# Patient Record
Sex: MALE | Race: WHITE | Employment: OTHER | URBAN - METROPOLITAN AREA
[De-identification: names, ages, dates, MRNs, and addresses within clinical notes are randomized per-mention and may not be internally consistent; named-entity substitution may affect disease eponyms.]

---

## 2017-01-26 ENCOUNTER — HOSPITAL ENCOUNTER (OUTPATIENT)
Dept: MRI IMAGING | Age: 77
Discharge: HOME | End: 2017-01-26
Attending: PSYCHIATRY & NEUROLOGY
Payer: MEDICARE

## 2017-01-26 DIAGNOSIS — F03.90 UNSPECIFIED DEMENTIA WITHOUT BEHAVIORAL DISTURBANCE: ICD-10-CM

## 2017-01-26 LAB — CREAT BLD-MCNC: 1.5 MG/DL (ref 0.6–1)

## 2017-01-26 PROCEDURE — 82565 ASSAY OF CREATININE: CPT

## 2017-01-26 PROCEDURE — 74011250636 HC RX REV CODE- 250/636: Performed by: PSYCHIATRY & NEUROLOGY

## 2017-01-26 PROCEDURE — 70553 MRI BRAIN STEM W/O & W/DYE: CPT

## 2017-01-26 PROCEDURE — A9577 INJ MULTIHANCE: HCPCS | Performed by: PSYCHIATRY & NEUROLOGY

## 2017-01-26 RX ORDER — SODIUM CHLORIDE 0.9 % (FLUSH) 0.9 %
10 SYRINGE (ML) INJECTION
Status: COMPLETED | OUTPATIENT
Start: 2017-01-26 | End: 2017-01-26

## 2017-01-26 RX ADMIN — Medication 10 ML: at 11:29

## 2017-01-26 RX ADMIN — GADOBENATE DIMEGLUMINE 18 ML: 529 INJECTION, SOLUTION INTRAVENOUS at 11:29

## 2017-03-31 PROBLEM — C64.9 RENAL CANCER (HCC): Status: ACTIVE | Noted: 2017-03-31

## 2017-03-31 PROCEDURE — 88112 CYTOPATH CELL ENHANCE TECH: CPT | Performed by: UROLOGY

## 2017-04-03 ENCOUNTER — HOSPITAL ENCOUNTER (OUTPATIENT)
Dept: LAB | Age: 77
Discharge: HOME | End: 2017-04-03

## 2017-09-29 PROCEDURE — 88112 CYTOPATH CELL ENHANCE TECH: CPT | Performed by: UROLOGY

## 2017-10-02 ENCOUNTER — HOSPITAL ENCOUNTER (OUTPATIENT)
Dept: LAB | Age: 77
Discharge: HOME | End: 2017-10-02

## 2017-10-09 ENCOUNTER — HOSPITAL ENCOUNTER (OUTPATIENT)
Dept: LAB | Age: 77
Discharge: HOME | End: 2017-10-09

## 2018-07-27 PROBLEM — C65.2: Status: ACTIVE | Noted: 2018-07-27

## 2018-07-27 PROBLEM — C66.2: Status: ACTIVE | Noted: 2018-07-27

## 2019-09-27 ENCOUNTER — HOSPITAL ENCOUNTER (OUTPATIENT)
Dept: GENERAL RADIOLOGY | Age: 79
Discharge: HOME | End: 2019-09-27
Payer: MEDICARE

## 2019-09-27 DIAGNOSIS — M54.6 THORACIC SPINE PAIN: ICD-10-CM

## 2019-09-27 PROCEDURE — 72072 X-RAY EXAM THORAC SPINE 3VWS: CPT

## 2019-10-10 ENCOUNTER — HOSPITAL ENCOUNTER (OUTPATIENT)
Dept: SURGERY | Age: 79
Discharge: HOME | End: 2019-10-10
Payer: MEDICARE

## 2019-10-10 VITALS
WEIGHT: 183.6 LBS | BODY MASS INDEX: 27.19 KG/M2 | HEIGHT: 69 IN | DIASTOLIC BLOOD PRESSURE: 67 MMHG | HEART RATE: 57 BPM | SYSTOLIC BLOOD PRESSURE: 149 MMHG | TEMPERATURE: 97.5 F | RESPIRATION RATE: 16 BRPM | OXYGEN SATURATION: 98 %

## 2019-10-10 LAB
GLUCOSE BLD STRIP.AUTO-MCNC: 86 MG/DL (ref 65–100)
HGB BLD-MCNC: 11.2 G/DL (ref 13.6–17.2)

## 2019-10-10 PROCEDURE — 85018 HEMOGLOBIN: CPT

## 2019-10-10 PROCEDURE — 82962 GLUCOSE BLOOD TEST: CPT

## 2019-10-10 RX ORDER — ASPIRIN 81 MG/1
81 TABLET ORAL
COMMUNITY
End: 2020-03-09

## 2019-10-10 NOTE — PERIOP NOTES
Patient confirms name and . Order to obtain consent  found in EHR and  matches case posting. Type 1b surgery, PAT assessment complete. Labs per surgeon: none  Labs per anesthesia protocol: Hgb  EKG: not required  Glucose: 86    Medication list nor Rx bottles visualized today. Patient provided with list of medications after discussing from memory. Instructions given to compare list to home medications and call PAT any corrections or additions. Understanding verbalized. Hibiclens and instructions given per hospital policy. Patient provided with and instructed on educational handouts including Guide to Surgery, Pain Management, Hand Hygiene, Blood Transfusion Education, and [facility] Brochure. Patient answered medical/surgical history questions at their best of ability. All prior to admission medications documented in [facility]. Patient instructed to continue previous medications as prescribed prior to surgery and to take the following medications the day of surgery according to anesthesia guidelines with a small sip of water: lebetalol. Patient instructed to hold all vitamins 7 days prior to surgery and NSAIDS 5 days prior to surgery, patient verbalized understanding. Medications to be held: Vitamins and supplements, holding Plavix per surgeon (permission to hold per Dr. Reji Jett in EHR- reports last dose as 10/8/2019). Patient has history of heart stents and stroke. Called Dr. Elfego Whitaker office and spoke to [facility] who states it's ok for patient to take 81 mg aspirin nightly while off Plavix. Patient and wife verbalize understanding of these instructions. Patient verbalizes understanding the following medications should not be taken morning of surgery: alogliptin, and that failure to follow these instructions may result in surgery being cancelled/ rescheduled.       Patient instructed on the following:  Arrive at [facility], time of arrival to be called the day before by 1700  NPO after midnight including gum, mints, and ice chips. Responsible adult must drive patient to the hospital, stay during surgery, and patient will require supervision 24 hours after anesthesia. Use antibacterial soap in shower the night before surgery and on the morning of surgery. Leave all valuables (money and jewelry) at home but bring insurance card and ID on       DOS. Do not wear make-up, nail polish, lotions, cologne, perfumes, powders, or oil on skin. Patient teach back successful and patient demonstrates knowledge of instruction.

## 2019-10-10 NOTE — PERIOP NOTES
Recent Results (from the past 12 hour(s))   GLUCOSE, POC    Collection Time: 10/10/19 12:37 PM   Result Value Ref Range    Glucose (POC) 86 65 - 100 mg/dL   HEMOGLOBIN    Collection Time: 10/10/19 12:45 PM   Result Value Ref Range    HGB 11.2 (L) 13.6 - 17.2 g/dL     Labs checked. WDL.

## 2019-10-14 ENCOUNTER — ANESTHESIA EVENT (OUTPATIENT)
Dept: SURGERY | Age: 79
End: 2019-10-14
Payer: MEDICARE

## 2019-10-14 NOTE — ANESTHESIA PREPROCEDURE EVALUATION
Anesthetic History   No history of anesthetic complications            Review of Systems / Medical History  Patient summary reviewed and pertinent labs reviewed    Pulmonary    COPD: mild    Sleep apnea: No treatment  Smoker         Neuro/Psych       CVA (Left eye impairment)       Cardiovascular    Hypertension: well controlled          CAD, CABG/stent, cardiac stents and CABG ('01 - on bASA)    Exercise tolerance: <4 METS     GI/Hepatic/Renal     GERD: well controlled      PUD     Endo/Other    Diabetes: well controlled, type 2         Other Findings            Physical Exam    Airway  Mallampati: II  TM Distance: 4 - 6 cm  Neck ROM: normal range of motion   Mouth opening: Normal     Cardiovascular  Regular rate and rhythm,  S1 and S2 normal,  no murmur, click, rub, or gallop  Rhythm: regular  Rate: normal         Dental    Dentition: Edentulous and Poor dentition     Pulmonary  Breath sounds clear to auscultation               Abdominal         Other Findings            Anesthetic Plan    ASA: 3  Anesthesia type: general          Induction: Intravenous  Anesthetic plan and risks discussed with: Patient and Spouse

## 2019-10-15 ENCOUNTER — ANESTHESIA (OUTPATIENT)
Dept: SURGERY | Age: 79
End: 2019-10-15
Payer: MEDICARE

## 2019-10-15 ENCOUNTER — HOSPITAL ENCOUNTER (OUTPATIENT)
Age: 79
Setting detail: OUTPATIENT SURGERY
Discharge: HOME | End: 2019-10-15
Attending: UROLOGY | Admitting: UROLOGY
Payer: MEDICARE

## 2019-10-15 VITALS
OXYGEN SATURATION: 95 % | SYSTOLIC BLOOD PRESSURE: 172 MMHG | WEIGHT: 180.4 LBS | DIASTOLIC BLOOD PRESSURE: 70 MMHG | HEIGHT: 69 IN | BODY MASS INDEX: 26.72 KG/M2 | RESPIRATION RATE: 12 BRPM | TEMPERATURE: 98.6 F | HEART RATE: 63 BPM

## 2019-10-15 LAB
GLUCOSE BLD STRIP.AUTO-MCNC: 107 MG/DL (ref 65–100)
POTASSIUM BLD-SCNC: 5.1 MMOL/L (ref 3.5–5.1)

## 2019-10-15 PROCEDURE — 74011250636 HC RX REV CODE- 250/636: Performed by: ANESTHESIOLOGY

## 2019-10-15 PROCEDURE — 77030010509 HC AIRWY LMA MSK TELE -A: Performed by: ANESTHESIOLOGY

## 2019-10-15 PROCEDURE — 76210000006 HC OR PH I REC 0.5 TO 1 HR: Performed by: UROLOGY

## 2019-10-15 PROCEDURE — 77030040361 HC SLV COMPR DVT MDII -B: Performed by: UROLOGY

## 2019-10-15 PROCEDURE — 74011250636 HC RX REV CODE- 250/636

## 2019-10-15 PROCEDURE — 77030019940 HC BLNKT HYPOTHRM STRY -B: Performed by: ANESTHESIOLOGY

## 2019-10-15 PROCEDURE — 76210000020 HC REC RM PH II FIRST 0.5 HR: Performed by: UROLOGY

## 2019-10-15 PROCEDURE — 76060000032 HC ANESTHESIA 0.5 TO 1 HR: Performed by: UROLOGY

## 2019-10-15 PROCEDURE — 74011250636 HC RX REV CODE- 250/636: Performed by: UROLOGY

## 2019-10-15 PROCEDURE — 84132 ASSAY OF SERUM POTASSIUM: CPT

## 2019-10-15 PROCEDURE — 88305 TISSUE EXAM BY PATHOLOGIST: CPT

## 2019-10-15 PROCEDURE — 74011000250 HC RX REV CODE- 250

## 2019-10-15 PROCEDURE — 77030019927 HC TBNG IRR CYSTO BAXT -A: Performed by: UROLOGY

## 2019-10-15 PROCEDURE — 76010000138 HC OR TIME 0.5 TO 1 HR: Performed by: UROLOGY

## 2019-10-15 PROCEDURE — 77030018832 HC SOL IRR H20 ICUM -A: Performed by: UROLOGY

## 2019-10-15 PROCEDURE — 82962 GLUCOSE BLOOD TEST: CPT

## 2019-10-15 RX ORDER — PROPOFOL 10 MG/ML
INJECTION, EMULSION INTRAVENOUS AS NEEDED
Status: DISCONTINUED | OUTPATIENT
Start: 2019-10-15 | End: 2019-10-15 | Stop reason: HOSPADM

## 2019-10-15 RX ORDER — ONDANSETRON 2 MG/ML
INJECTION INTRAMUSCULAR; INTRAVENOUS AS NEEDED
Status: DISCONTINUED | OUTPATIENT
Start: 2019-10-15 | End: 2019-10-15 | Stop reason: HOSPADM

## 2019-10-15 RX ORDER — ACETAMINOPHEN 500 MG
1000 TABLET ORAL ONCE
Status: DISCONTINUED | OUTPATIENT
Start: 2019-10-15 | End: 2019-10-15 | Stop reason: HOSPADM

## 2019-10-15 RX ORDER — OXYCODONE HYDROCHLORIDE 5 MG/1
5 TABLET ORAL
Status: DISCONTINUED | OUTPATIENT
Start: 2019-10-15 | End: 2019-10-15 | Stop reason: HOSPADM

## 2019-10-15 RX ORDER — SODIUM CHLORIDE 0.9 % (FLUSH) 0.9 %
5-40 SYRINGE (ML) INJECTION EVERY 8 HOURS
Status: DISCONTINUED | OUTPATIENT
Start: 2019-10-15 | End: 2019-10-15 | Stop reason: HOSPADM

## 2019-10-15 RX ORDER — OXYCODONE HYDROCHLORIDE 5 MG/1
10 TABLET ORAL
Status: DISCONTINUED | OUTPATIENT
Start: 2019-10-15 | End: 2019-10-15 | Stop reason: HOSPADM

## 2019-10-15 RX ORDER — EPHEDRINE SULFATE 50 MG/ML
INJECTION, SOLUTION INTRAVENOUS AS NEEDED
Status: DISCONTINUED | OUTPATIENT
Start: 2019-10-15 | End: 2019-10-15 | Stop reason: HOSPADM

## 2019-10-15 RX ORDER — CEFAZOLIN SODIUM/WATER 2 G/20 ML
2 SYRINGE (ML) INTRAVENOUS
Status: COMPLETED | OUTPATIENT
Start: 2019-10-15 | End: 2019-10-15

## 2019-10-15 RX ORDER — LIDOCAINE HYDROCHLORIDE 20 MG/ML
INJECTION, SOLUTION EPIDURAL; INFILTRATION; INTRACAUDAL; PERINEURAL AS NEEDED
Status: DISCONTINUED | OUTPATIENT
Start: 2019-10-15 | End: 2019-10-15 | Stop reason: HOSPADM

## 2019-10-15 RX ORDER — DIPHENHYDRAMINE HYDROCHLORIDE 50 MG/ML
12.5 INJECTION, SOLUTION INTRAMUSCULAR; INTRAVENOUS
Status: DISCONTINUED | OUTPATIENT
Start: 2019-10-15 | End: 2019-10-15 | Stop reason: HOSPADM

## 2019-10-15 RX ORDER — SODIUM CHLORIDE 0.9 % (FLUSH) 0.9 %
5-40 SYRINGE (ML) INJECTION AS NEEDED
Status: DISCONTINUED | OUTPATIENT
Start: 2019-10-15 | End: 2019-10-15 | Stop reason: HOSPADM

## 2019-10-15 RX ORDER — NALOXONE HYDROCHLORIDE 0.4 MG/ML
0.2 INJECTION, SOLUTION INTRAMUSCULAR; INTRAVENOUS; SUBCUTANEOUS AS NEEDED
Status: DISCONTINUED | OUTPATIENT
Start: 2019-10-15 | End: 2019-10-15 | Stop reason: HOSPADM

## 2019-10-15 RX ORDER — FENTANYL CITRATE 50 UG/ML
INJECTION, SOLUTION INTRAMUSCULAR; INTRAVENOUS AS NEEDED
Status: DISCONTINUED | OUTPATIENT
Start: 2019-10-15 | End: 2019-10-15 | Stop reason: HOSPADM

## 2019-10-15 RX ORDER — FENTANYL CITRATE 50 UG/ML
100 INJECTION, SOLUTION INTRAMUSCULAR; INTRAVENOUS ONCE
Status: DISCONTINUED | OUTPATIENT
Start: 2019-10-15 | End: 2019-10-15 | Stop reason: HOSPADM

## 2019-10-15 RX ORDER — DEXAMETHASONE SODIUM PHOSPHATE 4 MG/ML
INJECTION, SOLUTION INTRA-ARTICULAR; INTRALESIONAL; INTRAMUSCULAR; INTRAVENOUS; SOFT TISSUE AS NEEDED
Status: DISCONTINUED | OUTPATIENT
Start: 2019-10-15 | End: 2019-10-15 | Stop reason: HOSPADM

## 2019-10-15 RX ORDER — HYDROMORPHONE HYDROCHLORIDE 2 MG/ML
0.5 INJECTION, SOLUTION INTRAMUSCULAR; INTRAVENOUS; SUBCUTANEOUS
Status: DISCONTINUED | OUTPATIENT
Start: 2019-10-15 | End: 2019-10-15 | Stop reason: HOSPADM

## 2019-10-15 RX ORDER — SODIUM CHLORIDE, SODIUM LACTATE, POTASSIUM CHLORIDE, CALCIUM CHLORIDE 600; 310; 30; 20 MG/100ML; MG/100ML; MG/100ML; MG/100ML
100 INJECTION, SOLUTION INTRAVENOUS CONTINUOUS
Status: DISCONTINUED | OUTPATIENT
Start: 2019-10-15 | End: 2019-10-15 | Stop reason: HOSPADM

## 2019-10-15 RX ORDER — LIDOCAINE HYDROCHLORIDE 10 MG/ML
0.1 INJECTION INFILTRATION; PERINEURAL AS NEEDED
Status: DISCONTINUED | OUTPATIENT
Start: 2019-10-15 | End: 2019-10-15 | Stop reason: HOSPADM

## 2019-10-15 RX ORDER — FLUMAZENIL 0.1 MG/ML
0.2 INJECTION INTRAVENOUS
Status: DISCONTINUED | OUTPATIENT
Start: 2019-10-15 | End: 2019-10-15 | Stop reason: HOSPADM

## 2019-10-15 RX ADMIN — SODIUM CHLORIDE, SODIUM LACTATE, POTASSIUM CHLORIDE, AND CALCIUM CHLORIDE 100 ML/HR: 600; 310; 30; 20 INJECTION, SOLUTION INTRAVENOUS at 09:48

## 2019-10-15 RX ADMIN — LIDOCAINE HYDROCHLORIDE 60 MG: 20 INJECTION, SOLUTION EPIDURAL; INFILTRATION; INTRACAUDAL; PERINEURAL at 12:45

## 2019-10-15 RX ADMIN — DEXAMETHASONE SODIUM PHOSPHATE 4 MG: 4 INJECTION, SOLUTION INTRA-ARTICULAR; INTRALESIONAL; INTRAMUSCULAR; INTRAVENOUS; SOFT TISSUE at 12:55

## 2019-10-15 RX ADMIN — FENTANYL CITRATE 50 MCG: 50 INJECTION, SOLUTION INTRAMUSCULAR; INTRAVENOUS at 12:45

## 2019-10-15 RX ADMIN — PROPOFOL 150 MG: 10 INJECTION, EMULSION INTRAVENOUS at 12:45

## 2019-10-15 RX ADMIN — Medication 2 G: at 12:51

## 2019-10-15 RX ADMIN — EPHEDRINE SULFATE 5 MG: 50 INJECTION, SOLUTION INTRAVENOUS at 12:51

## 2019-10-15 RX ADMIN — ONDANSETRON 4 MG: 2 INJECTION INTRAMUSCULAR; INTRAVENOUS at 12:55

## 2019-10-15 NOTE — ANESTHESIA POSTPROCEDURE EVALUATION
Procedure(s):  CYSTOSCOPY W/ BLADDER BIOPSY. general    Anesthesia Post Evaluation      Multimodal analgesia: multimodal analgesia used between 6 hours prior to anesthesia start to PACU discharge  Patient location during evaluation: PACU  Patient participation: complete - patient participated  Level of consciousness: awake and alert  Pain management: adequate  Airway patency: patent  Anesthetic complications: no  Cardiovascular status: acceptable and hemodynamically stable  Respiratory status: acceptable  Hydration status: acceptable        Vitals Value Taken Time   /65 10/15/2019  1:41 PM   Temp 37 °C (98.6 °F) 10/15/2019  1:14 PM   Pulse 62 10/15/2019  1:43 PM   Resp 13 10/15/2019  1:43 PM   SpO2 97 % 10/15/2019  1:43 PM   Vitals shown include unvalidated device data.

## 2019-10-15 NOTE — BRIEF OP NOTE
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

## 2019-10-15 NOTE — OP NOTES
[facility]  OPERATIVE REPORT    Name:  Benson Vines  MR#:  [id number]  :  1940  ACCOUNT #:  [de-identified]  DATE OF SERVICE:  10/15/2019    PREOPERATIVE DIAGNOSIS:  Bladder tumor. POSTOPERATIVE DIAGNOSIS:  Bladder tumor. PROCEDURE PERFORMED:  Cystoscopy and bladder biopsy. SURGEON:  Anayeli Oh MD    ASSISTANT:  None. ANESTHESIA:  General.    COMPLICATIONS:  None. SPECIMENS REMOVED:  Bladder tumor. IMPLANTS:  None. ESTIMATED BLOOD LOSS:  Minimal.    FINDINGS:  A 1-cm papillary tumor posterior to the right ureteral orifice. DESCRIPTION OF PROCEDURE:  The patient was given a general anesthetic, placed in the dorsal lithotomy position. He was prepped and draped in sterile fashion. I inserted a 22-[place]at[place] cystoscope into the urethra using video camera and 30 degrees lens. The anterior urethra was normal.  Prostate is status post prostatectomy. Bladder was entered and both ureteral orifices were visualized. There is a small 1-cm papillary tumor just cephalad to the right ureteral orifice. A cold cup biopsy forceps were used to remove the tumor and it was sent in formalin to pathology. The base of this was cauterized with a Bugbee electrode. Hemostasis appeared good. Scope was removed after draining the bladder. PLAN:  He will be discharged home. I will contact the patient about his pathology and arrange followup.         Tita Santos MD    [id number]  D:  10/15/2019 13:25  T:  10/15/2019 19:02  JOB #:  [id number]

## 2019-10-15 NOTE — DISCHARGE INSTRUCTIONS
Restart Plavix in 3 days. Cystoscopy: What to Expect at [address]  A cystoscopy is a procedure that lets a doctor look inside of the bladder and the urethra. The urethra is the tube that carries urine from the bladder to outside the body. The doctor uses a thin, lighted tube called a cystoscope to look for kidney or bladder stones, tumors, bleeding, or infection. After the cystoscopy, your urethra may be sore at first, and it may burn when you urinate for the first few days after the procedure. You may feel the need to urinate more often, and your urine may be pink. These symptoms should get better in 1 or 2 days. You will probably be able to go back to work or most of your usual activities in 1 or 2 days. How can you care for yourself at home? Activity  · Rest when you feel tired. Getting enough sleep will help you recover. · Try to walk each day. Start by walking a little more than you did the day before. Bit by bit, increase the amount you walk. Walking boosts blood flow and helps prevent pneumonia and constipation. · Avoid strenuous activities, such as bicycle riding, jogging, weight lifting, or aerobic exercise, until your doctor says it is okay. · Ask your doctor when you can drive again. · Most people are able to return to work within 1 or 2 days after the procedure. · You may shower and take baths as usual.  · Ask your doctor when it is okay for you to have sex. Diet  · You can eat your normal diet. If your stomach is upset, try bland, low-fat foods like plain rice, broiled chicken, toast, and yogurt. · Drink plenty of fluids (unless your doctor tells you not to). Medicines  · Take pain medicines exactly as directed. ¨ If the doctor gave you a prescription medicine for pain, take it as prescribed. ¨ If you are not taking a prescription pain medicine, ask your doctor if you can take an over-the-counter medicine.   · If you think your pain medicine is making you sick to your stomach:  ¨ Take your medicine after meals (unless your doctor has told you not to). ¨ Ask your doctor for a different pain medicine. · If your doctor prescribed antibiotics, take them as directed. Do not stop taking them just because you feel better. You need to take the full course of antibiotics. Follow-up care is a key part of your treatment and safety. Be sure to make and go to all appointments, and call your doctor if you are having problems. It's also a good idea to know your test results and keep a list of the medicines you take. When should you call for help? Call 911 anytime you think you may need emergency care. For example, call if:  · You passed out (lost consciousness). · You have severe trouble breathing. · You have sudden chest pain and shortness of breath, or you cough up blood. · You have severe belly pain. Call your doctor now or seek immediate medical care if:  · You are sick to your stomach or cannot keep fluids down. · Your urine is still red or you see blood clots after you have urinated several times. · You have trouble passing urine or stool, especially if you have pain or swelling in your lower belly. · You have signs of a blood clot, such as:  ¨ Pain in your calf, back of the knee, thigh, or groin. ¨ Redness and swelling in your leg or groin. · You develop a fever or severe chills. · You have pain in your back just below your rib cage. This is called flank pain. Watch closely for changes in your health, and be sure to contact your doctor if:  · A burning feeling is normal for a day or two after the test, but call if it does not get better. · You have a frequent urge to urinate but can pass only small amounts of urine. · It is normal for the urine to have a pinkish color for a few days after the test, but call if it does not get better or if your urine is cloudy, smells bad, or has pus in it.     After general anesthesia or intravenous sedation, for 24 hours or while taking prescription Narcotics:  · Limit your activities  · A responsible adult needs to be with you for the next 24 hours  · Do not drive and operate hazardous machinery  · Do not make important personal or business decisions  · Do  not drink alcoholic beverages  · If you have not urinated within 8 hours after discharge, please contact your surgeon on call. · If you have sleep apnea and have a CPAP machine, please use it for all naps and sleeping. · Please use caution when taking narcotics and any of your home medications that may cause drowsiness. *  Please give a list of your current medications to your Primary Care Provider. *  Please update this list whenever your medications are discontinued, doses are      changed, or new medications (including over-the-counter products) are added. *  Please carry medication information at all times in case of emergency situations. These are general instructions for a healthy lifestyle:  No smoking/ No tobacco products/ Avoid exposure to second hand smoke  Surgeon General's Warning:  Quitting smoking now greatly reduces serious risk to your health. Obesity, smoking, and sedentary lifestyle greatly increases your risk for illness  A healthy diet, regular physical exercise & weight monitoring are important for maintaining a healthy lifestyle    You may be retaining fluid if you have a history of heart failure or if you experience any of the following symptoms:  Weight gain of 3 pounds or more overnight or 5 pounds in a week, increased swelling in our hands or feet or shortness of breath while lying flat in bed. Please call your doctor as soon as you notice any of these symptoms; do not wait until your next office visit.

## 2019-10-15 NOTE — H&P
[employer]   : 1940   HPI   [de-identified] y.o., male Has history of CRF (creatinine 1.8 in ). Has CAD, s/p CABG in     . History of renal stent by Dr. Rebeca Melo, possibly coronary   stents. Is on Plavix, goes to [facility]. Has CRF, creatinine 1.8. Has smoked since he was a teenager, currently 1 ppd. Has history   of stroke. Renal ultrasound done to workup in the left renal lesion. Shows   a 4.5-cm hypoechoic mass concerning for a solid mass such as   renal cell carcinoma. Located in left upper pole. Also a   nodular bladder mucosa possibly concerning for bladder tumor. MRI of the kidneys showed a left renal mass. Could not tell   whether it was a renal cell carcinoma or transitional cell   carcinoma. Had TURBT on May 23, 2013. . Pathology from his bladder tumor   shows high grade stage T1   urothelial cancer. Muscularis propria present and does not show   cancer. MAG 3 renal scan performed on 13---Very small left kidney   accounts for 16% of total renal function. Essentially normal   right renal function. No evidence of obstruction on either side. Serum creatinine of 1.84 on May 16, 2013. --S/P left hand-assisted laparoscopic nephrectomy 2013. 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 4.2 cm. In  creatinine was 1.73, hgb 10.5. Has had BCG x 6 in   . CT in  shows no evidence of cancer recurrence. Had BCG x 3 , finished in March and 2014. Polly Ovalle bCG x 3 in   . Need to continue maintenance BCG until 2017. Had atypical cytology, negative FISH in 2014. Negative cysto. Had CT 5-6-15:Impression:  1. Stable left nephrectomy. Right kidney stable with again a few   scattered tiny  cysts evident. Nothing acute seen in the abdomen and pelvis and   no evidence for  metastatic disease progression. Finished BCG maintenance. .   In , had atypical cytology, positive FISH.    CXR and CT were negative. In 5-16 had negative cysto, atypical cytology, positive FISH. Atypical cytology in 3-17. He has been having pain in his thoracic spine. Here for cysto.         Past Medical History:   Diagnosis Date    Allergic rhinitis, cause unspecified 2/27/2014    Anemia, unspecified 2/27/2014    Atherosclerosis of renal artery ([facility] [address]     2001    CAD (coronary artery disease) 2001    4 vessel cabg, cardiac stents prior to this    Cancer [facility]) 2013    bladder/ L kidney    Chronic airway obstruction, not elsewhere classified 2/27/2014    Chronic kidney disease     hx of CRF, creatinine 1.8    COPD     on no meds or oxygen; smoker of 60+ yrs; diminished lung sounds but clear 6/25/13    Coronary atherosclerosis of native coronary vessel 2/27/2014    Depressive disorder, not elsewhere classified 2/27/2014    Diabetes ([facility] [address] dx 2000    type 2, oral med, does not check his glucose, unsure last HA1c, rare episode hypo    Diverticulosis of colon (without mention of hemorrhage) 2/27/2014    Dyslipidemia     controlled with med    Esophagitis, unspecified 2/27/2014    Essential and other specified forms of tremor 2/27/2014    Essential hypertension, benign 2/27/2014    GERD (gastroesophageal reflux disease)     controlled with omeprazole    Gross hematuria 2/27/2014    Hematuria, unspecified 2/27/2014    Hypertension     controlled with meds    Malignant neoplasm of bladder, 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    Malignant neoplasm of trigone of urinary bladder ([facility] [address] 2/27/2014    Microscopic hematuria 2/27/2014    Osteoarthrosis, unspecified whether generalized or localized, unspecified site 2/27/2014    Other peripheral vascular disease(443.89) 2/27/2014    Polyneuropathy in diabetes(357.2) 2/27/2014    Proteinuria 2/27/2014    Psychiatric disorder     gets \"aggravated\", treated with Celexa    PUD (peptic ulcer disease) 1970's    Pure hypercholesterolemia 2/27/2014    Renal artery stenosis ([facility] [address] 2/27/2014    Renal sclerosis, unspecified 2/27/2014    left    Respiratory insufficiency 1/13/2016    Stroke [facility])     CVA x 2, (last was fall of 2011), no residual weakness; L eye impaired    Tobacco use disorder 2/27/2014    Unspecified gastritis and gastroduodenitis without mention of hemorrhage 2/27/2014    Unspecified hereditary and idiopathic peripheral neuropathy 2/27/2014    Unspecified sleep apnea     does not wear cpap    Unspecified transient cerebral ischemia 2/27/2014           Past Surgical History:   Procedure Laterality Date    CARDIAC SURG PROCEDURE UNLIST  2001    4 vessel CABG, cardiac stents    HX COLONOSCOPY      HX NEPHRECTOMY Left     HX UROLOGICAL      TURBT    VASCULAR SURGERY PROCEDURE UNLIST  2001    renal stent in L kidney            Current Outpatient Medications   Medication Sig Dispense Refill    alogliptin (NESINA) 12.5 mg tablet Take 12.5 mg by mouth daily.  citalopram (CELEXA) 40 mg tablet Take 1 Tab by mouth nightly. 90 Tab 3    cranberry conc-ascorbic acid 4,200-20 mg cap Take by mouth.  ascorbic acid (VITAMIN C) 500 mg tablet Take by mouth.  clopidogrel (PLAVIX) 75 mg tablet Take 75 mg by mouth daily. Last dose 7/2/13      selenium 200 mcg Tab Take 1 Tab by mouth every morning. Last dose 7/2/13      labetalol (NORMODYNE) 200 mg tablet Take 200 mg by mouth two (2) times a day.  omeprazole (PRILOSEC) 20 mg capsule Take 20 mg by mouth daily.  losartan (COZAAR) 25 mg tablet Take 50 mg by mouth nightly.  cyanocobalamin (VITAMIN B12) 100 mcg tablet Take 100 mcg by mouth daily. Last dose 7/2/13      saw palmetto fruit 450 mg Cap Take 1 Cap by mouth daily. Hold for surgery- last dose 7/2/13      simvastatin (ZOCOR) 40 mg tablet Take 40 mg by mouth nightly.                   Current Facility-Administered Medications   Medication Dose Route Frequency Provider Last Rate Last Dose    BCG Intravesical (RUDOLPH) injection 50 mg 50 mg IntraVESical Q7D Francena Snuffer, CMA  50 mg at 01/18/16 1100    BCG Intravesical (RUDOLPH) injection 50 mg 50 mg IntraVESical Q7D Kayode, Viky M  50 mg at 01/11/16 1100    BCG Intravesical (RUDOLPH) injection 50 mg 50 mg IntraVESical Q7D Francena Snuffer, CMA  50 mg at 01/04/16 1100    BCG Intravesical (RUDOLPH) injection 50 mg 50 mg IntraVESical Q7D Afua Macdonald, CMA      BCG Intravesical (RUDOLPH) injection 50 mg 50 mg IntraVESical Q7D MacdonaldAfua garcia, CMA      BCG Intravesical (RUDOLPH) injection 50 mg 50 mg IntraVESical Q7D Francena Snuffer, CMA  50 mg at 06/12/15 1107    BCG Intravesical (RUDOLPH) injection 50 mg 50 mg IntraVESical Q7D Alison Guevara      BCG Intravesical (RUDOLPH) injection 50 mg 50 mg IntraVESical Q7D Opal Dolores A, LPN      BCG Intravesical (RUDOLPH) injection 50 mg 50 mg IntraVESical Q7D Francena Snuffer, CMA  50 mg at 12/15/14 1000    BCG Intravesical (RUDOLPH) injection 50 mg 50 mg IntraVESical Q7D Opal Dolores A, LPN  50 mg at 87/03/70 1049    BCG Intravesical (RUDOLPH) injection 50 mg 50 mg IntraVESical Q7D Francena Snuffer, CMA  50 mg at 06/20/14 1050    BCG Intravesical (RUDOLPH) injection 50 mg 50 mg IntraVESical Q7D Kayode, Viky M  50 mg at 06/13/14 1032           Allergies   Allergen Reactions    Ciprofloxacin Other (comments)     weakness     Social History           Socioeconomic History    Marital status:      Spouse name: Not on file    Number of children: Not on file    Years of education: Not on file    Highest education level: Not on file   Occupational History    Not on file   Social Needs    Financial resource strain: Not on file    Food insecurity:     Worry: Not on file     Inability: Not on file    Transportation needs:     Medical: Not on file     Non-medical: Not on file   Tobacco Use    Smoking status: Current Every Day Smoker     Packs/day: 1.00     Years: 63.00     Pack years: 63.00    Smokeless tobacco: Never Used   Substance and Sexual Activity    Alcohol use: No    Drug use: No     Types: Prescription, OTC    Sexual activity: Not on file   Lifestyle    Physical activity:     Days per week: Not on file     Minutes per session: Not on file    Stress: Not on file   Relationships    Social connections:     Talks on phone: Not on file     Gets together: Not on file     Attends [employer] service: Not on file     Active member of club or organization: Not on file     Attends meetings of clubs or organizations: Not on file     Relationship status: Not on file    Intimate partner violence:     Fear of current or ex partner: Not on file     Emotionally abused: Not on file     Physically abused: Not on file     Forced sexual activity: Not on file   Other Topics Concern    Not on file   Social History Narrative    Not on file           Family History   Problem Relation Age of Onset    Diabetes Mother     Heart Disease Mother     Heart Attack Mother     Myocardial Infarction    Stroke Father     Alcohol abuse Father     Diabetes Son      Visit Vitals   /67   Pulse 64   Temp 95.7 °F (35.4 °C)   Physical Exam   General   Mental Status - Patient is alert and oriented X3. Build & Nutrition - Well nourished. Chest and Lung Exam   Chest and lung exam reveals - normal excursion with symmetric chest walls, quiet, even and easy respiratory effort with no use of accessory muscles and on auscultation, normal breath sounds, no adventitious sounds and normal vocal resonance. Cardiovascular   Cardiovascular examination reveals - normal heart sounds, regular rate and rhythm with no murmurs. Abdomen   Palpation/Percussion: Palpation and Percussion of the abdomen reveal - Non Tender, No Rebound tenderness, No Rigidity (guarding), No hepatosplenomegaly, No Palpable abdominal masses and Soft. Hernia - Bilateral - No Hernia(s) present.    UA - Dipstick          Results for orders placed or performed in visit on 09/06/19   AMB POC URINALYSIS DIP STICK AUTO W/ MICRO (PGU) Status: None   Result Value Ref Range Status    Glucose (UA POC) Negative Negative mg/dL Final    Bilirubin (UA POC) Negative Negative Final    Ketones (UA POC) Negative Negative Final    Specific gravity (UA POC) 1.025 1.001 - 1.035 Final    Blood (UA POC) Negative Negative Final    pH (UA POC) 5.0 4.6 - 8.0 Final    Protein (UA POC) 30  Negative Final    Urobilinogen (POC) 0.2 mg/dL  Final    Nitrites (UA POC) Negative Negative Final    Leukocyte esterase (UA POC) Negative Negative Final     UA - Micro   WBC - 0   RBC - 0   Bacteria - 0   Epith - 0   Cystoscopy Procedure Note   Procedure Details   The risks, benefits, complications, treatment options, and expected outcomes were discussed with the patient. The patient concurred with the proposed plan, giving informed consent. Cystoscopy was performed today under local anesthesia, using sterile technique. The patient was placed in the lithotomy position, prepped with Betadine, and draped in the usual sterile fashion. A (Flexible)cystoscope was used to inspect both the urethra and bladder . Findings:   Anterior urethra: normal without strictures. Prostatic urethra: Trilobar prostatic hyperplasia, without bladder neck contracture. Bladder: 1+ trabeculation, with lesions. 1 cm papillary lesion lateral to right UO. Ureteral orifice(s) was/were seen right and bilateral;   Complications: None; patient tolerated the procedure well. Condition: stable   Assessment and Plan     ICD-10-CM ICD-9-CM    1. Malignant neoplasm of trigone of urinary bladder (HCC) C67.0 188.0 AMB POC URINALYSIS DIP STICK AUTO W/ MICRO (PGU)      CYSTOURETHROSCOPY   2. 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 History of kidney cancer Z85.528 V10.52    4. Chronic midline thoracic back pain M54.6 724.1 AMB POC X-RAY THORACIC SPINE 4 VW    G89.29 338.29      Has small bladder cancer recurrence.  Recommend cysto and bladder biopsy. Stop Plavix before .

## 2020-03-09 PROBLEM — N18.4 CKD (CHRONIC KIDNEY DISEASE) STAGE 4, GFR 15-29 ML/MIN (HCC): Status: ACTIVE | Noted: 2020-03-09

## 2022-03-19 PROBLEM — C64.9 RENAL CANCER (HCC): Status: ACTIVE | Noted: 2017-03-31

## 2022-03-20 PROBLEM — C66.2: Status: ACTIVE | Noted: 2018-07-27

## 2022-03-20 PROBLEM — C65.2: Status: ACTIVE | Noted: 2018-07-27

## 2022-03-20 PROBLEM — N18.4 CKD (CHRONIC KIDNEY DISEASE) STAGE 4, GFR 15-29 ML/MIN (HCC): Status: ACTIVE | Noted: 2020-03-09

## 2022-03-28 PROBLEM — C66.2: Status: RESOLVED | Noted: 2018-07-27 | Resolved: 2022-03-28

## 2022-03-28 PROBLEM — C64.9 RENAL CANCER (HCC): Status: RESOLVED | Noted: 2017-03-31 | Resolved: 2022-03-28

## 2022-03-28 PROBLEM — C65.2: Status: RESOLVED | Noted: 2018-07-27 | Resolved: 2022-03-28

## 2022-06-20 ENCOUNTER — APPOINTMENT (OUTPATIENT)
Dept: CT IMAGING | Age: 82
DRG: 085 | End: 2022-06-20
Payer: MEDICARE

## 2022-06-20 ENCOUNTER — APPOINTMENT (OUTPATIENT)
Dept: GENERAL RADIOLOGY | Age: 82
DRG: 085 | End: 2022-06-20
Payer: MEDICARE

## 2022-06-20 ENCOUNTER — HOSPITAL ENCOUNTER (INPATIENT)
Age: 82
LOS: 9 days | Discharge: NURSING, REHAB OR LONG-TERM CARE | DRG: 085 | End: 2022-06-30
Attending: STUDENT IN AN ORGANIZED HEALTH CARE EDUCATION/TRAINING PROGRAM
Payer: MEDICARE

## 2022-06-20 DIAGNOSIS — K92.2 ACUTE UPPER GASTROINTESTINAL BLEEDING: ICD-10-CM

## 2022-06-20 DIAGNOSIS — I60.9 SAH (SUBARACHNOID HEMORRHAGE) (HCC): Primary | ICD-10-CM

## 2022-06-20 DIAGNOSIS — E87.0 HYPERNATREMIA: ICD-10-CM

## 2022-06-20 DIAGNOSIS — I16.1 HYPERTENSIVE EMERGENCY: ICD-10-CM

## 2022-06-20 LAB
ALBUMIN SERPL-MCNC: 3.3 G/DL (ref 3.2–4.6)
ALBUMIN/GLOB SERPL: 1 {RATIO} (ref 1.2–3.5)
ALP SERPL-CCNC: 59 U/L (ref 50–136)
ALT SERPL-CCNC: 12 U/L (ref 12–65)
ANION GAP SERPL CALC-SCNC: 3 MMOL/L (ref 7–16)
AST SERPL-CCNC: 14 U/L (ref 15–37)
BILIRUB SERPL-MCNC: 0.3 MG/DL (ref 0.2–1.1)
BUN SERPL-MCNC: 25 MG/DL (ref 8–23)
CALCIUM SERPL-MCNC: 9.1 MG/DL (ref 8.3–10.4)
CHLORIDE SERPL-SCNC: 112 MMOL/L (ref 98–107)
CO2 SERPL-SCNC: 31 MMOL/L (ref 21–32)
CREAT SERPL-MCNC: 1.6 MG/DL (ref 0.8–1.5)
ERYTHROCYTE [DISTWIDTH] IN BLOOD BY AUTOMATED COUNT: 15.7 % (ref 11.9–14.6)
GLOBULIN SER CALC-MCNC: 3.2 G/DL (ref 2.3–3.5)
GLUCOSE SERPL-MCNC: 172 MG/DL (ref 65–100)
HCT VFR BLD AUTO: 35.4 % (ref 41.1–50.3)
HGB BLD-MCNC: 11.3 G/DL (ref 13.6–17.2)
INR BLD: 1.1 (ref 0.9–1.2)
MCH RBC QN AUTO: 26.3 PG (ref 26.1–32.9)
MCHC RBC AUTO-ENTMCNC: 31.9 G/DL (ref 31.4–35)
MCV RBC AUTO: 82.3 FL (ref 79.6–97.8)
NRBC # BLD: 0 K/UL (ref 0–0.2)
PLATELET # BLD AUTO: 249 K/UL (ref 150–450)
PMV BLD AUTO: 12 FL (ref 9.4–12.3)
POTASSIUM SERPL-SCNC: 4.1 MMOL/L (ref 3.5–5.1)
PROT SERPL-MCNC: 6.5 G/DL (ref 6.3–8.2)
PT BLD: 12.9 SECS (ref 9.6–11.6)
RBC # BLD AUTO: 4.3 M/UL (ref 4.23–5.6)
SODIUM SERPL-SCNC: 146 MMOL/L (ref 136–145)
WBC # BLD AUTO: 8.3 K/UL (ref 4.3–11.1)

## 2022-06-20 PROCEDURE — 85610 PROTHROMBIN TIME: CPT

## 2022-06-20 PROCEDURE — 93005 ELECTROCARDIOGRAM TRACING: CPT | Performed by: STUDENT IN AN ORGANIZED HEALTH CARE EDUCATION/TRAINING PROGRAM

## 2022-06-20 PROCEDURE — 2500000003 HC RX 250 WO HCPCS: Performed by: STUDENT IN AN ORGANIZED HEALTH CARE EDUCATION/TRAINING PROGRAM

## 2022-06-20 PROCEDURE — 71045 X-RAY EXAM CHEST 1 VIEW: CPT

## 2022-06-20 PROCEDURE — 70496 CT ANGIOGRAPHY HEAD: CPT

## 2022-06-20 PROCEDURE — 96376 TX/PRO/DX INJ SAME DRUG ADON: CPT

## 2022-06-20 PROCEDURE — 80047 BASIC METABLC PNL IONIZED CA: CPT

## 2022-06-20 PROCEDURE — 2580000003 HC RX 258: Performed by: STUDENT IN AN ORGANIZED HEALTH CARE EDUCATION/TRAINING PROGRAM

## 2022-06-20 PROCEDURE — 96374 THER/PROPH/DIAG INJ IV PUSH: CPT

## 2022-06-20 PROCEDURE — 99285 EMERGENCY DEPT VISIT HI MDM: CPT

## 2022-06-20 PROCEDURE — 6360000004 HC RX CONTRAST MEDICATION: Performed by: STUDENT IN AN ORGANIZED HEALTH CARE EDUCATION/TRAINING PROGRAM

## 2022-06-20 PROCEDURE — 70450 CT HEAD/BRAIN W/O DYE: CPT

## 2022-06-20 PROCEDURE — 80053 COMPREHEN METABOLIC PANEL: CPT

## 2022-06-20 PROCEDURE — 4A03X5D MEASUREMENT OF ARTERIAL FLOW, INTRACRANIAL, EXTERNAL APPROACH: ICD-10-PCS | Performed by: FAMILY MEDICINE

## 2022-06-20 PROCEDURE — 85027 COMPLETE CBC AUTOMATED: CPT

## 2022-06-20 RX ORDER — SODIUM CHLORIDE 0.9 % (FLUSH) 0.9 %
10 SYRINGE (ML) INJECTION
Status: COMPLETED | OUTPATIENT
Start: 2022-06-20 | End: 2022-06-20

## 2022-06-20 RX ORDER — 0.9 % SODIUM CHLORIDE 0.9 %
100 INTRAVENOUS SOLUTION INTRAVENOUS ONCE
Status: COMPLETED | OUTPATIENT
Start: 2022-06-20 | End: 2022-06-20

## 2022-06-20 RX ORDER — NICARDIPINE HYDROCHLORIDE 0.1 MG/ML
2.5-15 INJECTION INTRAVENOUS CONTINUOUS
Status: DISCONTINUED | OUTPATIENT
Start: 2022-06-20 | End: 2022-06-21 | Stop reason: RX

## 2022-06-20 RX ADMIN — SODIUM CHLORIDE, PRESERVATIVE FREE 10 ML: 5 INJECTION INTRAVENOUS at 22:57

## 2022-06-20 RX ADMIN — NICARDIPINE HYDROCHLORIDE 5 MG/HR: 0.1 INJECTION INTRAVENOUS at 22:25

## 2022-06-20 RX ADMIN — IOPAMIDOL 100 ML: 755 INJECTION, SOLUTION INTRAVENOUS at 22:57

## 2022-06-20 RX ADMIN — NICARDIPINE HYDROCHLORIDE 15 MG/HR: 0.1 INJECTION INTRAVENOUS at 23:27

## 2022-06-20 RX ADMIN — SODIUM CHLORIDE 100 ML: 9 INJECTION, SOLUTION INTRAVENOUS at 22:57

## 2022-06-20 RX ADMIN — NICARDIPINE HYDROCHLORIDE 10 MG/HR: 0.1 INJECTION INTRAVENOUS at 22:38

## 2022-06-20 RX ADMIN — NICARDIPINE HYDROCHLORIDE 12.5 MG/HR: 0.1 INJECTION INTRAVENOUS at 22:46

## 2022-06-20 RX ADMIN — NICARDIPINE HYDROCHLORIDE 15 MG/HR: 0.1 INJECTION INTRAVENOUS at 23:07

## 2022-06-20 ASSESSMENT — PAIN SCALES - GENERAL: PAINLEVEL_OUTOF10: 6

## 2022-06-20 ASSESSMENT — ENCOUNTER SYMPTOMS
PHOTOPHOBIA: 0
VOMITING: 0
BACK PAIN: 0
ABDOMINAL PAIN: 0
SINUS PAIN: 0
DIARRHEA: 0
SHORTNESS OF BREATH: 0
NAUSEA: 0

## 2022-06-20 ASSESSMENT — PAIN - FUNCTIONAL ASSESSMENT: PAIN_FUNCTIONAL_ASSESSMENT: 0-10

## 2022-06-21 ENCOUNTER — APPOINTMENT (OUTPATIENT)
Dept: CT IMAGING | Age: 82
DRG: 085 | End: 2022-06-21
Payer: MEDICARE

## 2022-06-21 PROBLEM — E11.9 TYPE 2 DIABETES MELLITUS (HCC): Status: ACTIVE | Noted: 2022-06-21

## 2022-06-21 PROBLEM — I77.79 DISSECTION OF LEFT SUBCLAVIAN ARTERY (HCC): Status: ACTIVE | Noted: 2022-06-21

## 2022-06-21 PROBLEM — I74.8 SUBCLAVIAN ARTERY THROMBOSIS (HCC): Status: ACTIVE | Noted: 2022-06-21

## 2022-06-21 PROBLEM — F03.90 DEMENTIA (HCC): Status: ACTIVE | Noted: 2022-06-21

## 2022-06-21 PROBLEM — I60.9 SUBARACHNOID HEMORRHAGE (HCC): Status: ACTIVE | Noted: 2022-06-21

## 2022-06-21 PROBLEM — N18.4 CKD (CHRONIC KIDNEY DISEASE) STAGE 4, GFR 15-29 ML/MIN (HCC): Status: ACTIVE | Noted: 2020-03-09

## 2022-06-21 LAB
ANION GAP SERPL CALC-SCNC: 4 MMOL/L (ref 7–16)
BUN SERPL-MCNC: 36 MG/DL (ref 8–23)
CALCIUM SERPL-MCNC: 8.8 MG/DL (ref 8.3–10.4)
CHLORIDE SERPL-SCNC: 113 MMOL/L (ref 98–107)
CO2 SERPL-SCNC: 29 MMOL/L (ref 21–32)
CREAT SERPL-MCNC: 1.8 MG/DL (ref 0.8–1.5)
EKG ATRIAL RATE: 64 BPM
EKG DIAGNOSIS: NORMAL
EKG P AXIS: -88 DEGREES
EKG P-R INTERVAL: 114 MS
EKG Q-T INTERVAL: 468 MS
EKG QRS DURATION: 142 MS
EKG QTC CALCULATION (BAZETT): 476 MS
EKG R AXIS: 72 DEGREES
EKG T AXIS: 30 DEGREES
EKG VENTRICULAR RATE: 62 BPM
EST. AVERAGE GLUCOSE BLD GHB EST-MCNC: 126 MG/DL
GASTROCULT GAST QL: POSITIVE
GLUCOSE BLD STRIP.AUTO-MCNC: 179 MG/DL (ref 65–100)
GLUCOSE BLD STRIP.AUTO-MCNC: 184 MG/DL (ref 65–100)
GLUCOSE BLD STRIP.AUTO-MCNC: 199 MG/DL (ref 65–100)
GLUCOSE SERPL-MCNC: 290 MG/DL (ref 65–100)
HBA1C MFR BLD: 6 % (ref 4.2–6.3)
HCT VFR BLD AUTO: 29.4 % (ref 41.1–50.3)
HCT VFR BLD AUTO: 31.2 % (ref 41.1–50.3)
HGB BLD-MCNC: 9 G/DL (ref 13.6–17.2)
HGB BLD-MCNC: 9.9 G/DL (ref 13.6–17.2)
PH GAST: 3 [PH] (ref 1.5–3.5)
POTASSIUM SERPL-SCNC: 4.1 MMOL/L (ref 3.5–5.1)
SERVICE CMNT-IMP: ABNORMAL
SODIUM SERPL-SCNC: 146 MMOL/L (ref 136–145)

## 2022-06-21 PROCEDURE — 2500000003 HC RX 250 WO HCPCS: Performed by: INTERNAL MEDICINE

## 2022-06-21 PROCEDURE — A4216 STERILE WATER/SALINE, 10 ML: HCPCS | Performed by: INTERNAL MEDICINE

## 2022-06-21 PROCEDURE — 2700000000 HC OXYGEN THERAPY PER DAY

## 2022-06-21 PROCEDURE — 82962 GLUCOSE BLOOD TEST: CPT

## 2022-06-21 PROCEDURE — APPNB30 APP NON BILLABLE TIME 0-30 MINS: Performed by: NURSE PRACTITIONER

## 2022-06-21 PROCEDURE — C9113 INJ PANTOPRAZOLE SODIUM, VIA: HCPCS | Performed by: INTERNAL MEDICINE

## 2022-06-21 PROCEDURE — 2100000000 HC CCU R&B

## 2022-06-21 PROCEDURE — 80048 BASIC METABOLIC PNL TOTAL CA: CPT

## 2022-06-21 PROCEDURE — 2580000003 HC RX 258: Performed by: INTERNAL MEDICINE

## 2022-06-21 PROCEDURE — 6360000002 HC RX W HCPCS: Performed by: FAMILY MEDICINE

## 2022-06-21 PROCEDURE — 83036 HEMOGLOBIN GLYCOSYLATED A1C: CPT

## 2022-06-21 PROCEDURE — 36415 COLL VENOUS BLD VENIPUNCTURE: CPT

## 2022-06-21 PROCEDURE — 70450 CT HEAD/BRAIN W/O DYE: CPT

## 2022-06-21 PROCEDURE — 6360000002 HC RX W HCPCS: Performed by: INTERNAL MEDICINE

## 2022-06-21 PROCEDURE — 2500000003 HC RX 250 WO HCPCS: Performed by: STUDENT IN AN ORGANIZED HEALTH CARE EDUCATION/TRAINING PROGRAM

## 2022-06-21 PROCEDURE — 82271 OCCULT BLOOD OTHER SOURCES: CPT

## 2022-06-21 PROCEDURE — 2580000003 HC RX 258: Performed by: STUDENT IN AN ORGANIZED HEALTH CARE EDUCATION/TRAINING PROGRAM

## 2022-06-21 PROCEDURE — 6370000000 HC RX 637 (ALT 250 FOR IP): Performed by: STUDENT IN AN ORGANIZED HEALTH CARE EDUCATION/TRAINING PROGRAM

## 2022-06-21 PROCEDURE — 2580000003 HC RX 258: Performed by: FAMILY MEDICINE

## 2022-06-21 PROCEDURE — 6370000000 HC RX 637 (ALT 250 FOR IP): Performed by: FAMILY MEDICINE

## 2022-06-21 PROCEDURE — 6370000000 HC RX 637 (ALT 250 FOR IP): Performed by: INTERNAL MEDICINE

## 2022-06-21 PROCEDURE — 99223 1ST HOSP IP/OBS HIGH 75: CPT | Performed by: PSYCHIATRY & NEUROLOGY

## 2022-06-21 PROCEDURE — 99231 SBSQ HOSP IP/OBS SF/LOW 25: CPT | Performed by: SURGERY

## 2022-06-21 PROCEDURE — 85018 HEMOGLOBIN: CPT

## 2022-06-21 RX ORDER — PANTOPRAZOLE SODIUM 40 MG/1
40 TABLET, DELAYED RELEASE ORAL
Status: DISCONTINUED | OUTPATIENT
Start: 2022-06-21 | End: 2022-06-21

## 2022-06-21 RX ORDER — NICOTINE 21 MG/24HR
1 PATCH, TRANSDERMAL 24 HOURS TRANSDERMAL DAILY
Status: DISCONTINUED | OUTPATIENT
Start: 2022-06-21 | End: 2022-06-30 | Stop reason: HOSPADM

## 2022-06-21 RX ORDER — INSULIN LISPRO 100 [IU]/ML
0-4 INJECTION, SOLUTION INTRAVENOUS; SUBCUTANEOUS NIGHTLY
Status: DISCONTINUED | OUTPATIENT
Start: 2022-06-21 | End: 2022-06-30 | Stop reason: HOSPADM

## 2022-06-21 RX ORDER — SODIUM CHLORIDE 0.9 % (FLUSH) 0.9 %
5-40 SYRINGE (ML) INJECTION EVERY 12 HOURS SCHEDULED
Status: DISCONTINUED | OUTPATIENT
Start: 2022-06-21 | End: 2022-06-30 | Stop reason: HOSPADM

## 2022-06-21 RX ORDER — SODIUM CHLORIDE 9 MG/ML
INJECTION, SOLUTION INTRAVENOUS PRN
Status: DISCONTINUED | OUTPATIENT
Start: 2022-06-21 | End: 2022-06-30 | Stop reason: HOSPADM

## 2022-06-21 RX ORDER — INSULIN LISPRO 100 [IU]/ML
0-8 INJECTION, SOLUTION INTRAVENOUS; SUBCUTANEOUS
Status: DISCONTINUED | OUTPATIENT
Start: 2022-06-21 | End: 2022-06-30 | Stop reason: HOSPADM

## 2022-06-21 RX ORDER — LOSARTAN POTASSIUM 25 MG/1
25 TABLET ORAL DAILY
Status: DISCONTINUED | OUTPATIENT
Start: 2022-06-21 | End: 2022-06-24

## 2022-06-21 RX ORDER — ONDANSETRON 4 MG/1
4 TABLET, ORALLY DISINTEGRATING ORAL EVERY 8 HOURS PRN
Status: DISCONTINUED | OUTPATIENT
Start: 2022-06-21 | End: 2022-06-30 | Stop reason: HOSPADM

## 2022-06-21 RX ORDER — POLYETHYLENE GLYCOL 3350 17 G/17G
17 POWDER, FOR SOLUTION ORAL DAILY PRN
Status: DISCONTINUED | OUTPATIENT
Start: 2022-06-21 | End: 2022-06-30 | Stop reason: HOSPADM

## 2022-06-21 RX ORDER — SODIUM CHLORIDE 0.9 % (FLUSH) 0.9 %
5-40 SYRINGE (ML) INJECTION PRN
Status: DISCONTINUED | OUTPATIENT
Start: 2022-06-21 | End: 2022-06-30 | Stop reason: HOSPADM

## 2022-06-21 RX ORDER — LABETALOL 200 MG/1
200 TABLET, FILM COATED ORAL EVERY 12 HOURS SCHEDULED
Status: DISCONTINUED | OUTPATIENT
Start: 2022-06-21 | End: 2022-06-22

## 2022-06-21 RX ORDER — ACETAMINOPHEN 325 MG/1
650 TABLET ORAL
Status: COMPLETED | OUTPATIENT
Start: 2022-06-21 | End: 2022-06-21

## 2022-06-21 RX ORDER — GLUCAGON 1 MG/ML
1 KIT INJECTION PRN
Status: DISCONTINUED | OUTPATIENT
Start: 2022-06-21 | End: 2022-06-30 | Stop reason: HOSPADM

## 2022-06-21 RX ORDER — ACETAMINOPHEN 650 MG/1
650 SUPPOSITORY RECTAL EVERY 6 HOURS PRN
Status: DISCONTINUED | OUTPATIENT
Start: 2022-06-21 | End: 2022-06-30 | Stop reason: HOSPADM

## 2022-06-21 RX ORDER — ATORVASTATIN CALCIUM 10 MG/1
20 TABLET, FILM COATED ORAL DAILY
Status: DISCONTINUED | OUTPATIENT
Start: 2022-06-21 | End: 2022-06-30 | Stop reason: HOSPADM

## 2022-06-21 RX ORDER — ONDANSETRON 2 MG/ML
4 INJECTION INTRAMUSCULAR; INTRAVENOUS EVERY 6 HOURS PRN
Status: DISCONTINUED | OUTPATIENT
Start: 2022-06-21 | End: 2022-06-30 | Stop reason: HOSPADM

## 2022-06-21 RX ORDER — LABETALOL HYDROCHLORIDE 5 MG/ML
10 INJECTION, SOLUTION INTRAVENOUS EVERY 4 HOURS PRN
Status: DISCONTINUED | OUTPATIENT
Start: 2022-06-21 | End: 2022-06-23

## 2022-06-21 RX ORDER — CITALOPRAM 20 MG/1
40 TABLET ORAL DAILY
Status: DISCONTINUED | OUTPATIENT
Start: 2022-06-21 | End: 2022-06-30 | Stop reason: HOSPADM

## 2022-06-21 RX ORDER — DEXTROSE MONOHYDRATE 50 MG/ML
100 INJECTION, SOLUTION INTRAVENOUS PRN
Status: DISCONTINUED | OUTPATIENT
Start: 2022-06-21 | End: 2022-06-30 | Stop reason: HOSPADM

## 2022-06-21 RX ORDER — ACETAMINOPHEN 325 MG/1
650 TABLET ORAL EVERY 6 HOURS PRN
Status: DISCONTINUED | OUTPATIENT
Start: 2022-06-21 | End: 2022-06-30 | Stop reason: HOSPADM

## 2022-06-21 RX ADMIN — NICARDIPINE HYDROCHLORIDE 15 MG/HR: 0.1 INJECTION INTRAVENOUS at 00:00

## 2022-06-21 RX ADMIN — NICARDIPINE HYDROCHLORIDE 12.5 MG/HR: 0.1 INJECTION INTRAVENOUS at 00:49

## 2022-06-21 RX ADMIN — ACETAMINOPHEN 650 MG: 325 TABLET ORAL at 10:23

## 2022-06-21 RX ADMIN — NICARDIPINE HYDROCHLORIDE 15 MG/HR: 0.1 INJECTION INTRAVENOUS at 01:02

## 2022-06-21 RX ADMIN — SODIUM CHLORIDE, PRESERVATIVE FREE 10 ML: 5 INJECTION INTRAVENOUS at 09:08

## 2022-06-21 RX ADMIN — LABETALOL HYDROCHLORIDE 10 MG: 5 INJECTION INTRAVENOUS at 17:38

## 2022-06-21 RX ADMIN — SODIUM CHLORIDE 15 MG/HR: 9 INJECTION, SOLUTION INTRAVENOUS at 06:25

## 2022-06-21 RX ADMIN — ACETAMINOPHEN 650 MG: 325 TABLET ORAL at 02:23

## 2022-06-21 RX ADMIN — LABETALOL HYDROCHLORIDE 200 MG: 200 TABLET, FILM COATED ORAL at 20:56

## 2022-06-21 RX ADMIN — SODIUM CHLORIDE 10 MG/HR: 9 INJECTION, SOLUTION INTRAVENOUS at 12:59

## 2022-06-21 RX ADMIN — ATORVASTATIN CALCIUM 20 MG: 10 TABLET, FILM COATED ORAL at 09:04

## 2022-06-21 RX ADMIN — SODIUM CHLORIDE, PRESERVATIVE FREE 10 ML: 5 INJECTION INTRAVENOUS at 20:59

## 2022-06-21 RX ADMIN — ACETAMINOPHEN 650 MG: 325 TABLET ORAL at 16:23

## 2022-06-21 RX ADMIN — NICARDIPINE HYDROCHLORIDE 12.5 MG/HR: 0.1 INJECTION INTRAVENOUS at 01:17

## 2022-06-21 RX ADMIN — LABETALOL HYDROCHLORIDE 200 MG: 200 TABLET, FILM COATED ORAL at 11:44

## 2022-06-21 RX ADMIN — SODIUM CHLORIDE 15 MG/HR: 9 INJECTION, SOLUTION INTRAVENOUS at 03:00

## 2022-06-21 RX ADMIN — CITALOPRAM HYDROBROMIDE 40 MG: 20 TABLET ORAL at 09:04

## 2022-06-21 RX ADMIN — SODIUM CHLORIDE 15 MG/HR: 9 INJECTION, SOLUTION INTRAVENOUS at 04:39

## 2022-06-21 RX ADMIN — SODIUM CHLORIDE 12.5 MG/HR: 9 INJECTION, SOLUTION INTRAVENOUS at 08:48

## 2022-06-21 RX ADMIN — ONDANSETRON 4 MG: 2 INJECTION INTRAMUSCULAR; INTRAVENOUS at 12:53

## 2022-06-21 RX ADMIN — Medication 5 UNITS: at 15:37

## 2022-06-21 RX ADMIN — PANTOPRAZOLE SODIUM 40 MG: 40 TABLET, DELAYED RELEASE ORAL at 07:33

## 2022-06-21 RX ADMIN — SODIUM CHLORIDE 40 MG: 9 INJECTION INTRAMUSCULAR; INTRAVENOUS; SUBCUTANEOUS at 17:30

## 2022-06-21 RX ADMIN — LOSARTAN POTASSIUM 25 MG: 25 TABLET, FILM COATED ORAL at 11:44

## 2022-06-21 RX ADMIN — NICARDIPINE HYDROCHLORIDE 15 MG/HR: 0.1 INJECTION INTRAVENOUS at 01:43

## 2022-06-21 ASSESSMENT — PAIN DESCRIPTION - LOCATION
LOCATION: NECK

## 2022-06-21 ASSESSMENT — PAIN SCALES - WONG BAKER
WONGBAKER_NUMERICALRESPONSE: 0

## 2022-06-21 ASSESSMENT — PAIN DESCRIPTION - DESCRIPTORS
DESCRIPTORS: ACHING

## 2022-06-21 ASSESSMENT — PAIN SCALES - GENERAL
PAINLEVEL_OUTOF10: 0
PAINLEVEL_OUTOF10: 3
PAINLEVEL_OUTOF10: 3
PAINLEVEL_OUTOF10: 0
PAINLEVEL_OUTOF10: 0
PAINLEVEL_OUTOF10: 3
PAINLEVEL_OUTOF10: 3
PAINLEVEL_OUTOF10: 0
PAINLEVEL_OUTOF10: 3
PAINLEVEL_OUTOF10: 2

## 2022-06-21 ASSESSMENT — PAIN DESCRIPTION - ORIENTATION
ORIENTATION: POSTERIOR
ORIENTATION: MID;POSTERIOR
ORIENTATION: POSTERIOR
ORIENTATION: MID;POSTERIOR

## 2022-06-21 ASSESSMENT — PAIN - FUNCTIONAL ASSESSMENT
PAIN_FUNCTIONAL_ASSESSMENT: PREVENTS OR INTERFERES SOME ACTIVE ACTIVITIES AND ADLS
PAIN_FUNCTIONAL_ASSESSMENT: PREVENTS OR INTERFERES SOME ACTIVE ACTIVITIES AND ADLS
PAIN_FUNCTIONAL_ASSESSMENT: ACTIVITIES ARE NOT PREVENTED
PAIN_FUNCTIONAL_ASSESSMENT: ACTIVITIES ARE NOT PREVENTED

## 2022-06-21 NOTE — PROGRESS NOTES
Initial visit was made, emotional support and a spiritual presence were provided for the patient and his daughter.         Santos Whitlock, [address] [facility], [facility]

## 2022-06-21 NOTE — ED TRIAGE NOTES
Pt to ED with c/o headache and neck pain and hypertension. Per daughter pt did not take plavix or labetalol yesterday or this am. Per daughter wife called and stated that pt was c/o headache to the back of head and neck and broke out in a sweat. Per daughter pt hates hospitals but was willing to come in. Pt admits to headache getting better. Per daughter pt was given labetalol prior to arrival. Pt alert and in NAD at this time.

## 2022-06-21 NOTE — ED NOTES
EPOC results:     Na 147   K 4.3   Cl 109  CO2 28  bgl 151  Lac 1.15  Bun 24  Creatinine 1.6     Janessa Lowe, RN  06/20/22 [address] JOSE A Pressley  06/20/22 0768

## 2022-06-21 NOTE — CONSULTS
[address]   [address] [facility] FAX: [phone]    Coral Cortez  1940    Chief Complaint   Patient presents with    Headache    Hypertension           HPI   Mr. Coral Cortez is a 79y.o. year old male who presented with a headache last night and was found to have a subarachnoid hemorrhage. Underwent a CTA of his neck was found to have a concern for dissection of his left subclavian. Patient has a history of tobacco abuse.     Current Facility-Administered Medications   Medication Dose Route Frequency Provider Last Rate Last Admin    citalopram (CELEXA) tablet 40 mg  40 mg Oral Daily Coretta Gitelman, MD        pantoprazole (PROTONIX) tablet 40 mg  40 mg Oral QAM AC Coretta Gitelman, MD        atorvastatin (LIPITOR) tablet 20 mg  20 mg Oral Daily Coretta Gitelman, MD        sodium chloride flush 0.9 % injection 5-40 mL  5-40 mL IntraVENous 2 times per day Coretta Gitelman, MD        sodium chloride flush 0.9 % injection 5-40 mL  5-40 mL IntraVENous PRN Coretta Gitelman, MD        0.9 % sodium chloride infusion   IntraVENous PRN Coretta Gitelman, MD        ondansetron (ZOFRAN-ODT) disintegrating tablet 4 mg  4 mg Oral Q8H PRN Coretta Gitelman, MD        Or    [facility]ron [facility]) injection 4 mg  4 mg IntraVENous Q6H PRN Coretta Gitelman, MD        polyethylene glycol [facility]) packet 17 g  17 g Oral Daily PRN Coretta Gitelman, MD        acetaminophen (TYLENOL) tablet 650 mg  650 mg Oral Q6H PRN Coretta Gitelman, MD        Or    acetaminophen (TYLENOL) suppository 650 mg  650 mg Rectal Q6H PRN Coretta Gitelman, MD        nicotine (NICODERM CQ) 14 MG/24HR 1 patch  1 patch TransDERmal Daily Coretta Gitelman, MD        niCARdipine (CARDENE) 25 mg in sodium chloride 0.9 % 250 mL infusion (Ttry1Asg)  2.5-15 mg/hr IntraVENous Continuous Yuri Taylor,  mL/hr at 06/21/22 0625 15 mg/hr at 06/21/22 0816     Allergies   Allergen Reactions    Ciprofloxacin Other (See Comments)     weakness     Past Medical History:   Diagnosis Date    Allergic rhinitis, cause unspecified 2/27/2014    Anemia, unspecified 2/27/2014    Atherosclerosis of renal artery ([address]     2001    CAD (coronary artery disease) 2001    4 vessel cabg, cardiac stents prior to this    Cancer [facility]) 2013    bladder/ L kidney    Chronic airway obstruction, not elsewhere classified 2/27/2014    Chronic kidney disease     hx of CRF,  creatinine 1.8    Coronary atherosclerosis of native coronary vessel 2/27/2014    Depressive disorder, not elsewhere classified 2/27/2014    Diabetes ([address] dx 2000    type 2, oral med, does not check his glucose, A1C 6.4   9/9/2019, states very rare episode hypo    Diverticulosis of colon (without mention of hemorrhage) 2/27/2014    Dyslipidemia     controlled with med    Esophagitis, unspecified 2/27/2014    Essential and other specified forms of tremor 2/27/2014    Essential hypertension, benign 2/27/2014    GERD (gastroesophageal reflux disease)     controlled with omeprazole    Gross hematuria 2/27/2014    Hematuria, unspecified 2/27/2014    Hypertension     controlled with meds    Malignant neoplasm of bladder, 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    Malignant neoplasm of trigone of urinary bladder ([address] 2/27/2014    Microscopic hematuria 2/27/2014    Osteoarthrosis, unspecified whether generalized or localized, unspecified site 2/27/2014    Other peripheral vascular disease(443.89) 2/27/2014    Polyneuropathy in diabetes(357.2) 2/27/2014    Proteinuria 2/27/2014    Psychiatric disorder     gets \"aggravated\", treated with Celexa    PUD (peptic ulcer disease) 1970's    Pure hypercholesterolemia 2/27/2014    Renal artery stenosis ([address] 2/27/2014    Renal sclerosis, unspecified 2/27/2014    left    Respiratory insufficiency 1/13/2016    Stroke ([address]     CVA x 2, (last was fall of 2011),  no residual weakness; L eye impaired    Tobacco use disorder 2/27/2014    Unspecified gastritis and gastroduodenitis without mention of hemorrhage 2/27/2014    Unspecified hereditary and idiopathic peripheral neuropathy 2/27/2014    Unspecified sleep apnea     does not wear cpap    Unspecified transient cerebral ischemia 2/27/2014     Family History   Problem Relation Age of Onset    Diabetes Mother     Heart Disease Mother     Heart Attack Mother         Myocardial Infarction    Stroke Father     Alcohol Abuse Father     Diabetes Son      Past Surgical History:   Procedure Laterality Date    CARDIAC CATHETERIZATION      stents prior to CABG    COLONOSCOPY      KIDNEY REMOVAL Left     LIPOMA RESECTION  10/07/2019    [place] CARDIAC SURG PROCEDURE UNLIST  2001    4 vessel CABG, cardiac stents    UROLOGICAL SURGERY      TURBT    VASCULAR SURGERY  2001    renal stent in L kidney     Social History     Tobacco Use    Smoking status: Current Every Day Smoker     Packs/day: 1.00    Smokeless tobacco: Never Used   Substance Use Topics    Alcohol use: No        Review of Systems  Constitutional: Negative for fever and chills. HENT: Negative for congestion and sore throat. Skin: Negative for rash and itching. Eyes: Negative for blurred vision and double vision. Respiratory: Negative for cough and shortness of breath. Cardiovascular: Negative for chest pain, palpitations, claudication and leg swelling. Gastrointestinal: Negative for nausea, vomiting and abdominal pain. Genitourinary: Negative for dysuria, hematuria and flank pain. Musculoskeletal: Negative for joint pain and falls. Neurological: Negative for dizziness, sensory change, focal weakness, loss of consciousness and headaches. PHYSICAL EXAM       Constitutional: he appears well-developed. No distress. HENT: Hearing intact. Head: Atraumatic. Eyes: Pupils are equal, round, and reactive to light. Neck: Normal range of motion. Cardiovascular: Regular rhythm. Pulmonary/Chest: Effort normal and breath sounds normal. No respiratory distress. Abdominal: Soft. Bowel sounds are normal. he exhibits no distension. There is no tenderness. There is no guarding. No hernia. Musculoskeletal: Normal range of motion. Neurological: He is alert. CN II- XII grossly intact  Skin: Warm and dry. Vascular: The left brachial pulse palpable left radial pulse motor and sensory intact      Imaging Results  CTA showed no evidence of any atherosclerotic disease in his carotids there is a filling defect in his left subclavian chronic in nature distal subclavian fills    ASSESSMENT AND PLAN   Mr. Jake Decker is a 79y.o. 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 Patient is well-perfused with palpable radial pulses and motor and sensory intact. Ideally I would just put patient on antiplatelet therapy, however in the setting of her more hemorrhagic stroke, would not recommend that at this time. No further vascular intervention is needed. Vascular will sign off please call with questions or concerns. Elements of this note have been dictated using speech recognition software. As a result, errors of speech recognition may have occurred. 50 minutes of time was spent on this consult with greater than 50% of time spent face-to-face with the patient discussing the disease process, education and treatment options.

## 2022-06-21 NOTE — ACP (ADVANCE CARE PLANNING)
Advance Care Planning     General Advance Care Planning (ACP) Conversation    Date of Conversation:6/21/2022  Healthcare Decision Maker:    Primary Decision Maker: Giovanna Baker - Spouse - [phone]  Click here to complete Healthcare Decision Makers including selection of the Healthcare Decision Maker Relationship (ie \"Primary\"). Today we documented Decision Maker(s) consistent with Legal Next of Kin hierarchy. Content/Action Overview:  No current LW/HCPOA on file. Spouse legal NOK unless document presented stating otherwise. DNR per MD orders.      Length of Voluntary ACP Conversation in minutes:  <16 minutes (Non-Billable)    Esthela Ross RN

## 2022-06-21 NOTE — CONSULTS
[facility] Consult Note       Primary GI Physician: Gisela Aguilera MD  Consulting GI Physician: Billy Hogan MD  Referring Provider:  Abimbola Guerrero MD    Consult Date:  6/21/2022  Admit Date:  6/20/2022    Chief Complaint:  Hematemesis with Hgb drop    Subjective:     History of Present Illness:  Patient is a 79 y.o. male with PMHx including but not limited to HTN, CKD, dementia, CAD s/p CABG '01 and cardiac stents, hx of CVA, GERD, HTN, and GERD, who is seen in consultation at the request of Dr. Serena Larose for further evaluation of hematemesis. Patient presented to the ED yesterday with severe posterior headache and SBP > 200. Head CT scan revealed SAH adjacent to brainstem. Dr. Cam Roman with neurosurgery recommended CTA which showed an area of abnormality at subclavian, clot or small dissection. Patient was placed on cardene drip for BP control, currently turned off. [facility] was consulted and Dr. Cassidy Acosta stated that this was not a new finding and it does not need emergent intervention at this time. Per nursing, patient had a single episode of projectile vomiting of what appeared to be CG emesis. Patient denies N/V. No abdominal pain. + BMs, no melena or rectal bleeding. Last dose of Plavix was Friday, June 17th. He took a dose of headache powder over the weekend, but denies regular use of NSAIDs. He has a hx of chronic GERD which is well controlled with Prilosec 20mg QD. Presenting Hgb noted stable in the 11s range since March 2022, but dropped down to 9.9 today. Last EGD/colonoscopy 6/2012 for GERD, Heme +, anemia, wt loss, hx of colon polyp revealed mild gastritis, but colonoscopy belly sheet was difficult to read and colonoscopy report was incomplete.     PMH:  Past Medical History:   Diagnosis Date    Allergic rhinitis, cause unspecified 2/27/2014    Anemia, unspecified 2/27/2014    Atherosclerosis of renal artery ([address]     2001    CAD (coronary artery disease) 2001    4 vessel cabg, cardiac stents prior to this    Cancer [facility]) 2013    bladder/ L kidney    Chronic airway obstruction, not elsewhere classified 2/27/2014    Chronic kidney disease     hx of CRF,  creatinine 1.8    Coronary atherosclerosis of native coronary vessel 2/27/2014    Depressive disorder, not elsewhere classified 2/27/2014    Diabetes ([address] dx 2000    type 2, oral med, does not check his glucose, A1C 6.4   9/9/2019, states very rare episode hypo    Diverticulosis of colon (without mention of hemorrhage) 2/27/2014    Dyslipidemia     controlled with med    Esophagitis, unspecified 2/27/2014    Essential and other specified forms of tremor 2/27/2014    Essential hypertension, benign 2/27/2014    GERD (gastroesophageal reflux disease)     controlled with omeprazole    Gross hematuria 2/27/2014    Hematuria, unspecified 2/27/2014    Hypertension     controlled with meds    Malignant neoplasm of bladder, 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    Malignant neoplasm of trigone of urinary bladder ([address] 2/27/2014    Microscopic hematuria 2/27/2014    Osteoarthrosis, unspecified whether generalized or localized, unspecified site 2/27/2014    Other peripheral vascular disease(443.89) 2/27/2014    Polyneuropathy in diabetes(357.2) 2/27/2014    Proteinuria 2/27/2014    Psychiatric disorder     gets \"aggravated\", treated with Celexa    PUD (peptic ulcer disease) 1970's    Pure hypercholesterolemia 2/27/2014    Renal artery stenosis ([address] 2/27/2014    Renal sclerosis, unspecified 2/27/2014    left    Respiratory insufficiency 1/13/2016    Stroke ([address]     CVA x 2, (last was fall of 2011),  no residual weakness; L eye impaired    Tobacco use disorder 2/27/2014    Unspecified gastritis and gastroduodenitis without mention of hemorrhage 2/27/2014    Unspecified hereditary and idiopathic peripheral neuropathy 2/27/2014    Unspecified sleep apnea     does not wear cpap    Unspecified transient cerebral ischemia 2/27/2014       PSH:  Past Surgical History:   Procedure Laterality Date    CARDIAC CATHETERIZATION      stents prior to CABG    COLONOSCOPY      KIDNEY REMOVAL Left     LIPOMA RESECTION  10/07/2019    [place] CARDIAC SURG PROCEDURE UNLIST  2001    4 vessel CABG, cardiac stents    UROLOGICAL SURGERY      TURBT    VASCULAR SURGERY  2001    renal stent in L kidney       Allergies: Allergies   Allergen Reactions    Ciprofloxacin Other (See Comments)     weakness       Home Medications:  Prior to Admission medications    Medication Sig Start Date End Date Taking?  Authorizing Provider   ascorbic acid (VITAMIN C) 500 MG tablet Take by mouth    Ar Automatic Reconciliation   vitamin D3 (CHOLECALCIFEROL) 125 MCG (5000 UT) TABS tablet Take by mouth daily    Ar Automatic Reconciliation   citalopram (CELEXA) 40 MG tablet Take 40 mg by mouth 9/21/21   Ar Automatic Reconciliation   clopidogrel (PLAVIX) 75 MG tablet Take 75 mg by mouth daily    Ar Automatic Reconciliation   labetalol (NORMODYNE) 200 MG tablet Take 200 mg by mouth 2 times daily 9/21/21   Ar Automatic Reconciliation   losartan (COZAAR) 25 MG tablet Take 100 mg by mouth    Ar Automatic Reconciliation   omeprazole (PRILOSEC) 20 MG delayed release capsule Take 20 mg by mouth    Ar Automatic Reconciliation   simvastatin (ZOCOR) 40 MG tablet Take 40 mg by mouth    Ar Automatic Reconciliation       Hospital Medications:  Current Facility-Administered Medications   Medication Dose Route Frequency    citalopram (CELEXA) tablet 40 mg  40 mg Oral Daily    atorvastatin (LIPITOR) tablet 20 mg  20 mg Oral Daily    sodium chloride flush 0.9 % injection 5-40 mL  5-40 mL IntraVENous 2 times per day    sodium chloride flush 0.9 % injection 5-40 mL  5-40 mL IntraVENous PRN    0.9 % sodium chloride infusion   IntraVENous PRN    ondansetron (ZOFRAN-ODT) disintegrating tablet 4 mg  4 mg Oral Q8H PRN    Or    ondansetron (ZOFRAN) injection 4 mg  4 mg IntraVENous Q6H PRN    polyethylene glycol (GLYCOLAX) packet 17 g  17 g Oral Daily PRN    acetaminophen (TYLENOL) tablet 650 mg  650 mg Oral Q6H PRN    Or    acetaminophen (TYLENOL) suppository 650 mg  650 mg Rectal Q6H PRN    nicotine (NICODERM CQ) 14 MG/24HR 1 patch  1 patch TransDERmal Daily    niCARdipine (CARDENE) 25 mg in sodium chloride 0.9 % 250 mL infusion (Lvpy2Tdp)  2.5-15 mg/hr IntraVENous Continuous    losartan (COZAAR) tablet 25 mg  25 mg Oral Daily    labetalol (NORMODYNE) tablet 200 mg  200 mg Oral 2 times per day    glucose chewable tablet 16 g  4 tablet Oral PRN    dextrose bolus 10% 125 mL  125 mL IntraVENous PRN    Or    dextrose bolus 10% 250 mL  250 mL IntraVENous PRN    glucagon injection 1 mg  1 mg IntraMUSCular PRN    dextrose 5 % solution  100 mL/hr IntraVENous PRN    insulin lispro (HUMALOG) injection vial 0-8 Units  0-8 Units SubCUTAneous TID WC    insulin lispro (HUMALOG) injection vial 0-4 Units  0-4 Units SubCUTAneous Nightly    pantoprazole (PROTONIX) 40 mg in sodium chloride (PF) 10 mL injection  40 mg IntraVENous BID       Social History:  Social History     Tobacco Use    Smoking status: Current Every Day Smoker     Packs/day: 1.00    Smokeless tobacco: Never Used   Substance Use Topics    Alcohol use: No       Pt denies any history of drug use, blood transfusions, or tattoos. Family History:  Family History   Problem Relation Age of Onset    Diabetes Mother     Heart Disease Mother     Heart Attack Mother         Myocardial Infarction    Stroke Father     Alcohol Abuse Father     Diabetes Son        Review of Systems:  A detailed 10 system ROS is obtained, with pertinent positives as listed above. All others are negative.     Diet:  Regular    Objective:     Physical Exam:  Vitals:  BP (!) 130/58   Pulse 75   Temp 97.9 °F (36.6 °C) (Oral)   Resp 21   Ht 5' 9\" (1.753 m)   Wt 175 lb 11.3 oz (79.7 kg)   SpO2 91%   BMI 25.95 kg/m² Gen:  Pt is alert, cooperative, elderly male in NAD  Skin:  Extremities and face reveal no rashes. HEENT: Sclerae anicteric. Extra-occular muscles are intact. No oral ulcers. No abnormal pigmentation of the lips. The neck is supple. Cardiovascular: RRR. No murmurs, gallops, or rubs. Respiratory:  Comfortable breathing with no accessory muscle use. Clear breath sounds anteriorly with no wheezes, rales, or rhonchi. GI:  Abdomen nondistended, soft, and nontender. Normal active bowel sounds. No enlargement of the liver or spleen. No masses palpable. Rectal:  Deferred  Musculoskeletal:  No pitting edema of the lower legs. Neurological:  Gross memory appears intact. Patient is alert and oriented x2, not oriented to time/date  Psychiatric:  Mood appears appropriate with judgement intact. Lymphatic:  No cervical or supraclavicular adenopathy. Laboratory:    Recent Labs     06/20/22  2204 06/20/22  2222 06/21/22  0448 06/21/22  1305   WBC 8.3  --   --   --    HGB 11.3*  --   --  9.9*   HCT 35.4*  --   --  31.2*     --   --   --    MCV 82.3  --   --   --    *  --  146*  --    K 4.1  --  4.1  --    *  --  113*  --    CO2 31  --  29  --    BUN 25*  --  36*  --    AST 14*  --   --   --    ALT 12  --   --   --    INR  --  1.1  --   --           Assessment:     Principal Problem:    SAH (subarachnoid hemorrhage) (HCC)    Active Problems:    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HCC)    Dementia (HCC)    Type 2 diabetes mellitus (HCC)    Tobacco use disorder    Essential hypertension, benign    CKD (chronic kidney disease) stage 4, GFR 15-29 ml/min ([address]    79 y.o. male with PMHx of HTN, CKD, dementia, CAD s/p CABG '01 and cardiac stents, hx of CVA, GERD, HTN, and GERD, who is seen in consultation at the request of Dr. Khalida Luciano for further evaluation of hematemesis in the setting of Plavix. Patient presents with severe headache and SBP > 200. Head CT scan revealed SAH.  He had a single episode of CG emesis witnessed by RN around noon today. Hgb dropped from 11s range to 9.9. Last EGD in 2012 revealed gastritis. Last dose of Plavix was Friday, June 17th. Consider PUD, gastroduodenitis, AVMs, neoplasm, etc.     Plan:     Poor candidate for endoscopy and sedation due to recent [facility]. Consider emergent EGD if patient demonstrates or has ongoing evidence of GI bleeding with further drop in Hgb. Otherwise, monitor H/H, transfuse as needed, and continue PPI IV BID. Following. Patient is seen and examined in collaboration with Dr. Mukund Delarosa. Assessment and plan as per Dr. Mega Martinez.     Jenise Hernandez PA-C  [facility]

## 2022-06-21 NOTE — PROGRESS NOTES
NEUROSURGERY  83 YO  MALE  WITH SUDDEN  ONSET OF  HA   AND NECK PAIN SINCE THIS  AM.  BP  210/74  ON PLAVIX EXCEPT LAST TWO DAYS    CT:  SAH AT LEVEL OF FORAMEN MAGNUM  NO ICH  NO HYDROCEPHALUS  NEURO NEGATIVE  PER  ED    A/P AGREE WITH  STAT  CTA . IF + FOR ANEURYSM OR AVM, WILL NEED  TRANSFER TO [facility]. IF NEGATIVE THEN ADMIT TO ICU HOSPITALIST WITH NEUROLOGY CONSULTATION    AGREE WITH  BP  CONTROL TO  <466  SYSTOLIC    OTHERWISE NOTHING SURGICAL IF  CTA  IS  NEGATIVE. Sally Jaramillo     NO CHARGE TO PATIENT FOR THIS REVIEW    Arman Dakin MD

## 2022-06-21 NOTE — INTERDISCIPLINARY ROUNDS
Multi-D Rounds/Checklist (leapfrog):  Lines: can any be removed?: None       DVT Prophylaxis: Contraindicated  Vent: N/A  Nutrition Ordered/appropriate: Ordered  Can antibiotics or other drugs be stopped?: N/A  Consults needed: None  A: Is pain control adequate? Yes  B: Sedation break and SBT? N/A  C: Is sedation choice appropriate? N/A  D: Delirium/CAM-ICU? No  E: Mobility goals/appropriateness? Yes  F: Family update and plan? Spouse is primary contact and is being updated daily by primary attending and nursing staff.     Tereza Kennedy, APRN - CNP

## 2022-06-21 NOTE — PROGRESS NOTES
Bedside and verbal shift change report received from  [employer] (offgoing nurse). Report included the following information SBAR, Kardex, Intake/Output, MAR, Recent Results, Med Rec Status, Cardiac Rhythm SR, Alarm Parameters  and Dual Neuro Assessment. Dual skin assessment completed at bedside: WDL (list pertinent skin assessment findings)    Dual verification of gtts completed (name of gtts verified): n/a    Pt alert and oriented. Dual neuro assessment performed with Noe NARANJO. NIH 0. Wife at bedside. VSS on 4LNC.

## 2022-06-21 NOTE — PROGRESS NOTES
Bedside and verbal shift change report received from  [place] (offgoing nurse). Report included the following information SBAR, Kardex, ED Summary, Procedure Summary, Intake/Output, MAR, Recent Results, Cardiac Rhythm SR, Alarm Parameters  and Dual Neuro Assessment.      Dual skin assessment completed at bedside: WNL (list pertinent skin assessment findings)    Dual verification of gtts completed (name of gtts verified): Cardene @ 15mg/hr

## 2022-06-21 NOTE — ED PROVIDER NOTES
[facility] Emergency Department Provider Note                   PCP:                Yoli Velez DO               Age: 79 y.o. Sex: male       ICD-10-CM    1. SAH (subarachnoid hemorrhage) (HCC)  I60.9    2. Hypertensive emergency  I16.1        DISPOSITION         New Prescriptions    No medications on file       Orders Placed This Encounter   Procedures    Critical Care    XR CHEST PORTABLE    CT HEAD WO CONTRAST    CTA HEAD NECK W WO CONTRAST    CBC    Comprehensive Metabolic Panel    POCT CREATININE - BLOOD    POCT INR    POCT INR    EKG 12 Lead        Prabhjot Kalyaniguerline, DO 12:44 AM      MDM  Number of Diagnoses or Management Options  Hypertensive emergency  SAH (subarachnoid hemorrhage) (HCC)  Diagnosis management comments: 79-year-old male presents to the emergency department with sudden onset of headache, now complaining of severe neck pain. Denies any recent trauma. History of prior ischemic CVAs in the past.  Patient was seen shortly after arrival by myself with normal NIH, family is at bedside. Patient is significantly hypertensive blood pressure 233 systolic. Taken straight to CT scan. I reviewed this and then called radiology for their review as well, findings consistent with subarachnoid hemorrhage adjacent to the brainstem. Patient started on Cardene with a goal systolic BP less than 855. INR is normal.  Will obtain CTA. Normal white count, stable H&H. Neurosurgery immediately contacted who reviewed films, advised that patient would subarachnoid hemorrhage, if no evidence of large aneurysm the patient could be admitted to the ICU with strict blood pressure control. CTA did not reveal any evidence of aneurysm. It did show area of subclavian abnormality, question clot versus dissection, vascular surgery made aware advised this was nothing new and nothing that needed emergent intervention. Hospitalist consulted for ICU admission. Hospitalist agrees.   Patient and family voiced understanding and agreement. EKG shows sinus rhythm, rate 62, , , QTc 476, normal axis, PVCs noted, no significant ST elevation or depression. Amount and/or Complexity of Data Reviewed  Clinical lab tests: ordered and reviewed  Tests in the radiology section of CPT®: reviewed and ordered  Discussion of test results with the performing providers: yes  Decide to obtain previous medical records or to obtain history from someone other than the patient: yes  Obtain history from someone other than the patient: yes  Review and summarize past medical records: yes  Discuss the patient with other providers: yes  Independent visualization of images, tracings, or specimens: yes    Risk of Complications, Morbidity, and/or Mortality  Presenting problems: high  Diagnostic procedures: high  Management options: high Saul Toledo is a 79 y.o. male who presents to the Emergency Department with chief complaint of    Chief Complaint   Patient presents with    Headache    Hypertension      79-year-old male presents to the emergency department with family bedside. Patient reports a severe headache at approximately 815pm.  History of prior CVA in the past.  Patient also has not compliant with his blood pressure medication as well as his Plavix for the last 2 days secondary to his underlying schedule change. Patient denies any new neurologic findings. Denies any new vision changes. Family denies any change in speech patterns or other complaints. No recent trauma. Wife does report patient fell approximately 7 days ago. Again does not have his Plavix in 2 days but did receive his labetalol tonight after patient did complain of a headache. Patient also was given Goody's powder by his wife after patient complained of a headache. Review of Systems   Constitutional: Negative for chills and fever. HENT: Negative for congestion and sinus pain. Eyes: Negative for photophobia.    Respiratory: Polyneuropathy in diabetes(357.2) 2/27/2014    Proteinuria 2/27/2014    Psychiatric disorder     gets \"aggravated\", treated with Celexa    PUD (peptic ulcer disease) 1970's    Pure hypercholesterolemia 2/27/2014    Renal artery stenosis (HCC) 2/27/2014    Renal sclerosis, unspecified 2/27/2014    left    Respiratory insufficiency 1/13/2016    Stroke ([address]     CVA x 2, (last was fall of 2011),  no residual weakness; L eye impaired    Tobacco use disorder 2/27/2014    Unspecified gastritis and gastroduodenitis without mention of hemorrhage 2/27/2014    Unspecified hereditary and idiopathic peripheral neuropathy 2/27/2014    Unspecified sleep apnea     does not wear cpap    Unspecified transient cerebral ischemia 2/27/2014        Past Surgical History:   Procedure Laterality Date    CARDIAC CATHETERIZATION      stents prior to CABG    COLONOSCOPY      KIDNEY REMOVAL Left     LIPOMA RESECTION  10/07/2019    [place] CARDIAC SURG PROCEDURE UNLIST  2001    4 vessel CABG, cardiac stents    UROLOGICAL SURGERY      TURBT    VASCULAR SURGERY  2001    renal stent in L kidney        Family History   Problem Relation Age of Onset    Diabetes Mother     Heart Disease Mother     Heart Attack Mother         Myocardial Infarction    Stroke Father     Alcohol Abuse Father     Diabetes Son            Social Connections:     Frequency of Communication with Friends and Family: Not on file    Frequency of Social Gatherings with Friends and Family: Not on file    Attends Restoration Services: Not on file    Active Member of Clubs or Organizations: Not on file    Attends Club or Organization Meetings: Not on file    Marital Status: Not on file        Allergies   Allergen Reactions    Ciprofloxacin Other (See Comments)     weakness        Vitals signs and nursing note reviewed.    Patient Vitals for the past 4 hrs:   Temp Pulse Resp BP SpO2   06/21/22 0027 -- 69 17 (!) 124/53 96 %   06/21/22 0017 -- 70 16 (!) 141/51 91 %   06/21/22 0012 -- 71 18 (!) 142/55 94 %   06/21/22 0007 -- 70 16 127/70 92 %   06/21/22 0000 -- 69 16 (!) 144/53 91 %   06/20/22 2357 -- 72 18 (!) 133/52 94 %   06/20/22 2355 -- 71 18 (!) 139/54 93 %   06/20/22 2352 -- 70 15 (!) 145/59 91 %   06/20/22 2325 -- 77 16 (!) 148/55 92 %   06/20/22 2323 -- 82 21 (!) 144/59 95 %   06/20/22 2310 -- 75 15 (!) 131/95 93 %   06/20/22 2247 -- 69 17 (!) 184/62 94 %   06/20/22 2242 -- 68 18 (!) 160/58 95 %   06/20/22 2237 -- 68 17 (!) 181/67 94 %   06/20/22 2232 -- 66 17 (!) 208/78 96 %   06/20/22 2215 -- 68 -- (!) 223/83 98 %   06/20/22 2212 -- 68 15 (!) 212/92 98 %   06/20/22 2200 -- 57 14 (!) 211/74 97 %   06/20/22 2157 -- 70 16 (!) 257/78 --   06/20/22 2156 -- -- 17 -- --   06/20/22 2151 97.8 °F (36.6 °C) 62 -- (!) 223/87 100 %          Physical Exam  Vitals and nursing note reviewed. Constitutional:       General: He is in acute distress. Appearance: Normal appearance. Comments: Complaining of headache and neck pain   HENT:      Head: Normocephalic. Nose: Nose normal.      Mouth/Throat:      Mouth: Mucous membranes are moist.   Eyes:      Extraocular Movements: Extraocular movements intact. Cardiovascular:      Rate and Rhythm: Normal rate and regular rhythm. Pulses: Normal pulses. Heart sounds: Normal heart sounds. Pulmonary:      Effort: Pulmonary effort is normal. No respiratory distress. Breath sounds: Normal breath sounds. Abdominal:      General: Abdomen is flat. Palpations: Abdomen is soft. Tenderness: There is no abdominal tenderness. Musculoskeletal:         General: No swelling. Normal range of motion. Cervical back: Normal range of motion. No rigidity. Skin:     General: Skin is warm. Capillary Refill: Capillary refill takes less than 2 seconds. Findings: No rash. Neurological:      General: No focal deficit present. Mental Status: He is alert and oriented to person, place, and time. Cranial Nerves: No cranial nerve deficit. Sensory: No sensory deficit. Motor: No weakness.    Psychiatric:         Mood and Affect: Mood normal.          Critical Care  Performed by: Samanta Young DO  Authorized by: Samanta Young DO     Critical care provider statement:     Critical care time (minutes):  34    Critical care time was exclusive of:  Separately billable procedures and treating other patients    Critical care was necessary to treat or prevent imminent or life-threatening deterioration of the following conditions:  CNS failure or compromise (Hypertensive emergency with subarachnoid hemorrhage)    Critical care was time spent personally by me on the following activities:  Development of treatment plan with patient or surrogate, discussions with consultants, evaluation of patient's response to treatment, examination of patient, obtaining history from patient or surrogate, ordering and performing treatments and interventions, ordering and review of laboratory studies, ordering and review of radiographic studies, re-evaluation of patient's condition and review of old charts          Labs Reviewed   CBC - Abnormal; Notable for the following components:       Result Value    Hemoglobin 11.3 (*)     Hematocrit 35.4 (*)     RDW 15.7 (*)     All other components within normal limits   COMPREHENSIVE METABOLIC PANEL - Abnormal; Notable for the following components:    Sodium 146 (*)     Chloride 112 (*)     Anion Gap 3 (*)     Glucose 172 (*)     BUN 25 (*)     CREATININE 1.60 (*)     GFR  53 (*)     GFR Non- 44 (*)     AST 14 (*)     Albumin/Globulin Ratio 1.0 (*)     All other components within normal limits   POCT PT/INR - Abnormal; Notable for the following components:    POC Protime 12.9 (*)     All other components within normal limits   POCT PT/INR        CTA HEAD NECK W WO CONTRAST         XR CHEST PORTABLE   Final Result   No evidence of an acute intrathoracic process. CT HEAD WO CONTRAST   Final Result   Findings described above suggest subarachnoid hemorrhage adjacent to the the   brainstem, at the level of the foramen magnum. This does not appear to represent   intraparenchymal hemorrhage. This case was discussed with the ordering physician at the time of   interpretation. NIH Stroke Scale  Interval: Reassessment  Level of Consciousness (1a): Alert  LOC Questions (1b): Answers one correctly  LOC Commands (1c): Performs both tasks correctly  Best Gaze (2): Normal  Visual (3): No visual loss  Facial Palsy (4): Normal symmetrical movement  Motor Arm, Left (5a): No drift  Motor Arm, Right (5b): No drift  Motor Leg, Left (6a): No drift  Motor Leg, Right (6b): No drift  Limb Ataxia (7): Absent  Sensory (8): Normal  Best Language (9): No aphasia  Dysarthria (10): Normal  Extinction and Inattention (11): No abnormality  Total: 1                 Voice dictation software was used during the making of this note. This software is not perfect and grammatical and other typographical errors may be present. This note has not been completely proofread for errors.         Mike General, DO  06/21/22 0532

## 2022-06-21 NOTE — CARE COORDINATION
Pt seen in CCU s/p admission SAH, HTN urgency. Alert and RN working with pt. Spoke with daughter at bedside. Confirms demographics and screen below. Pt lives with spouse, mostly independent of ADLs,does use cane sometimes. No current HH or rehab. Discussed poss needs HH vs STR at d/c. Aware CM to follow for d/c needs/POC when stable. 06/21/22 1605   Service Assessment   History Provided By Child/Family  (daughter, Lona Mosquera)   Primary Caregiver Self   Support Systems Spouse/Significant Other;Children;Family Members   Patient's Healthcare Decision Maker is: Legal Next of Kin   PCP Verified by CM Yes  Myah Handing)   Prior Functional Level Independent in ADLs/IADLs   Can patient return to prior living arrangement Unknown at present   Jessica Rachel; Other (Comment)  (MCR/[facility] MCR)   Social/Functional History   Lives With Spouse   Type of [address]  (glucometer,cane)   Receives Help From Family   ADL Assistance Independent   Active  No   Patient's  Info drives some, but most family   Discharge Planning   Current Services Prior To Admission None   Services At/After Discharge   Confirm Follow Up Transport Family   Condition of Participation: Discharge Planning   Freedom of Choice list was provided with basic dialogue that supports the patient's individualized plan of care/goals, treatment preferences, and shares the quality data associated with the providers?   Yes

## 2022-06-21 NOTE — CONSULTS
Consult    Patient: Jaimie Mcclure MRN: [id number]     YOB: 1940  Age: 79 y.o. Sex: male      Subjective:      Jaimie Mcclure is a 79 y.o. male who is being seen for subarachnoid hemorrhage. The patient presented to the emergency department on 6/20 with severe headache. His systolic blood pressure was greater than 200 at that time. CT scan of the head showed a subarachnoid hemorrhage in the lower brainstem. A CTA did not reveal an aneurysm. It is thought that the patient likely had a fall and hit the back of his head. He is now in the ICU and being monitored closely. He did have an episode of vomiting earlier in the day. Onset of headache was acute. Duration was many hours. Severity was considered severe.     Past Medical History:   Diagnosis Date    Allergic rhinitis, cause unspecified 2/27/2014    Anemia, unspecified 2/27/2014    Atherosclerosis of renal artery ([facility] [address]     2001    CAD (coronary artery disease) 2001    4 vessel cabg, cardiac stents prior to this    Cancer [facility]) 2013    bladder/ L kidney    Chronic airway obstruction, not elsewhere classified 2/27/2014    Chronic kidney disease     hx of CRF,  creatinine 1.8    Coronary atherosclerosis of native coronary vessel 2/27/2014    Depressive disorder, not elsewhere classified 2/27/2014    Diabetes ([facility] [address] dx 2000    type 2, oral med, does not check his glucose, A1C 6.4   9/9/2019, states very rare episode hypo    Diverticulosis of colon (without mention of hemorrhage) 2/27/2014    Dyslipidemia     controlled with med    Esophagitis, unspecified 2/27/2014    Essential and other specified forms of tremor 2/27/2014    Essential hypertension, benign 2/27/2014    GERD (gastroesophageal reflux disease)     controlled with omeprazole    Gross hematuria 2/27/2014    Hematuria, unspecified 2/27/2014    Hypertension     controlled with meds    Malignant neoplasm of bladder, 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    Malignant neoplasm of trigone of urinary bladder ([facility] [address] 2/27/2014    Microscopic hematuria 2/27/2014    Osteoarthrosis, unspecified whether generalized or localized, unspecified site 2/27/2014    Other peripheral vascular disease(443.89) 2/27/2014    Polyneuropathy in diabetes(357.2) 2/27/2014    Proteinuria 2/27/2014    Psychiatric disorder     gets \"aggravated\", treated with Celexa    PUD (peptic ulcer disease) 1970's    Pure hypercholesterolemia 2/27/2014    Renal artery stenosis ([facility] [address] 2/27/2014    Renal sclerosis, unspecified 2/27/2014    left    Respiratory insufficiency 1/13/2016    Stroke ([facility] [address]     CVA x 2, (last was fall of 2011),  no residual weakness; L eye impaired    Tobacco use disorder 2/27/2014    Unspecified gastritis and gastroduodenitis without mention of hemorrhage 2/27/2014    Unspecified hereditary and idiopathic peripheral neuropathy 2/27/2014    Unspecified sleep apnea     does not wear cpap    Unspecified transient cerebral ischemia 2/27/2014     Past Surgical History:   Procedure Laterality Date    CARDIAC CATHETERIZATION      stents prior to CABG    COLONOSCOPY      KIDNEY REMOVAL Left     LIPOMA RESECTION  10/07/2019    [place] CARDIAC SURG PROCEDURE UNLIST  2001    4 vessel CABG, cardiac stents    UROLOGICAL SURGERY      TURBT    VASCULAR SURGERY  2001    renal stent in L kidney      Family History   Problem Relation Age of Onset    Diabetes Mother     Heart Disease Mother     Heart Attack Mother         Myocardial Infarction    Stroke Father     Alcohol Abuse Father     Diabetes Son      Social History     Tobacco Use    Smoking status: Current Every Day Smoker     Packs/day: 1.00    Smokeless tobacco: Never Used   Substance Use Topics    Alcohol use: No      Current Facility-Administered Medications   Medication Dose Route Frequency Provider Last Rate Last Admin    citalopram (CELEXA) tablet 40 mg  40 mg Oral Daily Coretta Gitelman, MD 40 mg at 06/21/22 0904    atorvastatin (LIPITOR) tablet 20 mg  20 mg Oral Daily Eleazar Sampson MD   20 mg at 06/21/22 8390    sodium chloride flush 0.9 % injection 5-40 mL  5-40 mL IntraVENous 2 times per day Eleazar Sampson MD   10 mL at 06/21/22 0908    sodium chloride flush 0.9 % injection 5-40 mL  5-40 mL IntraVENous PRN Eleazar Sampson MD        0.9 % sodium chloride infusion   IntraVENous PRN Eleazar Sampson MD        ondansetron (ZOFRAN-ODT) disintegrating tablet 4 mg  4 mg Oral Q8H PRN Eleazar Sampson MD        Or    ondansetron [facility]) injection 4 mg  4 mg IntraVENous Q6H PRDYLAN Sampson MD   4 mg at 06/21/22 1253    polyethylene glycol (GLYCOLAX) packet 17 g  17 g Oral Daily PRN Eleazar Sampson MD        acetaminophen (TYLENOL) tablet 650 mg  650 mg Oral Q6H PRN Eleazar Sampson MD   650 mg at 06/21/22 1023    Or    acetaminophen (TYLENOL) suppository 650 mg  650 mg Rectal Q6H PRN Eleazar Sampson MD        nicotine (NICODERM CQ) 14 MG/24HR 1 patch  1 patch TransDERmal Daily Eleazar Sampson MD   1 patch at 06/21/22 5107    niCARdipine (CARDENE) 25 mg in sodium chloride 0.9 % 250 mL infusion ([id number])  2.5-15 mg/hr IntraVENous Continuous Deette Peek, DO   Stopped at 06/21/22 1402    losartan (COZAAR) tablet 25 mg  25 mg Oral Daily Spencer Walker, DO   25 mg at 06/21/22 1144    labetalol (NORMODYNE) tablet 200 mg  200 mg Oral 2 times per day Nereida Geremias, DO   200 mg at 06/21/22 1144    glucose chewable tablet 16 g  4 tablet Oral PRN Nereida Geremias, DO        dextrose bolus 10% 125 mL  125 mL IntraVENous PRN Nreeida Geremias, DO        Or    dextrose bolus 10% 250 mL  250 mL IntraVENous PRN Nereida Geremias, DO        glucagon injection 1 mg  1 mg IntraMUSCular PRN Nereida Geremias, DO        dextrose 5 % solution  100 mL/hr IntraVENous PRN [place], DO        insulin lispro (HUMALOG) injection vial 0-8 Units  0-8 Units SubCUTAneous TID WC Spencer Walker, DO        insulin lispro (HUMALOG) injection vial 0-4 Units  0-4 Units SubCUTAneous Nightly Spencer Walker, DO        pantoprazole (PROTONIX) 40 mg in sodium chloride (PF) 10 mL injection  40 mg IntraVENous BID Spnecer Walker, DO        tuberculin injection 5 Units  5 Units IntraDERmal Once [employer], DO        labetalol (NORMODYNE;TRANDATE) injection 10 mg  10 mg IntraVENous Q4H PRN [employer], DO            Allergies   Allergen Reactions    Ciprofloxacin Other (See Comments)     weakness       Review of Systems:  CONSTITUTIONAL: No weight loss, fever, chills, weakness or fatigue. HEENT: Eyes: No visual loss, blurred vision, double vision or yellow sclerae. Ears, Nose, Throat: No hearing loss, sneezing, congestion, runny nose or sore throat. SKIN: No rash or itching. CARDIOVASCULAR: No chest pain, chest pressure or chest discomfort. No palpitations or edema. RESPIRATORY: No shortness of breath, cough or sputum. GASTROINTESTINAL: Positive for vomiting. . No abdominal pain or blood. GENITOURINARY:  No burning with urination  NEUROLOGICAL: No headache, dizziness, syncope, paralysis, ataxia, numbness or tingling in the extremities. No change in bowel or bladder control. MUSCULOSKELETAL: No muscle, back pain, joint pain or stiffness. HEMATOLOGIC: No anemia, bleeding or bruising. LYMPHATICS: No enlarged nodes. No history of splenectomy. PSYCHIATRIC: No history of depression or anxiety. ENDOCRINOLOGIC: No reports of sweating, cold or heat intolerance. No polyuria or polydipsia. ALLERGIES: No history of asthma, hives, eczema or rhinitis.         Objective:     Vitals:    06/21/22 1400 06/21/22 1415 06/21/22 1445 06/21/22 1500   BP: 125/60 134/61 (!) 130/58 (!) 144/65   Pulse: 68 69 73 73   Resp: 22  16 12   Temp:    98.2 °F (36.8 °C)   TempSrc:    Oral   SpO2: (!) 88% 93% 96% 93%   Weight:       Height:            Physical Exam:  General - Well developed, well nourished, in no apparent distress. Pleasant and conversant  HEENT - Normocephalic, atraumatic. Conjunctiva, tympanic membranes, and oropharynx are clear. Neck - Supple without masses, no bruits   Cardiovascular - Regular rate and rhythm. Normal S1, S2 without murmurs, rubs, or gallops. Lungs - Clear to auscultation. Abdomen - Soft, nontender with normal bowel sounds. Extremities - Peripheral pulses intact. No edema and no rashes. Neurological examination -    Comprehension is intact to one-step commands but he struggles to follow more complex commands. Language and speech are normal. On cranial nerve examination pupils are equal round and reactive to light. Fundoscopic examination is normal. Visual acuity is adequate. Visual fields are full to finger confrontation. Extraocular motility is normal. Face is symmetric and sensation is intact to light touch. Hearing is intact to finger rustle bilaterally. Motor examination - There is normal muscle tone and bulk. Power is full throughout. Muscle stretch reflexes are normoactive and there are no pathological reflexes present. Sensation is intact to light touch, pinprick, vibration and proprioception in all extremities. Cerebellar examination is normal.  Gait and stance not assessed due to fall risk        Lab Results   Component Value Date    CHOL 98 03/22/2022    HDL 38 03/22/2022    VLDL 15 03/22/2022        No results found for: HBA1C, [id number]     CT Results (most recent): Personally Reviewed   Results for orders placed during the hospital encounter of 06/20/22    CT HEAD WO CONTRAST    Narrative  EXAM: CT HEAD WO CONTRAST    HISTORY:  Reason for exam:->sudden onset HA, bp 260/110. TECHNIQUE: Axial images of the brain were performed without the administration  of intravenous contrast. Images were obtained axial plane and coronal  reformatted images were submitted. Dose reduction technique used:  Automated exposure control/Adjustment of the mA  and/or kV according to patient size/Use of iterative reconstruction technique. COMPARISON: MRI dated 1/26/2017    FINDINGS:  There is hyperdense hemorrhage noted adjacent to the brainstem, at the level of  the foramen magnum. There is no appreciable intracranial parenchymal hemorrhage. There is no midline shift, or mass effect. There are mild chronic appearing microangiopathic changes within the  periventricular white matter. No evidence of acute confluent territorial  infarction. Ventricles and basal cisterns are patent and symmetric. There is mild  generalized volume loss. Visualized paranasal sinuses and mastoid air cells are without significant  fluid. Scalp, soft tissues, and calvarium are within normal limits. Impression  Findings described above suggest subarachnoid hemorrhage adjacent to the the  brainstem, at the level of the foramen magnum. This does not appear to represent  intraparenchymal hemorrhage. This case was discussed with the ordering physician at the time of  interpretation. Most recent CTA Personally Reviewed  Results for orders placed during the hospital encounter of 06/20/22    CTA HEAD NECK W WO CONTRAST    Narrative  History: Complains of headache and neck pain and hypertension    Comments: CT ANGIOGRAM OF THE NECK AND CT ANGIOGRAM OF THE [place] OF WARD was  obtained following the administration of IV contrast. IV contrast was  administered to evaluate the arterial vasculature. Reformatted images in the  coronal and sagittal planes as well as 3-D imaging was obtained and reviewed on  a dedicated PACS and [address]. Radiation reduction dose techniques  were used for the study. Our CT scanner use one or all of the following-  Automated exposure control, adjustment of the mA and/or KV according the patient  size, iterative reconstruction. All measurements are based upon NASCET criteria  if appropriate.     This study was analyzed by the Within the next 24-48 hours this goal can likely be changed to less than 160 mmHg  STAT CT head for clinical worsening  Head of the bed at 30 degrees with neck in the neutral position  24-hour repeat CT scan of the head  Maintain normoglycemia and normothermia  No need for antiseizure medications  Avoid antithrombotics

## 2022-06-21 NOTE — PLAN OF CARE
Problem: Discharge Planning  Goal: Discharge to home or other facility with appropriate resources  Outcome: Progressing  Flowsheets (Taken 6/21/2022 0228)  Discharge to home or other facility with appropriate resources:   Identify barriers to discharge with patient and caregiver   Arrange for needed discharge resources and transportation as appropriate   Identify discharge learning needs (meds, wound care, etc)   Refer to discharge planning if patient needs post-hospital services based on physician order or complex needs related to functional status, cognitive ability or social support system     Problem: Pain  Goal: Verbalizes/displays adequate comfort level or baseline comfort level  Outcome: Progressing  Flowsheets (Taken 6/21/2022 0228)  Verbalizes/displays adequate comfort level or baseline comfort level:   Encourage patient to monitor pain and request assistance   Assess pain using appropriate pain scale   Administer analgesics based on type and severity of pain and evaluate response   Implement non-pharmacological measures as appropriate and evaluate response   Notify Licensed Independent Practitioner if interventions unsuccessful or patient reports new pain     Problem: Safety - Adult  Goal: Free from fall injury  Outcome: Progressing     Problem: ABCDS Injury Assessment  Goal: Absence of physical injury  Outcome: Progressing

## 2022-06-21 NOTE — H&P
Hospitalist History and Physical   Admit Date:  2022  9:49 PM   Name:  Jamaica Aldridge   Age:  79 y.o. Sex:  male  :  1940   MRN:  [id number]     Presenting Complaint: SAH  Reason(s) for Admission: Subarachnoid hemorrhage ([address] [I60.9]  SAH (subarachnoid hemorrhage) ([address] [I60.9]  Hypertensive emergency [I16.1]     History of Present Illness:   Jamaica Aldridge is a 79 y.o. male with medical history of HTN, CKD, dementia who presented to ED with severe posterior headache. Symptoms started around  last night. Upon ER evaluation, NIH was 0. 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 BP was found to be very elevated with SBP>200. CT head shows SAH adjacent to brainstem. Neurosurgery was consulted by ER. Dr. Allie Brito reviewed chart and recommended CTA. Patient as placed on cardene drip for BP control. CTA was obtained which does show an area of abnormality at subclavian, clot or small dissection. [facility] was consulted, and Dr. Anna Marie Tam reviewed chart and reported to ER that this was not a new finding and it does not need emergent intervention at this time. Hospitalist to admit for [facility] to ICU. Review of Systems:  10 systems reviewed and negative except as noted in HPI.   Assessment & Plan:   [facility]  - Neurosurgery consulted, no indication for surgical intervention  - Holding Plavix  - Admit to ICU  - Q1H neurochecks  - Cardene drip to keep SBP<140  - Repeat CT in 24 hours, sooner if develops neuro symptoms    Subclavian clot or dissection  - Vascular Surgery consulted, no indication for emergent intervention    HTN  - Recently has not taken home BP meds  - Currently on Cardene drip to keep SBP<140  - Restart home meds tomorrow    CKD, stage 4  - Stable  - Of note    Dementia  - Pleasant currently, but daughter and grandson at bedside report that he can get very agitated if someone is not with him (specifically his wife)  - Have requested special allowance for wife to remain at bedside in ICU tonight, House Supervisor agrees    Tobacco Use   - Smokes ~1ppd (\"depending on the weather\")  - Nicotine patch ordered  - Smoking cessation counseled     Past medical history reviewed.     Past Medical History:   Diagnosis Date    Allergic rhinitis, cause unspecified 2/27/2014    Anemia, unspecified 2/27/2014    Atherosclerosis of renal artery ([facility] [address]     2001    CAD (coronary artery disease) 2001    4 vessel cabg, cardiac stents prior to this    Cancer [facility]) 2013    bladder/ L kidney    Chronic airway obstruction, not elsewhere classified 2/27/2014    Chronic kidney disease     hx of CRF,  creatinine 1.8    Coronary atherosclerosis of native coronary vessel 2/27/2014    Depressive disorder, not elsewhere classified 2/27/2014    Diabetes ([address] dx 2000    type 2, oral med, does not check his glucose, A1C 6.4   9/9/2019, states very rare episode hypo    Diverticulosis of colon (without mention of hemorrhage) 2/27/2014    Dyslipidemia     controlled with med    Esophagitis, unspecified 2/27/2014    Essential and other specified forms of tremor 2/27/2014    Essential hypertension, benign 2/27/2014    GERD (gastroesophageal reflux disease)     controlled with omeprazole    Gross hematuria 2/27/2014    Hematuria, unspecified 2/27/2014    Hypertension     controlled with meds    Malignant neoplasm of bladder, 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    Malignant neoplasm of trigone of urinary bladder ([address] 2/27/2014    Microscopic hematuria 2/27/2014    Osteoarthrosis, unspecified whether generalized or localized, unspecified site 2/27/2014    Other peripheral vascular disease(443.89) 2/27/2014    Polyneuropathy in diabetes(357.2) 2/27/2014    Proteinuria 2/27/2014    Psychiatric disorder     gets \"aggravated\", treated with Celexa    PUD (peptic ulcer disease) 1970's    Pure hypercholesterolemia 2/27/2014    Renal artery stenosis ([address] 2/27/2014    Renal sclerosis, unspecified 2/27/2014    left    Respiratory insufficiency 1/13/2016    Stroke [facility])     CVA x 2, (last was fall of 2011),  no residual weakness; L eye impaired    Tobacco use disorder 2/27/2014    Unspecified gastritis and gastroduodenitis without mention of hemorrhage 2/27/2014    Unspecified hereditary and idiopathic peripheral neuropathy 2/27/2014    Unspecified sleep apnea     does not wear cpap    Unspecified transient cerebral ischemia 2/27/2014     Past surgical history reviewed. Past Surgical History:   Procedure Laterality Date    CARDIAC CATHETERIZATION      stents prior to CABG    COLONOSCOPY      KIDNEY REMOVAL Left     LIPOMA RESECTION  10/07/2019    [place] CARDIAC SURG PROCEDURE UNLIST  2001    4 vessel CABG, cardiac stents    UROLOGICAL SURGERY      TURBT    VASCULAR SURGERY  2001    renal stent in L kidney      Allergies   Allergen Reactions    Ciprofloxacin Other (See Comments)     weakness      Social History     Tobacco Use    Smoking status: Current Every Day Smoker     Packs/day: 1.00    Smokeless tobacco: Never Used   Substance Use Topics    Alcohol use: No      Family History   Problem Relation Age of Onset    Diabetes Mother     Heart Disease Mother     Heart Attack Mother         Myocardial Infarction    Stroke Father     Alcohol Abuse Father     Diabetes Son       Family history reviewed and noncontributory to patient's acute condition; no relevant family history unless otherwise noted above.   Immunization History   Administered Date(s) Administered    Pneumococcal Conjugate 13-valent ([id number]) 02/05/2016    Pneumococcal Polysaccharide ([id number]) 12/01/2006, 03/09/2020    Tdap (Boostrix, Adacel) 02/05/2016     PTA Medications:  Current Outpatient Medications   Medication Instructions    ascorbic acid (VITAMIN C) 500 MG tablet Oral    citalopram (CELEXA) 40 mg, Oral    clopidogrel (PLAVIX) 75 mg, Oral, DAILY    labetalol (NORMODYNE) 200 mg, Oral, 2 TIMES No overt distress  Head:  Normocephalic, atraumatic  Eyes:  Sclerae appear normal.  Pupils equally round. HENT:  Nares appear normal, no drainage. Moist mucous membranes  Neck:  No restricted ROM. Trachea midline  CV:   RRR. S1/S2 auscultated  Lungs:   CTAB. No wheezing, rhonchi, or rales. Appears even, unlabored  Abdomen: Bowel sounds present. Soft, nontender, nondistended. Extremities: Warm and dry. No cyanosis or clubbing. No edema. Skin:     No rashes. Normal turgor. Normal coloration  Neuro:  Cranial nerves II-XII grossly intact. Sensation intact  Psych:  Currently pleasant. Normal mood and affect.   Alert and oriented to person, place    Data Ordered and Personally Reviewed:    Last 24hr Labs:  Recent Results (from the past 24 hour(s))   EKG 12 Lead    Collection Time: 06/20/22  9:56 PM   Result Value Ref Range    Ventricular Rate 62 BPM    Atrial Rate 64 BPM    P-R Interval 114 ms    QRS Duration 142 ms    Q-T Interval 468 ms    QTc Calculation (Bazett) 476 ms    P Axis -88 degrees    R Axis 72 degrees    T Axis 30 degrees    Diagnosis Sinus or ectopic atrial rhythm    CBC    Collection Time: 06/20/22 10:04 PM   Result Value Ref Range    WBC 8.3 4.3 - 11.1 K/uL    RBC 4.30 4.23 - 5.6 M/uL    Hemoglobin 11.3 (L) 13.6 - 17.2 g/dL    Hematocrit 35.4 (L) 41.1 - 50.3 %    MCV 82.3 79.6 - 97.8 FL    MCH 26.3 26.1 - 32.9 PG    MCHC 31.9 31.4 - 35.0 g/dL    RDW 15.7 (H) 11.9 - 14.6 %    Platelets 301 221 - 152 K/uL    MPV 12.0 9.4 - 12.3 FL    nRBC 0.00 0.0 - 0.2 K/uL   Comprehensive Metabolic Panel    Collection Time: 06/20/22 10:04 PM   Result Value Ref Range    Sodium 146 (H) 136 - 145 mmol/L    Potassium 4.1 3.5 - 5.1 mmol/L    Chloride 112 (H) 98 - 107 mmol/L    CO2 31 21 - 32 mmol/L    Anion Gap 3 (L) 7 - 16 mmol/L    Glucose 172 (H) 65 - 100 mg/dL    BUN 25 (H) 8 - 23 MG/DL    CREATININE 1.60 (H) 0.8 - 1.5 MG/DL    GFR  53 (L) >60 ml/min/1.73m2    GFR Non-

## 2022-06-21 NOTE — PROGRESS NOTES
Hospitalist Progress Note   Admit Date:  2022  9:49 PM   Name:  Pallavi Zambrano   Age:  79 y.o. Sex:  male  :  1940   MRN:  [id number]   Room:  [facility]/    Presenting Complaint: Posterior Neck ache  Reason(s) for Admission: Subarachnoid hemorrhage ([facility] [I60.9]  SAH (subarachnoid hemorrhage) ([facility] [I60.9]  Hypertensive emergency [I16.1]     Hospital Course & Interval History:   79 y.o. male with medical history of HTN, CKD, dementia who presented to ED on  with severe posterior headache. Symptoms started around  on . Upon ER evaluation, NIH was 0. 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 BP was found to be very elevated with SBP>200. CT head shows SAH adjacent to brainstem. Neurosurgery was consulted by ER. They reviewed the chart and recommended CTA. Patient as placed on cardene drip for BP control. CTA was obtained which does show an area of abnormality at subclavian, clot or small dissection. Vascular Sugery was consulted who felt it was an chronic plaque or thrombus and recommended Plavix when able to take. He was admitted to the ICU on a Cardene gtt. Subjective/24hr Events (22): Still with posterior neck ache, but improved from admission. Denies CP/SOB. Denies vision changes. Denies N/V/D. Denies F/C.       Assessment & Plan:     Principal Problem:    Subarachnoid hemorrhage ([facility]  -  CT Head with SAH adjacent to the brainstem  -  CTA with no aneurysm  - Only neurological sign/symptom is posterior neck ache  - Goal SBP <140  - Continue Cardene gtt  - Restart home meds - Labetalol + Cozaar  - Repeat CT Head to be done tonight  - Consult Neurology for assistance    Active Problems:    Essential hypertension, benign  - Currently on Cardene gtt  - Restart home meds - Labetalol + Cozaar  - Goal SBP <140      CKD (chronic kidney disease) stage 4, GFR 15-29 ml/min ([facility])  - At baseline      Type 2 diabetes mellitus ([facility]  - Well controlled  - Continue Humalog SSI Tobacco use disorder  - Continue Nicotine patch      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[facility]  - Vascular surgery evaluated  - 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  - Cannot take Plavix due to [address]       Vascular Dementia ([facility]    Diet:  ADULT DIET; Regular  DVT PPx: SCDs  Code status: DNR    Hospital Problems:  Hospital Problems           Last Modified POA    * (Principal) Subarachnoid hemorrhage ([facility] 6/21/2022 Yes    Essential hypertension, benign 6/21/2022 Yes    CKD (chronic kidney disease) stage 4, GFR 15-29 ml/min ([facility] 6/21/2022 Yes    Type 2 diabetes mellitus ([facility] 6/21/2022 Yes    Tobacco use disorder 6/21/2022 Yes    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 Without intervention, there is a high probability of acute organ impairment or life-threatening deterioration in the patient's condition from: [address] with hypertensive emergency  Critical care interventions: Cardene gtt  Total critical care time spent: 41 minutes. Time is indicative of direct patient attendance at bedside and on the patient's floor nearby. Includes time spent at bedside performing history and exam, performing chart review, discussing findings and treatment plan with patient and/or family, discussing patient with nursing staff, consultants and colleagues, and ordering/reviewing pertinent laboratory and radiographic evaluations. Time excludes procedures. CPT:  20706: First 30-74 minutes  52352: Each block of 30 min.  beyond 74    Objective:     Patient Vitals for the past 24 hrs:   Temp Pulse Resp BP SpO2   06/21/22 1015 -- 79 23 135/61 90 %   06/21/22 1000 -- 76 15 (!) 142/65 93 %   06/21/22 0945 -- 79 19 (!) 125/58 (!) 85 %   06/21/22 0930 -- 93 25 133/63 (!) 89 %   06/21/22 0915 -- 79 22 132/62 92 %   06/21/22 0900 -- 78 22 (!) 141/62 (!) 89 %   06/21/22 0845 -- 75 22 135/63 (!) 89 %   06/21/22 0830 -- 69 23 (!) 123/58 (!) 88 %   06/21/22 0815 -- 91 (!) 38 (!) 131/58 93 % 06/21/22 0800 -- 69 20 130/60 90 %   06/21/22 0745 -- 68 20 136/61 90 %   06/21/22 0730 -- 72 (!) 32 132/60 --   06/21/22 0715 -- 74 21 133/60 90 %   06/21/22 0700 98 °F (36.7 °C) 76 22 133/60 90 %   06/21/22 0403 98 °F (36.7 °C) 79 21 135/63 92 %   06/21/22 0347 -- 75 26 129/62 90 %   06/21/22 0333 -- 74 19 134/62 92 %   06/21/22 0318 -- 68 23 (!) 140/65 92 %   06/21/22 0303 -- 75 19 (!) 141/65 93 %   06/21/22 0247 -- 72 (!) 31 137/63 93 %   06/21/22 0233 -- 75 24 139/64 93 %   06/21/22 0228 98 °F (36.7 °C) 74 19 (!) 140/66 93 %   06/21/22 0216 -- 79 20 -- 92 %   06/21/22 0211 98 °F (36.7 °C) -- -- -- --   06/21/22 0142 -- 77 19 (!) 159/51 91 %   06/21/22 0137 -- 78 17 (!) 136/52 90 %   06/21/22 0132 -- 75 16 133/66 90 %   06/21/22 0127 -- 79 15 (!) 139/52 91 %   06/21/22 0122 -- 72 21 (!) 142/56 92 %   06/21/22 0117 -- 75 16 125/80 91 %   06/21/22 0112 -- 74 17 (!) 145/56 93 %   06/21/22 0107 -- 71 15 (!) 146/51 90 %   06/21/22 0052 -- 77 17 (!) 140/61 92 %   06/21/22 0047 -- 76 13 (!) 150/56 91 %   06/21/22 0042 -- 72 15 136/74 90 %   06/21/22 0037 -- 70 16 (!) 141/50 93 %   06/21/22 0032 -- 72 13 (!) 140/51 96 %   06/21/22 0027 -- 69 17 (!) 124/53 96 %   06/21/22 0017 -- 70 16 (!) 141/51 91 %   06/21/22 0012 -- 71 18 (!) 142/55 94 %   06/21/22 0007 -- 70 16 127/70 92 %   06/21/22 0000 -- 69 16 (!) 144/53 91 %   06/20/22 2357 -- 72 18 (!) 133/52 94 %   06/20/22 2355 -- 71 18 (!) 139/54 93 %   06/20/22 2352 -- 70 15 (!) 145/59 91 %   06/20/22 2325 -- 77 16 (!) 148/55 92 %   06/20/22 2323 -- 82 21 (!) 144/59 95 %   06/20/22 2310 -- 75 15 (!) 131/95 93 %   06/20/22 2247 -- 69 17 (!) 184/62 94 %   06/20/22 2242 -- 68 18 (!) 160/58 95 %   06/20/22 2237 -- 68 17 (!) 181/67 94 %   06/20/22 2232 -- 66 17 (!) 208/78 96 %   06/20/22 2215 -- 68 -- (!) 223/83 98 %   06/20/22 2212 -- 68 15 (!) 212/92 98 %   06/20/22 2200 -- 57 14 (!) 211/74 97 %   06/20/22 2157 -- 70 16 (!) 257/78 --   06/20/22 2156 -- -- 17 -- -- 06/20/22 2151 97.8 °F (36.6 °C) 62 -- (!) 223/87 100 %       Oxygen Therapy  SpO2: 90 %  Pulse via Oximetry: 80 beats per minute  Pulse Oximeter Device Mode: Continuous  O2 Device: Nasal cannula  Skin Assessment: Clean, dry, & intact  O2 Flow Rate (L/min): 4 L/min    Estimated body mass index is 25.95 kg/m² as calculated from the following:    Height as of this encounter: 5' 9\" (1.753 m). Weight as of this encounter: 175 lb 11.3 oz (79.7 kg). Intake/Output Summary (Last 24 hours) at 6/21/2022 1103  Last data filed at 6/21/2022 0700  Gross per 24 hour   Intake 954.17 ml   Output 200 ml   Net 754.17 ml         Physical Exam:   Blood pressure 135/61, pulse 79, temperature 98 °F (36.7 °C), temperature source Oral, resp. rate 23, height 5' 9\" (1.753 m), weight 175 lb 11.3 oz (79.7 kg), SpO2 90 %. General:    Well nourished. No overt distress  Head:  Normocephalic, atraumatic  Eyes:  Sclerae appear normal.  Pupils equally round. ENT:  Nares appear normal, no drainage. Moist oral mucosa  Neck:  No restricted ROM. Trachea midline   CV:   RRR. No m/r/g. No jugular venous distension. Lungs:   CTAB. No wheezing, rhonchi, or rales. Respirations even, unlabored  Abdomen: Bowel sounds present. Soft, nontender, nondistended. Extremities: No cyanosis or clubbing. No edema  Skin:     No rashes and normal coloration. Warm and dry. Neuro:  CN II-XII grossly intact. Sensation intact. Pleasantly confused  Psych:  Normal mood and affect.       I have reviewed ordered lab tests and independently visualized imaging below:    Recent Labs:  Recent Results (from the past 48 hour(s))   EKG 12 Lead    Collection Time: 06/20/22  9:56 PM   Result Value Ref Range    Ventricular Rate 62 BPM    Atrial Rate 64 BPM    P-R Interval 114 ms    QRS Duration 142 ms    Q-T Interval 468 ms    QTc Calculation (Bazett) 476 ms    P Axis -88 degrees    R Axis 72 degrees    T Axis 30 degrees    Diagnosis Sinus or ectopic atrial rhythm CBC    Collection Time: 06/20/22 10:04 PM   Result Value Ref Range    WBC 8.3 4.3 - 11.1 K/uL    RBC 4.30 4.23 - 5.6 M/uL    Hemoglobin 11.3 (L) 13.6 - 17.2 g/dL    Hematocrit 35.4 (L) 41.1 - 50.3 %    MCV 82.3 79.6 - 97.8 FL    MCH 26.3 26.1 - 32.9 PG    MCHC 31.9 31.4 - 35.0 g/dL    RDW 15.7 (H) 11.9 - 14.6 %    Platelets 050 029 - 883 K/uL    MPV 12.0 9.4 - 12.3 FL    nRBC 0.00 0.0 - 0.2 K/uL   Comprehensive Metabolic Panel    Collection Time: 06/20/22 10:04 PM   Result Value Ref Range    Sodium 146 (H) 136 - 145 mmol/L    Potassium 4.1 3.5 - 5.1 mmol/L    Chloride 112 (H) 98 - 107 mmol/L    CO2 31 21 - 32 mmol/L    Anion Gap 3 (L) 7 - 16 mmol/L    Glucose 172 (H) 65 - 100 mg/dL    BUN 25 (H) 8 - 23 MG/DL    CREATININE 1.60 (H) 0.8 - 1.5 MG/DL    GFR  53 (L) >60 ml/min/1.73m2    GFR Non- 44 (L) >60 ml/min/1.73m2    Calcium 9.1 8.3 - 10.4 MG/DL    Total Bilirubin 0.3 0.2 - 1.1 MG/DL    ALT 12 12 - 65 U/L    AST 14 (L) 15 - 37 U/L    Alk Phosphatase 59 50 - 136 U/L    Total Protein 6.5 6.3 - 8.2 g/dL    Albumin 3.3 3.2 - 4.6 g/dL    Globulin 3.2 2.3 - 3.5 g/dL    Albumin/Globulin Ratio 1.0 (L) 1.2 - 3.5     POCT INR    Collection Time: 06/20/22 10:22 PM   Result Value Ref Range    POC Protime 12.9 (H) 9.6 - 11.6 SECS    POC INR 1.1 0.9 - 1.2     Basic Metabolic Panel w/ Reflex to MG    Collection Time: 06/21/22  4:48 AM   Result Value Ref Range    Sodium 146 (H) 136 - 145 mmol/L    Potassium 4.1 3.5 - 5.1 mmol/L    Chloride 113 (H) 98 - 107 mmol/L    CO2 29 21 - 32 mmol/L    Anion Gap 4 (L) 7 - 16 mmol/L    Glucose 290 (H) 65 - 100 mg/dL    BUN 36 (H) 8 - 23 MG/DL    CREATININE 1.80 (H) 0.8 - 1.5 MG/DL    GFR  47 (L) >60 ml/min/1.73m2    GFR Non- 38 (L) >60 ml/min/1.73m2    Calcium 8.8 8.3 - 10.4 MG/DL       Other Studies:  CTA HEAD NECK W WO CONTRAST    Result Date: 6/21/2022  History: Complains of headache and neck pain and hypertension Comments: CT ANGIOGRAM OF THE NECK AND CT ANGIOGRAM OF THE [place] was obtained following the administration of IV contrast. IV contrast was administered to evaluate the arterial vasculature. Reformatted images in the coronal and sagittal planes as well as 3-D imaging was obtained and reviewed on a dedicated PACS and [address]. Radiation reduction dose techniques were used for the study. Our CT scanner use one or all of the following- Automated exposure control, adjustment of the mA and/or KV according the patient size, iterative reconstruction. All measurements are based upon NASCET criteria if appropriate. This study was analyzed by the [address] [place], [place] [address]y [address] ai.Algorithm Findings: CT ANGIOGRAM OF THE NECK: The arch demonstrates atherosclerotic changes. Within the proximal left subclavian artery there is a soft tissue filling defect. This may represent noncalcified plaque disease or thromboemboli. This may place the patient at additional risk for distal emboli in the left upper extremity and/or of the left vertebral artery The right left carotid bulbs demonstrate eccentric calcified and noncalcified plaque disease. Proximally 50% narrowing is noted on the right and less than 50% on the left. The proximal vertebral arteries are patent with at least moderate stenosis noted at the origin on the left. The lung apices are clear with the exception of a mild scarring in the medial aspect of the right upper lobe. The thyroid gland is unremarkable. CT angiogram of [place]: The petrous, cavernous, and supraclinoid internal carotid arteries are patent with diffuse atherosclerotic changes. Moderate narrowing is noted of the clinoid portion of the right internal carotid artery. Anterior circulation and middle cerebral arteries are patent. The distal vertebral arteries posterior inferior cerebellar arteries basilar artery superior cerebellar arteries and posterior cerebral arteries are patent.  The dural venous sinuses are patent. 1. Small focus of filling defect noted within the proximal left subclavian artery which may represent noncalcified plaque disease or thromboembolic disease. This may place the patient at additional risk for distal emboli either within the left upper extremity or the left vertebral artery. CT HEAD WO CONTRAST    Result Date: 6/20/2022  EXAM: CT HEAD WO CONTRAST HISTORY:  Reason for exam:->sudden onset HA, bp 260/110. TECHNIQUE: Axial images of the brain were performed without the administration of intravenous contrast. Images were obtained axial plane and coronal reformatted images were submitted. Dose reduction technique used: Automated exposure control/Adjustment of the mA and/or kV according to patient size/Use of iterative reconstruction technique. COMPARISON: MRI dated 1/26/2017 FINDINGS: There is hyperdense hemorrhage noted adjacent to the brainstem, at the level of the foramen magnum. There is no appreciable intracranial parenchymal hemorrhage. There is no midline shift, or mass effect. There are mild chronic appearing microangiopathic changes within the periventricular white matter. No evidence of acute confluent territorial infarction. Ventricles and basal cisterns are patent and symmetric. There is mild generalized volume loss. Visualized paranasal sinuses and mastoid air cells are without significant fluid. Scalp, soft tissues, and calvarium are within normal limits. Findings described above suggest subarachnoid hemorrhage adjacent to the the brainstem, at the level of the foramen magnum. This does not appear to represent intraparenchymal hemorrhage. This case was discussed with the ordering physician at the time of interpretation. XR CHEST PORTABLE    Result Date: 6/20/2022  EXAM: XR CHEST PORTABLE HISTORY: evaluate. TECHNIQUE: Frontal chest. COMPARISON: 5/9/2016 FINDINGS: The cardiac silhouette, mediastinum, and pulmonary vasculature are within normal limits.  There is no consolidation, pleural effusion, or pneumothorax. No significant osseous abnormalities are observed. No evidence of an acute intrathoracic process. Current Meds:  Current Facility-Administered Medications   Medication Dose Route Frequency    citalopram (CELEXA) tablet 40 mg  40 mg Oral Daily    pantoprazole (PROTONIX) tablet 40 mg  40 mg Oral QAM AC    atorvastatin (LIPITOR) tablet 20 mg  20 mg Oral Daily    sodium chloride flush 0.9 % injection 5-40 mL  5-40 mL IntraVENous 2 times per day    sodium chloride flush 0.9 % injection 5-40 mL  5-40 mL IntraVENous PRN    0.9 % sodium chloride infusion   IntraVENous PRN    ondansetron (ZOFRAN-ODT) disintegrating tablet 4 mg  4 mg Oral Q8H PRN    Or    ondansetron (ZOFRAN) injection 4 mg  4 mg IntraVENous Q6H PRN    polyethylene glycol (GLYCOLAX) packet 17 g  17 g Oral Daily PRN    acetaminophen (TYLENOL) tablet 650 mg  650 mg Oral Q6H PRN    Or    acetaminophen (TYLENOL) suppository 650 mg  650 mg Rectal Q6H PRN    nicotine (NICODERM CQ) 14 MG/24HR 1 patch  1 patch TransDERmal Daily    niCARdipine (CARDENE) 25 mg in sodium chloride 0.9 % 250 mL infusion ([id number])  2.5-15 mg/hr IntraVENous Continuous    losartan (COZAAR) tablet 25 mg  25 mg Oral Daily    labetalol (NORMODYNE) tablet 200 mg  200 mg Oral 2 times per day    glucose chewable tablet 16 g  4 tablet Oral PRN    dextrose bolus 10% 125 mL  125 mL IntraVENous PRN    Or    dextrose bolus 10% 250 mL  250 mL IntraVENous PRN    glucagon injection 1 mg  1 mg IntraMUSCular PRN    dextrose 5 % solution  100 mL/hr IntraVENous PRN    insulin lispro (HUMALOG) injection vial 0-8 Units  0-8 Units SubCUTAneous TID WC    insulin lispro (HUMALOG) injection vial 0-4 Units  0-4 Units SubCUTAneous Nightly       Discharge Plan:     Location:TBD  Days: 2-3  PPD Ordered: 6/21    Signed:  Quyen Venegas DO    Part of this note may have been written by using a voice dictation software.   The note has

## 2022-06-21 NOTE — PROGRESS NOTES
PT had an episode of nausea and vomiting with dark brown coffee ground emesis. Dr. Kimberly No notified and at bedside. Stat H&H and occult blood gastric ordered. PRN zofran 4mg given, and PT denies any further nausea.

## 2022-06-21 NOTE — ED NOTES
TRANSFER - OUT REPORT:    Verbal report given to Yoanaprakashlauren Yi on Lynda Webster  being transferred to [facility] in [place] [id number] for routine progression of patient care       Report consisted of patient's Situation, Background, Assessment and   Recommendations(SBAR). Information from the following report(s) Nurse Handoff Report was reviewed with the receiving nurse. Lines:   Peripheral IV 06/20/22 Right Antecubital (Active)   Site Assessment Clean, dry & intact 06/20/22 2213   Line Status Blood return noted 06/20/22 2213   Line Care Connections checked and tightened 06/20/22 2213   Phlebitis Assessment No symptoms 06/20/22 2213   Dressing Status Clean, dry & intact 06/20/22 2213       Peripheral IV 06/20/22 Left Wrist (Active)   Site Assessment Clean, dry & intact 06/20/22 2313   Line Status Blood return noted 06/20/22 2021 Shanthi Viveros Connections checked and tightened 06/20/22 2313   Phlebitis Assessment No symptoms 06/20/22 2313   Infiltration Assessment 0 06/20/22 2313   Dressing Status Clean, dry & intact; New dressing applied 06/20/22 2313        Opportunity for questions and clarification was provided.       Patient transported with:  Monitor, JOSE A Varma RN  06/21/22 1810

## 2022-06-22 ENCOUNTER — APPOINTMENT (OUTPATIENT)
Dept: CT IMAGING | Age: 82
DRG: 085 | End: 2022-06-22
Payer: MEDICARE

## 2022-06-22 PROBLEM — D62 ACUTE BLOOD LOSS ANEMIA (ABLA): Status: ACTIVE | Noted: 2022-06-22

## 2022-06-22 PROBLEM — K92.2 ACUTE UPPER GASTROINTESTINAL BLEEDING: Status: ACTIVE | Noted: 2022-06-22

## 2022-06-22 LAB
ANION GAP SERPL CALC-SCNC: 5 MMOL/L (ref 7–16)
BASOPHILS # BLD: 0 K/UL (ref 0–0.2)
BASOPHILS NFR BLD: 0 % (ref 0–2)
BUN SERPL-MCNC: 63 MG/DL (ref 8–23)
CALCIUM SERPL-MCNC: 8.3 MG/DL (ref 8.3–10.4)
CHLORIDE SERPL-SCNC: 117 MMOL/L (ref 98–107)
CO2 SERPL-SCNC: 26 MMOL/L (ref 21–32)
CREAT SERPL-MCNC: 2 MG/DL (ref 0.8–1.5)
DIFFERENTIAL METHOD BLD: ABNORMAL
EOSINOPHIL # BLD: 0 K/UL (ref 0–0.8)
EOSINOPHIL NFR BLD: 0 % (ref 0.5–7.8)
ERYTHROCYTE [DISTWIDTH] IN BLOOD BY AUTOMATED COUNT: 15.7 % (ref 11.9–14.6)
GLUCOSE BLD STRIP.AUTO-MCNC: 151 MG/DL (ref 65–100)
GLUCOSE BLD STRIP.AUTO-MCNC: 157 MG/DL (ref 65–100)
GLUCOSE BLD STRIP.AUTO-MCNC: 163 MG/DL (ref 65–100)
GLUCOSE BLD STRIP.AUTO-MCNC: 171 MG/DL (ref 65–100)
GLUCOSE SERPL-MCNC: 159 MG/DL (ref 65–100)
HCT VFR BLD AUTO: 26.3 % (ref 41.1–50.3)
HCT VFR BLD AUTO: 26.9 % (ref 41.1–50.3)
HCT VFR BLD AUTO: 28.4 % (ref 41.1–50.3)
HGB BLD-MCNC: 8.1 G/DL (ref 13.6–17.2)
HGB BLD-MCNC: 8.5 G/DL (ref 13.6–17.2)
HGB BLD-MCNC: 8.7 G/DL (ref 13.6–17.2)
IMM GRANULOCYTES # BLD AUTO: 0.1 K/UL (ref 0–0.5)
IMM GRANULOCYTES NFR BLD AUTO: 0 % (ref 0–5)
LYMPHOCYTES # BLD: 1.6 K/UL (ref 0.5–4.6)
LYMPHOCYTES NFR BLD: 12 % (ref 13–44)
MCH RBC QN AUTO: 26.1 PG (ref 26.1–32.9)
MCHC RBC AUTO-ENTMCNC: 31.6 G/DL (ref 31.4–35)
MCV RBC AUTO: 82.5 FL (ref 79.6–97.8)
MM INDURATION, POC: 0 MM (ref 0–5)
MONOCYTES # BLD: 1.2 K/UL (ref 0.1–1.3)
MONOCYTES NFR BLD: 9 % (ref 4–12)
NEUTS SEG # BLD: 10.3 K/UL (ref 1.7–8.2)
NEUTS SEG NFR BLD: 79 % (ref 43–78)
NRBC # BLD: 0 K/UL (ref 0–0.2)
PLATELET # BLD AUTO: 195 K/UL (ref 150–450)
PMV BLD AUTO: 11.8 FL (ref 9.4–12.3)
POTASSIUM SERPL-SCNC: 3.9 MMOL/L (ref 3.5–5.1)
PPD, POC: NEGATIVE
RBC # BLD AUTO: 3.26 M/UL (ref 4.23–5.6)
SERVICE CMNT-IMP: ABNORMAL
SODIUM SERPL-SCNC: 148 MMOL/L (ref 138–145)
WBC # BLD AUTO: 13.2 K/UL (ref 4.3–11.1)

## 2022-06-22 PROCEDURE — 6370000000 HC RX 637 (ALT 250 FOR IP): Performed by: FAMILY MEDICINE

## 2022-06-22 PROCEDURE — 2500000003 HC RX 250 WO HCPCS: Performed by: STUDENT IN AN ORGANIZED HEALTH CARE EDUCATION/TRAINING PROGRAM

## 2022-06-22 PROCEDURE — 80048 BASIC METABOLIC PNL TOTAL CA: CPT

## 2022-06-22 PROCEDURE — 2580000003 HC RX 258: Performed by: INTERNAL MEDICINE

## 2022-06-22 PROCEDURE — A4216 STERILE WATER/SALINE, 10 ML: HCPCS | Performed by: INTERNAL MEDICINE

## 2022-06-22 PROCEDURE — APPSS45 APP SPLIT SHARED TIME 31-45 MINUTES: Performed by: NURSE PRACTITIONER

## 2022-06-22 PROCEDURE — C9113 INJ PANTOPRAZOLE SODIUM, VIA: HCPCS | Performed by: INTERNAL MEDICINE

## 2022-06-22 PROCEDURE — 2580000003 HC RX 258: Performed by: STUDENT IN AN ORGANIZED HEALTH CARE EDUCATION/TRAINING PROGRAM

## 2022-06-22 PROCEDURE — 2580000003 HC RX 258: Performed by: FAMILY MEDICINE

## 2022-06-22 PROCEDURE — 36415 COLL VENOUS BLD VENIPUNCTURE: CPT

## 2022-06-22 PROCEDURE — 2700000000 HC OXYGEN THERAPY PER DAY

## 2022-06-22 PROCEDURE — 85014 HEMATOCRIT: CPT

## 2022-06-22 PROCEDURE — 6370000000 HC RX 637 (ALT 250 FOR IP): Performed by: INTERNAL MEDICINE

## 2022-06-22 PROCEDURE — 94660 CPAP INITIATION&MGMT: CPT

## 2022-06-22 PROCEDURE — 99233 SBSQ HOSP IP/OBS HIGH 50: CPT | Performed by: PSYCHIATRY & NEUROLOGY

## 2022-06-22 PROCEDURE — 6360000002 HC RX W HCPCS: Performed by: INTERNAL MEDICINE

## 2022-06-22 PROCEDURE — 70450 CT HEAD/BRAIN W/O DYE: CPT

## 2022-06-22 PROCEDURE — 85025 COMPLETE CBC W/AUTO DIFF WBC: CPT

## 2022-06-22 PROCEDURE — 2500000003 HC RX 250 WO HCPCS: Performed by: INTERNAL MEDICINE

## 2022-06-22 PROCEDURE — 2100000000 HC CCU R&B

## 2022-06-22 PROCEDURE — 82962 GLUCOSE BLOOD TEST: CPT

## 2022-06-22 RX ORDER — LABETALOL 200 MG/1
300 TABLET, FILM COATED ORAL EVERY 12 HOURS SCHEDULED
Status: DISCONTINUED | OUTPATIENT
Start: 2022-06-22 | End: 2022-06-24

## 2022-06-22 RX ORDER — OXYCODONE HYDROCHLORIDE 5 MG/1
5 TABLET ORAL EVERY 6 HOURS PRN
Status: DISCONTINUED | OUTPATIENT
Start: 2022-06-22 | End: 2022-06-30 | Stop reason: HOSPADM

## 2022-06-22 RX ADMIN — ATORVASTATIN CALCIUM 20 MG: 10 TABLET, FILM COATED ORAL at 07:43

## 2022-06-22 RX ADMIN — ACETAMINOPHEN 650 MG: 325 TABLET ORAL at 00:28

## 2022-06-22 RX ADMIN — LOSARTAN POTASSIUM 25 MG: 25 TABLET, FILM COATED ORAL at 07:44

## 2022-06-22 RX ADMIN — SODIUM CHLORIDE 5 MG/HR: 9 INJECTION, SOLUTION INTRAVENOUS at 08:16

## 2022-06-22 RX ADMIN — LABETALOL HYDROCHLORIDE 10 MG: 5 INJECTION INTRAVENOUS at 05:59

## 2022-06-22 RX ADMIN — SODIUM CHLORIDE 5 MG/HR: 9 INJECTION, SOLUTION INTRAVENOUS at 23:36

## 2022-06-22 RX ADMIN — SODIUM CHLORIDE 40 MG: 9 INJECTION INTRAMUSCULAR; INTRAVENOUS; SUBCUTANEOUS at 07:44

## 2022-06-22 RX ADMIN — LABETALOL HYDROCHLORIDE 200 MG: 200 TABLET, FILM COATED ORAL at 07:43

## 2022-06-22 RX ADMIN — CITALOPRAM HYDROBROMIDE 40 MG: 20 TABLET ORAL at 07:44

## 2022-06-22 RX ADMIN — LABETALOL HYDROCHLORIDE 10 MG: 5 INJECTION INTRAVENOUS at 01:58

## 2022-06-22 RX ADMIN — SODIUM CHLORIDE 10 MG/HR: 9 INJECTION, SOLUTION INTRAVENOUS at 21:01

## 2022-06-22 RX ADMIN — SODIUM CHLORIDE 40 MG: 9 INJECTION INTRAMUSCULAR; INTRAVENOUS; SUBCUTANEOUS at 16:47

## 2022-06-22 RX ADMIN — ACETAMINOPHEN 650 MG: 325 TABLET ORAL at 11:51

## 2022-06-22 RX ADMIN — OXYCODONE 5 MG: 5 TABLET ORAL at 16:58

## 2022-06-22 RX ADMIN — SODIUM CHLORIDE 5 MG/HR: 9 INJECTION, SOLUTION INTRAVENOUS at 16:44

## 2022-06-22 RX ADMIN — SODIUM CHLORIDE, PRESERVATIVE FREE 10 ML: 5 INJECTION INTRAVENOUS at 21:00

## 2022-06-22 RX ADMIN — LABETALOL HYDROCHLORIDE 300 MG: 200 TABLET, FILM COATED ORAL at 20:56

## 2022-06-22 RX ADMIN — SODIUM CHLORIDE, PRESERVATIVE FREE 10 ML: 5 INJECTION INTRAVENOUS at 07:44

## 2022-06-22 RX ADMIN — LABETALOL HYDROCHLORIDE 10 MG: 5 INJECTION INTRAVENOUS at 14:21

## 2022-06-22 ASSESSMENT — PAIN SCALES - GENERAL
PAINLEVEL_OUTOF10: 3
PAINLEVEL_OUTOF10: 5
PAINLEVEL_OUTOF10: 2
PAINLEVEL_OUTOF10: 2
PAINLEVEL_OUTOF10: 0
PAINLEVEL_OUTOF10: 5
PAINLEVEL_OUTOF10: 0
PAINLEVEL_OUTOF10: 0

## 2022-06-22 ASSESSMENT — PAIN DESCRIPTION - DESCRIPTORS
DESCRIPTORS: ACHING;POUNDING
DESCRIPTORS: ACHING
DESCRIPTORS: ACHING
DESCRIPTORS: ACHING;POUNDING

## 2022-06-22 ASSESSMENT — PAIN - FUNCTIONAL ASSESSMENT: PAIN_FUNCTIONAL_ASSESSMENT: PREVENTS OR INTERFERES SOME ACTIVE ACTIVITIES AND ADLS

## 2022-06-22 ASSESSMENT — PAIN SCALES - WONG BAKER
WONGBAKER_NUMERICALRESPONSE: 0
WONGBAKER_NUMERICALRESPONSE: 0

## 2022-06-22 ASSESSMENT — PAIN DESCRIPTION - ORIENTATION
ORIENTATION: POSTERIOR
ORIENTATION: POSTERIOR
ORIENTATION: MID
ORIENTATION: POSTERIOR

## 2022-06-22 ASSESSMENT — PAIN DESCRIPTION - FREQUENCY: FREQUENCY: INTERMITTENT

## 2022-06-22 ASSESSMENT — PAIN DESCRIPTION - LOCATION
LOCATION: HEAD
LOCATION: HEAD
LOCATION: HEAD;NECK
LOCATION: NECK
LOCATION: HEAD;NECK

## 2022-06-22 ASSESSMENT — PAIN DESCRIPTION - PAIN TYPE: TYPE: ACUTE PAIN

## 2022-06-22 ASSESSMENT — PAIN DESCRIPTION - ONSET: ONSET: ON-GOING

## 2022-06-22 NOTE — PROGRESS NOTES
Therapy Note:  Therapist participated in ICU/CCU IDT rounds and believe patient is currently functioning below baseline functional mobility/ADL performance. Patient could benefit from skilled therapy services when MD deems medically appropriate. Thank you,  Waleska York.  Renan White

## 2022-06-22 NOTE — FLOWSHEET NOTE
06/22/22 0000   NIHSS Stroke Scale   Interval Reassessment   Level of Consciousness (1a) 0   LOC Questions (1b) 1   LOC Commands (1c) 0   Best Gaze (2) 0   Visual (3) 0   Facial Palsy (4) 0   Motor Arm, Left (5a) 0   Motor Arm, Right (5b) 0   Motor Leg, Left (6a) 0   Motor Leg, Right (6b) 0   Limb Ataxia (7) 0   Sensory (8) 0   Best Language (9) 0   Dysarthria (10) 0   Extinction and Inattention (11) 0   Total 1     Pt with increased confusion and agitation upon neuro assessment with no other focal deficits. MD notified about recent change and CT results. New orders received for repeat CT in am and q2hr neuro checks.

## 2022-06-22 NOTE — PROGRESS NOTES
This is a follow-up visit to the patient, providing a spiritual presence, emotional support and prayer. The patient appears to be resting. His wife, Annia Gamez and grandson, Raimundo Flores were present.     Ash Lutz, [address] [facility], [facility]

## 2022-06-22 NOTE — INTERDISCIPLINARY ROUNDS
Multi-D Rounds/Checklist (leapfrog):  Lines: can any be removed?: None       DVT Prophylaxis: Contraindicated  Vent: N/A  Nutrition Ordered/appropriate: Ordered  Can antibiotics or other drugs be stopped?: N/A  Consults needed: None  A: Is pain control adequate? Yes  B: Sedation break and SBT? N/A  C: Is sedation choice appropriate? N/A  D: Delirium/CAM-ICU? No  E: Mobility goals/appropriateness? Yes  F: Family update and plan? Spouse is primary contact and is being updated daily by primary attending and nursing staff.     Jonah Boo, APRN - CNP

## 2022-06-22 NOTE — CARE COORDINATION
Chart reviewed as pt remains CCU. Screen completed yesterday and CM following for d/c needs/POC. Remains on Cardene gtt. PPD for potential rehab needs.  PT/OT per MD.

## 2022-06-22 NOTE — PROGRESS NOTES
Bedside, Verbal and Written shift change report given to [phone] (oncoming nurse) by Sandy Naik RN (offgoing nurse). Report included the following information Nurse Handoff Report, Adult Overview, Intake/Output, MAR, Recent Results, Med Rec Status, Cardiac Rhythm NSR, Alarm Parameters and Neuro Assessment.

## 2022-06-22 NOTE — PROGRESS NOTES
[facility] Progress Note         Admit Date:  6/20/2022    Today's Date:  6/22/2022    CC:  Hematemesis with Hgb drop    Subjective:     Patient had a second episode of coffee ground emesis later in day yesterday, smaller amount, but none since then. He has a new area of bleeding on repeat CT head 21 June 2022 and then stable on recheck Ct head 22 June 2022 after concern for neuro changes. Bp remains elevated. Hgb drifted to 8.5 <-- 9 <-- 9.9 <-- 11.3. He has not had other signs of bleeding per RN. His wife is at the bedside. He is sleeping and does not respond to questions.     Medications:   Current Facility-Administered Medications   Medication Dose Route Frequency    citalopram (CELEXA) tablet 40 mg  40 mg Oral Daily    atorvastatin (LIPITOR) tablet 20 mg  20 mg Oral Daily    sodium chloride flush 0.9 % injection 5-40 mL  5-40 mL IntraVENous 2 times per day    sodium chloride flush 0.9 % injection 5-40 mL  5-40 mL IntraVENous PRN    0.9 % sodium chloride infusion   IntraVENous PRN    ondansetron (ZOFRAN-ODT) disintegrating tablet 4 mg  4 mg Oral Q8H PRN    Or    ondansetron (ZOFRAN) injection 4 mg  4 mg IntraVENous Q6H PRN    polyethylene glycol (GLYCOLAX) packet 17 g  17 g Oral Daily PRN    acetaminophen (TYLENOL) tablet 650 mg  650 mg Oral Q6H PRN    Or    acetaminophen (TYLENOL) suppository 650 mg  650 mg Rectal Q6H PRN    nicotine (NICODERM CQ) 14 MG/24HR 1 patch  1 patch TransDERmal Daily    niCARdipine (CARDENE) 25 mg in sodium chloride 0.9 % 250 mL infusion ([id number])  2.5-15 mg/hr IntraVENous Continuous    losartan (COZAAR) tablet 25 mg  25 mg Oral Daily    labetalol (NORMODYNE) tablet 200 mg  200 mg Oral 2 times per day    glucose chewable tablet 16 g  4 tablet Oral PRN    dextrose bolus 10% 125 mL  125 mL IntraVENous PRN    Or    dextrose bolus 10% 250 mL  250 mL IntraVENous PRN    glucagon injection 1 mg  1 mg IntraMUSCular PRN    dextrose 5 % solution  100 mL/hr IntraVENous PRN    insulin lispro (HUMALOG) injection vial 0-8 Units  0-8 Units SubCUTAneous TID WC    insulin lispro (HUMALOG) injection vial 0-4 Units  0-4 Units SubCUTAneous Nightly    pantoprazole (PROTONIX) 40 mg in sodium chloride (PF) 10 mL injection  40 mg IntraVENous BID    tuberculin injection 5 Units  5 Units IntraDERmal Once    labetalol (NORMODYNE;TRANDATE) injection 10 mg  10 mg IntraVENous Q4H PRN       Review of Systems:  ROS was unable to be obtained. Diet:  Regular    Objective:   Vitals:  BP (!) 144/63   Pulse 71   Temp 98.6 °F (37 °C) (Oral)   Resp 18   Ht 5' 9\" (1.753 m)   Wt 175 lb 11.3 oz (79.7 kg)   SpO2 99%   BMI 25.95 kg/m²   Intake/Output:  No intake/output data recorded. 06/20 1901 - 06/22 0700  In: 954.2 [I.V.:954.2]  Out: 600 [Urine:600]  Exam:  General appearance: sleeping, not responsive to questions, cooperative, no distress, lying in bed  Lungs: coarse BS to auscultation bilaterally anteriorly  Heart: regular rate and rhythm  Abdomen: soft, non-tender.  Bowel sounds normal. No masses, no organomegaly  Neuro:  Sleeping, not responsive to questions    Data Review (Labs):    Recent Labs     06/20/22  2204 06/20/22  2222 06/21/22  0448 06/21/22  1305 06/21/22  1949 06/22/22  0209   WBC 8.3  --   --   --   --  13.2*   HGB 11.3*  --   --  9.9* 9.0* 8.5*   HCT 35.4*  --   --  31.2* 29.4* 26.9*     --   --   --   --  195   MCV 82.3  --   --   --   --  82.5   *  --  146*  --   --  148*   K 4.1  --  4.1  --   --  3.9   *  --  113*  --   --  117*   CO2 31  --  29  --   --  26   BUN 25*  --  36*  --   --  63*   CREATININE 1.60*  --  1.80*  --   --  2.00*   CALCIUM 9.1  --  8.8  --   --  8.3   GLUCOSE 172*  --  290*  --   --  159*   ALKPHOS 59  --   --   --   --   --    AST 14*  --   --   --   --   --    ALT 12  --   --   --   --   --    BILITOT 0.3  --   --   --   --   --    ALBUMIN 1.0*  --   --   --   --   --    PROT 6.5  --   --   --   --   --    INR --  1.1  --   --   --   --      CT HEAD WO CONTRAST 21 June 2022   HISTORY:  Nontraumatic subarachnoid hemorrhage, unspecified / Reason for  exam:->SAH, 24 hour repeat CT.     TECHNIQUE: Axial images of the brain were performed without the administration of intravenous contrast. Images were obtained axial plane and coronal reformatted images were submitted.    Dose reduction technique used: Automated exposure control/Adjustment of the mA and/or kV according to patient size/Use of iterative reconstruction technique.   COMPARISON: 6/20/2022   FINDINGS:   Mild interval decrease in the hyperdensity previously seen at the level of the brainstem which may represent subarachnoid hemorrhage.   There is now a small volume of hemorrhage layering in the posterior horns of the lateral ventricles.   No evidence of intraparenchymal hemorrhage.   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   Visualized paranasal sinuses and mastoid air cells are without significant  fluid. Scalp, soft tissues, and calvarium are within normal limits.   Impression  Findings as above. CT HEAD WO CONTRAST 22 June 2022   HISTORY:  Nontraumatic subarachnoid hemorrhage, unspecified / Reason for exam:->SAH.     TECHNIQUE: Axial images of the brain were performed without the administration of intravenous contrast. Images were obtained axial plane and coronal reformatted images were submitted.    Dose reduction technique used: Automated exposure control/Adjustment of the mA and/or kV according to patient size/Use of iterative reconstruction technique.    COMPARISON: None.   FINDINGS:   No significant interval change in the appearance of the hyperdensity adjacent to the brainstem which may represent hemorrhage.   Compared to the previous examination there is been no significant interval  change in the intraventricular hemorrhage.   There is no new intracranial hemorrhage appreciated.   There is no midline shift or mass effect observed.   Stable chronic changes.   Impression  Findings as described above. No significant interval change. Assessment:     Principal Problem:    SAH (subarachnoid hemorrhage) (HCC)  Active Problems:    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HCC)    Dementia (HCC)    Type 2 diabetes mellitus (HCC)    Tobacco use disorder    Essential hypertension, benign    CKD (chronic kidney disease) stage 4, GFR 15-29 ml/min (HCC)  Resolved Problems:    * No resolved hospital problems. [de-identified]    80 yo male pt of Dr. Manuel Mark with PMH including but not limited to HTN, CKD, dementia, CAD s/p CABG '01 and cardiac stents, hx of CVA, GERD, HTN, GERD, who was seen in consultation 21 June 2022 at the request of Dr. Lorena Cerda for further evaluation of hematemesis in the setting of Plavix, after presented with severe headache and SBP > 200, and found to have acute SAH on head CT. He had a single episode of dark brown or coffee ground emesis witnessed by RN 21 June 2022. Hgb dropped from 11s range to 9.9 then 9 then 8.5. No chronicity to symptoms and no NSAIDs, ETOH, smoking. He had a large brown BM 21 June 2022. Last EGD in 2012 revealed gastritis. Last dose of Plavix was Friday, June 17th. Consider PUD, gastroduodenitis, AVMs, neoplasm, etc.  CT head noted possible new area of hemorrhage overnight, but then stable on repeat CT head this morning. Plan:     - Supportive care, maintain electrolytes. - Poor candidate for endoscopy and sedation due to recent [facility].    - Consider emergent EGD if patient demonstrates or has ongoing evidence of GI bleeding with significant further drop in Hgb. - Monitor H/H, transfuse as needed if hgb < 8.  - Continue PPI IV BID.   - Change to clear liquids with no reds. - Following. Patient is seen and examined in collaboration with Dr. Colin Zhang. Assessment and plan as per Dr. Bienvenido Jones. Carla Hunter [facility]  [facility]

## 2022-06-22 NOTE — PROGRESS NOTES
Neurology Daily Progress Note     Assessment:     79-year-old man who presented with severe headache and was found to have a perimedullary subarachnoid hemorrhage, likely secondary to trauma. No aneurysm on CTA. Plan:     Nonaneurysmal subarachnoid hemorrhage management   Q1H neurological checks  Maintain SBP < 140 mmHg. If he remains stable for 24 hours this be changed to less than 160 mmHg  STAT CT head for clinical worsening  Head of the bed at 30 degrees with neck in the neutral position  24-hour repeat CT scan of the head  Maintain normoglycemia and normothermia  No need for antiseizure medications  Avoid antithrombotics    Subjective: Interval history:    Patient drowsy. Awakens to voice and able to follow commands. Repeat CT stable. History:    Jamaica Aldridge is a 79 y.o. male who is being seen for [facility].     Unable to obtain ROS due to AMS      Objective:     Vitals:    06/22/22 0900 06/22/22 0915 06/22/22 0930 06/22/22 0945   BP: (!) 144/67 129/60 134/63    Pulse: 71 71 69 68   Resp: 17 16 19 18   Temp:       TempSrc:       SpO2: 98% 98%     Weight:       Height:              Current Facility-Administered Medications:     citalopram (CELEXA) tablet 40 mg, 40 mg, Oral, Daily, Shannan Little MD, 40 mg at 06/22/22 0744    atorvastatin (LIPITOR) tablet 20 mg, 20 mg, Oral, Daily, Shannan Little MD, 20 mg at 06/22/22 0743    sodium chloride flush 0.9 % injection 5-40 mL, 5-40 mL, IntraVENous, 2 times per day, Shannan Little MD, 10 mL at 06/22/22 0744    sodium chloride flush 0.9 % injection 5-40 mL, 5-40 mL, IntraVENous, PRN, Shannan Little MD    0.9 % sodium chloride infusion, , IntraVENous, PRN, Shannan Little MD    ondansetron (ZOFRAN-ODT) disintegrating tablet 4 mg, 4 mg, Oral, Q8H PRN **OR** ondansetron (ZOFRAN) injection 4 mg, 4 mg, IntraVENous, Q6H PRN, Shannan Little MD, 4 mg at 06/21/22 1253    polyethylene glycol (GLYCOLAX) packet 17 g, 17 g, Oral, Daily PRN, Frank Vera MD    acetaminophen (TYLENOL) tablet 650 mg, 650 mg, Oral, Q6H PRN, 650 mg at 06/22/22 0028 **OR** acetaminophen (TYLENOL) suppository 650 mg, 650 mg, Rectal, Q6H PRN, Frank Vera MD    nicotine (NICODERM CQ) 14 MG/24HR 1 patch, 1 patch, TransDERmal, Daily, Frank Vera MD, 1 patch at 06/22/22 0751    niCARdipine (CARDENE) 25 mg in sodium chloride 0.9 % 250 mL infusion ([id number]), 2.5-15 mg/hr, IntraVENous, Continuous, Gina Atlanta, DO, Last Rate: 50 mL/hr at 06/22/22 0816, 5 mg/hr at 06/22/22 0816    losartan (COZAAR) tablet 25 mg, 25 mg, Oral, Daily, Pocahontas E Belknap, DO, 25 mg at 06/22/22 0744    labetalol (NORMODYNE) tablet 200 mg, 200 mg, Oral, 2 times per day, Corky Rolls, DO, 200 mg at 06/22/22 3269    glucose chewable tablet 16 g, 4 tablet, Oral, PRN, Pocahontas E Denise, DO    dextrose bolus 10% 125 mL, 125 mL, IntraVENous, PRN **OR** dextrose bolus 10% 250 mL, 250 mL, IntraVENous, PRN, Pocahontas E Belknap, DO    glucagon injection 1 mg, 1 mg, IntraMUSCular, PRN, Spencer E Denise, DO    dextrose 5 % solution, 100 mL/hr, IntraVENous, PRN, Pocahontas E Belknap, DO    insulin lispro (HUMALOG) injection vial 0-8 Units, 0-8 Units, SubCUTAneous, TID WC, Spencer E Belknap, DO    insulin lispro (HUMALOG) injection vial 0-4 Units, 0-4 Units, SubCUTAneous, Nightly, Pocahontas E Belknap, DO    pantoprazole (PROTONIX) 40 mg in sodium chloride (PF) 10 mL injection, 40 mg, IntraVENous, BID, Pocahontas E Denise, DO, 40 mg at 06/22/22 0744    tuberculin injection 5 Units, 5 Units, IntraDERmal, Once, Corky Rolls, DO, 5 Units at 06/21/22 1537    labetalol (NORMODYNE;TRANDATE) injection 10 mg, 10 mg, IntraVENous, Q4H PRN, Spencer Walker, DO, 10 mg at 06/22/22 0559    Recent Results (from the past 12 hour(s))   Basic Metabolic Panel w/ Reflex to MG    Collection Time: 06/22/22  2:09 AM   Result Value Ref Range    Sodium 148 (H) 138 - 145 mmol/L    Potassium 3.9 3.5 - 5.1 mmol/L    Chloride 117 (H) 98 - 107 mmol/L    CO2 26 21 - 32 mmol/L    Anion Gap 5 (L) 7 - 16 mmol/L    Glucose 159 (H) 65 - 100 mg/dL    BUN 63 (H) 8 - 23 MG/DL    CREATININE 2.00 (H) 0.8 - 1.5 MG/DL    GFR  41 (L) >60 ml/min/1.73m2    GFR Non- 34 (L) >60 ml/min/1.73m2    Calcium 8.3 8.3 - 10.4 MG/DL   CBC with Auto Differential    Collection Time: 06/22/22  2:09 AM   Result Value Ref Range    WBC 13.2 (H) 4.3 - 11.1 K/uL    RBC 3.26 (L) 4.23 - 5.6 M/uL    Hemoglobin 8.5 (L) 13.6 - 17.2 g/dL    Hematocrit 26.9 (L) 41.1 - 50.3 %    MCV 82.5 79.6 - 97.8 FL    MCH 26.1 26.1 - 32.9 PG    MCHC 31.6 31.4 - 35.0 g/dL    RDW 15.7 (H) 11.9 - 14.6 %    Platelets 206 641 - 109 K/uL    MPV 11.8 9.4 - 12.3 FL    nRBC 0.00 0.0 - 0.2 K/uL    Differential Type AUTOMATED      Seg Neutrophils 79 (H) 43 - 78 %    Lymphocytes 12 (L) 13 - 44 %    Monocytes 9 4.0 - 12.0 %    Eosinophils % 0 (L) 0.5 - 7.8 %    Basophils 0 0.0 - 2.0 %    Immature Granulocytes 0 0.0 - 5.0 %    Segs Absolute 10.3 (H) 1.7 - 8.2 K/UL    Absolute Lymph # 1.6 0.5 - 4.6 K/UL    Absolute Mono # 1.2 0.1 - 1.3 K/UL    Absolute Eos # 0.0 0.0 - 0.8 K/UL    Basophils Absolute 0.0 0.0 - 0.2 K/UL    Absolute Immature Granulocyte 0.1 0.0 - 0.5 K/UL   POCT Glucose    Collection Time: 06/22/22  7:50 AM   Result Value Ref Range    POC Glucose 171 (H) 65 - 100 mg/dL    Performed by: Sandra Bean          Physical Exam:  General - Well developed, well nourished, in no apparent distress. Pleasant and conversant  HEENT - Normocephalic, atraumatic. Conjunctiva, tympanic membranes, and oropharynx are clear. Neck - Supple without masses, no bruits   Extremities - Peripheral pulses intact. No edema and no rashes.      Neurological examination -     Comprehension is intact to one-step commands but he struggles to follow more complex commands.  Language and speech are normal. On cranial nerve examination pupils are equal round and reactive to light. Visual acuity is adequate. Visual fields are full to finger confrontation. Extraocular motility is normal. Face is symmetric and sensation is intact to light touch. Hearing is intact to finger rustle bilaterally. Motor examination - There is normal muscle tone and bulk. Power is full throughout. Muscle stretch reflexes are normoactive and there are no pathological reflexes present. Sensation is intact to light touch, pinprick, vibration and proprioception in all extremities.  Cerebellar examination is normal.  Gait and stance not assessed due to fall risk         Signed By: Quinn Combs, APRN     June 22, 2022

## 2022-06-22 NOTE — PROGRESS NOTES
Hospitalist Progress Note   Admit Date:  2022  9:49 PM   Name:  Lety Ochoa   Age:  79 y.o. Sex:  male  :  1940   MRN:  [id number]   Room:  [facility]7/    Presenting Complaint: Posterior Neck ache  Reason(s) for Admission: Subarachnoid hemorrhage ([address] [I60.9]  SAH (subarachnoid hemorrhage) ([facility] [I60.9]  Hypertensive emergency [I16.1]     Hospital Course & Interval History:   79 y.o. male with medical history of HTN, CKD, dementia who presented to ED on  with severe posterior headache. Symptoms started around  on . Upon ER evaluation, NIH was 0. 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 BP was found to be very elevated with SBP>200. CT head shows SAH adjacent to brainstem. Neurosurgery was consulted by ER. They reviewed the chart and recommended CTA. Patient as placed on cardene drip for BP control. CTA was obtained which does show an area of abnormality at subclavian, clot or small dissection. Vascular Sugery was consulted who felt it was an chronic plaque or thrombus and recommended Plavix when able to take. He was admitted to the ICU on a Cardene gtt. On  he had sudden onset vomiting of coffee ground emesis. PPI was started with GI consult. Subjective/24hr Events (22): Drowsy this morning. Easily awakens. Still with posterior neck ache. Denies CP/SOB. Denies N/V/D today.       Assessment & Plan:     Principal Problem:    Subarachnoid hemorrhage ([address]  -  CT Head with SAH adjacent to the brainstem  -  CTA with no aneurysm  -  Repeat CT with no change  -  Repeat CT with decrease in [address] but now with blood layering in posterior horn ventricles  - Only neurological sign/symptom is posterior neck ache  - Goal SBP <140  - Continue Cardene gtt  - Restart home meds - Labetalol + Cozaar  - Appreciate Neurology's input and assistance    Active Problems:    Acute Blood Loss Anemia  - Likely due to UGIB  -  Hgb 11.3  -  9.9 --> large coffee ground emesis --> Hgb 9.0  - 6/22 Hgb 8.5  - Continue PPI BID  - GI following      Acute Upper GI Bleed  - 6/21 2 large coffee ground emesis episodes  - Hgb dropping  - Emesis is hemoccult positive  - Continue PPI BID  - GI following      Essential hypertension, benign  - Currently on Cardene gtt  - Restart home meds - Labetalol + Cozaar  - Goal SBP <140      CKD (chronic kidney disease) stage 4, GFR 15-29 ml/min ([facility])  - At baseline      Type 2 diabetes mellitus ([address]  - Well controlled  - Continue Humalog SSI      Tobacco use disorder  - Continue Nicotine patch      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 6/21/2022 Yes    Type 2 diabetes mellitus ([address] 6/21/2022 Yes    Tobacco use disorder 6/21/2022 Yes    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[address] 6/21/2022 Yes    Dementia ([address] 6/21/2022 Yes        Patient is critically ill. Without intervention, there is a high probability of acute organ impairment or life-threatening deterioration in the patient's condition from: [facility] with hypertensive emergency  Critical care interventions: Cardene gtt  Total critical care time spent: 41 minutes. Time is indicative of direct patient attendance at bedside and on the patient's floor nearby. Includes time spent at bedside performing history and exam, performing chart review, discussing findings and treatment plan with patient and/or family, discussing patient with nursing staff, consultants and colleagues, and ordering/reviewing pertinent laboratory and radiographic evaluations.   Time excludes procedures. CPT:  25987: First 30-74 minutes  30339: Each block of 30 min.  beyond 74    Objective:     Patient Vitals for the past 24 hrs:   Temp Pulse Resp BP SpO2   06/22/22 0915 -- 71 16 -- 98 %   06/22/22 0900 -- 71 17 (!) 144/67 98 %   06/22/22 0845 -- 72 23 (!) 146/64 99 %   06/22/22 0830 -- 71 17 (!) 154/69 97 %   06/22/22 0823 -- 71 18 -- 99 %   06/22/22 0815 -- 71 24 (!) 160/70 99 %   06/22/22 0800 -- 74 16 (!) 176/74 98 %   06/22/22 0745 -- 73 -- (!) 166/71 99 %   06/22/22 0730 -- 72 16 (!) 162/72 97 %   06/22/22 0715 -- 77 14 (!) 144/63 99 %   06/22/22 0700 98 °F (36.7 °C) 75 19 (!) 172/73 100 %   06/22/22 0645 -- 85 17 (!) 146/65 97 %   06/22/22 0630 -- 71 17 (!) 151/66 99 %   06/22/22 0615 -- 71 18 (!) 144/65 99 %   06/22/22 0600 -- 67 17 (!) 152/65 94 %   06/22/22 0545 -- 77 -- -- --   06/22/22 0530 -- 77 16 -- 98 %   06/22/22 0515 -- 76 17 (!) 157/70 98 %   06/22/22 0500 -- 74 18 (!) 143/65 98 %   06/22/22 0445 -- 71 27 (!) 143/64 98 %   06/22/22 0430 -- 75 15 (!) 148/67 97 %   06/22/22 0415 -- 78 18 132/62 97 %   06/22/22 0400 -- 71 13 (!) 148/65 92 %   06/22/22 0345 -- 72 25 (!) 142/66 95 %   06/22/22 0330 -- 74 17 (!) 143/55 99 %   06/22/22 0315 -- 77 15 (!) 141/64 98 %   06/22/22 0300 98.6 °F (37 °C) 73 19 (!) 145/65 99 %   06/22/22 0245 -- 75 20 130/60 98 %   06/22/22 0230 -- 73 19 132/63 99 %   06/22/22 0215 -- 72 15 131/62 97 %   06/22/22 0200 -- 75 18 138/63 99 %   06/22/22 0145 -- 75 19 (!) 144/63 99 %   06/22/22 0130 -- 76 19 (!) 158/67 98 %   06/22/22 0115 -- 72 19 (!) 147/65 98 %   06/22/22 0100 -- 72 16 (!) 140/103 97 %   06/22/22 0045 -- 73 17 (!) 132/55 95 %   06/22/22 0030 -- 75 18 (!) 129/59 --   06/22/22 0015 -- 76 16 (!) 136/58 --   06/22/22 0000 -- 74 15 139/62 --   06/21/22 2345 -- 76 18 (!) 145/65 --   06/21/22 2330 -- 74 18 136/63 --   06/21/22 2315 -- 73 17 (!) 158/63 --   06/21/22 2300 98.3 °F (36.8 °C) 74 17 135/65 93 %   06/21/22 2245 -- 71 23 136/63 --   06/21/22 2230 -- 70 (!) 31 139/65 --   06/21/22 2215 -- 69 16 (!) 123/52 --   06/21/22 2145 -- 67 17 125/67 92 %   06/21/22 2130 -- 68 16 130/87 --   06/21/22 2115 -- 77 17 128/87 91 %   06/21/22 2100 -- 83 15 121/64 99 %   06/21/22 2056 -- 75 -- 131/63 --   06/21/22 2045 -- 77 18 131/63 100 %   06/21/22 2030 -- 74 22 (!) 147/64 99 %   06/21/22 2015 -- 73 17 138/63 98 %   06/21/22 2000 -- 73 18 131/61 96 %   06/21/22 1945 -- 69 18 129/61 93 %   06/21/22 1930 -- 82 27 131/60 94 %   06/21/22 1915 98.6 °F (37 °C) 69 15 (!) 113/55 96 %   06/21/22 1900 -- 67 18 (!) 121/56 96 %   06/21/22 1845 -- 69 20 133/62 95 %   06/21/22 1830 -- 70 20 133/63 95 %   06/21/22 1815 -- 70 21 129/63 95 %   06/21/22 1800 -- 68 19 132/63 93 %   06/21/22 1745 -- 66 25 137/61 92 %   06/21/22 1730 -- 73 19 (!) 147/67 92 %   06/21/22 1715 -- 71 23 (!) 141/65 90 %   06/21/22 1638 -- 79 21 -- 99 %   06/21/22 1615 -- 79 17 (!) 143/62 99 %   06/21/22 1600 -- 75 15 (!) 119/56 100 %   06/21/22 1545 -- 75 17 131/62 95 %   06/21/22 1530 -- 73 18 127/60 98 %   06/21/22 1515 -- 64 29 (!) 146/64 (!) 83 %   06/21/22 1500 98.2 °F (36.8 °C) 73 12 (!) 144/65 93 %   06/21/22 1445 -- 73 16 (!) 130/58 96 %   06/21/22 1415 -- 69 -- 134/61 93 %   06/21/22 1400 -- 68 22 125/60 (!) 88 %   06/21/22 1345 -- 71 25 (!) 115/57 92 %   06/21/22 1330 -- 78 (!) 31 125/64 94 %   06/21/22 1315 -- 75 21 (!) 119/56 91 %   06/21/22 1300 -- 79 12 (!) 130/58 90 %   06/21/22 1245 -- 75 21 134/62 90 %   06/21/22 1230 -- 81 22 -- 94 %   06/21/22 1200 -- 76 21 132/60 91 %   06/21/22 1145 -- 76 22 (!) 123/59 94 %   06/21/22 1130 -- 92 (!) 31 (!) 123/52 95 %   06/21/22 1115 -- (!) 107 -- (!) 142/63 90 %   06/21/22 1100 97.9 °F (36.6 °C) 80 12 (!) 142/64 93 %   06/21/22 1045 -- 80 20 132/60 92 %   06/21/22 1030 -- 80 22 (!) 126/58 91 %   06/21/22 1015 -- 79 23 135/61 90 %   06/21/22 1000 -- 76 15 (!) 142/65 93 %   06/21/22 0945 -- 79 19 (!) 125/58 (!) 85 %       Oxygen Therapy  SpO2: 98 %  Pulse Oximetry Type: Continuous  Pulse via Oximetry: 70 beats per minute  Pulse Oximeter Device Mode: Continuous  Pulse Oximeter Device Location: Right,Finger  O2 Device: Nasal cannula  Oximetry Probe Site Changed: Yes  Skin Assessment: Clean, dry, & intact  Skin Protection for O2 Device: N/A  O2 Flow Rate (L/min): 4 L/min    Estimated body mass index is 25.95 kg/m² as calculated from the following:    Height as of this encounter: 5' 9\" (1.753 m). Weight as of this encounter: 175 lb 11.3 oz (79.7 kg). Intake/Output Summary (Last 24 hours) at 6/22/2022 0932  Last data filed at 6/21/2022 2106  Gross per 24 hour   Intake --   Output 400 ml   Net -400 ml         Physical Exam:   Blood pressure (!) 144/67, pulse 71, temperature 98 °F (36.7 °C), temperature source Axillary, resp. rate 16, height 5' 9\" (1.753 m), weight 175 lb 11.3 oz (79.7 kg), SpO2 98 %. General:    Well nourished. No overt distress  Head:  Normocephalic, atraumatic  Eyes:  Sclerae appear normal.  Pupils equally round. ENT:  Nares appear normal, no drainage. Moist oral mucosa  Neck:  No restricted ROM. Trachea midline   CV:   RRR. No m/r/g. No jugular venous distension. Lungs:   CTAB. No wheezing, rhonchi, or rales. Respirations even, unlabored  Abdomen: Bowel sounds present. Soft, nontender, nondistended. Extremities: No cyanosis or clubbing. No edema  Skin:     No rashes and normal coloration. Warm and dry. Neuro:  CN II-XII grossly intact. Sensation intact. Pleasantly confused  Psych:  Normal mood and affect.       I have reviewed ordered lab tests and independently visualized imaging below:    Recent Labs:  Recent Results (from the past 48 hour(s))   EKG 12 Lead    Collection Time: 06/20/22  9:56 PM   Result Value Ref Range    Ventricular Rate 62 BPM    Atrial Rate 64 BPM    P-R Interval 114 ms    QRS Duration 142 ms    Q-T Interval 468 ms    QTc Calculation (Bazett) 476 ms    P Axis -88 degrees    R Axis 72 degrees    T Axis 30 degrees Diagnosis Sinus or ectopic atrial rhythm    CBC    Collection Time: 06/20/22 10:04 PM   Result Value Ref Range    WBC 8.3 4.3 - 11.1 K/uL    RBC 4.30 4.23 - 5.6 M/uL    Hemoglobin 11.3 (L) 13.6 - 17.2 g/dL    Hematocrit 35.4 (L) 41.1 - 50.3 %    MCV 82.3 79.6 - 97.8 FL    MCH 26.3 26.1 - 32.9 PG    MCHC 31.9 31.4 - 35.0 g/dL    RDW 15.7 (H) 11.9 - 14.6 %    Platelets 964 001 - 526 K/uL    MPV 12.0 9.4 - 12.3 FL    nRBC 0.00 0.0 - 0.2 K/uL   Comprehensive Metabolic Panel    Collection Time: 06/20/22 10:04 PM   Result Value Ref Range    Sodium 146 (H) 136 - 145 mmol/L    Potassium 4.1 3.5 - 5.1 mmol/L    Chloride 112 (H) 98 - 107 mmol/L    CO2 31 21 - 32 mmol/L    Anion Gap 3 (L) 7 - 16 mmol/L    Glucose 172 (H) 65 - 100 mg/dL    BUN 25 (H) 8 - 23 MG/DL    CREATININE 1.60 (H) 0.8 - 1.5 MG/DL    GFR  53 (L) >60 ml/min/1.73m2    GFR Non- 44 (L) >60 ml/min/1.73m2    Calcium 9.1 8.3 - 10.4 MG/DL    Total Bilirubin 0.3 0.2 - 1.1 MG/DL    ALT 12 12 - 65 U/L    AST 14 (L) 15 - 37 U/L    Alk Phosphatase 59 50 - 136 U/L    Total Protein 6.5 6.3 - 8.2 g/dL    Albumin 3.3 3.2 - 4.6 g/dL    Globulin 3.2 2.3 - 3.5 g/dL    Albumin/Globulin Ratio 1.0 (L) 1.2 - 3.5     POCT INR    Collection Time: 06/20/22 10:22 PM   Result Value Ref Range    POC Protime 12.9 (H) 9.6 - 11.6 SECS    POC INR 1.1 0.9 - 1.2     Basic Metabolic Panel w/ Reflex to MG    Collection Time: 06/21/22  4:48 AM   Result Value Ref Range    Sodium 146 (H) 136 - 145 mmol/L    Potassium 4.1 3.5 - 5.1 mmol/L    Chloride 113 (H) 98 - 107 mmol/L    CO2 29 21 - 32 mmol/L    Anion Gap 4 (L) 7 - 16 mmol/L    Glucose 290 (H) 65 - 100 mg/dL    BUN 36 (H) 8 - 23 MG/DL    CREATININE 1.80 (H) 0.8 - 1.5 MG/DL    GFR  47 (L) >60 ml/min/1.73m2    GFR Non- 38 (L) >60 ml/min/1.73m2    Calcium 8.8 8.3 - 10.4 MG/DL   Hemoglobin A1C    Collection Time: 06/21/22  4:48 AM   Result Value Ref Range    Hemoglobin A1C 6.0 4.20 - 6.30 %    eAG 126 mg/dL   POCT Glucose    Collection Time: 06/21/22 11:50 AM   Result Value Ref Range    POC Glucose 199 (H) 65 - 100 mg/dL    Performed by: Aaron    Occult blood gastric / duodenum    Collection Time: 06/21/22 12:46 PM   Result Value Ref Range    Occult Blood, Gastric Positive (A) NEG      pH, Gastric 3.0 1.5 - 3.5     Hemoglobin and Hematocrit    Collection Time: 06/21/22  1:05 PM   Result Value Ref Range    Hemoglobin 9.9 (L) 13.6 - 17.2 g/dL    Hematocrit 31.2 (L) 41.1 - 50.3 %   POCT Glucose    Collection Time: 06/21/22  5:27 PM   Result Value Ref Range    POC Glucose 179 (H) 65 - 100 mg/dL    Performed by: Aaron    Hemoglobin and Hematocrit    Collection Time: 06/21/22  7:49 PM   Result Value Ref Range    Hemoglobin 9.0 (L) 13.6 - 17.2 g/dL    Hematocrit 29.4 (L) 41.1 - 50.3 %   POCT Glucose    Collection Time: 06/21/22  8:58 PM   Result Value Ref Range    POC Glucose 184 (H) 65 - 100 mg/dL    Performed by:  OdalisCarbon)BSN    Basic Metabolic Panel w/ Reflex to MG    Collection Time: 06/22/22  2:09 AM   Result Value Ref Range    Sodium 148 (H) 138 - 145 mmol/L    Potassium 3.9 3.5 - 5.1 mmol/L    Chloride 117 (H) 98 - 107 mmol/L    CO2 26 21 - 32 mmol/L    Anion Gap 5 (L) 7 - 16 mmol/L    Glucose 159 (H) 65 - 100 mg/dL    BUN 63 (H) 8 - 23 MG/DL    CREATININE 2.00 (H) 0.8 - 1.5 MG/DL    GFR  41 (L) >60 ml/min/1.73m2    GFR Non- 34 (L) >60 ml/min/1.73m2    Calcium 8.3 8.3 - 10.4 MG/DL   CBC with Auto Differential    Collection Time: 06/22/22  2:09 AM   Result Value Ref Range    WBC 13.2 (H) 4.3 - 11.1 K/uL    RBC 3.26 (L) 4.23 - 5.6 M/uL    Hemoglobin 8.5 (L) 13.6 - 17.2 g/dL    Hematocrit 26.9 (L) 41.1 - 50.3 %    MCV 82.5 79.6 - 97.8 FL    MCH 26.1 26.1 - 32.9 PG    MCHC 31.6 31.4 - 35.0 g/dL    RDW 15.7 (H) 11.9 - 14.6 %    Platelets 515 094 - 835 K/uL    MPV 11.8 9.4 - 12.3 FL    nRBC 0.00 0.0 - 0.2 K/uL    Differential Type AUTOMATED      Seg Neutrophils 79 (H) 43 - 78 %    Lymphocytes 12 (L) 13 - 44 %    Monocytes 9 4.0 - 12.0 %    Eosinophils % 0 (L) 0.5 - 7.8 %    Basophils 0 0.0 - 2.0 %    Immature Granulocytes 0 0.0 - 5.0 %    Segs Absolute 10.3 (H) 1.7 - 8.2 K/UL    Absolute Lymph # 1.6 0.5 - 4.6 K/UL    Absolute Mono # 1.2 0.1 - 1.3 K/UL    Absolute Eos # 0.0 0.0 - 0.8 K/UL    Basophils Absolute 0.0 0.0 - 0.2 K/UL    Absolute Immature Granulocyte 0.1 0.0 - 0.5 K/UL   POCT Glucose    Collection Time: 06/22/22  7:50 AM   Result Value Ref Range    POC Glucose 171 (H) 65 - 100 mg/dL    Performed by: Mar Alejandra        Other Studies:  CTA HEAD NECK W WO CONTRAST    Result Date: 6/21/2022  History: Complains of headache and neck pain and hypertension Comments: CT ANGIOGRAM OF THE NECK AND CT ANGIOGRAM OF THE [place] was obtained following the administration of IV contrast. IV contrast was administered to evaluate the arterial vasculature. Reformatted images in the coronal and sagittal planes as well as 3-D imaging was obtained and reviewed on a dedicated PACS and [address] Radiation reduction dose techniques were used for the study. Our CT scanner use one or all of the following- Automated exposure control, adjustment of the mA and/or KV according the patient size, iterative reconstruction. All measurements are based upon NASCET criteria if appropriate. This study was analyzed by the [address] [facility] [address]Algorithm Findings: CT ANGIOGRAM OF THE NECK: The arch demonstrates atherosclerotic changes. Within the proximal left subclavian artery there is a soft tissue filling defect. This may represent noncalcified plaque disease or thromboemboli. This may place the patient at additional risk for distal emboli in the left upper extremity and/or of the left vertebral artery The right left carotid bulbs demonstrate eccentric calcified and noncalcified plaque disease.  Proximally 50% narrowing is noted on the right and less than 50% on the left. The proximal vertebral arteries are patent with at least moderate stenosis noted at the origin on the left. The lung apices are clear with the exception of a mild scarring in the medial aspect of the right upper lobe. The thyroid gland is unremarkable. CT angiogram of [place]: The petrous, cavernous, and supraclinoid internal carotid arteries are patent with diffuse atherosclerotic changes. Moderate narrowing is noted of the clinoid portion of the right internal carotid artery. Anterior circulation and middle cerebral arteries are patent. The distal vertebral arteries posterior inferior cerebellar arteries basilar artery superior cerebellar arteries and posterior cerebral arteries are patent. The dural venous sinuses are patent. 1. Small focus of filling defect noted within the proximal left subclavian artery which may represent noncalcified plaque disease or thromboembolic disease. This may place the patient at additional risk for distal emboli either within the left upper extremity or the left vertebral artery. CT HEAD WO CONTRAST    Result Date: 6/20/2022  EXAM: CT HEAD WO CONTRAST HISTORY:  Reason for exam:->sudden onset HA, bp 260/110. TECHNIQUE: Axial images of the brain were performed without the administration of intravenous contrast. Images were obtained axial plane and coronal reformatted images were submitted. Dose reduction technique used: Automated exposure control/Adjustment of the mA and/or kV according to patient size/Use of iterative reconstruction technique. COMPARISON: MRI dated 1/26/2017 FINDINGS: There is hyperdense hemorrhage noted adjacent to the brainstem, at the level of the foramen magnum. There is no appreciable intracranial parenchymal hemorrhage. There is no midline shift, or mass effect. There are mild chronic appearing microangiopathic changes within the periventricular white matter. No evidence of acute confluent territorial infarction.  Ventricles and basal cisterns are patent and symmetric. There is mild generalized volume loss. Visualized paranasal sinuses and mastoid air cells are without significant fluid. Scalp, soft tissues, and calvarium are within normal limits. Findings described above suggest subarachnoid hemorrhage adjacent to the the brainstem, at the level of the foramen magnum. This does not appear to represent intraparenchymal hemorrhage. This case was discussed with the ordering physician at the time of interpretation. XR CHEST PORTABLE    Result Date: 6/20/2022  EXAM: XR CHEST PORTABLE HISTORY: evaluate. TECHNIQUE: Frontal chest. COMPARISON: 5/9/2016 FINDINGS: The cardiac silhouette, mediastinum, and pulmonary vasculature are within normal limits. There is no consolidation, pleural effusion, or pneumothorax. No significant osseous abnormalities are observed. No evidence of an acute intrathoracic process.        Current Meds:  Current Facility-Administered Medications   Medication Dose Route Frequency    citalopram (CELEXA) tablet 40 mg  40 mg Oral Daily    atorvastatin (LIPITOR) tablet 20 mg  20 mg Oral Daily    sodium chloride flush 0.9 % injection 5-40 mL  5-40 mL IntraVENous 2 times per day    sodium chloride flush 0.9 % injection 5-40 mL  5-40 mL IntraVENous PRN    0.9 % sodium chloride infusion   IntraVENous PRN    ondansetron (ZOFRAN-ODT) disintegrating tablet 4 mg  4 mg Oral Q8H PRN    Or    ondansetron (ZOFRAN) injection 4 mg  4 mg IntraVENous Q6H PRN    polyethylene glycol (GLYCOLAX) packet 17 g  17 g Oral Daily PRN    acetaminophen (TYLENOL) tablet 650 mg  650 mg Oral Q6H PRN    Or    acetaminophen (TYLENOL) suppository 650 mg  650 mg Rectal Q6H PRN    nicotine (NICODERM CQ) 14 MG/24HR 1 patch  1 patch TransDERmal Daily    niCARdipine (CARDENE) 25 mg in sodium chloride 0.9 % 250 mL infusion ([id number])  2.5-15 mg/hr IntraVENous Continuous    losartan (COZAAR) tablet 25 mg  25 mg Oral Daily    labetalol (NORMODYNE) tablet 200 mg  200 mg Oral 2 times per day    glucose chewable tablet 16 g  4 tablet Oral PRN    dextrose bolus 10% 125 mL  125 mL IntraVENous PRN    Or    dextrose bolus 10% 250 mL  250 mL IntraVENous PRN    glucagon injection 1 mg  1 mg IntraMUSCular PRN    dextrose 5 % solution  100 mL/hr IntraVENous PRN    insulin lispro (HUMALOG) injection vial 0-8 Units  0-8 Units SubCUTAneous TID WC    insulin lispro (HUMALOG) injection vial 0-4 Units  0-4 Units SubCUTAneous Nightly    pantoprazole (PROTONIX) 40 mg in sodium chloride (PF) 10 mL injection  40 mg IntraVENous BID    tuberculin injection 5 Units  5 Units IntraDERmal Once    labetalol (NORMODYNE;TRANDATE) injection 10 mg  10 mg IntraVENous Q4H PRN       Discharge Plan:     Location:TBD  Days: 2-3  PPD Ordered: 6/21    Signed:  Nereida Archuleta DO    Part of this note may have been written by using a voice dictation software. The note has been proof read but may still contain some grammatical/other typographical errors.

## 2022-06-23 ENCOUNTER — APPOINTMENT (OUTPATIENT)
Dept: GENERAL RADIOLOGY | Age: 82
DRG: 085 | End: 2022-06-23
Payer: MEDICARE

## 2022-06-23 LAB
ANION GAP SERPL CALC-SCNC: 7 MMOL/L (ref 7–16)
BASOPHILS # BLD: 0 K/UL (ref 0–0.2)
BASOPHILS NFR BLD: 0 % (ref 0–2)
BUN SERPL-MCNC: 55 MG/DL (ref 8–23)
CALCIUM SERPL-MCNC: 8.5 MG/DL (ref 8.3–10.4)
CHLORIDE SERPL-SCNC: 120 MMOL/L (ref 98–107)
CO2 SERPL-SCNC: 23 MMOL/L (ref 21–32)
CREAT SERPL-MCNC: 1.9 MG/DL (ref 0.8–1.5)
DIFFERENTIAL METHOD BLD: ABNORMAL
EOSINOPHIL # BLD: 0.1 K/UL (ref 0–0.8)
EOSINOPHIL NFR BLD: 1 % (ref 0.5–7.8)
ERYTHROCYTE [DISTWIDTH] IN BLOOD BY AUTOMATED COUNT: 15.9 % (ref 11.9–14.6)
GLUCOSE BLD STRIP.AUTO-MCNC: 128 MG/DL (ref 65–100)
GLUCOSE BLD STRIP.AUTO-MCNC: 159 MG/DL (ref 65–100)
GLUCOSE BLD STRIP.AUTO-MCNC: 166 MG/DL (ref 65–100)
GLUCOSE BLD STRIP.AUTO-MCNC: 204 MG/DL (ref 65–100)
GLUCOSE SERPL-MCNC: 166 MG/DL (ref 65–100)
HCT VFR BLD AUTO: 24.8 % (ref 41.1–50.3)
HCT VFR BLD AUTO: 25.4 % (ref 41.1–50.3)
HCT VFR BLD AUTO: 25.4 % (ref 41.1–50.3)
HGB BLD-MCNC: 7.7 G/DL (ref 13.6–17.2)
HGB BLD-MCNC: 7.9 G/DL (ref 13.6–17.2)
HGB BLD-MCNC: 8 G/DL (ref 13.6–17.2)
IMM GRANULOCYTES # BLD AUTO: 0 K/UL (ref 0–0.5)
IMM GRANULOCYTES NFR BLD AUTO: 0 % (ref 0–5)
LYMPHOCYTES # BLD: 1.3 K/UL (ref 0.5–4.6)
LYMPHOCYTES NFR BLD: 15 % (ref 13–44)
MCH RBC QN AUTO: 26.5 PG (ref 26.1–32.9)
MCHC RBC AUTO-ENTMCNC: 31.5 G/DL (ref 31.4–35)
MCV RBC AUTO: 84.1 FL (ref 79.6–97.8)
MM INDURATION, POC: 0 MM (ref 0–5)
MONOCYTES # BLD: 0.7 K/UL (ref 0.1–1.3)
MONOCYTES NFR BLD: 7 % (ref 4–12)
NEUTS SEG # BLD: 7.1 K/UL (ref 1.7–8.2)
NEUTS SEG NFR BLD: 77 % (ref 43–78)
NRBC # BLD: 0 K/UL (ref 0–0.2)
PLATELET # BLD AUTO: 203 K/UL (ref 150–450)
PMV BLD AUTO: 12.9 FL (ref 9.4–12.3)
POTASSIUM SERPL-SCNC: 4 MMOL/L (ref 3.5–5.1)
PPD, POC: NEGATIVE
RBC # BLD AUTO: 3.02 M/UL (ref 4.23–5.6)
SERVICE CMNT-IMP: ABNORMAL
SODIUM SERPL-SCNC: 150 MMOL/L (ref 136–145)
WBC # BLD AUTO: 9.2 K/UL (ref 4.3–11.1)

## 2022-06-23 PROCEDURE — 6370000000 HC RX 637 (ALT 250 FOR IP): Performed by: INTERNAL MEDICINE

## 2022-06-23 PROCEDURE — 71045 X-RAY EXAM CHEST 1 VIEW: CPT

## 2022-06-23 PROCEDURE — C9113 INJ PANTOPRAZOLE SODIUM, VIA: HCPCS | Performed by: INTERNAL MEDICINE

## 2022-06-23 PROCEDURE — 85025 COMPLETE CBC W/AUTO DIFF WBC: CPT

## 2022-06-23 PROCEDURE — A4216 STERILE WATER/SALINE, 10 ML: HCPCS | Performed by: INTERNAL MEDICINE

## 2022-06-23 PROCEDURE — 2100000000 HC CCU R&B

## 2022-06-23 PROCEDURE — 85014 HEMATOCRIT: CPT

## 2022-06-23 PROCEDURE — 6370000000 HC RX 637 (ALT 250 FOR IP): Performed by: FAMILY MEDICINE

## 2022-06-23 PROCEDURE — 2580000003 HC RX 258: Performed by: FAMILY MEDICINE

## 2022-06-23 PROCEDURE — APPSS45 APP SPLIT SHARED TIME 31-45 MINUTES: Performed by: NURSE PRACTITIONER

## 2022-06-23 PROCEDURE — 82962 GLUCOSE BLOOD TEST: CPT

## 2022-06-23 PROCEDURE — 2580000003 HC RX 258: Performed by: STUDENT IN AN ORGANIZED HEALTH CARE EDUCATION/TRAINING PROGRAM

## 2022-06-23 PROCEDURE — 2500000003 HC RX 250 WO HCPCS: Performed by: PSYCHIATRY & NEUROLOGY

## 2022-06-23 PROCEDURE — 2500000003 HC RX 250 WO HCPCS: Performed by: STUDENT IN AN ORGANIZED HEALTH CARE EDUCATION/TRAINING PROGRAM

## 2022-06-23 PROCEDURE — 6360000002 HC RX W HCPCS: Performed by: INTERNAL MEDICINE

## 2022-06-23 PROCEDURE — 80048 BASIC METABOLIC PNL TOTAL CA: CPT

## 2022-06-23 PROCEDURE — 99232 SBSQ HOSP IP/OBS MODERATE 35: CPT | Performed by: PSYCHIATRY & NEUROLOGY

## 2022-06-23 PROCEDURE — 2580000003 HC RX 258: Performed by: INTERNAL MEDICINE

## 2022-06-23 PROCEDURE — 36415 COLL VENOUS BLD VENIPUNCTURE: CPT

## 2022-06-23 RX ORDER — LABETALOL HYDROCHLORIDE 5 MG/ML
10 INJECTION, SOLUTION INTRAVENOUS EVERY 4 HOURS PRN
Status: DISCONTINUED | OUTPATIENT
Start: 2022-06-23 | End: 2022-06-24

## 2022-06-23 RX ADMIN — LABETALOL HYDROCHLORIDE 300 MG: 200 TABLET, FILM COATED ORAL at 08:21

## 2022-06-23 RX ADMIN — SODIUM CHLORIDE 40 MG: 9 INJECTION INTRAMUSCULAR; INTRAVENOUS; SUBCUTANEOUS at 08:24

## 2022-06-23 RX ADMIN — SODIUM CHLORIDE, PRESERVATIVE FREE 10 ML: 5 INJECTION INTRAVENOUS at 08:24

## 2022-06-23 RX ADMIN — OXYCODONE 5 MG: 5 TABLET ORAL at 21:34

## 2022-06-23 RX ADMIN — SODIUM CHLORIDE 40 MG: 9 INJECTION INTRAMUSCULAR; INTRAVENOUS; SUBCUTANEOUS at 17:37

## 2022-06-23 RX ADMIN — SODIUM CHLORIDE 7.5 MG/HR: 9 INJECTION, SOLUTION INTRAVENOUS at 03:13

## 2022-06-23 RX ADMIN — LABETALOL HYDROCHLORIDE 10 MG: 5 INJECTION INTRAVENOUS at 23:11

## 2022-06-23 RX ADMIN — CITALOPRAM HYDROBROMIDE 40 MG: 20 TABLET ORAL at 08:21

## 2022-06-23 RX ADMIN — LOSARTAN POTASSIUM 25 MG: 25 TABLET, FILM COATED ORAL at 08:22

## 2022-06-23 RX ADMIN — LABETALOL HYDROCHLORIDE 10 MG: 5 INJECTION INTRAVENOUS at 18:34

## 2022-06-23 RX ADMIN — LABETALOL HYDROCHLORIDE 300 MG: 200 TABLET, FILM COATED ORAL at 20:20

## 2022-06-23 RX ADMIN — ATORVASTATIN CALCIUM 20 MG: 10 TABLET, FILM COATED ORAL at 08:21

## 2022-06-23 RX ADMIN — INSULIN LISPRO 2 UNITS: 100 INJECTION, SOLUTION INTRAVENOUS; SUBCUTANEOUS at 11:24

## 2022-06-23 RX ADMIN — SODIUM CHLORIDE, PRESERVATIVE FREE 10 ML: 5 INJECTION INTRAVENOUS at 20:21

## 2022-06-23 ASSESSMENT — PAIN SCALES - GENERAL
PAINLEVEL_OUTOF10: 0
PAINLEVEL_OUTOF10: 4

## 2022-06-23 ASSESSMENT — PAIN DESCRIPTION - LOCATION: LOCATION: BACK

## 2022-06-23 NOTE — PLAN OF CARE
Problem: Discharge Planning  Goal: Discharge to home or other facility with appropriate resources  Outcome: Progressing  Flowsheets (Taken 6/22/2022 1930 by Jennifer Seth RN)  Discharge to home or other facility with appropriate resources: Identify barriers to discharge with patient and caregiver     Problem: Pain  Goal: Verbalizes/displays adequate comfort level or baseline comfort level  Outcome: Progressing  Flowsheets  Taken 6/22/2022 2300 by Jennifer Seth RN  Verbalizes/displays adequate comfort level or baseline comfort level: Encourage patient to monitor pain and request assistance  Taken 6/22/2022 1930 by Jennifer Seth RN  Verbalizes/displays adequate comfort level or baseline comfort level: Encourage patient to monitor pain and request assistance     Problem: Safety - Adult  Goal: Free from fall injury  Outcome: Progressing  Flowsheets (Taken 6/22/2022 1930 by Jennifer Seth RN)  Free From Fall Injury: Instruct family/caregiver on patient safety     Problem: ABCDS Injury Assessment  Goal: Absence of physical injury  Outcome: Progressing  Flowsheets (Taken 6/22/2022 1930 by Jennifer Seth RN)  Absence of Physical Injury: Implement safety measures based on patient assessment     Problem: Chronic Conditions and Co-morbidities  Goal: Patient's chronic conditions and co-morbidity symptoms are monitored and maintained or improved  Outcome: Progressing  Flowsheets (Taken 6/22/2022 1930 by Jennifer Seth RN)  Care Plan - Patient's Chronic Conditions and Co-Morbidity Symptoms are Monitored and Maintained or Improved: Monitor and assess patient's chronic conditions and comorbid symptoms for stability, deterioration, or improvement     Problem: Skin/Tissue Integrity  Goal: Absence of new skin breakdown  Description: 1. Monitor for areas of redness and/or skin breakdown  2. Assess vascular access sites hourly  3. Every 4-6 hours minimum:  Change oxygen saturation probe site  4.   Every 4-6 hours:  If on nasal continuous positive airway pressure, respiratory therapy assess nares and determine need for appliance change or resting period.   Outcome: Progressing

## 2022-06-23 NOTE — PROGRESS NOTES
Neurology Daily Progress Note     Assessment:     56-year-old man who presented with severe headache and was found to have a perimedullary subarachnoid hemorrhage, likely secondary to trauma. He also has a small amount of intraventricular hemorrhage. No aneurysm on CTA. Plan:     Nonaneurysmal subarachnoid hemorrhage management   q4h neuro checks   Maintain SBP < 160 mmHg. STAT CT head for clinical worsening  Head of the bed at 30 degrees with neck in the neutral position  24-hour repeat CT scan of the head  Maintain normoglycemia and normothermia  No need for antiseizure medications  Avoid antithrombotics    Subjective: Interval history:    Patient drowsy, but awakens easily and follows commands. No issues noted. History:    Thien Menjivar is a 79 y.o. male who is being seen for [facility].     Unable to obtain ROS due to AMS      Objective:     Vitals:    06/23/22 0933 06/23/22 0934 06/23/22 1004 06/23/22 1019   BP: 117/68  (!) 136/58    Pulse: 61 59 61 62   Resp: 15 13  26   Temp:       TempSrc:       SpO2: 94% 93%     Weight:       Height:              Current Facility-Administered Medications:     labetalol (NORMODYNE;TRANDATE) injection 10 mg, 10 mg, IntraVENous, Q4H PRN, José Miguel Harris DO    labetalol (NORMODYNE) tablet 300 mg, 300 mg, Oral, 2 times per day, Spencer Walker DO, 300 mg at 06/23/22 6835    oxyCODONE (ROXICODONE) immediate release tablet 5 mg, 5 mg, Oral, Q6H PRN, Spencer Walker DO, 5 mg at 06/22/22 1658    citalopram (CELEXA) tablet 40 mg, 40 mg, Oral, Daily, Alana Martin MD, 40 mg at 06/23/22 2101    atorvastatin (LIPITOR) tablet 20 mg, 20 mg, Oral, Daily, Alana Martin MD, 20 mg at 06/23/22 6869    sodium chloride flush 0.9 % injection 5-40 mL, 5-40 mL, IntraVENous, 2 times per day, Alana Martin MD, 10 mL at 06/23/22 0824    sodium chloride flush 0.9 % injection 5-40 mL, 5-40 mL, IntraVENous, PRN, Alana Martin MD    0.9 % sodium chloride infusion, , IntraVENous, PRN, Chastity Marquez MD    ondansetron (ZOFRAN-ODT) disintegrating tablet 4 mg, 4 mg, Oral, Q8H PRN **OR** ondansetron (ZOFRAN) injection 4 mg, 4 mg, IntraVENous, Q6H PRN, Chastity Marquez MD, 4 mg at 06/21/22 1253    polyethylene glycol (GLYCOLAX) packet 17 g, 17 g, Oral, Daily PRN, Chastity Marquez MD    acetaminophen (TYLENOL) tablet 650 mg, 650 mg, Oral, Q6H PRN, 650 mg at 06/22/22 1151 **OR** acetaminophen (TYLENOL) suppository 650 mg, 650 mg, Rectal, Q6H PRN, Chastity Marquez MD    nicotine (NICODERM CQ) 14 MG/24HR 1 patch, 1 patch, TransDERmal, Daily, Chastity Marquez MD, 1 patch at 06/23/22 0827    losartan (COZAAR) tablet 25 mg, 25 mg, Oral, Daily, Spencer Walker, , 25 mg at 06/23/22 8977    glucose chewable tablet 16 g, 4 tablet, Oral, PRN, Spencer Walker, DO    dextrose bolus 10% 125 mL, 125 mL, IntraVENous, PRN **OR** dextrose bolus 10% 250 mL, 250 mL, IntraVENous, PRN, Spencer Walker, DO    glucagon injection 1 mg, 1 mg, IntraMUSCular, PRN, Spencer Walker, DO    dextrose 5 % solution, 100 mL/hr, IntraVENous, PRN, Spencer Walker, DO    insulin lispro (HUMALOG) injection vial 0-8 Units, 0-8 Units, SubCUTAneous, TID WC, Spencer Walker, DO    insulin lispro (HUMALOG) injection vial 0-4 Units, 0-4 Units, SubCUTAneous, Nightly, Spencer Walker, DO    pantoprazole (PROTONIX) 40 mg in sodium chloride (PF) 10 mL injection, 40 mg, IntraVENous, BID, Spencer Walker, , 40 mg at 06/23/22 8812    Recent Results (from the past 12 hour(s))   Basic Metabolic Panel w/ Reflex to MG    Collection Time: 06/23/22  4:24 AM   Result Value Ref Range    Sodium 150 (H) 136 - 145 mmol/L    Potassium 4.0 3.5 - 5.1 mmol/L    Chloride 120 (H) 98 - 107 mmol/L    CO2 23 21 - 32 mmol/L    Anion Gap 7 7 - 16 mmol/L    Glucose 166 (H) 65 - 100 mg/dL    BUN 55 (H) 8 - 23 MG/DL    CREATININE 1.90 (H) 0.8 - 1.5 MG/DL    GFR  44 (L) >60 ml/min/1.73m2 GFR Non-African American 36 (L) >60 ml/min/1.73m2    Calcium 8.5 8.3 - 10.4 MG/DL   CBC with Auto Differential    Collection Time: 06/23/22  4:24 AM   Result Value Ref Range    WBC 9.2 4.3 - 11.1 K/uL    RBC 3.02 (L) 4.23 - 5.6 M/uL    Hemoglobin 8.0 (L) 13.6 - 17.2 g/dL    Hematocrit 25.4 (L) 41.1 - 50.3 %    MCV 84.1 79.6 - 97.8 FL    MCH 26.5 26.1 - 32.9 PG    MCHC 31.5 31.4 - 35.0 g/dL    RDW 15.9 (H) 11.9 - 14.6 %    Platelets 271 225 - 105 K/uL    MPV 12.9 (H) 9.4 - 12.3 FL    nRBC 0.00 0.0 - 0.2 K/uL    Differential Type AUTOMATED      Seg Neutrophils 77 43 - 78 %    Lymphocytes 15 13 - 44 %    Monocytes 7 4.0 - 12.0 %    Eosinophils % 1 0.5 - 7.8 %    Basophils 0 0.0 - 2.0 %    Immature Granulocytes 0 0.0 - 5.0 %    Segs Absolute 7.1 1.7 - 8.2 K/UL    Absolute Lymph # 1.3 0.5 - 4.6 K/UL    Absolute Mono # 0.7 0.1 - 1.3 K/UL    Absolute Eos # 0.1 0.0 - 0.8 K/UL    Basophils Absolute 0.0 0.0 - 0.2 K/UL    Absolute Immature Granulocyte 0.0 0.0 - 0.5 K/UL   POCT Glucose    Collection Time: 06/23/22  8:32 AM   Result Value Ref Range    POC Glucose 166 (H) 65 - 100 mg/dL    Performed by: Joann          Physical Exam:  General - Well developed, well nourished, in no apparent distress. Pleasant and conversant  HEENT - Normocephalic, atraumatic. Conjunctiva, tympanic membranes, and oropharynx are clear. Neck - Supple without masses, no bruits   Extremities - Peripheral pulses intact. No edema and no rashes.      Neurological examination -     Comprehension is intact to one-step commands but he struggles to follow more complex commands. Language and speech are normal. On cranial nerve examination pupils are equal round and reactive to light. Visual acuity is adequate. Visual fields are full to finger confrontation. Extraocular motility is normal. Face is symmetric and sensation is intact to light touch. Hearing is intact to finger rustle bilaterally. Motor examination - There is normal muscle tone and bulk. Power is full throughout. Muscle stretch reflexes are normoactive and there are no pathological reflexes present. Sensation is intact to light touch, pinprick, vibration and proprioception in all extremities.  Cerebellar examination is normal.  Gait and stance not assessed due to fall risk         Signed By: Hugo Drake, MALU     June 23, 2022

## 2022-06-23 NOTE — PROGRESS NOTES
Hospitalist Progress Note   Admit Date:  2022  9:49 PM   Name:  Jim Hercules   Age:  79 y.o. Sex:  male  :  1940   MRN:  [id number]   Room:  [facility]/    Presenting Complaint: Posterior Neck ache  Reason(s) for Admission: Subarachnoid hemorrhage ([facility] [I60.9]  SAH (subarachnoid hemorrhage) ([facility] [I60.9]  Hypertensive emergency [I16.1]     Hospital Course & Interval History:   79 y.o. male with medical history of HTN, CKD, dementia who presented to ED on  with severe posterior headache. Symptoms started around  on . Upon ER evaluation, NIH was 0. 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 BP was found to be very elevated with SBP>200. CT head shows SAH adjacent to brainstem. Neurosurgery was consulted by ER. They reviewed the chart and recommended CTA. Patient as placed on cardene drip for BP control. CTA was obtained which does show an area of abnormality at subclavian, clot or small dissection. Vascular Sugery was consulted who felt it was an chronic plaque or thrombus and recommended Plavix when able to take. He was admitted to the ICU on a Cardene gtt. On  he had sudden onset vomiting of coffee ground emesis. PPI was started with GI consult. Subjective/24hr Events (22): Alert this morning. Feels better. Neck ache improved. Denies CP/SOB. Denies N/V/D today.       Assessment & Plan:     Principal Problem:    Subarachnoid hemorrhage ([facility]  -  CT Head with SAH adjacent to the brainstem  -  CTA with no aneurysm  -  Repeat CT with no change  -  Repeat CT with decrease in [facility] but now with blood layering in posterior horn ventricles  - Only neurological sign/symptom is posterior neck ache  - Goal SBP <140  - Continue Cardene gtt  - Continue home Cozaar  - Continue Labetalol 300mg (increased from home dose)  - Appreciate Neurology's input and assistance    Active Problems:    Acute Blood Loss Anemia  - Likely due to UGIB  -  Hgb 11.3  -  9.9 --> large coffee ground emesis --> Hgb 9.0  - 6/22 Hgb 8.5  - 6/23 Hgb 8.1  - Continue PPI BID  - GI following      Acute Upper GI Bleed  - 6/21 2 large coffee ground emesis episodes  - Hgb dropping slower  - Emesis is hemoccult positive  - Continue PPI BID  - GI following      Essential hypertension, benign  - Off Cardene gtt since 6/22  - Continue home Cozaar  - Continue Labetalol 300mg (increased from home dose)  - Goal SBP <140      CKD (chronic kidney disease) stage 4, GFR 15-29 ml/min ([facility])  - At baseline      Type 2 diabetes mellitus ([address]  - Well controlled  - Continue Humalog SSI      Tobacco use disorder  - Continue Nicotine patch      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[address]  - Vascular surgery evaluated  - 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  - Cannot take Plavix due to [facility]       Vascular Dementia ([facility] [facility]    Diet:  ADULT DIET; Clear Liquid; No red dye  DVT PPx: SCDs  Code status: DNR    Hospital Problems:  Hospital Problems           Last Modified POA    * (Principal) SAH (subarachnoid hemorrhage) ([address] 6/21/2022 Yes    Acute blood loss anemia (ABLA) 6/22/2022 No    Acute upper gastrointestinal bleeding 6/22/2022 Clinically Undetermined    Essential hypertension, benign 6/21/2022 Yes    CKD (chronic kidney disease) stage 4, GFR 15-29 ml/min ([address] 6/21/2022 Yes    Type 2 diabetes mellitus ([address] 6/21/2022 Yes    Tobacco use disorder 6/21/2022 Yes    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[address] 6/21/2022 Yes    Dementia ([address] 6/21/2022 Yes        Patient is critically ill. Without intervention, there is a high probability of acute organ impairment or life-threatening deterioration in the patient's condition from: [facility] with hypertensive emergency  Critical care interventions: Cardene gtt  Total critical care time spent: 41 minutes. Time is indicative of direct patient attendance at bedside and on the patient's floor nearby.   Includes time spent at bedside performing history and exam, performing chart review, discussing findings and treatment plan with patient and/or family, discussing patient with nursing staff, consultants and colleagues, and ordering/reviewing pertinent laboratory and radiographic evaluations. Time excludes procedures. CPT:  27949: First 30-74 minutes  13702: Each block of 30 min.  beyond 74    Objective:     Patient Vitals for the past 24 hrs:   Temp Pulse Resp BP SpO2   06/23/22 1019 -- 62 26 -- --   06/23/22 1004 -- 61 -- (!) 136/58 --   06/23/22 0934 -- 59 13 -- 93 %   06/23/22 0933 -- 61 15 117/68 94 %   06/23/22 0918 -- 58 16 (!) 125/58 --   06/23/22 0914 -- 60 14 125/60 --   06/23/22 0900 -- 61 19 135/63 94 %   06/23/22 0847 -- 63 -- 135/63 --   06/23/22 0833 -- 69 -- (!) 146/65 --   06/23/22 0818 -- 77 18 (!) 142/56 --   06/23/22 0800 -- 69 14 (!) 131/50 --   06/23/22 0747 -- 68 13 (!) 131/50 --   06/23/22 0733 -- 67 14 (!) 140/64 --   06/23/22 0718 -- 66 13 134/63 --   06/23/22 0713 -- 66 16 138/64 --   06/23/22 0708 -- 70 18 123/61 --   06/23/22 0700 98.4 °F (36.9 °C) 70 16 123/61 --   06/23/22 0615 -- 64 14 -- 94 %   06/23/22 0600 -- 64 27 127/64 94 %   06/23/22 0545 -- 67 -- 130/63 93 %   06/23/22 0540 -- 67 (!) 35 (!) 124/58 93 %   06/23/22 0530 -- 67 12 -- 94 %   06/23/22 0515 -- 70 15 (!) 140/63 94 %   06/23/22 0500 -- 70 16 (!) 145/68 96 %   06/23/22 0445 -- 66 14 133/60 96 %   06/23/22 0430 -- 67 23 131/60 90 %   06/23/22 0415 -- 74 16 (!) 120/58 94 %   06/23/22 0400 -- 72 27 (!) 141/62 98 %   06/23/22 0315 -- 66 15 126/60 98 %   06/23/22 0303 -- 67 14 (!) 164/65 93 %   06/23/22 0200 -- 66 15 (!) 133/59 93 %   06/23/22 0145 -- 67 15 135/62 93 %   06/23/22 0130 -- 67 18 (!) 141/63 93 %   06/23/22 0115 -- 65 19 (!) 128/57 92 %   06/23/22 0100 -- 65 14 (!) 126/57 99 %   06/23/22 0045 -- 69 19 (!) 114/54 91 %   06/23/22 0030 -- 64 14 (!) 140/58 (!) 89 %   06/23/22 0015 -- 62 16 127/62 90 %   06/23/22 0000 -- 64 19 126/60 95 %   06/22/22 2345 -- 65 12 130/62 96 %   06/22/22 2330 -- 61 14 (!) 128/58 92 %   06/22/22 2315 -- 63 12 123/61 93 %   06/22/22 2300 98.2 °F (36.8 °C) 64 20 (!) 117/56 (!) 86 %   06/22/22 2245 -- 62 10 -- 91 %   06/22/22 2230 -- 68 (!) 44 119/60 92 %   06/22/22 2225 -- 66 -- (!) 119/59 93 %   06/22/22 2200 -- 63 20 125/60 96 %   06/22/22 2145 -- 63 30 121/62 94 %   06/22/22 2130 -- 64 12 139/65 92 %   06/22/22 2120 -- 65 22 (!) 151/68 95 %   06/22/22 2100 -- 73 13 (!) 141/105 94 %   06/22/22 2056 -- 72 -- (!) 149/67 --   06/22/22 2045 -- 69 11 (!) 149/67 90 %   06/22/22 2030 -- 70 18 (!) 143/66 94 %   06/22/22 2015 -- 70 13 (!) 141/62 96 %   06/22/22 2000 -- 68 19 (!) 151/69 96 %   06/22/22 1945 -- 70 14 (!) 149/68 96 %   06/22/22 1930 98.6 °F (37 °C) 67 10 (!) 154/68 98 %   06/22/22 1915 -- 65 14 (!) 146/67 95 %   06/22/22 1900 -- 67 11 (!) 145/67 96 %   06/22/22 1830 -- 67 13 (!) 149/64 96 %   06/22/22 1815 -- 66 18 (!) 156/66 96 %   06/22/22 1800 -- 67 14 (!) 152/70 95 %   06/22/22 1745 -- 69 17 (!) 168/67 96 %   06/22/22 1730 -- 69 17 (!) 160/70 95 %   06/22/22 1715 -- 68 19 (!) 161/68 97 %   06/22/22 1700 -- 70 15 (!) 148/66 99 %   06/22/22 1645 -- 71 18 (!) 142/64 100 %   06/22/22 1630 -- 65 18 (!) 143/64 100 %   06/22/22 1615 -- 74 16 (!) 143/62 97 %   06/22/22 1603 -- 67 22 (!) 148/67 99 %   06/22/22 1602 -- 67 20 -- 99 %   06/22/22 1600 -- 71 15 -- 98 %   06/22/22 1533 -- -- -- (!) 153/68 97 %   06/22/22 1532 -- 68 22 -- 98 %   06/22/22 1530 98 °F (36.7 °C) 68 15 (!) 153/68 98 %   06/22/22 1515 -- 70 15 (!) 147/67 97 %   06/22/22 1500 -- 70 28 135/63 99 %   06/22/22 1445 -- 70 27 (!) 153/69 99 %   06/22/22 1430 -- 65 -- (!) 152/68 99 %   06/22/22 1415 -- 68 18 (!) 154/67 97 %   06/22/22 1400 -- 66 21 (!) 113/56 97 %   06/22/22 1345 -- 68 -- (!) 113/56 99 %   06/22/22 1330 -- 60 16 135/60 100 %   06/22/22 1315 -- 61 20 125/60 98 %   06/22/22 1300 -- 64 25 (!) 126/57 98 %   06/22/22 1245 -- 62 24 (!) 120/58 96 %   06/22/22 1230 -- 65 21 (!) 150/60 97 %   06/22/22 1215 -- 64 28 (!) 144/67 96 %   06/22/22 1200 -- 67 30 (!) 152/66 97 %   06/22/22 1145 -- 73 20 (!) 123/59 94 %   06/22/22 1130 98.6 °F (37 °C) 69 16 (!) 143/63 96 %   06/22/22 1115 -- 74 17 (!) 145/69 93 %       Oxygen Therapy  SpO2: 93 %  Pulse Oximetry Type: Continuous  Pulse via Oximetry: 66 beats per minute  Pulse Oximeter Device Mode: Continuous  Pulse Oximeter Device Location: Left,Finger  O2 Device: Nasal cannula  Oximetry Probe Site Changed: No  Skin Assessment: Clean, dry, & intact  Skin Protection for O2 Device: N/A  Intervention(s): Reposition Device (placed in mouth due to pt being a mouth breather)  O2 Flow Rate (L/min): 4 L/min    Estimated body mass index is 25.95 kg/m² as calculated from the following:    Height as of this encounter: 5' 9\" (1.753 m). Weight as of this encounter: 175 lb 11.3 oz (79.7 kg). Intake/Output Summary (Last 24 hours) at 6/23/2022 1102  Last data filed at 6/23/2022 0700  Gross per 24 hour   Intake 1480.83 ml   Output 750 ml   Net 730.83 ml         Physical Exam:   Blood pressure (!) 136/58, pulse 62, temperature 98.4 °F (36.9 °C), temperature source Oral, resp. rate 26, height 5' 9\" (1.753 m), weight 175 lb 11.3 oz (79.7 kg), SpO2 93 %. General:    Well nourished. No overt distress  Head:  Normocephalic, atraumatic  Eyes:  Sclerae appear normal.  Pupils equally round. ENT:  Nares appear normal, no drainage. Moist oral mucosa  Neck:  No restricted ROM. Trachea midline   CV:   RRR. No m/r/g. No jugular venous distension. Lungs:   CTAB. No wheezing, rhonchi, or rales. Respirations even, unlabored  Abdomen: Bowel sounds present. Soft, nontender, nondistended. Extremities: No cyanosis or clubbing. No edema  Skin:     No rashes and normal coloration. Warm and dry. Neuro:  CN II-XII grossly intact. Sensation intact. Pleasantly confused  Psych:  Normal mood and affect.       I have reviewed ordered lab tests and independently visualized imaging below:    Recent Labs:  Recent Results (from the past 48 hour(s))   POCT Glucose    Collection Time: 06/21/22 11:50 AM   Result Value Ref Range    POC Glucose 199 (H) 65 - 100 mg/dL    Performed by: Aaron    Occult blood gastric / duodenum    Collection Time: 06/21/22 12:46 PM   Result Value Ref Range    Occult Blood, Gastric Positive (A) NEG      pH, Gastric 3.0 1.5 - 3.5     Hemoglobin and Hematocrit    Collection Time: 06/21/22  1:05 PM   Result Value Ref Range    Hemoglobin 9.9 (L) 13.6 - 17.2 g/dL    Hematocrit 31.2 (L) 41.1 - 50.3 %   POCT Glucose    Collection Time: 06/21/22  5:27 PM   Result Value Ref Range    POC Glucose 179 (H) 65 - 100 mg/dL    Performed by: Aaron    Hemoglobin and Hematocrit    Collection Time: 06/21/22  7:49 PM   Result Value Ref Range    Hemoglobin 9.0 (L) 13.6 - 17.2 g/dL    Hematocrit 29.4 (L) 41.1 - 50.3 %   POCT Glucose    Collection Time: 06/21/22  8:58 PM   Result Value Ref Range    POC Glucose 184 (H) 65 - 100 mg/dL    Performed by:  Od[place])BSN    Basic Metabolic Panel w/ Reflex to MG    Collection Time: 06/22/22  2:09 AM   Result Value Ref Range    Sodium 148 (H) 138 - 145 mmol/L    Potassium 3.9 3.5 - 5.1 mmol/L    Chloride 117 (H) 98 - 107 mmol/L    CO2 26 21 - 32 mmol/L    Anion Gap 5 (L) 7 - 16 mmol/L    Glucose 159 (H) 65 - 100 mg/dL    BUN 63 (H) 8 - 23 MG/DL    CREATININE 2.00 (H) 0.8 - 1.5 MG/DL    GFR  41 (L) >60 ml/min/1.73m2    GFR Non- 34 (L) >60 ml/min/1.73m2    Calcium 8.3 8.3 - 10.4 MG/DL   CBC with Auto Differential    Collection Time: 06/22/22  2:09 AM   Result Value Ref Range    WBC 13.2 (H) 4.3 - 11.1 K/uL    RBC 3.26 (L) 4.23 - 5.6 M/uL    Hemoglobin 8.5 (L) 13.6 - 17.2 g/dL    Hematocrit 26.9 (L) 41.1 - 50.3 %    MCV 82.5 79.6 - 97.8 FL    MCH 26.1 26.1 - 32.9 PG    MCHC 31.6 31.4 - 35.0 g/dL    RDW 15.7 (H) 11.9 - 14.6 %    Platelets 865 191 - 959 K/uL    MPV 11.8 9.4 - 12.3 FL    nRBC 0.00 0.0 - 0.2 K/uL    Differential Type AUTOMATED ml/min/1.73m2    GFR Non-African American 36 (L) >60 ml/min/1.73m2    Calcium 8.5 8.3 - 10.4 MG/DL   CBC with Auto Differential    Collection Time: 06/23/22  4:24 AM   Result Value Ref Range    WBC 9.2 4.3 - 11.1 K/uL    RBC 3.02 (L) 4.23 - 5.6 M/uL    Hemoglobin 8.0 (L) 13.6 - 17.2 g/dL    Hematocrit 25.4 (L) 41.1 - 50.3 %    MCV 84.1 79.6 - 97.8 FL    MCH 26.5 26.1 - 32.9 PG    MCHC 31.5 31.4 - 35.0 g/dL    RDW 15.9 (H) 11.9 - 14.6 %    Platelets 750 997 - 589 K/uL    MPV 12.9 (H) 9.4 - 12.3 FL    nRBC 0.00 0.0 - 0.2 K/uL    Differential Type AUTOMATED      Seg Neutrophils 77 43 - 78 %    Lymphocytes 15 13 - 44 %    Monocytes 7 4.0 - 12.0 %    Eosinophils % 1 0.5 - 7.8 %    Basophils 0 0.0 - 2.0 %    Immature Granulocytes 0 0.0 - 5.0 %    Segs Absolute 7.1 1.7 - 8.2 K/UL    Absolute Lymph # 1.3 0.5 - 4.6 K/UL    Absolute Mono # 0.7 0.1 - 1.3 K/UL    Absolute Eos # 0.1 0.0 - 0.8 K/UL    Basophils Absolute 0.0 0.0 - 0.2 K/UL    Absolute Immature Granulocyte 0.0 0.0 - 0.5 K/UL   POCT Glucose    Collection Time: 06/23/22  8:32 AM   Result Value Ref Range    POC Glucose 166 (H) 65 - 100 mg/dL    Performed by: Joann        Other Studies:  CTA HEAD NECK W WO CONTRAST    Result Date: 6/21/2022  History: Complains of headache and neck pain and hypertension Comments: CT ANGIOGRAM OF THE NECK AND CT ANGIOGRAM OF THE [place] was obtained following the administration of IV contrast. IV contrast was administered to evaluate the arterial vasculature. Reformatted images in the coronal and sagittal planes as well as 3-D imaging was obtained and reviewed on a dedicated PACS and [address]. Radiation reduction dose techniques were used for the study. Our CT scanner use one or all of the following- Automated exposure control, adjustment of the mA and/or KV according the patient size, iterative reconstruction. All measurements are based upon NASCET criteria if appropriate.  This study was analyzed by the [email or web address] Findings: CT ANGIOGRAM OF THE NECK: The arch demonstrates atherosclerotic changes. Within the proximal left subclavian artery there is a soft tissue filling defect. This may represent noncalcified plaque disease or thromboemboli. This may place the patient at additional risk for distal emboli in the left upper extremity and/or of the left vertebral artery The right left carotid bulbs demonstrate eccentric calcified and noncalcified plaque disease. Proximally 50% narrowing is noted on the right and less than 50% on the left. The proximal vertebral arteries are patent with at least moderate stenosis noted at the origin on the left. The lung apices are clear with the exception of a mild scarring in the medial aspect of the right upper lobe. The thyroid gland is unremarkable. CT angiogram of [place]: The petrous, cavernous, and supraclinoid internal carotid arteries are patent with diffuse atherosclerotic changes. Moderate narrowing is noted of the clinoid portion of the right internal carotid artery. Anterior circulation and middle cerebral arteries are patent. The distal vertebral arteries posterior inferior cerebellar arteries basilar artery superior cerebellar arteries and posterior cerebral arteries are patent. The dural venous sinuses are patent. 1. Small focus of filling defect noted within the proximal left subclavian artery which may represent noncalcified plaque disease or thromboembolic disease. This may place the patient at additional risk for distal emboli either within the left upper extremity or the left vertebral artery. CT HEAD WO CONTRAST    Result Date: 6/20/2022  EXAM: CT HEAD WO CONTRAST HISTORY:  Reason for exam:->sudden onset HA, bp 260/110. TECHNIQUE: Axial images of the brain were performed without the administration of intravenous contrast. Images were obtained axial plane and coronal reformatted images were submitted. Dose reduction technique used:  Automated exposure control/Adjustment of the mA and/or kV according to patient size/Use of iterative reconstruction technique. COMPARISON: MRI dated 1/26/2017 FINDINGS: There is hyperdense hemorrhage noted adjacent to the brainstem, at the level of the foramen magnum. There is no appreciable intracranial parenchymal hemorrhage. There is no midline shift, or mass effect. There are mild chronic appearing microangiopathic changes within the periventricular white matter. No evidence of acute confluent territorial infarction. Ventricles and basal cisterns are patent and symmetric. There is mild generalized volume loss. Visualized paranasal sinuses and mastoid air cells are without significant fluid. Scalp, soft tissues, and calvarium are within normal limits. Findings described above suggest subarachnoid hemorrhage adjacent to the the brainstem, at the level of the foramen magnum. This does not appear to represent intraparenchymal hemorrhage. This case was discussed with the ordering physician at the time of interpretation. XR CHEST PORTABLE    Result Date: 6/20/2022  EXAM: XR CHEST PORTABLE HISTORY: evaluate. TECHNIQUE: Frontal chest. COMPARISON: 5/9/2016 FINDINGS: The cardiac silhouette, mediastinum, and pulmonary vasculature are within normal limits. There is no consolidation, pleural effusion, or pneumothorax. No significant osseous abnormalities are observed. No evidence of an acute intrathoracic process.        Current Meds:  Current Facility-Administered Medications   Medication Dose Route Frequency    labetalol (NORMODYNE;TRANDATE) injection 10 mg  10 mg IntraVENous Q4H PRN    labetalol (NORMODYNE) tablet 300 mg  300 mg Oral 2 times per day    oxyCODONE (ROXICODONE) immediate release tablet 5 mg  5 mg Oral Q6H PRN    citalopram (CELEXA) tablet 40 mg  40 mg Oral Daily    atorvastatin (LIPITOR) tablet 20 mg  20 mg Oral Daily    sodium chloride flush 0.9 % injection 5-40 mL  5-40 mL IntraVENous 2 times per day    sodium chloride flush 0.9 % injection 5-40 mL  5-40 mL IntraVENous PRN    0.9 % sodium chloride infusion   IntraVENous PRN    ondansetron (ZOFRAN-ODT) disintegrating tablet 4 mg  4 mg Oral Q8H PRN    Or    ondansetron (ZOFRAN) injection 4 mg  4 mg IntraVENous Q6H PRN    polyethylene glycol (GLYCOLAX) packet 17 g  17 g Oral Daily PRN    acetaminophen (TYLENOL) tablet 650 mg  650 mg Oral Q6H PRN    Or    acetaminophen (TYLENOL) suppository 650 mg  650 mg Rectal Q6H PRN    nicotine (NICODERM CQ) 14 MG/24HR 1 patch  1 patch TransDERmal Daily    losartan (COZAAR) tablet 25 mg  25 mg Oral Daily    glucose chewable tablet 16 g  4 tablet Oral PRN    dextrose bolus 10% 125 mL  125 mL IntraVENous PRN    Or    dextrose bolus 10% 250 mL  250 mL IntraVENous PRN    glucagon injection 1 mg  1 mg IntraMUSCular PRN    dextrose 5 % solution  100 mL/hr IntraVENous PRN    insulin lispro (HUMALOG) injection vial 0-8 Units  0-8 Units SubCUTAneous TID WC    insulin lispro (HUMALOG) injection vial 0-4 Units  0-4 Units SubCUTAneous Nightly    pantoprazole (PROTONIX) 40 mg in sodium chloride (PF) 10 mL injection  40 mg IntraVENous BID       Discharge Plan:     Location:D  Days: 2-3  PPD Ordered: 6/21    Signed:  Quyen Venegas DO    Part of this note may have been written by using a voice dictation software. The note has been proof read but may still contain some grammatical/other typographical errors.

## 2022-06-23 NOTE — PROGRESS NOTES
Therapy Note:  Therapist participated in ICU/CCU IDT rounds and believe patient is currently functioning below baseline functional mobility/ADL performance. Patient could benefit from skilled therapy services when MD deems medically appropriate. Thank you,  Anderson Calvert

## 2022-06-23 NOTE — PROGRESS NOTES
A follow up visit was made to the patient. Emotional support, spiritual presence and   prayer were provided for the patient. His son was present.       Erica Caldera, [address] [facility], [facility]

## 2022-06-23 NOTE — PROGRESS NOTES
Care transferred to [facility] at this time. Dual NIH completed at bedside, score of 0 revealed.

## 2022-06-23 NOTE — PROGRESS NOTES
Bedside and verbal shift change report received from  Ana [address] (offgoing nurse). Report included the following information SBAR, Kardex, ED Summary, Intake/Output, MAR, Recent Results and Cardiac Rhythm NSR with occasional PVCs. Dual skin assessment completed at bedside: Scattered ecchymosis (list pertinent skin assessment findings)    Dual verification of gtts completed (name of gtts verified): Nicardipine currently infusing at 5 mg/hr, SBP goal < 140.      Dual NIH completed at bedside--NIH 0.

## 2022-06-23 NOTE — PROGRESS NOTES
Pt O2 saturation began dropping around 2300--pt was on 4L NC but breathing through his mouth. This RN placed NC in his mouth and increased O2 from 4-5L, but his saturations were still on the lower side. Notified hospitalist, order received for CPAP at night and a CXR in the morning. Will notify RT.

## 2022-06-24 PROBLEM — J96.01 ACUTE RESPIRATORY FAILURE WITH HYPOXIA (HCC): Status: ACTIVE | Noted: 2022-06-22

## 2022-06-24 PROBLEM — R00.1 BRADYCARDIA, SINUS: Status: ACTIVE | Noted: 2022-06-24

## 2022-06-24 LAB
ANION GAP SERPL CALC-SCNC: 5 MMOL/L (ref 7–16)
BUN SERPL-MCNC: 46 MG/DL (ref 8–23)
CALCIUM SERPL-MCNC: 8.7 MG/DL (ref 8.3–10.4)
CHLORIDE SERPL-SCNC: 117 MMOL/L (ref 98–107)
CO2 SERPL-SCNC: 25 MMOL/L (ref 21–32)
CREAT SERPL-MCNC: 2 MG/DL (ref 0.8–1.5)
EKG ATRIAL RATE: 65 BPM
EKG DIAGNOSIS: NORMAL
EKG P AXIS: 92 DEGREES
EKG P-R INTERVAL: 190 MS
EKG Q-T INTERVAL: 436 MS
EKG QRS DURATION: 130 MS
EKG QTC CALCULATION (BAZETT): 453 MS
EKG R AXIS: 71 DEGREES
EKG T AXIS: -1 DEGREES
EKG VENTRICULAR RATE: 65 BPM
GLUCOSE BLD STRIP.AUTO-MCNC: 145 MG/DL (ref 65–100)
GLUCOSE BLD STRIP.AUTO-MCNC: 155 MG/DL (ref 65–100)
GLUCOSE BLD STRIP.AUTO-MCNC: 163 MG/DL (ref 65–100)
GLUCOSE BLD STRIP.AUTO-MCNC: 164 MG/DL (ref 65–100)
GLUCOSE SERPL-MCNC: 203 MG/DL (ref 65–100)
MAGNESIUM SERPL-MCNC: 2.3 MG/DL (ref 1.8–2.4)
POTASSIUM SERPL-SCNC: 3.8 MMOL/L (ref 3.5–5.1)
SERVICE CMNT-IMP: ABNORMAL
SODIUM SERPL-SCNC: 147 MMOL/L (ref 138–145)

## 2022-06-24 PROCEDURE — 83735 ASSAY OF MAGNESIUM: CPT

## 2022-06-24 PROCEDURE — 6360000002 HC RX W HCPCS: Performed by: HOSPITALIST

## 2022-06-24 PROCEDURE — 6360000002 HC RX W HCPCS: Performed by: INTERNAL MEDICINE

## 2022-06-24 PROCEDURE — A4216 STERILE WATER/SALINE, 10 ML: HCPCS | Performed by: INTERNAL MEDICINE

## 2022-06-24 PROCEDURE — 2580000003 HC RX 258: Performed by: FAMILY MEDICINE

## 2022-06-24 PROCEDURE — 97530 THERAPEUTIC ACTIVITIES: CPT

## 2022-06-24 PROCEDURE — 97112 NEUROMUSCULAR REEDUCATION: CPT

## 2022-06-24 PROCEDURE — 6370000000 HC RX 637 (ALT 250 FOR IP): Performed by: FAMILY MEDICINE

## 2022-06-24 PROCEDURE — C9113 INJ PANTOPRAZOLE SODIUM, VIA: HCPCS | Performed by: INTERNAL MEDICINE

## 2022-06-24 PROCEDURE — 51798 US URINE CAPACITY MEASURE: CPT

## 2022-06-24 PROCEDURE — 92610 EVALUATE SWALLOWING FUNCTION: CPT

## 2022-06-24 PROCEDURE — 2580000003 HC RX 258: Performed by: INTERNAL MEDICINE

## 2022-06-24 PROCEDURE — 93005 ELECTROCARDIOGRAM TRACING: CPT | Performed by: FAMILY MEDICINE

## 2022-06-24 PROCEDURE — 97165 OT EVAL LOW COMPLEX 30 MIN: CPT

## 2022-06-24 PROCEDURE — 36415 COLL VENOUS BLD VENIPUNCTURE: CPT

## 2022-06-24 PROCEDURE — 80048 BASIC METABOLIC PNL TOTAL CA: CPT

## 2022-06-24 PROCEDURE — 97535 SELF CARE MNGMENT TRAINING: CPT

## 2022-06-24 PROCEDURE — 82962 GLUCOSE BLOOD TEST: CPT

## 2022-06-24 PROCEDURE — 99232 SBSQ HOSP IP/OBS MODERATE 35: CPT | Performed by: PSYCHIATRY & NEUROLOGY

## 2022-06-24 PROCEDURE — 97161 PT EVAL LOW COMPLEX 20 MIN: CPT

## 2022-06-24 PROCEDURE — 2500000003 HC RX 250 WO HCPCS: Performed by: HOSPITALIST

## 2022-06-24 PROCEDURE — 1100000000 HC RM PRIVATE

## 2022-06-24 PROCEDURE — 6370000000 HC RX 637 (ALT 250 FOR IP): Performed by: HOSPITALIST

## 2022-06-24 PROCEDURE — 1100000003 HC PRIVATE W/ TELEMETRY

## 2022-06-24 PROCEDURE — 6370000000 HC RX 637 (ALT 250 FOR IP): Performed by: INTERNAL MEDICINE

## 2022-06-24 RX ORDER — LABETALOL 200 MG/1
200 TABLET, FILM COATED ORAL EVERY 12 HOURS SCHEDULED
Status: DISCONTINUED | OUTPATIENT
Start: 2022-06-24 | End: 2022-06-29

## 2022-06-24 RX ORDER — AMLODIPINE BESYLATE 10 MG/1
10 TABLET ORAL DAILY
Status: DISCONTINUED | OUTPATIENT
Start: 2022-06-24 | End: 2022-06-30 | Stop reason: HOSPADM

## 2022-06-24 RX ORDER — POLYETHYLENE GLYCOL 3350 17 G/17G
17 POWDER, FOR SOLUTION ORAL DAILY
Status: DISCONTINUED | OUTPATIENT
Start: 2022-06-24 | End: 2022-06-30 | Stop reason: HOSPADM

## 2022-06-24 RX ORDER — HYDRALAZINE HYDROCHLORIDE 20 MG/ML
10 INJECTION INTRAMUSCULAR; INTRAVENOUS EVERY 6 HOURS PRN
Status: DISCONTINUED | OUTPATIENT
Start: 2022-06-24 | End: 2022-06-30 | Stop reason: HOSPADM

## 2022-06-24 RX ORDER — LABETALOL HYDROCHLORIDE 5 MG/ML
20 INJECTION, SOLUTION INTRAVENOUS EVERY 4 HOURS PRN
Status: DISCONTINUED | OUTPATIENT
Start: 2022-06-24 | End: 2022-06-30 | Stop reason: HOSPADM

## 2022-06-24 RX ORDER — LOSARTAN POTASSIUM 50 MG/1
50 TABLET ORAL DAILY
Status: DISCONTINUED | OUTPATIENT
Start: 2022-06-24 | End: 2022-06-26

## 2022-06-24 RX ORDER — LOSARTAN POTASSIUM 50 MG/1
50 TABLET ORAL DAILY
Status: DISCONTINUED | OUTPATIENT
Start: 2022-06-24 | End: 2022-06-24

## 2022-06-24 RX ORDER — HYDRALAZINE HYDROCHLORIDE 50 MG/1
50 TABLET, FILM COATED ORAL EVERY 8 HOURS SCHEDULED
Status: DISCONTINUED | OUTPATIENT
Start: 2022-06-24 | End: 2022-06-30 | Stop reason: HOSPADM

## 2022-06-24 RX ORDER — HYDRALAZINE HYDROCHLORIDE 50 MG/1
25 TABLET, FILM COATED ORAL EVERY 8 HOURS SCHEDULED
Status: DISCONTINUED | OUTPATIENT
Start: 2022-06-24 | End: 2022-06-24

## 2022-06-24 RX ADMIN — HYDRALAZINE HYDROCHLORIDE 25 MG: 50 TABLET, FILM COATED ORAL at 14:23

## 2022-06-24 RX ADMIN — ATORVASTATIN CALCIUM 20 MG: 10 TABLET, FILM COATED ORAL at 07:37

## 2022-06-24 RX ADMIN — POLYETHYLENE GLYCOL 3350 17 G: 17 POWDER, FOR SOLUTION ORAL at 09:30

## 2022-06-24 RX ADMIN — SODIUM CHLORIDE, PRESERVATIVE FREE 5 ML: 5 INJECTION INTRAVENOUS at 21:01

## 2022-06-24 RX ADMIN — SODIUM CHLORIDE 40 MG: 9 INJECTION INTRAMUSCULAR; INTRAVENOUS; SUBCUTANEOUS at 07:38

## 2022-06-24 RX ADMIN — AMLODIPINE BESYLATE 10 MG: 10 TABLET ORAL at 07:37

## 2022-06-24 RX ADMIN — CITALOPRAM HYDROBROMIDE 40 MG: 20 TABLET ORAL at 07:37

## 2022-06-24 RX ADMIN — HYDRALAZINE HYDROCHLORIDE 25 MG: 50 TABLET, FILM COATED ORAL at 07:54

## 2022-06-24 RX ADMIN — SODIUM CHLORIDE, PRESERVATIVE FREE 20 ML: 5 INJECTION INTRAVENOUS at 08:00

## 2022-06-24 RX ADMIN — HYDRALAZINE HYDROCHLORIDE 10 MG: 20 INJECTION INTRAMUSCULAR; INTRAVENOUS at 17:46

## 2022-06-24 RX ADMIN — HYDRALAZINE HYDROCHLORIDE 10 MG: 20 INJECTION INTRAMUSCULAR; INTRAVENOUS at 02:32

## 2022-06-24 RX ADMIN — LOSARTAN POTASSIUM 50 MG: 50 TABLET, FILM COATED ORAL at 05:08

## 2022-06-24 RX ADMIN — SODIUM CHLORIDE 40 MG: 9 INJECTION INTRAMUSCULAR; INTRAVENOUS; SUBCUTANEOUS at 17:36

## 2022-06-24 RX ADMIN — HYDRALAZINE HYDROCHLORIDE 50 MG: 50 TABLET, FILM COATED ORAL at 21:00

## 2022-06-24 RX ADMIN — OXYCODONE 5 MG: 5 TABLET ORAL at 09:29

## 2022-06-24 RX ADMIN — LABETALOL HYDROCHLORIDE 20 MG: 5 INJECTION INTRAVENOUS at 20:58

## 2022-06-24 ASSESSMENT — PAIN SCALES - GENERAL
PAINLEVEL_OUTOF10: 0
PAINLEVEL_OUTOF10: 6
PAINLEVEL_OUTOF10: 0

## 2022-06-24 ASSESSMENT — PAIN DESCRIPTION - LOCATION: LOCATION: HEAD

## 2022-06-24 ASSESSMENT — PAIN SCALES - WONG BAKER
WONGBAKER_NUMERICALRESPONSE: 0
WONGBAKER_NUMERICALRESPONSE: 0

## 2022-06-24 ASSESSMENT — PAIN DESCRIPTION - DESCRIPTORS: DESCRIPTORS: ACHING

## 2022-06-24 ASSESSMENT — PAIN DESCRIPTION - ORIENTATION: ORIENTATION: POSTERIOR

## 2022-06-24 NOTE — PROGRESS NOTES
PHYSICAL THERAPY Initial Assessment and AM  (Link to Caseload Tracking: PT Visit Days : 1  Acknowledge Orders  Time In/Out  PT Charge Capture  Rehab Caseload Tracker    Sae Shane is a 79 y.o. male   PRIMARY DIAGNOSIS: SAH (subarachnoid hemorrhage) ([facility] [address]  Subarachnoid hemorrhage ([facility] [address] [I60.9]  SAH (subarachnoid hemorrhage) ([facility] [address] [I60.9]  Hypertensive emergency [I16.1]       Reason for Referral: Generalized Muscle Weakness (M62.81)  Difficulty in walking, Not elsewhere classified (R26.2)  Repeated Falls (R29.6)  History of falling (Z91.81)  Inpatient: Payor: MEDICARE / Plan: MEDICARE PART A AND B / Product Type: *No Product type* /     ASSESSMENT:     REHAB RECOMMENDATIONS:   Recommendation to date pending progress:  Setting:   Short-term Rehab    Equipment:     To Be Determined     ASSESSMENT:  Mr. Yeimy Sung presents to PT with decreased AROM and decreased strength in B LEs. Pt was sitting in recliner upon arrival and agreeable to therapy. He was very [facility] but wife at bedside to assist with history. Pt was able to bart several times today with Tona x 2 and poor standing balance. He ambulated short distances today with RW and Tona x 2 using chair follow. Pt has very Parkinson's like gait with short, shuffled steps and was quick to fatigue. Mr. Yeimy Sung could benefit from skilled PT as he is currently functioning below his baseline.         [address] [facility] [address] [address] AM-PAC 6 Clicks Basic Mobility Inpatient Short Form  AM-PAC Mobility Inpatient   How much difficulty turning over in bed?: A Little  How much difficulty sitting down on / standing up from a chair with arms?: A Little  How much difficulty moving from lying on back to sitting on side of bed?: A Little  How much help from another person moving to and from a bed to a chair?: A Lot  How much help from another person needed to walk in hospital room?: A Lot  How much help from another person for climbing 3-5 steps with a railing?: A Lot  AM-PAC Inpatient Mobility Raw Score : 15  AM-PAC Inpatient T-Scale Score : 39.45  Mobility Inpatient CMS 0-100% Score: 57.7  Mobility Inpatient CMS G-Code Modifier : CK    SUBJECTIVE:   Mr. Go  states, \"Alright\"     Social/Functional Lives With: Spouse  Type of Home: House  Home Equipment: Cane (Alicia Elliott)  Receives Help From: Family  ADL Assistance: Independent  Active : No  Patient's  Info: drives some, but most family  Lives with spouse and uses SPC PRN for ambulation with recent falls reported  OBJECTIVE:     PAIN: Ortez Serene / O2: PRECAUTION / Lorelle Wu / Josephine [place]:   Pre Treatment:   Pain Assessment: 0-10  Pain Level: 0      Post Treatment: 0 Vitals        Oxygen      Continuous Pulse Oximetry and Telemetry     RESTRICTIONS/PRECAUTIONS:  Restrictions/Precautions: Fall Risk                 GROSS EVALUATION: Intact Impaired (Comments):   AROM []  generally decreased   PROM []    Strength []  3+ for L hip flexion 4- for R hip flexion   Balance []     Posture [] Forward Head  Rounded Shoulders   Sensation [x]     Coordination [] decreased   Tone []     Edema []    Activity Tolerance []  fatigued quickly with standing/ambulation    []      COGNITION/  PERCEPTION: Intact Impaired (Comments):   Orientation []  confused; history of dementia   Vision []     Hearing []  [place]   Cognition  []  follows simple commands but decreased safety awareness     MOBILITY: I Mod I S SBA CGA Min Mod Max Total  NT x2 Comments:   Bed Mobility    Rolling [] [] [] [] [] [] [] [] [] [] []    Supine to Sit [] [] [] [] [] [] [] [] [] [] []    Scooting [] [] [] [] [] [] [] [] [] [] []    Sit to Supine [] [] [] [] [] [] [] [] [] [] []    Transfers    Sit to Stand [] [] [] [] [] [x] [] [] [] [] [x]    Bed to Chair [] [] [] [] [] [] [] [] [] [] []    Stand to Sit [] [] [] [] [] [x] [] [] [] [] [x]     [] [] [] [] [] [] [] [] [] [] []    I=Independent, Mod I=Modified Independent, S=Supervision, SBA=Standby Assistance, CGA=Contact Guard Assistance, Min=Minimal Assistance, Mod=Moderate Assistance, Max=Maximal Assistance, Total=Total Assistance, NT=Not Tested    GAIT: I Mod I S SBA CGA Min Mod Max Total  NT x2 Comments:   Level of Assistance [] [] [] [] [] [x] [] [] [] [] [x]    Distance 10 feet    DME Rolling Walker    Gait Quality Decreased step clearance, Decreased step length, Shuffling  and Steppage    Weightbearing Status Restrictions/Precautions  Restrictions/Precautions: Fall Risk    Stairs      I=Independent, Mod I=Modified Independent, S=Supervision, SBA=Standby Assistance, CGA=Contact Guard Assistance,   Min=Minimal Assistance, Mod=Moderate Assistance, Max=Maximal Assistance, Total=Total Assistance, NT=Not Tested    PLAN:   ACUTE PHYSICAL THERAPY GOALS:   (Developed with and agreed upon by patient and/or caregiver. )  LTG:  (1.)Mr. Donavan Cardenas will move from supine to sit and sit to supine , scoot up and down and roll side to side in bed with SUPERVISION within 7 treatment day(s). (2.)Mr. Donavan Cardenas will transfer from bed to chair and chair to bed with SUPERVISION using the least restrictive device within 7 treatment day(s). (3.)Mr. Donavan Cardenas will ambulate with STAND BY ASSIST for 250 feet with the least restrictive device within 7 treatment day(s). (4.)Mr. Donavan Cardenas will participate in therapeutic activity/exercises x 25 minutes for increased strength within 7 treatment days. (5.)Mr. Donavan Cardenas will perform standing static and dynamic balance activities x 15 minutes with SUPERVISION to improve safety within 7 treatment day(s).     ________________________________________________________________________________________________          FREQUENCY AND DURATION: 3 times/week for duration of hospital stay or until stated goals are met, whichever comes first.    THERAPY PROGNOSIS: Good    PROBLEM LIST:   (Skilled intervention is medically necessary to address:)  Decreased ADL/Functional Activities  Decreased Activity Tolerance  Decreased AROM/PROM  Decreased Balance  Decreased Cognition  Decreased Coordination  Decreased Gait Ability  Decreased Safety Awareness  Decreased Strength  Decreased Transfer Abilities INTERVENTIONS PLANNED:   (Benefits and precautions of physical therapy have been discussed with the patient.)  Self Care Training  Therapeutic Activity  Therapeutic Exercise/HEP  Neuromuscular Re-education  Gait Training  Education       TREATMENT:   EVALUATION: LOW COMPLEXITY: (Untimed Charge)    TREATMENT:   Co-Treatment PT/OT necessary due to patient's decreased overall endurance/tolerance levels, as well as need for high level skilled assistance to complete functional transfers/mobility and functional tasks  Therapeutic Activity (23 Minutes): Therapeutic activity included Transfer Training, Ambulation on level ground and Standing balance to improve functional Activity tolerance, Balance, Coordination, Mobility and Strength. TREATMENT GRID:  N/A    AFTER TREATMENT PRECAUTIONS: Alarm Activated, Bed/Chair Locked, Call light within reach, RN notified and Visitors at bedside    INTERDISCIPLINARY COLLABORATION:  RN/ PCT, PT/ PTA and OT/ CRUZ    EDUCATION: Education Given To: Patient  Education Provided: Transfer Training    TIME IN/OUT:  Time In: 7883  Time Out: 7249  Minutes: 40 Lolita Badillo.  [place]

## 2022-06-24 NOTE — PROGRESS NOTES
Neurology Daily Progress Note     Assessment:     68-year-old man who presented with severe headache and was found to have a perimedullary subarachnoid hemorrhage, likely secondary to trauma. He also has a small amount of intraventricular hemorrhage. No aneurysm on CTA. Plan:     Nonaneurysmal subarachnoid hemorrhage management   q4h neuro checks   Maintain SBP < 160 mmHg. STAT CT head for clinical worsening  Maintain normoglycemia and normothermia  No need for antiseizure medications  Avoid antithrombotics  If excessive somnolence continues then consider an MRI of the brain to further investigate. Neurology will sign off    Subjective: Interval history:    The patient was working with physical therapy and sat down in a chair and had severe neck pain. He was given pain medicine and is now somnolent but awakens and follows all commands. History:    Jaimee Page is a 79 y.o. male who is being seen for [facility].     Unable to obtain ROS due to AMS      Objective:     Vitals:    06/24/22 0800 06/24/22 0803 06/24/22 0900 06/24/22 0930   BP: (!) 146/64 134/62 (!) 121/53 139/65   Pulse: 63 62  64   Resp: 18 17  20   Temp: 98.1 °F (36.7 °C)      TempSrc: Oral      SpO2: 95% 96%  96%   Weight:       Height:              Current Facility-Administered Medications:     hydrALAZINE (APRESOLINE) injection 10 mg, 10 mg, IntraVENous, Q6H PRN, Geraldo Benjamin MD, 10 mg at 06/24/22 0232    labetalol (NORMODYNE;TRANDATE) injection 20 mg, 20 mg, IntraVENous, Q4H PRN, Geraldo Benjamin MD    losartan (COZAAR) tablet 50 mg, 50 mg, Oral, Daily, Geraldo Benjamin MD, 50 mg at 06/24/22 0508    amLODIPine (NORVASC) tablet 10 mg, 10 mg, Oral, Daily, Thu Guerra, DO, 10 mg at 06/24/22 0737    [Held by provider] labetalol (NORMODYNE) tablet 200 mg, 200 mg, Oral, 2 times per day, Thu Guerra, DO    hydrALAZINE (APRESOLINE) tablet 25 mg, 25 mg, Oral, 3 times per day, Thu Guerra, DO, 25 mg at 06/24/22 0341    polyethylene glycol (GLYCOLAX) packet 17 g, 17 g, Oral, Daily, Thu Guerra, DO, 17 g at 06/24/22 0930    oxyCODONE (ROXICODONE) immediate release tablet 5 mg, 5 mg, Oral, Q6H PRN, Naeem Holden, DO, 5 mg at 06/24/22 0929    citalopram (CELEXA) tablet 40 mg, 40 mg, Oral, Daily, Frances Tijerina MD, 40 mg at 06/24/22 0737    atorvastatin (LIPITOR) tablet 20 mg, 20 mg, Oral, Daily, Frances Tijerina MD, 20 mg at 06/24/22 0737    sodium chloride flush 0.9 % injection 5-40 mL, 5-40 mL, IntraVENous, 2 times per day, Frances Tijerina MD, 20 mL at 06/24/22 0800    sodium chloride flush 0.9 % injection 5-40 mL, 5-40 mL, IntraVENous, PRN, Frances Tijerina MD    0.9 % sodium chloride infusion, , IntraVENous, PRN, Frances Tijerina MD    ondansetron (ZOFRAN-ODT) disintegrating tablet 4 mg, 4 mg, Oral, Q8H PRN **OR** ondansetron (ZOFRAN) injection 4 mg, 4 mg, IntraVENous, Q6H PRN, Frances Tijerina MD, 4 mg at 06/21/22 1253    polyethylene glycol (GLYCOLAX) packet 17 g, 17 g, Oral, Daily PRN, Frances Tijerina MD    acetaminophen (TYLENOL) tablet 650 mg, 650 mg, Oral, Q6H PRN, 650 mg at 06/22/22 1151 **OR** acetaminophen (TYLENOL) suppository 650 mg, 650 mg, Rectal, Q6H PRN, Frances Tijerina MD    nicotine (NICODERM CQ) 14 MG/24HR 1 patch, 1 patch, TransDERmal, Daily, Frances Tijerina MD, 1 patch at 06/24/22 0759    glucose chewable tablet 16 g, 4 tablet, Oral, PRN, Spencer Walker,     dextrose bolus 10% 125 mL, 125 mL, IntraVENous, PRN **OR** dextrose bolus 10% 250 mL, 250 mL, IntraVENous, PRN, Spencer Walker,     glucagon injection 1 mg, 1 mg, IntraMUSCular, PRN, Spencer Walker DO    dextrose 5 % solution, 100 mL/hr, IntraVENous, PRN, Spencer Walker DO    insulin lispro (HUMALOG) injection vial 0-8 Units, 0-8 Units, SubCUTAneous, TID WC, Spencer Walker DO, 2 Units at 06/23/22 1124    insulin lispro (HUMALOG) injection vial 0-4 Units, 0-4 Units, SubCUTAneous, Nightly, Spencer Kramer DO    pantoprazole (PROTONIX) 40 mg in sodium chloride (PF) 10 mL injection, 40 mg, IntraVENous, BID, Spencer Walker, DO, 40 mg at 06/24/22 8002    Recent Results (from the past 12 hour(s))   POCT Glucose    Collection Time: 06/24/22  7:56 AM   Result Value Ref Range    POC Glucose 163 (H) 65 - 100 mg/dL    Performed by: Alfreda([facility])    Basic Metabolic Panel    Collection Time: 06/24/22  9:12 AM   Result Value Ref Range    Sodium 147 (H) 138 - 145 mmol/L    Potassium 3.8 3.5 - 5.1 mmol/L    Chloride 117 (H) 98 - 107 mmol/L    CO2 25 21 - 32 mmol/L    Anion Gap 5 (L) 7 - 16 mmol/L    Glucose 203 (H) 65 - 100 mg/dL    BUN 46 (H) 8 - 23 MG/DL    CREATININE 2.00 (H) 0.8 - 1.5 MG/DL    GFR  41 (L) >60 ml/min/1.73m2    GFR Non- 34 (L) >60 ml/min/1.73m2    Calcium 8.7 8.3 - 10.4 MG/DL   EKG 12 Lead    Collection Time: 06/24/22  9:25 AM   Result Value Ref Range    Ventricular Rate 65 BPM    Atrial Rate 65 BPM    P-R Interval 190 ms    QRS Duration 130 ms    Q-T Interval 436 ms    QTc Calculation (Bazett) 453 ms    P Axis 92 degrees    R Axis 71 degrees    T Axis -1 degrees    Diagnosis Sinus rhythm with Premature atrial complexes    POCT Glucose    Collection Time: 06/24/22 11:12 AM   Result Value Ref Range    POC Glucose 164 (H) 65 - 100 mg/dL    Performed by: Mary Ellen Chin          Physical Exam:  General - Well developed, well nourished, in no apparent distress. Somnolent  HEENT - Normocephalic, atraumatic. Conjunctiva, tympanic membranes, and oropharynx are clear. Neck - Supple without masses, no bruits   Extremities - Peripheral pulses intact. No edema and no rashes.      Neurological examination -     Somnolent. Comprehension is intact to one-step commands but he struggles to follow more complex commands. Language and speech are normal. On cranial nerve examination pupils are equal round and reactive to light. Visual acuity is adequate. Visual fields are full to finger confrontation. Extraocular motility is normal. Face is symmetric and sensation is intact to light touch. Hearing is intact to finger rustle bilaterally. Motor examination - There is normal muscle tone and bulk. Power is full throughout.  Cerebellar examination is normal.  Gait and stance not assessed due to fall risk         Signed By: Edgard Aguilera DO     June 24, 2022

## 2022-06-24 NOTE — PROGRESS NOTES
TRANSFER - OUT REPORT:    Verbal report given to [address]  RN on Radha Right  being transferred to 1 for routine progression of patient care       Report consisted of patient's Situation, Background, Assessment and   Recommendations(SBAR). Information from the following report(s) Nurse Handoff Report, Intake/Output, MAR, Recent Results and Neuro Assessment was reviewed with the receiving nurse. Lines:   Peripheral IV 06/20/22 Right Antecubital (Active)   Site Assessment Clean, dry & intact 06/24/22 0745   Line Status Capped;Flushed 06/24/22 0745   Line Care Connections checked and tightened;Ports disinfected 06/24/22 0745   Phlebitis Assessment No symptoms 06/24/22 0745   Infiltration Assessment 0 06/24/22 0300   Alcohol Cap Used Yes 06/24/22 0745   Dressing Status Clean, dry & intact 06/24/22 0745   Dressing Type Transparent 06/24/22 0745       Peripheral IV 06/20/22 Left Wrist (Active)   Site Assessment Clean, dry & intact 06/24/22 0745   Line Status Capped;Flushed 06/24/22 0745   Line Care Connections checked and tightened;Ports disinfected 06/24/22 0745   Phlebitis Assessment No symptoms 06/24/22 0745   Infiltration Assessment 0 06/24/22 0745   Alcohol Cap Used Yes 06/24/22 0745   Dressing Status Clean, dry & intact 06/24/22 0745   Dressing Type Transparent 06/24/22 0745        Opportunity for questions and clarification was provided.       Patient transported with:  Registered Nurse

## 2022-06-24 NOTE — CARE COORDINATION
Chart review complete. Pt transferred to 7th floor from CCU today. Therapy evals pending. PPD placed 6/21/22 in the event STR is needed. DC needs unknown at present. CM to continue to follow to assist as needed.

## 2022-06-24 NOTE — PROGRESS NOTES
SPEECH PATHOLOGY NOTE    Speech therapy consult received. Dysphagia evaluation attempted this morning. Patient with c/o pain- RN aware- and requesting to delay evaluation. Will follow up at later time this morning. ADDENDUM: Patient en route to new room at time of speech therapy re-attempt. Will follow up later.      Robley Krabbe, MSP, CCC-SLP  Speech Language Pathologist  Acute Rehabilitation Services  Contact: Michelle

## 2022-06-24 NOTE — PROGRESS NOTES
Hospitalist Progress Note   Admit Date:  2022  9:49 PM   Name:  Ricki Del Real   Age:  79 y.o. Sex:  male  :  1940   MRN:  [id number]   Room:  [facility]/    Presenting Complaint: Posterior Neck ache  Reason(s) for Admission: Subarachnoid hemorrhage ([address] [I60.9]  SAH (subarachnoid hemorrhage) ([address] [I60.9]  Hypertensive emergency [I16.1]     Hospital Course & Interval History:   79 y.o. male with medical history of HTN, CKD, dementia who presented to ED on  with severe posterior headache. Symptoms started around  on . Upon ER evaluation, NIH was 0. 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 BP was found to be very elevated with SBP>200. CT head shows SAH adjacent to brainstem. Neurosurgery was consulted by ER. They reviewed the chart and recommended CTA. Patient as placed on cardene drip for BP control. CTA was obtained which does show an area of abnormality at subclavian, clot or small dissection. Vascular Sugery was consulted who felt it was an chronic plaque or thrombus and recommended Plavix when able to take. He was admitted to the ICU on a Cardene gtt, since  has come off of this. On  he had sudden onset vomiting of coffee ground emesis. PPI was started with GI consult. GI has seen and signed off on . No further emesis or evidence of GIB. Poor candidate for endoscopy with recent SAH. Subjective/24hr Events (22): Wife at beside. Pt sitting up in recliner. After working with PT, pt stated he has a headache and back of his neck was hurting. Denies any other changes. No more hematemesis. No BM since . Denies CP/SOB. Denies N/V/D today. Assessment & Plan:     Principal Problem:    Subarachnoid hemorrhage ([address]  -  CT Head with SAH adjacent to the brainstem.  CTA with no aneurysm  -  Repeat CT with no change  -  Repeat CT with decrease in [facility] but now with blood layering in posterior horn ventricles  - Only neurological sign/symptom is posterior neck ache  - Goal SBP <160  - Continue home Cozaar, can increase dose if needed  - Holding Labetalol with bradycardia overnight. - weaned off Cardene gtt. Add Norvasc and Hydralazine po  - Hydralazine IV prn  - Appreciate Neurology's input and assistance  - PT/OT  - Transfer to medical/Neuro floor when bed available, order has been placed. Active Problems:  Acute Blood Loss Anemia  Acute Upper GI Bleed  - Likely due to UGIB  - 6/20 Hgb 11.3 but trended down to ~9 on 6/21 with large coffee ground emesis. Emesis is hemoccult positive  - Continue PPI BID  - GI has seen and signed off on 6/22. No further emesis or evidence of GIB. Poor candidate for endoscopy with recent SAH.   - Hgb stable ~7-8.   - Recheck CBC. D/c frequent Hgb/Hct check. Check iron studies      Essential hypertension, benign  - Off Cardene gtt since 6/22  - Continue home Cozaar, can increase dose if needed  - Holding Labetalol with bradycardia overnight.   - Add Norvasc and Hydralazine po  - Hydralazine IV prn  - Goal SBP <160    Sinus bradycardia  - 6/23 telemetry reported bradycardia down to 20's-30's per RN.  - Holding Labetalol  - Check EKG and TSH  - Keep Mag>2, K>4    Acute respiratory failure with hypoxia  - RR in 30's at times with O2 sat 86% on 6/22 and had to be placed on 7LO2NC. Has been weaned down to RA. Suspect 2/2 anemia. CXR also shows atelectasis. 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  Delirium precautions     Management of Delirium      Non-pharmacological intervention  · Reorient the patient throughout the day  · Window open and lights on during the day. Lights off, television off, noises down during the night. If able, decrease nursing checks at night  · Therapies as often as possible  · Avoid restraints to the best of your ability   · Avoid sensory deprivation by using glasses and hearing aids, if applicable        Pharmacological intervention  · Replete electrolyte abnormalities and correct metabolic abnormalities  · Limit benzodiazepines, antihistamines, narcotics, anticholinergics. Preference towards Precedex for sedation over fentanyl and benzodiazepines/GABAa agonists. · For dangerous behavior/aggression to self or others can consider Zyprexa or Seroquel if benefits outweigh risk  · For persistent insomnia can use melatonin four hours prior to bedtime or Seroquel 25 mg at bedtime       Diet:  ADULT DIET; Clear Liquid; No red dye  DVT PPx: SCDs  Code status: DNR     I spent 39 minutes of time caring for this patient at bedside or nearby on the unit, more than 50 percent of which was spent on coordination of care and/or counseling regarding the disease process, treatment options, and treatment plan.       Hospital Problems:  Hospital Problems           Last Modified POA    * (Principal) SAH (subarachnoid hemorrhage) ([address] 6/21/2022 Yes    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[address] 6/21/2022 Yes    Dementia ([address] 6/21/2022 Yes    Type 2 diabetes mellitus ([address] 6/21/2022 Yes    Acute blood loss anemia (ABLA) 6/22/2022 No    Acute upper gastrointestinal bleeding 6/22/2022 Clinically Undetermined    Tobacco use disorder 6/21/2022 Yes    Essential hypertension, benign 6/21/2022 Yes    CKD (chronic kidney disease) stage 4, GFR 15-29 ml/min ([address] 6/21/2022 Yes          Objective:     Patient Vitals for the past 24 hrs:   Temp Pulse Resp BP SpO2   06/24/22 0803 -- 62 17 134/62 96 %   06/24/22 0800 -- 63 18 (!) 146/64 95 %   06/24/22 0715 -- 56 15 (!) 171/74 95 %   06/24/22 0700 -- 59 16 (!) 188/76 97 %   06/24/22 0505 -- 60 19 (!) 153/62 --   06/24/22 0455 -- 69 13 (!) 168/73 --   06/24/22 0445 -- 69 20 (!) 158/70 --   06/24/22 0412 -- 69 18 (!) 155/70 93 %   06/24/22 0405 -- 61 23 (!) 170/76 93 %   06/24/22 0354 -- 58 27 (!) 172/83 92 %   06/24/22 0345 -- 60 18 (!) 158/70 91 %   06/24/22 0330 -- 64 20 (!) 187/74 92 %   06/24/22 0310 -- 61 (!) 40 (!) 161/71 91 %   06/24/22 0303 -- 67 22 (!) 151/70 93 %   06/24/22 0301 -- 66 (!) 61 (!) 159/67 93 %   06/24/22 0254 -- -- -- (!) 193/74 --   06/24/22 0243 -- (!) 44 17 (!) 150/85 94 %   06/24/22 0241 -- -- -- (!) 172/74 --   06/24/22 0240 -- -- -- (!) 166/73 --   06/24/22 0238 -- -- -- (!) 179/77 --   06/24/22 0237 -- -- -- (!) 190/76 --   06/24/22 0236 -- -- -- (!) 173/75 --   06/24/22 0205 -- 58 16 (!) 169/70 93 %   06/24/22 0100 -- 60 24 (!) 160/72 92 %   06/24/22 0000 -- 60 (!) 52 (!) 145/67 92 %   06/23/22 2315 -- -- -- (!) 146/67 --   06/23/22 2300 98 °F (36.7 °C) 57 (!) 8 (!) 175/74 92 %   06/23/22 2230 -- 51 10 (!) 150/64 91 %   06/23/22 2135 -- 60 19 (!) 120/56 92 %   06/23/22 2130 -- 65 18 (!) 153/64 92 %   06/23/22 2115 -- 68 22 -- 95 %   06/23/22 2100 -- 61 14 (!) 168/72 (!) 89 %   06/23/22 2045 -- 66 17 -- 92 %   06/23/22 2030 -- 64 12 (!) 162/124 95 %   06/23/22 2004 -- 64 -- (!) 161/67 --   06/23/22 2000 -- 62 17 (!) 161/67 99 %   06/23/22 1920 98.7 °F (37.1 °C) 62 15 (!) 154/109 97 %   06/23/22 1900 -- 62 (!) 36 (!) 169/74 97 %   06/23/22 1832 -- 65 15 (!) 168/76 --   06/23/22 1808 -- 65 15 (!) 161/68 --   06/23/22 1733 -- 59 16 (!) 154/67 --   06/23/22 1700 -- 65 14 -- --   06/23/22 1653 -- 58 15 -- --   06/23/22 1608 -- 61 14 (!) 145/64 --   06/23/22 1553 -- 68 12 -- --   06/23/22 1538 -- 63 16 (!) 158/69 --   06/23/22 1523 -- 64 15 -- --   06/23/22 1508 -- 62 13 (!) 150/64 --   06/23/22 1500 98.1 °F (36.7 °C) 62 14 (!) 150/64 --   06/23/22 1453 -- 61 14 -- --   06/23/22 1438 -- 60 14 (!) 147/64 --   06/23/22 1408 -- 63 14 -- --   06/23/22 1404 -- -- -- 138/62 --   06/23/22 1334 -- 62 14 (!) 140/65 --   06/23/22 1310 -- 57 15 -- --   06/23/22 1304 -- 59 -- 132/63 95 %   06/23/22 1203 -- 60 14 (!) 141/67 --   06/23/22 1134 -- 60 18 (!) 140/62 --   06/23/22 1103 -- -- -- 119/77 --   06/23/22 1102 -- 61 20 -- --   06/23/22 1100 98.9 °F (37.2 °C) 58 26 119/77 --   06/23/22 1034 -- 59 26 (!) 123/58 97 %   06/23/22 1019 -- 62 26 -- --   06/23/22 1004 -- 61 -- (!) 136/58 --   06/23/22 0934 -- 59 13 -- 93 %   06/23/22 0933 -- 61 15 117/68 94 %   06/23/22 0918 -- 58 16 (!) 125/58 --   06/23/22 0914 -- 60 14 125/60 --   06/23/22 0900 -- 61 19 135/63 94 %   06/23/22 0847 -- 63 -- 135/63 --   06/23/22 0833 -- 69 -- (!) 146/65 --       Oxygen Therapy  SpO2: 96 %  Pulse Oximetry Type: Continuous  Pulse via Oximetry: 56 beats per minute  Pulse Oximeter Device Mode: Continuous  Pulse Oximeter Device Location: Left,Finger  O2 Device: None (Room air)  Oximetry Probe Site Changed: No  Skin Assessment: Clean, dry, & intact  Skin Protection for O2 Device: N/A  Intervention(s): Reposition Device (placed in mouth due to pt being a mouth breather)  O2 Flow Rate (L/min): 4 L/min    Estimated body mass index is 25.95 kg/m² as calculated from the following:    Height as of this encounter: 5' 9\" (1.753 m). Weight as of this encounter: 175 lb 11.3 oz (79.7 kg). Intake/Output Summary (Last 24 hours) at 6/24/2022 0829  Last data filed at 6/24/2022 0300  Gross per 24 hour   Intake 185.83 ml   Output 1000 ml   Net -814.17 ml         Physical Exam:   Blood pressure 134/62, pulse 62, temperature 98 °F (36.7 °C), resp. rate 17, height 5' 9\" (1.753 m), weight 175 lb 11.3 oz (79.7 kg), SpO2 96 %. General:    Well nourished. No overt distress. Hard of hearing. Head:  Normocephalic, atraumatic  Eyes:  Sclerae appear normal.  Pupils equally round. ENT:  Nares appear normal, no drainage. Moist oral mucosa  Neck:  No restricted ROM. Trachea midline   CV:   RRR. No m/r/g. No jugular venous distension. Lungs:   CTAB. No wheezing, rhonchi, or rales. Respirations even, unlabored  Abdomen: Bowel sounds present. Soft, nontender, nondistended. Extremities: No cyanosis or clubbing. No edema  Skin:     No rashes and normal coloration. Warm and dry. Neuro:  CN II-XII grossly intact. Sensation intact. Strength 5/5 b/l. Alert and oriented x3. Psych:  Normal mood and affect.       I have reviewed ordered lab tests and independently visualized imaging below:    Recent Labs:  Recent Results (from the past 48 hour(s))   POCT Glucose    Collection Time: 06/22/22 11:55 AM   Result Value Ref Range    POC Glucose 157 (H) 65 - 100 mg/dL    Performed by: Taylor Young    Hemoglobin and Hematocrit    Collection Time: 06/22/22  1:01 PM   Result Value Ref Range    Hemoglobin 8.1 (L) 13.6 - 17.2 g/dL    Hematocrit 26.3 (L) 41.1 - 50.3 %   PLEASE READ & DOCUMENT PPD TEST IN 24 HRS    Collection Time: 06/22/22  2:48 PM   Result Value Ref Range    PPD, (POC) Negative Negative    mm Induration 0 0 - 5 mm   POCT Glucose    Collection Time: 06/22/22  4:46 PM   Result Value Ref Range    POC Glucose 151 (H) 65 - 100 mg/dL    Performed by: Taylor Young    Hemoglobin and Hematocrit    Collection Time: 06/22/22  8:29 PM   Result Value Ref Range    Hemoglobin 8.7 (L) 13.6 - 17.2 g/dL    Hematocrit 28.4 (L) 41.1 - 50.3 %   POCT Glucose    Collection Time: 06/22/22  9:00 PM   Result Value Ref Range    POC Glucose 163 (H) 65 - 100 mg/dL    Performed by: Avila (Weber)    PLEASE READ & DOCUMENT PPD TEST IN 48 HRS    Collection Time: 06/23/22 12:00 AM   Result Value Ref Range    PPD, (POC) Negative Negative    mm Induration 0 0 - 5 mm   Basic Metabolic Panel w/ Reflex to MG    Collection Time: 06/23/22  4:24 AM   Result Value Ref Range    Sodium 150 (H) 136 - 145 mmol/L    Potassium 4.0 3.5 - 5.1 mmol/L    Chloride 120 (H) 98 - 107 mmol/L    CO2 23 21 - 32 mmol/L    Anion Gap 7 7 - 16 mmol/L    Glucose 166 (H) 65 - 100 mg/dL    BUN 55 (H) 8 - 23 MG/DL    CREATININE 1.90 (H) 0.8 - 1.5 MG/DL    GFR  44 (L) >60 ml/min/1.73m2    GFR Non- 36 (L) >60 ml/min/1.73m2    Calcium 8.5 8.3 - 10.4 MG/DL   CBC with Auto Differential    Collection Time: 06/23/22  4:24 AM   Result Value Ref Range    WBC 9.2 4.3 - 11.1 K/uL    RBC 3.02 (L) 4.23 - 5.6 M/uL    Hemoglobin 8.0 (L) 13.6 - 17.2 g/dL    Hematocrit 25.4 (L) 41.1 - 50.3 %    MCV 84.1 79.6 - 97.8 FL    MCH 26.5 26.1 - 32.9 PG    MCHC 31.5 31.4 - 35.0 g/dL    RDW 15.9 (H) 11.9 - 14.6 %    Platelets 678 002 - 414 K/uL    MPV 12.9 (H) 9.4 - 12.3 FL    nRBC 0.00 0.0 - 0.2 K/uL    Differential Type AUTOMATED      Seg Neutrophils 77 43 - 78 %    Lymphocytes 15 13 - 44 %    Monocytes 7 4.0 - 12.0 %    Eosinophils % 1 0.5 - 7.8 %    Basophils 0 0.0 - 2.0 %    Immature Granulocytes 0 0.0 - 5.0 %    Segs Absolute 7.1 1.7 - 8.2 K/UL    Absolute Lymph # 1.3 0.5 - 4.6 K/UL    Absolute Mono # 0.7 0.1 - 1.3 K/UL    Absolute Eos # 0.1 0.0 - 0.8 K/UL    Basophils Absolute 0.0 0.0 - 0.2 K/UL    Absolute Immature Granulocyte 0.0 0.0 - 0.5 K/UL   POCT Glucose    Collection Time: 06/23/22  8:32 AM   Result Value Ref Range    POC Glucose 166 (H) 65 - 100 mg/dL    Performed by: Joann    POCT Glucose    Collection Time: 06/23/22 11:24 AM   Result Value Ref Range    POC Glucose 204 (H) 65 - 100 mg/dL    Performed by: Joann    Hemoglobin and Hematocrit    Collection Time: 06/23/22  1:49 PM   Result Value Ref Range    Hemoglobin 7.9 (L) 13.6 - 17.2 g/dL    Hematocrit 25.4 (L) 41.1 - 50.3 %   POCT Glucose    Collection Time: 06/23/22  5:35 PM   Result Value Ref Range    POC Glucose 128 (H) 65 - 100 mg/dL    Performed by: Joann    POCT Glucose    Collection Time: 06/23/22  8:28 PM   Result Value Ref Range    POC Glucose 159 (H) 65 - 100 mg/dL    Performed by: Kaykay    Hemoglobin and Hematocrit    Collection Time: 06/23/22  8:44 PM   Result Value Ref Range    Hemoglobin 7.7 (L) 13.6 - 17.2 g/dL    Hematocrit 24.8 (L) 41.1 - 50.3 %   POCT Glucose    Collection Time: 06/24/22  7:56 AM   Result Value Ref Range    POC Glucose 163 (H) 65 - 100 mg/dL    Performed by: Lexis)        Other Studies:  CTA HEAD NECK W WO CONTRAST    Result Date: 6/21/2022  History: Complains of headache and neck pain and hypertension Comments: CT ANGIOGRAM OF THE NECK AND CT ANGIOGRAM OF THE [place] was obtained following the administration of IV contrast. IV contrast was administered to evaluate the arterial vasculature. Reformatted images in the coronal and sagittal planes as well as 3-D imaging was obtained and reviewed on a dedicated PACS and [address]. Radiation reduction dose techniques were used for the study. Our CT scanner use one or all of the following- Automated exposure control, adjustment of the mA and/or KV according the patient size, iterative reconstruction. All measurements are based upon NASCET criteria if appropriate. This study was analyzed by the [address] [address]Algorithm Findings: CT ANGIOGRAM OF THE NECK: The arch demonstrates atherosclerotic changes. Within the proximal left subclavian artery there is a soft tissue filling defect. This may represent noncalcified plaque disease or thromboemboli. This may place the patient at additional risk for distal emboli in the left upper extremity and/or of the left vertebral artery The right left carotid bulbs demonstrate eccentric calcified and noncalcified plaque disease. Proximally 50% narrowing is noted on the right and less than 50% on the left. The proximal vertebral arteries are patent with at least moderate stenosis noted at the origin on the left. The lung apices are clear with the exception of a mild scarring in the medial aspect of the right upper lobe. The thyroid gland is unremarkable. CT angiogram of [place]: The petrous, cavernous, and supraclinoid internal carotid arteries are patent with diffuse atherosclerotic changes.  Moderate narrowing is noted of the clinoid portion of the right internal carotid artery. Anterior circulation and middle cerebral arteries are patent. The distal vertebral arteries posterior inferior cerebellar arteries basilar artery superior cerebellar arteries and posterior cerebral arteries are patent. The dural venous sinuses are patent. 1. Small focus of filling defect noted within the proximal left subclavian artery which may represent noncalcified plaque disease or thromboembolic disease. This may place the patient at additional risk for distal emboli either within the left upper extremity or the left vertebral artery. CT HEAD WO CONTRAST    Result Date: 6/20/2022  EXAM: CT HEAD WO CONTRAST HISTORY:  Reason for exam:->sudden onset HA, bp 260/110. TECHNIQUE: Axial images of the brain were performed without the administration of intravenous contrast. Images were obtained axial plane and coronal reformatted images were submitted. Dose reduction technique used: Automated exposure control/Adjustment of the mA and/or kV according to patient size/Use of iterative reconstruction technique. COMPARISON: MRI dated 1/26/2017 FINDINGS: There is hyperdense hemorrhage noted adjacent to the brainstem, at the level of the foramen magnum. There is no appreciable intracranial parenchymal hemorrhage. There is no midline shift, or mass effect. There are mild chronic appearing microangiopathic changes within the periventricular white matter. No evidence of acute confluent territorial infarction. Ventricles and basal cisterns are patent and symmetric. There is mild generalized volume loss. Visualized paranasal sinuses and mastoid air cells are without significant fluid. Scalp, soft tissues, and calvarium are within normal limits. Findings described above suggest subarachnoid hemorrhage adjacent to the the brainstem, at the level of the foramen magnum. This does not appear to represent intraparenchymal hemorrhage.  This case was discussed with the ordering physician at the time of interpretation. XR CHEST PORTABLE    Result Date: 6/20/2022  EXAM: XR CHEST PORTABLE HISTORY: evaluate. TECHNIQUE: Frontal chest. COMPARISON: 5/9/2016 FINDINGS: The cardiac silhouette, mediastinum, and pulmonary vasculature are within normal limits. There is no consolidation, pleural effusion, or pneumothorax. No significant osseous abnormalities are observed. No evidence of an acute intrathoracic process.        Current Meds:  Current Facility-Administered Medications   Medication Dose Route Frequency    hydrALAZINE (APRESOLINE) injection 10 mg  10 mg IntraVENous Q6H PRN    labetalol (NORMODYNE;TRANDATE) injection 20 mg  20 mg IntraVENous Q4H PRN    losartan (COZAAR) tablet 50 mg  50 mg Oral Daily    amLODIPine (NORVASC) tablet 10 mg  10 mg Oral Daily    [Held by provider] labetalol (NORMODYNE) tablet 200 mg  200 mg Oral 2 times per day    hydrALAZINE (APRESOLINE) tablet 25 mg  25 mg Oral 3 times per day    oxyCODONE (ROXICODONE) immediate release tablet 5 mg  5 mg Oral Q6H PRN    citalopram (CELEXA) tablet 40 mg  40 mg Oral Daily    atorvastatin (LIPITOR) tablet 20 mg  20 mg Oral Daily    sodium chloride flush 0.9 % injection 5-40 mL  5-40 mL IntraVENous 2 times per day    sodium chloride flush 0.9 % injection 5-40 mL  5-40 mL IntraVENous PRN    0.9 % sodium chloride infusion   IntraVENous PRN    ondansetron (ZOFRAN-ODT) disintegrating tablet 4 mg  4 mg Oral Q8H PRN    Or    ondansetron (ZOFRAN) injection 4 mg  4 mg IntraVENous Q6H PRN    polyethylene glycol (GLYCOLAX) packet 17 g  17 g Oral Daily PRN    acetaminophen (TYLENOL) tablet 650 mg  650 mg Oral Q6H PRN    Or    acetaminophen (TYLENOL) suppository 650 mg  650 mg Rectal Q6H PRN    nicotine (NICODERM CQ) 14 MG/24HR 1 patch  1 patch TransDERmal Daily    glucose chewable tablet 16 g  4 tablet Oral PRN    dextrose bolus 10% 125 mL  125 mL IntraVENous PRN    Or    dextrose bolus 10% 250 mL 250 mL IntraVENous PRN    glucagon injection 1 mg  1 mg IntraMUSCular PRN    dextrose 5 % solution  100 mL/hr IntraVENous PRN    insulin lispro (HUMALOG) injection vial 0-8 Units  0-8 Units SubCUTAneous TID WC    insulin lispro (HUMALOG) injection vial 0-4 Units  0-4 Units SubCUTAneous Nightly    pantoprazole (PROTONIX) 40 mg in sodium chloride (PF) 10 mL injection  40 mg IntraVENous BID       Discharge Plan:     Location: Transfer to medical floor  Days: 1-2 days. OT/PT eval pending  PPD Ordered: 6/21    Signed: Aurora Spivey DO    Part of this note may have been written by using a voice dictation software. The note has been proof read but may still contain some grammatical/other typographical errors.

## 2022-06-24 NOTE — PROGRESS NOTES
ICU OUTREACH NURSE PROGRESS REPORT      SUBJECTIVE: Called to assess patient secondary to transfer from critical care. Vitals:    06/24/22 0900 06/24/22 0930 06/24/22 1200 06/24/22 1525   BP: (!) 121/53 139/65 (!) 174/54 (!) 163/62   Pulse:  64 59    Resp:  20 18    Temp:   97.9 °F (36.6 °C)    TempSrc:   Axillary    SpO2:  96% 93%    Weight:       Height:            ASSESSMENT:  Pt in bed with eyes closed, awakens to voice. Family at bedside. Pt states he feels better and denies any pain. No needs expressed at this time. PLAN:  Will follow per ICU Outreach protocol.     Yelitza Araujo RN  [phone]

## 2022-06-24 NOTE — PROGRESS NOTES
This is a follow-up visit to the patient, providing a spiritual presence, emotional support and prayer. The patient appears to be resting.  had a conversation and visited with his wife, Amy Matos who was present at his bedside.     Jaswant Alberts, [address] [facility], [facility]

## 2022-06-24 NOTE — INTERDISCIPLINARY ROUNDS
Multi-D Rounds/Checklist (leapfrog):  Lines: can any be removed?: None       DVT Prophylaxis: Contraindicated  Vent: N/A  Nutrition Ordered/appropriate: Ordered  Can antibiotics or other drugs be stopped?: N/A  Consults needed: None  A: Is pain control adequate? Yes  B: Sedation break and SBT? N/A  C: Is sedation choice appropriate? N/A  D: Delirium/CAM-ICU? No  E: Mobility goals/appropriateness? Yes  F: Family update and plan? Spouse is primary contact and is being updated daily by primary attending and nursing staff.     Nate Mcnair, APRN - CNP

## 2022-06-24 NOTE — PROGRESS NOTES
LTG: Patient will tolerate least restrictive oral diet without overt s/sx of airway compromise. STG: Patient will consume minced/moist diet and thin liquids without overt s/sx of airway compromise. STG: Patient will participate in ongoing po trials with SLP in attempt to identify least restrictive oral diet. STG: Patient will participate in modified barium swallow study to objectively assess swallow function as medically indicated. SPEECH LANGUAGE PATHOLOGY: DYSPHAGIA  Initial Assessment    NAME: Cecil Varela  : 1940  MRN: [id number]    ADMISSION DATE: 2022  ADMITTING DIAGNOSIS: has Proteinuria; Hematuria, unspecified; Pure hypercholesterolemia; Acute, but ill-defined, cerebrovascular disease; Recurrent major depressive disorder, in full remission ([address] Gross hematuria; Chronic kidney disease, unspecified; Urinary tract infection, site not specified; Coronary atherosclerosis of native coronary vessel; Microscopic hematuria; Tobacco use disorder; Type II or unspecified type diabetes mellitus with neurological manifestations, not stated as uncontrolled(250.60); Essential tremor; Anemia, unspecified; Essential hypertension, benign; History of kidney cancer; Renal sclerosis, unspecified; CKD (chronic kidney disease) stage 4, GFR 15-29 ml/min ([address] Respiratory insufficiency; SAH (subarachnoid hemorrhage) ([address] 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[address] Dementia ([address] Type 2 diabetes mellitus ([address] Acute blood loss anemia (ABLA); Acute upper gastrointestinal bleeding; Bradycardia, sinus; and Acute respiratory failure with hypoxia (HCC) on their problem list.    ICD-10: Treatment Diagnosis: R13.12 Dysphagia, Oropharyngeal Phase    RECOMMENDATIONS   Diet:  Diet Solids Recommendation: Minced & Moist  Liquid Consistency Recommendation: Thin    Medications: Whole with puree     Recommendations: Dysphagia treatment     Compensatory Swallowing Strategies: Alternate solids and liquids;Upright as possible for all oral intake;Small bites/sips     Therapeutic Intervention:Patient/Family education     Patient continues to require skilled intervention: Yes    D/C Recommendations: Ongoing speech therapy is recommended during this hospitalization       ASSESSMENT    Dysphagia Diagnosis: Mild to moderate oral stage dysphagia    Mild-moderate oral dysphagia characterized by prolonged mastication and piecemeal swallow. Appeared to have some difficulty with mastication of soft solids. Improved oral prep time with minced/moist textures. Appropriate oral clearance with minced/moist and purees. Intermittent double swallow observed. Delayed cough x1 following multiple trials of soft solids. Thin liquids consumed by cup and straw without overt s/sx of airway compromise. Daughter reports this incident is consistent with what she has seen at home and different from incident seen yesterday which included facial redness/watery eyes. Recommend minced/moist diet and thin liquids. Medications whole or crushed in puree. Please ensure he is awake and sitting upright during intake. Will follow up for diet tolerance and po trials for possible advancement. Possible instrumental swallow assessment if dysphagia concerns persist. Daughter aware and in agreement with plan.        GENERAL    History of Present Injury/Illness: Mr. Katey Fowler  has a past medical history of Allergic rhinitis, cause unspecified, Anemia, unspecified, Atherosclerosis of renal artery ([address] CAD (coronary artery disease), Cancer ([address] Chronic airway obstruction, not elsewhere classified, Chronic kidney disease, Coronary atherosclerosis of native coronary vessel, Depressive disorder, not elsewhere classified, Diabetes ([address] Diverticulosis of colon (without mention of hemorrhage), Dyslipidemia, Esophagitis, unspecified, Essential and other specified forms of tremor, Essential hypertension, benign, GERD (gastroesophageal reflux disease), Gross hematuria, Hematuria, unspecified, Hypertension, Malignant neoplasm of bladder, part unspecified, Malignant neoplasm of trigone of urinary bladder ([address] Microscopic hematuria, Osteoarthrosis, unspecified whether generalized or localized, unspecified site, Other peripheral vascular disease(443.89), 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 Yas Urena He also  has a past surgical history that includes Kidney removal (Left); Colonoscopy; Cardiac catheterization; pr cardiac surg procedure unlist (2001); lipoma resection (10/07/2019); Urological Surgery; and vascular surgery (2001). Chart Reviewed: Yes  Subjective: Patient pleasant and cooperative with evaluation. Daughter at bedside. She denies dysphagia at home. States he consumed regular diet. Coughing episodes with po intake yesterday x2. Behavior/Cognition: Alert; Cooperative  Patient Position: Upright in bed      Prior Dysphagia History: No prior dysphagia. Daughter reports 2 instances of coughing yesterday when taking po. + facial redness and watery eyes.   Current Liquid Diet : Thin (Clear liquids)    Pain:   Patient does not c/o pain          Communication Observation: Functional  Follows Directions: Simple  Respiratory Status: Room air    OBJECTIVE    Oral Motor Exam  Patient Positioning: Upright in bed   Oral Motor   Labial: No impairment  Dentition: Natural;Limited  Oral Hygiene: Moist;Clean  Lingual: No impairment  Mandible: No impairment     Baseline Vocal Quality: Normal    Oropharyngeal Phase:     Assessment Method(s): Observation;Palpation  Patient Position: Upright in bed  Vocal vitamin D3 (CHOLECALCIFEROL) 125 MCG (5000 UT) TABS tablet Take by mouth daily      citalopram (CELEXA) 40 MG tablet Take 40 mg by mouth      clopidogrel (PLAVIX) 75 MG tablet Take 75 mg by mouth daily      labetalol (NORMODYNE) 200 MG tablet Take 200 mg by mouth 2 times daily      losartan (COZAAR) 25 MG tablet Take 100 mg by mouth      omeprazole (PRILOSEC) 20 MG delayed release capsule Take 20 mg by mouth      simvastatin (ZOCOR) 40 MG tablet Take 40 mg by mouth         PRECAUTIONS/ALLERGIES: Ciprofloxacin   Safety Devices in place: Yes  Type of devices:  All fall risk precautions in place,Left in bed (Daughter at bedside)    Therapy Time  SLP Individual Minutes  Time In: 7497  Time Out: 5454 Ruben Pressley  Minutes: [address], [facility], [facility] DYLAN COOK, [facility]-SLP  6/24/2022 12:44 PM

## 2022-06-24 NOTE — PROGRESS NOTES
OCCUPATIONAL THERAPY Initial Assessment and Daily Note       OT Visit Days: 1   Acknowledge Orders  Time  OT Charge Capture  Rehab Caseload Tracker      Lynda Webster is a 79 y.o. male   PRIMARY DIAGNOSIS: SAH ([address]arachnoid hemorrhage) ([facility]  Subarachnoid hemorrhage ([facility] [I60.9]  SAH (subarachnoid hemorrhage) ([facility] [I60.9]  Hypertensive emergency [I16.1]       Reason for Referral: Generalized Muscle Weakness (M62.81)  Inpatient: Payor: MEDICARE / Plan: MEDICARE PART A AND B / Product Type: *No Product type* /     ASSESSMENT:     REHAB RECOMMENDATIONS:   Recommendation to date pending progress:  Setting:   Short-term Rehab    Equipment:     To Be Determined     ASSESSMENT:  Mr. Bradford Palumbo presents with deficits in overall strength, activity tolerance, ADL performance, and functional mobility. Currently in ICU for [facility]. Mild confusion today (also [place] could be contributing factor). BUE (3+/5) are generally decreased but WFL; sensation and vision are intact. Coordination generally decreased d/t mild weakness. Min A x 2 for sit <> stand from chair. Proceeded to complete functional mobility for short household distances to in-room sink. Fair (-) dynamic standing balance with short shuffled gait. Short seated rest break provided after patient report dizziness. Vital WFL. Pt proceeded to complete self-grooming tasks, including washing face and brushing teeth, while standing EOS with CGA. At this time, Lynda Webster is functioning below baseline for ADL performance and functional mobility. Patient would benefit from skilled OT services to address goals and plan of care.       [address] [facility] [address] [address] AM-PAC 6 Clicks Daily Activity Inpatient Short Form:    AM-PAC Daily Activity Inpatient   How much help for putting on and taking off regular lower body clothing?: A Lot  How much help for Bathing?: A Lot  How much help for Toileting?: A Lot  How much help for putting on and taking off regular upper body clothing?: A Little  How much help for taking care of personal grooming?: A Little  How much help for eating meals?: A Little  AM-PAC Inpatient Daily Activity Raw Score: 15  AM-PAC Inpatient ADL T-Scale Score : 34.69  ADL Inpatient CMS 0-100% Score: 56.46  ADL Inpatient CMS G-Code Modifier : CK           SUBJECTIVE:     Mr. Jaylene Rivera states, \"Oh I reckon. \"     Social/Functional Lives With: Spouse  Type of Home: House  Home Equipment: Cane (glucometer,cane)  Receives Help From: Family  ADL Assistance: Independent  Active : No  Patient's  Info: drives some, but most family  Lives with wife in [facility]. Independent at baseline for ADLs and fMOD I for functional mobility with use of cane.    OBJECTIVE:     Ana Rivas / Kamar Bloom / AIRWAY: None    RESTRICTIONS/PRECAUTIONS:  Restrictions/Precautions: Fall Risk    PAIN: VITALS / O2:   Pre Treatment:       Numeric 0-10  0/10    Post Treatment:   0/10       Vitals        WFL    Oxygen     Resting on 2L 02       GROSS EVALUATION: INTACT IMPAIRED   (See Comments)   UE AROM [] []   UE PROM [] []   Strength []  generally decreased but BUE WFL     Posture / Balance []  fair (+) sitting balance  Fair (-) dynamic standing balance w/ use of RW   Sensation [x]     Coordination [] Slightly decreased d/t mild weakness   Tone [x]       Edema [x]    Activity Tolerance []   generally decreased on 2L 02   Hand Dominance R [] L []      COGNITION/  PERCEPTION: INTACT IMPAIRED   (See Comments)   Orientation []  mild confusion    Vision [x]     Hearing []  [place]   Cognition  []  mild confusion    Perception []  decreased insight into deficits     MOBILITY: I Mod I S SBA CGA Min Mod Max Total  NT x2 Comments:   Bed Mobility    Rolling [] [] [] [] [] [] [] [] [] [x] []    Supine to Sit [] [] [] [] [] [] [] [] [] [x] []    Scooting [] [] [] [] [] [] [] [] [] [x] []    Sit to Supine [] [] [] [] [] [] [] [] [] [x] []    Transfers    Sit to Stand [] [] [] [] [] [x] [] [] [] [] [x]    Bed to Chair [] [] [] [] [] [x] [] [] [] [] [x]    Stand to Sit [] [] [] [] [] [x] [] [] [] [] [x]    Tub/Shower [] [] [] [] [] [] [] [] [] [] []     Toilet [] [] [] [] [] [] [] [] [] [] []      [] [] [] [] [] [] [] [] [] [] []    I=Independent, Mod I=Modified Independent, S=Supervision/Setup, SBA=Standby Assistance, CGA=Contact Guard Assistance, Min=Minimal Assistance, Mod=Moderate Assistance, Max=Maximal Assistance, Total=Total Assistance, NT=Not Tested    ACTIVITIES OF DAILY LIVING: I Mod I S SBA CGA Min Mod Max Total NT Comments   BASIC ADLs:              Upper Body Bathing  [] [] [] [] [] [] [] [] [] [x]    Lower Body Bathing [] [] [] [] [] [] [] [] [] [x]    Toileting [] [] [] [] [] [] [] [] [] [x]    Upper Body Dressing [] [] [] [] [] [] [] [] [] [x]    Lower Body Dressing [] [] [] [] [] [] [] [x] [] [] Donning socks   Feeding [] [] [] [] [] [] [] [] [] [x]    Grooming [] [] [] [] [x] [] [] [] [] [] While standing EOS   Personal Device Care [] [] [] [] [] [] [] [] [] [x]    Functional Mobility [] [] [] [] [] [x] [] [] [] [] X 2 x RW   I=Independent, Mod I=Modified Independent, S=Supervision/Setup, SBA=Standby Assistance, CGA=Contact Guard Assistance, Min=Minimal Assistance, Mod=Moderate Assistance, Max=Maximal Assistance, Total=Total Assistance, NT=Not Tested    PLAN:     FREQUENCY/DURATION   OT Plan of Care: 3 times/week for duration of hospital stay or until stated goals are met, whichever comes first.    ACUTE OCCUPATIONAL THERAPY GOALS:   (Developed with and agreed upon by patient and/or caregiver.)  1. Patient will complete lower body bathing and dressing with min A and adaptive equipment as needed. 2. Patient will complete toileting with SBA. 3. Patient will tolerate 30 minutes of OT treatment with 1-2 rest breaks to increase activity tolerance for ADLs. 4. Patient will complete functional transfers with SBA and adaptive equipment as needed.    5. Patient will complete functional mobility for household distances with supervision and adaptive equipment as needed. 6. Patient will complete self-grooming while standing edge of sink with supervision and adaptive equipment as needed. Timeframe: 7 visits           PROBLEM LIST:   (Skilled intervention is medically necessary to address:)  Decreased ADL/Functional Activities  Decreased Activity Tolerance  Decreased AROM/PROM  Decreased Balance  Decreased Coordination  Decreased Gait Ability  Decreased Strength  Decreased Transfer Abilities   INTERVENTIONS PLANNED:  (Benefits and precautions of occupational therapy have been discussed with the patient.)  Self Care Training  Therapeutic Activity  Therapeutic Exercise/HEP  Neuromuscular Re-education  Manual Therapy  Education         TREATMENT:     EVALUATION: LOW COMPLEXITY: (Untimed Charge)    TREATMENT:   Neuromuscular Re-education (13 Minutes): Neuromuscular Re-education included Balance Training, Coordination training, Postural training, Sitting balance training and Standing balance training to improve Balance, Coordination and Postural Control. Self Care (10 minutes): Patient participated in lower body dressing and grooming ADLs in standing with minimal verbal cueing to increase independence, decrease assistance required and increase activity tolerance. Patient also participated in functional mobility, functional transfer and energy conservation training to increase independence, decrease assistance required and increase activity tolerance. TREATMENT GRID:  N/A    AFTER TREATMENT PRECAUTIONS: Chair, Needs within reach, RN notified and Visitors at bedside    INTERDISCIPLINARY COLLABORATION:  RN/ PCT, PT/ PTA and OT/ CRUZ    EDUCATION:  Education Given To: Patient; Family  Education Provided: Role of Therapy;Plan of Care  Barriers to Learning: None;Cognition  Education Outcome: Verbalized understanding;Demonstrated understanding      TOTAL TREATMENT DURATION AND TIME:  Time In: 0840  Time Out: 2163  Minutes: 22    Ghazal PONCE Stevena Flavors

## 2022-06-25 ENCOUNTER — APPOINTMENT (OUTPATIENT)
Dept: MRI IMAGING | Age: 82
DRG: 085 | End: 2022-06-25
Payer: MEDICARE

## 2022-06-25 LAB
ANION GAP SERPL CALC-SCNC: 7 MMOL/L (ref 7–16)
BASOPHILS # BLD: 0 K/UL (ref 0–0.2)
BASOPHILS NFR BLD: 0 % (ref 0–2)
BUN SERPL-MCNC: 36 MG/DL (ref 8–23)
CALCIUM SERPL-MCNC: 9 MG/DL (ref 8.3–10.4)
CHLORIDE SERPL-SCNC: 118 MMOL/L (ref 98–107)
CO2 SERPL-SCNC: 26 MMOL/L (ref 21–32)
CREAT SERPL-MCNC: 1.5 MG/DL (ref 0.8–1.5)
DIFFERENTIAL METHOD BLD: ABNORMAL
EOSINOPHIL # BLD: 0.2 K/UL (ref 0–0.8)
EOSINOPHIL NFR BLD: 2 % (ref 0.5–7.8)
ERYTHROCYTE [DISTWIDTH] IN BLOOD BY AUTOMATED COUNT: 15.9 % (ref 11.9–14.6)
FERRITIN SERPL-MCNC: 242 NG/ML (ref 8–388)
GLUCOSE BLD STRIP.AUTO-MCNC: 131 MG/DL (ref 65–100)
GLUCOSE BLD STRIP.AUTO-MCNC: 140 MG/DL (ref 65–100)
GLUCOSE BLD STRIP.AUTO-MCNC: 141 MG/DL (ref 65–100)
GLUCOSE BLD STRIP.AUTO-MCNC: 157 MG/DL (ref 65–100)
GLUCOSE SERPL-MCNC: 121 MG/DL (ref 65–100)
HCT VFR BLD AUTO: 25.8 % (ref 41.1–50.3)
HGB BLD-MCNC: 8.1 G/DL (ref 13.6–17.2)
IMM GRANULOCYTES # BLD AUTO: 0.1 K/UL (ref 0–0.5)
IMM GRANULOCYTES NFR BLD AUTO: 1 % (ref 0–5)
IRON SATN MFR SERPL: 28 %
IRON SERPL-MCNC: 47 UG/DL (ref 35–150)
LYMPHOCYTES # BLD: 1.6 K/UL (ref 0.5–4.6)
LYMPHOCYTES NFR BLD: 21 % (ref 13–44)
MAGNESIUM SERPL-MCNC: 2.3 MG/DL (ref 1.8–2.4)
MCH RBC QN AUTO: 25.8 PG (ref 26.1–32.9)
MCHC RBC AUTO-ENTMCNC: 31.4 G/DL (ref 31.4–35)
MCV RBC AUTO: 82.2 FL (ref 79.6–97.8)
MONOCYTES # BLD: 0.8 K/UL (ref 0.1–1.3)
MONOCYTES NFR BLD: 10 % (ref 4–12)
NEUTS SEG # BLD: 5 K/UL (ref 1.7–8.2)
NEUTS SEG NFR BLD: 66 % (ref 43–78)
NRBC # BLD: 0 K/UL (ref 0–0.2)
PLATELET # BLD AUTO: 214 K/UL (ref 150–450)
PMV BLD AUTO: 13.1 FL (ref 9.4–12.3)
POTASSIUM SERPL-SCNC: 3.5 MMOL/L (ref 3.5–5.1)
RBC # BLD AUTO: 3.14 M/UL (ref 4.23–5.6)
SERVICE CMNT-IMP: ABNORMAL
SODIUM SERPL-SCNC: 151 MMOL/L (ref 138–145)
TIBC SERPL-MCNC: 166 UG/DL (ref 250–450)
TSH, 3RD GENERATION: 2.41 UIU/ML (ref 0.36–3.74)
WBC # BLD AUTO: 7.6 K/UL (ref 4.3–11.1)

## 2022-06-25 PROCEDURE — 6370000000 HC RX 637 (ALT 250 FOR IP): Performed by: FAMILY MEDICINE

## 2022-06-25 PROCEDURE — A4216 STERILE WATER/SALINE, 10 ML: HCPCS | Performed by: INTERNAL MEDICINE

## 2022-06-25 PROCEDURE — 80048 BASIC METABOLIC PNL TOTAL CA: CPT

## 2022-06-25 PROCEDURE — 6360000002 HC RX W HCPCS: Performed by: HOSPITALIST

## 2022-06-25 PROCEDURE — 82728 ASSAY OF FERRITIN: CPT

## 2022-06-25 PROCEDURE — C9113 INJ PANTOPRAZOLE SODIUM, VIA: HCPCS | Performed by: INTERNAL MEDICINE

## 2022-06-25 PROCEDURE — 82962 GLUCOSE BLOOD TEST: CPT

## 2022-06-25 PROCEDURE — 6370000000 HC RX 637 (ALT 250 FOR IP): Performed by: HOSPITALIST

## 2022-06-25 PROCEDURE — 2580000003 HC RX 258: Performed by: INTERNAL MEDICINE

## 2022-06-25 PROCEDURE — 6360000002 HC RX W HCPCS: Performed by: INTERNAL MEDICINE

## 2022-06-25 PROCEDURE — 83735 ASSAY OF MAGNESIUM: CPT

## 2022-06-25 PROCEDURE — 1100000003 HC PRIVATE W/ TELEMETRY

## 2022-06-25 PROCEDURE — 2580000003 HC RX 258: Performed by: FAMILY MEDICINE

## 2022-06-25 PROCEDURE — 84443 ASSAY THYROID STIM HORMONE: CPT

## 2022-06-25 PROCEDURE — 36415 COLL VENOUS BLD VENIPUNCTURE: CPT

## 2022-06-25 PROCEDURE — 6370000000 HC RX 637 (ALT 250 FOR IP): Performed by: INTERNAL MEDICINE

## 2022-06-25 PROCEDURE — 70551 MRI BRAIN STEM W/O DYE: CPT

## 2022-06-25 PROCEDURE — 85025 COMPLETE CBC W/AUTO DIFF WBC: CPT

## 2022-06-25 PROCEDURE — 83540 ASSAY OF IRON: CPT

## 2022-06-25 RX ORDER — LORAZEPAM 2 MG/ML
0.5 INJECTION INTRAMUSCULAR
Status: COMPLETED | OUTPATIENT
Start: 2022-06-25 | End: 2022-06-25

## 2022-06-25 RX ADMIN — SODIUM CHLORIDE, PRESERVATIVE FREE 10 ML: 5 INJECTION INTRAVENOUS at 03:36

## 2022-06-25 RX ADMIN — POLYETHYLENE GLYCOL 3350 17 G: 17 POWDER, FOR SOLUTION ORAL at 08:04

## 2022-06-25 RX ADMIN — LOSARTAN POTASSIUM 50 MG: 50 TABLET, FILM COATED ORAL at 08:04

## 2022-06-25 RX ADMIN — HYDRALAZINE HYDROCHLORIDE 50 MG: 50 TABLET, FILM COATED ORAL at 05:53

## 2022-06-25 RX ADMIN — HYDRALAZINE HYDROCHLORIDE 10 MG: 20 INJECTION INTRAMUSCULAR; INTRAVENOUS at 15:49

## 2022-06-25 RX ADMIN — OXYCODONE 5 MG: 5 TABLET ORAL at 23:13

## 2022-06-25 RX ADMIN — CITALOPRAM HYDROBROMIDE 40 MG: 20 TABLET ORAL at 08:03

## 2022-06-25 RX ADMIN — SODIUM CHLORIDE 40 MG: 9 INJECTION INTRAMUSCULAR; INTRAVENOUS; SUBCUTANEOUS at 08:03

## 2022-06-25 RX ADMIN — SODIUM CHLORIDE 40 MG: 9 INJECTION INTRAMUSCULAR; INTRAVENOUS; SUBCUTANEOUS at 18:32

## 2022-06-25 RX ADMIN — LORAZEPAM 0.5 MG: 2 INJECTION INTRAMUSCULAR; INTRAVENOUS at 17:34

## 2022-06-25 RX ADMIN — SODIUM CHLORIDE, PRESERVATIVE FREE 10 ML: 5 INJECTION INTRAVENOUS at 08:04

## 2022-06-25 RX ADMIN — AMLODIPINE BESYLATE 10 MG: 10 TABLET ORAL at 08:04

## 2022-06-25 RX ADMIN — SODIUM CHLORIDE, PRESERVATIVE FREE 5 ML: 5 INJECTION INTRAVENOUS at 23:13

## 2022-06-25 RX ADMIN — ATORVASTATIN CALCIUM 20 MG: 10 TABLET, FILM COATED ORAL at 08:03

## 2022-06-25 RX ADMIN — HYDRALAZINE HYDROCHLORIDE 10 MG: 20 INJECTION INTRAMUSCULAR; INTRAVENOUS at 03:27

## 2022-06-25 RX ADMIN — OXYCODONE 5 MG: 5 TABLET ORAL at 08:14

## 2022-06-25 RX ADMIN — LABETALOL HYDROCHLORIDE 20 MG: 5 INJECTION INTRAVENOUS at 18:32

## 2022-06-25 RX ADMIN — HYDRALAZINE HYDROCHLORIDE 50 MG: 50 TABLET, FILM COATED ORAL at 20:54

## 2022-06-25 RX ADMIN — HYDRALAZINE HYDROCHLORIDE 50 MG: 50 TABLET, FILM COATED ORAL at 14:58

## 2022-06-25 RX ADMIN — LABETALOL HYDROCHLORIDE 20 MG: 5 INJECTION INTRAVENOUS at 00:49

## 2022-06-25 ASSESSMENT — PAIN - FUNCTIONAL ASSESSMENT: PAIN_FUNCTIONAL_ASSESSMENT: PREVENTS OR INTERFERES SOME ACTIVE ACTIVITIES AND ADLS

## 2022-06-25 ASSESSMENT — PAIN DESCRIPTION - LOCATION
LOCATION: BACK;GENERALIZED
LOCATION: HEAD

## 2022-06-25 ASSESSMENT — PAIN SCALES - WONG BAKER
WONGBAKER_NUMERICALRESPONSE: 0
WONGBAKER_NUMERICALRESPONSE: 0

## 2022-06-25 ASSESSMENT — PAIN SCALES - PAIN ASSESSMENT IN ADVANCED DEMENTIA (PAINAD)
NEGVOCALIZATION: 1
NEGVOCALIZATION: 0
CONSOLABILITY: 1
BREATHING: 0
FACIALEXPRESSION: 0
TOTALSCORE: 0
BREATHING: 1
TOTALSCORE: 5
FACIALEXPRESSION: 1
BODYLANGUAGE: 1
BODYLANGUAGE: 0
CONSOLABILITY: 0

## 2022-06-25 ASSESSMENT — PAIN DESCRIPTION - ORIENTATION
ORIENTATION: POSTERIOR
ORIENTATION: POSTERIOR

## 2022-06-25 ASSESSMENT — PAIN SCALES - GENERAL
PAINLEVEL_OUTOF10: 5
PAINLEVEL_OUTOF10: 0
PAINLEVEL_OUTOF10: 5

## 2022-06-25 ASSESSMENT — PAIN DESCRIPTION - PAIN TYPE: TYPE: CHRONIC PAIN

## 2022-06-25 ASSESSMENT — PAIN DESCRIPTION - ONSET: ONSET: ON-GOING

## 2022-06-25 ASSESSMENT — PAIN DESCRIPTION - DESCRIPTORS
DESCRIPTORS: SQUEEZING
DESCRIPTORS: ACHING

## 2022-06-25 ASSESSMENT — PAIN DESCRIPTION - FREQUENCY: FREQUENCY: INTERMITTENT

## 2022-06-25 NOTE — PROGRESS NOTES
Hospitalist Progress Note   Admit Date:  2022  9:49 PM   Name:  Sae Shane   Age:  79 y.o. Sex:  male  :  1940   MRN:  [id number]   Room:  8/    Presenting Complaint: Posterior Neck ache  Reason(s) for Admission: Subarachnoid hemorrhage ([address] [I60.9]  SAH (subarachnoid hemorrhage) ([facility] [I60.9]  Hypertensive emergency [I16.1]     Hospital Course & Interval History: This is a 79 y.o. male with medical history of HTN, CKD, dementia who presented to ED on  with severe posterior headache. Symptoms started around  on . Upon ER evaluation, NIH was 0. 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 BP was found to be very elevated with SBP>200. CT head shows SAH adjacent to brainstem. Neurosurgery was consulted by ER. They reviewed the chart and recommended CTA. Patient as placed on cardene drip for BP control. CTA was obtained which does show an area of abnormality at subclavian, clot or small dissection. Vascular Sugery was consulted who felt it was an chronic plaque or thrombus and recommended Plavix when able to take. He was admitted to the ICU on a Cardene gtt, since  has come off of this. On  he had sudden onset vomiting of coffee ground emesis. PPI was started with GI consult. GI has seen and signed off on . No further emesis or evidence of GIB. Poor candidate for endoscopy with recent SAH. Subjective/24hr Events (22): Patient is hard of hearing. Wife at bedside. Patient denies any chest pain shortness of breath. No fever no chills. No tingling numbness or weakness of extremities. Assessment & Plan:     Principal Problem: This is a 58-year-old male with:      Subarachnoid hemorrhage ([facility]  -  CT Head with SAH adjacent to the brainstem. CTA with no aneurysm  -  Repeat CT with no change  -  Repeat CT with decrease in [facility] but now with blood layering in posterior horn ventricles.    - Goal SBP <160  - Continue home Cozaar, can increase dose if needed  - Holding Labetalol with bradycardia overnight. - weaned off Cardene gtt. Add Norvasc and Hydralazine po  - Hydralazine IV prn  - Appreciate Neurology's input and assistance  - PT/OT  -Neurology recommends MRI of the brain as patient is somnolent. MRI of the brain ordered. Active Problems:  Acute Blood Loss Anemia  Acute Upper GI Bleed  - Likely due to UGIB  - 6/20 Hgb 11.3 but trended down to ~9 on 6/21 with large coffee ground emesis. Emesis is hemoccult positive  - Continue PPI BID  - GI has seen and signed off on 6/22. No further emesis or evidence of GIB. Poor candidate for endoscopy with recent SAH.   - Hgb stable ~7-8.   -Hemoglobin is 8.1 this morning. Essential hypertension, benign  - Off Cardene gtt since 6/22  - Continue home Cozaar, can increase dose if needed. - Holding Labetalol with bradycardia overnight.   - Add Norvasc and Hydralazine po  - Hydralazine IV prn  - Goal SBP <160    Sinus bradycardia  - 6/23 telemetry reported bradycardia down to 20's-30's per RN.  - Holding Labetalol  - TSH is 2.41.  - Keep Mag>2, K>4    Acute respiratory failure with hypoxia  - RR in 30's at times with O2 sat 86% on 6/22 and had to be placed on 7LO2NC. Has been weaned down to RA. Suspect 2/2 anemia. CXR also shows atelectasis. 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 Vascular Dementia ([facility]  - Noted. Delirium precautions     Management of Delirium      Non-pharmacological intervention  · Reorient the patient throughout the day  · Window open and lights on during the day. Lights off, television off, noises down during the night.  If able, decrease nursing 98.2 °F (36.8 °C) 66 20 (!) 171/55 94 %   06/24/22 2224 -- -- -- (!) 166/63 --   06/24/22 2051 -- 76 -- (!) 174/54 --   06/24/22 2000 98.4 °F (36.9 °C) 53 16 (!) 175/51 --   06/24/22 1941 -- -- -- -- 95 %   06/24/22 1746 -- -- -- (!) 175/53 --   06/24/22 1611 -- -- -- (!) 149/52 --   06/24/22 1600 98.6 °F (37 °C) 60 19 (!) 179/45 94 %   06/24/22 1525 -- -- -- (!) 163/62 --       Oxygen Therapy  SpO2: 91 %  Pulse Oximetry Type: Continuous  Pulse via Oximetry: 64 beats per minute  Pulse Oximeter Device Mode: Continuous  Pulse Oximeter Device Location: Left,Finger  O2 Device: None (Room air)  Oximetry Probe Site Changed: No  Skin Assessment: Clean, dry, & intact  Skin Protection for O2 Device: N/A  Intervention(s): Reposition Device (placed in mouth due to pt being a mouth breather)  O2 Flow Rate (L/min): 4 L/min    Estimated body mass index is 25.95 kg/m² as calculated from the following:    Height as of this encounter: 5' 9\" (1.753 m). Weight as of this encounter: 175 lb 11.3 oz (79.7 kg). Intake/Output Summary (Last 24 hours) at 6/25/2022 1519  Last data filed at 6/25/2022 1405  Gross per 24 hour   Intake 180 ml   Output 1050 ml   Net -870 ml         Physical Exam:   Blood pressure (!) 170/65, pulse 59, temperature 97.7 °F (36.5 °C), temperature source Oral, resp. rate 19, height 5' 9\" (1.753 m), weight 175 lb 11.3 oz (79.7 kg), SpO2 91 %. General:    Well nourished. No overt distress. Hard of hearing. Head:  Normocephalic, atraumatic  Eyes:  Sclerae appear normal.  Pupils equally round. ENT:  Nares appear normal, no drainage. Moist oral mucosa  Neck:  No restricted ROM. Trachea midline   CV:   RRR. No m/r/g. No jugular venous distension. Lungs:   CTAB. No wheezing, rhonchi, or rales. Respirations even, unlabored  Abdomen: Bowel sounds present. Soft, nontender, nondistended. Extremities: No cyanosis or clubbing. No edema  Skin:     No rashes and normal coloration. Warm and dry.     Neuro:  CN Result Value Ref Range    Magnesium 2.3 1.8 - 2.4 mg/dL   POCT Glucose    Collection Time: 06/24/22  4:48 PM   Result Value Ref Range    POC Glucose 155 (H) 65 - 100 mg/dL    Performed by: [employer] [address]    POCT Glucose    Collection Time: 06/24/22  9:24 PM   Result Value Ref Range    POC Glucose 145 (H) 65 - 100 mg/dL    Performed by: Kelsey    CBC with Auto Differential    Collection Time: 06/25/22  5:05 AM   Result Value Ref Range    WBC 7.6 4.3 - 11.1 K/uL    RBC 3.14 (L) 4.23 - 5.6 M/uL    Hemoglobin 8.1 (L) 13.6 - 17.2 g/dL    Hematocrit 25.8 (L) 41.1 - 50.3 %    MCV 82.2 79.6 - 97.8 FL    MCH 25.8 (L) 26.1 - 32.9 PG    MCHC 31.4 31.4 - 35.0 g/dL    RDW 15.9 (H) 11.9 - 14.6 %    Platelets 670 369 - 906 K/uL    MPV 13.1 (H) 9.4 - 12.3 FL    nRBC 0.00 0.0 - 0.2 K/uL    Differential Type AUTOMATED      Seg Neutrophils 66 43 - 78 %    Lymphocytes 21 13 - 44 %    Monocytes 10 4.0 - 12.0 %    Eosinophils % 2 0.5 - 7.8 %    Basophils 0 0.0 - 2.0 %    Immature Granulocytes 1 0.0 - 5.0 %    Segs Absolute 5.0 1.7 - 8.2 K/UL    Absolute Lymph # 1.6 0.5 - 4.6 K/UL    Absolute Mono # 0.8 0.1 - 1.3 K/UL    Absolute Eos # 0.2 0.0 - 0.8 K/UL    Basophils Absolute 0.0 0.0 - 0.2 K/UL    Absolute Immature Granulocyte 0.1 0.0 - 0.5 K/UL   Ferritin    Collection Time: 06/25/22  5:05 AM   Result Value Ref Range    Ferritin 242 8 - 388 NG/ML   Transferrin Saturation    Collection Time: 06/25/22  5:05 AM   Result Value Ref Range    Iron 47 35 - 150 ug/dL    TIBC 166 (L) 250 - 450 ug/dL    TRANSFERRIN SATURATION 28 >20 %   TSH    Collection Time: 06/25/22  5:05 AM   Result Value Ref Range    TSH, 3RD GENERATION 2.410 0.358 - 3.740 uIU/mL   Magnesium    Collection Time: 06/25/22  5:05 AM   Result Value Ref Range    Magnesium 2.3 1.8 - 2.4 mg/dL   Basic Metabolic Panel    Collection Time: 06/25/22  5:05 AM   Result Value Ref Range    Sodium 151 (H) 138 - 145 mmol/L    Potassium 3.5 3.5 - 5.1 mmol/L    Chloride 118 (H) 98 - 107 mmol/L    CO2 26 21 - 32 mmol/L    Anion Gap 7 7 - 16 mmol/L    Glucose 121 (H) 65 - 100 mg/dL    BUN 36 (H) 8 - 23 MG/DL    CREATININE 1.50 0.8 - 1.5 MG/DL    GFR  57 (L) >60 ml/min/1.73m2    GFR Non- 48 (L) >60 ml/min/1.73m2    Calcium 9.0 8.3 - 10.4 MG/DL   POCT Glucose    Collection Time: 06/25/22  5:54 AM   Result Value Ref Range    POC Glucose 131 (H) 65 - 100 mg/dL    Performed by: Kelsey    POCT Glucose    Collection Time: 06/25/22 11:13 AM   Result Value Ref Range    POC Glucose 141 (H) 65 - 100 mg/dL    Performed by: Angela        Other Studies:  CTA HEAD NECK W WO CONTRAST    Result Date: 6/21/2022  History: Complains of headache and neck pain and hypertension Comments: CT ANGIOGRAM OF THE NECK AND CT ANGIOGRAM OF THE [place] was obtained following the administration of IV contrast. IV contrast was administered to evaluate the arterial vasculature. Reformatted images in the coronal and sagittal planes as well as 3-D imaging was obtained and reviewed on a dedicated PACS and [address] Radiation reduction dose techniques were used for the study. Our CT scanner use one or all of the following- Automated exposure control, adjustment of the mA and/or KV according the patient size, iterative reconstruction. All measurements are based upon NASCET criteria if appropriate. This study was analyzed by the [address]Algorithm Findings: CT ANGIOGRAM OF THE NECK: The arch demonstrates atherosclerotic changes. Within the proximal left subclavian artery there is a soft tissue filling defect. This may represent noncalcified plaque disease or thromboemboli. This may place the patient at additional risk for distal emboli in the left upper extremity and/or of the left vertebral artery The right left carotid bulbs demonstrate eccentric calcified and noncalcified plaque disease. Proximally 50% narrowing is noted on the right and less than 50% on the left.  The proximal vertebral arteries are patent with at least moderate stenosis noted at the origin on the left. The lung apices are clear with the exception of a mild scarring in the medial aspect of the right upper lobe. The thyroid gland is unremarkable. CT angiogram of [place]: The petrous, cavernous, and supraclinoid internal carotid arteries are patent with diffuse atherosclerotic changes. Moderate narrowing is noted of the clinoid portion of the right internal carotid artery. Anterior circulation and middle cerebral arteries are patent. The distal vertebral arteries posterior inferior cerebellar arteries basilar artery superior cerebellar arteries and posterior cerebral arteries are patent. The dural venous sinuses are patent. 1. Small focus of filling defect noted within the proximal left subclavian artery which may represent noncalcified plaque disease or thromboembolic disease. This may place the patient at additional risk for distal emboli either within the left upper extremity or the left vertebral artery. CT HEAD WO CONTRAST    Result Date: 6/20/2022  EXAM: CT HEAD WO CONTRAST HISTORY:  Reason for exam:->sudden onset HA, bp 260/110. TECHNIQUE: Axial images of the brain were performed without the administration of intravenous contrast. Images were obtained axial plane and coronal reformatted images were submitted. Dose reduction technique used: Automated exposure control/Adjustment of the mA and/or kV according to patient size/Use of iterative reconstruction technique. COMPARISON: MRI dated 1/26/2017 FINDINGS: There is hyperdense hemorrhage noted adjacent to the brainstem, at the level of the foramen magnum. There is no appreciable intracranial parenchymal hemorrhage. There is no midline shift, or mass effect. There are mild chronic appearing microangiopathic changes within the periventricular white matter. No evidence of acute confluent territorial infarction.  Ventricles and basal cisterns are patent and symmetric. There is mild generalized volume loss. Visualized paranasal sinuses and mastoid air cells are without significant fluid. Scalp, soft tissues, and calvarium are within normal limits. Findings described above suggest subarachnoid hemorrhage adjacent to the the brainstem, at the level of the foramen magnum. This does not appear to represent intraparenchymal hemorrhage. This case was discussed with the ordering physician at the time of interpretation. XR CHEST PORTABLE    Result Date: 6/20/2022  EXAM: XR CHEST PORTABLE HISTORY: evaluate. TECHNIQUE: Frontal chest. COMPARISON: 5/9/2016 FINDINGS: The cardiac silhouette, mediastinum, and pulmonary vasculature are within normal limits. There is no consolidation, pleural effusion, or pneumothorax. No significant osseous abnormalities are observed. No evidence of an acute intrathoracic process.        Current Meds:  Current Facility-Administered Medications   Medication Dose Route Frequency    LORazepam (ATIVAN) injection 0.5 mg  0.5 mg IntraVENous Once PRN    hydrALAZINE (APRESOLINE) injection 10 mg  10 mg IntraVENous Q6H PRN    labetalol (NORMODYNE;TRANDATE) injection 20 mg  20 mg IntraVENous Q4H PRN    losartan (COZAAR) tablet 50 mg  50 mg Oral Daily    amLODIPine (NORVASC) tablet 10 mg  10 mg Oral Daily    [Held by provider] labetalol (NORMODYNE) tablet 200 mg  200 mg Oral 2 times per day    polyethylene glycol (GLYCOLAX) packet 17 g  17 g Oral Daily    hydrALAZINE (APRESOLINE) tablet 50 mg  50 mg Oral 3 times per day    oxyCODONE (ROXICODONE) immediate release tablet 5 mg  5 mg Oral Q6H PRN    citalopram (CELEXA) tablet 40 mg  40 mg Oral Daily    atorvastatin (LIPITOR) tablet 20 mg  20 mg Oral Daily    sodium chloride flush 0.9 % injection 5-40 mL  5-40 mL IntraVENous 2 times per day    sodium chloride flush 0.9 % injection 5-40 mL  5-40 mL IntraVENous PRN    0.9 % sodium chloride infusion   IntraVENous PRN    ondansetron (ZOFRAN-ODT) disintegrating tablet 4 mg  4 mg Oral Q8H PRN    Or    ondansetron (ZOFRAN) injection 4 mg  4 mg IntraVENous Q6H PRN    polyethylene glycol (GLYCOLAX) packet 17 g  17 g Oral Daily PRN    acetaminophen (TYLENOL) tablet 650 mg  650 mg Oral Q6H PRN    Or    acetaminophen (TYLENOL) suppository 650 mg  650 mg Rectal Q6H PRN    nicotine (NICODERM CQ) 14 MG/24HR 1 patch  1 patch TransDERmal Daily    glucose chewable tablet 16 g  4 tablet Oral PRN    dextrose bolus 10% 125 mL  125 mL IntraVENous PRN    Or    dextrose bolus 10% 250 mL  250 mL IntraVENous PRN    glucagon injection 1 mg  1 mg IntraMUSCular PRN    dextrose 5 % solution  100 mL/hr IntraVENous PRN    insulin lispro (HUMALOG) injection vial 0-8 Units  0-8 Units SubCUTAneous TID WC    insulin lispro (HUMALOG) injection vial 0-4 Units  0-4 Units SubCUTAneous Nightly    pantoprazole (PROTONIX) 40 mg in sodium chloride (PF) 10 mL injection  40 mg IntraVENous BID       Discharge Plan:     Days: 1-2 days. OT/PT eval pending  PPD Ordered: 6/21    Signed:  Lora Ratliff MD    Part of this note may have been written by using a voice dictation software. The note has been proof read but may still contain some grammatical/other typographical errors.

## 2022-06-25 NOTE — PROGRESS NOTES
Date of Outreach Update:  Murphy Bond was seen and assessed. @[facility]([id number])@  Vitals:    06/24/22 1746 06/24/22 1941 06/24/22 2000 06/24/22 2051   BP: (!) 175/53  (!) 175/51 (!) 174/54   Pulse:   53 76   Resp:   16    Temp:   98.4 °F (36.9 °C)    TempSrc:   Axillary    SpO2:  95%     Weight:       Height:            Previous Outreach assessment has been reviewed. Alert and oriented, room air, CTA, respirations even and unlabored. BP elevated, RN at bedside treated with PRNs as well scheduled meds. Will continue to follow up per outreach protocol.     Signed By:   Naeem Eaton RN    June 24, 2022 9:54 PM

## 2022-06-25 NOTE — PROGRESS NOTES
Date of Outreach Update:  Murphy Bond was seen and assessed. Previous Outreach assessment has been reviewed. There have been no significant clinical changes since the completion of the last dated Outreach assessment. Will continue to follow up per outreach protocol.     Signed By:   Collette Lucero RN    June 25, 2022 4:46 PM

## 2022-06-25 NOTE — PROGRESS NOTES
Date of Outreach Update:  Gagan Lovett was seen and assessed. @[facility]([id number])@  Vitals:    06/24/22 2224 06/25/22 0000 06/25/22 0315 06/25/22 0419   BP: (!) 166/63 (!) 171/55 (!) 163/52 (!) 156/81   Pulse:  66 60 68   Resp:  20     Temp:  98.2 °F (36.8 °C)     TempSrc:  Oral     SpO2:  94%     Weight:       Height:              Previous Outreach assessment has been reviewed. There have been no significant clinical changes since the completion of the last dated Outreach assessment. BP been uncontrolled, last one 156/81. Hydralazine seems to be the most effective. Pt alert with some confusion. Wife at bedside, no further concerns. Will continue to follow up per outreach protocol.     Signed By:   Zacarias Estrada RN    June 25, 2022 5:04 AM

## 2022-06-25 NOTE — PROGRESS NOTES
Neurology Progress Note      Diagnosis/CC:  SAH with intraventricular extension. Patient Active Problem List   Diagnosis    Proteinuria    Hematuria, unspecified    Pure hypercholesterolemia    Acute, but ill-defined, cerebrovascular disease    Recurrent major depressive disorder, in full remission ([facility]    Gross hematuria    Chronic kidney disease, unspecified    Urinary tract infection, site not specified    Coronary atherosclerosis of native coronary vessel    Microscopic hematuria    Tobacco use disorder    Type II or unspecified type diabetes mellitus with neurological manifestations, not stated as uncontrolled(250.60)    Essential tremor    Anemia, unspecified    Essential hypertension, benign    History of kidney cancer    Renal sclerosis, unspecified    CKD (chronic kidney disease) stage 4, GFR 15-29 ml/min (HCC)    Respiratory insufficiency    SAH (subarachnoid hemorrhage) (HCC)    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HCC)    Dementia ([facility]    Type 2 diabetes mellitus ([facility]    Acute blood loss anemia (ABLA)    Acute upper gastrointestinal bleeding    Bradycardia, sinus    Acute respiratory failure with hypoxia (HCC)       Subjective:  No new complaints.     Objective:    Medications    Scheduled Medications   losartan  50 mg Oral Daily    amLODIPine  10 mg Oral Daily    [Held by provider] labetalol  200 mg Oral 2 times per day    polyethylene glycol  17 g Oral Daily    hydrALAZINE  50 mg Oral 3 times per day    citalopram  40 mg Oral Daily    atorvastatin  20 mg Oral Daily    sodium chloride flush  5-40 mL IntraVENous 2 times per day    nicotine  1 patch TransDERmal Daily    insulin lispro  0-8 Units SubCUTAneous TID WC    insulin lispro  0-4 Units SubCUTAneous Nightly    pantoprazole (PROTONIX) 40 mg injection  40 mg IntraVENous BID       PRN Medications  hydrALAZINE, labetalol, oxyCODONE, sodium chloride flush, sodium chloride, ondansetron **OR** ondansetron, polyethylene glycol, acetaminophen **OR** acetaminophen, glucose, dextrose bolus **OR** dextrose bolus, glucagon (rDNA), dextrose    IV Medications  [unfilled]      Vitals:   Current Range (last 24 hours)  Temperature: 98.2 °F (36.8 °C)   Heart Rate: 82   Resp Rate: 20   Blood Pressure: (!) 162/54 (SCHEDULED MEDS) [unfilled]  Oxygen Sats: 94 %     Appearance: NAD  Chest: respirations non-labored   Ext: no edema, full ROM  Neck: supple without meningeal signs    Neurological examination:  Higher functions:  Alert and oriented to person only. Follows commands fairly well. Cranial nerves: II to XII reviewed in depth and are intact. Motor:   Normal power bulk and tone in all four extremities. Sensory: Intact to light touch    DTR's: plantar response downgoing bilaterally     Cerebellar:  Mild to moderate bilateral finger to nose dystaxia. Gait: deferred;        Imaging : CT brain personally reviewed by me shows a small SAH near the brain stem without midline shift and some intraventricular  Extension. IMPRESSION:  This is an 79year old man with a history of essential tremor, diabetic polyneuropathy, HTN, CKD stage IV, bladder cancer, major depressive disorder, nephrectomy, and prior  PT for generalized weakness and loss of balance in 2016. He was admitted through the ED on 6/21/2022 for hypertensive emergency and SAH with acute  Onset of headache,and severe neck pain. He had a CT of the brain which showed a brainstem adjacent SAH. His INR was normal and  CTA showed no LVO nor aneurysm. His last CT of the brain was on 6/22/22 which showed no new changes. He is lethargic now but arousable and following some commands. Given his persistent incoordination, and the possibility of brainstem ischemia poorly visualized on a CT, I will order an MRI of the brain.

## 2022-06-25 NOTE — PROGRESS NOTES
ICU OUTREACH NURSE PROGRESS REPORT      SUBJECTIVE: Called to assess patient secondary to transfer from critical care. Vitals:    06/25/22 0400 06/25/22 0419 06/25/22 0800 06/25/22 0801   BP: (!) 154/62 (!) 156/81 (!) 162/54    Pulse: 58 68 82    Resp:   20    Temp: 98.4 °F (36.9 °C)  98.2 °F (36.8 °C)    TempSrc: Oral  Oral    SpO2: 95%   94%   Weight:       Height:            ASSESSMENT:  Pt in bed with eyes closed, awakens briefly to voice. Oriented x3, grumpy. On room air. Family at bedside. No needs expressed at this time. PLAN:  Will follow per ICU Outreach protocol.     Claudene Bolster, RN  [phone]

## 2022-06-26 LAB
ANION GAP SERPL CALC-SCNC: 6 MMOL/L (ref 7–16)
ANION GAP SERPL CALC-SCNC: 7 MMOL/L (ref 7–16)
BASOPHILS # BLD: 0 K/UL (ref 0–0.2)
BASOPHILS NFR BLD: 1 % (ref 0–2)
BUN SERPL-MCNC: 32 MG/DL (ref 8–23)
BUN SERPL-MCNC: 35 MG/DL (ref 8–23)
CALCIUM SERPL-MCNC: 8.7 MG/DL (ref 8.3–10.4)
CALCIUM SERPL-MCNC: 8.8 MG/DL (ref 8.3–10.4)
CHLORIDE SERPL-SCNC: 114 MMOL/L (ref 98–107)
CHLORIDE SERPL-SCNC: 118 MMOL/L (ref 98–107)
CO2 SERPL-SCNC: 26 MMOL/L (ref 21–32)
CO2 SERPL-SCNC: 26 MMOL/L (ref 21–32)
CREAT SERPL-MCNC: 1.6 MG/DL (ref 0.8–1.5)
CREAT SERPL-MCNC: 1.8 MG/DL (ref 0.8–1.5)
DIFFERENTIAL METHOD BLD: ABNORMAL
EOSINOPHIL # BLD: 0.1 K/UL (ref 0–0.8)
EOSINOPHIL NFR BLD: 2 % (ref 0.5–7.8)
ERYTHROCYTE [DISTWIDTH] IN BLOOD BY AUTOMATED COUNT: 15.9 % (ref 11.9–14.6)
GLUCOSE BLD STRIP.AUTO-MCNC: 143 MG/DL (ref 65–100)
GLUCOSE BLD STRIP.AUTO-MCNC: 163 MG/DL (ref 65–100)
GLUCOSE BLD STRIP.AUTO-MCNC: 215 MG/DL (ref 65–100)
GLUCOSE BLD STRIP.AUTO-MCNC: 289 MG/DL (ref 65–100)
GLUCOSE SERPL-MCNC: 126 MG/DL (ref 65–100)
GLUCOSE SERPL-MCNC: 218 MG/DL (ref 65–100)
HCT VFR BLD AUTO: 28.4 % (ref 41.1–50.3)
HGB BLD-MCNC: 9 G/DL (ref 13.6–17.2)
IMM GRANULOCYTES # BLD AUTO: 0.1 K/UL (ref 0–0.5)
IMM GRANULOCYTES NFR BLD AUTO: 1 % (ref 0–5)
LYMPHOCYTES # BLD: 1.3 K/UL (ref 0.5–4.6)
LYMPHOCYTES NFR BLD: 16 % (ref 13–44)
MAGNESIUM SERPL-MCNC: 2.4 MG/DL (ref 1.8–2.4)
MCH RBC QN AUTO: 26.1 PG (ref 26.1–32.9)
MCHC RBC AUTO-ENTMCNC: 31.7 G/DL (ref 31.4–35)
MCV RBC AUTO: 82.3 FL (ref 79.6–97.8)
MONOCYTES # BLD: 0.9 K/UL (ref 0.1–1.3)
MONOCYTES NFR BLD: 11 % (ref 4–12)
NEUTS SEG # BLD: 5.5 K/UL (ref 1.7–8.2)
NEUTS SEG NFR BLD: 69 % (ref 43–78)
NRBC # BLD: 0.02 K/UL (ref 0–0.2)
PLATELET # BLD AUTO: 226 K/UL (ref 150–450)
PMV BLD AUTO: 12.4 FL (ref 9.4–12.3)
POTASSIUM SERPL-SCNC: 3.6 MMOL/L (ref 3.5–5.1)
POTASSIUM SERPL-SCNC: 3.7 MMOL/L (ref 3.5–5.1)
RBC # BLD AUTO: 3.45 M/UL (ref 4.23–5.6)
SERVICE CMNT-IMP: ABNORMAL
SODIUM SERPL-SCNC: 147 MMOL/L (ref 138–145)
SODIUM SERPL-SCNC: 150 MMOL/L (ref 138–145)
WBC # BLD AUTO: 7.9 K/UL (ref 4.3–11.1)

## 2022-06-26 PROCEDURE — 85025 COMPLETE CBC W/AUTO DIFF WBC: CPT

## 2022-06-26 PROCEDURE — C9113 INJ PANTOPRAZOLE SODIUM, VIA: HCPCS | Performed by: INTERNAL MEDICINE

## 2022-06-26 PROCEDURE — 1100000003 HC PRIVATE W/ TELEMETRY

## 2022-06-26 PROCEDURE — 36415 COLL VENOUS BLD VENIPUNCTURE: CPT

## 2022-06-26 PROCEDURE — 6370000000 HC RX 637 (ALT 250 FOR IP): Performed by: FAMILY MEDICINE

## 2022-06-26 PROCEDURE — 6370000000 HC RX 637 (ALT 250 FOR IP): Performed by: HOSPITALIST

## 2022-06-26 PROCEDURE — 2580000003 HC RX 258: Performed by: FAMILY MEDICINE

## 2022-06-26 PROCEDURE — 83735 ASSAY OF MAGNESIUM: CPT

## 2022-06-26 PROCEDURE — 80048 BASIC METABOLIC PNL TOTAL CA: CPT

## 2022-06-26 PROCEDURE — 6360000002 HC RX W HCPCS: Performed by: INTERNAL MEDICINE

## 2022-06-26 PROCEDURE — 82962 GLUCOSE BLOOD TEST: CPT

## 2022-06-26 PROCEDURE — 6360000002 HC RX W HCPCS: Performed by: HOSPITALIST

## 2022-06-26 PROCEDURE — A4216 STERILE WATER/SALINE, 10 ML: HCPCS | Performed by: INTERNAL MEDICINE

## 2022-06-26 PROCEDURE — 2580000003 HC RX 258: Performed by: INTERNAL MEDICINE

## 2022-06-26 PROCEDURE — 6370000000 HC RX 637 (ALT 250 FOR IP): Performed by: INTERNAL MEDICINE

## 2022-06-26 PROCEDURE — 2580000003 HC RX 258: Performed by: HOSPITALIST

## 2022-06-26 RX ORDER — DEXTROSE MONOHYDRATE 50 MG/ML
INJECTION, SOLUTION INTRAVENOUS CONTINUOUS
Status: DISCONTINUED | OUTPATIENT
Start: 2022-06-26 | End: 2022-06-27

## 2022-06-26 RX ORDER — LOSARTAN POTASSIUM 50 MG/1
100 TABLET ORAL DAILY
Status: DISCONTINUED | OUTPATIENT
Start: 2022-06-26 | End: 2022-06-30 | Stop reason: HOSPADM

## 2022-06-26 RX ADMIN — HYDRALAZINE HYDROCHLORIDE 10 MG: 20 INJECTION INTRAMUSCULAR; INTRAVENOUS at 00:27

## 2022-06-26 RX ADMIN — SODIUM CHLORIDE 40 MG: 9 INJECTION INTRAMUSCULAR; INTRAVENOUS; SUBCUTANEOUS at 16:31

## 2022-06-26 RX ADMIN — HYDRALAZINE HYDROCHLORIDE 50 MG: 50 TABLET, FILM COATED ORAL at 13:10

## 2022-06-26 RX ADMIN — HYDRALAZINE HYDROCHLORIDE 50 MG: 50 TABLET, FILM COATED ORAL at 05:24

## 2022-06-26 RX ADMIN — INSULIN LISPRO 4 UNITS: 100 INJECTION, SOLUTION INTRAVENOUS; SUBCUTANEOUS at 12:05

## 2022-06-26 RX ADMIN — HYDRALAZINE HYDROCHLORIDE 50 MG: 50 TABLET, FILM COATED ORAL at 23:25

## 2022-06-26 RX ADMIN — SODIUM CHLORIDE, PRESERVATIVE FREE 10 ML: 5 INJECTION INTRAVENOUS at 09:07

## 2022-06-26 RX ADMIN — AMLODIPINE BESYLATE 10 MG: 10 TABLET ORAL at 09:06

## 2022-06-26 RX ADMIN — SODIUM CHLORIDE, PRESERVATIVE FREE 10 ML: 5 INJECTION INTRAVENOUS at 00:27

## 2022-06-26 RX ADMIN — SODIUM CHLORIDE 40 MG: 9 INJECTION INTRAMUSCULAR; INTRAVENOUS; SUBCUTANEOUS at 09:07

## 2022-06-26 RX ADMIN — OXYCODONE 5 MG: 5 TABLET ORAL at 23:25

## 2022-06-26 RX ADMIN — POLYETHYLENE GLYCOL 3350 17 G: 17 POWDER, FOR SOLUTION ORAL at 09:06

## 2022-06-26 RX ADMIN — DEXTROSE MONOHYDRATE: 5 INJECTION, SOLUTION INTRAVENOUS at 09:08

## 2022-06-26 RX ADMIN — CITALOPRAM HYDROBROMIDE 40 MG: 20 TABLET ORAL at 09:06

## 2022-06-26 RX ADMIN — INSULIN LISPRO 5 UNITS: 100 INJECTION, SOLUTION INTRAVENOUS; SUBCUTANEOUS at 16:31

## 2022-06-26 RX ADMIN — LOSARTAN POTASSIUM 100 MG: 50 TABLET, FILM COATED ORAL at 09:06

## 2022-06-26 RX ADMIN — ATORVASTATIN CALCIUM 20 MG: 10 TABLET, FILM COATED ORAL at 09:06

## 2022-06-26 RX ADMIN — SODIUM CHLORIDE, PRESERVATIVE FREE 10 ML: 5 INJECTION INTRAVENOUS at 23:26

## 2022-06-26 ASSESSMENT — PAIN DESCRIPTION - LOCATION: LOCATION: BACK;GENERALIZED

## 2022-06-26 ASSESSMENT — PAIN SCALES - GENERAL
PAINLEVEL_OUTOF10: 5
PAINLEVEL_OUTOF10: 0

## 2022-06-26 ASSESSMENT — PAIN DESCRIPTION - ORIENTATION: ORIENTATION: POSTERIOR

## 2022-06-26 ASSESSMENT — PAIN DESCRIPTION - PAIN TYPE: TYPE: CHRONIC PAIN

## 2022-06-26 ASSESSMENT — PAIN DESCRIPTION - ONSET: ONSET: ON-GOING

## 2022-06-26 ASSESSMENT — PAIN DESCRIPTION - DESCRIPTORS: DESCRIPTORS: ACHING

## 2022-06-26 ASSESSMENT — PAIN - FUNCTIONAL ASSESSMENT: PAIN_FUNCTIONAL_ASSESSMENT: PREVENTS OR INTERFERES SOME ACTIVE ACTIVITIES AND ADLS

## 2022-06-26 ASSESSMENT — PAIN DESCRIPTION - FREQUENCY: FREQUENCY: INTERMITTENT

## 2022-06-26 NOTE — CARE COORDINATION
LMSW follow up with pt and family at bedside about rehab options. Reviewed SNF list and chose referrals to [address] [place] [address] as first choice but also agreed to be referred to [facility], [facility] and [facility] if [address] [place] [address] is not available to pt. Referrals placed as requested. Await bed offers.

## 2022-06-26 NOTE — PROGRESS NOTES
Goal SBP <160  - Continue home Cozaar, can increase dose if needed  - Holding Labetalol with bradycardia overnight. - weaned off Cardene gtt. Add Norvasc and Hydralazine po  - Hydralazine IV prn  - Appreciate Neurology's input and assistance  - PT/OT  - MRI of the brain shows no evidence of acute infarction. Intraventricular hemorrhage, subarachnoid bleed adjacent to the brainstem noted. Chronic infarcts and remote insults. Avoid antiplatelet agents for now. Active Problems:  Acute Blood Loss Anemia  Acute Upper GI Bleed  - Likely due to UGIB  - 6/20 Hgb 11.3 but trended down to ~9 on 6/21 with large coffee ground emesis. Emesis is hemoccult positive  - Continue PPI BID  - GI has seen and signed off on 6/22. No further emesis or evidence of GIB. Poor candidate for endoscopy with recent SAH.   - Hgb stable ~7-8.   -Hemoglobin stable at 9 this morning. Hypernatremia:  Patient with very poor p.o. intake. Sodium of 150 this morning. Add D5 containing fluids at a rate of 50 mL/h. Essential hypertension, benign  - Off Cardene gtt since 6/22  -Increased dose of losartan to 100 mg p.o. daily. Continue amlodipine, hydralazine.  - Holding Labetalol with bradycardia   - Hydralazine IV prn  - Goal SBP <160    Sinus bradycardia  - 6/23 telemetry reported bradycardia down to 20's-30's per RN.  - Holding Labetalol  - TSH is 2.41.  - Keep Mag>2, K>4    Acute respiratory failure with hypoxia  - RR in 30's at times with O2 sat 86% on 6/22 and had to be placed on 7LO2NC. Has been weaned down to RA. Suspect 2/2 anemia. CXR also shows atelectasis. 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 162/57 --   06/26/22 0400 97.5 °F (36.4 °C) 75 18 (!) 162/57 --   06/26/22 0130 -- 71 18 (!) 157/67 --   06/26/22 0000 98.1 °F (36.7 °C) 58 17 (!) 163/55 99 %   06/25/22 2107 -- -- -- (!) 157/59 --   06/25/22 2000 98.1 °F (36.7 °C) 62 18 (!) 160/55 95 %   06/25/22 1910 -- -- -- (!) 160/55 --   06/25/22 1832 -- -- -- (!) 177/60 --   06/25/22 1800 -- 64 -- (!) 167/62 --   06/25/22 1615 -- 65 -- (!) 159/48 --   06/25/22 1530 -- 63 -- (!) 180/43 --   06/25/22 1457 -- -- -- (!) 170/65 --   06/25/22 1300 -- -- -- (!) 152/52 --   06/25/22 1211 -- -- -- -- 91 %   06/25/22 1200 97.7 °F (36.5 °C) 59 19 (!) 167/47 --       Oxygen Therapy  SpO2: 96 %  Pulse Oximetry Type: Continuous  Pulse via Oximetry: 64 beats per minute  Pulse Oximeter Device Mode: Continuous  Pulse Oximeter Device Location: Left,Finger  O2 Device: None (Room air)  Oximetry Probe Site Changed: No  Skin Assessment: Clean, dry, & intact  Skin Protection for O2 Device: N/A  Intervention(s): Reposition Device (placed in mouth due to pt being a mouth breather)  O2 Flow Rate (L/min): 4 L/min    Estimated body mass index is 25.95 kg/m² as calculated from the following:    Height as of this encounter: 5' 9\" (1.753 m). Weight as of this encounter: 175 lb 11.3 oz (79.7 kg). Intake/Output Summary (Last 24 hours) at 6/26/2022 1113  Last data filed at 6/26/2022 0800  Gross per 24 hour   Intake 380 ml   Output 1225 ml   Net -845 ml         Physical Exam:   Blood pressure (!) 147/59, pulse 72, temperature 98.4 °F (36.9 °C), temperature source Axillary, resp. rate 18, height 5' 9\" (1.753 m), weight 175 lb 11.3 oz (79.7 kg), SpO2 96 %. General:    Well nourished. No overt distress. Hard of hearing. Head:  Normocephalic, atraumatic  Eyes:  Sclerae appear normal.  Pupils equally round. ENT:  Nares appear normal, no drainage. Moist oral mucosa  Neck:  No restricted ROM. Trachea midline   CV:   RRR. No m/r/g. No jugular venous distension. Lungs:   CTAB.   No wheezing, rhonchi, or rales. Respirations even, unlabored  Abdomen: Bowel sounds present. Soft, nontender, nondistended. Extremities: No cyanosis or clubbing. No edema  Skin:     No rashes and normal coloration. Warm and dry. Neuro:  CN II-XII grossly intact. Sensation intact. Strength 5/5 b/l. Alert and oriented x3. Psych:  Normal mood and affect.       I have reviewed ordered lab tests and independently visualized imaging below:    Recent Labs:  Recent Results (from the past 48 hour(s))   Magnesium    Collection Time: 06/24/22  3:47 PM   Result Value Ref Range    Magnesium 2.3 1.8 - 2.4 mg/dL   POCT Glucose    Collection Time: 06/24/22  4:48 PM   Result Value Ref Range    POC Glucose 155 (H) 65 - 100 mg/dL    Performed by: [employer]    POCT Glucose    Collection Time: 06/24/22  9:24 PM   Result Value Ref Range    POC Glucose 145 (H) 65 - 100 mg/dL    Performed by: Kelsey    CBC with Auto Differential    Collection Time: 06/25/22  5:05 AM   Result Value Ref Range    WBC 7.6 4.3 - 11.1 K/uL    RBC 3.14 (L) 4.23 - 5.6 M/uL    Hemoglobin 8.1 (L) 13.6 - 17.2 g/dL    Hematocrit 25.8 (L) 41.1 - 50.3 %    MCV 82.2 79.6 - 97.8 FL    MCH 25.8 (L) 26.1 - 32.9 PG    MCHC 31.4 31.4 - 35.0 g/dL    RDW 15.9 (H) 11.9 - 14.6 %    Platelets 589 008 - 240 K/uL    MPV 13.1 (H) 9.4 - 12.3 FL    nRBC 0.00 0.0 - 0.2 K/uL    Differential Type AUTOMATED      Seg Neutrophils 66 43 - 78 %    Lymphocytes 21 13 - 44 %    Monocytes 10 4.0 - 12.0 %    Eosinophils % 2 0.5 - 7.8 %    Basophils 0 0.0 - 2.0 %    Immature Granulocytes 1 0.0 - 5.0 %    Segs Absolute 5.0 1.7 - 8.2 K/UL    Absolute Lymph # 1.6 0.5 - 4.6 K/UL    Absolute Mono # 0.8 0.1 - 1.3 K/UL    Absolute Eos # 0.2 0.0 - 0.8 K/UL    Basophils Absolute 0.0 0.0 - 0.2 K/UL    Absolute Immature Granulocyte 0.1 0.0 - 0.5 K/UL   Ferritin    Collection Time: 06/25/22  5:05 AM   Result Value Ref Range    Ferritin 242 8 - 388 NG/ML   Transferrin Saturation    Collection Time: 06/25/22  5:05 AM   Result Value Ref Range    Iron 47 35 - 150 ug/dL    TIBC 166 (L) 250 - 450 ug/dL    TRANSFERRIN SATURATION 28 >20 %   TSH    Collection Time: 06/25/22  5:05 AM   Result Value Ref Range    TSH, 3RD GENERATION 2.410 0.358 - 3.740 uIU/mL   Magnesium    Collection Time: 06/25/22  5:05 AM   Result Value Ref Range    Magnesium 2.3 1.8 - 2.4 mg/dL   Basic Metabolic Panel    Collection Time: 06/25/22  5:05 AM   Result Value Ref Range    Sodium 151 (H) 138 - 145 mmol/L    Potassium 3.5 3.5 - 5.1 mmol/L    Chloride 118 (H) 98 - 107 mmol/L    CO2 26 21 - 32 mmol/L    Anion Gap 7 7 - 16 mmol/L    Glucose 121 (H) 65 - 100 mg/dL    BUN 36 (H) 8 - 23 MG/DL    CREATININE 1.50 0.8 - 1.5 MG/DL    GFR  57 (L) >60 ml/min/1.73m2    GFR Non- 48 (L) >60 ml/min/1.73m2    Calcium 9.0 8.3 - 10.4 MG/DL   POCT Glucose    Collection Time: 06/25/22  5:54 AM   Result Value Ref Range    POC Glucose 131 (H) 65 - 100 mg/dL    Performed by: Kelsey    POCT Glucose    Collection Time: 06/25/22 11:13 AM   Result Value Ref Range    POC Glucose 141 (H) 65 - 100 mg/dL    Performed by: [facility]    POCT Glucose    Collection Time: 06/25/22  3:48 PM   Result Value Ref Range    POC Glucose 157 (H) 65 - 100 mg/dL    Performed by: [facility]    POCT Glucose    Collection Time: 06/25/22  8:51 PM   Result Value Ref Range    POC Glucose 140 (H) 65 - 100 mg/dL    Performed by: Kirill    CBC with Auto Differential    Collection Time: 06/26/22  5:49 AM   Result Value Ref Range    WBC 7.9 4.3 - 11.1 K/uL    RBC 3.45 (L) 4.23 - 5.6 M/uL    Hemoglobin 9.0 (L) 13.6 - 17.2 g/dL    Hematocrit 28.4 (L) 41.1 - 50.3 %    MCV 82.3 79.6 - 97.8 FL    MCH 26.1 26.1 - 32.9 PG    MCHC 31.7 31.4 - 35.0 g/dL    RDW 15.9 (H) 11.9 - 14.6 %    Platelets 520 285 - 767 K/uL    MPV 12.4 (H) 9.4 - 12.3 FL    nRBC 0.02 0.0 - 0.2 K/uL    Differential Type AUTOMATED      Seg Neutrophils 69 43 - 78 % Lymphocytes 16 13 - 44 %    Monocytes 11 4.0 - 12.0 %    Eosinophils % 2 0.5 - 7.8 %    Basophils 1 0.0 - 2.0 %    Immature Granulocytes 1 0.0 - 5.0 %    Segs Absolute 5.5 1.7 - 8.2 K/UL    Absolute Lymph # 1.3 0.5 - 4.6 K/UL    Absolute Mono # 0.9 0.1 - 1.3 K/UL    Absolute Eos # 0.1 0.0 - 0.8 K/UL    Basophils Absolute 0.0 0.0 - 0.2 K/UL    Absolute Immature Granulocyte 0.1 0.0 - 0.5 K/UL   Basic Metabolic Panel    Collection Time: 06/26/22  5:49 AM   Result Value Ref Range    Sodium 150 (H) 138 - 145 mmol/L    Potassium 3.6 3.5 - 5.1 mmol/L    Chloride 118 (H) 98 - 107 mmol/L    CO2 26 21 - 32 mmol/L    Anion Gap 6 (L) 7 - 16 mmol/L    Glucose 126 (H) 65 - 100 mg/dL    BUN 32 (H) 8 - 23 MG/DL    CREATININE 1.60 (H) 0.8 - 1.5 MG/DL    GFR  53 (L) >60 ml/min/1.73m2    GFR Non- 44 (L) >60 ml/min/1.73m2    Calcium 8.8 8.3 - 10.4 MG/DL   Magnesium    Collection Time: 06/26/22  5:49 AM   Result Value Ref Range    Magnesium 2.4 1.8 - 2.4 mg/dL   POCT Glucose    Collection Time: 06/26/22  6:54 AM   Result Value Ref Range    POC Glucose 143 (H) 65 - 100 mg/dL    Performed by: Kirill    POCT Glucose    Collection Time: 06/26/22 10:38 AM   Result Value Ref Range    POC Glucose 289 (H) 65 - 100 mg/dL    Performed by: Delroy        Other Studies:  CTA HEAD NECK W WO CONTRAST    Result Date: 6/21/2022  History: Complains of headache and neck pain and hypertension Comments: CT ANGIOGRAM OF THE NECK AND CT ANGIOGRAM OF THE [place] was obtained following the administration of IV contrast. IV contrast was administered to evaluate the arterial vasculature. Reformatted images in the coronal and sagittal planes as well as 3-D imaging was obtained and reviewed on a dedicated PACS and [address]. Radiation reduction dose techniques were used for the study.  Our CT scanner use one or all of the following- Automated exposure control, adjustment of the mA and/or KV according the patient size, iterative reconstruction. All measurements are based upon NASCET criteria if appropriate. This study was analyzed by the [facility] [address]Algorithm Findings: CT ANGIOGRAM OF THE NECK: The arch demonstrates atherosclerotic changes. Within the proximal left subclavian artery there is a soft tissue filling defect. This may represent noncalcified plaque disease or thromboemboli. This may place the patient at additional risk for distal emboli in the left upper extremity and/or of the left vertebral artery The right left carotid bulbs demonstrate eccentric calcified and noncalcified plaque disease. Proximally 50% narrowing is noted on the right and less than 50% on the left. The proximal vertebral arteries are patent with at least moderate stenosis noted at the origin on the left. The lung apices are clear with the exception of a mild scarring in the medial aspect of the right upper lobe. The thyroid gland is unremarkable. CT angiogram of [place]: The petrous, cavernous, and supraclinoid internal carotid arteries are patent with diffuse atherosclerotic changes. Moderate narrowing is noted of the clinoid portion of the right internal carotid artery. Anterior circulation and middle cerebral arteries are patent. The distal vertebral arteries posterior inferior cerebellar arteries basilar artery superior cerebellar arteries and posterior cerebral arteries are patent. The dural venous sinuses are patent. 1. Small focus of filling defect noted within the proximal left subclavian artery which may represent noncalcified plaque disease or thromboembolic disease. This may place the patient at additional risk for distal emboli either within the left upper extremity or the left vertebral artery. CT HEAD WO CONTRAST    Result Date: 6/20/2022  EXAM: CT HEAD WO CONTRAST HISTORY:  Reason for exam:->sudden onset HA, bp 260/110.   TECHNIQUE: Axial images of the brain were performed without the administration of intravenous contrast. Images were obtained axial plane and coronal reformatted images were submitted. Dose reduction technique used: Automated exposure control/Adjustment of the mA and/or kV according to patient size/Use of iterative reconstruction technique. COMPARISON: MRI dated 1/26/2017 FINDINGS: There is hyperdense hemorrhage noted adjacent to the brainstem, at the level of the foramen magnum. There is no appreciable intracranial parenchymal hemorrhage. There is no midline shift, or mass effect. There are mild chronic appearing microangiopathic changes within the periventricular white matter. No evidence of acute confluent territorial infarction. Ventricles and basal cisterns are patent and symmetric. There is mild generalized volume loss. Visualized paranasal sinuses and mastoid air cells are without significant fluid. Scalp, soft tissues, and calvarium are within normal limits. Findings described above suggest subarachnoid hemorrhage adjacent to the the brainstem, at the level of the foramen magnum. This does not appear to represent intraparenchymal hemorrhage. This case was discussed with the ordering physician at the time of interpretation. XR CHEST PORTABLE    Result Date: 6/20/2022  EXAM: XR CHEST PORTABLE HISTORY: evaluate. TECHNIQUE: Frontal chest. COMPARISON: 5/9/2016 FINDINGS: The cardiac silhouette, mediastinum, and pulmonary vasculature are within normal limits. There is no consolidation, pleural effusion, or pneumothorax. No significant osseous abnormalities are observed. No evidence of an acute intrathoracic process.        Current Meds:  Current Facility-Administered Medications   Medication Dose Route Frequency    dextrose 5 % solution   IntraVENous Continuous    losartan (COZAAR) tablet 100 mg  100 mg Oral Daily    hydrALAZINE (APRESOLINE) injection 10 mg  10 mg IntraVENous Q6H PRN    labetalol (NORMODYNE;TRANDATE) injection 20 mg  20 mg IntraVENous Q4H PRN    amLODIPine (NORVASC) tablet 10 mg  10 mg Oral Daily    [Held by provider] labetalol (NORMODYNE) tablet 200 mg  200 mg Oral 2 times per day    polyethylene glycol (GLYCOLAX) packet 17 g  17 g Oral Daily    hydrALAZINE (APRESOLINE) tablet 50 mg  50 mg Oral 3 times per day    oxyCODONE (ROXICODONE) immediate release tablet 5 mg  5 mg Oral Q6H PRN    citalopram (CELEXA) tablet 40 mg  40 mg Oral Daily    atorvastatin (LIPITOR) tablet 20 mg  20 mg Oral Daily    sodium chloride flush 0.9 % injection 5-40 mL  5-40 mL IntraVENous 2 times per day    sodium chloride flush 0.9 % injection 5-40 mL  5-40 mL IntraVENous PRN    0.9 % sodium chloride infusion   IntraVENous PRN    ondansetron (ZOFRAN-ODT) disintegrating tablet 4 mg  4 mg Oral Q8H PRN    Or    ondansetron (ZOFRAN) injection 4 mg  4 mg IntraVENous Q6H PRN    polyethylene glycol (GLYCOLAX) packet 17 g  17 g Oral Daily PRN    acetaminophen (TYLENOL) tablet 650 mg  650 mg Oral Q6H PRN    Or    acetaminophen (TYLENOL) suppository 650 mg  650 mg Rectal Q6H PRN    nicotine (NICODERM CQ) 14 MG/24HR 1 patch  1 patch TransDERmal Daily    glucose chewable tablet 16 g  4 tablet Oral PRN    dextrose bolus 10% 125 mL  125 mL IntraVENous PRN    Or    dextrose bolus 10% 250 mL  250 mL IntraVENous PRN    glucagon injection 1 mg  1 mg IntraMUSCular PRN    dextrose 5 % solution  100 mL/hr IntraVENous PRN    insulin lispro (HUMALOG) injection vial 0-8 Units  0-8 Units SubCUTAneous TID WC    insulin lispro (HUMALOG) injection vial 0-4 Units  0-4 Units SubCUTAneous Nightly    pantoprazole (PROTONIX) 40 mg in sodium chloride (PF) 10 mL injection  40 mg IntraVENous BID       Discharge Plan:     Days: 1-2 days. OT/PT eval pending  PPD Ordered: 6/21    Signed:  Lora Ratliff MD    Part of this note may have been written by using a voice dictation software. The note has been proof read but may still contain some grammatical/other typographical errors.

## 2022-06-26 NOTE — PROGRESS NOTES
Neurology Progress Note      Diagnosis/CC:  SAH with intraventricular extension. Patient Active Problem List   Diagnosis    Proteinuria    Hematuria, unspecified    Pure hypercholesterolemia    Acute, but ill-defined, cerebrovascular disease    Recurrent major depressive disorder, in full remission ([facility] [address]    Gross hematuria    Chronic kidney disease, unspecified    Urinary tract infection, site not specified    Coronary atherosclerosis of native coronary vessel    Microscopic hematuria    Tobacco use disorder    Type II or unspecified type diabetes mellitus with neurological manifestations, not stated as uncontrolled(250.60)    Essential tremor    Anemia, unspecified    Essential hypertension, benign    History of kidney cancer    Renal sclerosis, unspecified    CKD (chronic kidney disease) stage 4, GFR 15-29 ml/min (HCC)    Respiratory insufficiency    SAH (subarachnoid hemorrhage) (HCC)    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HCC)    Dementia ([facility]    Type 2 diabetes mellitus ([facility]    Acute blood loss anemia (ABLA)    Acute upper gastrointestinal bleeding    Bradycardia, sinus    Acute respiratory failure with hypoxia (HCC)       Subjective:  No new complaints.     Objective:    Medications    Scheduled Medications   losartan  100 mg Oral Daily    amLODIPine  10 mg Oral Daily    [Held by provider] labetalol  200 mg Oral 2 times per day    polyethylene glycol  17 g Oral Daily    hydrALAZINE  50 mg Oral 3 times per day    citalopram  40 mg Oral Daily    atorvastatin  20 mg Oral Daily    sodium chloride flush  5-40 mL IntraVENous 2 times per day    nicotine  1 patch TransDERmal Daily    insulin lispro  0-8 Units SubCUTAneous TID WC    insulin lispro  0-4 Units SubCUTAneous Nightly    pantoprazole (PROTONIX) 40 mg injection  40 mg IntraVENous BID       PRN Medications  hydrALAZINE, labetalol, oxyCODONE, sodium chloride flush, sodium chloride, ondansetron **OR** ondansetron, polyethylene glycol, acetaminophen **OR** acetaminophen, glucose, dextrose bolus **OR** dextrose bolus, glucagon (rDNA), dextrose    IV Medications  [unfilled]      Vitals:   Current Range (last 24 hours)  Temperature: 97.5 °F (36.4 °C)   Heart Rate: 75   Resp Rate: 18   Blood Pressure: (!) 162/57   Oxygen Sats: 99 %     Appearance: NAD  Chest: respirations non-labored   Ext: no edema, full ROM  Neck: supple without meningeal signs    Neurological examination:  Higher functions:  Alert and oriented to person place and time. Follows commands fairly well. Cranial nerves: II to XII reviewed in depth and are intact. Motor:   Normal power bulk and tone in all four extremities. Sensory: Intact to light touch    DTR's: plantar response downgoing bilaterally     Cerebellar:  Mild to moderate bilateral finger to nose dystaxia. Gait: deferred;        Imaging : CT brain personally reviewed by me shows a small SAH near the brain stem without midline shift and some intraventricular  Extension. IMPRESSION:  This is an 79year old man with a history of essential tremor, diabetic polyneuropathy, HTN, CKD stage IV, bladder cancer, major depressive disorder, nephrectomy, and prior  PT for generalized weakness and loss of balance in 2016. He was admitted through the ED on 6/21/2022 for hypertensive emergency and SAH with acute  Onset of headache,and severe neck pain. He had a CT of the brain which showed a brainstem adjacent SAH. His INR was normal and  CTA showed no LVO nor aneurysm. His last CT of the brain was on 6/22/22 which showed no new changes. He is more alert today and follows commands well. He  Is now oriented X 3. Given his persistent incoordination, and the possibility of brainstem ischemia poorly visualized on a CT, I ordered an MRI of the brain and this demonstrated no acute infarction. His mentation appears to be improved today relative to yesterday.

## 2022-06-26 NOTE — PROGRESS NOTES
Date of Outreach Update:  Eric Saldivar was seen and assessed. @[facility]([id number])@  Vitals:    06/25/22 1832 06/25/22 1910 06/25/22 2000 06/26/22 0000   BP: (!) 177/60 (!) 160/55 (!) 160/55 (!) 163/55   Pulse:   62 58   Resp:   18 17   Temp:   98.1 °F (36.7 °C) 98.1 °F (36.7 °C)   TempSrc:   Axillary Axillary   SpO2:   95% 99%   Weight:       Height:            Previous Outreach assessment has been reviewed. Patient asleep but easily arousable with voice. No c/o pain or SOB, VSS, SBP <160 at this time. MRI:    1.  No evidence of an acute infarct. 2.  Intraventricular hemorrhage, subarachnoid blood adjacent the brainstem again   noted. 3.  Chronic infarcts and remote insults. Will continue to follow up per outreach protocol.     Signed By:   Lavern Kaplan RN    June 26, 2022 12:51 AM

## 2022-06-27 LAB
ANION GAP SERPL CALC-SCNC: 5 MMOL/L (ref 7–16)
BASOPHILS # BLD: 0 K/UL (ref 0–0.2)
BASOPHILS NFR BLD: 0 % (ref 0–2)
BUN SERPL-MCNC: 29 MG/DL (ref 8–23)
CALCIUM SERPL-MCNC: 8.5 MG/DL (ref 8.3–10.4)
CHLORIDE SERPL-SCNC: 114 MMOL/L (ref 98–107)
CO2 SERPL-SCNC: 27 MMOL/L (ref 21–32)
CREAT SERPL-MCNC: 1.6 MG/DL (ref 0.8–1.5)
DIFFERENTIAL METHOD BLD: ABNORMAL
EOSINOPHIL # BLD: 0.3 K/UL (ref 0–0.8)
EOSINOPHIL NFR BLD: 3 % (ref 0.5–7.8)
ERYTHROCYTE [DISTWIDTH] IN BLOOD BY AUTOMATED COUNT: 15.9 % (ref 11.9–14.6)
GLUCOSE BLD STRIP.AUTO-MCNC: 176 MG/DL (ref 65–100)
GLUCOSE BLD STRIP.AUTO-MCNC: 181 MG/DL (ref 65–100)
GLUCOSE BLD STRIP.AUTO-MCNC: 192 MG/DL (ref 65–100)
GLUCOSE BLD STRIP.AUTO-MCNC: 197 MG/DL (ref 65–100)
GLUCOSE SERPL-MCNC: 163 MG/DL (ref 65–100)
HCT VFR BLD AUTO: 25.7 % (ref 41.1–50.3)
HGB BLD-MCNC: 8.2 G/DL (ref 13.6–17.2)
IMM GRANULOCYTES # BLD AUTO: 0.1 K/UL (ref 0–0.5)
IMM GRANULOCYTES NFR BLD AUTO: 1 % (ref 0–5)
LYMPHOCYTES # BLD: 1.9 K/UL (ref 0.5–4.6)
LYMPHOCYTES NFR BLD: 22 % (ref 13–44)
MAGNESIUM SERPL-MCNC: 2.1 MG/DL (ref 1.8–2.4)
MCH RBC QN AUTO: 25.9 PG (ref 26.1–32.9)
MCHC RBC AUTO-ENTMCNC: 31.9 G/DL (ref 31.4–35)
MCV RBC AUTO: 81.3 FL (ref 79.6–97.8)
MONOCYTES # BLD: 1 K/UL (ref 0.1–1.3)
MONOCYTES NFR BLD: 12 % (ref 4–12)
NEUTS SEG # BLD: 5.4 K/UL (ref 1.7–8.2)
NEUTS SEG NFR BLD: 62 % (ref 43–78)
NRBC # BLD: 0 K/UL (ref 0–0.2)
PLATELET # BLD AUTO: 226 K/UL (ref 150–450)
PMV BLD AUTO: 12.2 FL (ref 9.4–12.3)
POTASSIUM SERPL-SCNC: 3.3 MMOL/L (ref 3.5–5.1)
RBC # BLD AUTO: 3.16 M/UL (ref 4.23–5.6)
SARS-COV-2: NORMAL
SERVICE CMNT-IMP: ABNORMAL
SODIUM SERPL-SCNC: 146 MMOL/L (ref 138–145)
WBC # BLD AUTO: 8.7 K/UL (ref 4.3–11.1)

## 2022-06-27 PROCEDURE — 80048 BASIC METABOLIC PNL TOTAL CA: CPT

## 2022-06-27 PROCEDURE — 99233 SBSQ HOSP IP/OBS HIGH 50: CPT | Performed by: PSYCHIATRY & NEUROLOGY

## 2022-06-27 PROCEDURE — 6360000002 HC RX W HCPCS: Performed by: INTERNAL MEDICINE

## 2022-06-27 PROCEDURE — A4216 STERILE WATER/SALINE, 10 ML: HCPCS | Performed by: INTERNAL MEDICINE

## 2022-06-27 PROCEDURE — 2580000003 HC RX 258: Performed by: INTERNAL MEDICINE

## 2022-06-27 PROCEDURE — U0005 INFEC AGEN DETEC AMPLI PROBE: HCPCS

## 2022-06-27 PROCEDURE — 6370000000 HC RX 637 (ALT 250 FOR IP): Performed by: HOSPITALIST

## 2022-06-27 PROCEDURE — 85025 COMPLETE CBC W/AUTO DIFF WBC: CPT

## 2022-06-27 PROCEDURE — 83735 ASSAY OF MAGNESIUM: CPT

## 2022-06-27 PROCEDURE — 2580000003 HC RX 258: Performed by: HOSPITALIST

## 2022-06-27 PROCEDURE — 6370000000 HC RX 637 (ALT 250 FOR IP): Performed by: FAMILY MEDICINE

## 2022-06-27 PROCEDURE — C9113 INJ PANTOPRAZOLE SODIUM, VIA: HCPCS | Performed by: INTERNAL MEDICINE

## 2022-06-27 PROCEDURE — 2580000003 HC RX 258: Performed by: FAMILY MEDICINE

## 2022-06-27 PROCEDURE — 97535 SELF CARE MNGMENT TRAINING: CPT

## 2022-06-27 PROCEDURE — 82962 GLUCOSE BLOOD TEST: CPT

## 2022-06-27 PROCEDURE — 1100000003 HC PRIVATE W/ TELEMETRY

## 2022-06-27 PROCEDURE — 97530 THERAPEUTIC ACTIVITIES: CPT

## 2022-06-27 PROCEDURE — 97112 NEUROMUSCULAR REEDUCATION: CPT

## 2022-06-27 PROCEDURE — 36415 COLL VENOUS BLD VENIPUNCTURE: CPT

## 2022-06-27 RX ORDER — POTASSIUM CHLORIDE 20 MEQ/1
40 TABLET, EXTENDED RELEASE ORAL ONCE
Status: COMPLETED | OUTPATIENT
Start: 2022-06-27 | End: 2022-06-27

## 2022-06-27 RX ADMIN — SODIUM CHLORIDE 40 MG: 9 INJECTION INTRAMUSCULAR; INTRAVENOUS; SUBCUTANEOUS at 17:07

## 2022-06-27 RX ADMIN — HYDRALAZINE HYDROCHLORIDE 50 MG: 50 TABLET, FILM COATED ORAL at 23:11

## 2022-06-27 RX ADMIN — LOSARTAN POTASSIUM 100 MG: 50 TABLET, FILM COATED ORAL at 09:19

## 2022-06-27 RX ADMIN — CITALOPRAM HYDROBROMIDE 40 MG: 20 TABLET ORAL at 09:19

## 2022-06-27 RX ADMIN — DEXTROSE MONOHYDRATE: 5 INJECTION, SOLUTION INTRAVENOUS at 04:08

## 2022-06-27 RX ADMIN — SODIUM CHLORIDE 40 MG: 9 INJECTION INTRAMUSCULAR; INTRAVENOUS; SUBCUTANEOUS at 09:19

## 2022-06-27 RX ADMIN — POLYETHYLENE GLYCOL 3350 17 G: 17 POWDER, FOR SOLUTION ORAL at 09:20

## 2022-06-27 RX ADMIN — AMLODIPINE BESYLATE 10 MG: 10 TABLET ORAL at 09:19

## 2022-06-27 RX ADMIN — SODIUM CHLORIDE, PRESERVATIVE FREE 10 ML: 5 INJECTION INTRAVENOUS at 23:13

## 2022-06-27 RX ADMIN — POTASSIUM CHLORIDE 40 MEQ: 20 TABLET, EXTENDED RELEASE ORAL at 09:19

## 2022-06-27 RX ADMIN — HYDRALAZINE HYDROCHLORIDE 50 MG: 50 TABLET, FILM COATED ORAL at 14:45

## 2022-06-27 RX ADMIN — ATORVASTATIN CALCIUM 20 MG: 10 TABLET, FILM COATED ORAL at 09:19

## 2022-06-27 RX ADMIN — SODIUM CHLORIDE, PRESERVATIVE FREE 10 ML: 5 INJECTION INTRAVENOUS at 09:20

## 2022-06-27 RX ADMIN — HYDRALAZINE HYDROCHLORIDE 50 MG: 50 TABLET, FILM COATED ORAL at 06:00

## 2022-06-27 ASSESSMENT — PAIN SCALES - GENERAL
PAINLEVEL_OUTOF10: 0

## 2022-06-27 NOTE — CARE COORDINATION
[facility] has declined to accept pt- no quarantine beds available (pt is not vaccinated). [facility] has accepted the pt. [facility] (spouse's preference of facility) is reviewing the pt's information. No response from [facility]. Awaiting answer from [facility]. Regular COVID test ordered and is in process. CM following to facilitate pt's transfer to rehab at [facility].

## 2022-06-27 NOTE — PROGRESS NOTES
[facility]  [facility], [address] [facility]            Jade Kaiser is a 79 y.o. male who presents on referral from the inpatient service seen by Dr. Regi Fagan over the course of the weekend I have reviewed his notes and I also reviewed the report by Dr. Bradley Mota see comments below      Past Medical History:   Diagnosis Date    Allergic rhinitis, cause unspecified 2/27/2014    Anemia, unspecified 2/27/2014    Atherosclerosis of renal artery ([facility] [address]     2001    CAD (coronary artery disease) 2001    4 vessel cabg, cardiac stents prior to this    Cancer [facility]) 2013    bladder/ L kidney    Chronic airway obstruction, not elsewhere classified 2/27/2014    Chronic kidney disease     hx of CRF,  creatinine 1.8    Coronary atherosclerosis of native coronary vessel 2/27/2014    Depressive disorder, not elsewhere classified 2/27/2014    Diabetes ([facility] [address] dx 2000    type 2, oral med, does not check his glucose, A1C 6.4   9/9/2019, states very rare episode hypo    Diverticulosis of colon (without mention of hemorrhage) 2/27/2014    Dyslipidemia     controlled with med    Esophagitis, unspecified 2/27/2014    Essential and other specified forms of tremor 2/27/2014    Essential hypertension, benign 2/27/2014    GERD (gastroesophageal reflux disease)     controlled with omeprazole    Gross hematuria 2/27/2014    Hematuria, unspecified 2/27/2014    Hypertension     controlled with meds    Malignant neoplasm of bladder, 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    Malignant neoplasm of trigone of urinary bladder ([facility] [address] 2/27/2014    Microscopic hematuria 2/27/2014    Osteoarthrosis, unspecified whether generalized or localized, unspecified site 2/27/2014    Other peripheral vascular disease(443.89) 2/27/2014    Polyneuropathy in diabetes(357.2) 2/27/2014    Proteinuria 2/27/2014    Psychiatric disorder     gets \"aggravated\", treated with Celexa    PUD (peptic ulcer disease) 1970's    Pure hypercholesterolemia 2/27/2014    Renal artery stenosis ([facility] [address] 2/27/2014    Renal sclerosis, unspecified 2/27/2014    left    Respiratory insufficiency 1/13/2016    Stroke ([facility]     CVA x 2, (last was fall of 2011),  no residual weakness; L eye impaired    Tobacco use disorder 2/27/2014    Unspecified gastritis and gastroduodenitis without mention of hemorrhage 2/27/2014    Unspecified hereditary and idiopathic peripheral neuropathy 2/27/2014    Unspecified sleep apnea     does not wear cpap    Unspecified transient cerebral ischemia 2/27/2014       Past Surgical History:   Procedure Laterality Date    CARDIAC CATHETERIZATION      stents prior to CABG    COLONOSCOPY      KIDNEY REMOVAL Left     LIPOMA RESECTION  10/07/2019    DE CARDIAC SURG PROCEDURE UNLIST  2001    4 vessel CABG, cardiac stents    UROLOGICAL SURGERY      TURBT    VASCULAR SURGERY  2001    renal stent in L kidney        Family History   Problem Relation Age of Onset    Diabetes Mother     Heart Disease Mother     Heart Attack Mother         Myocardial Infarction    Stroke Father     Alcohol Abuse Father     Diabetes Son         Social History     Socioeconomic History    Marital status:      Spouse name: None    Number of children: None    Years of education: None    Highest education level: None   Occupational History    None   Tobacco Use    Smoking status: Current Every Day Smoker     Packs/day: 1.00    Smokeless tobacco: Never Used   Vaping Use    Vaping Use: Never used   Substance and Sexual Activity    Alcohol use: No    Drug use: No     Types: Prescription, OTC    Sexual activity: Not Currently   Other Topics Concern    None   Social History Narrative    None     Social Determinants of Health     Financial Resource Strain:     Difficulty of Paying Living Expenses: Not on file   Food Insecurity:     Worried About Running Out of Food in the Last Year: Not on file    [address] in the Last Year: Not on file   Transportation Needs:     Lack of Transportation (Medical): Not on file    Lack of Transportation (Non-Medical):  Not on file   Physical Activity:     Days of Exercise per Week: Not on file    Minutes of Exercise per Session: Not on file   Stress:     Feeling of Stress : Not on file   Social Connections:     Frequency of Communication with Friends and Family: Not on file    Frequency of Social Gatherings with Friends and Family: Not on file    Attends Protestant Services: Not on file    Active Member of [address] [place] [address] [employer] or Organizations: Not on file    Attends Club or Organization Meetings: Not on file    Marital Status: Not on file   Intimate Partner Violence:     Fear of Current or Ex-Partner: Not on file    Emotionally Abused: Not on file    Physically Abused: Not on file    Sexually Abused: Not on file   Housing Stability:     Unable to Pay for Housing in the Last Year: Not on file    Number of [place] in the Last Year: Not on file    Unstable Housing in the Last Year: Not on file         Current Facility-Administered Medications   Medication Dose Route Frequency Provider Last Rate Last Admin    losartan (COZAAR) tablet 100 mg  100 mg Oral Daily Jinny Araiza MD   100 mg at 06/27/22 0919    hydrALAZINE (APRESOLINE) injection 10 mg  10 mg IntraVENous Q6H PRN Marta Chamberlain MD   10 mg at 06/26/22 0027    labetalol (NORMODYNE;TRANDATE) injection 20 mg  20 mg IntraVENous Q4H PRN Marta Chamberlain MD   20 mg at 06/25/22 1832    amLODIPine (NORVASC) tablet 10 mg  10 mg Oral Daily Thu Guerra, DO   10 mg at 06/27/22 0919    [Held by provider] labetalol (NORMODYNE) tablet 200 mg  200 mg Oral 2 times per day Thu Guerra, DO        polyethylene glycol (GLYCOLAX) packet 17 g  17 g Oral Daily Thu Guerra, DO   17 g at 06/27/22 0920    hydrALAZINE (APRESOLINE) tablet 50 mg  50 mg Oral 3 times per day Thu Guerra, DO   50 mg at 06/27/22 0600    oxyCODONE (Zoila Right) immediate release tablet 5 mg  5 mg Oral Q6H PRN Towanda Flight, DO   5 mg at 06/26/22 2325    citalopram (CELEXA) tablet 40 mg  40 mg Oral Daily Pantera Weston MD   40 mg at 06/27/22 0919    atorvastatin (LIPITOR) tablet 20 mg  20 mg Oral Daily Pantera Weston MD   20 mg at 06/27/22 0919    sodium chloride flush 0.9 % injection 5-40 mL  5-40 mL IntraVENous 2 times per day Pantera Weston MD   10 mL at 06/27/22 0920    sodium chloride flush 0.9 % injection 5-40 mL  5-40 mL IntraVENous PRN Pantera Weston MD   10 mL at 06/26/22 0027    0.9 % sodium chloride infusion   IntraVENous PRN Pantera Weston MD        ondansetron (ZOFRAN-ODT) disintegrating tablet 4 mg  4 mg Oral Q8H PRN Pantera Weston MD        Or    ondansetron [facility]) injection 4 mg  4 mg IntraVENous Q6H PRN Pantera Weston MD   4 mg at 06/21/22 1253    polyethylene glycol (GLYCOLAX) packet 17 g  17 g Oral Daily PRN Pantera Weston MD        acetaminophen (TYLENOL) tablet 650 mg  650 mg Oral Q6H PRN Pantera Weston MD   650 mg at 06/22/22 1151    Or    acetaminophen (TYLENOL) suppository 650 mg  650 mg Rectal Q6H PRN Pantera Weston MD        nicotine (NICODERM CQ) 14 MG/24HR 1 patch  1 patch TransDERmal Daily Pantera Weston MD   1 patch at 06/27/22 1178    glucose chewable tablet 16 g  4 tablet Oral PRN Towanda Flight, DO        dextrose bolus 10% 125 mL  125 mL IntraVENous PRN Towanda Flight, DO        Or    dextrose bolus 10% 250 mL  250 mL IntraVENous PRN Towanda Flight, DO        glucagon injection 1 mg  1 mg IntraMUSCular PRN Towanda Flight, DO        dextrose 5 % solution  100 mL/hr IntraVENous PRN Towanda Flight, DO   Stopped at 06/27/22 3871    insulin lispro (HUMALOG) injection vial 0-8 Units  0-8 Units SubCUTAneous TID [facility], DO   5 Units at 06/26/22 1631    insulin lispro (HUMALOG) injection vial 0-4 Units  0-4 Units SubCUTAneous Nightly Rosa Flight, DO        aspect  of foramen magnum. This was initially demonstrated on the head CT of 6/20/2022. Further, there are blood products within the dependent portions of the occipital  horns of both lateral ventricles, right greater than left. Intraventricular  blood was initially demonstrated on the head CT of 6/21/2022. The ventricles are stable in caliber in comparison to recent prior head CTs,  including ex vacuo dilation of the occipital horn of the right lateral ventricle  consequent to adjacent right occipital lobe encephalomalacia. The ventricles are slightly larger than on the 1/26/2017 brain MRI. There is no  definite thick T2 hyperintensity marginating the contours of the lateral  ventricles. Scattered T2 hyperintensities in the periventricular and subcortical white  matter have slightly worsened in comparison to the prior exam. This is a  nonspecific finding which most likely represents a mild burden of chronic  microangiopathy. Multifocal encephalomalacia throughout the left greater than right cerebellar  hemispheres at the site of prior vascular insults, similar to the 1/26/2017  exam.    A midline nasopharyngeal mucosal cyst is not significant changed in comparison  to the 1/26/2017 exam, measuring 2.6 x 2.3 cm (axial image 5). 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 Diffusion imaging shows no evidence of acute ischemic infarction. The expected large central intracranial vascular flow voids are preserved. There are no suspicious osseous lesions. Impression  1. Unchanged subarachnoid hemorrhage anterior to the cervical medullary  junction and involving the medullary cistern and foramen magnum.  Given the  subtle increased vascularity along the central left central portions of the  cervical cord on axial image 407 of the prior CT angiogram, a catheter angiogram  is recommended to assess for an underlying vascular malformation causative of  the subarachnoid hemorrhage. 2.  Unchanged intraventricular blood products within the dependent right greater  than left lateral ventricles. 3.  The ventricles are stable in comparison to recent prior head CTs. However,  there is subtle increase in ventricular caliber in comparison to the 1/26/2017  brain MRI. While this may be consequent to progressive age-related cortical  volume loss, in the setting of subarachnoid hemorrhage and intraventricular  blood products, close attention to ventricular caliber is recommended on  follow-up exams to assess for the possibility of a mild developing communicating  hydrocephalus. 4.  No evidence of acute ischemic infarction. 5.  Extensive chronic vascular insults as detailed above. Most recent MRA   No results found for this or any previous visit. Most recent CTA  Results for orders placed during the hospital encounter of 06/20/22    CT HEAD WO CONTRAST    Narrative  EXAM: CT HEAD WO CONTRAST    HISTORY:  Nontraumatic subarachnoid hemorrhage, unspecified / Reason for  exam:->SAH. TECHNIQUE: Axial images of the brain were performed without the administration  of intravenous contrast. Images were obtained axial plane and coronal  reformatted images were submitted. Dose reduction technique used: Automated exposure control/Adjustment of the mA  and/or kV according to patient size/Use of iterative reconstruction technique. COMPARISON: None. FINDINGS:  No significant interval change in the appearance of the hyperdensity adjacent to  the brainstem which may represent hemorrhage. Compared to the previous examination there is been no significant interval  change in the intraventricular hemorrhage. There is no new intracranial hemorrhage appreciated. There is no midline shift or mass effect observed. Stable chronic changes. Impression  Findings as described above. No significant interval change.        Most recent Echo  No results found for this or any previous visit. Most recent lipid panels  Lab Results   Component Value Date    CHOL 98 03/22/2022    HDL 38 03/22/2022    VLDL 15 03/22/2022       Most recent Hgb A1C  No results found for: HBA1C, [id number]      Assessment/Plan:  Subarachnoid hemorrhage. He continues to have a headache but is improving.   Current Thomas Miller score is 3  Pursuant to Dr. Taylor Silva's addendum to the interpretation of the patient's CTA I did discussed the situation with endovascular at [facility] in terms of whether the patient would be a candidate for a angiogram.  Our discussion was that particularly bearing in mind the patient's other morbidities his kidney disease etc. all of which will have substantial impact on potential morbidity from that procedure etc. that this will be held until the overall situation with regards to his ultimate neurologic situation has become more clear          Zac Benson MD

## 2022-06-27 NOTE — PROGRESS NOTES
SPEECH LANGUAGE PATHOLOGY NOTE      Attempted to see patient, however he politely declined po trials despite encouragement. Patient's family reports poor intake, unrelated to being on modified diet. Will re attempt at later time/date.        Chevis Hodgkin, [address] CCC-SLP

## 2022-06-27 NOTE — PROGRESS NOTES
Hospitalist Progress Note   Admit Date:  2022  9:49 PM   Name:  Dontrell Boudreaux   Age:  79 y.o. Sex:  male  :  1940   MRN:  [id number]   Room:  8/    Presenting Complaint: Posterior Neck ache  Reason(s) for Admission: Subarachnoid hemorrhage ([address] [I60.9]  SAH (subarachnoid hemorrhage) ([address] [I60.9]  Hypertensive emergency [I16.1]     Hospital Course & Interval History: This is a 79 y.o. male with medical history of HTN, CKD, dementia who presented to ED on  with severe posterior headache. Symptoms started around  on . Upon ER evaluation, NIH was 0. 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 BP was found to be very elevated with SBP>200. CT head shows SAH adjacent to brainstem. Neurosurgery was consulted by ER. They reviewed the chart and recommended CTA. Patient as placed on cardene drip for BP control. CTA was obtained which does show an area of abnormality at subclavian, clot or small dissection. Vascular Sugery was consulted who felt it was an chronic plaque or thrombus and recommended Plavix when able to take. He was admitted to the ICU on a Cardene gtt, since  has come off of this. On  he had sudden onset vomiting of coffee ground emesis. PPI was started with GI consult. GI has seen and signed off on . No further emesis or evidence of GIB. Poor candidate for endoscopy with recent SAH. Subjective/24hr Events (22): Patient is afebrile. No chest pain. No tingling numbness or weakness. CTA head and neck showed a small focus of filling defect within the proximal left subclavian artery which may represent noncalcified plaque or thromboembolic disease. Findings were discussed with [facility] by neurology Dr. Kay Arrington. Assessment & Plan:     Principal Problem: This is a 44-year-old male with:      Subarachnoid hemorrhage ([address]  -  CT Head with SAH adjacent to the brainstem.  CTA with no aneurysm  -  Repeat CT with no change  -  Repeat CT with decrease in [address] but now with blood layering in posterior horn ventricles. - Goal SBP <160  - Continue home Cozaar, can increase dose if needed  - Holding Labetalol with bradycardia overnight. - weaned off Cardene gtt. Add Norvasc and Hydralazine po  - Hydralazine IV prn  - Appreciate Neurology's input and assistance  - PT/OT  - MRI of the brain shows no evidence of acute infarction. Intraventricular hemorrhage, subarachnoid bleed adjacent to the brainstem noted. Chronic infarcts and remote insults. CTA head and neck showed a small focus of filling defect within the proximal left subclavian artery which may represent noncalcified plaque or thromboembolic disease. Findings were discussed with [facility] by neurology Dr. Gale Cornell. Active Problems:  Acute Blood Loss Anemia  Acute Upper GI Bleed  - Likely due to UGIB  - 6/20 Hgb 11.3 but trended down to ~9 on 6/21 with large coffee ground emesis. Emesis is hemoccult positive  - Continue PPI BID.   - GI has seen and signed off on 6/22. No further emesis or evidence of GIB. Poor candidate for endoscopy with recent SAH.   - Hgb stable ~7-8.   -Hemoglobin stable at 8.2 this morning. Hypernatremia:  Patient with very poor p.o. intake. Sodium improved to 146 this morning from 150 yesterday. IV fluids discontinued to prevent rapid correction. Essential hypertension, benign  - Off Cardene gtt since 6/22  -Increased dose of losartan to 100 mg p.o. daily. Continue amlodipine, hydralazine.  - Holding Labetalol with bradycardia   - Hydralazine IV prn  - Goal SBP <160    Sinus bradycardia  - 6/23 telemetry reported bradycardia down to 20's-30's per RN.  - Holding Labetalol  - TSH is 2.41.  - Keep Mag>2, K>4    Acute respiratory failure with hypoxia  - RR in 30's at times with O2 sat 86% on 6/22 and had to be placed on 7LO2NC. Has been weaned down to RA. Suspect 2/2 anemia. CXR also shows atelectasis.   - frequent IS use      CKD stage 4, GFR 15-29 ml/min (HCC)  - At baseline Cr ~1.6-1.8      Type 2 diabetes mellitus ([facility]  - Well controlled--hA1C 6.0 on 6/21  - Continue Humalog SSI      Tobacco use disorder  - Continue Nicotine patch. 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 Vascular Dementia ([facility]  - Noted. Delirium precautions     Poor p.o. intake:  Nutrition consulted. Appreciate recommendations. Management of Delirium      Non-pharmacological intervention  · Reorient the patient throughout the day  · Window open and lights on during the day. Lights off, television off, noises down during the night. If able, decrease nursing checks at night  · Therapies as often as possible  · Avoid restraints to the best of your ability   · Avoid sensory deprivation by using glasses and hearing aids, if applicable        Pharmacological intervention  · Replete electrolyte abnormalities and correct metabolic abnormalities  · Limit benzodiazepines, antihistamines, narcotics, anticholinergics. Preference towards Precedex for sedation over fentanyl and benzodiazepines/GABAa agonists. · For dangerous behavior/aggression to self or others can consider Zyprexa or Seroquel if benefits outweigh risk  · For persistent insomnia can use melatonin four hours prior to bedtime or Seroquel 25 mg at bedtime     DISPO: STR when bed available. Diet:  ADULT DIET;  Dysphagia - Minced and Moist  ADULT ORAL NUTRITION SUPPLEMENT; Breakfast, Lunch, Dinner; Diabetic Oral Supplement  DVT PPx: SCDs  Code status: DNR     Hospital Problems:  Hospital Problems           Last Modified POA    * (Principal) SAH (subarachnoid hemorrhage) ([facility] 6/21/2022 Yes    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[facility] 6/21/2022 Yes    Dementia ([facility] 6/21/2022 Yes    Type 2 diabetes mellitus ([address] 6/21/2022 Yes    Acute blood loss anemia (ABLA) 6/22/2022 No    Acute upper gastrointestinal bleeding 6/22/2022 Clinically Undetermined    Bradycardia, sinus 6/24/2022 Yes    Acute respiratory failure with hypoxia ([address] 6/24/2022 Yes    Tobacco use disorder 6/21/2022 Yes    Essential hypertension, benign 6/21/2022 Yes    CKD (chronic kidney disease) stage 4, GFR 15-29 ml/min ([address] 6/21/2022 Yes          Objective:     Patient Vitals for the past 24 hrs:   Temp Pulse Resp BP SpO2   06/27/22 1200 98.4 °F (36.9 °C) 71 16 (!) 129/55 97 %   06/27/22 0800 98.4 °F (36.9 °C) 63 16 (!) 157/63 98 %   06/27/22 0400 98.5 °F (36.9 °C) 78 20 (!) 156/54 94 %   06/27/22 0041 -- 80 -- (!) 144/65 --   06/27/22 0000 98.2 °F (36.8 °C) 85 20 (!) 170/56 95 %   06/26/22 2325 -- -- (!) 118 -- --   06/26/22 2000 97.9 °F (36.6 °C) 77 20 (!) 147/72 94 %   06/26/22 1600 98 °F (36.7 °C) 75 18 (!) 142/65 97 %       Oxygen Therapy  SpO2: 97 %  Pulse Oximetry Type: Continuous  Pulse via Oximetry: 64 beats per minute  Pulse Oximeter Device Mode: Continuous  Pulse Oximeter Device Location: Left,Finger  O2 Device: None (Room air)  Oximetry Probe Site Changed: No  Skin Assessment: Clean, dry, & intact  Skin Protection for O2 Device: N/A  Intervention(s): Reposition Device (placed in mouth due to pt being a mouth breather)  O2 Flow Rate (L/min): 4 L/min    Estimated body mass index is 26.57 kg/m² as calculated from the following:    Height as of this encounter: 5' 10\" (1.778 m). Weight as of this encounter: 185 lb 3 oz (84 kg). No intake or output data in the 24 hours ending 06/27/22 1444      Physical Exam:   Blood pressure (!) 129/55, pulse 71, temperature 98.4 °F (36.9 °C), temperature source Oral, resp. rate 16, height 5' 10\" (1.778 m), weight 185 lb 3 oz (84 kg), SpO2 97 %. General:    Well nourished. No overt distress. Hard of hearing. Head:  Normocephalic, atraumatic  Eyes:  Sclerae appear normal.  Pupils equally round. ENT:  Nares appear normal, no drainage. Moist oral mucosa  Neck:  No restricted ROM. Trachea midline   CV:   RRR. No m/r/g. No jugular venous distension. Lungs:   CTAB. No wheezing, rhonchi, or rales. Respirations even, unlabored  Abdomen: Bowel sounds present. Soft, nontender, nondistended. Extremities: No cyanosis or clubbing. No edema  Skin:     No rashes and normal coloration. Warm and dry. Neuro:  CN II-XII grossly intact. Sensation intact. Strength 5/5 b/l. Alert and oriented x3. Psych:  Normal mood and affect.       I have reviewed ordered lab tests and independently visualized imaging below:    Recent Labs:  Recent Results (from the past 48 hour(s))   POCT Glucose    Collection Time: 06/25/22  3:48 PM   Result Value Ref Range    POC Glucose 157 (H) 65 - 100 mg/dL    Performed by: DamarisCT    POCT Glucose    Collection Time: 06/25/22  8:51 PM   Result Value Ref Range    POC Glucose 140 (H) 65 - 100 mg/dL    Performed by: Kirill    CBC with Auto Differential    Collection Time: 06/26/22  5:49 AM   Result Value Ref Range    WBC 7.9 4.3 - 11.1 K/uL    RBC 3.45 (L) 4.23 - 5.6 M/uL    Hemoglobin 9.0 (L) 13.6 - 17.2 g/dL    Hematocrit 28.4 (L) 41.1 - 50.3 %    MCV 82.3 79.6 - 97.8 FL    MCH 26.1 26.1 - 32.9 PG    MCHC 31.7 31.4 - 35.0 g/dL    RDW 15.9 (H) 11.9 - 14.6 %    Platelets 084 470 - 121 K/uL    MPV 12.4 (H) 9.4 - 12.3 FL    nRBC 0.02 0.0 - 0.2 K/uL    Differential Type AUTOMATED      Seg Neutrophils 69 43 - 78 %    Lymphocytes 16 13 - 44 %    Monocytes 11 4.0 - 12.0 %    Eosinophils % 2 0.5 - 7.8 %    Basophils 1 0.0 - 2.0 %    Immature Granulocytes 1 0.0 - 5.0 %    Segs Absolute 5.5 1.7 - 8.2 K/UL    Absolute Lymph # 1.3 0.5 - 4.6 K/UL    Absolute Mono # 0.9 0.1 - 1.3 K/UL    Absolute Eos # 0.1 0.0 - 0.8 K/UL    Basophils Absolute 0.0 0.0 - 0.2 K/UL    Absolute Immature Granulocyte 0.1 0.0 - 0.5 K/UL   Basic Metabolic Panel    Collection Time: 06/26/22  5:49 AM   Result Value Ref Range    Sodium 150 (H) 138 - 145 mmol/L    Potassium 3.6 3.5 - 5.1 mmol/L    Chloride 118 (H) 98 - 107 mmol/L    CO2 26 21 - 32 mmol/L    Anion Gap 6 (L) 7 - 16 mmol/L    Glucose 126 (H) 65 - 100 mg/dL    BUN 32 (H) 8 - 23 MG/DL    CREATININE 1.60 (H) 0.8 - 1.5 MG/DL    GFR  53 (L) >60 ml/min/1.73m2    GFR Non- 44 (L) >60 ml/min/1.73m2    Calcium 8.8 8.3 - 10.4 MG/DL   Magnesium    Collection Time: 06/26/22  5:49 AM   Result Value Ref Range    Magnesium 2.4 1.8 - 2.4 mg/dL   POCT Glucose    Collection Time: 06/26/22  6:54 AM   Result Value Ref Range    POC Glucose 143 (H) 65 - 100 mg/dL    Performed by: Kirill    POCT Glucose    Collection Time: 06/26/22 10:38 AM   Result Value Ref Range    POC Glucose 289 (H) 65 - 100 mg/dL    Performed by: Delroy    Basic Metabolic Panel    Collection Time: 06/26/22  3:00 PM   Result Value Ref Range    Sodium 147 (H) 138 - 145 mmol/L    Potassium 3.7 3.5 - 5.1 mmol/L    Chloride 114 (H) 98 - 107 mmol/L    CO2 26 21 - 32 mmol/L    Anion Gap 7 7 - 16 mmol/L    Glucose 218 (H) 65 - 100 mg/dL    BUN 35 (H) 8 - 23 MG/DL    CREATININE 1.80 (H) 0.8 - 1.5 MG/DL    GFR  47 (L) >60 ml/min/1.73m2    GFR Non- 38 (L) >60 ml/min/1.73m2    Calcium 8.7 8.3 - 10.4 MG/DL   POCT Glucose    Collection Time: 06/26/22  3:54 PM   Result Value Ref Range    POC Glucose 215 (H) 65 - 100 mg/dL    Performed by: Delroy    POCT Glucose    Collection Time: 06/26/22  9:05 PM   Result Value Ref Range    POC Glucose 163 (H) 65 - 100 mg/dL    Performed by: Kelsey    CBC with Auto Differential    Collection Time: 06/27/22  2:51 AM   Result Value Ref Range    WBC 8.7 4.3 - 11.1 K/uL    RBC 3.16 (L) 4.23 - 5.6 M/uL    Hemoglobin 8.2 (L) 13.6 - 17.2 g/dL    Hematocrit 25.7 (L) 41.1 - 50.3 %    MCV 81.3 79.6 - 97.8 FL    MCH 25.9 (L) 26.1 - 32.9 PG    MCHC 31.9 31.4 - 35.0 g/dL    RDW 15.9 (H) 11.9 - 14.6 %    Platelets 148 106 - 310 K/uL    MPV 12.2 9.4 - 12.3 FL    nRBC 0.00 0.0 - 0.2 K/uL    Differential Type AUTOMATED      Seg Neutrophils 62 43 - 78 %    Lymphocytes 22 13 - 44 %    Monocytes 12 4.0 - 12.0 %    Eosinophils % 3 0.5 - 7.8 %    Basophils 0 0.0 - 2.0 %    Immature Granulocytes 1 0.0 - 5.0 %    Segs Absolute 5.4 1.7 - 8.2 K/UL    Absolute Lymph # 1.9 0.5 - 4.6 K/UL    Absolute Mono # 1.0 0.1 - 1.3 K/UL    Absolute Eos # 0.3 0.0 - 0.8 K/UL    Basophils Absolute 0.0 0.0 - 0.2 K/UL    Absolute Immature Granulocyte 0.1 0.0 - 0.5 K/UL   Basic Metabolic Panel    Collection Time: 06/27/22  2:51 AM   Result Value Ref Range    Sodium 146 (H) 138 - 145 mmol/L    Potassium 3.3 (L) 3.5 - 5.1 mmol/L    Chloride 114 (H) 98 - 107 mmol/L    CO2 27 21 - 32 mmol/L    Anion Gap 5 (L) 7 - 16 mmol/L    Glucose 163 (H) 65 - 100 mg/dL    BUN 29 (H) 8 - 23 MG/DL    CREATININE 1.60 (H) 0.8 - 1.5 MG/DL    GFR  53 (L) >60 ml/min/1.73m2    GFR Non- 44 (L) >60 ml/min/1.73m2    Calcium 8.5 8.3 - 10.4 MG/DL   Magnesium    Collection Time: 06/27/22  2:51 AM   Result Value Ref Range    Magnesium 2.1 1.8 - 2.4 mg/dL   POCT Glucose    Collection Time: 06/27/22  6:26 AM   Result Value Ref Range    POC Glucose 181 (H) 65 - 100 mg/dL    Performed by: Kelsey    POCT Glucose    Collection Time: 06/27/22 11:08 AM   Result Value Ref Range    POC Glucose 176 (H) 65 - 100 mg/dL    Performed by: Maricarmen    COVID-19    Collection Time: 06/27/22 12:32 PM    Specimen: Nasopharyngeal Swab   Result Value Ref Range    SARS-CoV-2 Please find results under separate order         Other Studies:  CTA HEAD NECK W WO CONTRAST    Result Date: 6/21/2022  History: Complains of headache and neck pain and hypertension Comments: CT ANGIOGRAM OF THE NECK AND CT ANGIOGRAM OF THE [place] was obtained following the administration of IV contrast. IV contrast was administered to evaluate the arterial vasculature. Reformatted images in the coronal and sagittal planes as well as 3-D imaging was obtained and reviewed on a dedicated PACS and [address] Radiation reduction dose techniques were used for the study. Our CT scanner use one or all of the following- Automated exposure control, adjustment of the mA and/or KV according the patient size, iterative reconstruction. All measurements are based upon NASCET criteria if appropriate. This study was analyzed by the [facility] sm[facility].Algorithm Findings: CT ANGIOGRAM OF THE NECK: The arch demonstrates atherosclerotic changes. Within the proximal left subclavian artery there is a soft tissue filling defect. This may represent noncalcified plaque disease or thromboemboli. This may place the patient at additional risk for distal emboli in the left upper extremity and/or of the left vertebral artery The right left carotid bulbs demonstrate eccentric calcified and noncalcified plaque disease. Proximally 50% narrowing is noted on the right and less than 50% on the left. The proximal vertebral arteries are patent with at least moderate stenosis noted at the origin on the left. The lung apices are clear with the exception of a mild scarring in the medial aspect of the right upper lobe. The thyroid gland is unremarkable. CT angiogram of [place]: The petrous, cavernous, and supraclinoid internal carotid arteries are patent with diffuse atherosclerotic changes. Moderate narrowing is noted of the clinoid portion of the right internal carotid artery. Anterior circulation and middle cerebral arteries are patent. The distal vertebral arteries posterior inferior cerebellar arteries basilar artery superior cerebellar arteries and posterior cerebral arteries are patent. The dural venous sinuses are patent. 1. Small focus of filling defect noted within the proximal left subclavian artery which may represent noncalcified plaque disease or thromboembolic disease. This may place the patient at additional risk for distal emboli either within the left upper extremity or the left vertebral artery.      CT HEAD WO CONTRAST    Result Date: 6/20/2022  EXAM: CT HEAD WO CONTRAST HISTORY:  Reason for exam:->sudden onset HA, bp 260/110. TECHNIQUE: Axial images of the brain were performed without the administration of intravenous contrast. Images were obtained axial plane and coronal reformatted images were submitted. Dose reduction technique used: Automated exposure control/Adjustment of the mA and/or kV according to patient size/Use of iterative reconstruction technique. COMPARISON: MRI dated 1/26/2017 FINDINGS: There is hyperdense hemorrhage noted adjacent to the brainstem, at the level of the foramen magnum. There is no appreciable intracranial parenchymal hemorrhage. There is no midline shift, or mass effect. There are mild chronic appearing microangiopathic changes within the periventricular white matter. No evidence of acute confluent territorial infarction. Ventricles and basal cisterns are patent and symmetric. There is mild generalized volume loss. Visualized paranasal sinuses and mastoid air cells are without significant fluid. Scalp, soft tissues, and calvarium are within normal limits. Findings described above suggest subarachnoid hemorrhage adjacent to the the brainstem, at the level of the foramen magnum. This does not appear to represent intraparenchymal hemorrhage. This case was discussed with the ordering physician at the time of interpretation. XR CHEST PORTABLE    Result Date: 6/20/2022  EXAM: XR CHEST PORTABLE HISTORY: evaluate. TECHNIQUE: Frontal chest. COMPARISON: 5/9/2016 FINDINGS: The cardiac silhouette, mediastinum, and pulmonary vasculature are within normal limits. There is no consolidation, pleural effusion, or pneumothorax. No significant osseous abnormalities are observed. No evidence of an acute intrathoracic process.        Current Meds:  Current Facility-Administered Medications   Medication Dose Route Frequency    losartan (COZAAR) tablet 100 mg  100 mg Oral Daily    hydrALAZINE (APRESOLINE) injection 10 mg proof read but may still contain some grammatical/other typographical errors.

## 2022-06-27 NOTE — PROGRESS NOTES
PHYSICAL THERAPY Daily Note and AM  (Link to Caseload Tracking: PT Visit Days : 2  Time In/Out PT Charge Capture  Rehab Caseload Tracker  Orders      Jeb Noel is a 79 y.o. male   PRIMARY DIAGNOSIS: SAH (subarachnoid hemorrhage) ([facility]  Subarachnoid hemorrhage ([facility] [I60.9]  SAH (subarachnoid hemorrhage) ([facility] [I60.9]  Hypertensive emergency [I16.1]       Inpatient: Payor: MEDICARE / Plan: MEDICARE PART A AND B / Product Type: *No Product type* /     ASSESSMENT:     REHAB RECOMMENDATIONS:   Recommendation to date pending progress:  Setting:   Short-term Rehab    Equipment:     To Be Determined     ASSESSMENT:  Mr. Zak Blanchard presents in supine, agreeable to session. Upon entering, Pnt is agreeable to PT treatment. he reports no pain at rest. Pnt performed supine > sit with mod Ax2, sitting EOB with fair sitting balance control. Sit > stand with mod Ax2 using RW. Gait x 10, 22 ft with min Ax2, chair follow, and significant extra time, cues for step length. Gait is noted to be very slow and shuffled. Stand > sit with mod Ax2, followed by positioning for comfort. At end of session pt up in bedside chair with all needs within reach, alarm activated for safety, RN notified. Overall, good progress today as pnt improved in ambulation distance. Pnt continues to present as functioning below his baseline, with deficits in mobility including transfers, gait, balance, and activity tolerance. Pt will continue to benefit from skilled therapy services to address stated deficits to promote return to highest level of function, independence, and safety. Will continue to follow.          SUBJECTIVE:   Mr. Zak Blanchard states, \"This is wearing me out\"     Social/Functional Lives With: Spouse  Type of Home: House  Home Equipment: Cane (Adolph Roys)  Receives Help From: Family  ADL Assistance: Independent  Active : No  Patient's  Info: drives some, but most family  OBJECTIVE:     PAIN: [place] / O2: PRECAUTION / LINES / DRAINS:   Pre Treatment:   Pain Assessment: 0-10  Pain Level: 0  Patient's Stated Pain Goal: 0 - No pain      Post Treatment: 0 Vitals        Oxygen    None    RESTRICTIONS/PRECAUTIONS:  Restrictions/Precautions  Restrictions/Precautions: Fall Risk  Restrictions/Precautions: Fall Risk     MOBILITY: I Mod I S SBA CGA Min Mod Max Total  NT x2 Comments:   Bed Mobility    Rolling [] [] [] [] [] [] [x] [] [] [] [x]    Supine to Sit [] [] [] [] [] [] [x] [] [] [] [x]    Scooting [] [] [] [] [] [] [x] [] [] [] [x]    Sit to Supine [] [] [] [] [] [] [x] [] [] [] [x]    Transfers    Sit to Stand [] [] [] [] [] [x] [] [] [] [] [x]    Bed to Chair [] [] [] [] [] [x] [] [] [] [] [x]    Stand to Sit [] [] [] [] [] [x] [] [] [] [] [x]     [] [] [] [] [] [] [] [] [] [] []    I=Independent, Mod I=Modified Independent, S=Supervision, SBA=Standby Assistance, CGA=Contact Guard Assistance,   Min=Minimal Assistance, Mod=Moderate Assistance, Max=Maximal Assistance, Total=Total Assistance, NT=Not Tested    BALANCE: Good Fair+ Fair Fair- Poor NT Comments   Sitting Static [] [x] [] [] [] []    Sitting Dynamic [] [x] [] [] [] []              Standing Static [] [x] [x] [] [] []    Standing Dynamic [] [] [] [] [] []      GAIT: I Mod I S SBA CGA Min Mod Max Total  NT x2 Comments:   Level of Assistance [] [] [] [] [] [x] [] [] [] [] [x]    Distance  (10, 22) feet    DME Gait Belt, Rolling Walker and chair follow    Gait Quality Decreased rae , Decreased step clearance, Decreased step length and Decreased stance    Weightbearing Status      Stairs      I=Independent, Mod I=Modified Independent, S=Supervision, SBA=Standby Assistance, CGA=Contact Guard Assistance,   Min=Minimal Assistance, Mod=Moderate Assistance, Max=Maximal Assistance, Total=Total Assistance, NT=Not Tested    PLAN:   ACUTE PHYSICAL THERAPY GOALS:   (Developed with and agreed upon by patient and/or caregiver. )  LTG:  (1.)Mr. Miracle Frost will move from supine to sit and sit to supine , scoot up and down and roll side to side in bed with SUPERVISION within 7 treatment day(s). (2.)Mr. Bradford Palumbo will transfer from bed to chair and chair to bed with SUPERVISION using the least restrictive device within 7 treatment day(s). (3.)Mr. Bradford Palumbo will ambulate with STAND BY ASSIST for 250 feet with the least restrictive device within 7 treatment day(s). (4.)Mr. Bradford Palumbo will participate in therapeutic activity/exercises x 25 minutes for increased strength within 7 treatment days. (5.)Mr. Bradford Palumbo will perform standing static and dynamic balance activities x 15 minutes with SUPERVISION to improve safety within 7 treatment day(s). FREQUENCY AND DURATION: 3 times/week for duration of hospital stay or until stated goals are met, whichever comes first.    TREATMENT:   TREATMENT:   Therapeutic Activity (29 Minutes): Therapeutic activity included Rolling, Supine to Sit, Sit to Supine, Ambulation on level ground, Sitting balance  and Standing balance to improve functional Activity tolerance, Balance, Coordination, Mobility, Strength and ROM.     TREATMENT GRID:  N/A    AFTER TREATMENT PRECAUTIONS: Bed/Chair Locked, Call light within reach and Side rails x3    INTERDISCIPLINARY COLLABORATION:  RN/ PCT, PT/ PTA and OT/ CRUZ    EDUCATION: Education Given To: Patient    TIME IN/OUT:  Time In: 1126  Time Out: [facility]  Minutes: 112 KACEY Bajwa

## 2022-06-27 NOTE — PROGRESS NOTES
Contacted [facility] transfer line for Dr. Roxy Koehler in relation to MRI results.  Awaiting return call

## 2022-06-27 NOTE — PROGRESS NOTES
OCCUPATIONAL THERAPY Daily Note and AM     OT Visit Days: 2   Time  OT Charge Capture  Rehab Caseload Tracker  OT Orders    Lety Ochoa is a 79 y.o. male   PRIMARY DIAGNOSIS: SAH (subarachnoid hemorrhage) ([facility] [address]  Subarachnoid hemorrhage (HCC) [I60.9]  SAH (subarachnoid hemorrhage) ([facility] [address] [I60.9]  Hypertensive emergency [I16.1]       Inpatient: Payor: MEDICARE / Plan: MEDICARE PART A AND B / Product Type: *No Product type* /     ASSESSMENT:     REHAB RECOMMENDATIONS: CURRENT LEVEL OF FUNCTION:  (Most Recently Demonstrated)   Recommendation to date pending progress:  Setting:   Short-term Rehab    Equipment:     To Be Determined Bathing:   Not Tested  Dressing:   Not Tested  Feeding/Grooming:   Minimal Assist  Toileting:   Not Tested  Functional Mobility:   Minimal Assist x2 (mod A x2 for sit to stand)     ASSESSMENT:  Mr. Miracle Frost is doing fair today. Pt presents supine upon arrival. Pt required min A x2 for bed mobility today. Pt demonstrates fair sitting balance at EOB. Pt did not c/o much dizziness at EOB but sat there for a few minutes to regroup. Pt was able to perform functional mobility in room/hallway with min A x2. Pt required seated rest breaks due to fatigue. Pt returned to room and perform simple grooming task at sink (standing). Poor standing tolerance noted during session. Pt gives good effort. Some progress noted with with goals. Will continue to benefit from skilled OT during stay.        SUBJECTIVE:     Mr. Miracle Frost states, [place] I rest a few more minutes\"     Social/Functional Lives With: Spouse  Type of Home: House  Home Equipment: Cane ([place])  Receives Help From: Family  ADL Assistance: Independent  Active : No  Patient's  Info: drives some, but most family    OBJECTIVE:     Jairo Pineda / Ruy Pitts / Spring Sullivan: NA    RESTRICTIONS/PRECAUTIONS:  Restrictions/Precautions  Restrictions/Precautions: Fall Risk        PAIN: VITALS / O2:   Pre Treatment:              Post Treatment: 0 Vitals          Oxygen            MOBILITY: I Mod I S SBA CGA Min Mod Max Total  NT x2 Comments:   Bed Mobility    Rolling [] [] [] [] [] [] [] [] [] [x] []    Supine to Sit [] [] [] [] [] [x] [] [] [] [] [x]    Scooting [] [] [] [] [] [] [x] [] [] [] []    Sit to Supine [] [] [] [] [] [] [] [] [] [x] []    Transfers    Sit to Stand [] [] [] [] [] [] [x] [] [] [] [x]    Bed to Chair [] [] [] [] [] [x] [] [] [] [] [x]    Stand to Sit [] [] [] [] [] [x] [] [] [] [] [x]    Tub/Shower [] [] [] [] [] [] [] [] [] [x] []     Toilet [] [] [] [] [] [] [] [] [] [x] []      [] [] [] [] [] [] [] [] [] [] []    I=Independent, Mod I=Modified Independent, S=Supervision/Setup, SBA=Standby Assistance, CGA=Contact Guard Assistance, Min=Minimal Assistance, Mod=Moderate Assistance, Max=Maximal Assistance, Total=Total Assistance, NT=Not Tested    ACTIVITIES OF DAILY LIVING: I Mod I S SBA CGA Min Mod Max Total NT Comments   BASIC ADLs:              Upper Body   Bathing [] [] [] [] [] [] [] [] [] [x]    Lower Body Bathing [] [] [] [] [] [] [] [] [] [x]    Toileting [] [] [] [] [] [] [] [] [] [x]    Upper Body Dressing [] [] [] [] [] [] [] [] [] [x]    Lower Body Dressing [] [] [] [] [] [] [] [] [] [x]    Feeding [] [] [] [] [] [] [] [] [] [x]    Grooming [] [] [] [] [] [x] [] [] [] []    Personal Device Care [] [] [] [] [] [] [] [] [] [x]    Functional Mobility [] [] [] [] [] [x] [] [] [] [] x2   I=Independent, Mod I=Modified Independent, S=Supervision/Setup, SBA=Standby Assistance, CGA=Contact Guard Assistance, Min=Minimal Assistance, Mod=Moderate Assistance, Max=Maximal Assistance, Total=Total Assistance, NT=Not Tested    BALANCE: Good Fair+ Fair Fair- Poor NT Comments   Sitting Static [] [x] [] [] [] []    Sitting Dynamic [] [] [x] [] [] []              Standing Static [] [] [x] [] [] []    Standing Dynamic [] [] [] [x] [] []        PLAN:     FREQUENCY/DURATION   OT Plan of Care: 3 times/week for duration of hospital stay or until stated goals are met, whichever comes first.    ACUTE OCCUPATIONAL THERAPY GOALS:   (Developed with and agreed upon by patient and/or caregiver.)  1. Patient will complete lower body bathing and dressing with min A and adaptive equipment as needed. 2. Patient will complete toileting with SBA. 3. Patient will tolerate 30 minutes of OT treatment with 1-2 rest breaks to increase activity tolerance for ADLs. 4. Patient will complete functional transfers with SBA and adaptive equipment as needed. 5. Patient will complete functional mobility for household distances with supervision and adaptive equipment as needed. 6. Patient will complete self-grooming while standing edge of sink with supervision and adaptive equipment as needed.     Timeframe: 7 visits      TREATMENT:     TREATMENT:   Co-Treatment PT/OT necessary due to patient's decreased overall endurance/tolerance levels, as well as need for high level skilled assistance to complete functional transfers/mobility and functional tasks  Neuromuscular Re-education (15 Minutes): Neuromuscular Re-education included Balance Training, Coordination training, Postural training, Sitting balance training and Standing balance training to improve Balance, Coordination, Functional Mobility and Postural Control. Self Care (10 minutes): Patient participated in grooming ADLs in standing with minimal verbal, manual and tactile cueing to increase independence, decrease assistance required and increase activity tolerance. Patient also participated in functional mobility and functional transfer training to increase independence, decrease assistance required and increase activity tolerance.      TREATMENT GRID:  N/A    AFTER TREATMENT PRECAUTIONS: Bed/Chair Locked, Call light within reach, Chair, Needs within reach, RN notified and Visitors at bedside    INTERDISCIPLINARY COLLABORATION:  RN/ PCT, PT/ PTA and OT/ CRUZ    EDUCATION:       TOTAL TREATMENT DURATION AND TIME:  Time In: 1126  Time Out: 9097 [facility]  [facility] [address] [facility]

## 2022-06-27 NOTE — CONSULTS
Comprehensive Nutrition Assessment    Type and Reason for Visit: Initial,Consult  Poor Intake/Appetite 5 or More Days (Hospitalists)    Nutrition Recommendations/Plan:   Meals and Snacks:  Continue current diet. Nutrition Supplement Therapy:   Medical food supplement therapy:  Initiate Glucerna Shake three times per day (this provides 220 kcal and 10 grams protein per bottle) - strawberry or chocolate      Malnutrition Assessment:  Malnutrition Status: At risk for malnutrition (Comment) (SAH, decreased appetite/intake, advanced age)    Nutrition Assessment:  Nutrition History: Pt wife/son provide nutrition hx. They state pt appetite and intake at baseline PTA, typically consumed 2 hot meals per day + snacks in between. Family denies any unintentional changes to weight, state -190lb. Family denied prior use of oral nutrition supplements. Do You Have Any Cultural, Tenriism, or Ethnic Food Preferences?: No   Nutrition Background:  Pt with PMH significant for HTN, CKD, dementia who presented to ED with severe headache and was found to have a perimedullary subarachnoid hemorrhage, likely secondary to trauma. Julia Tran also has a small amount of intraventricular hemorrhage. No aneurysm on CTA. Nutrition Interval:  Minced and moist diet per SLP 6/24. Pt seen in recliner, wife and son present. Pt reports poor appetite, states \"I'm just not hungry at all. \" Wife attempting to feed pt, pt declining all offers of foods. Wife states pt did drink 100% of strawberry ensure 6/26 which RN provided, pt agreeable to trying supplement today. RD provided chocolate supplement, pt began to drink. 50% of supplement consumed at end of RD assessment. Nutrition Related Findings:   Pt without joselin signs of fat or muscle wasting noted, thinness to facial features noted. Current Nutrition Therapies:  ADULT DIET;  Dysphagia - Minced and Moist    Current Intake:   Average Meal Intake: 1-25% Average Supplements Intake: None Ordered      Anthropometric Measures:  Height: 5' 10\" (177.8 cm)  Current Body Wt: 185 lb 3 oz (84 kg) (6/27), Weight source: Bed Scale  BMI: 26.6  Admission Body Weight: 177 lb 14.6 oz (80.7 kg) (6/20)  Ideal Body Weight (Kg) (Calculated): 75 kg (166 lbs), 111.6 %  Usual Body Wt: 190 lb (86.2 kg) (per pt wife), Percent weight change: -2.5       Edema:Peripheral Vascular  Peripheral Vascular (WDL): Within Defined Limits   BMI Category Overweight (BMI 25.0-29. 9)  Estimated Daily Nutrient Needs:  Energy (kcal/day): 4574-5665 (20-25kcal/kg) (Kcal/kg Weight used: 84 kg Current  Protein (g/day): 67-84 (0.8-1g/kg) Weight Used: (Current) 84 kg  Fluid (ml/day):   (1 ml/kcal)    Nutrition Diagnosis:   · Inadequate oral intake related to cognitive or neurological impairment (decreased appetite) as evidenced by  [facility], pt reported barriers to intake as above)       Nutrition Interventions:   Food and/or Nutrient Delivery: Continue Current Diet,Start Oral Nutrition Supplement     Coordination of Nutrition Care: Continue to monitor while inpatient,Interdisciplinary Rounds       Goals: Active Goal: PO intake 50% or greater,by next RD assessment       Nutrition Monitoring and Evaluation:      Food/Nutrient Intake Outcomes: Diet Advancement/Tolerance,Food and Nutrient Intake,Supplement Intake  Physical Signs/Symptoms Outcomes: Biochemical Data,Meal Time Behavior,Weight    Discharge Planning:     Too soon to determine    Anil Barreto, [address] [place] [address],   Contact: [phone]

## 2022-06-28 ENCOUNTER — APPOINTMENT (OUTPATIENT)
Dept: CT IMAGING | Age: 82
DRG: 085 | End: 2022-06-28
Payer: MEDICARE

## 2022-06-28 LAB
GLUCOSE BLD STRIP.AUTO-MCNC: 172 MG/DL (ref 65–100)
GLUCOSE BLD STRIP.AUTO-MCNC: 179 MG/DL (ref 65–100)
GLUCOSE BLD STRIP.AUTO-MCNC: 183 MG/DL (ref 65–100)
GLUCOSE BLD STRIP.AUTO-MCNC: 94 MG/DL (ref 65–100)
SARS-COV-2 RNA RESP QL NAA+PROBE: NOT DETECTED
SERVICE CMNT-IMP: ABNORMAL
SERVICE CMNT-IMP: NORMAL
SOURCE: NORMAL

## 2022-06-28 PROCEDURE — 82962 GLUCOSE BLOOD TEST: CPT

## 2022-06-28 PROCEDURE — 2580000003 HC RX 258: Performed by: FAMILY MEDICINE

## 2022-06-28 PROCEDURE — 6370000000 HC RX 637 (ALT 250 FOR IP): Performed by: INTERNAL MEDICINE

## 2022-06-28 PROCEDURE — 6360000002 HC RX W HCPCS: Performed by: INTERNAL MEDICINE

## 2022-06-28 PROCEDURE — A4216 STERILE WATER/SALINE, 10 ML: HCPCS | Performed by: INTERNAL MEDICINE

## 2022-06-28 PROCEDURE — 2580000003 HC RX 258: Performed by: INTERNAL MEDICINE

## 2022-06-28 PROCEDURE — 6370000000 HC RX 637 (ALT 250 FOR IP): Performed by: FAMILY MEDICINE

## 2022-06-28 PROCEDURE — 6360000002 HC RX W HCPCS: Performed by: FAMILY MEDICINE

## 2022-06-28 PROCEDURE — 70450 CT HEAD/BRAIN W/O DYE: CPT

## 2022-06-28 PROCEDURE — 6370000000 HC RX 637 (ALT 250 FOR IP): Performed by: HOSPITALIST

## 2022-06-28 PROCEDURE — C9113 INJ PANTOPRAZOLE SODIUM, VIA: HCPCS | Performed by: INTERNAL MEDICINE

## 2022-06-28 PROCEDURE — 1100000003 HC PRIVATE W/ TELEMETRY

## 2022-06-28 PROCEDURE — 99222 1ST HOSP IP/OBS MODERATE 55: CPT | Performed by: PSYCHIATRY & NEUROLOGY

## 2022-06-28 RX ORDER — MORPHINE SULFATE 4 MG/ML
4 INJECTION INTRAVENOUS ONCE
Status: COMPLETED | OUTPATIENT
Start: 2022-06-28 | End: 2022-06-28

## 2022-06-28 RX ADMIN — ATORVASTATIN CALCIUM 20 MG: 10 TABLET, FILM COATED ORAL at 09:09

## 2022-06-28 RX ADMIN — SODIUM CHLORIDE, PRESERVATIVE FREE 5 ML: 5 INJECTION INTRAVENOUS at 05:31

## 2022-06-28 RX ADMIN — MORPHINE SULFATE 4 MG: 4 INJECTION INTRAVENOUS at 05:30

## 2022-06-28 RX ADMIN — SODIUM CHLORIDE, PRESERVATIVE FREE 10 ML: 5 INJECTION INTRAVENOUS at 21:47

## 2022-06-28 RX ADMIN — HYDRALAZINE HYDROCHLORIDE 50 MG: 50 TABLET, FILM COATED ORAL at 14:43

## 2022-06-28 RX ADMIN — ONDANSETRON 4 MG: 2 INJECTION INTRAMUSCULAR; INTRAVENOUS at 05:30

## 2022-06-28 RX ADMIN — AMLODIPINE BESYLATE 10 MG: 10 TABLET ORAL at 09:09

## 2022-06-28 RX ADMIN — HYDRALAZINE HYDROCHLORIDE 50 MG: 50 TABLET, FILM COATED ORAL at 05:18

## 2022-06-28 RX ADMIN — SODIUM CHLORIDE, PRESERVATIVE FREE 10 ML: 5 INJECTION INTRAVENOUS at 09:10

## 2022-06-28 RX ADMIN — OXYCODONE 5 MG: 5 TABLET ORAL at 05:18

## 2022-06-28 RX ADMIN — SODIUM CHLORIDE 40 MG: 9 INJECTION INTRAMUSCULAR; INTRAVENOUS; SUBCUTANEOUS at 09:10

## 2022-06-28 RX ADMIN — LOSARTAN POTASSIUM 100 MG: 50 TABLET, FILM COATED ORAL at 09:09

## 2022-06-28 RX ADMIN — SODIUM CHLORIDE 40 MG: 9 INJECTION INTRAMUSCULAR; INTRAVENOUS; SUBCUTANEOUS at 17:44

## 2022-06-28 RX ADMIN — POLYETHYLENE GLYCOL 3350 17 G: 17 POWDER, FOR SOLUTION ORAL at 09:10

## 2022-06-28 RX ADMIN — CITALOPRAM HYDROBROMIDE 40 MG: 20 TABLET ORAL at 09:09

## 2022-06-28 RX ADMIN — ACETAMINOPHEN 650 MG: 325 TABLET ORAL at 05:18

## 2022-06-28 RX ADMIN — HYDRALAZINE HYDROCHLORIDE 50 MG: 50 TABLET, FILM COATED ORAL at 21:05

## 2022-06-28 ASSESSMENT — PAIN DESCRIPTION - ORIENTATION: ORIENTATION: POSTERIOR

## 2022-06-28 ASSESSMENT — PAIN DESCRIPTION - LOCATION: LOCATION: HEAD

## 2022-06-28 ASSESSMENT — PAIN DESCRIPTION - ONSET: ONSET: SUDDEN

## 2022-06-28 ASSESSMENT — PAIN SCALES - GENERAL
PAINLEVEL_OUTOF10: 0
PAINLEVEL_OUTOF10: 8
PAINLEVEL_OUTOF10: 0

## 2022-06-28 ASSESSMENT — PAIN DESCRIPTION - PAIN TYPE: TYPE: ACUTE PAIN

## 2022-06-28 ASSESSMENT — PAIN DESCRIPTION - FREQUENCY: FREQUENCY: CONTINUOUS

## 2022-06-28 ASSESSMENT — PAIN DESCRIPTION - DESCRIPTORS: DESCRIPTORS: ACHING

## 2022-06-28 NOTE — PROGRESS NOTES
Patient was experiencing a sudden and very bad headache. First time that he had complained to me about one and the first time that he had complained of one since he was in the unit. Notified Dr. Katarzyna Carter at 1085 and she gave orders for additional Medication and a Stat CT. This nurse and an assistant took patient to CT and returned him to the floor. Upon return to the floor patient's oxygen was 89%. Placed him on 2L until medication can wear off.  Notified Dr. Katarzyna Carter of the CT results which were:  Decreased hemorrhage in the ventricles and adjacent to the   brainstem, with minimal residual.

## 2022-06-28 NOTE — PROGRESS NOTES
Hospitalist Progress Note   Admit Date:  2022  9:49 PM   Name:  Xiomy Matamoros   Age:  79 y.o. Sex:  male  :  1940   MRN:  [id number]   Room:  708/    Presenting Complaint: Posterior Neck ache  Reason(s) for Admission: Subarachnoid hemorrhage ([address] [I60.9]  SAH (subarachnoid hemorrhage) ([address] [I60.9]  Hypertensive emergency [I16.1]     Hospital Course & Interval History: This is a 79 y.o. male with medical history of HTN, CKD, dementia who presented to ED on  with severe posterior headache. Symptoms started around  on . Upon ER evaluation, NIH was 0. 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 BP was found to be very elevated with SBP>200. CT head shows SAH adjacent to brainstem. Neurosurgery was consulted by ER. They reviewed the chart and recommended CTA. Patient as placed on cardene drip for BP control. CTA was obtained which does show an area of abnormality at subclavian, clot or small dissection. Vascular Sugery was consulted who felt it was an chronic plaque or thrombus and recommended Plavix when able to take. He was admitted to the ICU on a Cardene gtt, since  has come off of this. On  he had sudden onset vomiting of coffee ground emesis. PPI was started with GI consult. GI has seen and signed off on . No further emesis or evidence of GIB. Poor candidate for endoscopy with recent SAH. Subjective/24hr Events (22): Patient somnolent this morning. Wife at bedside. Patient had headache earlier this morning for which CT head was done and showed decreased hemorrhage in the ventricles and adjacent to the brainstem. No fever no chills. Assessment & Plan:     Principal Problem: This is a 76-year-old male with:      Subarachnoid hemorrhage ([address]  -  CT Head with SAH adjacent to the brainstem. CTA with no aneurysm  -  Repeat CT with no change  -  Repeat CT with decrease in [facility] but now with blood layering in posterior horn ventricles.    - Goal SBP <160  - Continue home Cozaar, can increase dose if needed  - Holding Labetalol with bradycardia overnight. - weaned off Cardene gtt. Add Norvasc and Hydralazine po  - Hydralazine IV prn  - Appreciate Neurology's input and assistance  - PT/OT  - MRI of the brain shows no evidence of acute infarction. Intraventricular hemorrhage, subarachnoid bleed adjacent to the brainstem noted. Chronic infarcts and remote insults. CTA head and neck showed a small focus of filling defect within the proximal left subclavian artery which may represent noncalcified plaque or thromboembolic disease. Findings were discussed with [facility] by neurology Dr. Maurilio Hope. 6/28 CT head was repeated this morning as patient was having headache. Shows decreased hemorrhage in the ventricles. Active Problems:  Acute Blood Loss Anemia  Acute Upper GI Bleed  - Likely due to UGIB  - 6/20 Hgb 11.3 but trended down to ~9 on 6/21 with large coffee ground emesis. Emesis is hemoccult positive  - Continue PPI BID.   - GI has seen and signed off on 6/22. No further emesis or evidence of GIB. Poor candidate for endoscopy with recent SAH.   - Hgb stable ~7-8.   -Hemoglobin stable at 8.2. Hypernatremia:  Patient with very poor p.o. intake. Sodium stable at 146. Repeat BMP in AM.      Essential hypertension, benign  - Off Cardene gtt since 6/22  -Increased dose of losartan to 100 mg p.o. daily. Continue amlodipine, hydralazine.  - Holding Labetalol with bradycardia   - Hydralazine IV prn  - Goal SBP <160    Sinus bradycardia  - 6/23 telemetry reported bradycardia down to 20's-30's per RN.  - Holding Labetalol  - TSH is 2.41.  - Keep Mag>2, K>4    Acute respiratory failure with hypoxia  -resolved      CKD stage 4, GFR 15-29 ml/min ([facility])  - At baseline Cr ~1.6-1.8      Type 2 diabetes mellitus ([facility]  - Well controlled--hA1C 6.0 on 6/21  - Continue Humalog SSI      Tobacco use disorder  - Continue Nicotine patch.        Subclavian (chronic kidney disease) stage 4, GFR 15-29 ml/min ([facility]) 6/21/2022 Yes          Objective:     Patient Vitals for the past 24 hrs:   Temp Pulse Resp BP SpO2   06/28/22 1230 -- -- -- -- 94 %   06/28/22 1200 97.7 °F (36.5 °C) 72 18 (!) 147/54 --   06/28/22 0845 -- -- -- -- 94 %   06/28/22 0800 97.6 °F (36.4 °C) 90 20 (!) 153/41 --   06/28/22 0616 -- 86 20 (!) 153/57 93 %   06/28/22 0518 -- -- 20 -- --   06/28/22 0506 -- -- 18 (!) 161/84 --   06/28/22 0446 -- -- -- (!) 189/78 --   06/28/22 0400 98.8 °F (37.1 °C) 82 20 (!) 159/84 94 %   06/28/22 0000 98.4 °F (36.9 °C) 65 16 (!) 152/83 --   06/27/22 2308 -- -- -- -- 94 %   06/27/22 2000 98.6 °F (37 °C) 66 16 (!) 153/65 --   06/27/22 1945 -- -- -- -- 95 %   06/27/22 1600 97.9 °F (36.6 °C) 72 17 (!) 145/60 95 %       Oxygen Therapy  SpO2: 94 %  Pulse Oximetry Type: Continuous  Pulse via Oximetry: 84 beats per minute  Pulse Oximeter Device Mode: Continuous  Pulse Oximeter Device Location: Left,Finger  O2 Device: Nasal cannula  Oximetry Probe Site Changed: No  Skin Assessment: Clean, dry, & intact  Skin Protection for O2 Device: N/A  Intervention(s): Reposition Device (placed in mouth due to pt being a mouth breather)  O2 Flow Rate (L/min): 2 L/min    Estimated body mass index is 26.57 kg/m² as calculated from the following:    Height as of this encounter: 5' 10\" (1.778 m). Weight as of this encounter: 185 lb 3 oz (84 kg). Intake/Output Summary (Last 24 hours) at 6/28/2022 1527  Last data filed at 6/27/2022 2308  Gross per 24 hour   Intake --   Output 575 ml   Net -575 ml         Physical Exam:   Blood pressure (!) 147/54, pulse 72, temperature 97.7 °F (36.5 °C), temperature source Oral, resp. rate 18, height 5' 10\" (1.778 m), weight 185 lb 3 oz (84 kg), SpO2 94 %. General:    Well nourished. No overt distress. Hard of hearing. Head:  Normocephalic, atraumatic  Eyes:  Sclerae appear normal.  Pupils equally round. ENT:  Nares appear normal, no drainage.   Moist oral mucosa  Neck:  No restricted ROM. Trachea midline   CV:   RRR. No m/r/g. No jugular venous distension. Lungs:   CTAB. No wheezing, rhonchi, or rales. Respirations even, unlabored  Abdomen: Bowel sounds present. Soft, nontender, nondistended. Extremities: No cyanosis or clubbing. No edema  Skin:     No rashes and normal coloration. Warm and dry. Neuro:  CN II-XII grossly intact. Sensation intact. Strength 5/5 b/l. Alert and oriented x3. Psych:  Normal mood and affect.       I have reviewed ordered lab tests and independently visualized imaging below:    Recent Labs:  Recent Results (from the past 48 hour(s))   POCT Glucose    Collection Time: 06/26/22  3:54 PM   Result Value Ref Range    POC Glucose 215 (H) 65 - 100 mg/dL    Performed by: Delroy    POCT Glucose    Collection Time: 06/26/22  9:05 PM   Result Value Ref Range    POC Glucose 163 (H) 65 - 100 mg/dL    Performed by: Kelsey    CBC with Auto Differential    Collection Time: 06/27/22  2:51 AM   Result Value Ref Range    WBC 8.7 4.3 - 11.1 K/uL    RBC 3.16 (L) 4.23 - 5.6 M/uL    Hemoglobin 8.2 (L) 13.6 - 17.2 g/dL    Hematocrit 25.7 (L) 41.1 - 50.3 %    MCV 81.3 79.6 - 97.8 FL    MCH 25.9 (L) 26.1 - 32.9 PG    MCHC 31.9 31.4 - 35.0 g/dL    RDW 15.9 (H) 11.9 - 14.6 %    Platelets 861 734 - 225 K/uL    MPV 12.2 9.4 - 12.3 FL    nRBC 0.00 0.0 - 0.2 K/uL    Differential Type AUTOMATED      Seg Neutrophils 62 43 - 78 %    Lymphocytes 22 13 - 44 %    Monocytes 12 4.0 - 12.0 %    Eosinophils % 3 0.5 - 7.8 %    Basophils 0 0.0 - 2.0 %    Immature Granulocytes 1 0.0 - 5.0 %    Segs Absolute 5.4 1.7 - 8.2 K/UL    Absolute Lymph # 1.9 0.5 - 4.6 K/UL    Absolute Mono # 1.0 0.1 - 1.3 K/UL    Absolute Eos # 0.3 0.0 - 0.8 K/UL    Basophils Absolute 0.0 0.0 - 0.2 K/UL    Absolute Immature Granulocyte 0.1 0.0 - 0.5 K/UL   Basic Metabolic Panel    Collection Time: 06/27/22  2:51 AM   Result Value Ref Range    Sodium 146 (H) 138 - 145 mmol/L Potassium 3.3 (L) 3.5 - 5.1 mmol/L    Chloride 114 (H) 98 - 107 mmol/L    CO2 27 21 - 32 mmol/L    Anion Gap 5 (L) 7 - 16 mmol/L    Glucose 163 (H) 65 - 100 mg/dL    BUN 29 (H) 8 - 23 MG/DL    CREATININE 1.60 (H) 0.8 - 1.5 MG/DL    GFR  53 (L) >60 ml/min/1.73m2    GFR Non- 44 (L) >60 ml/min/1.73m2    Calcium 8.5 8.3 - 10.4 MG/DL   Magnesium    Collection Time: 06/27/22  2:51 AM   Result Value Ref Range    Magnesium 2.1 1.8 - 2.4 mg/dL   POCT Glucose    Collection Time: 06/27/22  6:26 AM   Result Value Ref Range    POC Glucose 181 (H) 65 - 100 mg/dL    Performed by: Kelsey    POCT Glucose    Collection Time: 06/27/22 11:08 AM   Result Value Ref Range    POC Glucose 176 (H) 65 - 100 mg/dL    Performed by: Maricarmen    COVID-19    Collection Time: 06/27/22 12:32 PM    Specimen: Nasopharyngeal Swab   Result Value Ref Range    SARS-CoV-2 Please find results under separate order     COVID-19    Collection Time: 06/27/22 12:32 PM    Specimen: Nasopharyngeal   Result Value Ref Range    Source Nasopharyngeal      SARS-CoV-2, PCR Not detected NOTD     POCT Glucose    Collection Time: 06/27/22  4:22 PM   Result Value Ref Range    POC Glucose 192 (H) 65 - 100 mg/dL    Performed by: Maricarmen    POCT Glucose    Collection Time: 06/27/22  8:49 PM   Result Value Ref Range    POC Glucose 197 (H) 65 - 100 mg/dL    Performed by: Joe    POCT Glucose    Collection Time: 06/28/22  9:18 AM   Result Value Ref Range    POC Glucose 179 (H) 65 - 100 mg/dL    Performed by: Anson    POCT Glucose    Collection Time: 06/28/22 11:33 AM   Result Value Ref Range    POC Glucose 94 65 - 100 mg/dL    Performed by: Freedom        Other Studies:  CTA HEAD NECK W WO CONTRAST    Result Date: 6/21/2022  History: Complains of headache and neck pain and hypertension Comments: CT ANGIOGRAM OF THE NECK AND CT ANGIOGRAM OF THE [place] was obtained following the administration of IV contrast. IV contrast was administered to evaluate the arterial vasculature. Reformatted images in the coronal and sagittal planes as well as 3-D imaging was obtained and reviewed on a dedicated PACS and [address]. Radiation reduction dose techniques were used for the study. Our CT scanner use one or all of the following- Automated exposure control, adjustment of the mA and/or KV according the patient size, iterative reconstruction. All measurements are based upon NASCET criteria if appropriate. This study was analyzed by the [address] [place] [address] [address]Algorithm Findings: CT ANGIOGRAM OF THE NECK: The arch demonstrates atherosclerotic changes. Within the proximal left subclavian artery there is a soft tissue filling defect. This may represent noncalcified plaque disease or thromboemboli. This may place the patient at additional risk for distal emboli in the left upper extremity and/or of the left vertebral artery The right left carotid bulbs demonstrate eccentric calcified and noncalcified plaque disease. Proximally 50% narrowing is noted on the right and less than 50% on the left. The proximal vertebral arteries are patent with at least moderate stenosis noted at the origin on the left. The lung apices are clear with the exception of a mild scarring in the medial aspect of the right upper lobe. The thyroid gland is unremarkable. CT angiogram of [place]: The petrous, cavernous, and supraclinoid internal carotid arteries are patent with diffuse atherosclerotic changes. Moderate narrowing is noted of the clinoid portion of the right internal carotid artery. Anterior circulation and middle cerebral arteries are patent. The distal vertebral arteries posterior inferior cerebellar arteries basilar artery superior cerebellar arteries and posterior cerebral arteries are patent. The dural venous sinuses are patent.      1. Small focus of filling defect noted within the proximal left subclavian artery which may represent noncalcified plaque disease or thromboembolic disease. This may place the patient at additional risk for distal emboli either within the left upper extremity or the left vertebral artery. CT HEAD WO CONTRAST    Result Date: 6/20/2022  EXAM: CT HEAD WO CONTRAST HISTORY:  Reason for exam:->sudden onset HA, bp 260/110. TECHNIQUE: Axial images of the brain were performed without the administration of intravenous contrast. Images were obtained axial plane and coronal reformatted images were submitted. Dose reduction technique used: Automated exposure control/Adjustment of the mA and/or kV according to patient size/Use of iterative reconstruction technique. COMPARISON: MRI dated 1/26/2017 FINDINGS: There is hyperdense hemorrhage noted adjacent to the brainstem, at the level of the foramen magnum. There is no appreciable intracranial parenchymal hemorrhage. There is no midline shift, or mass effect. There are mild chronic appearing microangiopathic changes within the periventricular white matter. No evidence of acute confluent territorial infarction. Ventricles and basal cisterns are patent and symmetric. There is mild generalized volume loss. Visualized paranasal sinuses and mastoid air cells are without significant fluid. Scalp, soft tissues, and calvarium are within normal limits. Findings described above suggest subarachnoid hemorrhage adjacent to the the brainstem, at the level of the foramen magnum. This does not appear to represent intraparenchymal hemorrhage. This case was discussed with the ordering physician at the time of interpretation. XR CHEST PORTABLE    Result Date: 6/20/2022  EXAM: XR CHEST PORTABLE HISTORY: evaluate. TECHNIQUE: Frontal chest. COMPARISON: 5/9/2016 FINDINGS: The cardiac silhouette, mediastinum, and pulmonary vasculature are within normal limits. There is no consolidation, pleural effusion, or pneumothorax. No significant osseous abnormalities are observed.      No evidence of an acute intrathoracic process.        Current Meds:  Current Facility-Administered Medications   Medication Dose Route Frequency    losartan (COZAAR) tablet 100 mg  100 mg Oral Daily    hydrALAZINE (APRESOLINE) injection 10 mg  10 mg IntraVENous Q6H PRN    labetalol (NORMODYNE;TRANDATE) injection 20 mg  20 mg IntraVENous Q4H PRN    amLODIPine (NORVASC) tablet 10 mg  10 mg Oral Daily    [Held by provider] labetalol (NORMODYNE) tablet 200 mg  200 mg Oral 2 times per day    polyethylene glycol (GLYCOLAX) packet 17 g  17 g Oral Daily    hydrALAZINE (APRESOLINE) tablet 50 mg  50 mg Oral 3 times per day    oxyCODONE (ROXICODONE) immediate release tablet 5 mg  5 mg Oral Q6H PRN    citalopram (CELEXA) tablet 40 mg  40 mg Oral Daily    atorvastatin (LIPITOR) tablet 20 mg  20 mg Oral Daily    sodium chloride flush 0.9 % injection 5-40 mL  5-40 mL IntraVENous 2 times per day    sodium chloride flush 0.9 % injection 5-40 mL  5-40 mL IntraVENous PRN    0.9 % sodium chloride infusion   IntraVENous PRN    ondansetron (ZOFRAN-ODT) disintegrating tablet 4 mg  4 mg Oral Q8H PRN    Or    ondansetron (ZOFRAN) injection 4 mg  4 mg IntraVENous Q6H PRN    polyethylene glycol (GLYCOLAX) packet 17 g  17 g Oral Daily PRN    acetaminophen (TYLENOL) tablet 650 mg  650 mg Oral Q6H PRN    Or    acetaminophen (TYLENOL) suppository 650 mg  650 mg Rectal Q6H PRN    nicotine (NICODERM CQ) 14 MG/24HR 1 patch  1 patch TransDERmal Daily    glucose chewable tablet 16 g  4 tablet Oral PRN    dextrose bolus 10% 125 mL  125 mL IntraVENous PRN    Or    dextrose bolus 10% 250 mL  250 mL IntraVENous PRN    glucagon injection 1 mg  1 mg IntraMUSCular PRN    dextrose 5 % solution  100 mL/hr IntraVENous PRN    insulin lispro (HUMALOG) injection vial 0-8 Units  0-8 Units SubCUTAneous TID WC    insulin lispro (HUMALOG) injection vial 0-4 Units  0-4 Units SubCUTAneous Nightly    pantoprazole (PROTONIX) 40 mg in sodium chloride (PF) 10 mL injection  40 mg IntraVENous BID       Discharge Plan:     Days: 1-2 days. STR. PPD Ordered: 6/21    Signed:  Azul Damon MD    Part of this note may have been written by using a voice dictation software. The note has been proof read but may still contain some grammatical/other typographical errors.

## 2022-06-28 NOTE — CARE COORDINATION
Pt has been accepted for STR admission to [facility] and can transfer there tomorrow if he is medically cleared for dc. CM spoke with pt/spouse to update them on bed offer and plan. They are in agreement. CM following to facilitate pt's transfer to rehab at [facility].

## 2022-06-28 NOTE — PROGRESS NOTES
OT Note:    OT attempted to see patient today for therapy. Pt too drowsy to participate at this time. OT will re-attempt to see patient at a later date/time.     Thanks,  J LUIS Moreno

## 2022-06-29 ENCOUNTER — APPOINTMENT (OUTPATIENT)
Dept: MRI IMAGING | Age: 82
DRG: 085 | End: 2022-06-29
Payer: MEDICARE

## 2022-06-29 ENCOUNTER — TELEPHONE (OUTPATIENT)
Dept: NEUROLOGY | Age: 82
End: 2022-06-29

## 2022-06-29 PROBLEM — E87.0 HYPERNATREMIA: Status: ACTIVE | Noted: 2022-06-29

## 2022-06-29 LAB
ANION GAP SERPL CALC-SCNC: 4 MMOL/L (ref 7–16)
BASOPHILS # BLD: 0 K/UL (ref 0–0.2)
BASOPHILS NFR BLD: 0 % (ref 0–2)
BUN SERPL-MCNC: 29 MG/DL (ref 8–23)
CALCIUM SERPL-MCNC: 9.2 MG/DL (ref 8.3–10.4)
CHLORIDE SERPL-SCNC: 114 MMOL/L (ref 98–107)
CO2 SERPL-SCNC: 28 MMOL/L (ref 21–32)
CREAT SERPL-MCNC: 1.6 MG/DL (ref 0.8–1.5)
DIFFERENTIAL METHOD BLD: ABNORMAL
EOSINOPHIL # BLD: 0.2 K/UL (ref 0–0.8)
EOSINOPHIL NFR BLD: 2 % (ref 0.5–7.8)
ERYTHROCYTE [DISTWIDTH] IN BLOOD BY AUTOMATED COUNT: 16.6 % (ref 11.9–14.6)
GLUCOSE BLD STRIP.AUTO-MCNC: 132 MG/DL (ref 65–100)
GLUCOSE BLD STRIP.AUTO-MCNC: 149 MG/DL (ref 65–100)
GLUCOSE BLD STRIP.AUTO-MCNC: 182 MG/DL (ref 65–100)
GLUCOSE BLD STRIP.AUTO-MCNC: 229 MG/DL (ref 65–100)
GLUCOSE SERPL-MCNC: 145 MG/DL (ref 65–100)
HCT VFR BLD AUTO: 28.3 % (ref 41.1–50.3)
HGB BLD-MCNC: 8.9 G/DL (ref 13.6–17.2)
IMM GRANULOCYTES # BLD AUTO: 0.1 K/UL (ref 0–0.5)
IMM GRANULOCYTES NFR BLD AUTO: 1 % (ref 0–5)
LYMPHOCYTES # BLD: 1.3 K/UL (ref 0.5–4.6)
LYMPHOCYTES NFR BLD: 13 % (ref 13–44)
MAGNESIUM SERPL-MCNC: 2.6 MG/DL (ref 1.8–2.4)
MCH RBC QN AUTO: 26.3 PG (ref 26.1–32.9)
MCHC RBC AUTO-ENTMCNC: 31.4 G/DL (ref 31.4–35)
MCV RBC AUTO: 83.7 FL (ref 79.6–97.8)
MONOCYTES # BLD: 1.2 K/UL (ref 0.1–1.3)
MONOCYTES NFR BLD: 12 % (ref 4–12)
NEUTS SEG # BLD: 7.2 K/UL (ref 1.7–8.2)
NEUTS SEG NFR BLD: 72 % (ref 43–78)
NRBC # BLD: 0 K/UL (ref 0–0.2)
PLATELET # BLD AUTO: 267 K/UL (ref 150–450)
PMV BLD AUTO: 12.1 FL (ref 9.4–12.3)
POTASSIUM SERPL-SCNC: 4 MMOL/L (ref 3.5–5.1)
RBC # BLD AUTO: 3.38 M/UL (ref 4.23–5.6)
SERVICE CMNT-IMP: ABNORMAL
SODIUM SERPL-SCNC: 146 MMOL/L (ref 138–145)
WBC # BLD AUTO: 9.9 K/UL (ref 4.3–11.1)

## 2022-06-29 PROCEDURE — A4216 STERILE WATER/SALINE, 10 ML: HCPCS | Performed by: INTERNAL MEDICINE

## 2022-06-29 PROCEDURE — 72156 MRI NECK SPINE W/O & W/DYE: CPT

## 2022-06-29 PROCEDURE — 6360000004 HC RX CONTRAST MEDICATION: Performed by: PSYCHIATRY & NEUROLOGY

## 2022-06-29 PROCEDURE — 6370000000 HC RX 637 (ALT 250 FOR IP): Performed by: FAMILY MEDICINE

## 2022-06-29 PROCEDURE — 6370000000 HC RX 637 (ALT 250 FOR IP): Performed by: HOSPITALIST

## 2022-06-29 PROCEDURE — C9113 INJ PANTOPRAZOLE SODIUM, VIA: HCPCS | Performed by: INTERNAL MEDICINE

## 2022-06-29 PROCEDURE — 85025 COMPLETE CBC W/AUTO DIFF WBC: CPT

## 2022-06-29 PROCEDURE — 2580000003 HC RX 258: Performed by: FAMILY MEDICINE

## 2022-06-29 PROCEDURE — 97530 THERAPEUTIC ACTIVITIES: CPT

## 2022-06-29 PROCEDURE — 6360000002 HC RX W HCPCS: Performed by: INTERNAL MEDICINE

## 2022-06-29 PROCEDURE — 6370000000 HC RX 637 (ALT 250 FOR IP): Performed by: INTERNAL MEDICINE

## 2022-06-29 PROCEDURE — 2580000003 HC RX 258: Performed by: PSYCHIATRY & NEUROLOGY

## 2022-06-29 PROCEDURE — 97112 NEUROMUSCULAR REEDUCATION: CPT

## 2022-06-29 PROCEDURE — A9579 GAD-BASE MR CONTRAST NOS,1ML: HCPCS | Performed by: PSYCHIATRY & NEUROLOGY

## 2022-06-29 PROCEDURE — 83735 ASSAY OF MAGNESIUM: CPT

## 2022-06-29 PROCEDURE — 1100000003 HC PRIVATE W/ TELEMETRY

## 2022-06-29 PROCEDURE — 99232 SBSQ HOSP IP/OBS MODERATE 35: CPT | Performed by: PSYCHIATRY & NEUROLOGY

## 2022-06-29 PROCEDURE — 82962 GLUCOSE BLOOD TEST: CPT

## 2022-06-29 PROCEDURE — 97535 SELF CARE MNGMENT TRAINING: CPT

## 2022-06-29 PROCEDURE — 36415 COLL VENOUS BLD VENIPUNCTURE: CPT

## 2022-06-29 PROCEDURE — 80048 BASIC METABOLIC PNL TOTAL CA: CPT

## 2022-06-29 PROCEDURE — 2580000003 HC RX 258: Performed by: INTERNAL MEDICINE

## 2022-06-29 RX ORDER — LORAZEPAM 0.5 MG/1
1 TABLET ORAL ONCE
Status: COMPLETED | OUTPATIENT
Start: 2022-06-29 | End: 2022-06-29

## 2022-06-29 RX ORDER — SODIUM CHLORIDE 0.9 % (FLUSH) 0.9 %
10 SYRINGE (ML) INJECTION AS NEEDED
Status: DISCONTINUED | OUTPATIENT
Start: 2022-06-29 | End: 2022-06-30 | Stop reason: HOSPADM

## 2022-06-29 RX ADMIN — CITALOPRAM HYDROBROMIDE 40 MG: 20 TABLET ORAL at 08:52

## 2022-06-29 RX ADMIN — HYDRALAZINE HYDROCHLORIDE 50 MG: 50 TABLET, FILM COATED ORAL at 21:22

## 2022-06-29 RX ADMIN — POLYETHYLENE GLYCOL 3350 17 G: 17 POWDER, FOR SOLUTION ORAL at 08:52

## 2022-06-29 RX ADMIN — SODIUM CHLORIDE, PRESERVATIVE FREE 10 ML: 5 INJECTION INTRAVENOUS at 10:32

## 2022-06-29 RX ADMIN — AMLODIPINE BESYLATE 10 MG: 10 TABLET ORAL at 08:52

## 2022-06-29 RX ADMIN — SODIUM CHLORIDE, PRESERVATIVE FREE 10 ML: 5 INJECTION INTRAVENOUS at 08:53

## 2022-06-29 RX ADMIN — INSULIN LISPRO 2 UNITS: 100 INJECTION, SOLUTION INTRAVENOUS; SUBCUTANEOUS at 17:57

## 2022-06-29 RX ADMIN — HYDRALAZINE HYDROCHLORIDE 50 MG: 50 TABLET, FILM COATED ORAL at 15:33

## 2022-06-29 RX ADMIN — SODIUM CHLORIDE 40 MG: 9 INJECTION INTRAMUSCULAR; INTRAVENOUS; SUBCUTANEOUS at 17:26

## 2022-06-29 RX ADMIN — HYDRALAZINE HYDROCHLORIDE 50 MG: 50 TABLET, FILM COATED ORAL at 05:26

## 2022-06-29 RX ADMIN — LOSARTAN POTASSIUM 100 MG: 50 TABLET, FILM COATED ORAL at 08:53

## 2022-06-29 RX ADMIN — ACETAMINOPHEN 650 MG: 325 TABLET ORAL at 11:26

## 2022-06-29 RX ADMIN — GADOTERIDOL 15 ML: 279.3 INJECTION, SOLUTION INTRAVENOUS at 10:32

## 2022-06-29 RX ADMIN — ATORVASTATIN CALCIUM 20 MG: 10 TABLET, FILM COATED ORAL at 08:52

## 2022-06-29 RX ADMIN — SODIUM CHLORIDE 40 MG: 9 INJECTION INTRAMUSCULAR; INTRAVENOUS; SUBCUTANEOUS at 08:53

## 2022-06-29 RX ADMIN — SODIUM CHLORIDE, PRESERVATIVE FREE 10 ML: 5 INJECTION INTRAVENOUS at 21:26

## 2022-06-29 RX ADMIN — LORAZEPAM 1 MG: 0.5 TABLET ORAL at 09:44

## 2022-06-29 ASSESSMENT — PAIN SCALES - GENERAL
PAINLEVEL_OUTOF10: 3
PAINLEVEL_OUTOF10: 0

## 2022-06-29 ASSESSMENT — PAIN DESCRIPTION - LOCATION: LOCATION: HEAD

## 2022-06-29 ASSESSMENT — PAIN DESCRIPTION - DESCRIPTORS: DESCRIPTORS: ACHING

## 2022-06-29 ASSESSMENT — PAIN DESCRIPTION - ORIENTATION: ORIENTATION: POSTERIOR

## 2022-06-29 NOTE — PROGRESS NOTES
Hospitalist Progress Note   Admit Date:  2022  9:49 PM   Name:  Ricki Del Real   Age:  79 y.o. Sex:  male  :  1940   MRN:  [id number]   Room:  708/01    Presenting Complaint: Headache and Hypertension     Reason(s) for Admission: Subarachnoid hemorrhage ([facility] [I60.9]  SAH (subarachnoid hemorrhage) ([facility] [I60.9]  Hypertensive emergency [I16.1]     Hospital Course & Interval History:   79 y. o. male with medical history of HTN, CKD, dementia who presented to ED on  with severe posterior headache.  Symptoms started around  on .  Upon ER evaluation, NIH was 0.  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  BP was found to be very elevated with SBP>200.  CT head shows SAH adjacent to brainstem.  Neurosurgery was consulted by ER. They reviewed the chart and recommended CTA. He was admitted to the ICU on a Cardene gtt, since  has come off of this. Repeat CT head shows stable findings. CTA was obtained which does show an area of abnormality at subclavian, clot or small dissection.  Vascular [facility] was consulted who felt it was an chronic plaque or thrombus and recommended Plavix when able to take. Findings were discussed with [facility] by neurology Dr. Karen Johnson. Per note on  from neuro, hold off on procedure at this time due to morbidity, CKD until overall neurologic situation has become more clear. On  he had sudden onset vomiting of coffee ground emesis. PPI was started with GI consult. GI has seen and signed off on . No further emesis or evidence of GIB. Poor candidate for endoscopy with recent SAH. Subjective/24hr Events (22): Patient alert and oriented x3 this a.m., follows commands. Denies any headache this morning. Does not have much of an appetite. Otherwise no complaint. Awaiting MRI C-spine this morning. No fever, chills, SOB, chest pain, abdominal pain, nausea, vomiting.       Assessment & Plan:       [address]   CT Head with SAH adjacent to the brainstem. CTA with no aneurysm  6/21 Repeat CT with no change  6/22 Repeat CT with decrease in [facility] but now with blood layering in posterior horn ventricles. 6/28 CT head was repeated as patient was having headache. Shows decreased hemorrhage in the ventricles. MRI of the brain shows no evidence of acute infarction. Intraventricular hemorrhage, subarachnoid bleed adjacent to the brainstem noted. Chronic infarcts and remote insults. Goal SBP <160  Continue home Cozaar, can increase dose if needed  D/c Labetalol with bradycardia  Cont Norvasc and Hydralazine   Hydralazine IV prn  Appreciate Neurology's input and assistance  PT/OT-- recommended STR  MRI C-spine per Neuro to eval for source of SAH    Subclavian artery thrombosis  CTA head and neck showed a small focus of filling defect within the proximal left subclavian artery which may represent noncalcified plaque or thromboembolic disease. Findings were discussed with [facility] by neurology Dr. Kay Arrington. Per note on 6/27 from neuro, hold off on procedure at this time due to morbidity, CKD until overall neurologic situation has become more clear. Vascular surgery has seen, appears chronic in nature  Cannot take Plavix due to [facility]  F/u Neurosurgery outpatient    Acute upper gastrointestinal bleeding  Acute blood loss anemia  6/20 Hgb 11.3 but trended down to ~9 on 6/21 with large coffee ground emesis. Emesis was hemoccult positive  Continue PPI BID. GI has seen and signed off on 6/22. No further emesis or evidence of GIB.  Poor candidate for endoscopy with recent SAH  Hgb stable    Bradycardia, sinus, resolved  TSH 2.41  D/c labetalol  Keep mag >2, K >4    Hypernatremia  Stable  Encourage free fluid intake    Acute respiratory failure with hypoxia, resolved  2/2 atelectasis     Vascular dementia  Delirium precautions    Type 2 diabetes mellitus  A1c 6.0 on 6/21  Continue SSI    Tobacco use disorder  Continue nicotine patch    Essential hypertension, benign  Goal SBP <160  Continue home Cozaar, can increase dose if needed  Holding Labetalol with bradycardia  Cont Norvasc and Hydralazine     CKD stage 4  CR at baseline ~1.6-1.8  Avoid nephrotoxic meds      Management of Delirium      Non-pharmacological intervention  · Reorient the patient throughout the day  · Window open and lights on during the day. Lights off, television off, noises down during the night. If able, decrease nursing checks at night  · Therapies as often as possible  · Avoid restraints to the best of your ability   · Avoid sensory deprivation by using glasses and hearing aids, if applicable        Pharmacological intervention  · Replete electrolyte abnormalities and correct metabolic abnormalities  · Limit benzodiazepines, antihistamines, narcotics, anticholinergics. Preference towards Precedex for sedation over fentanyl and benzodiazepines/GABAa agonists. · For dangerous behavior/aggression to self or others can consider Zyprexa or Seroquel if benefits outweigh risk  · For persistent insomnia can use melatonin four hours prior to bedtime or Seroquel 25 mg at bedtime         Discharge Planning:     STR pending once cleared by Neuro. MRI C spine today. I spent 35 minutes of time caring for this patient at bedside or nearby on the unit, more than 50 percent of which was spent on coordination of care and/or counseling regarding the disease process, treatment options, and treatment plan. Diet:  ADULT DIET;  Dysphagia - Minced and Moist  ADULT ORAL NUTRITION SUPPLEMENT; Breakfast, Lunch, Dinner; Diabetic Oral Supplement  DVT PPx: SCD's  Code status: DNR    Hospital Problems:  Principal Problem:    SAH (subarachnoid hemorrhage) (HCC)  Active Problems:    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HCC)    Dementia (HCC)    Type 2 diabetes mellitus (HCC)    Acute blood loss anemia ([facility]LA)    Acute upper gastrointestinal bleeding    Bradycardia, sinus    Acute respiratory failure with hypoxia ([facility]    Tobacco use disorder    Essential hypertension, benign    CKD (chronic kidney disease) stage 4, GFR 15-29 ml/min ([facility])  Resolved Problems:    * No resolved hospital problems. *      Objective:     Patient Vitals for the past 24 hrs:   Temp Pulse Resp BP SpO2   06/29/22 0816 -- -- -- -- 93 %   06/29/22 0800 98.1 °F (36.7 °C) 79 18 (!) 154/63 --   06/29/22 0526 -- -- -- (!) 169/75 --   06/29/22 0347 98.7 °F (37.1 °C) 93 18 (!) 169/75 90 %   06/29/22 0000 98.4 °F (36.9 °C) 84 18 110/63 --   06/28/22 2221 -- -- -- -- 100 %   06/28/22 2000 98 °F (36.7 °C) 77 18 (!) 152/69 --   06/28/22 1928 -- -- -- -- 94 %   06/28/22 1600 97.9 °F (36.6 °C) 83 18 (!) 118/57 96 %   06/28/22 1230 -- -- -- -- 94 %   06/28/22 1200 97.7 °F (36.5 °C) 72 18 (!) 147/54 --       Oxygen Therapy  SpO2: 93 %  Pulse Oximetry Type: Continuous  Pulse via Oximetry: 84 beats per minute  Pulse Oximeter Device Mode: Continuous  Pulse Oximeter Device Location: Left,Finger  O2 Device: Nasal cannula  Oximetry Probe Site Changed: No  Skin Assessment: Clean, dry, & intact  Skin Protection for O2 Device: N/A  Intervention(s): Reposition Device (placed in mouth due to pt being a mouth breather)  O2 Flow Rate (L/min): 2 L/min    Estimated body mass index is 26.57 kg/m² as calculated from the following:    Height as of this encounter: 5' 10\" (1.778 m). Weight as of this encounter: 185 lb 3 oz (84 kg). Intake/Output Summary (Last 24 hours) at 6/29/2022 0936  Last data filed at 6/29/2022 0626  Gross per 24 hour   Intake 30 ml   Output 700 ml   Net -670 ml         Physical Exam:     Blood pressure (!) 154/63, pulse 79, temperature 98.1 °F (36.7 °C), temperature source Oral, resp. rate 18, height 5' 10\" (1.778 m), weight 185 lb 3 oz (84 kg), SpO2 93 %. General:    Well nourished. Hard of hearing  Head:  Normocephalic, atraumatic  Eyes:  Sclerae appear normal.  Pupils equally round. ENT:  Nares appear normal, no drainage.   Moist oral mucosa  Neck:  No restricted ROM. Trachea midline   CV:   RRR. No m/r/g. No jugular venous distension. Lungs:   CTAB. No wheezing, rhonchi, or rales. Respirations even, unlabored  Abdomen: Bowel sounds present. Soft, nontender, nondistended. Extremities: No cyanosis or clubbing. No edema  Skin:     No rashes and normal coloration. Warm and dry. Neuro:  CN II-XII grossly intact. Sensation intact. A&Ox3. Moves all extremities spontaneously. Strength 5/5 b/l  Psych:  Normal mood and affect.       I have reviewed ordered lab tests and independently visualized imaging below:    Recent Labs:  Recent Results (from the past 48 hour(s))   POCT Glucose    Collection Time: 06/27/22 11:08 AM   Result Value Ref Range    POC Glucose 176 (H) 65 - 100 mg/dL    Performed by: [facility]    COVID-19    Collection Time: 06/27/22 12:32 PM    Specimen: Nasopharyngeal Swab   Result Value Ref Range    SARS-CoV-2 Please find results under separate order     COVID-19    Collection Time: 06/27/22 12:32 PM    Specimen: Nasopharyngeal   Result Value Ref Range    Source Nasopharyngeal      SARS-CoV-2, PCR Not detected NOTD     POCT Glucose    Collection Time: 06/27/22  4:22 PM   Result Value Ref Range    POC Glucose 192 (H) 65 - 100 mg/dL    Performed by: [facility]    POCT Glucose    Collection Time: 06/27/22  8:49 PM   Result Value Ref Range    POC Glucose 197 (H) 65 - 100 mg/dL    Performed by: [facility]    POCT Glucose    Collection Time: 06/28/22  9:18 AM   Result Value Ref Range    POC Glucose 179 (H) 65 - 100 mg/dL    Performed by: [place]    POCT Glucose    Collection Time: 06/28/22 11:33 AM   Result Value Ref Range    POC Glucose 94 65 - 100 mg/dL    Performed by: MaximoVenus    POCT Glucose    Collection Time: 06/28/22  3:57 PM   Result Value Ref Range    POC Glucose 172 (H) 65 - 100 mg/dL    Performed by: StephanePCTVenus    POCT Glucose    Collection Time: 06/28/22  9:11 PM   Result Value Ref Range POC Glucose 183 (H) 65 - 100 mg/dL    Performed by: Aleena    POCT Glucose    Collection Time: 06/29/22  6:26 AM   Result Value Ref Range    POC Glucose 149 (H) 65 - 100 mg/dL    Performed by: Aleena    CBC with Auto Differential    Collection Time: 06/29/22  8:05 AM   Result Value Ref Range    WBC 9.9 4.3 - 11.1 K/uL    RBC 3.38 (L) 4.23 - 5.6 M/uL    Hemoglobin 8.9 (L) 13.6 - 17.2 g/dL    Hematocrit 28.3 (L) 41.1 - 50.3 %    MCV 83.7 79.6 - 97.8 FL    MCH 26.3 26.1 - 32.9 PG    MCHC 31.4 31.4 - 35.0 g/dL    RDW 16.6 (H) 11.9 - 14.6 %    Platelets 081 185 - 462 K/uL    MPV 12.1 9.4 - 12.3 FL    nRBC 0.00 0.0 - 0.2 K/uL    Differential Type AUTOMATED      Seg Neutrophils 72 43 - 78 %    Lymphocytes 13 13 - 44 %    Monocytes 12 4.0 - 12.0 %    Eosinophils % 2 0.5 - 7.8 %    Basophils 0 0.0 - 2.0 %    Immature Granulocytes 1 0.0 - 5.0 %    Segs Absolute 7.2 1.7 - 8.2 K/UL    Absolute Lymph # 1.3 0.5 - 4.6 K/UL    Absolute Mono # 1.2 0.1 - 1.3 K/UL    Absolute Eos # 0.2 0.0 - 0.8 K/UL    Basophils Absolute 0.0 0.0 - 0.2 K/UL    Absolute Immature Granulocyte 0.1 0.0 - 0.5 K/UL   Basic Metabolic Panel    Collection Time: 06/29/22  8:05 AM   Result Value Ref Range    Sodium 146 (H) 138 - 145 mmol/L    Potassium 4.0 3.5 - 5.1 mmol/L    Chloride 114 (H) 98 - 107 mmol/L    CO2 28 21 - 32 mmol/L    Anion Gap 4 (L) 7 - 16 mmol/L    Glucose 145 (H) 65 - 100 mg/dL    BUN 29 (H) 8 - 23 MG/DL    CREATININE 1.60 (H) 0.8 - 1.5 MG/DL    GFR  53 (L) >60 ml/min/1.73m2    GFR Non- 44 (L) >60 ml/min/1.73m2    Calcium 9.2 8.3 - 10.4 MG/DL   Magnesium    Collection Time: 06/29/22  8:05 AM   Result Value Ref Range    Magnesium 2.6 (H) 1.8 - 2.4 mg/dL       Other Studies:  CT HEAD WO CONTRAST   Final Result   Decreased hemorrhage in the ventricles and adjacent to the   brainstem, with minimal residual.                  MRI BRAIN WO CONTRAST   Final Result      1.   Unchanged subarachnoid hemorrhage anterior to the cervical medullary   junction and involving the medullary cistern and foramen magnum. Given the   subtle increased vascularity along the central left central portions of the   cervical cord on axial image 407 of the prior CT angiogram, a catheter angiogram   is recommended to assess for an underlying vascular malformation causative of   the subarachnoid hemorrhage. 2.  Unchanged intraventricular blood products within the dependent right greater   than left lateral ventricles. 3.  The ventricles are stable in comparison to recent prior head CTs. However,   there is subtle increase in ventricular caliber in comparison to the 1/26/2017   brain MRI. While this may be consequent to progressive age-related cortical   volume loss, in the setting of subarachnoid hemorrhage and intraventricular   blood products, close attention to ventricular caliber is recommended on   follow-up exams to assess for the possibility of a mild developing communicating   hydrocephalus. 4.  No evidence of acute ischemic infarction. 5.  Extensive chronic vascular insults as detailed above. XR CHEST PORTABLE   Final Result      1. Findings as described above. CT HEAD WO CONTRAST   Final Result   Findings as described above. No significant interval change. CT HEAD WO CONTRAST   Final Result   Findings as above. CTA HEAD NECK W WO CONTRAST   Final Result   1. Small focus of filling defect noted within the proximal left subclavian   artery which may represent noncalcified plaque disease or thromboembolic   disease. This may place the patient at additional risk for distal emboli either   within the left upper extremity or the left vertebral artery. XR CHEST PORTABLE   Final Result   No evidence of an acute intrathoracic process.          CT HEAD WO CONTRAST   Final Result   Findings described above suggest subarachnoid hemorrhage adjacent to the the brainstem, at the level of the foramen magnum. This does not appear to represent   intraparenchymal hemorrhage. This case was discussed with the ordering physician at the time of   interpretation.          MRI CERVICAL SPINE W WO CONTRAST    (Results Pending)       Current Meds:  Current Facility-Administered Medications   Medication Dose Route Frequency    LORazepam (ATIVAN) tablet 1 mg  1 mg Oral Once    losartan (COZAAR) tablet 100 mg  100 mg Oral Daily    hydrALAZINE (APRESOLINE) injection 10 mg  10 mg IntraVENous Q6H PRN    labetalol (NORMODYNE;TRANDATE) injection 20 mg  20 mg IntraVENous Q4H PRN    amLODIPine (NORVASC) tablet 10 mg  10 mg Oral Daily    [Held by provider] labetalol (NORMODYNE) tablet 200 mg  200 mg Oral 2 times per day    polyethylene glycol (GLYCOLAX) packet 17 g  17 g Oral Daily    hydrALAZINE (APRESOLINE) tablet 50 mg  50 mg Oral 3 times per day    oxyCODONE (ROXICODONE) immediate release tablet 5 mg  5 mg Oral Q6H PRN    citalopram (CELEXA) tablet 40 mg  40 mg Oral Daily    atorvastatin (LIPITOR) tablet 20 mg  20 mg Oral Daily    sodium chloride flush 0.9 % injection 5-40 mL  5-40 mL IntraVENous 2 times per day    sodium chloride flush 0.9 % injection 5-40 mL  5-40 mL IntraVENous PRN    0.9 % sodium chloride infusion   IntraVENous PRN    ondansetron (ZOFRAN-ODT) disintegrating tablet 4 mg  4 mg Oral Q8H PRN    Or    ondansetron (ZOFRAN) injection 4 mg  4 mg IntraVENous Q6H PRN    polyethylene glycol (GLYCOLAX) packet 17 g  17 g Oral Daily PRN    acetaminophen (TYLENOL) tablet 650 mg  650 mg Oral Q6H PRN    Or    acetaminophen (TYLENOL) suppository 650 mg  650 mg Rectal Q6H PRN    nicotine (NICODERM CQ) 14 MG/24HR 1 patch  1 patch TransDERmal Daily    glucose chewable tablet 16 g  4 tablet Oral PRN    dextrose bolus 10% 125 mL  125 mL IntraVENous PRN    Or    dextrose bolus 10% 250 mL  250 mL IntraVENous PRN    glucagon injection 1 mg  1 mg IntraMUSCular PRN    dextrose 5 % solution  100 mL/hr IntraVENous PRN    insulin lispro (HUMALOG) injection vial 0-8 Units  0-8 Units SubCUTAneous TID WC    insulin lispro (HUMALOG) injection vial 0-4 Units  0-4 Units SubCUTAneous Nightly    pantoprazole (PROTONIX) 40 mg in sodium chloride (PF) 10 mL injection  40 mg IntraVENous BID       Signed: Rylee Beard DO    Part of this note may have been written by using a voice dictation software. The note has been proof read but may still contain some grammatical/other typographical errors.

## 2022-06-29 NOTE — PROGRESS NOTES
 PUD (peptic ulcer disease) 1970's    Pure hypercholesterolemia 2/27/2014    Renal artery stenosis ([facility] [address] 2/27/2014    Renal sclerosis, unspecified 2/27/2014    left    Respiratory insufficiency 1/13/2016    Stroke ([facility]     CVA x 2, (last was fall of 2011),  no residual weakness; L eye impaired    Tobacco use disorder 2/27/2014    Unspecified gastritis and gastroduodenitis without mention of hemorrhage 2/27/2014    Unspecified hereditary and idiopathic peripheral neuropathy 2/27/2014    Unspecified sleep apnea     does not wear cpap    Unspecified transient cerebral ischemia 2/27/2014       Past Surgical History:   Procedure Laterality Date    CARDIAC CATHETERIZATION      stents prior to CABG    COLONOSCOPY      KIDNEY REMOVAL Left     LIPOMA RESECTION  10/07/2019    [place] CARDIAC SURG PROCEDURE UNLIST  2001    4 vessel CABG, cardiac stents    UROLOGICAL SURGERY      TURBT    VASCULAR SURGERY  2001    renal stent in L kidney        Family History   Problem Relation Age of Onset    Diabetes Mother     Heart Disease Mother     Heart Attack Mother         Myocardial Infarction    Stroke Father     Alcohol Abuse Father     Diabetes Son         Social History     Socioeconomic History    Marital status:      Spouse name: None    Number of children: None    Years of education: None    Highest education level: None   Occupational History    None   Tobacco Use    Smoking status: Current Every Day Smoker     Packs/day: 1.00    Smokeless tobacco: Never Used   Vaping Use    Vaping Use: Never used   Substance and Sexual Activity    Alcohol use: No    Drug use: No     Types: Prescription, OTC    Sexual activity: Not Currently   Other Topics Concern    None   Social History Narrative    None     Social Determinants of Health     Financial Resource Strain:     Difficulty of Paying Living Expenses: Not on file   Food Insecurity:     Worried About Running Out of Food in the Last Year: Not on file    [address] in the Last Year: Not on file   Transportation Needs:     Lack of Transportation (Medical): Not on file    Lack of Transportation (Non-Medical):  Not on file   Physical Activity:     Days of Exercise per Week: Not on file    Minutes of Exercise per Session: Not on file   Stress:     Feeling of Stress : Not on file   Social Connections:     Frequency of Communication with Friends and Family: Not on file    Frequency of Social Gatherings with Friends and Family: Not on file    Attends Jewish Services: Not on file    Active Member of Clubs or Organizations: Not on file    Attends Club or Organization Meetings: Not on file    Marital Status: Not on file   Intimate Partner Violence:     Fear of Current or Ex-Partner: Not on file    Emotionally Abused: Not on file    Physically Abused: Not on file    Sexually Abused: Not on file   Housing Stability:     Unable to Pay for Housing in the Last Year: Not on file    Number of [place] in the Last Year: Not on file    Unstable Housing in the Last Year: Not on file         Current Facility-Administered Medications   Medication Dose Route Frequency Provider Last Rate Last Admin    sodium chloride flush 0.9 % injection 10 mL  10 mL IntraVENous PRN Cirilo Carroll MD   10 mL at 06/29/22 1032    losartan (COZAAR) tablet 100 mg  100 mg Oral Daily Jinny Araiza MD   100 mg at 06/29/22 0853    hydrALAZINE (APRESOLINE) injection 10 mg  10 mg IntraVENous Q6H PRN Cleone Denver, MD   10 mg at 06/26/22 0027    labetalol (NORMODYNE;TRANDATE) injection 20 mg  20 mg IntraVENous Q4H PRN Cleone Denver, MD   20 mg at 06/25/22 1832    amLODIPine (NORVASC) tablet 10 mg  10 mg Oral Daily Thu Guerra, DO   10 mg at 06/29/22 0852    polyethylene glycol (GLYCOLAX) packet 17 g  17 g Oral Daily Thu Guerra, DO   17 g at 06/29/22 0852    hydrALAZINE (APRESOLINE) tablet 50 mg  50 mg Oral 3 times per day Thu Guerra, DO   50 mg at 06/29/22 0526    oxyCODONE (ROXICODONE) immediate release tablet 5 mg  5 mg Oral Q6H PRN Naeem Holden DO   5 mg at 06/28/22 0518    citalopram (CELEXA) tablet 40 mg  40 mg Oral Daily Frances Tijerina MD   40 mg at 06/29/22 8395    atorvastatin (LIPITOR) tablet 20 mg  20 mg Oral Daily Frances Tijerina MD   20 mg at 06/29/22 0040    sodium chloride flush 0.9 % injection 5-40 mL  5-40 mL IntraVENous 2 times per day Frances Tijerina MD   10 mL at 06/29/22 0853    sodium chloride flush 0.9 % injection 5-40 mL  5-40 mL IntraVENous PRN Frances Tijerina MD   5 mL at 06/28/22 0531    0.9 % sodium chloride infusion   IntraVENous PRN Frances Tijerina MD        ondansetron (ZOFRAN-ODT) disintegrating tablet 4 mg  4 mg Oral Q8H PRN Frances Tijerina MD        Or    ondansetron [facility]) injection 4 mg  4 mg IntraVENous Q6H PRN Frances Tijerina MD   4 mg at 06/28/22 0530    polyethylene glycol (GLYCOLAX) packet 17 g  17 g Oral Daily PRN Frances Tijerina MD        acetaminophen (TYLENOL) tablet 650 mg  650 mg Oral Q6H PRN Frances Tijerina MD   650 mg at 06/29/22 1126    Or    acetaminophen (TYLENOL) suppository 650 mg  650 mg Rectal Q6H PRN Frances Tijerina MD        nicotine (NICODERM CQ) 14 MG/24HR 1 patch  1 patch TransDERmal Daily Frances Tijerina MD   1 patch at 06/29/22 8014    glucose chewable tablet 16 g  4 tablet Oral PRN Naeem Holden,         dextrose bolus 10% 125 mL  125 mL IntraVENous PRN Naeem Holden,         Or    dextrose bolus 10% 250 mL  250 mL IntraVENous PRN Naeem Holden,         glucagon injection 1 mg  1 mg IntraMUSCular PRN Naeem Holden, DO        dextrose 5 % solution  100 mL/hr IntraVENous PRN Naeem Holden DO   Stopped at 06/27/22 1922    insulin lispro (HUMALOG) injection vial 0-8 Units  0-8 Units SubCUTAneous TID EDUARDO Walker DO   5 Units at 06/26/22 1631    insulin lispro (HUMALOG) injection vial 0-4 Units  0-4 Units SubCUTAneous Nightly Naeem Holden DO  pantoprazole (PROTONIX) 40 mg in sodium chloride (PF) 10 mL injection  40 mg IntraVENous BID Claire Notch, DO   40 mg at 06/29/22 5307        Allergies   Allergen Reactions    Ciprofloxacin Other (See Comments)     weakness       Review of Systems   Sufficiently drowsy to make a complete review of systems and appropriate    BP (!) 139/59   Pulse 84   Temp 97.7 °F (36.5 °C) (Axillary)   Resp 15   Ht 5' 10\" (1.778 m)   Wt 185 lb 3 oz (84 kg)   SpO2 91%   BMI 26.57 kg/m²     Neurologic Exam  The patient is drowsy but rousable to visual and verbal stimulation. Nuchal rigidity which is minimal at this point but is complaining of neck pain and headache. Full range of extraocular movements face is moving symmetrically he is able to move both upper and lower extremities and wiggle his toes to commands    Most recent MRI   Results for orders placed during the hospital encounter of 06/20/22    MRI BRAIN WO CONTRAST    Narrative      1. No evidence of an acute infarct. 2.  Intraventricular hemorrhage, subarachnoid blood adjacent the brainstem again  noted. 3.  Chronic infarcts and remote insults. Exam detail limited by patient motion. Final report to follow. EXAMINATION: BRAIN MRI 6/25/2022 5:54 PM    ACCESSION NUMBER: [id number]    INDICATION: possible brainstem ischemia due to [facility] with bilateral limb ataxia in  the upper extremities. COMPARISON: Head CT 6/22/2022, 6/21/2022, brain MRI 1/26/2017, CTA head and neck  6/20/2022, head CT 6/20/2022    TECHNIQUE: Multiplanar multisequence MRI of the brain without the administration  of intravenous contrast.    FINDINGS:    Technically difficult exam due to patient motion. Midline structures including the corpus callosum, pituitary gland, optic nerves,  and cerebellum are well developed.     There is persistent subarachnoid hemorrhage anterior to the cervicomedullary  junction and within the medullary cistern and extending into the anterior aspect  of foramen magnum. This was initially demonstrated on the head CT of 6/20/2022. Further, there are blood products within the dependent portions of the occipital  horns of both lateral ventricles, right greater than left. Intraventricular  blood was initially demonstrated on the head CT of 6/21/2022. The ventricles are stable in caliber in comparison to recent prior head CTs,  including ex vacuo dilation of the occipital horn of the right lateral ventricle  consequent to adjacent right occipital lobe encephalomalacia. The ventricles are slightly larger than on the 1/26/2017 brain MRI. There is no  definite thick T2 hyperintensity marginating the contours of the lateral  ventricles. Scattered T2 hyperintensities in the periventricular and subcortical white  matter have slightly worsened in comparison to the prior exam. This is a  nonspecific finding which most likely represents a mild burden of chronic  microangiopathy. Multifocal encephalomalacia throughout the left greater than right cerebellar  hemispheres at the site of prior vascular insults, similar to the 1/26/2017  exam.    A midline nasopharyngeal mucosal cyst is not significant changed in comparison  to the 1/26/2017 exam, measuring 2.6 x 2.3 cm (axial image 5). 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 Diffusion imaging shows no evidence of acute ischemic infarction. The expected large central intracranial vascular flow voids are preserved. There are no suspicious osseous lesions. Impression  1. Unchanged subarachnoid hemorrhage anterior to the cervical medullary  junction and involving the medullary cistern and foramen magnum.  Given the  subtle increased vascularity along the central left central portions of the  cervical cord on axial image 407 of the prior CT angiogram, a catheter angiogram  is recommended to assess for an underlying vascular malformation causative of  the subarachnoid hemorrhage. 2.  Unchanged intraventricular blood products within the dependent right greater  than left lateral ventricles. 3.  The ventricles are stable in comparison to recent prior head CTs. However,  there is subtle increase in ventricular caliber in comparison to the 1/26/2017  brain MRI. While this may be consequent to progressive age-related cortical  volume loss, in the setting of subarachnoid hemorrhage and intraventricular  blood products, close attention to ventricular caliber is recommended on  follow-up exams to assess for the possibility of a mild developing communicating  hydrocephalus. 4.  No evidence of acute ischemic infarction. 5.  Extensive chronic vascular insults as detailed above. Most recent MRA   No results found for this or any previous visit. Most recent CTA  Results for orders placed during the hospital encounter of 06/20/22    CT HEAD WO CONTRAST    Narrative  EXAM: CT HEAD WO CONTRAST    HISTORY:  Nontraumatic subarachnoid hemorrhage, unspecified / Reason for  exam:->SAH. TECHNIQUE: Axial images of the brain were performed without the administration  of intravenous contrast. Images were obtained axial plane and coronal  reformatted images were submitted. Dose reduction technique used: Automated exposure control/Adjustment of the mA  and/or kV according to patient size/Use of iterative reconstruction technique. COMPARISON: None. FINDINGS:  No significant interval change in the appearance of the hyperdensity adjacent to  the brainstem which may represent hemorrhage. Compared to the previous examination there is been no significant interval  change in the intraventricular hemorrhage. There is no new intracranial hemorrhage appreciated. There is no midline shift or mass effect observed. Stable chronic changes. Impression  Findings as described above. No significant interval change.        Most recent Echo  No results found for this or any previous visit. Most recent lipid panels  Lab Results   Component Value Date    CHOL 98 03/22/2022    HDL 38 03/22/2022    VLDL 15 03/22/2022       Most recent Hgb A1C  No results found for: HBA1C, VAM0MGPO      Assessment/Plan:  Subarachnoid hemorrhage. He continues to have a headache but is improving. Current Thomas Miller score is 3  Proceeding with MRI of the cervical spine.   There have been some difficulties with artifact and the instrument which has caused some degree of delay he is stable currently            Chandrika Foss MD

## 2022-06-29 NOTE — TELEPHONE ENCOUNTER
----- Message from Eros Hamm DO sent at 6/24/2022 11:39 AM EDT -----  Regarding:  Follow-up with Aga Mercedes for subarachnoid hemorrhage

## 2022-06-29 NOTE — TELEPHONE ENCOUNTER
Patient was called and left message to call back for scheduling.   Follow-up with Lavinia Mcallister for subarachnoid hemorrhage

## 2022-06-29 NOTE — PROGRESS NOTES
OCCUPATIONAL THERAPY Daily Note and PM     OT Visit Days: 3   Time  OT Charge Capture  Rehab Caseload Tracker  OT Orders    Ashley Pérez is a 79 y.o. male   PRIMARY DIAGNOSIS: SAH (subarachnoid hemorrhage) ([facility] [address]  Subarachnoid hemorrhage ([facility] [address] [I60.9]  SAH (subarachnoid hemorrhage) ([facility] [address] [I60.9]  Hypertensive emergency [I16.1]       Inpatient: Payor: MEDICARE / Plan: MEDICARE PART A AND B / Product Type: *No Product type* /     ASSESSMENT:     REHAB RECOMMENDATIONS: CURRENT LEVEL OF FUNCTION:  (Most Recently Demonstrated)   Recommendation to date pending progress:  Setting:   Short-term Rehab    Equipment:     To Be Determined Bathing:   Not Tested  Dressing:   Not Tested  Feeding/Grooming:   Maximal Assist for shaving  Toileting:   Not Tested  Functional Mobility:   Minimal Assist/mod A x2      ASSESSMENT:  Mr. Teresa Strong is doing fair today. Pt presents supine upon arrival. Pt required min A for bed mobility today. Pt demonstrates fair sitting balance at EOB. Pt was able to perform functional mobility in room/hallway with min A x2. Pt required seated rest breaks due to fatigue. Pt returned to room and perform simple grooming task at sink (seated). Pt required more assistance with shaving due to using safety razor. Pt gives good effort. Some progress noted with with goals. Will continue to benefit from skilled OT during stay.        SUBJECTIVE:     Mr. Teresa Strong states, \"I can't shave with a razor\"     Social/Functional Lives With: Spouse  Type of Home: House  Home Equipment: Cane (Hellen Stanley)  Receives Help From: Family  ADL Assistance: Independent  Active : No  Patient's  Info: drives some, but most family    OBJECTIVE:     [place] Dc / Hodan Hall / Asa Hook: NA    RESTRICTIONS/PRECAUTIONS:  Restrictions/Precautions  Restrictions/Precautions: Fall Risk        PAIN: VITALS / O2:   Pre Treatment:              Post Treatment: 0 Vitals          Oxygen            MOBILITY: I Mod I S SBA CGA Min Mod Max Total  NT x2 Comments:   Bed Mobility    Rolling [] [] [] [] [] [] [] [] [] [x] []    Supine to Sit [] [] [] [] [] [x] [] [] [] [] [x]    Scooting [] [] [] [] [] [x] [] [] [] [] []    Sit to Supine [] [] [] [] [] [] [] [] [] [x] []    Transfers    Sit to Stand [] [] [] [] [] [x] [x] [] [] [] [x]    Bed to Chair [] [] [] [] [] [x] [] [] [] [] [x]    Stand to Sit [] [] [] [] [] [x] [] [] [] [] [x]    Tub/Shower [] [] [] [] [] [] [] [] [] [x] []     Toilet [] [] [] [] [] [] [] [] [] [x] []      [] [] [] [] [] [] [] [] [] [] []    I=Independent, Mod I=Modified Independent, S=Supervision/Setup, SBA=Standby Assistance, CGA=Contact Guard Assistance, Min=Minimal Assistance, Mod=Moderate Assistance, Max=Maximal Assistance, Total=Total Assistance, NT=Not Tested    ACTIVITIES OF DAILY LIVING: I Mod I S SBA CGA Min Mod Max Total NT Comments   BASIC ADLs:              Upper Body   Bathing [] [] [] [] [] [] [] [] [] [x]    Lower Body Bathing [] [] [] [] [] [] [] [] [] [x]    Toileting [] [] [] [] [] [] [] [] [] [x]    Upper Body Dressing [] [] [] [] [] [] [] [] [] [x]    Lower Body Dressing [] [] [] [] [] [] [] [] [] [x]    Feeding [] [] [] [] [] [] [] [] [] [x]    Grooming [] [] [] [] [] [] [] [x] [] []    Personal Device Care [] [] [] [] [] [] [] [] [] [x]    Functional Mobility [] [] [] [] [] [x] [] [] [] [] x2   I=Independent, Mod I=Modified Independent, S=Supervision/Setup, SBA=Standby Assistance, CGA=Contact Guard Assistance, Min=Minimal Assistance, Mod=Moderate Assistance, Max=Maximal Assistance, Total=Total Assistance, NT=Not Tested    BALANCE: Good Fair+ Fair Fair- Poor NT Comments   Sitting Static [] [x] [] [] [] []    Sitting Dynamic [] [] [x] [] [] []              Standing Static [] [] [x] [] [] []    Standing Dynamic [] [] [] [x] [] []        PLAN:     FREQUENCY/DURATION   OT Plan of Care: 3 times/week for duration of hospital stay or until stated goals are met, whichever comes first.    ACUTE OCCUPATIONAL THERAPY GOALS:   (Developed with and agreed upon by patient and/or caregiver.)  1. Patient will complete lower body bathing and dressing with min A and adaptive equipment as needed. 2. Patient will complete toileting with SBA. 3. Patient will tolerate 30 minutes of OT treatment with 1-2 rest breaks to increase activity tolerance for ADLs. 4. Patient will complete functional transfers with SBA and adaptive equipment as needed. 5. Patient will complete functional mobility for household distances with supervision and adaptive equipment as needed. 6. Patient will complete self-grooming while standing edge of sink with supervision and adaptive equipment as needed.     Timeframe: 7 visits      TREATMENT:     TREATMENT:   Co-Treatment PT/OT necessary due to patient's decreased overall endurance/tolerance levels, as well as need for high level skilled assistance to complete functional transfers/mobility and functional tasks  Neuromuscular Re-education (20 Minutes): Neuromuscular Re-education included Balance Training, Coordination training, Postural training, Sitting balance training and Standing balance training to improve Balance, Coordination, Functional Mobility and Postural Control. Self Care (30 minutes): Patient participated in grooming ADLs in supported sitting with maximal manual and tactile cueing to increase independence, decrease assistance required and increase activity tolerance. Patient also participated in functional mobility and functional transfer training to increase independence, decrease assistance required and increase activity tolerance.      TREATMENT GRID:  N/A    AFTER TREATMENT PRECAUTIONS: Bed/Chair Locked, Call light within reach, Chair, Needs within reach and RN notified    INTERDISCIPLINARY COLLABORATION:  RN/ PCT, PT/ PTA and OT/ CRUZ    EDUCATION:       TOTAL TREATMENT DURATION AND TIME:  Time In: 1435  Time Out: [address]  Minutes: [address] OTA

## 2022-06-29 NOTE — PLAN OF CARE
Problem: Discharge Planning  Goal: Discharge to home or other facility with appropriate resources  Outcome: Progressing     Problem: Pain  Goal: Verbalizes/displays adequate comfort level or baseline comfort level  Outcome: Progressing     Problem: Safety - Adult  Goal: Free from fall injury  Outcome: Progressing     Problem: ABCDS Injury Assessment  Goal: Absence of physical injury  Outcome: Progressing     Problem: Chronic Conditions and Co-morbidities  Goal: Patient's chronic conditions and co-morbidity symptoms are monitored and maintained or improved  Outcome: Progressing     Problem: Skin/Tissue Integrity  Goal: Absence of new skin breakdown  Description: 1. Monitor for areas of redness and/or skin breakdown  2. Assess vascular access sites hourly  3. Every 4-6 hours minimum:  Change oxygen saturation probe site  4. Every 4-6 hours:  If on nasal continuous positive airway pressure, respiratory therapy assess nares and determine need for appliance change or resting period.   Outcome: Progressing     Problem: Neurosensory - Adult  Goal: Achieves stable or improved neurological status  Outcome: Progressing     Problem: Respiratory - Adult  Goal: Achieves optimal ventilation and oxygenation  Outcome: Progressing     Problem: Cardiovascular - Adult  Goal: Maintains optimal cardiac output and hemodynamic stability  Outcome: Progressing  Goal: Absence of cardiac dysrhythmias or at baseline  Outcome: Progressing     Problem: Skin/Tissue Integrity - Adult  Goal: Skin integrity remains intact  Outcome: Progressing  Goal: Incisions, wounds, or drain sites healing without S/S of infection  Outcome: Progressing  Goal: Oral mucous membranes remain intact  Outcome: Progressing     Problem: Musculoskeletal - Adult  Goal: Return mobility to safest level of function  Outcome: Progressing     Problem: Gastrointestinal - Adult  Goal: Minimal or absence of nausea and vomiting  Outcome: Progressing  Goal: Maintains or returns to baseline bowel function  Outcome: Progressing  Goal: Maintains adequate nutritional intake  Outcome: Progressing     Problem: Genitourinary - Adult  Goal: Absence of urinary retention  Outcome: Progressing     Problem: Infection - Adult  Goal: Absence of infection at discharge  Outcome: Progressing  Goal: Absence of infection during hospitalization  Outcome: Progressing  Goal: Absence of fever/infection during anticipated neutropenic period  Outcome: Progressing     Problem: Metabolic/Fluid and Electrolytes - Adult  Goal: Electrolytes maintained within normal limits  Outcome: Progressing  Goal: Hemodynamic stability and optimal renal function maintained  Outcome: Progressing     Problem: Hematologic - Adult  Goal: Maintains hematologic stability  Outcome: Progressing     Problem: Confusion  Goal: Confusion, delirium, dementia, or psychosis is improved or at baseline  Description: INTERVENTIONS:  1. Assess for possible contributors to thought disturbance, including medications, impaired vision or hearing, underlying metabolic abnormalities, dehydration, psychiatric diagnoses, and notify attending LIP  2. [place] high risk fall precautions, as indicated  3. Provide frequent short contacts to provide reality reorientation, refocusing and direction  4. Decrease environmental stimuli, including noise as appropriate  5. Monitor and intervene to maintain adequate nutrition, hydration, elimination, sleep and activity  6. If unable to ensure safety without constant attention obtain sitter and review sitter guidelines with assigned personnel  7.  Initiate Psychosocial CNS and Spiritual Care consult, as indicated  Outcome: Progressing

## 2022-06-29 NOTE — CARE COORDINATION
Pt not medically cleared for dc to rehab today. MRI results and neurology clearance pending. Facility notified and bed will still be available tomorrow if pt is cleared for dc.

## 2022-06-29 NOTE — PROGRESS NOTES
PHYSICAL THERAPY Daily Note and PM  (Link to Caseload Tracking: PT Visit Days : 3  Time In/Out PT Charge Capture  Rehab Caseload Tracker  Orders      Ricki Del Real is a 79 y.o. male   PRIMARY DIAGNOSIS: SAH (subarachnoid hemorrhage) ([facility] [address]  Subarachnoid hemorrhage ([facility] [address] [I60.9]  SAH (subarachnoid hemorrhage) ([facility] [address] [I60.9]  Hypertensive emergency [I16.1]       Inpatient: Payor: MEDICARE / Plan: MEDICARE PART A AND B / Product Type: *No Product type* /     ASSESSMENT:     REHAB RECOMMENDATIONS:   Recommendation to date pending progress:  Setting:   Short-term Rehab    Equipment:     To Be Determined     ASSESSMENT:  Mr. Darrin Munguia presents in supine, agreeable to session. He is noted to be much weaker and fatigued today. Upon entering, Pnt is agreeable to PT treatment. he reports no pain at rest. Pnt performed supine > sit with min Ax2, sitting EOB with fair- (posterior lean) sitting balance control. Sit > stand with min Ax2 using RW. Gait x 5, 15 ft with mod Ax2, cues for step length . Gait is noted to be very slow and shuffled. Stand > sit with min Ax2, followed by positioning for comfort. At end of session pt up in bedside chair with CRUZ with all needs within reach, alarm activated for safety, RN notified. Overall, slow progress today as pnt was much more fatigued and required heavier support. Pnt continues to present as functioning below his baseline, with deficits in mobility including transfers, gait, balance, and activity tolerance. Pt will continue to benefit from skilled therapy services to address stated deficits to promote return to highest level of function, independence, and safety. Will continue to follow.            SUBJECTIVE:   Mr. Darrin Munguia states, \"I gotta get some drawers\"     Social/Functional Lives With: Spouse  Type of Home: House  Home Equipment: Cane (Rony Fontenot)  Receives Help From: Family  ADL Assistance: Independent  Active : No  Patient's  Info: drives some, but most family  OBJECTIVE:     PAIN: VITALS / O2: PRECAUTION / Edd Roch / DRAINS:   Pre Treatment:          Post Treatment: 0 Vitals        Oxygen    None    RESTRICTIONS/PRECAUTIONS:  Restrictions/Precautions  Restrictions/Precautions: Fall Risk  Restrictions/Precautions: Fall Risk     MOBILITY: I Mod I S SBA CGA Min Mod Max Total  NT x2 Comments:   Bed Mobility    Rolling [] [] [] [] [] [x] [x] [] [] [] [x]    Supine to Sit [] [] [] [] [] [x] [x] [] [] [] [x]    Scooting [] [] [] [] [] [] [] [] [] [] []    Sit to Supine [] [] [] [] [] [] [] [] [] [x] []    Transfers    Sit to Stand [] [] [] [] [] [] [x] [] [] [] [x]    Bed to Chair [] [] [] [] [] [] [x] [] [] [] [x]    Stand to Sit [] [] [] [] [] [] [x] [] [] [] [x]     [] [] [] [] [] [] [] [] [] [] []    I=Independent, Mod I=Modified Independent, S=Supervision, SBA=Standby Assistance, CGA=Contact Guard Assistance,   Min=Minimal Assistance, Mod=Moderate Assistance, Max=Maximal Assistance, Total=Total Assistance, NT=Not Tested    BALANCE: Good Fair+ Fair Fair- Poor NT Comments   Sitting Static [] [x] [] [] [] []    Sitting Dynamic [] [x] [] [] [] []              Standing Static [] [] [x] [] [] []    Standing Dynamic [] [] [] [x] [] []      GAIT: I Mod I S SBA CGA Min Mod Max Total  NT x2 Comments:   Level of Assistance [] [] [] [] [] [] [x] [] [] [] [x]    Distance   feet    DME Gait Belt, Rolling Walker and chair follow    Gait Quality Decreased rae , Decreased step clearance, Decreased step length and Decreased stance    Weightbearing Status      Stairs      I=Independent, Mod I=Modified Independent, S=Supervision, SBA=Standby Assistance, CGA=Contact Guard Assistance,   Min=Minimal Assistance, Mod=Moderate Assistance, Max=Maximal Assistance, Total=Total Assistance, NT=Not Tested    PLAN:   ACUTE PHYSICAL THERAPY GOALS:   (Developed with and agreed upon by patient and/or caregiver. )  LTG:  (1.)Mr. Ashley Silver will move from supine to sit and sit to supine , scoot up and down and roll side to side in bed with SUPERVISION within 7 treatment day(s). (2.)Mr. Donavan Cardenas will transfer from bed to chair and chair to bed with SUPERVISION using the least restrictive device within 7 treatment day(s). (3.)Mr. Donavan Cardenas will ambulate with STAND BY ASSIST for 250 feet with the least restrictive device within 7 treatment day(s). (4.)Mr. Donavan Cardenas will participate in therapeutic activity/exercises x 25 minutes for increased strength within 7 treatment days. (5.)Mr. Donavan Cardenas will perform standing static and dynamic balance activities x 15 minutes with SUPERVISION to improve safety within 7 treatment day(s). FREQUENCY AND DURATION: 3 times/week for duration of hospital stay or until stated goals are met, whichever comes first.    TREATMENT:   TREATMENT:   Co-Treatment PT/OT necessary due to patient's decreased overall endurance/tolerance levels, as well as need for high level skilled assistance to complete functional transfers/mobility and functional tasks  Therapeutic Activity (25 Minutes): Therapeutic activity included Rolling, Supine to Sit, Sit to Supine, Ambulation on level ground, Sitting balance  and Standing balance to improve functional Activity tolerance, Balance, Coordination, Mobility, Strength and ROM.     TREATMENT GRID:  N/A    AFTER TREATMENT PRECAUTIONS: Bed/Chair Locked, Call light within reach and Side rails x3    INTERDISCIPLINARY COLLABORATION:  RN/ PCT, PT/ PTA and OT/ CRUZ    EDUCATION:      TIME IN/OUT:  Time In: 1435  Time Out: 1500  Minutes: 4500 Lee Jaramillo, PT

## 2022-06-29 NOTE — PROGRESS NOTES
[facility] Neurology [facility] [address]  [facility], [address] [facility]            Jade Kaiser is a 79 y.o. male who presents on referral from the inpatient service and he continues to endorse significant headache which tends to come and go. He is drowsy.   Situation discussed with his wife thoroughly in detail      Past Medical History:   Diagnosis Date    Allergic rhinitis, cause unspecified 2/27/2014    Anemia, unspecified 2/27/2014    Atherosclerosis of renal artery ([address]     2001    CAD (coronary artery disease) 2001    4 vessel cabg, cardiac stents prior to this    Cancer [facility]) 2013    bladder/ L kidney    Chronic airway obstruction, not elsewhere classified 2/27/2014    Chronic kidney disease     hx of CRF,  creatinine 1.8    Coronary atherosclerosis of native coronary vessel 2/27/2014    Depressive disorder, not elsewhere classified 2/27/2014    Diabetes ([facility] [address] dx 2000    type 2, oral med, does not check his glucose, A1C 6.4   9/9/2019, states very rare episode hypo    Diverticulosis of colon (without mention of hemorrhage) 2/27/2014    Dyslipidemia     controlled with med    Esophagitis, unspecified 2/27/2014    Essential and other specified forms of tremor 2/27/2014    Essential hypertension, benign 2/27/2014    GERD (gastroesophageal reflux disease)     controlled with omeprazole    Gross hematuria 2/27/2014    Hematuria, unspecified 2/27/2014    Hypertension     controlled with meds    Malignant neoplasm of bladder, 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    Malignant neoplasm of trigone of urinary bladder ([facility] [address] 2/27/2014    Microscopic hematuria 2/27/2014    Osteoarthrosis, unspecified whether generalized or localized, unspecified site 2/27/2014    Other peripheral vascular disease(443.89) 2/27/2014    Polyneuropathy in diabetes(357.2) 2/27/2014    Proteinuria 2/27/2014    Psychiatric disorder     gets \"aggravated\", treated with Celexa  PUD (peptic ulcer disease) 1970's    Pure hypercholesterolemia 2/27/2014    Renal artery stenosis ([facility] [address] 2/27/2014    Renal sclerosis, unspecified 2/27/2014    left    Respiratory insufficiency 1/13/2016    Stroke ([facility]     CVA x 2, (last was fall of 2011),  no residual weakness; L eye impaired    Tobacco use disorder 2/27/2014    Unspecified gastritis and gastroduodenitis without mention of hemorrhage 2/27/2014    Unspecified hereditary and idiopathic peripheral neuropathy 2/27/2014    Unspecified sleep apnea     does not wear cpap    Unspecified transient cerebral ischemia 2/27/2014       Past Surgical History:   Procedure Laterality Date    CARDIAC CATHETERIZATION      stents prior to CABG    COLONOSCOPY      KIDNEY REMOVAL Left     LIPOMA RESECTION  10/07/2019    NC CARDIAC SURG PROCEDURE UNLIST  2001    4 vessel CABG, cardiac stents    UROLOGICAL SURGERY      TURBT    VASCULAR SURGERY  2001    renal stent in L kidney        Family History   Problem Relation Age of Onset    Diabetes Mother     Heart Disease Mother     Heart Attack Mother         Myocardial Infarction    Stroke Father     Alcohol Abuse Father     Diabetes Son         Social History     Socioeconomic History    Marital status:      Spouse name: None    Number of children: None    Years of education: None    Highest education level: None   Occupational History    None   Tobacco Use    Smoking status: Current Every Day Smoker     Packs/day: 1.00    Smokeless tobacco: Never Used   Vaping Use    Vaping Use: Never used   Substance and Sexual Activity    Alcohol use: No    Drug use: No     Types: Prescription, OTC    Sexual activity: Not Currently   Other Topics Concern    None   Social History Narrative    None     Social Determinants of Health     Financial Resource Strain:     Difficulty of Paying Living Expenses: Not on file   Food Insecurity:     Worried About Running Out of Food in the Last Year: Not on file    [address] in the Last Year: Not on file   Transportation Needs:     Lack of Transportation (Medical): Not on file    Lack of Transportation (Non-Medical):  Not on file   Physical Activity:     Days of Exercise per Week: Not on file    Minutes of Exercise per Session: Not on file   Stress:     Feeling of Stress : Not on file   Social Connections:     Frequency of Communication with Friends and Family: Not on file    Frequency of Social Gatherings with Friends and Family: Not on file    Attends Jehovah's witness Services: Not on file    Active Member of Clubs or Organizations: Not on file    Attends Club or Organization Meetings: Not on file    Marital Status: Not on file   Intimate Partner Violence:     Fear of Current or Ex-Partner: Not on file    Emotionally Abused: Not on file    Physically Abused: Not on file    Sexually Abused: Not on file   Housing Stability:     Unable to Pay for Housing in the Last Year: Not on file    Number of [place] in the Last Year: Not on file    Unstable Housing in the Last Year: Not on file         Current Facility-Administered Medications   Medication Dose Route Frequency Provider Last Rate Last Admin    sodium chloride flush 0.9 % injection 10 mL  10 mL IntraVENous PRN Chandrika Foss MD   10 mL at 06/29/22 1032    losartan (COZAAR) tablet 100 mg  100 mg Oral Daily Jinny Araiza MD   100 mg at 06/29/22 0853    hydrALAZINE (APRESOLINE) injection 10 mg  10 mg IntraVENous Q6H PRN Marta Chamberlain MD   10 mg at 06/26/22 0027    labetalol (NORMODYNE;TRANDATE) injection 20 mg  20 mg IntraVENous Q4H PRN Marta Chamberlain MD   20 mg at 06/25/22 1832    amLODIPine (NORVASC) tablet 10 mg  10 mg Oral Daily Thu Guerra, DO   10 mg at 06/29/22 0852    polyethylene glycol (GLYCOLAX) packet 17 g  17 g Oral Daily Thu Guerra, DO   17 g at 06/29/22 0852    hydrALAZINE (APRESOLINE) tablet 50 mg  50 mg Oral 3 times per day Thu Guerra, DO   50 mg at 06/29/22 0526    oxyCODONE (ROXICODONE) immediate release tablet 5 mg  5 mg Oral Q6H PRN Quyen Theo, DO   5 mg at 06/28/22 0518    citalopram (CELEXA) tablet 40 mg  40 mg Oral Daily Misti Wright MD   40 mg at 06/29/22 0042    atorvastatin (LIPITOR) tablet 20 mg  20 mg Oral Daily Misti Wright MD   20 mg at 06/29/22 4777    sodium chloride flush 0.9 % injection 5-40 mL  5-40 mL IntraVENous 2 times per day Misti Wright MD   10 mL at 06/29/22 0853    sodium chloride flush 0.9 % injection 5-40 mL  5-40 mL IntraVENous PRN Misti Wright MD   5 mL at 06/28/22 0531    0.9 % sodium chloride infusion   IntraVENous PRN Misti Wright MD        ondansetron (ZOFRAN-ODT) disintegrating tablet 4 mg  4 mg Oral Q8H PRN Misti Wright MD        Or    ondansetron [facility]) injection 4 mg  4 mg IntraVENous Q6H PRN Misti Wright MD   4 mg at 06/28/22 0530    polyethylene glycol (GLYCOLAX) packet 17 g  17 g Oral Daily PRN Misti Wright MD        acetaminophen (TYLENOL) tablet 650 mg  650 mg Oral Q6H PRN Misti Wright MD   650 mg at 06/29/22 1126    Or    acetaminophen (TYLENOL) suppository 650 mg  650 mg Rectal Q6H PRN Misti Wright MD        nicotine (NICODERM CQ) 14 MG/24HR 1 patch  1 patch TransDERmal Daily Misti Wright MD   1 patch at 06/29/22 8124    glucose chewable tablet 16 g  4 tablet Oral PRN Quyen Theo, DO        dextrose bolus 10% 125 mL  125 mL IntraVENous PRN Quyen Theo, DO        Or    dextrose bolus 10% 250 mL  250 mL IntraVENous PRN Quyen Theo, DO        glucagon injection 1 mg  1 mg IntraMUSCular PRN Quyen Theo, DO        dextrose 5 % solution  100 mL/hr IntraVENous PRN Quyen Theo, DO   Stopped at 06/27/22 9082    insulin lispro (HUMALOG) injection vial 0-8 Units  0-8 Units SubCUTAneous TID  Spencer Walker DO   5 Units at 06/26/22 1631    insulin lispro (HUMALOG) injection vial 0-4 Units  0-4 Units SubCUTAneous Nightly Quyen Venegas DO  pantoprazole (PROTONIX) 40 mg in sodium chloride (PF) 10 mL injection  40 mg IntraVENous BID Vear Matthew, DO   40 mg at 06/29/22 5070        Allergies   Allergen Reactions    Ciprofloxacin Other (See Comments)     weakness       Review of Systems   Sufficiently drowsy to make a complete review of systems and appropriate    BP (!) 139/59   Pulse 84   Temp 97.7 °F (36.5 °C) (Axillary)   Resp 15   Ht 5' 10\" (1.778 m)   Wt 185 lb 3 oz (84 kg)   SpO2 91%   BMI 26.57 kg/m²     Neurologic Exam  The patient is drowsy but rousable to visual and verbal stimulation. Nuchal rigidity which is minimal at this point but is complaining of neck pain and headache. Full range of extraocular movements face is moving symmetrically he is able to move both upper and lower extremities and wiggle his toes to commands    Most recent MRI   Results for orders placed during the hospital encounter of 06/20/22    MRI BRAIN WO CONTRAST    Narrative      1. No evidence of an acute infarct. 2.  Intraventricular hemorrhage, subarachnoid blood adjacent the brainstem again  noted. 3.  Chronic infarcts and remote insults. Exam detail limited by patient motion. Final report to follow. EXAMINATION: BRAIN MRI 6/25/2022 5:54 PM    ACCESSION NUMBER: [id number]    INDICATION: possible brainstem ischemia due to [facility] with bilateral limb ataxia in  the upper extremities. COMPARISON: Head CT 6/22/2022, 6/21/2022, brain MRI 1/26/2017, CTA head and neck  6/20/2022, head CT 6/20/2022    TECHNIQUE: Multiplanar multisequence MRI of the brain without the administration  of intravenous contrast.    FINDINGS:    Technically difficult exam due to patient motion. Midline structures including the corpus callosum, pituitary gland, optic nerves,  and cerebellum are well developed.     There is persistent subarachnoid hemorrhage anterior to the cervicomedullary  junction and within the medullary cistern and extending into the anterior aspect  of foramen magnum. This was initially demonstrated on the head CT of 6/20/2022. Further, there are blood products within the dependent portions of the occipital  horns of both lateral ventricles, right greater than left. Intraventricular  blood was initially demonstrated on the head CT of 6/21/2022. The ventricles are stable in caliber in comparison to recent prior head CTs,  including ex vacuo dilation of the occipital horn of the right lateral ventricle  consequent to adjacent right occipital lobe encephalomalacia. The ventricles are slightly larger than on the 1/26/2017 brain MRI. There is no  definite thick T2 hyperintensity marginating the contours of the lateral  ventricles. Scattered T2 hyperintensities in the periventricular and subcortical white  matter have slightly worsened in comparison to the prior exam. This is a  nonspecific finding which most likely represents a mild burden of chronic  microangiopathy. Multifocal encephalomalacia throughout the left greater than right cerebellar  hemispheres at the site of prior vascular insults, similar to the 1/26/2017  exam.    A midline nasopharyngeal mucosal cyst is not significant changed in comparison  to the 1/26/2017 exam, measuring 2.6 x 2.3 cm (axial image 5). 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 Diffusion imaging shows no evidence of acute ischemic infarction. The expected large central intracranial vascular flow voids are preserved. There are no suspicious osseous lesions. Impression  1. Unchanged subarachnoid hemorrhage anterior to the cervical medullary  junction and involving the medullary cistern and foramen magnum.  Given the  subtle increased vascularity along the central left central portions of the  cervical cord on axial image 407 of the prior CT angiogram, a catheter angiogram  is recommended to assess for an underlying vascular malformation

## 2022-06-29 NOTE — PROGRESS NOTES
SPEECH LANGUAGE PATHOLOGY NOTE      Attempted to see patient, however another therapy working with patient at the time. Will check back at later time/date.        [address] CCC-SLP

## 2022-06-30 VITALS
WEIGHT: 178.13 LBS | OXYGEN SATURATION: 99 % | HEART RATE: 88 BPM | BODY MASS INDEX: 25.5 KG/M2 | RESPIRATION RATE: 20 BRPM | HEIGHT: 70 IN | DIASTOLIC BLOOD PRESSURE: 59 MMHG | SYSTOLIC BLOOD PRESSURE: 150 MMHG | TEMPERATURE: 98.6 F

## 2022-06-30 LAB
GLUCOSE BLD STRIP.AUTO-MCNC: 156 MG/DL (ref 65–100)
GLUCOSE BLD STRIP.AUTO-MCNC: 251 MG/DL (ref 65–100)
MAGNESIUM SERPL-MCNC: 2.5 MG/DL (ref 1.8–2.4)
SERVICE CMNT-IMP: ABNORMAL
SERVICE CMNT-IMP: ABNORMAL

## 2022-06-30 PROCEDURE — 36415 COLL VENOUS BLD VENIPUNCTURE: CPT

## 2022-06-30 PROCEDURE — 6360000002 HC RX W HCPCS: Performed by: INTERNAL MEDICINE

## 2022-06-30 PROCEDURE — 2580000003 HC RX 258: Performed by: FAMILY MEDICINE

## 2022-06-30 PROCEDURE — 6370000000 HC RX 637 (ALT 250 FOR IP): Performed by: FAMILY MEDICINE

## 2022-06-30 PROCEDURE — 6360000002 HC RX W HCPCS: Performed by: HOSPITALIST

## 2022-06-30 PROCEDURE — 97112 NEUROMUSCULAR REEDUCATION: CPT

## 2022-06-30 PROCEDURE — 97535 SELF CARE MNGMENT TRAINING: CPT

## 2022-06-30 PROCEDURE — 6370000000 HC RX 637 (ALT 250 FOR IP): Performed by: INTERNAL MEDICINE

## 2022-06-30 PROCEDURE — A4216 STERILE WATER/SALINE, 10 ML: HCPCS | Performed by: INTERNAL MEDICINE

## 2022-06-30 PROCEDURE — 82962 GLUCOSE BLOOD TEST: CPT

## 2022-06-30 PROCEDURE — C9113 INJ PANTOPRAZOLE SODIUM, VIA: HCPCS | Performed by: INTERNAL MEDICINE

## 2022-06-30 PROCEDURE — 99232 SBSQ HOSP IP/OBS MODERATE 35: CPT | Performed by: PSYCHIATRY & NEUROLOGY

## 2022-06-30 PROCEDURE — 6370000000 HC RX 637 (ALT 250 FOR IP): Performed by: HOSPITALIST

## 2022-06-30 PROCEDURE — 97530 THERAPEUTIC ACTIVITIES: CPT

## 2022-06-30 PROCEDURE — 2580000003 HC RX 258: Performed by: INTERNAL MEDICINE

## 2022-06-30 PROCEDURE — 83735 ASSAY OF MAGNESIUM: CPT

## 2022-06-30 RX ORDER — AMLODIPINE BESYLATE 10 MG/1
10 TABLET ORAL DAILY
Qty: 30 TABLET | Refills: 1
Start: 2022-07-01 | End: 2022-07-28 | Stop reason: SDUPTHER

## 2022-06-30 RX ORDER — HYDRALAZINE HYDROCHLORIDE 50 MG/1
50 TABLET, FILM COATED ORAL EVERY 8 HOURS SCHEDULED
Qty: 90 TABLET | Refills: 1
Start: 2022-06-30 | End: 2022-07-28 | Stop reason: SDUPTHER

## 2022-06-30 RX ORDER — PANTOPRAZOLE SODIUM 40 MG/1
40 TABLET, DELAYED RELEASE ORAL DAILY
Qty: 30 TABLET | Refills: 1
Start: 2022-06-30 | End: 2022-07-28 | Stop reason: SDUPTHER

## 2022-06-30 RX ORDER — NICOTINE 21 MG/24HR
1 PATCH, TRANSDERMAL 24 HOURS TRANSDERMAL DAILY
Qty: 30 PATCH | Refills: 3
Start: 2022-07-01 | End: 2022-07-28

## 2022-06-30 RX ADMIN — POLYETHYLENE GLYCOL 3350 17 G: 17 POWDER, FOR SOLUTION ORAL at 08:27

## 2022-06-30 RX ADMIN — LOSARTAN POTASSIUM 100 MG: 50 TABLET, FILM COATED ORAL at 08:26

## 2022-06-30 RX ADMIN — SODIUM CHLORIDE, PRESERVATIVE FREE 10 ML: 5 INJECTION INTRAVENOUS at 08:28

## 2022-06-30 RX ADMIN — HYDRALAZINE HYDROCHLORIDE 50 MG: 50 TABLET, FILM COATED ORAL at 05:30

## 2022-06-30 RX ADMIN — HYDRALAZINE HYDROCHLORIDE 10 MG: 20 INJECTION INTRAMUSCULAR; INTRAVENOUS at 04:12

## 2022-06-30 RX ADMIN — ATORVASTATIN CALCIUM 20 MG: 10 TABLET, FILM COATED ORAL at 08:26

## 2022-06-30 RX ADMIN — AMLODIPINE BESYLATE 10 MG: 10 TABLET ORAL at 08:26

## 2022-06-30 RX ADMIN — CITALOPRAM HYDROBROMIDE 40 MG: 20 TABLET ORAL at 09:56

## 2022-06-30 RX ADMIN — INSULIN LISPRO 4 UNITS: 100 INJECTION, SOLUTION INTRAVENOUS; SUBCUTANEOUS at 11:46

## 2022-06-30 ASSESSMENT — PAIN SCALES - GENERAL: PAINLEVEL_OUTOF10: 0

## 2022-06-30 NOTE — DISCHARGE SUMMARY
Hospitalist Discharge Summary   Admit Date:  2022  9:49 PM   DC Note date: 2022  Name:  Xiomy Matamoros   Age:  79 y.o. Sex:  male  :  1940   MRN:  [id number]   Room:  [facility]  PCP:  Myke Fuentes DO    Presenting Complaint: Headache and Hypertension     Initial Admission Diagnosis: Subarachnoid hemorrhage ([address] [I60.9]  SAH (subarachnoid hemorrhage) ([address] [I60.9]  Hypertensive emergency [I16.1]     Problem List for this Hospitalization (present on admission):    Principal Problem:    SAH (subarachnoid hemorrhage) ([address]  Active Problems:    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HCC)    Dementia ([address]    Type 2 diabetes mellitus ([address]    Acute blood loss anemia (ABLA)    Acute upper gastrointestinal bleeding    Bradycardia, sinus    Acute respiratory failure with hypoxia (HCC)    Hypernatremia    Tobacco use disorder    Essential hypertension, benign    CKD (chronic kidney disease) stage 4, GFR 15-29 ml/min (HCC)  Resolved Problems:    * No resolved hospital problems. *    Did Patient have Sepsis (YES OR NO): No    Hospital Course:  79 y. o. male with medical history of HTN, CKD, dementia who presented to ED on  with severe posterior headache.  Symptoms started around  on .  Upon ER evaluation, NIH was 0.  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  BP was found to be very elevated with SBP>200.  CT head shows SAH adjacent to brainstem.  Neurosurgery was consulted by ER. They reviewed the chart and recommended CTA. He was admitted to the ICU on a Cardene gtt, since  has come off of this.  CT Head with SAH adjacent to the brainstem. CTA with no aneurysm   Repeat CT with no change   Repeat CT with decrease in [facility] but now with blood layering in posterior horn ventricles.  CT head was repeated as patient was having headache.  Shows decreased hemorrhage in the ventricles.    MRI of the brain shows no evidence of acute infarction.  Intraventricular hemorrhage, subarachnoid bleed adjacent to the brainstem noted.  Chronic infarcts and remote insults.     CTA was obtained which does show an area of abnormality at subclavian, clot or small dissection.  Vascular [facility] was consulted who felt it was an chronic plaque or thrombus and recommended Plavix when able to take. Findings were discussed with [facility] by neurology Dr. Jody Turcios. Per note on 6/27 from neuro, hold off on procedure at this time due to morbidity, CKD until overall neurologic situation has become more clear. We will have patient follow up with Neurology outpatient in 1 month to determine need for referral to [facility] at that point.     On 6/21 he had sudden onset vomiting of coffee ground emesis. PPI was started with GI consult. GI has seen and signed off on 6/22. No further emesis or evidence of GIB. Poor candidate for endoscopy with recent SAH. Recommended continue with PPI and diet as tolerated. Hgb has remained stable 8-9. No more episodes of emesis for bleeding anywhere. 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 Also shows moderate bilateral foraminal stenosis at C3-C4, mild right and severe left foraminal stenosis at C4-C5 and mild to moderate bilateral foraminal stenosis at C6-C7. Discussed findings and case again with neurology Dr. Jody Turcios who was okay for patient to be discharged from their standpoint. Headache continues to improve and patient denies any headache on day of discharge. Wife at bedside. Patient was alert and oriented x3, follows commands and had no acute complaints. He was discharged in hemodynamically stable condition. Return precautions given. He will need to follow-up with his PCP in 1 week, needs repeat CBC/BMP. Follow-up with neurology NP Marcy Nageotte in 1 month. Return precautions given extensively. Disposition: STR  Diet: ADULT DIET;  Dysphagia - Minced and Moist  ADULT ORAL NUTRITION SUPPLEMENT; Breakfast, Lunch, Dinner; Diabetic Oral Supplement  Code Status: DNR    Follow Ups:   Contact information for follow-up providers     MALU Giordano. Specialty: Neurology  Why: [facility] follow up  Contact information:  [address]  [phone]                   Contact information for after-discharge care     Discharge [address], [address] ([address] [place]). Service: Skilled Nursing  Contact information:  [facility]  [phone]                           Follow up labs/diagnostics (ultimately defer to outpatient provider):  PCP in 1 week  Neurology NP [place] in 1 month      Time spent in patient discharge and coordination 45 minutes. Plan was discussed with patient, wife and neurology. All questions answered. Patient was stable at time of discharge. Instructions given to call a physician or return if any concerns.     Current Discharge Medication List      START taking these medications    Details   hydrALAZINE (APRESOLINE) 50 MG tablet Take 1 tablet by mouth every 8 hours  Qty: 90 tablet, Refills: 1      amLODIPine (NORVASC) 10 MG tablet Take 1 tablet by mouth daily  Qty: 30 tablet, Refills: 1      nicotine (NICODERM CQ) 14 MG/24HR Place 1 patch onto the skin daily  Qty: 30 patch, Refills: 3      pantoprazole (PROTONIX) 40 MG tablet Take 1 tablet by mouth daily  Qty: 30 tablet, Refills: 1         CONTINUE these medications which have NOT CHANGED    Details   ascorbic acid (VITAMIN C) 500 MG tablet Take by mouth      vitamin D3 (CHOLECALCIFEROL) 125 MCG (5000 UT) TABS tablet Take by mouth daily      citalopram (CELEXA) 40 MG tablet Take 40 mg by mouth      losartan (COZAAR) 25 MG tablet Take 100 mg by mouth      simvastatin (ZOCOR) 40 MG tablet Take 40 mg by mouth         STOP taking these medications       clopidogrel (PLAVIX) 75 MG tablet Comments:   Reason for Stopping:         labetalol (NORMODYNE) 200 MG tablet Comments:   Reason for Stopping:         omeprazole (PRILOSEC) 20 MG delayed release capsule Comments:   Reason for Stopping:               Procedures done this admission:  * No surgery found *    Consults this admission:  IP CONSULT TO NEUROLOGY  IP CONSULT TO GI  IP CONSULT TO DIETITIAN  FOLLOW UP VISIT  FOLLOW UP VISIT    Echocardiogram results:  No results found for this or any previous visit. Diagnostic Imaging/Tests:   CTA HEAD NECK W WO CONTRAST    Result Date: 6/21/2022  History: Complains of headache and neck pain and hypertension Comments: CT ANGIOGRAM OF THE NECK AND CT ANGIOGRAM OF THE [place] OF [place] was obtained following the administration of IV contrast. IV contrast was administered to evaluate the arterial vasculature. Reformatted images in the coronal and sagittal planes as well as 3-D imaging was obtained and reviewed on a dedicated PACS and [address] Radiation reduction dose techniques were used for the study. Our CT scanner use one or all of the following- Automated exposure control, adjustment of the mA and/or KV according the patient size, iterative reconstruction. All measurements are based upon NASCET criteria if appropriate. This study was analyzed by the [address] [facility] [address]Algorithm Findings: CT ANGIOGRAM OF THE NECK: The arch demonstrates atherosclerotic changes. Within the proximal left subclavian artery there is a soft tissue filling defect. This may represent noncalcified plaque disease or thromboemboli. This may place the patient at additional risk for distal emboli in the left upper extremity and/or of the left vertebral artery The right left carotid bulbs demonstrate eccentric calcified and noncalcified plaque disease. Proximally 50% narrowing is noted on the right and less than 50% on the left. The proximal vertebral arteries are patent with at least moderate stenosis noted at the origin on the left.  The lung apices are clear with the exception of a mild scarring in the medial aspect of the right upper lobe. The thyroid gland is unremarkable. CT angiogram of [place]: The petrous, cavernous, and supraclinoid internal carotid arteries are patent with diffuse atherosclerotic changes. Moderate narrowing is noted of the clinoid portion of the right internal carotid artery. Anterior circulation and middle cerebral arteries are patent. The distal vertebral arteries posterior inferior cerebellar arteries basilar artery superior cerebellar arteries and posterior cerebral arteries are patent. The dural venous sinuses are patent. 1. Small focus of filling defect noted within the proximal left subclavian artery which may represent noncalcified plaque disease or thromboembolic disease. This may place the patient at additional risk for distal emboli either within the left upper extremity or the left vertebral artery. CT HEAD WO CONTRAST    Result Date: 6/21/2022  EXAM: CT HEAD WO CONTRAST HISTORY:  Nontraumatic subarachnoid hemorrhage, unspecified / Reason for exam:->SAH, 24 hour repeat CT. TECHNIQUE: Axial images of the brain were performed without the administration of intravenous contrast. Images were obtained axial plane and coronal reformatted images were submitted. Dose reduction technique used: Automated exposure control/Adjustment of the mA and/or kV according to patient size/Use of iterative reconstruction technique. COMPARISON: 6/20/2022 FINDINGS: Mild interval decrease in the hyperdensity previously seen at the level of the brainstem which may represent subarachnoid hemorrhage. There is now a small volume of hemorrhage layering in the posterior horns of the lateral ventricles. No evidence of intraparenchymal hemorrhage. 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 Visualized paranasal sinuses and mastoid air cells are without significant fluid.  Scalp, soft tissues, and calvarium are within normal limits. Findings as above. CT HEAD WO CONTRAST    Result Date: 6/20/2022  EXAM: CT HEAD WO CONTRAST HISTORY:  Reason for exam:->sudden onset HA, bp 260/110. TECHNIQUE: Axial images of the brain were performed without the administration of intravenous contrast. Images were obtained axial plane and coronal reformatted images were submitted. Dose reduction technique used: Automated exposure control/Adjustment of the mA and/or kV according to patient size/Use of iterative reconstruction technique. COMPARISON: MRI dated 1/26/2017 FINDINGS: There is hyperdense hemorrhage noted adjacent to the brainstem, at the level of the foramen magnum. There is no appreciable intracranial parenchymal hemorrhage. There is no midline shift, or mass effect. There are mild chronic appearing microangiopathic changes within the periventricular white matter. No evidence of acute confluent territorial infarction. Ventricles and basal cisterns are patent and symmetric. There is mild generalized volume loss. Visualized paranasal sinuses and mastoid air cells are without significant fluid. Scalp, soft tissues, and calvarium are within normal limits. Findings described above suggest subarachnoid hemorrhage adjacent to the the brainstem, at the level of the foramen magnum. This does not appear to represent intraparenchymal hemorrhage. This case was discussed with the ordering physician at the time of interpretation. XR CHEST PORTABLE    Result Date: 6/20/2022  EXAM: XR CHEST PORTABLE HISTORY: evaluate. TECHNIQUE: Frontal chest. COMPARISON: 5/9/2016 FINDINGS: The cardiac silhouette, mediastinum, and pulmonary vasculature are within normal limits. There is no consolidation, pleural effusion, or pneumothorax. No significant osseous abnormalities are observed. No evidence of an acute intrathoracic process.          Labs: Results:       BMP, Mg, Phos Recent Labs     06/29/22  0805 06/30/22  0526   *  --    K 4.0  -- Data:  Patient Vitals for the past 24 hrs:   Temp Pulse Resp BP SpO2   06/30/22 0800 97.9 °F (36.6 °C) 84 18 139/64 96 %   06/30/22 0530 -- -- -- (!) 149/57 --   06/30/22 0406 -- 83 -- (!) 171/75 --   06/30/22 0403 -- 87 -- (!) 180/70 91 %   06/30/22 0401 98.4 °F (36.9 °C) 92 16 (!) 173/82 90 %   06/30/22 0000 98.6 °F (37 °C) 87 18 (!) 149/64 92 %   06/29/22 2122 -- -- -- (!) 159/65 --   06/29/22 2002 98.2 °F (36.8 °C) 79 14 (!) 159/65 92 %   06/29/22 1715 -- -- -- (!) 146/56 --   06/29/22 1600 97.3 °F (36.3 °C) 68 16 (!) 115/48 --   06/29/22 1545 -- -- 16 -- 90 %   06/29/22 1200 97.7 °F (36.5 °C) 84 15 (!) 139/59 91 %       Oxygen Therapy  SpO2: 96 %  Pulse Oximetry Type: Continuous  Pulse via Oximetry: 84 beats per minute  Pulse Oximeter Device Mode: Continuous  Pulse Oximeter Device Location: Left,Finger  O2 Device: None (Room air)  Oximetry Probe Site Changed: No  Skin Assessment: Clean, dry, & intact  Skin Protection for O2 Device: N/A  Intervention(s): Reposition Device (placed in mouth due to pt being a mouth breather)  O2 Flow Rate (L/min): 2 L/min    Estimated body mass index is 25.56 kg/m² as calculated from the following:    Height as of this encounter: 5' 10\" (1.778 m). Weight as of this encounter: 178 lb 2.1 oz (80.8 kg). Intake/Output Summary (Last 24 hours) at 6/30/2022 0940  Last data filed at 6/30/2022 0930  Gross per 24 hour   Intake 480 ml   Output 325 ml   Net 155 ml         Physical Exam:  General:          Well nourished. Hard of hearing  Head:               Normocephalic, atraumatic  Eyes:               Sclerae appear normal.  Pupils equally round. ENT:                Nares appear normal, no drainage. Moist oral mucosa  Neck:               No restricted ROM. Trachea midline   CV:                  RRR. No m/r/g. No jugular venous distension. Lungs:             CTAB. No wheezing, rhonchi, or rales. Respirations even, unlabored  Abdomen: Bowel sounds present.   Soft,

## 2022-06-30 NOTE — PLAN OF CARE
Problem: Discharge Planning  Goal: Discharge to home or other facility with appropriate resources  6/29/2022 2015 by Blayne Ramos RN  Outcome: Progressing  6/29/2022 0849 by Frederic Andrews RN  Outcome: Progressing  Flowsheets (Taken 6/29/2022 0800)  Discharge to home or other facility with appropriate resources: Identify barriers to discharge with patient and caregiver     Problem: Pain  Goal: Verbalizes/displays adequate comfort level or baseline comfort level  6/29/2022 2015 by Blayne Ramos RN  Outcome: Progressing  6/29/2022 0849 by Frederic Andrews RN  Outcome: Progressing     Problem: Safety - Adult  Goal: Free from fall injury  6/29/2022 2015 by Blayne Ramos RN  Outcome: Progressing  6/29/2022 0849 by Frederic Andrews RN  Outcome: Progressing  Flowsheets (Taken 6/29/2022 0800)  Free From Fall Injury: Instruct family/caregiver on patient safety     Problem: ABCDS Injury Assessment  Goal: Absence of physical injury  6/29/2022 2015 by Blayne Ramos RN  Outcome: Progressing  6/29/2022 0849 by Frederic Andrews RN  Outcome: Progressing  Flowsheets (Taken 6/29/2022 0800)  Absence of Physical Injury: Implement safety measures based on patient assessment     Problem: Chronic Conditions and Co-morbidities  Goal: Patient's chronic conditions and co-morbidity symptoms are monitored and maintained or improved  6/29/2022 2015 by Blayne Ramos RN  Outcome: Progressing  6/29/2022 0849 by Frederic Andrews RN  Outcome: Progressing  Flowsheets (Taken 6/29/2022 0800)  Care Plan - Patient's Chronic Conditions and Co-Morbidity Symptoms are Monitored and Maintained or Improved: Monitor and assess patient's chronic conditions and comorbid symptoms for stability, deterioration, or improvement     Problem: Skin/Tissue Integrity  Goal: Absence of new skin breakdown  Description: 1. Monitor for areas of redness and/or skin breakdown  2. Assess vascular access sites hourly  3.   Every 4-6 hours minimum:  Change oxygen saturation probe site  4. Every 4-6 hours:  If on nasal continuous positive airway pressure, respiratory therapy assess nares and determine need for appliance change or resting period.   6/29/2022 2015 by Bharti Longoria RN  Outcome: Progressing  6/29/2022 0849 by Marian Durham RN  Outcome: Progressing     Problem: Neurosensory - Adult  Goal: Achieves stable or improved neurological status  6/29/2022 2015 by Bharti Longoria RN  Outcome: Progressing  6/29/2022 0849 by Marian Durham RN  Outcome: Progressing  Flowsheets (Taken 6/29/2022 0800)  Achieves stable or improved neurological status: Assess for and report changes in neurological status     Problem: Respiratory - Adult  Goal: Achieves optimal ventilation and oxygenation  6/29/2022 2015 by Bharti Longoria RN  Outcome: Progressing  6/29/2022 0849 by Marian Durham RN  Outcome: Progressing  Flowsheets (Taken 6/29/2022 0800)  Achieves optimal ventilation and oxygenation: Assess for changes in respiratory status     Problem: Cardiovascular - Adult  Goal: Maintains optimal cardiac output and hemodynamic stability  6/29/2022 2015 by Bharti Longoria RN  Outcome: Progressing  6/29/2022 0849 by Marian Durham RN  Outcome: Progressing  Flowsheets (Taken 6/29/2022 0800)  Maintains optimal cardiac output and hemodynamic stability: Monitor blood pressure and heart rate  Goal: Absence of cardiac dysrhythmias or at baseline  6/29/2022 2015 by Bharti Longoria RN  Outcome: Progressing  6/29/2022 0849 by Marian Durham RN  Outcome: Progressing  Flowsheets (Taken 6/29/2022 0800)  Absence of cardiac dysrhythmias or at baseline: Monitor cardiac rate and rhythm     Problem: Skin/Tissue Integrity - Adult  Goal: Skin integrity remains intact  6/29/2022 2015 by Bharti Longoria RN  Outcome: Progressing  6/29/2022 0849 by Marian Durham RN  Outcome: Progressing  Flowsheets (Taken 6/29/2022 0800)  Skin Integrity Remains Intact: Monitor for areas of redness and/or skin breakdown  Goal: Incisions, wounds, or drain sites healing without S/S of infection  6/29/2022 2015 by Shazia Schilling RN  Outcome: Progressing  6/29/2022 0849 by Lita Hernadnez RN  Outcome: Progressing  Flowsheets (Taken 6/29/2022 0800)  Incisions, Wounds, or Drain Sites Healing Without Sign and Symptoms of Infection: ADMISSION and DAILY: Assess and document risk factors for pressure ulcer development  Goal: Oral mucous membranes remain intact  6/29/2022 2015 by Shazia Schilling RN  Outcome: Progressing  6/29/2022 0849 by Lita Hernandez RN  Outcome: Progressing  Flowsheets (Taken 6/29/2022 0800)  Oral Mucous Membranes Remain Intact: Assess oral mucosa and hygiene practices     Problem: Musculoskeletal - Adult  Goal: Return mobility to safest level of function  6/29/2022 2015 by Shazia Schilling RN  Outcome: Progressing  6/29/2022 0849 by Lita Hernandez RN  Outcome: Progressing  Flowsheets (Taken 6/29/2022 0800)  Return Mobility to Safest Level of Function: Assess patient stability and activity tolerance for standing, transferring and ambulating with or without assistive devices     Problem: Gastrointestinal - Adult  Goal: Minimal or absence of nausea and vomiting  6/29/2022 2015 by Shazia Schilling RN  Outcome: Progressing  6/29/2022 0849 by Lita Hernandez RN  Outcome: Progressing  Flowsheets (Taken 6/29/2022 0800)  Minimal or absence of nausea and vomiting: Administer IV fluids as ordered to ensure adequate hydration  Goal: Maintains or returns to baseline bowel function  6/29/2022 2015 by Shazia Schilling RN  Outcome: Progressing  6/29/2022 0849 by Lita Hernandez RN  Outcome: Progressing  Flowsheets (Taken 6/29/2022 0800)  Maintains or returns to baseline bowel function: Assess bowel function  Goal: Maintains adequate nutritional intake  6/29/2022 2015 by Shazia Schilling RN  Outcome: Progressing  6/29/2022 0849 by Lita Hernandez RN  Outcome: Progressing  Flowsheets (Taken 6/29/2022 0800)  Maintains adequate nutritional intake: Monitor percentage of each meal consumed Problem: Genitourinary - Adult  Goal: Absence of urinary retention  6/29/2022 2015 by Blayne Ramos RN  Outcome: Progressing  6/29/2022 0849 by Frederic Andrews RN  Outcome: Progressing  Flowsheets (Taken 6/29/2022 0800)  Absence of urinary retention: Assess patients ability to void and empty bladder     Problem: Infection - Adult  Goal: Absence of infection at discharge  6/29/2022 2015 by Blayne Ramos RN  Outcome: Progressing  6/29/2022 0849 by Frederic Andrews RN  Outcome: Progressing  Flowsheets (Taken 6/29/2022 0800)  Absence of infection at discharge: Assess and monitor for signs and symptoms of infection  Goal: Absence of infection during hospitalization  6/29/2022 2015 by Blayne Ramos RN  Outcome: Progressing  6/29/2022 0849 by Frederic Andrews RN  Outcome: Progressing  Flowsheets (Taken 6/29/2022 0800)  Absence of infection during hospitalization: Assess and monitor for signs and symptoms of infection  Goal: Absence of fever/infection during anticipated neutropenic period  6/29/2022 2015 by Blayne Ramos RN  Outcome: Progressing  6/29/2022 0849 by Frederic Andrews RN  Outcome: Progressing  Flowsheets (Taken 6/29/2022 0800)  Absence of fever/infection during anticipated neutropenic period: Monitor white blood cell count     Problem: Metabolic/Fluid and Electrolytes - Adult  Goal: Electrolytes maintained within normal limits  6/29/2022 2015 by Blayne Ramos RN  Outcome: Progressing  6/29/2022 0849 by Frederic Andrews RN  Outcome: Progressing  Flowsheets (Taken 6/29/2022 0800)  Electrolytes maintained within normal limits: Monitor labs and assess patient for signs and symptoms of electrolyte imbalances  Goal: Hemodynamic stability and optimal renal function maintained  6/29/2022 0849 by Frederic Andrews RN  Outcome: Progressing  Flowsheets (Taken 6/29/2022 0800)  Hemodynamic stability and optimal renal function maintained: Monitor labs and assess for signs and symptoms of volume excess or deficit     Problem: Hematologic - Adult  Goal: Maintains hematologic stability  6/29/2022 2015 by Lillian Johnson RN  Outcome: Progressing  6/29/2022 0849 by Ramesh Lopez RN  Outcome: Progressing  Flowsheets (Taken 6/29/2022 0800)  Maintains hematologic stability: Assess for signs and symptoms of bleeding or hemorrhage     Problem: Confusion  Goal: Confusion, delirium, dementia, or psychosis is improved or at baseline  Description: INTERVENTIONS:  1. Assess for possible contributors to thought disturbance, including medications, impaired vision or hearing, underlying metabolic abnormalities, dehydration, psychiatric diagnoses, and notify attending LIP  2. Rover high risk fall precautions, as indicated  3. Provide frequent short contacts to provide reality reorientation, refocusing and direction  4. Decrease environmental stimuli, including noise as appropriate  5. Monitor and intervene to maintain adequate nutrition, hydration, elimination, sleep and activity  6. If unable to ensure safety without constant attention obtain sitter and review sitter guidelines with assigned personnel  7.  Initiate Psychosocial CNS and Spiritual Care consult, as indicated  6/29/2022 2015 by Lillian Johnson RN  Outcome: Progressing  6/29/2022 0849 by Ramesh Lopez RN  Outcome: Progressing  Flowsheets (Taken 6/29/2022 0800)  Effect of thought disturbance (confusion, delirium, dementia, or psychosis) are managed with adequate functional status: Assess for contributors to thought disturbance, including medications, impaired vision or hearing, underlying metabolic abnormalities, dehydration, psychiatric diagnoses, notify LIP

## 2022-06-30 NOTE — PROGRESS NOTES
[facility] Neurology [address], [address] Lety Ochoa is a 79 y.o. 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 which in all likelihood would probably at this age be attributed to a adjacent osteophyte      Past Medical History:   Diagnosis Date    Allergic rhinitis, cause unspecified 2/27/2014    Anemia, unspecified 2/27/2014    Atherosclerosis of renal artery ([address]     2001    CAD (coronary artery disease) 2001    4 vessel cabg, cardiac stents prior to this    Cancer [facility]) 2013    bladder/ L kidney    Chronic airway obstruction, not elsewhere classified 2/27/2014    Chronic kidney disease     hx of CRF,  creatinine 1.8    Coronary atherosclerosis of native coronary vessel 2/27/2014    Depressive disorder, not elsewhere classified 2/27/2014    Diabetes ([address] dx 2000    type 2, oral med, does not check his glucose, A1C 6.4   9/9/2019, states very rare episode hypo    Diverticulosis of colon (without mention of hemorrhage) 2/27/2014    Dyslipidemia     controlled with med    Esophagitis, unspecified 2/27/2014    Essential and other specified forms of tremor 2/27/2014    Essential hypertension, benign 2/27/2014    GERD (gastroesophageal reflux disease)     controlled with omeprazole    Gross hematuria 2/27/2014    Hematuria, unspecified 2/27/2014    Hypertension     controlled with meds    Malignant neoplasm of bladder, 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    Malignant neoplasm of trigone of urinary bladder ([address] 2/27/2014    Microscopic hematuria 2/27/2014    Osteoarthrosis, unspecified whether generalized or localized, unspecified site 2/27/2014    Other peripheral vascular disease(443.89) 2/27/2014    Polyneuropathy in diabetes(357.2) 2/27/2014    Proteinuria 2/27/2014    Psychiatric disorder     gets \"aggravated\", treated with Celexa    PUD (peptic ulcer disease) 1970's    Pure hypercholesterolemia 2/27/2014    Renal artery stenosis ([facility] 2/27/2014    Renal sclerosis, unspecified 2/27/2014    left    Respiratory insufficiency 1/13/2016    Stroke ([facility]     CVA x 2, (last was fall of 2011),  no residual weakness; L eye impaired    Tobacco use disorder 2/27/2014    Unspecified gastritis and gastroduodenitis without mention of hemorrhage 2/27/2014    Unspecified hereditary and idiopathic peripheral neuropathy 2/27/2014    Unspecified sleep apnea     does not wear cpap    Unspecified transient cerebral ischemia 2/27/2014       Past Surgical History:   Procedure Laterality Date    CARDIAC CATHETERIZATION      stents prior to CABG    COLONOSCOPY      KIDNEY REMOVAL Left     LIPOMA RESECTION  10/07/2019    [place] CARDIAC SURG PROCEDURE UNLIST  2001    4 vessel CABG, cardiac stents    UROLOGICAL SURGERY      TURBT    VASCULAR SURGERY  2001    renal stent in L kidney        Family History   Problem Relation Age of Onset    Diabetes Mother     Heart Disease Mother     Heart Attack Mother         Myocardial Infarction    Stroke Father     Alcohol Abuse Father     Diabetes Son         Social History     Socioeconomic History    Marital status:      Spouse name: None    Number of children: None    Years of education: None    Highest education level: None   Occupational History    None   Tobacco Use    Smoking status: Current Every Day Smoker     Packs/day: 1.00    Smokeless tobacco: Never Used   Vaping Use    Vaping Use: Never used   Substance and Sexual Activity    Alcohol use: No    Drug use: No     Types: Prescription, OTC    Sexual activity: Not Currently   Other Topics Concern    None   Social History Narrative    None     Social Determinants of Health     Financial Resource Strain:     Difficulty of Paying Living Expenses: Not on file   Food Insecurity:     Worried About Running Out of Food in the Last Year: Not on file    Ran immediate release tablet 5 mg  5 mg Oral Q6H PRN Laura Steve DO   5 mg at 06/28/22 0518    citalopram (CELEXA) tablet 40 mg  40 mg Oral Daily Shannan Little MD   40 mg at 06/30/22 2306    atorvastatin (LIPITOR) tablet 20 mg  20 mg Oral Daily Shannan Little MD   20 mg at 06/30/22 3572    sodium chloride flush 0.9 % injection 5-40 mL  5-40 mL IntraVENous 2 times per day Shannan Little MD   10 mL at 06/30/22 0828    sodium chloride flush 0.9 % injection 5-40 mL  5-40 mL IntraVENous PRN Shannan Little MD   5 mL at 06/28/22 0531    0.9 % sodium chloride infusion   IntraVENous PRN Shannan Little MD        ondansetron (ZOFRAN-ODT) disintegrating tablet 4 mg  4 mg Oral Q8H PRN Shannan Little MD        Or    ondansetron [facility]) injection 4 mg  4 mg IntraVENous Q6H PRN Shannan Little MD   4 mg at 06/28/22 0530    polyethylene glycol (GLYCOLAX) packet 17 g  17 g Oral Daily PRN Shannan Little MD        acetaminophen (TYLENOL) tablet 650 mg  650 mg Oral Q6H PRN Shannan Little MD   650 mg at 06/29/22 1126    Or    acetaminophen (TYLENOL) suppository 650 mg  650 mg Rectal Q6H PRN Shannan Little MD        nicotine (NICODERM CQ) 14 MG/24HR 1 patch  1 patch TransDERmal Daily Shannan Little MD   1 patch at 06/30/22 0827    glucose chewable tablet 16 g  4 tablet Oral PRN Laura Steve,         dextrose bolus 10% 125 mL  125 mL IntraVENous PRN Laura Steve DO        Or    dextrose bolus 10% 250 mL  250 mL IntraVENous PRN Laura Steve DO        glucagon injection 1 mg  1 mg IntraMUSCular PRN Laura Steve DO        dextrose 5 % solution  100 mL/hr IntraVENous PRN Laura Steve DO   Stopped at 06/27/22 4322    insulin lispro (HUMALOG) injection vial 0-8 Units  0-8 Units SubCUTAneous TID EDUARDO Steve DO   2 Units at 06/29/22 1757    insulin lispro (HUMALOG) injection vial 0-4 Units  0-4 Units SubCUTAneous Nightly Laura Steve DO        pantoprazole (PROTONIX) 40 mg in sodium chloride (PF) 10 mL injection  40 mg IntraVENous BID Amy Soliz DO   40 mg at 06/29/22 1726        Allergies   Allergen Reactions    Ciprofloxacin Other (See Comments)     weakness       Review of Systems    /64   Pulse 84   Temp 97.9 °F (36.6 °C) (Oral)   Resp 18   Ht 5' 10\" (1.778 m)   Wt 178 lb 2.1 oz (80.8 kg)   SpO2 96%   BMI 25.56 kg/m²     Neurologic Exam  More awake alert and sitting in a chair. Headache has dissipated to a substantial degree moving all 4 extremities he walked today which is very encouraging no gaze paralysis no visual field abnormality    Most recent MRI   Results for orders placed during the hospital encounter of 06/20/22    MRI CERVICAL SPINE W WO CONTRAST    Narrative  MRI CERVICAL SPINE WITHOUT AND WITH CONTRAST 6/29/2022    HISTORY: Moderate to severe headache and neck pain for one week. Subarachnoid  hemorrhage. TECHNIQUE: Sagittal T2-weighted, T1-weighted, STIR and postcontrast fat sat  T1-weighted images were obtained from the craniovertebral junction through T3.  15 mL of ProHance gadolinium was used for this study. Axial T2-weighted and  postcontrast fat sat T1-weighted images were obtained from C2-C3 through C7-T1. COMPARISON: CT angiogram head and neck June 20, 2022;    FINDINGS: Motion artifact is present throughout the study, limiting evaluation  of the cord. There is a suspected polyp in the nasopharynx. Encephalomalacia is  present in the left cerebellar hemisphere. The cervical spinal cord is grossly normal in signal intensity, however, cord  signal is not reliably assessed because of motion artifact. There is prominent  leptomeningeal enhancement throughout the lower cervical and upper thoracic  cord. The cervical vertebrae are normal in height and alignment with normal marrow  signal.    Axial images:    C2-C3: No significant spinal stenosis or foraminal stenosis.     C3-C4: Facet arthropathy and moderate generalized cerebral atrophy, chronic small vessel  ischemic changes in the white matter and areas of old encephalomalacia in the  right occipital lobe and left cerebellar hemisphere. The ventricles are  unchanged in size. No mass effect or midline shift is seen. The skull and skull  base are intact. Impression  Decreased hemorrhage in the ventricles and adjacent to the  brainstem, with minimal residual.       Most recent Echo  No results found for this or any previous visit.          Most recent lipid panels  Lab Results   Component Value Date/Time    CHOL 98 03/22/2022 01:48 PM    HDL 38 03/22/2022 01:48 PM    VLDL 15 03/22/2022 01:48 PM       Most recent Hgb A1C  No results found for: HBA1C, [id number]      Assessment/Plan:    Would agree with the patient going to inpatient rehab  Extremely important if any rebleed occurrence that he be taken to [facility] as if there is a rebleed this would be an indication to do a stat arteriogram and consider embolization  Should be managed as an [address] otherwise  Avoid hypertension  Neuro follow-up should we with endovascular neurosurgery rather than with neurology and Tamela Brown MD

## 2022-06-30 NOTE — PROGRESS NOTES
PHYSICAL THERAPY Daily Note and AM  (Link to Caseload Tracking: PT Visit Days : 4  Time In/Out PT Charge Capture  Rehab Caseload Tracker  Orders      Samantha Rodríguez is a 79 y.o. male   PRIMARY DIAGNOSIS: SAH (subarachnoid hemorrhage) ([facility]  Subarachnoid hemorrhage ([facility] [I60.9]  SAH (subarachnoid hemorrhage) ([facility] [I60.9]  Hypertensive emergency [I16.1]       Inpatient: Payor: MEDICARE / Plan: MEDICARE PART A AND B / Product Type: *No Product type* /     ASSESSMENT:     REHAB RECOMMENDATIONS:   Recommendation to date pending progress:  Setting:   Short-term Rehab    Equipment:     To Be Determined     ASSESSMENT:  Mr. Terry Sim presents in supine, wife present. He is agreeable to therapy. He was able to sit to side of bed with min assist.  He worked on scooting and sitting balance while performing ADL with OT. He has fair+ sitting balance today. He stood to rolling walker with min/CGA x 2. He stood again and ambulated to the chair. After resting he ambulated 25' x 2 using rolling walker and min/CGA x 2. He requires cues to stay closer to walker, cues for hand placement with sit to stand, guidance, increased step length, and safety. He was left sitting up in chair with wife present, needs in reach. Good progress towards goals with increased gait distance and less assistance required. Will continue with POC. SUBJECTIVE:   Mr. Terry Sim states, \"That's far enough for now. \"     Social/Functional Lives With: Spouse  Type of Home: House  Home Equipment: Cane ([address])  Receives Help From: Family  ADL Assistance: Independent  Homemaking Responsibilities: No  Ambulation Assistance: Needs assistance  Transfer Assistance: Needs assistance  Active : No  Patient's  Info: drives some, but most family  Occupation: Retired  OBJECTIVE:     PAIN: VITALS / O2: PRECAUTION / Patty Kyle / DRAINS:   Pre Treatment:   Pain Assessment: None - Denies Pain      Post Treatment: 0 Vitals        Oxygen None    RESTRICTIONS/PRECAUTIONS:  Restrictions/Precautions  Restrictions/Precautions: Fall Risk  Restrictions/Precautions: Fall Risk     MOBILITY: I Mod I S SBA CGA Min Mod Max Total  NT x2 Comments:   Bed Mobility    Rolling [] [] [] [] [] [x] [] [] [] [] []    Supine to Sit [] [] [] [] [] [x] [] [] [] [] []    Scooting [] [] [] [] [] [] [] [] [] [] []    Sit to Supine [] [] [] [] [] [] [] [] [] [] []    Transfers    Sit to Stand [] [] [] [] [x] [x] [] [] [] [] [x]    Bed to Chair [] [] [] [] [] [x] [] [] [] [] [x]    Stand to Sit [] [] [] [] [x] [x] [] [] [] [] [x]     [] [] [] [] [] [] [] [] [] [] []    I=Independent, Mod I=Modified Independent, S=Supervision, SBA=Standby Assistance, CGA=Contact Guard Assistance,   Min=Minimal Assistance, Mod=Moderate Assistance, Max=Maximal Assistance, Total=Total Assistance, NT=Not Tested    BALANCE: Good Fair+ Fair Fair- Poor NT Comments   Sitting Static [] [x] [] [] [] []    Sitting Dynamic [] [x] [] [] [] []              Standing Static [] [] [x] [] [] []    Standing Dynamic [] [] [] [x] [] []      GAIT: I Mod I S SBA CGA Min Mod Max Total  NT x2 Comments:   Level of Assistance [] [] [] [] [x] [x] [] [] [] [] [x]      Distance 25' x 2, 5' feet    DME Rolling Walker and chair follow    Gait Quality Decreased rae , Decreased step clearance, Decreased step length, Decreased stance, Narrow base of support and Shuffling     Weightbearing Status      Stairs      I=Independent, Mod I=Modified Independent, S=Supervision, SBA=Standby Assistance, CGA=Contact Guard Assistance,   Min=Minimal Assistance, Mod=Moderate Assistance, Max=Maximal Assistance, Total=Total Assistance, NT=Not Tested    PLAN:   ACUTE PHYSICAL THERAPY GOALS:   (Developed with and agreed upon by patient and/or caregiver. )  LTG:  (1.)Mr. Teresa Strong will move from supine to sit and sit to supine , scoot up and down and roll side to side in bed with SUPERVISION within 7 treatment day(s). (2.)Mr. Teresa Strong will transfer from bed to chair and chair to bed with SUPERVISION using the least restrictive device within 7 treatment day(s). (3.)Mr. Saeid Jaramillo will ambulate with STAND BY ASSIST for 250 feet with the least restrictive device within 7 treatment day(s). (4.)Mr. Saeid Jaramillo will participate in therapeutic activity/exercises x 25 minutes for increased strength within 7 treatment days. (5.)Mr. Saeid Jaramillo will perform standing static and dynamic balance activities x 15 minutes with SUPERVISION to improve safety within 7 treatment day(s). FREQUENCY AND DURATION: 3 times/week for duration of hospital stay or until stated goals are met, whichever comes first.    TREATMENT:   TREATMENT:   Co-Treatment PT/OT necessary due to patient's decreased overall endurance/tolerance levels, as well as need for high level skilled assistance to complete functional transfers/mobility and functional tasks  Therapeutic Activity (40 Minutes): Therapeutic activity included Rolling, Supine to Sit, Scooting, Transfer Training, Ambulation on level ground, Sitting balance  and Standing balance to improve functional Activity tolerance, Balance, Coordination, Mobility, Strength and ROM.     TREATMENT GRID:  N/A    AFTER TREATMENT PRECAUTIONS: Bed/Chair Locked, Call light within reach, Chair, Needs within reach and Visitors at bedside    INTERDISCIPLINARY COLLABORATION:  RN/ PCT, PT/ PTA and OT/ CRUZ    EDUCATION:      TIME IN/OUT:  Time In: 0905  Time Out: 0945  Minutes: 40    RAFAL ARREDONDO PTA

## 2022-06-30 NOTE — PROGRESS NOTES
OCCUPATIONAL THERAPY Daily Note and AM     OT Visit Days: 4   Time  OT Charge Capture  Rehab Caseload Tracker  OT Orders    Charlie Miranda is a 79 y.o. male   PRIMARY DIAGNOSIS: SAH (subarachnoid hemorrhage) ([facility]  Subarachnoid hemorrhage (H[facility]) [I60.9]  SAH (subarachnoid hemorrhage) ([facility] [I60.9]  Hypertensive emergency [I16.1]       Inpatient: Payor: MEDICARE / Plan: MEDICARE PART A AND B / Product Type: *No Product type* /     ASSESSMENT:     REHAB RECOMMENDATIONS: CURRENT LEVEL OF FUNCTION:  (Most Recently Demonstrated)   Recommendation to date pending progress:  Setting:   Short-term Rehab    Equipment:     To Be Determined Bathing:   Minimal Assist/Moderate A  Dressing:   Moderate Assist for LB dressing  Feeding/Grooming:   Supervision/Setup   Toileting:   Not Tested  Functional Mobility:   Contact Guard Assist/Minimal A x2     ASSESSMENT:  Mr. No Gallardo is doing well today. Pt presents supine upon arrival and more alert today. Pt required min A for bed mobility today. Pt demonstrates fair sitting balance at EOB. Pt assisted with self care tasks at EOB with mod A overall. Pt required more assistance with LB dressing and bathing due to fatigue. Pt was able to perform functional mobility in room/hallway with CGA/min A x2. Pt required seated rest breaks due to fatigue. Pt gives good effort. Some progress noted with with goals. Will continue to benefit from skilled OT during stay.        SUBJECTIVE:     Mr. No Gallardo states, \"I am just resting for a few minutes\"     Social/Functional Lives With: Spouse  Type of Home: House  Home Equipment: Cane ([place])  Receives Help From: Family  ADL Assistance: Independent  Homemaking Responsibilities: No  Ambulation Assistance: Needs assistance  Transfer Assistance: Needs assistance  Active : No  Patient's  Info: drives some, but most family  Occupation: Retired    OBJECTIVE:     Scout Ty / Esmer Disla / Sigrid Pablo: NA    RESTRICTIONS/PRECAUTIONS:  Restrictions/Precautions  Restrictions/Precautions: Fall Risk        PAIN: VITALS / O2:   Pre Treatment:              Post Treatment: 0 Vitals          Oxygen            MOBILITY: I Mod I S SBA CGA Min Mod Max Total  NT x2 Comments:   Bed Mobility    Rolling [] [] [] [] [] [] [] [] [] [x] []    Supine to Sit [] [] [] [] [] [x] [] [] [] [] []    Scooting [] [] [] [] [] [x] [] [] [] [] []    Sit to Supine [] [] [] [] [] [] [] [] [] [x] []    Transfers    Sit to Stand [] [] [] [] [x] [x] [] [] [] [] [x]    Bed to Chair [] [] [] [] [x] [x] [] [] [] [] [x]    Stand to Sit [] [] [] [] [x] [] [] [] [] [] [x]    Tub/Shower [] [] [] [] [] [] [] [] [] [x] []     Toilet [] [] [] [] [] [] [] [] [] [x] []      [] [] [] [] [] [] [] [] [] [] []    I=Independent, Mod I=Modified Independent, S=Supervision/Setup, SBA=Standby Assistance, CGA=Contact Guard Assistance, Min=Minimal Assistance, Mod=Moderate Assistance, Max=Maximal Assistance, Total=Total Assistance, NT=Not Tested    ACTIVITIES OF DAILY LIVING: I Mod I S SBA CGA Min Mod Max Total NT Comments   BASIC ADLs:              Upper Body   Bathing [] [] [] [] [] [x] [] [] [] []    Lower Body Bathing [] [] [] [] [] [] [x] [] [] []    Toileting [] [] [] [] [] [] [] [] [] [x]    Upper Body Dressing [] [] [] [x] [] [] [] [] [] []    Lower Body Dressing [] [] [] [] [] [] [x] [] [] []    Feeding [] [] [] [] [] [] [] [] [] [x]    Grooming [] [] [x] [] [] [] [] [] [] []    Personal Device Care [] [] [] [] [] [] [] [] [x] []    Functional Mobility [] [] [] [] [x] [x] [] [] [] [] x2   I=Independent, Mod I=Modified Independent, S=Supervision/Setup, SBA=Standby Assistance, CGA=Contact Guard Assistance, Min=Minimal Assistance, Mod=Moderate Assistance, Max=Maximal Assistance, Total=Total Assistance, NT=Not Tested    BALANCE: Good Fair+ Fair Fair- Poor NT Comments   Sitting Static [] [x] [] [] [] []    Sitting Dynamic [] [] [x] [] [] []              Standing Static [] [] [x] [] [] []    Standing Dynamic [] [] [] [x] [] []        PLAN:     FREQUENCY/DURATION   OT Plan of Care: 3 times/week for duration of hospital stay or until stated goals are met, whichever comes first.    ACUTE OCCUPATIONAL THERAPY GOALS:   (Developed with and agreed upon by patient and/or caregiver.)  1. Patient will complete lower body bathing and dressing with min A and adaptive equipment as needed. 2. Patient will complete toileting with SBA. 3. Patient will tolerate 30 minutes of OT treatment with 1-2 rest breaks to increase activity tolerance for ADLs. 4. Patient will complete functional transfers with SBA and adaptive equipment as needed. 5. Patient will complete functional mobility for household distances with supervision and adaptive equipment as needed. 6. Patient will complete self-grooming while standing edge of sink with supervision and adaptive equipment as needed.     Timeframe: 7 visits      TREATMENT:     TREATMENT:   Co-Treatment PT/OT necessary due to patient's decreased overall endurance/tolerance levels, as well as need for high level skilled assistance to complete functional transfers/mobility and functional tasks  Neuromuscular Re-education (25 Minutes): Neuromuscular Re-education included Balance Training, Coordination training, Postural training, Sitting balance training and Standing balance training to improve Balance, Coordination, Functional Mobility and Postural Control. Self Care (15 minutes): Patient participated in upper body bathing, lower body bathing, upper body dressing, lower body dressing and grooming ADLs in supported sitting and standing with moderate verbal, manual and tactile cueing to increase independence, decrease assistance required and increase activity tolerance. Patient also participated in functional mobility and functional transfer training to increase independence, decrease assistance required and increase activity tolerance.      TREATMENT GRID:  N/A    AFTER TREATMENT PRECAUTIONS: Bed/Chair Locked, Call light within reach, Chair, Needs within reach and RN notified    INTERDISCIPLINARY COLLABORATION:  RN/ PCT, PT/ PTA and OT/ CRUZ    EDUCATION:       TOTAL TREATMENT DURATION AND TIME:  Time In: 5986  Time Out: 0945  Minutes: J LUIS Saini

## 2022-06-30 NOTE — PROGRESS NOTES
TRANSFER - OUT REPORT:    Verbal report given to JOSE A Salazar on Jaimee Page  being transferred to [facility], Room 314 for routine progression of patient care       Report consisted of patient's Situation, Background, Assessment and   Recommendations(SBAR). Information from the following report(s) Nurse Handoff Report was reviewed with the receiving nurse. Lines:       Opportunity for questions and clarification was provided.

## 2022-06-30 NOTE — CARE COORDINATION
Pt is medically cleared for dc today and will transfer to room 314 at [facility] for [address] Transport scheduled for 1430 through [facility]. RN to call report to #[phone]. SW spoke with pt and spouse at the bedside to notify them of pt's transport time. SW remains available to assist as needed. 06/30/22 1130   Service Assessment   Cognition Alert   History Provided By Significant Other;Medical Record   Primary Caregiver Spouse   Support Systems Spouse/Significant Other;Family Members; Jayme Valladares [phone] is: Legal Next of Kin   PCP Verified by CM Yes   Prior Functional Level Independent in ADLs/IADLs   Current Functional Level Assistance with the following:;Bathing;Dressing; Mobility; Toileting   Can patient return to prior living arrangement No   Ability to make needs known: Good   Family able to assist with home care needs: No   Would you like for me to discuss the discharge plan with any other family members/significant others, and if so, who? No   Financial Resources Medicare   Social/Functional History   Lives With Spouse   Type of [address]  ([email or web address],cane)   Receives Help From [address] [place] [address] Responsibilities No   Ambulation Assistance Needs assistance   Transfer Assistance Needs assistance   Active  No   Patient's  Info drives some, but most family   Occupation Retired   Discharge Planning   Type of [facility] Prior To Admission None   [address]   DME Ordered? No   Potential Assistance Purchasing Medications No   Type of Home Care Services None   Patient expects to be discharged to: Skilled nursing facility   History of falls?  0   Services At/After Discharge   Transition of Care Consult (CM Consult) SNF  ([facility])   Partner SNF Yes   Services At/After Discharge PT;OT;Nursing services;Transport; In ambulance   [address] Provided? No   Mode of Transport at [facility] in [place]  ([address] [place] [address])   MEENU GOMEZ  [facility] Transport Time of Discharge 1430   Confirm Follow Up Transport Family   Condition of Participation: Discharge Planning   The Plan for Transition of Care is related to the following treatment goals: STR to improve pt's strength and functional abilities for a safe transition to home   The Patient and/or Patient Representative was provided with a Choice of Provider? Patient Representative; Patient   The Patient and/Or Patient Representative agree with the Discharge Plan? Yes   Freedom of Choice list was provided with basic dialogue that supports the patient's individualized plan of care/goals, treatment preferences, and shares the quality data associated with the providers?   Yes

## 2022-06-30 NOTE — PROGRESS NOTES
Conferred with staff regarding needs. Spiritual Care has been following.      Francisco Currie 67  Board Certified

## 2022-07-01 ENCOUNTER — CARE COORDINATION (OUTPATIENT)
Dept: CARE COORDINATION | Facility: CLINIC | Age: 82
End: 2022-07-01

## 2022-07-01 NOTE — CARE COORDINATION
Patient discharged to a SNF [facility]erred [facility] Network facility. Patient will be included in weekly care coordination calls. Message sent to Dannielle Pettit RN SNF Preferred Provider [id number].

## 2022-07-05 LAB
BUN BLD-MCNC: 24 MG/DL (ref 9–20)
CHLORIDE BLD-SCNC: 109 MMOL/L (ref 98–107)
CO2 BLD-SCNC: 28 MMOL/L (ref 21–32)
CREAT BLD-MCNC: 1.6 MG/DL (ref 0.6–1.3)
GLUCOSE BLD-MCNC: 151 MG/DL (ref 65–100)
LACTATE BLD-SCNC: 1.15 MMOL/L (ref 0.4–2)
POTASSIUM BLD-SCNC: 4.3 MMOL/L (ref 3.5–5.1)
SODIUM BLD-SCNC: 147 MMOL/L (ref 136–145)

## 2022-07-06 ENCOUNTER — CARE COORDINATION (OUTPATIENT)
Dept: CARE COORDINATION | Facility: CLINIC | Age: 82
End: 2022-07-06

## 2022-07-06 NOTE — CARE COORDINATION
Notified [facility] IDT of hospital discharge and new admission for STR. Will follow patients progress in STR.

## 2022-07-28 ENCOUNTER — OFFICE VISIT (OUTPATIENT)
Dept: INTERNAL MEDICINE CLINIC | Facility: CLINIC | Age: 82
End: 2022-07-28
Payer: MEDICARE

## 2022-07-28 VITALS
BODY MASS INDEX: 23.82 KG/M2 | SYSTOLIC BLOOD PRESSURE: 110 MMHG | DIASTOLIC BLOOD PRESSURE: 54 MMHG | HEART RATE: 81 BPM | OXYGEN SATURATION: 97 % | WEIGHT: 166 LBS

## 2022-07-28 DIAGNOSIS — I10 ESSENTIAL HYPERTENSION, BENIGN: ICD-10-CM

## 2022-07-28 DIAGNOSIS — I60.9 SAH (SUBARACHNOID HEMORRHAGE) (HCC): Primary | ICD-10-CM

## 2022-07-28 DIAGNOSIS — Z51.5 PALLIATIVE CARE ENCOUNTER: ICD-10-CM

## 2022-07-28 DIAGNOSIS — N18.32 TYPE 2 DIABETES MELLITUS WITH STAGE 3B CHRONIC KIDNEY DISEASE, WITHOUT LONG-TERM CURRENT USE OF INSULIN (HCC): ICD-10-CM

## 2022-07-28 DIAGNOSIS — E11.22 TYPE 2 DIABETES MELLITUS WITH STAGE 3B CHRONIC KIDNEY DISEASE, WITHOUT LONG-TERM CURRENT USE OF INSULIN (HCC): ICD-10-CM

## 2022-07-28 DIAGNOSIS — K21.9 GASTROESOPHAGEAL REFLUX DISEASE WITHOUT ESOPHAGITIS: ICD-10-CM

## 2022-07-28 PROCEDURE — 99214 OFFICE O/P EST MOD 30 MIN: CPT | Performed by: INTERNAL MEDICINE

## 2022-07-28 PROCEDURE — 1111F DSCHRG MED/CURRENT MED MERGE: CPT | Performed by: INTERNAL MEDICINE

## 2022-07-28 PROCEDURE — 4004F PT TOBACCO SCREEN RCVD TLK: CPT | Performed by: INTERNAL MEDICINE

## 2022-07-28 PROCEDURE — G8428 CUR MEDS NOT DOCUMENT: HCPCS | Performed by: INTERNAL MEDICINE

## 2022-07-28 PROCEDURE — G8420 CALC BMI NORM PARAMETERS: HCPCS | Performed by: INTERNAL MEDICINE

## 2022-07-28 PROCEDURE — 3044F HG A1C LEVEL LT 7.0%: CPT | Performed by: INTERNAL MEDICINE

## 2022-07-28 PROCEDURE — 1123F ACP DISCUSS/DSCN MKR DOCD: CPT | Performed by: INTERNAL MEDICINE

## 2022-07-28 RX ORDER — AMLODIPINE BESYLATE 5 MG/1
5 TABLET ORAL DAILY
Qty: 90 TABLET | Refills: 1 | Status: ON HOLD | OUTPATIENT
Start: 2022-07-28

## 2022-07-28 RX ORDER — PANTOPRAZOLE SODIUM 40 MG/1
40 TABLET, DELAYED RELEASE ORAL DAILY
Qty: 30 TABLET | Refills: 1 | Status: ON HOLD | OUTPATIENT
Start: 2022-07-28

## 2022-07-28 RX ORDER — HYDRALAZINE HYDROCHLORIDE 50 MG/1
50 TABLET, FILM COATED ORAL 3 TIMES DAILY
Qty: 270 TABLET | Refills: 3 | Status: SHIPPED | OUTPATIENT
Start: 2022-07-28 | End: 2022-09-28 | Stop reason: SDUPTHER

## 2022-07-28 ASSESSMENT — PATIENT HEALTH QUESTIONNAIRE - PHQ9
3. TROUBLE FALLING OR STAYING ASLEEP: 0
SUM OF ALL RESPONSES TO PHQ QUESTIONS 1-9: 6
2. FEELING DOWN, DEPRESSED OR HOPELESS: 0
7. TROUBLE CONCENTRATING ON THINGS, SUCH AS READING THE NEWSPAPER OR WATCHING TELEVISION: 0
9. THOUGHTS THAT YOU WOULD BE BETTER OFF DEAD, OR OF HURTING YOURSELF: 0
SUM OF ALL RESPONSES TO PHQ9 QUESTIONS 1 & 2: 3
6. FEELING BAD ABOUT YOURSELF - OR THAT YOU ARE A FAILURE OR HAVE LET YOURSELF OR YOUR FAMILY DOWN: 0
10. IF YOU CHECKED OFF ANY PROBLEMS, HOW DIFFICULT HAVE THESE PROBLEMS MADE IT FOR YOU TO DO YOUR WORK, TAKE CARE OF THINGS AT HOME, OR GET ALONG WITH OTHER PEOPLE: 1
SUM OF ALL RESPONSES TO PHQ QUESTIONS 1-9: 6
1. LITTLE INTEREST OR PLEASURE IN DOING THINGS: 3
8. MOVING OR SPEAKING SO SLOWLY THAT OTHER PEOPLE COULD HAVE NOTICED. OR THE OPPOSITE, BEING SO FIGETY OR RESTLESS THAT YOU HAVE BEEN MOVING AROUND A LOT MORE THAN USUAL: 3
5. POOR APPETITE OR OVEREATING: 0
4. FEELING TIRED OR HAVING LITTLE ENERGY: 0

## 2022-07-28 NOTE — PROGRESS NOTES
Cirilo Salinas was seen today for follow-up from hospital.    Diagnoses and all orders for this visit:    SAH (subarachnoid hemorrhage) ([facility]    Essential hypertension, benign  -     CBC; Future  -     Basic Metabolic Panel; Future  -     amLODIPine (NORVASC) 5 MG tablet; Take 1 tablet by mouth in the morning.  -     hydrALAZINE (APRESOLINE) 50 MG tablet; Take 1 tablet by mouth in the morning and 1 tablet at noon and 1 tablet before bedtime. Type 2 diabetes mellitus with stage 3b chronic kidney disease, without long-term current use of insulin (HCC)  -     CBC; Future  -     Basic Metabolic Panel; Future    Gastroesophageal reflux disease without esophagitis  -     pantoprazole (PROTONIX) 40 MG tablet; Take 1 tablet by mouth in the morning. Palliative care encounter        Thien Menjivar is a 79 y.o. male    Chief Complaint   Patient presents with    Follow-Up from [address] records . Has gone rehab  [facility] dx. Htn meds changed--questions re hydralazine    No results found for this visit on 07/28/22.     Past Medical History:   Diagnosis Date    Allergic rhinitis, cause unspecified 2/27/2014    Anemia, unspecified 2/27/2014    Atherosclerosis of renal artery ([facility]     2001    CAD (coronary artery disease) 2001    4 vessel cabg, cardiac stents prior to this    Cancer [facility]) 2013    bladder/ L kidney    Chronic airway obstruction, not elsewhere classified 2/27/2014    Chronic kidney disease     hx of CRF,  creatinine 1.8    Coronary atherosclerosis of native coronary vessel 2/27/2014    Depressive disorder, not elsewhere classified 2/27/2014    Diabetes ([facility] dx 2000    type 2, oral med, does not check his glucose, A1C 6.4   9/9/2019, states very rare episode hypo    Diverticulosis of colon (without mention of hemorrhage) 2/27/2014    Dyslipidemia     controlled with med    Esophagitis, unspecified 2/27/2014    Essential and other specified forms of tremor 2/27/2014    Essential hypertension, benign 2/27/2014    GERD (gastroesophageal reflux disease)     controlled with omeprazole    Gross hematuria 2/27/2014    Hematuria, unspecified 2/27/2014    Hypertension     controlled with meds    Malignant neoplasm of bladder, 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 Malignant neoplasm of trigone of urinary bladder (HCC) 2/27/2014    Microscopic hematuria 2/27/2014    Osteoarthrosis, unspecified whether generalized or localized, unspecified site 2/27/2014    Other peripheral vascular disease(443.89) 2/27/2014    Polyneuropathy in diabetes(357.2) 2/27/2014    Proteinuria 2/27/2014    Psychiatric disorder     gets \"aggravated\", treated with Celexa    PUD (peptic ulcer disease) 1970's    Pure hypercholesterolemia 2/27/2014    Renal artery stenosis ([facility] 2/27/2014    Renal sclerosis, unspecified 2/27/2014    left    Respiratory insufficiency 1/13/2016    Stroke ([facility]     CVA x 2, (last was fall of 2011),  no residual weakness; L eye impaired    Tobacco use disorder 2/27/2014    Unspecified gastritis and gastroduodenitis without mention of hemorrhage 2/27/2014    Unspecified hereditary and idiopathic peripheral neuropathy 2/27/2014    Unspecified sleep apnea     does not wear cpap    Unspecified transient cerebral ischemia 2/27/2014       Family History   Problem Relation Age of Onset    Diabetes Mother     Heart Disease Mother     Heart Attack Mother         Myocardial Infarction    Stroke Father     Alcohol Abuse Father     Diabetes Son         Social History     Socioeconomic History    Marital status:      Spouse name: Not on file    Number of children: Not on file    Years of education: Not on file    Highest education level: Not on file   Occupational History    Not on file   Tobacco Use    Smoking status: Every Day     Packs/day: 1.00     Types: Cigarettes    Smokeless tobacco: Never   Vaping Use    Vaping Use: Never used   Substance and Sexual Activity    Alcohol use: No    Drug use:  No Types: Prescription, OTC    Sexual activity: Not Currently   Other Topics Concern    Not on file   Social History Narrative    Not on file     Social Determinants of Health     Financial Resource Strain: Not on file   Food Insecurity: Not on file   Transportation Needs: Not on file   Physical Activity: Not on file   Stress: Not on file   Social Connections: Not on file   Intimate Partner Violence: Not on file   Housing Stability: Not on file         Current Outpatient Medications:     pantoprazole (PROTONIX) 40 MG tablet, Take 1 tablet by mouth in the morning., Disp: 30 tablet, Rfl: 1    amLODIPine (NORVASC) 5 MG tablet, Take 1 tablet by mouth in the morning., Disp: 90 tablet, Rfl: 1    hydrALAZINE (APRESOLINE) 50 MG tablet, Take 1 tablet by mouth in the morning and 1 tablet at noon and 1 tablet before bedtime. , Disp: 270 tablet, Rfl: 3    ascorbic acid (VITAMIN C) 500 MG tablet, Take by mouth, Disp: , Rfl:     vitamin D3 (CHOLECALCIFEROL) 125 MCG (5000 UT) TABS tablet, Take by mouth daily, Disp: , Rfl:     citalopram (CELEXA) 20 MG tablet, Take 20 mg by mouth, Disp: , Rfl:     losartan (COZAAR) 100 MG tablet, Take 100 mg by mouth, Disp: , Rfl:     simvastatin (ZOCOR) 40 MG tablet, Take 40 mg by mouth, Disp: , Rfl:     Allergies   Allergen Reactions    Aspirin-Dipyridamole Er Headaches    Beta Adrenergic Blockers     Ciprofloxacin Other (See Comments)     weakness    Donepezil Diarrhea    Lisinopril Cough         Review of Systems      Vitals:    07/28/22 1349   BP: (!) 110/54   Pulse: 81   SpO2: 97%   Weight: 166 lb (75.3 kg)           Physical Exam       Barb Yost was seen today for follow-up from hospital.    Diagnoses and all orders for this visit:    [facility]achnoid [facility]    Essential hypertension, benign  -     CBC; Future  -     Basic Metabolic Panel; Future  -     amLODIPine (NORVASC) 5 MG tablet; Take 1 tablet by mouth in the morning.  -     hydrALAZINE (APRESOLINE) 50 MG tablet;  Take 1 tablet by mouth in the morning and 1 tablet at noon and 1 tablet before bedtime. Type 2 diabetes mellitus with stage 3b chronic kidney disease, without long-term current use of insulin (HCC)  -     CBC; Future  -     Basic Metabolic Panel; Future    Gastroesophageal reflux disease without esophagitis  -     pantoprazole (PROTONIX) 40 MG tablet; Take 1 tablet by mouth in the morning.     Palliative care encounter               Dannie Shah DO

## 2022-09-09 ENCOUNTER — OFFICE VISIT (OUTPATIENT)
Dept: NEUROLOGY | Age: 82
End: 2022-09-09
Payer: MEDICARE

## 2022-09-09 VITALS
BODY MASS INDEX: 23.77 KG/M2 | HEART RATE: 74 BPM | SYSTOLIC BLOOD PRESSURE: 128 MMHG | HEIGHT: 70 IN | DIASTOLIC BLOOD PRESSURE: 45 MMHG | WEIGHT: 166 LBS | OXYGEN SATURATION: 100 %

## 2022-09-09 DIAGNOSIS — Z71.6 TOBACCO ABUSE COUNSELING: ICD-10-CM

## 2022-09-09 DIAGNOSIS — Z09 HOSPITAL DISCHARGE FOLLOW-UP: Primary | ICD-10-CM

## 2022-09-09 DIAGNOSIS — F41.9 ANXIETY AND DEPRESSION: ICD-10-CM

## 2022-09-09 DIAGNOSIS — G25.0 ESSENTIAL TREMOR: ICD-10-CM

## 2022-09-09 DIAGNOSIS — F32.A ANXIETY AND DEPRESSION: ICD-10-CM

## 2022-09-09 DIAGNOSIS — F01.50 VASCULAR DEMENTIA WITHOUT BEHAVIORAL DISTURBANCE (HCC): ICD-10-CM

## 2022-09-09 DIAGNOSIS — I60.9 SAH (SUBARACHNOID HEMORRHAGE) (HCC): ICD-10-CM

## 2022-09-09 DIAGNOSIS — R93.89 ABNORMAL COMPUTED TOMOGRAPHY ANGIOGRAPHY (CTA): ICD-10-CM

## 2022-09-09 PROBLEM — Z96.1 LENS REPLACED BY OTHER MEANS: Status: ACTIVE | Noted: 2022-09-09

## 2022-09-09 PROBLEM — C67.9 MALIGNANT NEOPLASM OF URINARY BLADDER (HCC): Status: ACTIVE | Noted: 2022-09-09

## 2022-09-09 PROBLEM — D12.6 BENIGN NEOPLASM OF COLON: Status: ACTIVE | Noted: 2022-09-09

## 2022-09-09 PROBLEM — K21.9 GERD (GASTROESOPHAGEAL REFLUX DISEASE): Status: ACTIVE | Noted: 2022-07-01

## 2022-09-09 PROBLEM — H91.90 HEARING LOSS: Status: ACTIVE | Noted: 2022-09-09

## 2022-09-09 PROBLEM — I25.9 CHRONIC ISCHEMIC HEART DISEASE: Status: ACTIVE | Noted: 2022-09-09

## 2022-09-09 PROBLEM — R63.4 UNINTENTIONAL WEIGHT LOSS: Status: ACTIVE | Noted: 2022-07-11

## 2022-09-09 PROBLEM — Z90.5 H/O UNILATERAL NEPHRECTOMY: Status: ACTIVE | Noted: 2022-09-09

## 2022-09-09 PROBLEM — E11.3299 MILD NONPROLIFERATIVE DIABETIC RETINOPATHY (HCC): Status: ACTIVE | Noted: 2022-09-09

## 2022-09-09 PROBLEM — Z95.1 POSTSURGICAL AORTOCORONARY BYPASS STATUS: Status: ACTIVE | Noted: 2022-09-09

## 2022-09-09 PROBLEM — R53.81 DEBILITY: Status: ACTIVE | Noted: 2022-07-01

## 2022-09-09 PROBLEM — C64.9 RENAL CANCER (HCC): Status: ACTIVE | Noted: 2022-09-09

## 2022-09-09 PROBLEM — H61.20 IMPACTED CERUMEN: Status: ACTIVE | Noted: 2022-09-09

## 2022-09-09 PROBLEM — M54.30 SCIATICA: Status: ACTIVE | Noted: 2022-09-09

## 2022-09-09 PROBLEM — E11.9 DIABETES MELLITUS TYPE 2, DIET-CONTROLLED (HCC): Status: ACTIVE | Noted: 2022-09-09

## 2022-09-09 PROBLEM — F17.200 NICOTINE DEPENDENCE, UNSPECIFIED, UNCOMPLICATED: Status: ACTIVE | Noted: 2022-09-09

## 2022-09-09 PROBLEM — C64.9 MALIGNANT NEOPLASM OF KIDNEY EXCLUDING RENAL PELVIS (HCC): Status: ACTIVE | Noted: 2022-09-09

## 2022-09-09 PROBLEM — R69 OTHER ILL-DEFINED AND UNKNOWN CAUSES OF MORBIDITY AND MORTALITY: Status: ACTIVE | Noted: 2022-09-09

## 2022-09-09 PROBLEM — K92.2 GI BLEED: Status: ACTIVE | Noted: 2022-07-05

## 2022-09-09 PROBLEM — G58.9 MONONEURITIS: Status: ACTIVE | Noted: 2022-09-09

## 2022-09-09 PROCEDURE — 4004F PT TOBACCO SCREEN RCVD TLK: CPT | Performed by: NURSE PRACTITIONER

## 2022-09-09 PROCEDURE — G8420 CALC BMI NORM PARAMETERS: HCPCS | Performed by: NURSE PRACTITIONER

## 2022-09-09 PROCEDURE — 99205 OFFICE O/P NEW HI 60 MIN: CPT | Performed by: NURSE PRACTITIONER

## 2022-09-09 PROCEDURE — G8427 DOCREV CUR MEDS BY ELIG CLIN: HCPCS | Performed by: NURSE PRACTITIONER

## 2022-09-09 PROCEDURE — 1111F DSCHRG MED/CURRENT MED MERGE: CPT | Performed by: NURSE PRACTITIONER

## 2022-09-09 PROCEDURE — 1123F ACP DISCUSS/DSCN MKR DOCD: CPT | Performed by: NURSE PRACTITIONER

## 2022-09-09 RX ORDER — CLOPIDOGREL BISULFATE 75 MG/1
75 TABLET ORAL
Status: ON HOLD | COMMUNITY
Start: 2021-10-27 | End: 2022-10-29 | Stop reason: HOSPADM

## 2022-09-09 RX ORDER — OLOPATADINE HYDROCHLORIDE 1 MG/ML
SOLUTION/ DROPS OPHTHALMIC
Status: ON HOLD | COMMUNITY
Start: 2022-04-20

## 2022-09-09 RX ORDER — OMEPRAZOLE 20 MG/1
20 CAPSULE, DELAYED RELEASE ORAL
COMMUNITY
Start: 2022-05-11 | End: 2022-09-10 | Stop reason: ALTCHOICE

## 2022-09-09 ASSESSMENT — PATIENT HEALTH QUESTIONNAIRE - PHQ9
SUM OF ALL RESPONSES TO PHQ QUESTIONS 1-9: 0
SUM OF ALL RESPONSES TO PHQ9 QUESTIONS 1 & 2: 0
2. FEELING DOWN, DEPRESSED OR HOPELESS: 0
1. LITTLE INTEREST OR PLEASURE IN DOING THINGS: 0

## 2022-09-09 NOTE — PROGRESS NOTES
[facility] Neurology [facility]  [address]  [address] [facility], [address] [facility]      Chief Complaint   Patient presents with    Follow-Up from [facility]       Sp Charles is a 79 y.o. male who presents for follow up for [address] . PMH significant for  of HTN, CKD,prior stroke, dementia who presented to ED on 6/20 with severe posterior headache. Upon ER evaluation, NIH was 0. BP was found to be very elevated with SBP>200. CT head shows SAH adjacent to brainstem. Neurosurgery was consulted by ER. They reviewed the chart and recommended CTA. He was admitted to the ICU on a Cardene gtt, since 6/22 has come off of this. 6/20 CT Head with SAH adjacent to the brainstem. CTA with no aneurysm. Repeat CT with no change; 6/22 Repeat CT with decrease in [address] but now with blood layering in posterior horn ventricles. 6/28 CT head was repeated as patient was having headache. Shows decreased hemorrhage in the ventricles. MRI of the brain shows intraventricular hemorrhage, subarachnoid bleed adjacent to the brainstem; chronic infarcts and remote insults. CTA was obtained which does show an area of abnormality at subclavian, clot or small dissection. Vascular Sugery was consulted who felt it was an chronic plaque or thrombus. Images were reviewed by neurology and discussed with Endovascular NS, recommendation for conservative management given recent [address] and follow up as outpatient. On 6/21 he had sudden onset vomiting of coffee ground emesis. Repeat CT of head stable IVH. Patient was evaluated by GI, started on PPI. No further emesis or evidence of GIB. Poor candidate for endoscopy with recent SAH. Recommended continue with PPI and diet as tolerated. Hgb has remained stable 8-9. He was evaluated by PT/OT/St and physiatry with recommendations for continued skilled rehab at [facility]. Interval history:     He is here today with his spouse. He is right handed.  Continues to endorse localized, unspecified site 2/27/2014    Other peripheral vascular disease(443.89) 2/27/2014    Polyneuropathy in diabetes(357.2) 2/27/2014    Proteinuria 2/27/2014    Psychiatric disorder     gets \"aggravated\", treated with Celexa    PUD (peptic ulcer disease) 1970's    Pure hypercholesterolemia 2/27/2014    Renal artery stenosis ([facility] 2/27/2014    Renal sclerosis, unspecified 2/27/2014    left    Respiratory insufficiency 1/13/2016    Stroke ([facility] [address]     CVA x 2, (last was fall of 2011),  no residual weakness; L eye impaired    Tobacco use disorder 2/27/2014    Unspecified gastritis and gastroduodenitis without mention of hemorrhage 2/27/2014    Unspecified hereditary and idiopathic peripheral neuropathy 2/27/2014    Unspecified sleep apnea     does not wear cpap    Unspecified transient cerebral ischemia 2/27/2014       Past Surgical History:   Procedure Laterality Date    CARDIAC CATHETERIZATION      stents prior to CABG    COLONOSCOPY      KIDNEY REMOVAL Left     LIPOMA RESECTION  10/07/2019    [place] CARDIAC SURG PROCEDURE UNLIST  2001    4 vessel CABG, cardiac stents    UROLOGICAL SURGERY      TURBT    VASCULAR SURGERY  2001    renal stent in L kidney       Family History   Problem Relation Age of Onset    Diabetes Mother     Heart Disease Mother     Heart Attack Mother         Myocardial Infarction    Stroke Father     Alcohol Abuse Father     Diabetes Son        Social History     Socioeconomic History    Marital status:    Tobacco Use    Smoking status: Every Day     Packs/day: 1.00     Types: Cigarettes    Smokeless tobacco: Never   Vaping Use    Vaping Use: Never used   Substance and Sexual Activity    Alcohol use: No    Drug use: No     Types: Prescription, OTC    Sexual activity: Not Currently         Current Outpatient Medications:     clopidogrel (PLAVIX) 75 MG tablet, 75 mg, Disp: , Rfl:     olopatadine (PATANOL) 0.1 % ophthalmic solution, INSTILL 1 DROP IN BOTH EYES TWICE A DAY, Disp: , Rfl: omeprazole (PRILOSEC) 20 MG delayed release capsule, 20 mg, Disp: , Rfl:     Selenium 200 MCG TABS, Take by mouth, Disp: , Rfl:     pantoprazole (PROTONIX) 40 MG tablet, Take 1 tablet by mouth in the morning., Disp: 30 tablet, Rfl: 1    amLODIPine (NORVASC) 5 MG tablet, Take 1 tablet by mouth in the morning., Disp: 90 tablet, Rfl: 1    hydrALAZINE (APRESOLINE) 50 MG tablet, Take 1 tablet by mouth in the morning and 1 tablet at noon and 1 tablet before bedtime. , Disp: 270 tablet, Rfl: 3    ascorbic acid (VITAMIN C) 500 MG tablet, Take by mouth, Disp: , Rfl:     vitamin D3 (CHOLECALCIFEROL) 125 MCG (5000 UT) TABS tablet, Take by mouth daily, Disp: , Rfl:     citalopram (CELEXA) 20 MG tablet, Take 20 mg by mouth, Disp: , Rfl:     losartan (COZAAR) 100 MG tablet, Take 100 mg by mouth, Disp: , Rfl:     simvastatin (ZOCOR) 40 MG tablet, Take 40 mg by mouth, Disp: , Rfl:     Allergies   Allergen Reactions    Aspirin-Dipyridamole Er Headaches    Beta Adrenergic Blockers     Ciprofloxacin Other (See Comments)     weakness    Donepezil Diarrhea    Lisinopril Cough       REVIEW OF SYSTEMS:  CONSTITUTIONAL: No weight loss, fever, chills, weakness or fatigue. HEENT: Eyes: No visual loss, blurred vision, double vision or yellow sclerae. Ears, Nose, Throat: No hearing loss, sneezing, congestion, runny nose or sore throat. SKIN: No rash or itching. CARDIOVASCULAR: No chest pain, chest pressure or chest discomfort. No palpitations or edema. RESPIRATORY: No shortness of breath, cough or sputum. GASTROINTESTINAL: No anorexia, nausea, vomiting or diarrhea. No abdominal pain or blood. GENITOURINARY: no burning with urination. NEUROLOGICAL: left sided weakness No headache, dizziness, syncope, paralysis, ataxia, numbness or tingling in the extremities. No change in bowel or bladder control. MUSCULOSKELETAL: No muscle, back pain, joint pain or stiffness. HEMATOLOGIC: No anemia, bleeding or bruising.   LYMPHATICS: No enlarged nodes. No history of splenectomy. PSYCHIATRIC: history of depression or anxiety. ENDOCRINOLOGIC: No reports of sweating, cold or heat intolerance. No polyuria or polydipsia. ALLERGIES: No history of asthma, hives, eczema or rhinitis. Physical Examination  BP (!) 128/45 (Site: Left Upper Arm, Position: Sitting, Cuff Size: Large Adult)   Pulse 74   Ht 5' 10\" (1.778 m)   Wt 166 lb (75.3 kg)   SpO2 100%   BMI 23.82 kg/m²     General - Well developed, well nourished, in no apparent distress. Pleasant and conversent. HEENT - Normocephalic, atraumatic. Conjunctiva, tympanic membranes, and oropharynx are clear. Neck - Supple without masses, no bruits   Cardiovascular - Regular rate and rhythm. Normal S1, S2 without murmurs, rubs, or gallops. Lungs - Clear to auscultation. Abdomen - Soft, nontender with normal bowel sounds. Extremities - Peripheral pulses intact. No edema and no rashes. Neurological examination - Comprehension, attention , memory and reasoning are intact. Language and speech are normal. On cranial nerve examination pupils are equal round and reactive to light. Fundoscopic examination is normal. Visual acuity is adequate. Visual fields are full to finger confrontation. Extraocular motility is normal. Face is symmetric and sensation is intact to light touch. Hearing is intact to finger rustle bilaterally. Motor examination - There is normal muscle tone and bulk. Power is full throughout. Muscle stretch reflexes are normoactive and there are no pathological reflexes present. Sensation is intact to light touch, pinprick, vibration and proprioception in all extremities. Cerebellar examination is normal. Gait and stance are normal.     Most recent CT  - I personally reviewed this image   CT Result (most recent):  CT HEAD WO CONTRAST 06/28/2022    Narrative  EXAM: Noncontrast CT head. INDICATION: Worsening headache. COMPARISON: Prior CT head on June 22, 2022.     TECHNIQUE: Axial noncontrast CT images of the head were obtained. Radiation  dose reduction techniques were used for this study. Our CT scanners use one or  all of the following:  Automated exposure control, adjustment of the mA and/or  kV according to patient size, iterative reconstruction. FINDINGS: There is decreased intraventricular hemorrhage in the trigone of both  lateral ventricles, as well as decreased hemorrhage adjacent to the brainstem,  with minimal residual. No new hemorrhage or acute infarct is identified. Again  noted is mild to moderate generalized cerebral atrophy, chronic small vessel  ischemic changes in the white matter and areas of old encephalomalacia in the  right occipital lobe and left cerebellar hemisphere. The ventricles are  unchanged in size. No mass effect or midline shift is seen. The skull and skull  base are intact. Impression  Decreased hemorrhage in the ventricles and adjacent to the  brainstem, with minimal residual.      Most recent MRI - I personally reviewed this image   MRI Result (most recent):  EXAMINATION: BRAIN MRI 6/25/2022 5:54 PM     ACCESSION NUMBER: [id number]     INDICATION: possible brainstem ischemia due to [facility] with bilateral limb ataxia in  the upper extremities. COMPARISON: Head CT 6/22/2022, 6/21/2022, brain MRI 1/26/2017, CTA head and neck  6/20/2022, head CT 6/20/2022     TECHNIQUE: Multiplanar multisequence MRI of the brain without the administration  of intravenous contrast.     FINDINGS:     Technically difficult exam due to patient motion. Midline structures including the corpus callosum, pituitary gland, optic nerves,  and cerebellum are well developed. There is persistent subarachnoid hemorrhage anterior to the cervicomedullary  junction and within the medullary cistern and extending into the anterior aspect  of foramen magnum. This was initially demonstrated on the head CT of 6/20/2022.      Further, there are blood products within the dependent portions of the occipital  horns of both lateral ventricles, right greater than left. Intraventricular  blood was initially demonstrated on the head CT of 6/21/2022. The ventricles are stable in caliber in comparison to recent prior head CTs,  including ex vacuo dilation of the occipital horn of the right lateral ventricle  consequent to adjacent right occipital lobe encephalomalacia. The ventricles are slightly larger than on the 1/26/2017 brain MRI. There is no  definite thick T2 hyperintensity marginating the contours of the lateral  ventricles. Scattered T2 hyperintensities in the periventricular and subcortical white  matter have slightly worsened in comparison to the prior exam. This is a  nonspecific finding which most likely represents a mild burden of chronic  microangiopathy. Multifocal encephalomalacia throughout the left greater than right cerebellar  hemispheres at the site of prior vascular insults, similar to the 1/26/2017  exam.     A midline nasopharyngeal mucosal cyst is not significant changed in comparison  to the 1/26/2017 exam, measuring 2.6 x 2.3 cm (axial image 5). 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 Diffusion imaging shows no evidence of acute ischemic infarction. The expected large central intracranial vascular flow voids are preserved. There are no suspicious osseous lesions. Impression     1. Unchanged subarachnoid hemorrhage anterior to the cervical medullary  junction and involving the medullary cistern and foramen magnum. Given the  subtle increased vascularity along the central left central portions of the  cervical cord on axial image 407 of the prior CT angiogram, a catheter angiogram  is recommended to assess for an underlying vascular malformation causative of  the subarachnoid hemorrhage.      2.  Unchanged intraventricular blood products within the dependent right greater  than left lateral ventricles. 3.  The ventricles are stable in comparison to recent prior head CTs. However,  there is subtle increase in ventricular caliber in comparison to the 1/26/2017  brain MRI. While this may be consequent to progressive age-related cortical  volume loss, in the setting of subarachnoid hemorrhage and intraventricular  blood products, close attention to ventricular caliber is recommended on  follow-up exams to assess for the possibility of a mild developing communicating  hydrocephalus. 4.  No evidence of acute ischemic infarction. 5.  Extensive chronic vascular insults as detailed above. Christopher Dowling was seen today for follow-up from hospital.    Diagnoses and all orders for this visit:    Hospital discharge follow-up  -     [place] DISCHARGE MEDS RECONCILED W/ CURRENT OUTPATIENT MED LIST    SAH (subarachnoid hemorrhage) (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 Denies SI. Continue citalopram 20 mg daily. Vascular dementia  Stable. No behavioral changes or visual/auditory hallucinations. Will continue to monitor. Discussed importance of tobacco cessation as this can increase cognitive decline. Unable to tolerate donepazil due to GI side effects. Interventions that may decrease conversion from mild cognitive impairment to dementia or aid in the treatment of established dementia:   Reduce damage the brain by controlling vascular risk factors (SBP < 140 mmHg, normal cholesterol, BMI < 31 kg/m²). 2. Exercise 20 minutes three times per week reaching 80% maximum heart rate   3. Diet: adhere to a Mediterranean diet     Symptomatic management   Cholinesterase inhibitors to help with memory and attention. These medications can cause nausea, vomiting, diarrhea, syncope, bradycardia, and may increase the risk of falls.   If the above mentioned symptoms occur then we can try a rivastigmine patch. For moderate to severe dementia memantine can be added. Cognitive enrichment by taking part in social activities and support groups. For delirium and sundowning: cholinesterase inhibitor and melatonin can be helpful. For the prevention of delirium, re-orientation, daily routine, a normal sleep wake cycle, and socialization are most important. Benzodiazepines and anticholinergic medications should be avoided as these are frequent causes of delirium. If all measures have failed and the patient is a danger to self or others then certain atypical antipsychotics can be used as low doses (quetiapine 25 mg at night). Depression: depression is very common in dementia. SSRIs are preferred (i.e. Citalopram,escitalopram, etc.). If depression coexists with apathy then I recommend bupropion. Sleep: I counseled the patient on sleep hygiene, including keeping the lights off at night, TV off, and the benefits of herbal tea, etc. Melatonin, trazodone, and mirtazapine are helpful medications. I recommend against the use of sedative hypnotics. Fatigue: amantadine and modafinil may be mildly effective. Agitation and aggression: attempt to resolve with communication. Always introduce the patient to everyone in the room and make eye contact with the patient. Use a calm and friendly voice. Certain SSRIs may be helpful     Tobacco abuse counseling  Discussed importance of tobacco cessation. Currently 1 ppd smoker with no desire to quit. Abnormal Computed tomography angiography (CTA)   CTA was obtained which does show an area of abnormality at subclavian, clot or small dissection. Will send referral to Endovascular NS for evaluation and treatment.      Follow up in 3 months or sooner if needed    I spent greater than 50% of the 60 total minutes of today's visit in coordination of care and patient/family education and counseling regarding the above patient concerns, reviewing the patient's medical record, my assessment and recommendations.         Luis Barger, [address] Neurology [address] [facility]  [address] [facility], [address] [facility] [address] [facility]  GDDKL:[phone]

## 2022-09-21 ENCOUNTER — NURSE ONLY (OUTPATIENT)
Dept: INTERNAL MEDICINE CLINIC | Facility: CLINIC | Age: 82
End: 2022-09-21

## 2022-09-21 DIAGNOSIS — I10 ESSENTIAL HYPERTENSION, BENIGN: ICD-10-CM

## 2022-09-21 DIAGNOSIS — E11.22 TYPE 2 DIABETES MELLITUS WITH STAGE 3B CHRONIC KIDNEY DISEASE, WITHOUT LONG-TERM CURRENT USE OF INSULIN (HCC): ICD-10-CM

## 2022-09-21 DIAGNOSIS — N18.32 TYPE 2 DIABETES MELLITUS WITH STAGE 3B CHRONIC KIDNEY DISEASE, WITHOUT LONG-TERM CURRENT USE OF INSULIN (HCC): ICD-10-CM

## 2022-09-21 LAB
ANION GAP SERPL CALC-SCNC: 2 MMOL/L (ref 4–13)
BUN SERPL-MCNC: 38 MG/DL (ref 8–23)
CALCIUM SERPL-MCNC: 9.2 MG/DL (ref 8.3–10.4)
CHLORIDE SERPL-SCNC: 113 MMOL/L (ref 101–110)
CO2 SERPL-SCNC: 27 MMOL/L (ref 21–32)
CREAT SERPL-MCNC: 1.7 MG/DL (ref 0.8–1.5)
ERYTHROCYTE [DISTWIDTH] IN BLOOD BY AUTOMATED COUNT: 15.9 % (ref 11.9–14.6)
GLUCOSE SERPL-MCNC: 95 MG/DL (ref 65–100)
HCT VFR BLD AUTO: 31.7 % (ref 41.1–50.3)
HGB BLD-MCNC: 9.7 G/DL (ref 13.6–17.2)
MCH RBC QN AUTO: 26.6 PG (ref 26.1–32.9)
MCHC RBC AUTO-ENTMCNC: 30.6 G/DL (ref 31.4–35)
MCV RBC AUTO: 86.8 FL (ref 79.6–97.8)
NRBC # BLD: 0 K/UL (ref 0–0.2)
PLATELET # BLD AUTO: 280 K/UL (ref 150–450)
PMV BLD AUTO: 12.4 FL (ref 9.4–12.3)
POTASSIUM SERPL-SCNC: 4.5 MMOL/L (ref 3.5–5.1)
RBC # BLD AUTO: 3.65 M/UL (ref 4.23–5.6)
SODIUM SERPL-SCNC: 142 MMOL/L (ref 136–145)
WBC # BLD AUTO: 6.5 K/UL (ref 4.3–11.1)

## 2022-09-28 ENCOUNTER — OFFICE VISIT (OUTPATIENT)
Dept: INTERNAL MEDICINE CLINIC | Facility: CLINIC | Age: 82
End: 2022-09-28
Payer: MEDICARE

## 2022-09-28 VITALS
SYSTOLIC BLOOD PRESSURE: 130 MMHG | WEIGHT: 166 LBS | HEART RATE: 76 BPM | OXYGEN SATURATION: 98 % | DIASTOLIC BLOOD PRESSURE: 60 MMHG | BODY MASS INDEX: 23.77 KG/M2 | HEIGHT: 70 IN | RESPIRATION RATE: 17 BRPM

## 2022-09-28 DIAGNOSIS — F01.50 VASCULAR DEMENTIA WITHOUT BEHAVIORAL DISTURBANCE (HCC): Primary | ICD-10-CM

## 2022-09-28 DIAGNOSIS — N18.4 CKD (CHRONIC KIDNEY DISEASE) STAGE 4, GFR 15-29 ML/MIN (HCC): ICD-10-CM

## 2022-09-28 DIAGNOSIS — C67.0 MALIGNANT NEOPLASM OF TRIGONE OF BLADDER (HCC): ICD-10-CM

## 2022-09-28 DIAGNOSIS — Z23 NEEDS FLU SHOT: ICD-10-CM

## 2022-09-28 DIAGNOSIS — I10 ESSENTIAL HYPERTENSION, BENIGN: ICD-10-CM

## 2022-09-28 DIAGNOSIS — Z91.81 AT HIGH RISK FOR FALLS: ICD-10-CM

## 2022-09-28 PROCEDURE — G8427 DOCREV CUR MEDS BY ELIG CLIN: HCPCS | Performed by: INTERNAL MEDICINE

## 2022-09-28 PROCEDURE — G8420 CALC BMI NORM PARAMETERS: HCPCS | Performed by: INTERNAL MEDICINE

## 2022-09-28 PROCEDURE — 4004F PT TOBACCO SCREEN RCVD TLK: CPT | Performed by: INTERNAL MEDICINE

## 2022-09-28 PROCEDURE — G0008 ADMIN INFLUENZA VIRUS VAC: HCPCS | Performed by: INTERNAL MEDICINE

## 2022-09-28 PROCEDURE — 99214 OFFICE O/P EST MOD 30 MIN: CPT | Performed by: INTERNAL MEDICINE

## 2022-09-28 PROCEDURE — 90694 VACC AIIV4 NO PRSRV 0.5ML IM: CPT | Performed by: INTERNAL MEDICINE

## 2022-09-28 PROCEDURE — 1123F ACP DISCUSS/DSCN MKR DOCD: CPT | Performed by: INTERNAL MEDICINE

## 2022-09-28 RX ORDER — HYDRALAZINE HYDROCHLORIDE 50 MG/1
50 TABLET, FILM COATED ORAL 3 TIMES DAILY
Qty: 270 TABLET | Refills: 3 | Status: ON HOLD | OUTPATIENT
Start: 2022-09-28

## 2022-09-28 RX ORDER — MEMANTINE HYDROCHLORIDE 5 MG/1
5 TABLET ORAL 2 TIMES DAILY
Qty: 180 TABLET | Refills: 1 | Status: ON HOLD | OUTPATIENT
Start: 2022-09-28

## 2022-09-28 ASSESSMENT — PATIENT HEALTH QUESTIONNAIRE - PHQ9
6. FEELING BAD ABOUT YOURSELF - OR THAT YOU ARE A FAILURE OR HAVE LET YOURSELF OR YOUR FAMILY DOWN: 0
SUM OF ALL RESPONSES TO PHQ9 QUESTIONS 1 & 2: 0
4. FEELING TIRED OR HAVING LITTLE ENERGY: 0
9. THOUGHTS THAT YOU WOULD BE BETTER OFF DEAD, OR OF HURTING YOURSELF: 0
7. TROUBLE CONCENTRATING ON THINGS, SUCH AS READING THE NEWSPAPER OR WATCHING TELEVISION: 0
8. MOVING OR SPEAKING SO SLOWLY THAT OTHER PEOPLE COULD HAVE NOTICED. OR THE OPPOSITE, BEING SO FIGETY OR RESTLESS THAT YOU HAVE BEEN MOVING AROUND A LOT MORE THAN USUAL: 0
1. LITTLE INTEREST OR PLEASURE IN DOING THINGS: 0
SUM OF ALL RESPONSES TO PHQ QUESTIONS 1-9: 0
2. FEELING DOWN, DEPRESSED OR HOPELESS: 0
3. TROUBLE FALLING OR STAYING ASLEEP: 0
10. IF YOU CHECKED OFF ANY PROBLEMS, HOW DIFFICULT HAVE THESE PROBLEMS MADE IT FOR YOU TO DO YOUR WORK, TAKE CARE OF THINGS AT HOME, OR GET ALONG WITH OTHER PEOPLE: 0
SUM OF ALL RESPONSES TO PHQ QUESTIONS 1-9: 0
SUM OF ALL RESPONSES TO PHQ QUESTIONS 1-9: 0
5. POOR APPETITE OR OVEREATING: 0
SUM OF ALL RESPONSES TO PHQ QUESTIONS 1-9: 0

## 2022-09-28 NOTE — PROGRESS NOTES
Evita Damon was seen today for discuss labs. 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 male    Chief Complaint   Patient presents with    Discuss Labs     Some forgetting  Vascular dementia      Nurse Only on 09/21/2022   Component Date Value Ref Range Status    Sodium 09/21/2022 142  136 - 145 mmol/L Final    Potassium 09/21/2022 4.5  3.5 - 5.1 mmol/L Final    Chloride 09/21/2022 113 (A)  101 - 110 mmol/L Final    CO2 09/21/2022 27  21 - 32 mmol/L Final    Anion Gap 09/21/2022 2 (A)  4 - 13 mmol/L Final    Glucose 09/21/2022 95  65 - 100 mg/dL Final    BUN 09/21/2022 38 (A)  8 - 23 MG/DL Final    Creatinine 09/21/2022 1.70 (A)  0.8 - 1.5 MG/DL Final    GFR  09/21/2022 50 (A)  >60 ml/min/1.73m2 Final    GFR Non- 09/21/2022 41 (A)  >60 ml/min/1.73m2 Final    Comment:   Estimated GFR is calculated using the Modification of Diet in Renal Disease (MDRD) Study equation, reported for both  Americans (GFRAA) and non- Americans (GFRNA), and normalized to 1.73m2 body surface area. The physician must decide which value applies to the patient. The MDRD study equation should only be used in individuals age 25 or older. It has not been validated for the following: pregnant women, patients with serious comorbid conditions,or on certain medications, or persons with extremes of body size, muscle mass, or nutritional status.       Calcium 09/21/2022 9.2  8.3 - 10.4 MG/DL Final    WBC 09/21/2022 6.5  4.3 - 11.1 K/uL Final    RBC 09/21/2022 3.65 (A)  4.23 - 5.6 M/uL Final    Hemoglobin 09/21/2022 9.7 (A)  13.6 - 17.2 g/dL Final    Hematocrit 09/21/2022 31.7 (A)  41.1 - 50.3 % Final    MCV 09/21/2022 86.8  79.6 - 97.8 FL Final    MCH 09/21/2022 26.6  26.1 - 32.9 PG Final    MCHC 09/21/2022 30.6 (A)  31.4 - 35.0 g/dL Final    RDW 09/21/2022 15.9 (A)  11.9 - 14.6 % Final    Platelets 57/49/4109 280  150 - 450 K/uL Final    MPV 09/21/2022 12.4 (A)  9.4 - 12.3 FL Final    nRBC 09/21/2022 0.00  0.0 - 0.2 K/uL Final    **Note: Absolute NRBC parameter is now reported with Hemogram**        Past Medical History:   Diagnosis Date    Allergic rhinitis, cause unspecified 2/27/2014    Anemia, unspecified 2/27/2014    Atherosclerosis of renal artery ([facility] [address]     2001    CAD (coronary artery disease) 2001    4 vessel cabg, cardiac stents prior to this    Cancer [facility]) 2013    bladder/ L kidney    Chronic airway obstruction, not elsewhere classified 2/27/2014    Chronic kidney disease     hx of CRF,  creatinine 1.8    Coronary atherosclerosis of native coronary vessel 2/27/2014    Depressive disorder, not elsewhere classified 2/27/2014    Diabetes ([facility] [address] dx 2000    type 2, oral med, does not check his glucose, A1C 6.4   9/9/2019, states very rare episode hypo    Diverticulosis of colon (without mention of hemorrhage) 2/27/2014    Dyslipidemia     controlled with med    Esophagitis, unspecified 2/27/2014    Essential and other specified forms of tremor 2/27/2014    Essential hypertension, benign 2/27/2014    GERD (gastroesophageal reflux disease)     controlled with omeprazole    Gross hematuria 2/27/2014    Hematuria, unspecified 2/27/2014    Hypertension     controlled with meds    Malignant neoplasm of bladder, 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     Malignant neoplasm of trigone of urinary bladder (HCC) 2/27/2014    Microscopic hematuria [Annual Visit] : annual visit 2/27/2014    Osteoarthrosis, unspecified whether generalized or localized, unspecified site 2/27/2014    Other peripheral vascular disease(443.89) 2/27/2014    Polyneuropathy in diabetes(357.2) 2/27/2014    Proteinuria 2/27/2014    Psychiatric disorder     gets \"aggravated\", treated with Celexa    PUD (peptic ulcer disease) 1970's    Pure hypercholesterolemia 2/27/2014    Renal artery stenosis ([facility] [address] 2/27/2014    Renal sclerosis, unspecified 2/27/2014    left    Respiratory insufficiency 1/13/2016    Stroke ([facility]     CVA x 2, (last was fall of 2011),  no residual weakness; L eye impaired    Tobacco use disorder 2/27/2014    Unspecified gastritis and gastroduodenitis without mention of hemorrhage 2/27/2014    Unspecified hereditary and idiopathic peripheral neuropathy 2/27/2014    Unspecified sleep apnea     does not wear cpap    Unspecified transient cerebral ischemia 2/27/2014       Family History   Problem Relation Age of Onset    Diabetes Mother     Heart Disease Mother     Heart Attack Mother         Myocardial Infarction    Stroke Father     Alcohol Abuse Father     Diabetes Son         Social History     Socioeconomic History    Marital status:      Spouse name: Not on file    Number of children: Not on file    Years of education: Not on file    Highest education level: Not on file   Occupational History    Not on file   Tobacco Use    Smoking status: Every Day     Packs/day: 1.00     Types: Cigarettes    Smokeless tobacco: Never   Vaping Use    Vaping Use: Never used   Substance and Sexual Activity    Alcohol use: No    Drug use: No     Types: Prescription, OTC    Sexual activity: Not Currently   Other Topics Concern    Not on file   Social History Narrative    Not on file     Social Determinants of Health     Financial Resource Strain: Not on file   Food Insecurity: Not on file   Transportation Needs: Not on file   Physical Activity: Not on file   Stress: Not on file   Social Connections: Not on file [FreeTextEntry1] : Patient menses is better, just lasting approx 7 days. No bleeding in between. Intimate Partner Violence: Not on file   Housing Stability: Not on file         Current Outpatient Medications:     hydrALAZINE (APRESOLINE) 50 MG tablet, Take 1 tablet by mouth 3 times daily, Disp: 270 tablet, Rfl: 3    memantine (NAMENDA) 5 MG tablet, Take 1 tablet by mouth 2 times daily, Disp: 180 tablet, Rfl: 1    olopatadine (PATANOL) 0.1 % ophthalmic solution, INSTILL 1 DROP IN BOTH EYES TWICE A DAY, Disp: , Rfl:     Selenium 200 MCG TABS, Take by mouth, Disp: , Rfl:     pantoprazole (PROTONIX) 40 MG tablet, Take 1 tablet by mouth in the morning., Disp: 30 tablet, Rfl: 1    amLODIPine (NORVASC) 5 MG tablet, Take 1 tablet by mouth in the morning., Disp: 90 tablet, Rfl: 1    ascorbic acid (VITAMIN C) 500 MG tablet, Take by mouth, Disp: , Rfl:     vitamin D3 (CHOLECALCIFEROL) 125 MCG (5000 UT) TABS tablet, Take by mouth daily, Disp: , Rfl:     citalopram (CELEXA) 20 MG tablet, Take 20 mg by mouth, Disp: , Rfl:     losartan (COZAAR) 100 MG tablet, Take 100 mg by mouth, Disp: , Rfl:     simvastatin (ZOCOR) 40 MG tablet, Take 40 mg by mouth, Disp: , Rfl:     clopidogrel (PLAVIX) 75 MG tablet, 75 mg (Patient not taking: Reported on 9/28/2022), Disp: , Rfl:     Allergies   Allergen Reactions    Aspirin-Dipyridamole Er Headaches    Beta Adrenergic Blockers     Ciprofloxacin Other (See Comments)     weakness    Donepezil Diarrhea    Lisinopril Cough         Review of Systems   Constitutional:  Negative for appetite change, chills, fatigue and fever. HENT:  Negative for sinus pain. Respiratory:  Negative for shortness of breath and wheezing. Cardiovascular:  Negative for chest pain. Gastrointestinal:  Negative for abdominal pain, constipation, diarrhea, nausea and vomiting. Genitourinary:  Negative for dysuria and frequency. Musculoskeletal:  Positive for gait problem. Negative for myalgias. Neurological:  Positive for dizziness and weakness. Psychiatric/Behavioral:  Negative for suicidal ideas. All other systems reviewed and are negative. Vitals:    09/28/22 1338   BP: 130/60   Site: Left Upper Arm   Position: Sitting   Pulse: 76   Resp: 17   SpO2: 98%   Weight: 166 lb (75.3 kg)   Height: 5' 10\" (1.778 m)           Physical Exam  Constitutional:       Appearance: He is not ill-appearing. Eyes:      Extraocular Movements: Extraocular movements intact. Pulmonary:      Effort: Pulmonary effort is normal. No respiratory distress. Neurological:      General: No focal deficit present. Mental Status: He is alert. Mental status is at baseline. Comments: Short term memory poor   Psychiatric:         Thought Content: Thought content normal.          Barb Yost was seen today for discuss labs. 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

## 2022-09-30 ASSESSMENT — ENCOUNTER SYMPTOMS
WHEEZING: 0
VOMITING: 0
SINUS PAIN: 0
DIARRHEA: 0
NAUSEA: 0
SHORTNESS OF BREATH: 0
ABDOMINAL PAIN: 0
CONSTIPATION: 0

## 2022-10-26 ENCOUNTER — APPOINTMENT (OUTPATIENT)
Dept: GENERAL RADIOLOGY | Age: 82
DRG: 871 | End: 2022-10-26
Payer: MEDICARE

## 2022-10-26 ENCOUNTER — APPOINTMENT (OUTPATIENT)
Dept: CT IMAGING | Age: 82
DRG: 871 | End: 2022-10-26
Payer: MEDICARE

## 2022-10-26 ENCOUNTER — HOSPITAL ENCOUNTER (INPATIENT)
Age: 82
LOS: 13 days | Discharge: NURSING, REHAB OR LONG-TERM CARE | DRG: 871 | End: 2022-11-09
Attending: EMERGENCY MEDICINE | Admitting: HOSPITALIST
Payer: MEDICARE

## 2022-10-26 DIAGNOSIS — R09.02 HYPOXIA: Primary | ICD-10-CM

## 2022-10-26 DIAGNOSIS — U07.1 COVID-19 VIRUS INFECTION: ICD-10-CM

## 2022-10-26 LAB
ALBUMIN SERPL-MCNC: 3 G/DL (ref 3.2–4.6)
ALBUMIN/GLOB SERPL: 0.9 {RATIO} (ref 0.4–1.6)
ALP SERPL-CCNC: 40 U/L (ref 50–136)
ALT SERPL-CCNC: 14 U/L (ref 12–65)
ANION GAP SERPL CALC-SCNC: 2 MMOL/L (ref 2–11)
APPEARANCE UR: CLEAR
AST SERPL-CCNC: 23 U/L (ref 15–37)
BACTERIA URNS QL MICRO: NEGATIVE /HPF
BASOPHILS # BLD: 0 K/UL (ref 0–0.2)
BASOPHILS NFR BLD: 0 % (ref 0–2)
BILIRUB SERPL-MCNC: 0.4 MG/DL (ref 0.2–1.1)
BILIRUB UR QL: NEGATIVE
BUN SERPL-MCNC: 31 MG/DL (ref 8–23)
CALCIUM SERPL-MCNC: 8.6 MG/DL (ref 8.3–10.4)
CASTS URNS QL MICRO: ABNORMAL /LPF
CHLORIDE SERPL-SCNC: 115 MMOL/L (ref 101–110)
CO2 SERPL-SCNC: 24 MMOL/L (ref 21–32)
COLOR UR: ABNORMAL
CREAT SERPL-MCNC: 1.5 MG/DL (ref 0.8–1.5)
D DIMER PPP FEU-MCNC: 0.95 UG/ML(FEU)
DIFFERENTIAL METHOD BLD: ABNORMAL
EOSINOPHIL # BLD: 0 K/UL (ref 0–0.8)
EOSINOPHIL NFR BLD: 0 % (ref 0.5–7.8)
EPI CELLS #/AREA URNS HPF: ABNORMAL /HPF
ERYTHROCYTE [DISTWIDTH] IN BLOOD BY AUTOMATED COUNT: 15.9 % (ref 11.9–14.6)
GLOBULIN SER CALC-MCNC: 3.3 G/DL (ref 2.8–4.5)
GLUCOSE SERPL-MCNC: 89 MG/DL (ref 65–100)
GLUCOSE UR STRIP.AUTO-MCNC: NEGATIVE MG/DL
HCT VFR BLD AUTO: 31.3 % (ref 41.1–50.3)
HGB BLD-MCNC: 9.8 G/DL (ref 13.6–17.2)
HGB UR QL STRIP: NEGATIVE
IMM GRANULOCYTES # BLD AUTO: 0 K/UL (ref 0–0.5)
IMM GRANULOCYTES NFR BLD AUTO: 0 % (ref 0–5)
KETONES UR QL STRIP.AUTO: NEGATIVE MG/DL
LACTATE SERPL-SCNC: 0.9 MMOL/L (ref 0.4–2)
LEUKOCYTE ESTERASE UR QL STRIP.AUTO: NEGATIVE
LYMPHOCYTES # BLD: 1 K/UL (ref 0.5–4.6)
LYMPHOCYTES NFR BLD: 31 % (ref 13–44)
MAGNESIUM SERPL-MCNC: 2.3 MG/DL (ref 1.8–2.4)
MCH RBC QN AUTO: 26.4 PG (ref 26.1–32.9)
MCHC RBC AUTO-ENTMCNC: 31.3 G/DL (ref 31.4–35)
MCV RBC AUTO: 84.4 FL (ref 82–102)
MONOCYTES # BLD: 0.5 K/UL (ref 0.1–1.3)
MONOCYTES NFR BLD: 15 % (ref 4–12)
MUCOUS THREADS URNS QL MICRO: 0 /LPF
NEUTS SEG # BLD: 1.7 K/UL (ref 1.7–8.2)
NEUTS SEG NFR BLD: 53 % (ref 43–78)
NITRITE UR QL STRIP.AUTO: NEGATIVE
NRBC # BLD: 0 K/UL (ref 0–0.2)
NT PRO BNP: 964 PG/ML
PH UR STRIP: 5 [PH] (ref 5–9)
PLATELET # BLD AUTO: 194 K/UL (ref 150–450)
PMV BLD AUTO: 13.4 FL (ref 9.4–12.3)
POTASSIUM SERPL-SCNC: 4.5 MMOL/L (ref 3.5–5.1)
PROT SERPL-MCNC: 6.3 G/DL (ref 6.3–8.2)
PROT UR STRIP-MCNC: 300 MG/DL
RBC # BLD AUTO: 3.71 M/UL (ref 4.23–5.6)
RBC #/AREA URNS HPF: ABNORMAL /HPF
SARS-COV-2 RDRP RESP QL NAA+PROBE: DETECTED
SODIUM SERPL-SCNC: 141 MMOL/L (ref 133–143)
SOURCE: ABNORMAL
SP GR UR REFRACTOMETRY: 1.02 (ref 1–1.02)
URINE CULTURE IF INDICATED: ABNORMAL
UROBILINOGEN UR QL STRIP.AUTO: 0.2 EU/DL (ref 0.2–1)
WBC # BLD AUTO: 3.1 K/UL (ref 4.3–11.1)
WBC URNS QL MICRO: ABNORMAL /HPF

## 2022-10-26 PROCEDURE — 83605 ASSAY OF LACTIC ACID: CPT

## 2022-10-26 PROCEDURE — 85379 FIBRIN DEGRADATION QUANT: CPT

## 2022-10-26 PROCEDURE — 87040 BLOOD CULTURE FOR BACTERIA: CPT

## 2022-10-26 PROCEDURE — 93005 ELECTROCARDIOGRAM TRACING: CPT | Performed by: EMERGENCY MEDICINE

## 2022-10-26 PROCEDURE — 96374 THER/PROPH/DIAG INJ IV PUSH: CPT

## 2022-10-26 PROCEDURE — 83735 ASSAY OF MAGNESIUM: CPT

## 2022-10-26 PROCEDURE — 85025 COMPLETE CBC W/AUTO DIFF WBC: CPT

## 2022-10-26 PROCEDURE — 80053 COMPREHEN METABOLIC PANEL: CPT

## 2022-10-26 PROCEDURE — 87635 SARS-COV-2 COVID-19 AMP PRB: CPT

## 2022-10-26 PROCEDURE — 81001 URINALYSIS AUTO W/SCOPE: CPT

## 2022-10-26 PROCEDURE — 6360000004 HC RX CONTRAST MEDICATION: Performed by: EMERGENCY MEDICINE

## 2022-10-26 PROCEDURE — 83880 ASSAY OF NATRIURETIC PEPTIDE: CPT

## 2022-10-26 PROCEDURE — 6360000002 HC RX W HCPCS: Performed by: EMERGENCY MEDICINE

## 2022-10-26 PROCEDURE — 71260 CT THORAX DX C+: CPT | Performed by: EMERGENCY MEDICINE

## 2022-10-26 PROCEDURE — 71045 X-RAY EXAM CHEST 1 VIEW: CPT

## 2022-10-26 PROCEDURE — 99285 EMERGENCY DEPT VISIT HI MDM: CPT

## 2022-10-26 RX ORDER — SODIUM CHLORIDE 0.9 % (FLUSH) 0.9 %
5 SYRINGE (ML) INJECTION EVERY 8 HOURS
Status: DISCONTINUED | OUTPATIENT
Start: 2022-10-26 | End: 2022-11-09 | Stop reason: HOSPADM

## 2022-10-26 RX ORDER — SODIUM CHLORIDE 0.9 % (FLUSH) 0.9 %
5 SYRINGE (ML) INJECTION AS NEEDED
Status: DISCONTINUED | OUTPATIENT
Start: 2022-10-26 | End: 2022-11-09 | Stop reason: HOSPADM

## 2022-10-26 RX ORDER — HYDRALAZINE HYDROCHLORIDE 20 MG/ML
10 INJECTION INTRAMUSCULAR; INTRAVENOUS
Status: COMPLETED | OUTPATIENT
Start: 2022-10-26 | End: 2022-10-26

## 2022-10-26 RX ORDER — DEXAMETHASONE SODIUM PHOSPHATE 10 MG/ML
6 INJECTION INTRAMUSCULAR; INTRAVENOUS
Status: COMPLETED | OUTPATIENT
Start: 2022-10-27 | End: 2022-10-27

## 2022-10-26 RX ADMIN — IOPAMIDOL 100 ML: 755 INJECTION, SOLUTION INTRAVENOUS at 21:50

## 2022-10-26 RX ADMIN — HYDRALAZINE HYDROCHLORIDE 10 MG: 20 INJECTION INTRAMUSCULAR; INTRAVENOUS at 21:12

## 2022-10-26 ASSESSMENT — ENCOUNTER SYMPTOMS
VOMITING: 0
DIARRHEA: 0
NAUSEA: 0
BACK PAIN: 0
ABDOMINAL PAIN: 0
SHORTNESS OF BREATH: 1
SORE THROAT: 0
COUGH: 1

## 2022-10-26 ASSESSMENT — PAIN - FUNCTIONAL ASSESSMENT: PAIN_FUNCTIONAL_ASSESSMENT: NONE - DENIES PAIN

## 2022-10-26 NOTE — ED NOTES
Report given to Jeanmarie Spencer RN. Care transferred at this time.       Karyn Sharpe RN  10/26/22 1919

## 2022-10-26 NOTE — ED PROVIDER NOTES
[facility] Emergency Department Provider Note                   PCP:                Liliane Arguello DO               Age: 79 y.o. Sex: male       Franco Llanos is a 79 y.o. male who presents to the Emergency Department with chief complaint of    Chief Complaint   Patient presents with    Shortness of Breath    Fatigue      Patient's daughter states that 5 days ago he started having a cough associated with a fever. Since then he has been having nasal congestion and shortness of breath. He normally is able to ambulate on his own but the past few days he has been too weak. Patient went to an urgent care earlier today and his oxygen saturation was 85% on room air. Patient does not wear oxygen at home and has no history of lung problems. Patient did receive a liter of IV fluid at urgent care which daughter states perked the patient up. The history is provided by the patient and a relative (daughter). All other systems reviewed and are negative. Review of Systems   Constitutional:  Positive for activity change, fatigue and fever. HENT:  Positive for congestion. Negative for sore throat. Eyes:  Negative for visual disturbance. Respiratory:  Positive for cough and shortness of breath. Cardiovascular:  Negative for chest pain and leg swelling. Gastrointestinal:  Negative for abdominal pain, diarrhea, nausea and vomiting. Genitourinary:  Negative for decreased urine volume and dysuria. Musculoskeletal:  Negative for back pain, myalgias and neck pain. Skin:  Negative for rash. Neurological:  Negative for headaches.      Past Medical History:   Diagnosis Date    Allergic rhinitis, cause unspecified 2/27/2014    Anemia, unspecified 2/27/2014    Atherosclerosis of renal artery ([facility] [address]     2001    CAD (coronary artery disease) 2001    4 vessel cabg, cardiac stents prior to this    Cancer [facility]) 2013    bladder/ L kidney    Chronic airway obstruction, not elsewhere classified 2/27/2014 Chronic kidney disease     hx of CRF,  creatinine 1.8    Coronary atherosclerosis of native coronary vessel 2/27/2014    Depressive disorder, not elsewhere classified 2/27/2014    Diabetes ([facility] [address] dx 2000    type 2, oral med, does not check his glucose, A1C 6.4   9/9/2019, states very rare episode hypo    Diverticulosis of colon (without mention of hemorrhage) 2/27/2014    Dyslipidemia     controlled with med    Esophagitis, unspecified 2/27/2014    Essential and other specified forms of tremor 2/27/2014    Essential hypertension, benign 2/27/2014    GERD (gastroesophageal reflux disease)     controlled with omeprazole    Gross hematuria 2/27/2014    Hematuria, unspecified 2/27/2014    Hypertension     controlled with meds    Malignant neoplasm of bladder, 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     Malignant neoplasm of trigone of urinary bladder (HCC) 2/27/2014    Microscopic hematuria 2/27/2014    Osteoarthrosis, unspecified whether generalized or localized, unspecified site 2/27/2014    Other peripheral vascular disease(443.89) 2/27/2014    Polyneuropathy in diabetes(357.2) 2/27/2014    Proteinuria 2/27/2014    Psychiatric disorder     gets \"aggravated\", treated with Celexa    PUD (peptic ulcer disease) 1970's    Pure hypercholesterolemia 2/27/2014    Renal artery stenosis ([address] 2/27/2014    Renal sclerosis, unspecified 2/27/2014    left    Respiratory insufficiency 1/13/2016    Stroke ([facility] [address]     CVA x 2, (last was fall of 2011),  no residual weakness; L eye impaired    Tobacco use disorder 2/27/2014    Unspecified gastritis and gastroduodenitis without mention of hemorrhage 2/27/2014    Unspecified hereditary and idiopathic peripheral neuropathy 2/27/2014    Unspecified sleep apnea     does not wear cpap    Unspecified transient cerebral ischemia 2/27/2014        Past Surgical History:   Procedure Laterality Date    CARDIAC CATHETERIZATION      stents prior to CABG    COLONOSCOPY      KIDNEY REMOVAL Left     LIPOMA RESECTION  10/07/2019    MD CARDIAC SURG PROCEDURE UNLIST  2001    4 vessel CABG, cardiac stents    UROLOGICAL SURGERY      TURBT    VASCULAR SURGERY  2001    renal stent in L kidney        Family History   Problem Relation Age of Onset    Diabetes Mother     Heart Disease Mother     Heart Attack Mother         Myocardial Infarction    Stroke Father     Alcohol Abuse Father     Diabetes Son         Social History     Socioeconomic History    Marital status:    Tobacco Use    Smoking status: Every Day     Packs/day: 1.00     Types: Cigarettes    Smokeless tobacco: Never   Vaping Use    Vaping Use: Never used   Substance and Sexual Activity    Alcohol use: No    Drug use: No     Types: Prescription, OTC    Sexual activity: Not Currently        Allergies: Aspirin-dipyridamole er, Beta adrenergic blockers, Ciprofloxacin, Donepezil, and Lisinopril    Previous Medications    AMLODIPINE (NORVASC) 5 MG TABLET    Take 1 tablet by mouth in the morning. ASCORBIC ACID (VITAMIN C) 500 MG TABLET    Take by mouth    CITALOPRAM (CELEXA) 20 MG TABLET    Take 20 mg by mouth    CLOPIDOGREL (PLAVIX) 75 MG TABLET    75 mg    HYDRALAZINE (APRESOLINE) 50 MG TABLET    Take 1 tablet by mouth 3 times daily    LOSARTAN (COZAAR) 100 MG TABLET    Take 100 mg by mouth    MEMANTINE (NAMENDA) 5 MG TABLET    Take 1 tablet by mouth 2 times daily    OLOPATADINE (PATANOL) 0.1 % OPHTHALMIC SOLUTION    INSTILL 1 DROP IN BOTH EYES TWICE A DAY    PANTOPRAZOLE (PROTONIX) 40 MG TABLET    Take 1 tablet by mouth in the morning. SELENIUM 200 MCG TABS    Take by mouth    SIMVASTATIN (ZOCOR) 40 MG TABLET    Take 40 mg by mouth    VITAMIN D3 (CHOLECALCIFEROL) 125 MCG (5000 UT) TABS TABLET    Take by mouth daily        Vitals signs and nursing note reviewed.    Patient Vitals for the past 4 hrs:   Pulse Resp BP SpO2   10/27/22 0030 89 21 (!) 159/50 90 %   10/27/22 0000 85 18 (!) 151/50 94 %   10/26/22 2345 87 20 (!) 153/57 93 % 10/26/22 2245 82 20 (!) 166/106 95 %   10/26/22 2230 76 24 (!) 163/59 91 %   10/26/22 2215 91 24 (!) 164/65 92 %          Physical Exam  Vitals and nursing note reviewed. Constitutional:       Appearance: Normal appearance. HENT:      Head: Normocephalic and atraumatic. Cardiovascular:      Rate and Rhythm: Normal rate and regular rhythm. Pulses: Normal pulses. Heart sounds: Normal heart sounds. Pulmonary:      Effort: Pulmonary effort is normal.      Breath sounds: Normal breath sounds. Chest:      Chest wall: No tenderness. Abdominal:      General: Abdomen is flat. Bowel sounds are normal.      Palpations: Abdomen is soft. Tenderness: There is no abdominal tenderness. Musculoskeletal:         General: No swelling or tenderness. Cervical back: Normal range of motion and neck supple. No tenderness. Right lower leg: No tenderness. No edema. Left lower leg: No tenderness. No edema. Skin:     General: Skin is warm and dry. Neurological:      General: No focal deficit present. Mental Status: He is alert and oriented to person, place, and time.       Comments: Generalized motor weakness to left lower extremity without any focal deficits   Psychiatric:         Behavior: Behavior normal.        Orders Placed This Encounter   Procedures    Culture, Blood 1    Culture, Blood 1    COVID-19, Rapid    XR CHEST PORTABLE    CT CHEST PULMONARY EMBOLISM W CONTRAST    Comprehensive Metabolic Panel    Magnesium    Urinalysis with Reflex to Culture    Brain Natriuretic Peptide    Lactate, Sepsis    D-Dimer, Quantitative    CBC with Auto Differential    Continuous Pulse Oximetry    Cardiac Monitor - ED Only    Nasal Cannula Oxygen    EKG 12 Lead    Saline lock IV    ADMIT TO INPATIENT       Procedures    ED EKG Interpretation  EKG was interpreted in the absence of a cardiologist.    Rate: Rate: Normal  EKG Interpretation: EKG Interpretation: sinus rhythm  ST Segments: Nonspecific ST segments - NO STEMI      Results Include:    Recent Results (from the past 24 hour(s))   EKG 12 Lead    Collection Time: 10/26/22  6:44 PM   Result Value Ref Range    Ventricular Rate 80 BPM    Atrial Rate 74 BPM    P-R Interval 196 ms    QRS Duration 113 ms    Q-T Interval 387 ms    QTc Calculation (Bazett) 447 ms    P Axis 125 degrees    R Axis 83 degrees    T Axis 41 degrees    Diagnosis Unknown rhythm, irregular rate    Comprehensive Metabolic Panel    Collection Time: 10/26/22  6:54 PM   Result Value Ref Range    Sodium 141 133 - 143 mmol/L    Potassium 4.5 3.5 - 5.1 mmol/L    Chloride 115 (H) 101 - 110 mmol/L    CO2 24 21 - 32 mmol/L    Anion Gap 2 2 - 11 mmol/L    Glucose 89 65 - 100 mg/dL    BUN 31 (H) 8 - 23 MG/DL    Creatinine 1.50 0.8 - 1.5 MG/DL    Est, Glom Filt Rate 46 (L) >60 ml/min/1.73m2    Calcium 8.6 8.3 - 10.4 MG/DL    Total Bilirubin 0.4 0.2 - 1.1 MG/DL    ALT 14 12 - 65 U/L    AST 23 15 - 37 U/L    Alk Phosphatase 40 (L) 50 - 136 U/L    Total Protein 6.3 6.3 - 8.2 g/dL    Albumin 3.0 (L) 3.2 - 4.6 g/dL    Globulin 3.3 2.8 - 4.5 g/dL    Albumin/Globulin Ratio 0.9 0.4 - 1.6     Magnesium    Collection Time: 10/26/22  6:54 PM   Result Value Ref Range    Magnesium 2.3 1.8 - 2.4 mg/dL   Brain Natriuretic Peptide    Collection Time: 10/26/22  7:47 PM   Result Value Ref Range    NT Pro- (H) <450 PG/ML   D-Dimer, Quantitative    Collection Time: 10/26/22  7:47 PM   Result Value Ref Range    D-Dimer, Quant 0.95 (H) <0.56 ug/ml(FEU)   CBC with Auto Differential    Collection Time: 10/26/22  7:47 PM   Result Value Ref Range    WBC 3.1 (L) 4.3 - 11.1 K/uL    RBC 3.71 (L) 4.23 - 5.6 M/uL    Hemoglobin 9.8 (L) 13.6 - 17.2 g/dL    Hematocrit 31.3 (L) 41.1 - 50.3 %    MCV 84.4 82 - 102 FL    MCH 26.4 26.1 - 32.9 PG    MCHC 31.3 (L) 31.4 - 35.0 g/dL    RDW 15.9 (H) 11.9 - 14.6 %    Platelets 271 111 - 772 K/uL    MPV 13.4 (H) 9.4 - 12.3 FL    nRBC 0.00 0.0 - 0.2 K/uL    Differential Type AUTOMATED Seg Neutrophils 53 43 - 78 %    Lymphocytes 31 13 - 44 %    Monocytes 15 (H) 4.0 - 12.0 %    Eosinophils % 0 (L) 0.5 - 7.8 %    Basophils 0 0.0 - 2.0 %    Immature Granulocytes 0 0.0 - 5.0 %    Segs Absolute 1.7 1.7 - 8.2 K/UL    Absolute Lymph # 1.0 0.5 - 4.6 K/UL    Absolute Mono # 0.5 0.1 - 1.3 K/UL    Absolute Eos # 0.0 0.0 - 0.8 K/UL    Basophils Absolute 0.0 0.0 - 0.2 K/UL    Absolute Immature Granulocyte 0.0 0.0 - 0.5 K/UL   Urinalysis with Reflex to Culture    Collection Time: 10/26/22  7:51 PM    Specimen: Urine   Result Value Ref Range    Color, UA YELLOW/STRAW      Appearance CLEAR      Specific Gravity, UA 1.016 1.001 - 1.023      pH, Urine 5.0 5.0 - 9.0      Protein,  (A) NEG mg/dL    Glucose, UA Negative mg/dL    Ketones, Urine Negative NEG mg/dL    Bilirubin Urine Negative NEG      Blood, Urine Negative NEG      Urobilinogen, Urine 0.2 0.2 - 1.0 EU/dL    Nitrite, Urine Negative NEG      Leukocyte Esterase, Urine Negative NEG      Urine Culture if Indicated CULTURE NOT INDICATED BY UA RESULT      WBC, UA 0-4 (A) NORM /hpf    RBC, UA 0-5 (A) NORM /hpf    BACTERIA, URINE Negative (A) NORM /hpf    Epithelial Cells UA 0-5 (A) NORM /hpf    Casts 2-5 (A) NORM /lpf    Mucus, UA 0 0 /lpf   Lactate, Sepsis    Collection Time: 10/26/22  7:51 PM   Result Value Ref Range    Lactic Acid, Sepsis 0.9 0.4 - 2.0 MMOL/L   COVID-19, Rapid    Collection Time: 10/26/22  8:52 PM    Specimen: Nasopharyngeal   Result Value Ref Range    Source NASAL      SARS-CoV-2, Rapid Detected (AA) NOTD            CT CHEST PULMONARY EMBOLISM W CONTRAST   Final Result   1. No evidence of pulmonary embolism. 2. Mild infiltrate or atelectasis in the left upper lobe. 3. Findings in the upper abdomen as described above. XR CHEST PORTABLE   Final Result   No acute airspace disease. ED Course as of 10/27/22 0157   Wed Oct 26, 2022   2055 Patient's home blood pressure has trended up.   He apparently gets hydralazine whenever his systolic blood pressure gets above 150. He currently is 160s so I ordered IV hydralazine. [CW]   9525 Patient is resting comfortably in bed. His pulse ox is 92% on 2 L nasal cannula. His blood pressure is 150 over 60s. Patient and wife are updated on the results and plan and agreeable to admission. [CW]      ED Course User Index  [CW] Santiago Morrison MD       MDM  Number of Diagnoses or Management Options  COVID-19 virus infection  Hypoxia  Diagnosis management comments: Patient presenting for 5 days of fever, cough, and shortness of breath. Patient was hypoxic to 85% at urgent care. Patient was placed on 2 L nasal cannula oxygen and his sats improved to 92%. He is not in any distress. Patient's white count is 3.1. His lactate is 0.9 and there is no indication of severe sepsis. Patient's BNP level is 900. Patient's chest x-ray showed no pneumonia or signs of CHF. Patient's D-dimer was elevated. Patient's CT angiogram of chest showed no PE. There is mild infiltrate in the left upper lobe. Patient was COVID-positive. Patient was given a dose of Decadron. Hospitalist was consulted for admission. Explanation: The diagnosis and plan as well as the results of any testing and treatments today in the department were communicated to the patient and/or their family/caregiver (if present). The patient/caregiver verbalized understanding and realize that they are being admitted to the hospital for further evaluation and treatment. All questions were answered at bedside. ICD-10-CM    1. Hypoxia  R09.02       2. COVID-19 virus infection  U07.1           DISPOSITION Admitted 10/27/2022 12:26:26 AM       Voice dictation software was used during the making of this note. This software is not perfect and grammatical and other typographical errors may be present. This note has not been completely proofread for errors.      Santiago Morrison MD  10/27/22 [address]

## 2022-10-26 NOTE — ED TRIAGE NOTES
Pt to ed via ems from urgent care. EMS called out by facility  for hypoxia. Pt was 89-90 on room air. EMS placed pt on 4L, pt sats 94%. Hx of diabetes, CVA, hypertension, MI, subarachnoid bleed 2 weeks ago. Pt normally ambulates on own, family states since Friday he has not been able to walk. Pt originally brought to urgent care by family for weakness. 1000 mL normal saline at urgent care.    BP: 160/90  HR: 72  Resp: 16  Temp: 98.3  BGL: 102

## 2022-10-27 PROBLEM — R09.02 HYPOXIA: Status: ACTIVE | Noted: 2022-10-27

## 2022-10-27 PROBLEM — U07.1 COVID-19 VIRUS INFECTION: Status: ACTIVE | Noted: 2022-10-27

## 2022-10-27 LAB
EKG ATRIAL RATE: 74 BPM
EKG DIAGNOSIS: NORMAL
EKG P AXIS: 125 DEGREES
EKG P-R INTERVAL: 196 MS
EKG Q-T INTERVAL: 387 MS
EKG QRS DURATION: 113 MS
EKG QTC CALCULATION (BAZETT): 447 MS
EKG R AXIS: 83 DEGREES
EKG T AXIS: 41 DEGREES
EKG VENTRICULAR RATE: 80 BPM
GLUCOSE BLD STRIP.AUTO-MCNC: 240 MG/DL (ref 65–100)
GLUCOSE BLD STRIP.AUTO-MCNC: 243 MG/DL (ref 65–100)
GLUCOSE BLD STRIP.AUTO-MCNC: 257 MG/DL (ref 65–100)
SERVICE CMNT-IMP: ABNORMAL

## 2022-10-27 PROCEDURE — 6370000000 HC RX 637 (ALT 250 FOR IP): Performed by: HOSPITALIST

## 2022-10-27 PROCEDURE — 6370000000 HC RX 637 (ALT 250 FOR IP): Performed by: FAMILY MEDICINE

## 2022-10-27 PROCEDURE — 2580000003 HC RX 258: Performed by: HOSPITALIST

## 2022-10-27 PROCEDURE — 6360000002 HC RX W HCPCS: Performed by: HOSPITALIST

## 2022-10-27 PROCEDURE — 2580000003 HC RX 258: Performed by: FAMILY MEDICINE

## 2022-10-27 PROCEDURE — 6360000002 HC RX W HCPCS: Performed by: FAMILY MEDICINE

## 2022-10-27 PROCEDURE — 82962 GLUCOSE BLOOD TEST: CPT

## 2022-10-27 PROCEDURE — 6360000002 HC RX W HCPCS: Performed by: EMERGENCY MEDICINE

## 2022-10-27 PROCEDURE — 1100000003 HC PRIVATE W/ TELEMETRY

## 2022-10-27 RX ORDER — MEMANTINE HYDROCHLORIDE 5 MG/1
5 TABLET ORAL 2 TIMES DAILY
Status: DISCONTINUED | OUTPATIENT
Start: 2022-10-27 | End: 2022-11-09 | Stop reason: HOSPADM

## 2022-10-27 RX ORDER — AMLODIPINE BESYLATE 5 MG/1
5 TABLET ORAL DAILY
Status: DISCONTINUED | OUTPATIENT
Start: 2022-10-27 | End: 2022-11-03

## 2022-10-27 RX ORDER — ONDANSETRON 2 MG/ML
4 INJECTION INTRAMUSCULAR; INTRAVENOUS EVERY 6 HOURS PRN
Status: DISCONTINUED | OUTPATIENT
Start: 2022-10-27 | End: 2022-11-09 | Stop reason: HOSPADM

## 2022-10-27 RX ORDER — AZITHROMYCIN 250 MG/1
250 TABLET, FILM COATED ORAL DAILY
Status: COMPLETED | OUTPATIENT
Start: 2022-10-28 | End: 2022-10-31

## 2022-10-27 RX ORDER — DEXAMETHASONE SODIUM PHOSPHATE 10 MG/ML
6 INJECTION INTRAMUSCULAR; INTRAVENOUS EVERY 24 HOURS
Status: DISCONTINUED | OUTPATIENT
Start: 2022-10-27 | End: 2022-10-30

## 2022-10-27 RX ORDER — INSULIN LISPRO 100 [IU]/ML
0-4 INJECTION, SOLUTION INTRAVENOUS; SUBCUTANEOUS
Status: DISCONTINUED | OUTPATIENT
Start: 2022-10-27 | End: 2022-11-09 | Stop reason: HOSPADM

## 2022-10-27 RX ORDER — ACETAMINOPHEN 325 MG/1
650 TABLET ORAL EVERY 6 HOURS PRN
Status: DISCONTINUED | OUTPATIENT
Start: 2022-10-27 | End: 2022-11-09 | Stop reason: HOSPADM

## 2022-10-27 RX ORDER — ACETAMINOPHEN 650 MG/1
650 SUPPOSITORY RECTAL EVERY 6 HOURS PRN
Status: DISCONTINUED | OUTPATIENT
Start: 2022-10-27 | End: 2022-11-09 | Stop reason: HOSPADM

## 2022-10-27 RX ORDER — ATORVASTATIN CALCIUM 10 MG/1
20 TABLET, FILM COATED ORAL DAILY
Status: DISCONTINUED | OUTPATIENT
Start: 2022-10-27 | End: 2022-11-09 | Stop reason: HOSPADM

## 2022-10-27 RX ORDER — NICOTINE 21 MG/24HR
1 PATCH, TRANSDERMAL 24 HOURS TRANSDERMAL DAILY
Status: DISCONTINUED | OUTPATIENT
Start: 2022-10-27 | End: 2022-11-09 | Stop reason: HOSPADM

## 2022-10-27 RX ORDER — INSULIN LISPRO 100 [IU]/ML
0-4 INJECTION, SOLUTION INTRAVENOUS; SUBCUTANEOUS NIGHTLY
Status: DISCONTINUED | OUTPATIENT
Start: 2022-10-27 | End: 2022-11-09 | Stop reason: HOSPADM

## 2022-10-27 RX ORDER — CITALOPRAM 20 MG/1
20 TABLET ORAL DAILY
Status: DISCONTINUED | OUTPATIENT
Start: 2022-10-27 | End: 2022-11-09 | Stop reason: HOSPADM

## 2022-10-27 RX ORDER — GUAIFENESIN/DEXTROMETHORPHAN 100-10MG/5
5 SYRUP ORAL EVERY 4 HOURS PRN
Status: DISCONTINUED | OUTPATIENT
Start: 2022-10-27 | End: 2022-11-09 | Stop reason: HOSPADM

## 2022-10-27 RX ORDER — AZITHROMYCIN 250 MG/1
500 TABLET, FILM COATED ORAL ONCE
Status: COMPLETED | OUTPATIENT
Start: 2022-10-27 | End: 2022-10-27

## 2022-10-27 RX ORDER — ONDANSETRON 4 MG/1
4 TABLET, ORALLY DISINTEGRATING ORAL EVERY 8 HOURS PRN
Status: DISCONTINUED | OUTPATIENT
Start: 2022-10-27 | End: 2022-11-09 | Stop reason: HOSPADM

## 2022-10-27 RX ORDER — SODIUM CHLORIDE 0.9 % (FLUSH) 0.9 %
5-40 SYRINGE (ML) INJECTION EVERY 12 HOURS SCHEDULED
Status: DISCONTINUED | OUTPATIENT
Start: 2022-10-27 | End: 2022-11-09 | Stop reason: HOSPADM

## 2022-10-27 RX ORDER — SODIUM CHLORIDE 9 MG/ML
INJECTION, SOLUTION INTRAVENOUS PRN
Status: DISCONTINUED | OUTPATIENT
Start: 2022-10-27 | End: 2022-11-09 | Stop reason: HOSPADM

## 2022-10-27 RX ORDER — ENOXAPARIN SODIUM 100 MG/ML
30 INJECTION SUBCUTANEOUS 2 TIMES DAILY
Status: DISCONTINUED | OUTPATIENT
Start: 2022-10-27 | End: 2022-11-01

## 2022-10-27 RX ORDER — SODIUM CHLORIDE 0.9 % (FLUSH) 0.9 %
5-40 SYRINGE (ML) INJECTION PRN
Status: DISCONTINUED | OUTPATIENT
Start: 2022-10-27 | End: 2022-11-09 | Stop reason: HOSPADM

## 2022-10-27 RX ORDER — PANTOPRAZOLE SODIUM 40 MG/1
40 TABLET, DELAYED RELEASE ORAL DAILY
Status: DISCONTINUED | OUTPATIENT
Start: 2022-10-27 | End: 2022-11-09 | Stop reason: HOSPADM

## 2022-10-27 RX ORDER — POLYETHYLENE GLYCOL 3350 17 G/17G
17 POWDER, FOR SOLUTION ORAL DAILY PRN
Status: DISCONTINUED | OUTPATIENT
Start: 2022-10-27 | End: 2022-11-09 | Stop reason: HOSPADM

## 2022-10-27 RX ORDER — LOSARTAN POTASSIUM 50 MG/1
100 TABLET ORAL DAILY
Status: DISCONTINUED | OUTPATIENT
Start: 2022-10-27 | End: 2022-11-09 | Stop reason: HOSPADM

## 2022-10-27 RX ADMIN — CEFTRIAXONE 1000 MG: 1 INJECTION, POWDER, FOR SOLUTION INTRAMUSCULAR; INTRAVENOUS at 09:03

## 2022-10-27 RX ADMIN — PANTOPRAZOLE SODIUM 40 MG: 40 TABLET, DELAYED RELEASE ORAL at 08:54

## 2022-10-27 RX ADMIN — DEXAMETHASONE SODIUM PHOSPHATE 6 MG: 10 INJECTION INTRAMUSCULAR; INTRAVENOUS at 00:45

## 2022-10-27 RX ADMIN — INSULIN LISPRO 1 UNITS: 100 INJECTION, SOLUTION INTRAVENOUS; SUBCUTANEOUS at 13:22

## 2022-10-27 RX ADMIN — ATORVASTATIN CALCIUM 20 MG: 20 TABLET, FILM COATED ORAL at 08:55

## 2022-10-27 RX ADMIN — AMLODIPINE BESYLATE 5 MG: 5 TABLET ORAL at 08:54

## 2022-10-27 RX ADMIN — LOSARTAN POTASSIUM 100 MG: 50 TABLET, FILM COATED ORAL at 08:54

## 2022-10-27 RX ADMIN — AZITHROMYCIN MONOHYDRATE 500 MG: 250 TABLET ORAL at 08:53

## 2022-10-27 RX ADMIN — DEXAMETHASONE SODIUM PHOSPHATE 6 MG: 10 INJECTION INTRAMUSCULAR; INTRAVENOUS at 21:58

## 2022-10-27 RX ADMIN — MEMANTINE 5 MG: 5 TABLET ORAL at 21:57

## 2022-10-27 RX ADMIN — MEMANTINE 5 MG: 5 TABLET ORAL at 08:54

## 2022-10-27 RX ADMIN — ENOXAPARIN SODIUM 30 MG: 100 INJECTION SUBCUTANEOUS at 09:03

## 2022-10-27 RX ADMIN — SODIUM CHLORIDE, PRESERVATIVE FREE 10 ML: 5 INJECTION INTRAVENOUS at 21:58

## 2022-10-27 RX ADMIN — INSULIN LISPRO 1 UNITS: 100 INJECTION, SOLUTION INTRAVENOUS; SUBCUTANEOUS at 18:26

## 2022-10-27 RX ADMIN — CITALOPRAM HYDROBROMIDE 20 MG: 20 TABLET ORAL at 08:54

## 2022-10-27 NOTE — ED NOTES
TRANSFER - OUT REPORT:    Verbal report given to [facility] RN on Tom Lopez  being transferred to  for routine progression of patient care       Report consisted of patient's Situation, Background, Assessment and   Recommendations(SBAR). Information from the following report(s) Nurse Handoff Report, ED SBAR, [facility], and Recent Results was reviewed with the receiving nurse. Lines:   Peripheral IV 10/26/22 Left Antecubital (Active)        Opportunity for questions and clarification was provided.       Patient transported with:  Lashay Greco RN  10/27/22 4564

## 2022-10-27 NOTE — H&P
Hospitalist History and Physical   Admit Date:  10/26/2022  6:21 PM   Name:  Blair Zhang   Age:  79 y.o. Sex:  male  :  1940   MRN:  [id number]   Room:  ER14/14    Presenting Complaint: Shortness of Breath and Fatigue     Reason(s) for Admission: COVID-19 virus infection [U07.1]     History of Present Illness:   Blair Zhang is a 79 y.o. male with medical history of vascular dementia, hypertension, CKD, and malignant neoplasm of bladder, who presented with  5 days of cough associated with a fever. He has been having nasal congestion and shortness of breath. He normally is able to ambulate on his own but the past few days he has been too weak. Patient went to an urgent care earlier today and his oxygen saturation was 85% on room air. Patient does not wear oxygen at home and has no history of lung problems. Patient did receive a liter of IV fluid at urgent care which family states perked the patient up. Wife states that she tested positive for COVID about a week ago and that patient tested positive for COVID shortly thereafter. Since that time he has had poor p.o. intake. Work-up in the emergency room shows white blood cell count 3.1 with hemoglobin 9.8. Take acid is good at 0.9. Patient's chest x-ray showed no pneumonia or signs of CHF. Patient's D-dimer was elevated. Patient's CT angiogram of chest showed no PE. There is mild infiltrate in the left upper lobe. Patient was COVID-positive. Patient was given a dose of Decadron. Hospitalist was consulted for admission    Review of Systems:  10 systems reviewed and negative except as noted in HPI.   Assessment & Plan:     Principal Problem:    COVID-19 virus infection -79-year-old with advanced dementia, hypoxia, positive COVID      Plan:   1) Admit to med bed, Inpatient status due to medical complexity, comorbidities, and medically necessary treatment not readily available as outpatient as outlined below    2) Will continue IV decadron due to hypoxia. 3) Continue oxygen by nasal canula to keep saturations >90%    4) monitor and trend inflammatory markers    5) anti-tussives, supportive care. 6) evaluate for immune modulators: baricitinib/tocilizumab    7) wife wishes patient to remain a full code at this time. 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  *       Past History:     Past Medical History:   Diagnosis Date    Allergic rhinitis, cause unspecified 2/27/2014    Anemia, unspecified 2/27/2014    Atherosclerosis of renal artery ([facility]     2001    CAD (coronary artery disease) 2001    4 vessel cabg, cardiac stents prior to this    Cancer [facility]) 2013    bladder/ L kidney    Chronic airway obstruction, not elsewhere classified 2/27/2014    Chronic kidney disease     hx of CRF,  creatinine 1.8    Coronary atherosclerosis of native coronary vessel 2/27/2014    Depressive disorder, not elsewhere classified 2/27/2014    Diabetes ([facility] dx 2000    type 2, oral med, does not check his glucose, A1C 6.4   9/9/2019, states very rare episode hypo    Diverticulosis of colon (without mention of hemorrhage) 2/27/2014    Dyslipidemia     controlled with med    Esophagitis, unspecified 2/27/2014    Essential and other specified forms of tremor 2/27/2014    Essential hypertension, benign 2/27/2014    GERD (gastroesophageal reflux disease)     controlled with omeprazole    Gross hematuria 2/27/2014    Hematuria, unspecified 2/27/2014    Hypertension     controlled with meds    Malignant neoplasm of bladder, 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     Malignant neoplasm of trigone of urinary bladder (HCC) 2/27/2014    Microscopic hematuria 2/27/2014    Osteoarthrosis, unspecified whether generalized or localized, unspecified site 2/27/2014    Other peripheral vascular disease(443.89) 2/27/2014    Polyneuropathy in diabetes(357.2) 2/27/2014    Proteinuria 2/27/2014    Psychiatric disorder     gets \"aggravated\", treated with Celexa    PUD (peptic ulcer disease) 1970's    Pure hypercholesterolemia 2/27/2014    Renal artery stenosis ([facility] 2/27/2014    Renal sclerosis, unspecified 2/27/2014    left    Respiratory insufficiency 1/13/2016    Stroke ([facility]     CVA x 2, (last was fall of 2011),  no residual weakness; L eye impaired    Tobacco use disorder 2/27/2014    Unspecified gastritis and gastroduodenitis without mention of hemorrhage 2/27/2014    Unspecified hereditary and idiopathic peripheral neuropathy 2/27/2014    Unspecified sleep apnea     does not wear cpap    Unspecified transient cerebral ischemia 2/27/2014       Past Surgical History:   Procedure Laterality Date    CARDIAC CATHETERIZATION      stents prior to CABG    COLONOSCOPY      KIDNEY REMOVAL Left     LIPOMA RESECTION  10/07/2019    DC CARDIAC SURG PROCEDURE UNLIST  2001    4 vessel CABG, cardiac stents    UROLOGICAL SURGERY      TURBT    VASCULAR SURGERY  2001    renal stent in L kidney        Social History     Tobacco Use    Smoking status: Every Day     Packs/day: 1.00     Types: Cigarettes    Smokeless tobacco: Never   Substance Use Topics    Alcohol use: No      Social History     Substance and Sexual Activity   Drug Use No    Types: Prescription, OTC       Family History   Problem Relation Age of Onset    Diabetes Mother     Heart Disease Mother     Heart Attack Mother         Myocardial Infarction    Stroke Father     Alcohol Abuse Father     Diabetes Son         Immunization History   Administered Date(s) Administered    Influenza Virus Vaccine 11/01/2003, 11/01/2005, 12/04/2007, 11/01/2008, 12/11/2012, 10/27/2021    Influenza, FLUAD, (age 72 y+), Adjuvanted, 0.5mL 09/28/2022    PPD Test 06/21/2022    Pneumococcal Conjugate 13-valent ([id number]) 02/05/2016    Pneumococcal Polysaccharide ([id number]) 11/01/2003, 12/01/2006, 08/01/2017, 03/09/2020    Td vaccine (adult) 01/13/2004    Tdap (Boostrix, Adacel) 02/05/2016    Zoster Live (Zostavax) 08/01/2017     Allergies   Allergen Reactions    Aspirin-Dipyridamole Er Headaches    Beta Adrenergic Blockers     Ciprofloxacin Other (See Comments)     weakness    Donepezil Diarrhea    Lisinopril Cough     Prior to Admit Medications:  Current Outpatient Medications   Medication Instructions    amLODIPine (NORVASC) 5 mg, Oral, DAILY    ascorbic acid (VITAMIN C) 500 MG tablet Oral    citalopram (CELEXA) 20 mg, Oral    clopidogrel (PLAVIX) 75 mg    hydrALAZINE (APRESOLINE) 50 mg, Oral, 3 TIMES DAILY    losartan (COZAAR) 100 mg, Oral    memantine (NAMENDA) 5 mg, Oral, 2 TIMES DAILY    olopatadine (PATANOL) 0.1 % ophthalmic solution INSTILL 1 DROP IN BOTH EYES TWICE A DAY    pantoprazole (PROTONIX) 40 mg, Oral, DAILY    Selenium 200 MCG TABS Oral    simvastatin (ZOCOR) 40 mg, Oral    vitamin D3 (CHOLECALCIFEROL) 125 MCG (5000 UT) TABS tablet Oral, DAILY         Objective:   Patient Vitals for the past 24 hrs:   Temp Pulse Resp BP SpO2   10/27/22 0030 -- 89 21 (!) 159/50 90 %   10/27/22 0000 -- 85 18 (!) 151/50 94 %   10/26/22 2345 -- 87 20 (!) 153/57 93 %   10/26/22 2245 -- 82 20 (!) 166/106 95 % 10/26/22 2230 -- 76 24 (!) 163/59 91 %   10/26/22 2215 -- 91 24 (!) 164/65 92 %   10/26/22 2145 -- 82 22 (!) 169/92 94 %   10/26/22 2138 -- 86 23 (!) 176/83 94 %   10/26/22 2130 -- 83 21 (!) 184/77 95 %   10/26/22 2112 -- -- -- (!) 164/88 --   10/26/22 2050 -- 80 15 (!) 164/88 93 %   10/26/22 1951 -- 78 22 (!) 151/64 95 %   10/26/22 1931 -- 84 21 (!) 160/62 95 %   10/26/22 1916 -- 82 21 (!) 151/70 95 %   10/26/22 1901 -- 78 17 (!) 150/64 98 %   10/26/22 1857 98.4 °F (36.9 °C) 79 15 138/61 95 %       Oxygen Therapy  SpO2: 90 %  Pulse via Oximetry: 85 beats per minute  Pulse Oximeter Device Mode: Continuous  O2 Device: Nasal cannula  O2 Flow Rate (L/min): 2 L/min    Estimated body mass index is 23.68 kg/m² as calculated from the following:    Height as of this encounter: 5' 10\" (1.778 m). Weight as of this encounter: 165 lb (74.8 kg). No intake or output data in the 24 hours ending 10/27/22 0143      Physical Exam:    Blood pressure (!) 159/50, pulse 89, temperature 98.4 °F (36.9 °C), resp. rate 21, height 5' 10\" (1.778 m), weight 165 lb (74.8 kg), SpO2 90 %. General:    Well nourished. No acute distress, demented, oriented to name only. Head:  Normocephalic, atraumatic  Eyes:  Sclerae appear normal.  Pupils equally round. ENT:  Nares appear normal, no drainage. Moist oral mucosa  Neck:  No restricted ROM. Trachea midline   CV:   RRR. No m/r/g. No jugular venous distension. Lungs:   Diminished at bases. No wheezing, rhonchi, or rales. Symmetric expansion. Abdomen: Bowel sounds present. Soft, nontender, nondistended. Extremities: No cyanosis or clubbing. No edema  Skin:     No rashes and normal coloration. Warm and dry. Neuro:  CN II-XII grossly intact. Sensation intact.   A&Ox1  Psych:  Flat affect    I have personally reviewed labs and tests showing:  Recent Labs:  Recent Results (from the past 24 hour(s))   EKG 12 Lead    Collection Time: 10/26/22  6:44 PM   Result Value Ref Range Ventricular Rate 80 BPM    Atrial Rate 74 BPM    P-R Interval 196 ms    QRS Duration 113 ms    Q-T Interval 387 ms    QTc Calculation (Bazett) 447 ms    P Axis 125 degrees    R Axis 83 degrees    T Axis 41 degrees    Diagnosis Unknown rhythm, irregular rate    Comprehensive Metabolic Panel    Collection Time: 10/26/22  6:54 PM   Result Value Ref Range    Sodium 141 133 - 143 mmol/L    Potassium 4.5 3.5 - 5.1 mmol/L    Chloride 115 (H) 101 - 110 mmol/L    CO2 24 21 - 32 mmol/L    Anion Gap 2 2 - 11 mmol/L    Glucose 89 65 - 100 mg/dL    BUN 31 (H) 8 - 23 MG/DL    Creatinine 1.50 0.8 - 1.5 MG/DL    Est, Glom Filt Rate 46 (L) >60 ml/min/1.73m2    Calcium 8.6 8.3 - 10.4 MG/DL    Total Bilirubin 0.4 0.2 - 1.1 MG/DL    ALT 14 12 - 65 U/L    AST 23 15 - 37 U/L    Alk Phosphatase 40 (L) 50 - 136 U/L    Total Protein 6.3 6.3 - 8.2 g/dL    Albumin 3.0 (L) 3.2 - 4.6 g/dL    Globulin 3.3 2.8 - 4.5 g/dL    Albumin/Globulin Ratio 0.9 0.4 - 1.6     Magnesium    Collection Time: 10/26/22  6:54 PM   Result Value Ref Range    Magnesium 2.3 1.8 - 2.4 mg/dL   Brain Natriuretic Peptide    Collection Time: 10/26/22  7:47 PM   Result Value Ref Range    NT Pro- (H) <450 PG/ML   D-Dimer, Quantitative    Collection Time: 10/26/22  7:47 PM   Result Value Ref Range    D-Dimer, Quant 0.95 (H) <0.56 ug/ml(FEU)   CBC with Auto Differential    Collection Time: 10/26/22  7:47 PM   Result Value Ref Range    WBC 3.1 (L) 4.3 - 11.1 K/uL    RBC 3.71 (L) 4.23 - 5.6 M/uL    Hemoglobin 9.8 (L) 13.6 - 17.2 g/dL    Hematocrit 31.3 (L) 41.1 - 50.3 %    MCV 84.4 82 - 102 FL    MCH 26.4 26.1 - 32.9 PG    MCHC 31.3 (L) 31.4 - 35.0 g/dL    RDW 15.9 (H) 11.9 - 14.6 %    Platelets 044 390 - 677 K/uL    MPV 13.4 (H) 9.4 - 12.3 FL    nRBC 0.00 0.0 - 0.2 K/uL    Differential Type AUTOMATED      Seg Neutrophils 53 43 - 78 %    Lymphocytes 31 13 - 44 %    Monocytes 15 (H) 4.0 - 12.0 %    Eosinophils % 0 (L) 0.5 - 7.8 %    Basophils 0 0.0 - 2.0 %    Immature Granulocytes 0 0.0 - 5.0 %    Segs Absolute 1.7 1.7 - 8.2 K/UL    Absolute Lymph # 1.0 0.5 - 4.6 K/UL    Absolute Mono # 0.5 0.1 - 1.3 K/UL    Absolute Eos # 0.0 0.0 - 0.8 K/UL    Basophils Absolute 0.0 0.0 - 0.2 K/UL    Absolute Immature Granulocyte 0.0 0.0 - 0.5 K/UL   Urinalysis with Reflex to Culture    Collection Time: 10/26/22  7:51 PM    Specimen: Urine   Result Value Ref Range    Color, UA YELLOW/STRAW      Appearance CLEAR      Specific Gravity, UA 1.016 1.001 - 1.023      pH, Urine 5.0 5.0 - 9.0      Protein,  (A) NEG mg/dL    Glucose, UA Negative mg/dL    Ketones, Urine Negative NEG mg/dL    Bilirubin Urine Negative NEG      Blood, Urine Negative NEG      Urobilinogen, Urine 0.2 0.2 - 1.0 EU/dL    Nitrite, Urine Negative NEG      Leukocyte Esterase, Urine Negative NEG      Urine Culture if Indicated CULTURE NOT INDICATED BY UA RESULT      WBC, UA 0-4 (A) NORM /hpf    RBC, UA 0-5 (A) NORM /hpf    BACTERIA, URINE Negative (A) NORM /hpf    Epithelial Cells UA 0-5 (A) NORM /hpf    Casts 2-5 (A) NORM /lpf    Mucus, UA 0 0 /lpf   Lactate, Sepsis    Collection Time: 10/26/22  7:51 PM   Result Value Ref Range    Lactic Acid, Sepsis 0.9 0.4 - 2.0 MMOL/L   COVID-19, Rapid    Collection Time: 10/26/22  8:52 PM    Specimen: Nasopharyngeal   Result Value Ref Range    Source NASAL      SARS-CoV-2, Rapid Detected (AA) NOTD         I have personally reviewed imaging studies showing:  XR CHEST PORTABLE    Result Date: 10/26/2022  EXAMINATION: XR CHEST PORTABLE 10/26/2022 7:04 PM ACCESSION NUMBER: [id number] COMPARISON: Chest radiograph 6/23/2020 20/200. INDICATION: Shortness of Breath TECHNIQUE: A single view of the chest was obtained. FINDINGS: No focal consolidation. No pulmonary edema. No pneumothorax or sizable pleural effusion. Stable prominent cardiomediastinal silhouette. Aortic arch calcifications. Unchanged surgical clip overlying the medial left upper lung. No acute airspace disease.      CT CHEST PULMONARY EMBOLISM W CONTRAST    Result Date: 10/26/2022  EXAM: CT angiogram chest. HISTORY: SOB. TECHNIQUE: CT angiographic images of the chest were obtained following intravenous administration of contrast. Imaging was performed utilizing PE protocol. Examination was performed in the axial plane and coronal reconstructions were performed. 3-D MIPS reconstructions of the chest were obtained. Dose reduction technique used: Automated exposure control/Adjustment of the mA and/or kV according to patient size/Use of iterative reconstruction technique. 100 mL of Isovue-370 were utilized. COMPARISON: CT abdomen and pelvis dated 5/9/2016 FINDINGS: There is adequate opacification of the pulmonary arterial tree. No significant filling defects are identified to suggest pulmonary embolism. Main pulmonary artery is normal in caliber. The heart is not enlarged and there is no pericardial effusion. The great vessels appear normal. The visualized thyroid is unremarkable. There are no pathologically enlarged mediastinal hilar or axillary lymph nodes. Trachea and mainstem bronchi are patent. There is no pleural effusion or pneumothorax. No nodules are seen. Mild infiltrate or atelectasis in the left upper lobe. The visualized upper abdomen is unremarkable. Stable low density lesion in the dome of the liver. Small low-density lesion in the lateral aspect of the right kidney. Confluent units of fluid suggests a cyst. Osseous structures are within normal limits. 1. No evidence of pulmonary embolism. 2. Mild infiltrate or atelectasis in the left upper lobe. 3. Findings in the upper abdomen as described above. Echocardiogram:  No results found for this or any previous visit.         Orders Placed This Encounter   Medications    sodium chloride flush 0.9 % injection 5 mL    sodium chloride flush 0.9 % injection 5 mL    hydrALAZINE (APRESOLINE) injection 10 mg    iopamidol (ISOVUE-370) 76 % injection 100 mL    dexamethasone

## 2022-10-27 NOTE — CARE COORDINATION
Pt chart reviewed for discharge planning. Pt is in ED being admitted for care and treatment after testing positive for COVID within the past week. Pt is known to CM from previous care. Last hospitalized in June 2022 with discharge to [facility] for rehab. Pt lives with and is supported by his spouse and extended family as needed. CM will follow pt plan of care and assist with supportive care referrals pending pt clinical progress. Please consult case management if specific needs arise. 10/27/22 5727   Service Assessment   Patient Orientation Alert and Oriented   Cognition Alert   History Provided By Patient;Spouse   Primary Caregiver Spouse   Support Systems Spouse/Significant Other   Patient's Healthcare Decision Maker is:   (spouse)   PCP Verified by CM Yes   Last Visit to PCP Within last 3 months   Prior Functional Level Independent in ADLs/IADLs   Current Functional Level Assistance with the following:;Mobility   Can patient return to prior living arrangement Unknown at present   Ability to make needs known: Good   Family able to assist with home care needs: Yes   Would you like for me to discuss the discharge plan with any other family members/significant others, and if so, who?  Yes  (spouse)   Social/Functional History   Lives With Spouse   Type of [address] Help From Family   Active  No   Patient's  Info not at present   Occupation Retired   Discharge Planning   Type of [address] Prior To Admission Durable Medical Equipment   Current DME Prior to [address] [address] Care;Skilled Nursing Facility;Durable [address]   Potential DME Needed Other (Comment)  (assess pending clinical progress)   Potential Assistance Purchasing Medications No   Patient expects to be discharged to: House  (home with [facility] or other rehab pending clinical progress)   Services At/After Discharge   Transition of Care Consult (CM Consult) Discharge [address] Provided? No   Mode of Transport at Discharge Other (see comment)  (family)   Confirm Follow Up Transport Family   Condition of Participation: Discharge Planning   The Plan for Transition of Care is related to the following treatment goals: Pt will return home with supportive care vs rehab services. The Patient and/or Patient Representative was provided with a Choice of Provider? Patient;Patient Representative   Name of the Patient Representative who was provided with the Choice of Provider and agrees with the Discharge Plan? spouse   The Patient and/Or Patient Representative agree with the Discharge Plan? Yes   Freedom of Choice list was provided with basic dialogue that supports the patient's individualized plan of care/goals, treatment preferences, and shares the quality data associated with the providers?   Yes

## 2022-10-27 NOTE — ED NOTES
Per Dr. Spencer Snowden, Pt is ok to take PO 50mg hydralazine from home. Pt typically takes this medication 3x day PRN for sysolic pressures greater than 110.  Pt took this medication at [address] [place] [address] JOSE A Ferrara  10/26/22 4001

## 2022-10-27 NOTE — PROGRESS NOTES
Admitted to room 820. A/O X3. Denies dyspnea, cough, or pain. 2 liters O2 in place. Telemetry monitor placed; Both eyes are with reddened, edematous, conjunctiva. Daughter states is chronic and unchanged. No skin breakdown. SR X3, bed in l/l position, bed exit alarm on, call light in lap. Door closed with COVID isolation precaution in place. Provided with sandwich tray for dinner.

## 2022-10-27 NOTE — ED NOTES
Per Dr. Martha Vasquez, Pt is ok to take remaining home BP meds. Pts BP has not improved with medication thus far.  Pt is taking losartan 100mg and amilodipine 5pm PO at 11pm.      Caitie Balderas RN  10/26/22 8296

## 2022-10-27 NOTE — PROGRESS NOTES
TRANSFER - IN REPORT:    Verbal report received from JOSE A Martino on Armani Colbert  being received from ED for routine progression of patient care      Report consisted of patient's Situation, Background, Assessment and   Recommendations(SBAR). Information from the following report(s) ED Encounter Summary, MAR, Recent Results, and Event Log was reviewed with the receiving nurse. Opportunity for questions and clarification was provided. Assessment completed upon patient's arrival to unit and care assumed.

## 2022-10-27 NOTE — PROGRESS NOTES
Hospitalist Progress Note   Admit Date:  10/26/2022  6:21 PM   Name:  Nesha Mayes   Age:  79 y.o. Sex:  male  :  1940   MRN:  [id number]   Room:  ER14/14    Presenting Complaint: Shortness of Breath and Fatigue     Reason(s) for Admission: COVID-19 virus infection [U07.1]     Hospital Course:   Nesha Mayes is a 79 y.o. male with medical history of vascular dementia, hypertension, CKD, and malignant neoplasm of bladder, who presented with  5 days of cough associated with a fever. He has been having nasal congestion and shortness of breath. He normally is able to ambulate on his own but the past few days he has been too weak. Patient went to an urgent care earlier today and his oxygen saturation was 85% on room air. Patient does not wear oxygen at home and has no history of lung problems. Patient did receive a liter of IV fluid at urgent care which family states perked the patient up. Wife states that she tested positive for COVID about a week ago and that patient tested positive for COVID shortly thereafter. Since that time he has had poor p.o. intake. Work-up in the emergency room shows white blood cell count 3.1 with hemoglobin 9.8. Take acid is good at 0.9. Patient's chest x-ray showed no pneumonia or signs of CHF. Patient's D-dimer was elevated. Patient's CT angiogram of chest showed no PE. There is mild infiltrate in the left upper lobe. Patient was COVID-positive. Patient was given a dose of Decadron.   Hospitalist was consulted for admission    Admitted for covid pneumonia         Subjective & 24hr Events (10/27/22):   Daughter at bedside  Pt awake,say doing ok  Mild hard at hearing  Baseline dementia  No lower ext weakness      Assessment & Plan:       COVID-19 virus infection/hypoxia  Covid positive on 10/21  On 2 lit/min  Cont decadron  Added rocephin and zithromax     Dementia (HCC)  Cont namenda      Type 2 diabetes mellitus (HCC)  Cont sliding scale Acute respiratory failure with hypoxia (HCC)  On 2 lit/min      Chronic ischemic heart disease  Not on any medication sec to recent subarachnoid hemorrhage in June as per daughter      Stage 3a chronic kidney disease ([facility] [address]  Has only rt kidney      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)        Essential hypertension, benign  Amlodipine,hydralazine,losartan    Hyperlipidemia  Lipitor 20 mg      Anxiety  celexa    Diet:  ADULT DIET; Regular  DVT PPx: scd  Code status: Full Code    Hospital Problems:  Principal Problem:    COVID-19 virus infection  Active Problems:    Dementia ([facility] [address]    Type 2 diabetes mellitus (HCC)    Acute respiratory failure with hypoxia (HCC)    Chronic ischemic heart disease    Stage 3a chronic kidney disease ([facility] [address]    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  *      Objective:   Patient Vitals for the past 24 hrs:   Temp Pulse Resp BP SpO2   10/27/22 1129 -- 81 -- (!) 146/81 91 %   10/27/22 1119 -- 87 22 (!) 141/66 93 %   10/27/22 1059 -- 95 21 (!) 154/43 --   10/27/22 1049 -- 75 -- (!) 121/52 93 %   10/27/22 1039 -- -- (!) 4 -- --   10/27/22 1029 -- 82 16 135/66 92 %   10/27/22 0858 -- 90 17 129/88 --   10/27/22 0848 -- 93 21 135/83 --   10/27/22 0828 -- 91 23 (!) 132/51 --   10/27/22 0818 -- 80 21 132/62 --   10/27/22 0758 -- 77 11 (!) 143/59 --   10/27/22 0658 -- 78 14 (!) 144/72 91 %   10/27/22 0648 -- 85 24 138/62 --   10/27/22 0628 -- 72 19 (!) 144/66 --   10/27/22 0618 -- 77 14 132/69 --   10/27/22 0548 -- 85 21 136/61 94 %   10/27/22 0528 -- 71 18 134/78 --   10/27/22 0518 -- 78 12 128/62 94 %   10/27/22 0458 -- 78 28 (!) 153/57 93 %   10/27/22 0448 -- 69 17 (!) 144/51 93 %   10/27/22 0428 -- 81 21 (!) 140/60 91 %   10/27/22 0418 -- 79 23 (!) 154/62 94 %   10/27/22 0358 -- 77 17 (!) 150/69 94 %   10/27/22 0348 -- 81 20 134/67 95 %   10/27/22 0328 -- 87 21 135/61 94 %   10/27/22 0318 -- 80 20 133/61 94 %   10/27/22 0030 -- 89 21 (!) 159/50 90 %   10/27/22 0000 -- 85 18 (!) 151/50 94 %   10/26/22 2345 -- 87 20 (!) 153/57 93 %   10/26/22 2245 -- 82 20 (!) 166/106 95 %   10/26/22 2230 -- 76 24 (!) 163/59 91 %   10/26/22 2215 -- 91 24 (!) 164/65 92 %   10/26/22 2145 -- 82 22 (!) 169/92 94 %   10/26/22 2138 -- 86 23 (!) 176/83 94 %   10/26/22 2130 -- 83 21 (!) 184/77 95 %   10/26/22 2112 -- -- -- (!) 164/88 --   10/26/22 2050 -- 80 15 (!) 164/88 93 %   10/26/22 1951 -- 78 22 (!) 151/64 95 %   10/26/22 1931 -- 84 21 (!) 160/62 95 %   10/26/22 1916 -- 82 21 (!) 151/70 95 %   10/26/22 1901 -- 78 17 (!) 150/64 98 %   10/26/22 1857 98.4 °F (36.9 °C) 79 15 138/61 95 %       Oxygen Therapy  SpO2: 91 %  Pulse via Oximetry: 85 beats per minute  Pulse Oximeter Device Mode: Continuous  O2 Device: Nasal cannula  O2 Flow Rate (L/min): 2 L/min    Estimated body mass index is 23.68 kg/m² as calculated from the following:    Height as of this encounter: 5' 10\" (1.778 m). Weight as of this encounter: 165 lb (74.8 kg). No intake or output data in the 24 hours ending 10/27/22 1257      Physical Exam:     Blood pressure (!) 146/81, pulse 81, temperature 98.4 °F (36.9 °C), resp. rate 22, height 5' 10\" (1.778 m), weight 165 lb (74.8 kg), SpO2 91 %. General:    Well nourished. 2 lit/min  Head:  Normocephalic, atraumatic  Eyes:  Sclerae appear normal.  Pupils equally round. ENT:  Nares appear normal, no drainage. Moist oral mucosa  Neck:  No restricted ROM. Trachea midline   CV:   RRR. No m/r/g. No jugular venous distension. Lungs:   Mild coarse breath sounds  Abdomen: Bowel sounds present. Soft, nontender, nondistended. Extremities: No cyanosis or clubbing. No edema  Skin:     No rashes and normal coloration. Warm and dry. Neuro:  CN II-XII grossly intact. Sensation intact. A&Ox3  Psych:  Normal mood and affect.   Baseline dementia    I have personally reviewed labs and tests showing:  Recent Labs:  Recent Results (from the past 48 hour(s))   EKG 12 Lead    Collection Time: 10/26/22  6:44 PM   Result Value Ref Range    Ventricular Rate 80 BPM    Atrial Rate 74 BPM    P-R Interval 196 ms    QRS Duration 113 ms    Q-T Interval 387 ms    QTc Calculation (Bazett) 447 ms    P Axis 125 degrees    R Axis 83 degrees    T Axis 41 degrees    Diagnosis Probable sinus rhythm with sinus arrhythmia    Comprehensive Metabolic Panel    Collection Time: 10/26/22  6:54 PM   Result Value Ref Range    Sodium 141 133 - 143 mmol/L    Potassium 4.5 3.5 - 5.1 mmol/L    Chloride 115 (H) 101 - 110 mmol/L    CO2 24 21 - 32 mmol/L    Anion Gap 2 2 - 11 mmol/L    Glucose 89 65 - 100 mg/dL    BUN 31 (H) 8 - 23 MG/DL    Creatinine 1.50 0.8 - 1.5 MG/DL    Est, Glom Filt Rate 46 (L) >60 ml/min/1.73m2    Calcium 8.6 8.3 - 10.4 MG/DL    Total Bilirubin 0.4 0.2 - 1.1 MG/DL    ALT 14 12 - 65 U/L    AST 23 15 - 37 U/L    Alk Phosphatase 40 (L) 50 - 136 U/L    Total Protein 6.3 6.3 - 8.2 g/dL    Albumin 3.0 (L) 3.2 - 4.6 g/dL    Globulin 3.3 2.8 - 4.5 g/dL    Albumin/Globulin Ratio 0.9 0.4 - 1.6     Magnesium    Collection Time: 10/26/22  6:54 PM   Result Value Ref Range    Magnesium 2.3 1.8 - 2.4 mg/dL   Brain Natriuretic Peptide    Collection Time: 10/26/22  7:47 PM   Result Value Ref Range    NT Pro- (H) <450 PG/ML   Culture, Blood 1    Collection Time: 10/26/22  7:47 PM    Specimen: Blood   Result Value Ref Range    Special Requests LEFT  Antecubital        Culture NO GROWTH AFTER 10 HOURS     D-Dimer, Quantitative    Collection Time: 10/26/22  7:47 PM   Result Value Ref Range    D-Dimer, Quant 0.95 (H) <0.56 ug/ml(FEU)   CBC with Auto Differential    Collection Time: 10/26/22  7:47 PM   Result Value Ref Range    WBC 3.1 (L) 4.3 - 11.1 K/uL    RBC 3.71 (L) 4.23 - 5.6 M/uL    Hemoglobin 9.8 (L) 13.6 - 17.2 g/dL    Hematocrit 31.3 (L) 41.1 - 50.3 %    MCV 84.4 82 - 102 FL    MCH 26.4 26.1 - 32.9 PG    MCHC 31.3 (L) 31.4 - 35.0 g/dL    RDW 15.9 (H) 11.9 - 14.6 %    Platelets 299 537 - 761 K/uL    MPV 13.4 (H) 9.4 - 12.3 FL    nRBC 0.00 0.0 - 0.2 K/uL    Differential Type AUTOMATED      Seg Neutrophils 53 43 - 78 %    Lymphocytes 31 13 - 44 %    Monocytes 15 (H) 4.0 - 12.0 %    Eosinophils % 0 (L) 0.5 - 7.8 %    Basophils 0 0.0 - 2.0 %    Immature Granulocytes 0 0.0 - 5.0 %    Segs Absolute 1.7 1.7 - 8.2 K/UL    Absolute Lymph # 1.0 0.5 - 4.6 K/UL    Absolute Mono # 0.5 0.1 - 1.3 K/UL    Absolute Eos # 0.0 0.0 - 0.8 K/UL    Basophils Absolute 0.0 0.0 - 0.2 K/UL    Absolute Immature Granulocyte 0.0 0.0 - 0.5 K/UL   Urinalysis with Reflex to Culture    Collection Time: 10/26/22  7:51 PM    Specimen: Urine   Result Value Ref Range    Color, UA YELLOW/STRAW      Appearance CLEAR      Specific Gravity, UA 1.016 1.001 - 1.023      pH, Urine 5.0 5.0 - 9.0      Protein,  (A) NEG mg/dL    Glucose, UA Negative mg/dL    Ketones, Urine Negative NEG mg/dL    Bilirubin Urine Negative NEG      Blood, Urine Negative NEG      Urobilinogen, Urine 0.2 0.2 - 1.0 EU/dL    Nitrite, Urine Negative NEG      Leukocyte Esterase, Urine Negative NEG      Urine Culture if Indicated CULTURE NOT INDICATED BY UA RESULT      WBC, UA 0-4 (A) NORM /hpf    RBC, UA 0-5 (A) NORM /hpf    BACTERIA, URINE Negative (A) NORM /hpf    Epithelial Cells UA 0-5 (A) NORM /hpf    Casts 2-5 (A) NORM /lpf    Mucus, UA 0 0 /lpf   Culture, Blood 1    Collection Time: 10/26/22  7:51 PM    Specimen: Blood   Result Value Ref Range    Special Requests RIGHT  HAND        Culture NO GROWTH AFTER 10 HOURS     Lactate, Sepsis    Collection Time: 10/26/22  7:51 PM   Result Value Ref Range    Lactic Acid, Sepsis 0.9 0.4 - 2.0 MMOL/L   COVID-19, Rapid    Collection Time: 10/26/22  8:52 PM    Specimen: Nasopharyngeal   Result Value Ref Range    Source NASAL      SARS-CoV-2, Rapid Detected (AA) NOTD         I have personally reviewed imaging studies showing:   Other Studies:  CT CHEST PULMONARY EMBOLISM W CONTRAST   Final Result   1. No evidence of pulmonary embolism. 2. Mild infiltrate or atelectasis in the left upper lobe. 3. Findings in the upper abdomen as described above. XR CHEST PORTABLE   Final Result   No acute airspace disease.                 Current Meds:  Current Facility-Administered Medications   Medication Dose Route Frequency    sodium chloride flush 0.9 % injection 5-40 mL  5-40 mL IntraVENous 2 times per day    sodium chloride flush 0.9 % injection 5-40 mL  5-40 mL IntraVENous PRN    0.9 % sodium chloride infusion   IntraVENous PRN    [Held by provider] enoxaparin Sodium (LOVENOX) injection 30 mg  30 mg SubCUTAneous BID    ondansetron (ZOFRAN-ODT) disintegrating tablet 4 mg  4 mg Oral Q8H PRN    Or    ondansetron (ZOFRAN) injection 4 mg  4 mg IntraVENous Q6H PRN    polyethylene glycol (GLYCOLAX) packet 17 g  17 g Oral Daily PRN    acetaminophen (TYLENOL) tablet 650 mg  650 mg Oral Q6H PRN    Or    acetaminophen (TYLENOL) suppository 650 mg  650 mg Rectal Q6H PRN    guaiFENesin-dextromethorphan (ROBITUSSIN DM) 100-10 MG/5ML syrup 5 mL  5 mL Oral Q4H PRN    dexamethasone (DECADRON) injection 6 mg  6 mg IntraVENous Q24H    amLODIPine (NORVASC) tablet 5 mg  5 mg Oral Daily    citalopram (CELEXA) tablet 20 mg  20 mg Oral Daily    losartan (COZAAR) tablet 100 mg  100 mg Oral Daily    memantine (NAMENDA) tablet 5 mg  5 mg Oral BID    pantoprazole (PROTONIX) tablet 40 mg  40 mg Oral Daily    atorvastatin (LIPITOR) tablet 20 mg  20 mg Oral Daily    cefTRIAXone (ROCEPHIN) 1,000 mg in sodium chloride 0.9 % 50 mL IVPB mini-bag  1,000 mg IntraVENous Q24H    [START ON 10/28/2022] azithromycin (ZITHROMAX) tablet 250 mg  250 mg Oral Daily    insulin lispro (HUMALOG) injection vial 0-4 Units  0-4 Units SubCUTAneous TID WC    insulin lispro (HUMALOG) injection vial 0-4 Units  0-4 Units SubCUTAneous Nightly    nicotine (NICODERM CQ) 21 MG/24HR 1 patch  1 patch TransDERmal Daily sodium chloride flush 0.9 % injection 5 mL  5 mL IntraVENous Q8H    sodium chloride flush 0.9 % injection 5 mL  5 mL IntraVENous PRN     Current Outpatient Medications   Medication Sig    hydrALAZINE (APRESOLINE) 50 MG tablet Take 1 tablet by mouth 3 times daily    memantine (NAMENDA) 5 MG tablet Take 1 tablet by mouth 2 times daily    clopidogrel (PLAVIX) 75 MG tablet 75 mg (Patient not taking: Reported on 9/28/2022)    olopatadine (PATANOL) 0.1 % ophthalmic solution INSTILL 1 DROP IN BOTH EYES TWICE A DAY    Selenium 200 MCG TABS Take by mouth    pantoprazole (PROTONIX) 40 MG tablet Take 1 tablet by mouth in the morning. amLODIPine (NORVASC) 5 MG tablet Take 1 tablet by mouth in the morning.     ascorbic acid (VITAMIN C) 500 MG tablet Take by mouth    vitamin D3 (CHOLECALCIFEROL) 125 MCG (5000 UT) TABS tablet Take by mouth daily    citalopram (CELEXA) 20 MG tablet Take 20 mg by mouth    losartan (COZAAR) 100 MG tablet Take 100 mg by mouth    simvastatin (ZOCOR) 40 MG tablet Take 40 mg by mouth       Signed:  Chay Piedra MD

## 2022-10-27 NOTE — ED NOTES
Shift handoff report given to [facility], RN.       Chang Murdock RN  10/27/22 2251

## 2022-10-28 LAB
ALBUMIN SERPL-MCNC: 2.7 G/DL (ref 3.2–4.6)
ALBUMIN/GLOB SERPL: 0.8 {RATIO} (ref 0.4–1.6)
ALP SERPL-CCNC: 45 U/L (ref 50–136)
ALT SERPL-CCNC: 14 U/L (ref 12–65)
ANION GAP SERPL CALC-SCNC: 4 MMOL/L (ref 2–11)
AST SERPL-CCNC: 15 U/L (ref 15–37)
BASOPHILS # BLD: 0 K/UL (ref 0–0.2)
BASOPHILS NFR BLD: 0 % (ref 0–2)
BILIRUB SERPL-MCNC: 0.3 MG/DL (ref 0.2–1.1)
BUN SERPL-MCNC: 40 MG/DL (ref 8–23)
CALCIUM SERPL-MCNC: 9 MG/DL (ref 8.3–10.4)
CHLORIDE SERPL-SCNC: 116 MMOL/L (ref 101–110)
CO2 SERPL-SCNC: 23 MMOL/L (ref 21–32)
CREAT SERPL-MCNC: 1.6 MG/DL (ref 0.8–1.5)
DIFFERENTIAL METHOD BLD: ABNORMAL
EOSINOPHIL # BLD: 0 K/UL (ref 0–0.8)
EOSINOPHIL NFR BLD: 0 % (ref 0.5–7.8)
ERYTHROCYTE [DISTWIDTH] IN BLOOD BY AUTOMATED COUNT: 15.6 % (ref 11.9–14.6)
GLOBULIN SER CALC-MCNC: 3.3 G/DL (ref 2.8–4.5)
GLUCOSE BLD STRIP.AUTO-MCNC: 185 MG/DL (ref 65–100)
GLUCOSE BLD STRIP.AUTO-MCNC: 189 MG/DL (ref 65–100)
GLUCOSE BLD STRIP.AUTO-MCNC: 222 MG/DL (ref 65–100)
GLUCOSE BLD STRIP.AUTO-MCNC: 265 MG/DL (ref 65–100)
GLUCOSE SERPL-MCNC: 197 MG/DL (ref 65–100)
HCT VFR BLD AUTO: 30.5 % (ref 41.1–50.3)
HGB BLD-MCNC: 9.6 G/DL (ref 13.6–17.2)
IMM GRANULOCYTES # BLD AUTO: 0 K/UL (ref 0–0.5)
IMM GRANULOCYTES NFR BLD AUTO: 0 % (ref 0–5)
LYMPHOCYTES # BLD: 0.5 K/UL (ref 0.5–4.6)
LYMPHOCYTES NFR BLD: 11 % (ref 13–44)
MCH RBC QN AUTO: 26.2 PG (ref 26.1–32.9)
MCHC RBC AUTO-ENTMCNC: 31.5 G/DL (ref 31.4–35)
MCV RBC AUTO: 83.3 FL (ref 82–102)
MONOCYTES # BLD: 0.3 K/UL (ref 0.1–1.3)
MONOCYTES NFR BLD: 5 % (ref 4–12)
NEUTS SEG # BLD: 3.9 K/UL (ref 1.7–8.2)
NEUTS SEG NFR BLD: 83 % (ref 43–78)
NRBC # BLD: 0 K/UL (ref 0–0.2)
PLATELET # BLD AUTO: 163 K/UL (ref 150–450)
PMV BLD AUTO: 12.7 FL (ref 9.4–12.3)
POTASSIUM SERPL-SCNC: 4.5 MMOL/L (ref 3.5–5.1)
PROCALCITONIN SERPL-MCNC: <0.05 NG/ML (ref 0–0.49)
PROT SERPL-MCNC: 6 G/DL (ref 6.3–8.2)
RBC # BLD AUTO: 3.66 M/UL (ref 4.23–5.6)
SERVICE CMNT-IMP: ABNORMAL
SODIUM SERPL-SCNC: 143 MMOL/L (ref 133–143)
WBC # BLD AUTO: 4.7 K/UL (ref 4.3–11.1)

## 2022-10-28 PROCEDURE — 2580000003 HC RX 258: Performed by: FAMILY MEDICINE

## 2022-10-28 PROCEDURE — 85025 COMPLETE CBC W/AUTO DIFF WBC: CPT

## 2022-10-28 PROCEDURE — 97535 SELF CARE MNGMENT TRAINING: CPT

## 2022-10-28 PROCEDURE — 6370000000 HC RX 637 (ALT 250 FOR IP): Performed by: FAMILY MEDICINE

## 2022-10-28 PROCEDURE — 84145 PROCALCITONIN (PCT): CPT

## 2022-10-28 PROCEDURE — 6360000002 HC RX W HCPCS: Performed by: HOSPITALIST

## 2022-10-28 PROCEDURE — 51798 US URINE CAPACITY MEASURE: CPT

## 2022-10-28 PROCEDURE — 6370000000 HC RX 637 (ALT 250 FOR IP): Performed by: HOSPITALIST

## 2022-10-28 PROCEDURE — 2580000003 HC RX 258: Performed by: EMERGENCY MEDICINE

## 2022-10-28 PROCEDURE — 36415 COLL VENOUS BLD VENIPUNCTURE: CPT

## 2022-10-28 PROCEDURE — 1100000003 HC PRIVATE W/ TELEMETRY

## 2022-10-28 PROCEDURE — 2580000003 HC RX 258: Performed by: HOSPITALIST

## 2022-10-28 PROCEDURE — 97162 PT EVAL MOD COMPLEX 30 MIN: CPT

## 2022-10-28 PROCEDURE — 97166 OT EVAL MOD COMPLEX 45 MIN: CPT

## 2022-10-28 PROCEDURE — 6370000000 HC RX 637 (ALT 250 FOR IP): Performed by: INTERNAL MEDICINE

## 2022-10-28 PROCEDURE — 80053 COMPREHEN METABOLIC PANEL: CPT

## 2022-10-28 PROCEDURE — 97530 THERAPEUTIC ACTIVITIES: CPT

## 2022-10-28 PROCEDURE — 82962 GLUCOSE BLOOD TEST: CPT

## 2022-10-28 PROCEDURE — 6360000002 HC RX W HCPCS: Performed by: FAMILY MEDICINE

## 2022-10-28 RX ORDER — TAMSULOSIN HYDROCHLORIDE 0.4 MG/1
0.4 CAPSULE ORAL DAILY
Status: DISCONTINUED | OUTPATIENT
Start: 2022-10-28 | End: 2022-11-09 | Stop reason: HOSPADM

## 2022-10-28 RX ORDER — TAMSULOSIN HYDROCHLORIDE 0.4 MG/1
0.4 CAPSULE ORAL DAILY
Status: DISCONTINUED | OUTPATIENT
Start: 2022-10-29 | End: 2022-10-28

## 2022-10-28 RX ORDER — ASPIRIN 81 MG/1
81 TABLET, CHEWABLE ORAL DAILY
Status: DISCONTINUED | OUTPATIENT
Start: 2022-10-28 | End: 2022-11-09 | Stop reason: HOSPADM

## 2022-10-28 RX ORDER — DEXTROSE MONOHYDRATE 100 MG/ML
INJECTION, SOLUTION INTRAVENOUS CONTINUOUS PRN
Status: DISCONTINUED | OUTPATIENT
Start: 2022-10-28 | End: 2022-11-09 | Stop reason: HOSPADM

## 2022-10-28 RX ADMIN — LOSARTAN POTASSIUM 100 MG: 50 TABLET, FILM COATED ORAL at 10:30

## 2022-10-28 RX ADMIN — CEFTRIAXONE 1000 MG: 1 INJECTION, POWDER, FOR SOLUTION INTRAMUSCULAR; INTRAVENOUS at 10:30

## 2022-10-28 RX ADMIN — TAMSULOSIN HYDROCHLORIDE 0.4 MG: 0.4 CAPSULE ORAL at 23:04

## 2022-10-28 RX ADMIN — INSULIN LISPRO 2 UNITS: 100 INJECTION, SOLUTION INTRAVENOUS; SUBCUTANEOUS at 12:25

## 2022-10-28 RX ADMIN — CITALOPRAM HYDROBROMIDE 20 MG: 20 TABLET ORAL at 10:30

## 2022-10-28 RX ADMIN — AZITHROMYCIN MONOHYDRATE 250 MG: 250 TABLET ORAL at 10:30

## 2022-10-28 RX ADMIN — DEXAMETHASONE SODIUM PHOSPHATE 6 MG: 10 INJECTION INTRAMUSCULAR; INTRAVENOUS at 20:27

## 2022-10-28 RX ADMIN — SODIUM CHLORIDE, PRESERVATIVE FREE 5 ML: 5 INJECTION INTRAVENOUS at 10:35

## 2022-10-28 RX ADMIN — SODIUM CHLORIDE, PRESERVATIVE FREE 5 ML: 5 INJECTION INTRAVENOUS at 20:30

## 2022-10-28 RX ADMIN — SODIUM CHLORIDE, PRESERVATIVE FREE 10 ML: 5 INJECTION INTRAVENOUS at 10:35

## 2022-10-28 RX ADMIN — PANTOPRAZOLE SODIUM 40 MG: 40 TABLET, DELAYED RELEASE ORAL at 10:30

## 2022-10-28 RX ADMIN — AMLODIPINE BESYLATE 5 MG: 5 TABLET ORAL at 10:30

## 2022-10-28 RX ADMIN — MEMANTINE 5 MG: 5 TABLET ORAL at 10:30

## 2022-10-28 RX ADMIN — ASPIRIN 81 MG: 81 TABLET, CHEWABLE ORAL at 16:55

## 2022-10-28 RX ADMIN — MEMANTINE 5 MG: 5 TABLET ORAL at 20:31

## 2022-10-28 RX ADMIN — ATORVASTATIN CALCIUM 20 MG: 20 TABLET, FILM COATED ORAL at 10:30

## 2022-10-28 NOTE — PROGRESS NOTES
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)  - noted      Essential hypertension, benign  Amlodipine,hydralazine,losartan    Hyperlipidemia  - statin      Anxiety  celexa    Diet:  ADULT DIET; Regular  DVT PPx: scd  Code status: Full Code    Hospital Problems:  Principal Problem:    COVID-19 virus infection  Active Problems:    Dementia ([facility] [address]    Type 2 diabetes mellitus (HCC)    Acute respiratory failure with hypoxia (HCC)    Chronic ischemic heart disease    Stage 3a chronic kidney disease ([facility] [address]    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  *      Objective:   Patient Vitals for the past 24 hrs:   Temp Pulse Resp BP SpO2   10/28/22 1536 97.7 °F (36.5 °C) (!) 41 18 (!) 149/58 91 %   10/28/22 1151 97.2 °F (36.2 °C) 71 20 (!) 131/54 95 %   10/28/22 1030 -- -- -- (!) 148/61 --   10/28/22 0958 -- -- -- -- 93 %   10/28/22 0748 97.7 °F (36.5 °C) 55 16 (!) 148/61 94 %   10/28/22 0315 97.3 °F (36.3 °C) 66 18 (!) 137/56 95 %   10/27/22 2334 97.7 °F (36.5 °C) 64 18 132/65 93 %   10/27/22 2107 -- 81 -- -- --   10/27/22 2005 97.7 °F (36.5 °C) 66 18 125/64 94 %   10/27/22 1944 -- 71 -- -- --   10/27/22 1828 98.1 °F (36.7 °C) 70 18 (!) 151/56 93 %   10/27/22 1713 -- 72 21 139/63 --   10/27/22 1703 -- 64 18 (!) 125/46 --   10/27/22 1643 -- 70 20 (!) 129/47 --   10/27/22 1629 -- 75 28 (!) 125/45 --   10/27/22 1619 -- 73 16 (!) 134/40 97 % 10/27/22 1559 -- 69 17 (!) 140/55 98 %         Oxygen Therapy  SpO2: 91 %  Pulse via Oximetry: 85 beats per minute  Pulse Oximeter Device Mode: Continuous  O2 Device: Nasal cannula  O2 Flow Rate (L/min): 2.5 L/min    Estimated body mass index is 23.68 kg/m² as calculated from the following:    Height as of this encounter: 5' 10\" (1.778 m). Weight as of this encounter: 165 lb (74.8 kg). Intake/Output Summary (Last 24 hours) at 10/28/2022 1550  Last data filed at 10/28/2022 1359  Gross per 24 hour   Intake 120 ml   Output 475 ml   Net -355 ml           Physical Exam:     Blood pressure (!) 149/58, pulse (!) 41, temperature 97.7 °F (36.5 °C), resp. rate 18, height 5' 10\" (1.778 m), weight 165 lb (74.8 kg), SpO2 91 %. General:    Well nourished. No acute distress. Pleasant  Head:  Normocephalic, atraumatic  Eyes:  Sclerae appear normal.  Pupils equally round. ENT:  Nares appear normal, no drainage. Moist oral mucosa  Neck:  No restricted ROM. Trachea midline   CV:   RRR. No jugular venous distension. Lungs:   CTA b/l without R/R/W, no conversational dyspnea or tachypnea  Abdomen: Bowel sounds present. Soft, nontender, nondistended. Extremities: No cyanosis or clubbing. No edema  Skin:     No rashes and normal coloration. Warm and dry. Neuro:  CN II-XII grossly intact. Sensation intact. A&Ox3  Psych:  Normal mood and affect.   Baseline dementia    I have personally reviewed labs and tests showing:  Recent Labs:  Recent Results (from the past 48 hour(s))   EKG 12 Lead    Collection Time: 10/26/22  6:44 PM   Result Value Ref Range    Ventricular Rate 80 BPM    Atrial Rate 74 BPM    P-R Interval 196 ms    QRS Duration 113 ms    Q-T Interval 387 ms    QTc Calculation (Bazett) 447 ms    P Axis 125 degrees    R Axis 83 degrees    T Axis 41 degrees    Diagnosis Probable sinus rhythm with sinus arrhythmia    Comprehensive Metabolic Panel    Collection Time: 10/26/22  6:54 PM   Result Value Ref Range Sodium 141 133 - 143 mmol/L    Potassium 4.5 3.5 - 5.1 mmol/L    Chloride 115 (H) 101 - 110 mmol/L    CO2 24 21 - 32 mmol/L    Anion Gap 2 2 - 11 mmol/L    Glucose 89 65 - 100 mg/dL    BUN 31 (H) 8 - 23 MG/DL    Creatinine 1.50 0.8 - 1.5 MG/DL    Est, Glom Filt Rate 46 (L) >60 ml/min/1.73m2    Calcium 8.6 8.3 - 10.4 MG/DL    Total Bilirubin 0.4 0.2 - 1.1 MG/DL    ALT 14 12 - 65 U/L    AST 23 15 - 37 U/L    Alk Phosphatase 40 (L) 50 - 136 U/L    Total Protein 6.3 6.3 - 8.2 g/dL    Albumin 3.0 (L) 3.2 - 4.6 g/dL    Globulin 3.3 2.8 - 4.5 g/dL    Albumin/Globulin Ratio 0.9 0.4 - 1.6     Magnesium    Collection Time: 10/26/22  6:54 PM   Result Value Ref Range    Magnesium 2.3 1.8 - 2.4 mg/dL   Brain Natriuretic Peptide    Collection Time: 10/26/22  7:47 PM   Result Value Ref Range    NT Pro- (H) <450 PG/ML   Culture, Blood 1    Collection Time: 10/26/22  7:47 PM    Specimen: Blood   Result Value Ref Range    Special Requests LEFT  Antecubital        Culture NO GROWTH 2 DAYS     D-Dimer, Quantitative    Collection Time: 10/26/22  7:47 PM   Result Value Ref Range    D-Dimer, Quant 0.95 (H) <0.56 ug/ml(FEU)   CBC with Auto Differential    Collection Time: 10/26/22  7:47 PM   Result Value Ref Range    WBC 3.1 (L) 4.3 - 11.1 K/uL    RBC 3.71 (L) 4.23 - 5.6 M/uL    Hemoglobin 9.8 (L) 13.6 - 17.2 g/dL    Hematocrit 31.3 (L) 41.1 - 50.3 %    MCV 84.4 82 - 102 FL    MCH 26.4 26.1 - 32.9 PG    MCHC 31.3 (L) 31.4 - 35.0 g/dL    RDW 15.9 (H) 11.9 - 14.6 %    Platelets 453 197 - 580 K/uL    MPV 13.4 (H) 9.4 - 12.3 FL    nRBC 0.00 0.0 - 0.2 K/uL    Differential Type AUTOMATED      Seg Neutrophils 53 43 - 78 %    Lymphocytes 31 13 - 44 %    Monocytes 15 (H) 4.0 - 12.0 %    Eosinophils % 0 (L) 0.5 - 7.8 %    Basophils 0 0.0 - 2.0 %    Immature Granulocytes 0 0.0 - 5.0 %    Segs Absolute 1.7 1.7 - 8.2 K/UL    Absolute Lymph # 1.0 0.5 - 4.6 K/UL    Absolute Mono # 0.5 0.1 - 1.3 K/UL    Absolute Eos # 0.0 0.0 - 0.8 K/UL Basophils Absolute 0.0 0.0 - 0.2 K/UL    Absolute Immature Granulocyte 0.0 0.0 - 0.5 K/UL   Urinalysis with Reflex to Culture    Collection Time: 10/26/22  7:51 PM    Specimen: Urine   Result Value Ref Range    Color, UA YELLOW/STRAW      Appearance CLEAR      Specific Gravity, UA 1.016 1.001 - 1.023      pH, Urine 5.0 5.0 - 9.0      Protein,  (A) NEG mg/dL    Glucose, UA Negative mg/dL    Ketones, Urine Negative NEG mg/dL    Bilirubin Urine Negative NEG      Blood, Urine Negative NEG      Urobilinogen, Urine 0.2 0.2 - 1.0 EU/dL    Nitrite, Urine Negative NEG      Leukocyte Esterase, Urine Negative NEG      Urine Culture if Indicated CULTURE NOT INDICATED BY UA RESULT      WBC, UA 0-4 (A) NORM /hpf    RBC, UA 0-5 (A) NORM /hpf    BACTERIA, URINE Negative (A) NORM /hpf    Epithelial Cells UA 0-5 (A) NORM /hpf    Casts 2-5 (A) NORM /lpf    Mucus, UA 0 0 /lpf   Culture, Blood 1    Collection Time: 10/26/22  7:51 PM    Specimen: Blood   Result Value Ref Range    Special Requests RIGHT  HAND        Culture NO GROWTH 2 DAYS     Lactate, Sepsis    Collection Time: 10/26/22  7:51 PM   Result Value Ref Range    Lactic Acid, Sepsis 0.9 0.4 - 2.0 MMOL/L   COVID-19, Rapid    Collection Time: 10/26/22  8:52 PM    Specimen: Nasopharyngeal   Result Value Ref Range    Source NASAL      SARS-CoV-2, Rapid Detected (AA) NOTD     POCT Glucose    Collection Time: 10/27/22  1:20 PM   Result Value Ref Range    POC Glucose 240 (H) 65 - 100 mg/dL    Performed by: Layne    POCT Glucose    Collection Time: 10/27/22  6:20 PM   Result Value Ref Range    POC Glucose 243 (H) 65 - 100 mg/dL    Performed by:  Steven    POCT Glucose    Collection Time: 10/27/22  8:42 PM   Result Value Ref Range    POC Glucose 257 (H) 65 - 100 mg/dL    Performed by: Kaya    CBC with Auto Differential    Collection Time: 10/28/22  4:20 AM   Result Value Ref Range    WBC 4.7 4.3 - 11.1 K/uL    RBC 3.66 (L) 4.23 - 5.6 M/uL Hemoglobin 9.6 (L) 13.6 - 17.2 g/dL    Hematocrit 30.5 (L) 41.1 - 50.3 %    MCV 83.3 82 - 102 FL    MCH 26.2 26.1 - 32.9 PG    MCHC 31.5 31.4 - 35.0 g/dL    RDW 15.6 (H) 11.9 - 14.6 %    Platelets 268 459 - 426 K/uL    MPV 12.7 (H) 9.4 - 12.3 FL    nRBC 0.00 0.0 - 0.2 K/uL    Differential Type AUTOMATED      Seg Neutrophils 83 (H) 43 - 78 %    Lymphocytes 11 (L) 13 - 44 %    Monocytes 5 4.0 - 12.0 %    Eosinophils % 0 (L) 0.5 - 7.8 %    Basophils 0 0.0 - 2.0 %    Immature Granulocytes 0 0.0 - 5.0 %    Segs Absolute 3.9 1.7 - 8.2 K/UL    Absolute Lymph # 0.5 0.5 - 4.6 K/UL    Absolute Mono # 0.3 0.1 - 1.3 K/UL    Absolute Eos # 0.0 0.0 - 0.8 K/UL    Basophils Absolute 0.0 0.0 - 0.2 K/UL    Absolute Immature Granulocyte 0.0 0.0 - 0.5 K/UL   Comprehensive Metabolic Panel w/ Reflex to MG    Collection Time: 10/28/22  4:20 AM   Result Value Ref Range    Sodium 143 133 - 143 mmol/L    Potassium 4.5 3.5 - 5.1 mmol/L    Chloride 116 (H) 101 - 110 mmol/L    CO2 23 21 - 32 mmol/L    Anion Gap 4 2 - 11 mmol/L    Glucose 197 (H) 65 - 100 mg/dL    BUN 40 (H) 8 - 23 MG/DL    Creatinine 1.60 (H) 0.8 - 1.5 MG/DL    Est, Glom Filt Rate 42 (L) >60 ml/min/1.73m2    Calcium 9.0 8.3 - 10.4 MG/DL    Total Bilirubin 0.3 0.2 - 1.1 MG/DL    ALT 14 12 - 65 U/L    AST 15 15 - 37 U/L    Alk Phosphatase 45 (L) 50 - 136 U/L    Total Protein 6.0 (L) 6.3 - 8.2 g/dL    Albumin 2.7 (L) 3.2 - 4.6 g/dL    Globulin 3.3 2.8 - 4.5 g/dL    Albumin/Globulin Ratio 0.8 0.4 - 1.6     Procalcitonin    Collection Time: 10/28/22  4:20 AM   Result Value Ref Range    Procalcitonin <0.05 0.00 - 0.49 ng/mL   POCT Glucose    Collection Time: 10/28/22  7:45 AM   Result Value Ref Range    POC Glucose 189 (H) 65 - 100 mg/dL    Performed by: Gissel    POCT Glucose    Collection Time: 10/28/22 11:52 AM   Result Value Ref Range    POC Glucose 265 (H) 65 - 100 mg/dL    Performed by: Gissel        I have personally reviewed imaging studies showing:   Other Studies:  CT CHEST PULMONARY EMBOLISM W CONTRAST   Final Result   1. No evidence of pulmonary embolism. 2. Mild infiltrate or atelectasis in the left upper lobe. 3. Findings in the upper abdomen as described above. XR CHEST PORTABLE   Final Result   No acute airspace disease.                 Current Meds:  Current Facility-Administered Medications   Medication Dose Route Frequency    sodium chloride flush 0.9 % injection 5-40 mL  5-40 mL IntraVENous 2 times per day    sodium chloride flush 0.9 % injection 5-40 mL  5-40 mL IntraVENous PRN    0.9 % sodium chloride infusion   IntraVENous PRN    [Held by provider] enoxaparin Sodium (LOVENOX) injection 30 mg  30 mg SubCUTAneous BID    ondansetron (ZOFRAN-ODT) disintegrating tablet 4 mg  4 mg Oral Q8H PRN    Or    ondansetron (ZOFRAN) injection 4 mg  4 mg IntraVENous Q6H PRN    polyethylene glycol (GLYCOLAX) packet 17 g  17 g Oral Daily PRN    acetaminophen (TYLENOL) tablet 650 mg  650 mg Oral Q6H PRN    Or    acetaminophen (TYLENOL) suppository 650 mg  650 mg Rectal Q6H PRN    guaiFENesin-dextromethorphan (ROBITUSSIN DM) 100-10 MG/5ML syrup 5 mL  5 mL Oral Q4H PRN    dexamethasone (DECADRON) injection 6 mg  6 mg IntraVENous Q24H    amLODIPine (NORVASC) tablet 5 mg  5 mg Oral Daily    citalopram (CELEXA) tablet 20 mg  20 mg Oral Daily    losartan (COZAAR) tablet 100 mg  100 mg Oral Daily    memantine (NAMENDA) tablet 5 mg  5 mg Oral BID    pantoprazole (PROTONIX) tablet 40 mg  40 mg Oral Daily    atorvastatin (LIPITOR) tablet 20 mg  20 mg Oral Daily    cefTRIAXone (ROCEPHIN) 1,000 mg in sodium chloride 0.9 % 50 mL IVPB mini-bag  1,000 mg IntraVENous Q24H    azithromycin (ZITHROMAX) tablet 250 mg  250 mg Oral Daily    insulin lispro (HUMALOG) injection vial 0-4 Units  0-4 Units SubCUTAneous TID WC    insulin lispro (HUMALOG) injection vial 0-4 Units  0-4 Units SubCUTAneous Nightly    nicotine (NICODERM CQ) 21 MG/24HR 1 patch  1 patch TransDERmal Daily sodium chloride flush 0.9 % injection 5 mL  5 mL IntraVENous Q8H    sodium chloride flush 0.9 % injection 5 mL  5 mL IntraVENous PRN       Signed:  Flash Valdovinos DO

## 2022-10-28 NOTE — PROGRESS NOTES
Patient resting in bed with no distress noted. 02 on at 2 liters via NC with RR even/unlabored. Telemetry on and working with patient NSR.   Safety in place with bed alarm on with call light in reach and will prepare bedside shift report for oncoming nurse

## 2022-10-28 NOTE — PROGRESS NOTES
Telemetry notified this nurse that patient converted from A-fib to NSR with PAC's and PVC's with irregular and 60 pulse per monitor room. Will notify MD about the changes.  Bed alarm on and call light in reach

## 2022-10-28 NOTE — PROGRESS NOTES
ACUTE OCCUPATIONAL THERAPY GOALS:   (Developed with and agreed upon by patient and/or caregiver.)  1. Patient will complete lower body bathing and dressing with SUPERVISION and adaptive equipment as needed. 2. Patient will complete toileting with SUPERVISION. 3. Patient will complete grooming ADL with SUPERVISION. 4. Patient will tolerate 25 minutes of OT treatment with 1-2 rest breaks to increase activity tolerance for ADLs. 5. Patient will complete functional transfers with SUPERVISION and adaptive equipment as needed. 6. Patient will tolerate 10 minutes BUE exercises to increase strength for safe, functional transfers. Timeframe: 7 visits     OCCUPATIONAL THERAPY Initial Assessment and Daily Note       OT Visit Days: 1  Acknowledge Orders  Time  OT Charge Capture  Rehab Caseload Tracker      Johnna Tse is a 79 y.o. male   PRIMARY DIAGNOSIS: COVID-19 virus infection  Hypoxia [R09.02]  COVID-19 virus infection [U07.1]       Reason for Referral: Generalized Muscle Weakness (M62.81)  Difficulty in walking, Not elsewhere classified (R26.2)  Other abnormalities of gait and mobility (R26.89)  Inpatient: Payor: MEDICARE / Plan: MEDICARE PART A AND B / Product Type: *No Product type* /     ASSESSMENT:     REHAB RECOMMENDATIONS:   Recommendation to date pending progress:  Setting:  Home Health Therapy    Equipment:    To Be Determined--pt has RW at home     ASSESSMENT:  Mr. Negin Eaton is an 78 y/o male presents with SOB and found to have [place]. Pt with hx of dementia and severely [place], so it was difficult to obtain PLOF. Pt tells us he lives with his wife, uses a RW for mobility and is independent with ADLs. Today pt presents with decreased activity tolerance, balance and mobility impacting ADLs. Pt is currently on RA with sats >90% throughout. Pt overall CGA for functional transfers and mobility of household distances.  Pt CGA for dynamic balance during LE dressing EOB and for grooming ADLs in standing. Pt required min verbal cues for initiation and motor planning during ADLs. Pt fatigued after activity, but tolerated treatment well. Pt is likely functioning close to his baseline and would benefit from skilled OT services to address OT goals and plan of care. Rec HHOT at d/c.       [address] [facility] [address] [id number] AM-PAC 6 Clicks Daily Activity Inpatient Short Form:    AM-PAC Daily Activity Inpatient   How much help for putting on and taking off regular lower body clothing?: A Little  How much help for Bathing?: A Little  How much help for Toileting?: A Little  How much help for putting on and taking off regular upper body clothing?: None  How much help for taking care of personal grooming?: A Little  How much help for eating meals?: None  AM-PAC Inpatient Daily Activity Raw Score: 20  AM-PAC Inpatient ADL T-Scale Score : 42.03  ADL Inpatient CMS 0-100% Score: 38.32  ADL Inpatient CMS G-Code Modifier : CJ           SUBJECTIVE:     Mr. Shayna Recinos states, \"I'm Talha the \"     Social/Functional Lives With: Spouse  Type of Home: House  Home Equipment: [employer], [address] Help From: Family  ADL Assistance: Independent  Active : No  Patient's  Info: not at present  Occupation: Retired    OBJECTIVE:     Tabby Doan / Jevon Cano / Jeffrey Ruiz: None    RESTRICTIONS/PRECAUTIONS:  Restrictions/Precautions: Fall Risk    PAIN: VITALS / O2:   Pre Treatment:   Pain Assessment: None - Denies Pain      Post Treatment: no c/o pain, resting comfortably in bedside chair       Vitals          Oxygen  O2 Therapy: Room air  SpO2: 93 %         GROSS EVALUATION: INTACT IMPAIRED   (See Comments)   UE AROM [x] []   UE PROM [x] []   Strength [x]       Posture / Balance [] Posture: Fair  Sitting - Static: Fair, +  Sitting - Dynamic: Fair  Standing - Static: Fair  Standing - Dynamic: Fair   Sensation []  NT   Coordination []  delayed     Tone [x]       Edema [x]    Activity Tolerance [] Patient Tolerated treatment well, Patient limited by fatigue     Hand Dominance R [] L []      COGNITION/  PERCEPTION: INTACT IMPAIRED   (See Comments)   Orientation []  AAO x2 to self and place   Vision [x]     Hearing []  [place]   Cognition  []  Hx dementia   Perception []  Cues for initiation and motor planning     MOBILITY: I Mod I S SBA CGA Min Mod Max Total  NT x2 Comments:   Bed Mobility    Rolling [] [] [] [] [] [] [] [] [] [x] []    Supine to Sit [] [] [] [] [x] [] [] [] [] [] []    Scooting [] [] [] [] [x] [] [] [] [] [] []    Sit to Supine [] [] [] [] [] [] [] [] [] [x] []    Transfers    Sit to Stand [] [] [] [] [x] [] [] [] [] [] [] RW   Bed to Chair [] [] [] [] [x] [] [] [] [] [] [] RW   Stand to Sit [] [] [] [] [x] [] [] [] [] [] [] RW   Tub/Shower [] [] [] [] [] [] [] [] [] [x] []     Toilet [] [] [] [] [] [] [] [] [] [x] []      [] [] [] [] [] [] [] [] [] [x] []    I=Independent, Mod I=Modified Independent, S=Supervision/Setup, SBA=Standby Assistance, CGA=Contact Guard Assistance, Min=Minimal Assistance, Mod=Moderate Assistance, Max=Maximal Assistance, Total=Total Assistance, NT=Not Tested    ACTIVITIES OF DAILY LIVING: I Mod I S SBA CGA Min Mod Max Total NT Comments   BASIC ADLs:              Upper Body Bathing  [] [] [] [] [] [] [] [] [] [x]    Lower Body Bathing [] [] [] [] [] [] [] [] [] [x]    Toileting [] [] [] [] [] [] [] [] [] [x]    Upper Body Dressing [] [] [x] [] [] [] [] [] [] [] Set up doffing pullover shirt and donning gown EOB   Lower Body Dressing [] [] [] [] [x] [] [] [] [] [] Donning socks EOB   Feeding [] [] [] [] [] [] [] [] [] [x]    Grooming [] [] [] [] [x] [] [] [] [] [] Brushing teeth and washing hands standing at sink RW   Personal Device Care [] [] [] [] [] [] [] [] [] [x]    Functional Mobility [] [] [] [] [x] [] [] [] [] [] RW   I=Independent, Mod I=Modified Independent, S=Supervision/Setup, SBA=Standby Assistance, CGA=Contact Guard Assistance, Min=Minimal Assistance, Mod=Moderate Assistance, Max=Maximal Assistance, Total=Total Assistance, NT=Not Tested    PLAN:   FREQUENCY/DURATION   OT Plan of Care: 3 times/week for duration of hospital stay or until stated goals are met, whichever comes first.    PROBLEM LIST:   (Skilled intervention is medically necessary to address:)  Decreased ADL/Functional Activities  Decreased Activity Tolerance  Decreased Balance  Decreased Cognition  Decreased Coordination  Decreased Gait Ability  Decreased Safety Awareness  Decreased Transfer Abilities   INTERVENTIONS PLANNED:  (Benefits and precautions of occupational therapy have been discussed with the patient.)  Self Care Training  Therapeutic Activity  Therapeutic Exercise/HEP  Neuromuscular Re-education  Manual Therapy  Education         TREATMENT:     EVALUATION: MODERATE COMPLEXITY: (Untimed Charge)    TREATMENT:   Co-Treatment PT/OT necessary due to patient's decreased overall endurance/tolerance levels, as well as need for high level skilled assistance to complete functional transfers/mobility and functional tasks  Self Care (25 minutes): Patient participated in upper body dressing, lower body dressing, and grooming ADLs in unsupported sitting and standing with minimal verbal, manual, and tactile cueing to increase independence and decrease assistance required. Patient also participated in functional mobility, functional transfer, and adaptive equipment training to increase independence, decrease assistance required, increase activity tolerance, and increase safety awareness. TREATMENT GRID:  N/A    AFTER TREATMENT PRECAUTIONS: Alarm Activated, Call light within reach, Chair, Needs within reach, and RN notified    INTERDISCIPLINARY COLLABORATION:  RN/ PCT, PT/ PTA, and OT/ CRUZ    EDUCATION:  Education Given To: Patient  Education Provided: Role of Therapy;Plan of Care  Education Method: Verbal  Barriers to Learning: None;Cognition; Hearing  Education Outcome: Verbalized understanding;Continued education needed    TOTAL TREATMENT DURATION AND TIME:  Time In: 5766  Time Out: Clotilde Jean-Baptiste 1950  Minutes: [address], OT

## 2022-10-28 NOTE — PROGRESS NOTES
ACUTE PHYSICAL THERAPY GOALS:   (Developed with and agreed upon by patient and/or caregiver. )  LTG:  (1.)Mr. Shayna Recinos will move from supine to sit and sit to supine , scoot up and down, and roll side to side in bed with CONTACT GUARD ASSIST within 7 treatment day(s). (2.)Mr. Shayna Recinos will transfer from bed to chair and chair to bed with STAND BY ASSIST using the least restrictive device within 7 treatment day(s). (3.)Mr. Shayna Recinos will ambulate with CONTACT GUARD ASSIST for 150 feet with the least restrictive device within 7 treatment day(s). ________________________________________________________________________________________________      PHYSICAL THERAPY Initial Assessment and AM  (Link to Caseload Tracking: PT Visit Days : 1  Acknowledge Orders  Time In/Out  PT Charge Capture  Rehab Caseload Tracker    Marko Jarvis is a 79 y.o. male   PRIMARY DIAGNOSIS: COVID-19 virus infection  Hypoxia [R09.02]  COVID-19 virus infection [U07.1]       Reason for Referral: Generalized Muscle Weakness (M62.81)  Difficulty in walking, Not elsewhere classified (R26.2)  Inpatient: Payor: MEDICARE / Plan: MEDICARE PART A AND B / Product Type: *No Product type* /     ASSESSMENT:     REHAB RECOMMENDATIONS:   Recommendation to date pending progress:  Setting:  Home Health Therapy    Equipment:    To Be Determined     ASSESSMENT:  Mr. Shayna Recinos was supine at arrival agreed to treat. He was slow but Tona thru out session; to stand go to sink, 10min at sink, then walked 25ft over to chair. Pt reported after 20min of activity he was fatigued. Made comfortable in chair. Pt will benefit from skilled and sophisticated PT to address and improve impairments.         [address] [facility] [address] [address] AM-PAC 6 Clicks Basic Mobility Inpatient Short Form  AM-PAC Mobility Inpatient   How much difficulty turning over in bed?: A Little  How much difficulty sitting down on / standing up from a chair with arms?: A Little  How much difficulty moving from lying on back to sitting on side of bed?: A Little  How much help from another person moving to and from a bed to a chair?: A Little  How much help from another person needed to walk in hospital room?: A Little  How much help from another person for climbing 3-5 steps with a railing?: A Little  AM-PAC Inpatient Mobility Raw Score : 18  AM-PAC Inpatient T-Scale Score : 43.63  Mobility Inpatient CMS 0-100% Score: 46.58  Mobility Inpatient CMS G-Code Modifier : CK    SUBJECTIVE:   Mr. Princess Montoya states, \"what? \"     Social/Functional Lives With: Spouse  Type of Home: House  Receives Help From: Family  Active : No  Patient's  Info: not at present  Occupation: Retired    OBJECTIVE:     PAIN: VITALS / O2: Lee Frizzle / Dollene Reasons / Lexii Lauth:   Pre Treatment: 0         Post Treatment: 0 Vitals        Oxygen RA above 95% entire session      None    RESTRICTIONS/PRECAUTIONS:                    GROSS EVALUATION: Intact Impaired (Comments):   AROM [] AROM RLE (degrees)  RLE AROM: WFL   PROM []    Strength [] Strength RLE  Strength RLE: WFL   Balance [] Posture: Fair  Sitting - Static: Good, -  Sitting - Dynamic: Good, -  Standing - Static: Fair  Standing - Dynamic: Fair   Posture [] Forward Head  Rounded Shoulders   Sensation []     Coordination []      Tone []     Edema []    Activity Tolerance [] Patient limited by fatigue    []      COGNITION/  PERCEPTION: Intact Impaired (Comments):   Orientation [x]     Vision [x]     Hearing [] Hard of hearing/hearing concerns   Cognition  [x]       MOBILITY: I Mod I S SBA CGA Min Mod Max Total  NT x2 Comments:   Bed Mobility    Rolling [] [] [] [] [] [x] [] [] [] [] [x]    Supine to Sit [] [] [] [] [] [x] [] [] [] [] [x]    Scooting [] [] [] [] [] [x] [] [] [] [] [x]    Sit to Supine [] [] [] [] [] [] [] [] [] [] []    Transfers    Sit to Stand [] [] [] [] [] [x] [] [] [] [] [x]    Bed to Chair [] [] [] [] [] [x] [] [] [] [] [x]    Stand to Sit [] [] [] [] [] [x] [] [] [] [] [x]     [] [] [] [] [] [] [] [] [] [] []    I=Independent, Mod I=Modified Independent, S=Supervision, SBA=Standby Assistance, CGA=Contact Guard Assistance,   Min=Minimal Assistance, Mod=Moderate Assistance, Max=Maximal Assistance, Total=Total Assistance, NT=Not Tested    GAIT: I Mod I S SBA CGA Min Mod Max Total  NT x2 Comments:   Level of Assistance [] [] [] [] [] [] [] [] [] [] []    Distance 30  feet    DME Rolling Walker    Gait Quality Decreased rae , Decreased step clearance, Decreased step length, Shuffling , and Trunk flexion    Weightbearing Status      Stairs      I=Independent, Mod I=Modified Independent, S=Supervision, SBA=Standby Assistance, CGA=Contact Guard Assistance,   Min=Minimal Assistance, Mod=Moderate Assistance, Max=Maximal Assistance, Total=Total Assistance, NT=Not Tested    PLAN:   FREQUENCY AND DURATION: 3 times/week for duration of hospital stay or until stated goals are met, whichever comes first.    THERAPY PROGNOSIS: Fair    PROBLEM LIST:   (Skilled intervention is medically necessary to address:)  Decreased ADL/Functional Activities  Decreased Activity Tolerance  Decreased AROM/PROM  Decreased Balance  Decreased Cognition  Decreased Coordination  Decreased Gait Ability  Decreased Safety Awareness  Decreased Strength  Decreased Transfer Abilities INTERVENTIONS PLANNED:   (Benefits and precautions of physical therapy have been discussed with the patient.)  Therapeutic Activity  Therapeutic Exercise/HEP  Neuromuscular Re-education  Gait Training  Education       TREATMENT:   EVALUATION: MODERATE COMPLEXITY: (Untimed Charge)    TREATMENT:   Therapeutic Activity (20 Minutes): Therapeutic activity included Rolling, Supine to Sit, Scooting, Transfer Training, Ambulation on level ground, Sitting balance , and Standing balance to improve functional Activity tolerance, Balance, Coordination, Mobility, and Strength.     TREATMENT GRID:  N/A    AFTER TREATMENT PRECAUTIONS: Alarm Activated, Bed/Chair Locked, Call light within reach, Chair, and Needs within reach    INTERDISCIPLINARY COLLABORATION:  RN/ PCT, PT/ PTA, and OT/ CRUZ    EDUCATION: Education Given To: Patient  Education Provided: Role of Therapy  Education Method: Verbal  Barriers to Learning: Cognition; Hearing  Education Outcome: Verbalized understanding;Continued education needed    TIME IN/OUT:  Time In: 0845  Time Out: [address]  Minutes: [address] PT

## 2022-10-28 NOTE — PROGRESS NOTES
Patient is A/O to person and place. Patient extremely hard of hearing. Patient eyes bilaterally are red with some swelling. Patient is covid positive. Patient resting in bed with no pain or distress. 02 on at 2 liters via NC with RR even/unlabored. Telemetry on and working with A-fib with 80 pulse. Safety in place with bed alarm on and call light in reach with family at bedside at this time.

## 2022-10-29 LAB
GLUCOSE BLD STRIP.AUTO-MCNC: 135 MG/DL (ref 65–100)
GLUCOSE BLD STRIP.AUTO-MCNC: 180 MG/DL (ref 65–100)
GLUCOSE BLD STRIP.AUTO-MCNC: 183 MG/DL (ref 65–100)
GLUCOSE BLD STRIP.AUTO-MCNC: 249 MG/DL (ref 65–100)
SERVICE CMNT-IMP: ABNORMAL

## 2022-10-29 PROCEDURE — 6370000000 HC RX 637 (ALT 250 FOR IP): Performed by: HOSPITALIST

## 2022-10-29 PROCEDURE — 6370000000 HC RX 637 (ALT 250 FOR IP): Performed by: INTERNAL MEDICINE

## 2022-10-29 PROCEDURE — 1100000000 HC RM PRIVATE

## 2022-10-29 PROCEDURE — 1100000003 HC PRIVATE W/ TELEMETRY

## 2022-10-29 PROCEDURE — 2580000003 HC RX 258: Performed by: EMERGENCY MEDICINE

## 2022-10-29 PROCEDURE — 6370000000 HC RX 637 (ALT 250 FOR IP): Performed by: FAMILY MEDICINE

## 2022-10-29 PROCEDURE — 6360000002 HC RX W HCPCS: Performed by: FAMILY MEDICINE

## 2022-10-29 PROCEDURE — 6360000002 HC RX W HCPCS: Performed by: HOSPITALIST

## 2022-10-29 PROCEDURE — 82962 GLUCOSE BLOOD TEST: CPT

## 2022-10-29 PROCEDURE — 2580000003 HC RX 258: Performed by: FAMILY MEDICINE

## 2022-10-29 PROCEDURE — 2580000003 HC RX 258: Performed by: HOSPITALIST

## 2022-10-29 RX ORDER — ASPIRIN 81 MG/1
81 TABLET, CHEWABLE ORAL DAILY
Qty: 30 TABLET | Refills: 11 | Status: SHIPPED | OUTPATIENT
Start: 2022-10-29 | End: 2022-11-06 | Stop reason: HOSPADM

## 2022-10-29 RX ORDER — PREDNISONE 20 MG/1
40 TABLET ORAL DAILY
Qty: 10 TABLET | Refills: 0 | Status: SHIPPED | OUTPATIENT
Start: 2022-10-29 | End: 2022-11-06 | Stop reason: HOSPADM

## 2022-10-29 RX ORDER — CEFPODOXIME PROXETIL 200 MG/1
200 TABLET, FILM COATED ORAL 2 TIMES DAILY
Qty: 6 TABLET | Refills: 0 | Status: SHIPPED | OUTPATIENT
Start: 2022-10-29 | End: 2022-11-06 | Stop reason: HOSPADM

## 2022-10-29 RX ORDER — AZITHROMYCIN 500 MG/1
500 TABLET, FILM COATED ORAL DAILY
Qty: 3 TABLET | Refills: 0 | Status: SHIPPED | OUTPATIENT
Start: 2022-10-29 | End: 2022-11-06 | Stop reason: HOSPADM

## 2022-10-29 RX ADMIN — ATORVASTATIN CALCIUM 20 MG: 20 TABLET, FILM COATED ORAL at 09:07

## 2022-10-29 RX ADMIN — SODIUM CHLORIDE, PRESERVATIVE FREE 10 ML: 5 INJECTION INTRAVENOUS at 21:18

## 2022-10-29 RX ADMIN — PANTOPRAZOLE SODIUM 40 MG: 40 TABLET, DELAYED RELEASE ORAL at 09:07

## 2022-10-29 RX ADMIN — SODIUM CHLORIDE, PRESERVATIVE FREE 5 ML: 5 INJECTION INTRAVENOUS at 17:04

## 2022-10-29 RX ADMIN — CEFTRIAXONE 1000 MG: 1 INJECTION, POWDER, FOR SOLUTION INTRAMUSCULAR; INTRAVENOUS at 09:06

## 2022-10-29 RX ADMIN — CITALOPRAM HYDROBROMIDE 20 MG: 20 TABLET ORAL at 09:07

## 2022-10-29 RX ADMIN — LOSARTAN POTASSIUM 100 MG: 50 TABLET, FILM COATED ORAL at 09:07

## 2022-10-29 RX ADMIN — INSULIN LISPRO 1 UNITS: 100 INJECTION, SOLUTION INTRAVENOUS; SUBCUTANEOUS at 09:05

## 2022-10-29 RX ADMIN — AMLODIPINE BESYLATE 5 MG: 5 TABLET ORAL at 09:07

## 2022-10-29 RX ADMIN — MEMANTINE 5 MG: 5 TABLET ORAL at 09:07

## 2022-10-29 RX ADMIN — AZITHROMYCIN MONOHYDRATE 250 MG: 250 TABLET ORAL at 09:07

## 2022-10-29 RX ADMIN — ASPIRIN 81 MG: 81 TABLET, CHEWABLE ORAL at 09:07

## 2022-10-29 RX ADMIN — TAMSULOSIN HYDROCHLORIDE 0.4 MG: 0.4 CAPSULE ORAL at 09:07

## 2022-10-29 RX ADMIN — SODIUM CHLORIDE, PRESERVATIVE FREE 5 ML: 5 INJECTION INTRAVENOUS at 09:08

## 2022-10-29 RX ADMIN — SODIUM CHLORIDE, PRESERVATIVE FREE 10 ML: 5 INJECTION INTRAVENOUS at 09:07

## 2022-10-29 RX ADMIN — DEXAMETHASONE SODIUM PHOSPHATE 6 MG: 10 INJECTION INTRAMUSCULAR; INTRAVENOUS at 21:18

## 2022-10-29 RX ADMIN — MEMANTINE 5 MG: 5 TABLET ORAL at 21:18

## 2022-10-29 NOTE — PROGRESS NOTES
Bedside report received from night nurse Lucinda Merchant. Assessment done as noted  Respiration even and unlabored 20/min; denies pain or nausea at present. Afebrile this morning. Non-productive coughing at interval. Remain on remote tele with NSR confirmed per monitor tech. O2 intact with 2 liters via nasal canula with O2 sat 92% at rest. Refuses SCDs at present. Remains on Droplet plus isolation for positive COVID-19 screening. Ordered PPE in place and in use. Encouraged to call with needs.

## 2022-10-29 NOTE — PROGRESS NOTES
Hospitalist Progress Note   Admit Date:  10/26/2022  6:21 PM   Name:  Jos Taylor   Age:  79 y.o. Sex:  male  :  1940   MRN:  [id number]   Room:  [facility]/    Presenting Complaint: Shortness of Breath and Fatigue     Reason(s) for Admission: Hypoxia [R09.02]  COVID-19 virus infection [U07.1]     Hospital Course:   Jos Taylor is a 79 y.o. male with medical history of vascular dementia, hypertension, CKD, and malignant neoplasm of bladder, who presented with  5 days of cough associated with a fever. He has been having nasal congestion and shortness of breath. He normally is able to ambulate on his own but the past few days he has been too weak. Patient went to an urgent care earlier today and his oxygen saturation was 85% on room air. Patient does not wear oxygen at home and has no history of lung problems. Patient did receive a liter of IV fluid at urgent care which family states perked the patient up. Wife states that she tested positive for COVID about a week ago and that patient tested positive for COVID shortly thereafter. Since that time he has had poor p.o. intake. Work-up in the emergency room shows white blood cell count 3.1 with hemoglobin 9.8. Take acid is good at 0.9. Patient's chest x-ray showed no pneumonia or signs of CHF. Patient's D-dimer was elevated. Patient's CT angiogram of chest showed no PE. There is mild infiltrate in the left upper lobe. Patient was COVID-positive. Patient was given a dose of Decadron. Hospitalist was consulted for admission    Admitted for covid pneumonia     Subjective & 24hr Events (10/29/22): Patient examined at bedside. No acute overnight events. Weaned to room air. Wife at bedside. Denies worsening shortness of breath or cough. Denies fevers/chills. Denies chest pain. Denies abdominal pain. Has difficulty standing up on his own and is using his rollable table to transfer around his room.      10 point ROS negative except for HPI above. 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 Discussed with wife yesterday that due to his dementia it would be preferable if he could come home at discharge. 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  *      Objective:   Patient Vitals for the past 24 hrs:   Temp Pulse Resp BP SpO2   10/29/22 1153 97.7 °F (36.5 °C) 74 16 (!) 155/56 91 %   10/29/22 0813 97.5 °F (36.4 °C) 79 15 (!) 153/82 90 %   10/29/22 0340 98.2 °F (36.8 °C) 52 14 (!) 142/52 90 %   10/28/22 2351 -- -- -- -- 92 %   10/28/22 2345 98.5 °F (36.9 °C) 64 14 (!) 136/57 (!) 87 %   10/28/22 1932 98.2 °F (36.8 °C) 74 20 132/64 90 %   10/28/22 1536 97.7 °F (36.5 °C) 74 18 (!) 149/58 91 %         Oxygen Therapy  SpO2: 91 %  Pulse via Oximetry: 85 beats per minute  Pulse Oximeter Device Mode: Continuous  O2 Device: None (Room air)  O2 Flow Rate (L/min): 2.5 L/min    Estimated body mass index is 23.68 kg/m² as calculated from the following:    Height as of this encounter: 5' 10\" (1.778 m). Weight as of this encounter: 165 lb (74.8 kg). No intake or output data in the 24 hours ending 10/29/22 1413        Physical Exam:     Blood pressure (!) 155/56, pulse 74, temperature 97.7 °F (36.5 °C), temperature source Oral, resp. rate 16, height 5' 10\" (1.778 m), weight 165 lb (74.8 kg), SpO2 91 %. General:    Well nourished. No acute distress. Pleasant  Head:  Normocephalic, atraumatic  Eyes:  Sclerae appear normal.  Pupils equally round. ENT:  Nares appear normal, no drainage. Moist oral mucosa  Neck:  No restricted ROM. Trachea midline   CV:   RRR. No jugular venous distension. Lungs:   CTA b/l without R/R/W, no conversational dyspnea or tachypnea  Abdomen: Bowel sounds present. Soft, nontender, nondistended. Extremities: No cyanosis or clubbing. No edema  Skin:     No rashes and normal coloration. Warm and dry. Neuro:  CN II-XII grossly intact. Sensation intact. A&Ox3  Psych:  Normal mood and affect. Baseline dementia    I have personally reviewed labs and tests showing:  Recent Labs:  Recent Results (from the past 48 hour(s))   POCT Glucose    Collection Time: 10/27/22  6:20 PM   Result Value Ref Range    POC Glucose 243 (H) 65 - 100 mg/dL    Performed by:  KENNY Leggett 38    POCT Glucose    Collection Time: 10/27/22  8:42 PM   Result Value Ref Range    POC Glucose 257 (H) 65 - 100 mg/dL    Performed by: Kaya    CBC with Auto Differential    Collection Time: 10/28/22  4:20 AM   Result Value Ref Range    WBC 4.7 4.3 - 11.1 K/uL    RBC 3.66 (L) 4.23 - 5.6 M/uL    Hemoglobin 9.6 (L) 13.6 - 17.2 g/dL    Hematocrit 30.5 (L) 41.1 - 50.3 %    MCV 83.3 82 - 102 FL    MCH 26.2 26.1 - 32.9 PG    MCHC 31.5 31.4 - 35.0 g/dL    RDW 15.6 (H) 11.9 - 14.6 %    Platelets 296 556 - 239 K/uL    MPV 12.7 (H) 9.4 - 12.3 FL    nRBC 0.00 0.0 - 0.2 K/uL    Differential Type AUTOMATED      Seg Neutrophils 83 (H) 43 - 78 %    Lymphocytes 11 (L) 13 - 44 %    Monocytes 5 4.0 - 12.0 %    Eosinophils % 0 (L) 0.5 - 7.8 %    Basophils 0 0.0 - 2.0 %    Immature Granulocytes 0 0.0 - 5.0 %    Segs Absolute 3.9 1.7 - 8.2 K/UL    Absolute Lymph # 0.5 0.5 - 4.6 K/UL    Absolute Mono # 0.3 0.1 - 1.3 K/UL    Absolute Eos # 0.0 0.0 - 0.8 K/UL    Basophils Absolute 0.0 0.0 - 0.2 K/UL    Absolute Immature Granulocyte 0.0 0.0 - 0.5 K/UL   Comprehensive Metabolic Panel w/ Reflex to MG    Collection Time: 10/28/22  4:20 AM   Result Value Ref Range    Sodium 143 133 - 143 mmol/L    Potassium 4.5 3.5 - 5.1 mmol/L    Chloride 116 (H) 101 - 110 mmol/L    CO2 23 21 - 32 mmol/L    Anion Gap 4 2 - 11 mmol/L    Glucose 197 (H) 65 - 100 mg/dL    BUN 40 (H) 8 - 23 MG/DL    Creatinine 1.60 (H) 0.8 - 1.5 MG/DL    Est, Glom Filt Rate 42 (L) >60 ml/min/1.73m2    Calcium 9.0 8.3 - 10.4 MG/DL    Total Bilirubin 0.3 0.2 - 1.1 MG/DL    ALT 14 12 - 65 U/L    AST 15 15 - 37 U/L    Alk Phosphatase 45 (L) 50 - 136 U/L    Total Protein 6.0 (L) 6.3 - 8.2 g/dL    Albumin 2.7 (L) 3.2 - 4.6 g/dL    Globulin 3.3 2.8 - 4.5 g/dL    Albumin/Globulin Ratio 0.8 0.4 - 1.6     Procalcitonin    Collection Time: 10/28/22  4:20 AM   Result Value Ref Range    Procalcitonin <0.05 0.00 - 0.49 ng/mL   POCT Glucose    Collection Time: 10/28/22  7:45 AM   Result Value Ref Range    POC Glucose 189 (H) 65 - 100 mg/dL    Performed by: [facility]    POCT Glucose    Collection Time: 10/28/22 11:52 AM   Result Value Ref Range    POC Glucose 265 (H) 65 - 100 mg/dL    Performed by: [facility]    POCT Glucose    Collection Time: 10/28/22  4:44 PM   Result Value Ref Range    POC Glucose 185 (H) 65 - 100 mg/dL    Performed by: [facility]    POCT Glucose    Collection Time: 10/28/22  8:30 PM   Result Value Ref Range    POC Glucose 222 (H) 65 - 100 mg/dL    Performed by: Kelsey    POCT Glucose    Collection Time: 10/29/22  8:15 AM   Result Value Ref Range    POC Glucose 249 (H) 65 - 100 mg/dL    Performed by: "[employer]    POCT Glucose    Collection Time: 10/29/22 11:53 AM   Result Value Ref Range    POC Glucose 180 (H) 65 - 100 mg/dL    Performed by: "[employer]        I have personally reviewed imaging studies showing: Other Studies:  CT CHEST PULMONARY EMBOLISM W CONTRAST   Final Result   1. No evidence of pulmonary embolism. 2. Mild infiltrate or atelectasis in the left upper lobe. 3. Findings in the upper abdomen as described above. XR CHEST PORTABLE   Final Result   No acute airspace disease.                 Current Meds:  Current Facility-Administered Medications   Medication Dose Route Frequency    aspirin chewable tablet 81 mg  81 mg Oral Daily    glucose chewable tablet 16 g  4 tablet Oral PRN    dextrose bolus 10% 125 mL  125 mL IntraVENous PRN    Or    dextrose bolus 10% 250 mL  250 mL IntraVENous PRN    glucagon (rDNA) injection 1 mg  1 mg SubCUTAneous PRN    dextrose 10 % infusion   IntraVENous Continuous PRN    tamsulosin (FLOMAX) capsule 0.4 mg  0.4 mg Oral Daily    sodium chloride flush 0.9 % injection 5-40 mL  5-40 mL IntraVENous 2 times per day    sodium chloride flush 0.9 % injection 5-40 mL  5-40 mL IntraVENous PRN    0.9 % sodium chloride infusion   IntraVENous PRN    [Held by provider] enoxaparin Sodium (LOVENOX) injection 30 mg  30 mg SubCUTAneous BID    ondansetron (ZOFRAN-ODT) disintegrating tablet 4 mg  4 mg Oral Q8H PRN    Or    ondansetron (ZOFRAN) injection 4 mg  4 mg IntraVENous Q6H PRN    polyethylene glycol (GLYCOLAX) packet 17 g  17 g Oral Daily PRN    acetaminophen (TYLENOL) tablet 650 mg  650 mg Oral Q6H PRN    Or    acetaminophen (TYLENOL) suppository 650 mg  650 mg Rectal Q6H PRN    guaiFENesin-dextromethorphan (ROBITUSSIN DM) 100-10 MG/5ML syrup 5 mL  5 mL Oral Q4H PRN    dexamethasone (DECADRON) injection 6 mg  6 mg IntraVENous Q24H    amLODIPine (NORVASC) tablet 5 mg  5 mg Oral Daily    citalopram (CELEXA) tablet 20 mg  20 mg Oral Daily    losartan (COZAAR) tablet 100 mg  100 mg Oral Daily    memantine (NAMENDA) tablet 5 mg  5 mg Oral BID    pantoprazole (PROTONIX) tablet 40 mg  40 mg Oral Daily    atorvastatin (LIPITOR) tablet 20 mg  20 mg Oral Daily    cefTRIAXone (ROCEPHIN) 1,000 mg in sodium chloride 0.9 % 50 mL IVPB mini-bag  1,000 mg IntraVENous Q24H    azithromycin (ZITHROMAX) tablet 250 mg  250 mg Oral Daily    insulin lispro (HUMALOG) injection vial 0-4 Units  0-4 Units SubCUTAneous TID WC    insulin lispro (HUMALOG) injection vial 0-4 Units  0-4 Units SubCUTAneous Nightly    nicotine (NICODERM CQ) 21 MG/24HR 1 patch  1 patch TransDERmal Daily    sodium chloride flush 0.9 % injection 5 mL  5 mL IntraVENous Q8H    sodium chloride flush 0.9 % injection 5 mL  5 mL IntraVENous PRN       Signed:  SADE ALMONTE DO

## 2022-10-30 ENCOUNTER — APPOINTMENT (OUTPATIENT)
Dept: GENERAL RADIOLOGY | Age: 82
DRG: 871 | End: 2022-10-30
Payer: MEDICARE

## 2022-10-30 ENCOUNTER — APPOINTMENT (OUTPATIENT)
Dept: CT IMAGING | Age: 82
DRG: 871 | End: 2022-10-30
Payer: MEDICARE

## 2022-10-30 LAB
ALBUMIN SERPL-MCNC: 2.7 G/DL (ref 3.2–4.6)
ALBUMIN/GLOB SERPL: 0.8 {RATIO} (ref 0.4–1.6)
ALP SERPL-CCNC: 42 U/L (ref 50–136)
ALT SERPL-CCNC: 13 U/L (ref 12–65)
ANION GAP SERPL CALC-SCNC: 3 MMOL/L (ref 2–11)
ARTERIAL PATENCY WRIST A: POSITIVE
AST SERPL-CCNC: 14 U/L (ref 15–37)
BASE EXCESS BLD CALC-SCNC: 0.4 MMOL/L
BASOPHILS # BLD: 0 K/UL (ref 0–0.2)
BASOPHILS NFR BLD: 0 % (ref 0–2)
BDY SITE: ABNORMAL
BILIRUB SERPL-MCNC: 0.3 MG/DL (ref 0.2–1.1)
BUN SERPL-MCNC: 38 MG/DL (ref 8–23)
CALCIUM SERPL-MCNC: 8.7 MG/DL (ref 8.3–10.4)
CHLORIDE SERPL-SCNC: 113 MMOL/L (ref 101–110)
CO2 SERPL-SCNC: 25 MMOL/L (ref 21–32)
CREAT SERPL-MCNC: 1.4 MG/DL (ref 0.8–1.5)
D DIMER PPP FEU-MCNC: 0.83 UG/ML(FEU)
D DIMER PPP FEU-MCNC: 0.9 UG/ML(FEU)
DIFFERENTIAL METHOD BLD: ABNORMAL
EOSINOPHIL # BLD: 0 K/UL (ref 0–0.8)
EOSINOPHIL NFR BLD: 0 % (ref 0.5–7.8)
ERYTHROCYTE [DISTWIDTH] IN BLOOD BY AUTOMATED COUNT: 15.5 % (ref 11.9–14.6)
GAS FLOW.O2 O2 DELIVERY SYS: ABNORMAL L/MIN
GLOBULIN SER CALC-MCNC: 3.6 G/DL (ref 2.8–4.5)
GLUCOSE BLD STRIP.AUTO-MCNC: 185 MG/DL (ref 65–100)
GLUCOSE BLD STRIP.AUTO-MCNC: 201 MG/DL (ref 65–100)
GLUCOSE BLD STRIP.AUTO-MCNC: 204 MG/DL (ref 65–100)
GLUCOSE BLD STRIP.AUTO-MCNC: 218 MG/DL (ref 65–100)
GLUCOSE SERPL-MCNC: 180 MG/DL (ref 65–100)
HCO3 BLD-SCNC: 23.7 MMOL/L (ref 22–26)
HCT VFR BLD AUTO: 31.8 % (ref 41.1–50.3)
HGB BLD-MCNC: 10.1 G/DL (ref 13.6–17.2)
IMM GRANULOCYTES # BLD AUTO: 0 K/UL (ref 0–0.5)
IMM GRANULOCYTES NFR BLD AUTO: 1 % (ref 0–5)
LYMPHOCYTES # BLD: 0.6 K/UL (ref 0.5–4.6)
LYMPHOCYTES NFR BLD: 10 % (ref 13–44)
MCH RBC QN AUTO: 26 PG (ref 26.1–32.9)
MCHC RBC AUTO-ENTMCNC: 31.8 G/DL (ref 31.4–35)
MCV RBC AUTO: 82 FL (ref 82–102)
MONOCYTES # BLD: 0.3 K/UL (ref 0.1–1.3)
MONOCYTES NFR BLD: 5 % (ref 4–12)
NEUTS SEG # BLD: 4.5 K/UL (ref 1.7–8.2)
NEUTS SEG NFR BLD: 84 % (ref 43–78)
NRBC # BLD: 0 K/UL (ref 0–0.2)
O2/TOTAL GAS SETTING VFR VENT: 36 %
PCO2 BLD: 32.3 MMHG (ref 35–45)
PH BLD: 7.47 [PH] (ref 7.35–7.45)
PLATELET # BLD AUTO: 246 K/UL (ref 150–450)
PMV BLD AUTO: 10.3 FL (ref 9.4–12.3)
PO2 BLD: 46 MMHG (ref 75–100)
POTASSIUM SERPL-SCNC: 4.6 MMOL/L (ref 3.5–5.1)
PROCALCITONIN SERPL-MCNC: <0.05 NG/ML (ref 0–0.49)
PROCALCITONIN SERPL-MCNC: <0.05 NG/ML (ref 0–0.49)
PROT SERPL-MCNC: 6.3 G/DL (ref 6.3–8.2)
RBC # BLD AUTO: 3.88 M/UL (ref 4.23–5.6)
SAO2 % BLD: 85 % (ref 95–98)
SERVICE CMNT-IMP: ABNORMAL
SODIUM SERPL-SCNC: 141 MMOL/L (ref 133–143)
SPECIMEN TYPE: ABNORMAL
WBC # BLD AUTO: 5.4 K/UL (ref 4.3–11.1)

## 2022-10-30 PROCEDURE — 6370000000 HC RX 637 (ALT 250 FOR IP): Performed by: FAMILY MEDICINE

## 2022-10-30 PROCEDURE — 84145 PROCALCITONIN (PCT): CPT

## 2022-10-30 PROCEDURE — 2700000000 HC OXYGEN THERAPY PER DAY

## 2022-10-30 PROCEDURE — 36600 WITHDRAWAL OF ARTERIAL BLOOD: CPT

## 2022-10-30 PROCEDURE — 2580000003 HC RX 258: Performed by: FAMILY MEDICINE

## 2022-10-30 PROCEDURE — 6360000002 HC RX W HCPCS: Performed by: INTERNAL MEDICINE

## 2022-10-30 PROCEDURE — 80053 COMPREHEN METABOLIC PANEL: CPT

## 2022-10-30 PROCEDURE — 36415 COLL VENOUS BLD VENIPUNCTURE: CPT

## 2022-10-30 PROCEDURE — 82962 GLUCOSE BLOOD TEST: CPT

## 2022-10-30 PROCEDURE — 6370000000 HC RX 637 (ALT 250 FOR IP): Performed by: INTERNAL MEDICINE

## 2022-10-30 PROCEDURE — 2580000003 HC RX 258: Performed by: HOSPITALIST

## 2022-10-30 PROCEDURE — 70450 CT HEAD/BRAIN W/O DYE: CPT

## 2022-10-30 PROCEDURE — 85025 COMPLETE CBC W/AUTO DIFF WBC: CPT

## 2022-10-30 PROCEDURE — 6360000002 HC RX W HCPCS: Performed by: FAMILY MEDICINE

## 2022-10-30 PROCEDURE — 94760 N-INVAS EAR/PLS OXIMETRY 1: CPT

## 2022-10-30 PROCEDURE — 82803 BLOOD GASES ANY COMBINATION: CPT

## 2022-10-30 PROCEDURE — 85379 FIBRIN DEGRADATION QUANT: CPT

## 2022-10-30 PROCEDURE — 1100000003 HC PRIVATE W/ TELEMETRY

## 2022-10-30 PROCEDURE — 6370000000 HC RX 637 (ALT 250 FOR IP): Performed by: HOSPITALIST

## 2022-10-30 PROCEDURE — 71045 X-RAY EXAM CHEST 1 VIEW: CPT

## 2022-10-30 PROCEDURE — 2580000003 HC RX 258: Performed by: INTERNAL MEDICINE

## 2022-10-30 PROCEDURE — 2580000003 HC RX 258: Performed by: EMERGENCY MEDICINE

## 2022-10-30 PROCEDURE — 1100000000 HC RM PRIVATE

## 2022-10-30 RX ORDER — DEXAMETHASONE 4 MG/1
6 TABLET ORAL DAILY
Status: COMPLETED | OUTPATIENT
Start: 2022-10-30 | End: 2022-11-05

## 2022-10-30 RX ORDER — LANOLIN ALCOHOL/MO/W.PET/CERES
3 CREAM (GRAM) TOPICAL NIGHTLY PRN
Status: DISCONTINUED | OUTPATIENT
Start: 2022-10-30 | End: 2022-11-09 | Stop reason: HOSPADM

## 2022-10-30 RX ORDER — SODIUM CHLORIDE 9 MG/ML
INJECTION, SOLUTION INTRAVENOUS CONTINUOUS
Status: DISCONTINUED | OUTPATIENT
Start: 2022-10-30 | End: 2022-11-01

## 2022-10-30 RX ADMIN — CEFTRIAXONE 1000 MG: 1 INJECTION, POWDER, FOR SOLUTION INTRAMUSCULAR; INTRAVENOUS at 09:07

## 2022-10-30 RX ADMIN — INSULIN LISPRO 1 UNITS: 100 INJECTION, SOLUTION INTRAVENOUS; SUBCUTANEOUS at 16:59

## 2022-10-30 RX ADMIN — AMLODIPINE BESYLATE 5 MG: 5 TABLET ORAL at 09:08

## 2022-10-30 RX ADMIN — PANTOPRAZOLE SODIUM 40 MG: 40 TABLET, DELAYED RELEASE ORAL at 09:08

## 2022-10-30 RX ADMIN — CITALOPRAM HYDROBROMIDE 20 MG: 20 TABLET ORAL at 09:08

## 2022-10-30 RX ADMIN — DEXAMETHASONE 6 MG: 4 TABLET ORAL at 09:08

## 2022-10-30 RX ADMIN — ASPIRIN 81 MG: 81 TABLET, CHEWABLE ORAL at 09:08

## 2022-10-30 RX ADMIN — INSULIN LISPRO 1 UNITS: 100 INJECTION, SOLUTION INTRAVENOUS; SUBCUTANEOUS at 09:09

## 2022-10-30 RX ADMIN — LOSARTAN POTASSIUM 100 MG: 50 TABLET, FILM COATED ORAL at 09:08

## 2022-10-30 RX ADMIN — SODIUM CHLORIDE, PRESERVATIVE FREE 5 ML: 5 INJECTION INTRAVENOUS at 17:05

## 2022-10-30 RX ADMIN — MEMANTINE 5 MG: 5 TABLET ORAL at 21:07

## 2022-10-30 RX ADMIN — MEMANTINE 5 MG: 5 TABLET ORAL at 09:08

## 2022-10-30 RX ADMIN — AZITHROMYCIN MONOHYDRATE 250 MG: 250 TABLET ORAL at 09:08

## 2022-10-30 RX ADMIN — SODIUM CHLORIDE: 9 INJECTION, SOLUTION INTRAVENOUS at 16:59

## 2022-10-30 RX ADMIN — SODIUM CHLORIDE, PRESERVATIVE FREE 5 ML: 5 INJECTION INTRAVENOUS at 03:18

## 2022-10-30 RX ADMIN — TAMSULOSIN HYDROCHLORIDE 0.4 MG: 0.4 CAPSULE ORAL at 09:08

## 2022-10-30 RX ADMIN — SODIUM CHLORIDE, PRESERVATIVE FREE 5 ML: 5 INJECTION INTRAVENOUS at 21:07

## 2022-10-30 RX ADMIN — ATORVASTATIN CALCIUM 20 MG: 20 TABLET, FILM COATED ORAL at 09:08

## 2022-10-30 RX ADMIN — SODIUM CHLORIDE, PRESERVATIVE FREE 5 ML: 5 INJECTION INTRAVENOUS at 09:09

## 2022-10-30 RX ADMIN — BARICITINIB 2 MG: 2 TABLET, FILM COATED ORAL at 09:08

## 2022-10-30 NOTE — PROGRESS NOTES
Dr Barb Butler notified of family concern of increased confusion, increased oxygen to 4LPM to maintain sat above 92% and most recent vitals via perfect serve. ABG and [place] to examine patient.

## 2022-10-30 NOTE — PROGRESS NOTES
[facility] CRITICAL CARE OUTREACH NURSE PROGRESS REPORT      SUBJECTIVE: Called to assess patient secondary to RN concern. Vitals:    10/29/22 1945 10/29/22 1958 10/29/22 2330 10/30/22 0000   BP: (!) 157/87  (!) 153/67    Pulse: 77  79 74   Resp: 18  20    Temp: 98.6 °F (37 °C)  99.4 °F (37.4 °C)    TempSrc: Oral  Oral    SpO2: 90% 91% 94%    Weight:       Height:            LAB DATA:    Recent Labs     10/28/22  0420      K 4.5   *   CO2 23   BUN 40*   GLOB 3.3   ALT 14        Recent Labs     10/28/22  0420   WBC 4.7   HGB 9.6*   HCT 30.5*             OBJECTIVE: On arrival to room, I found patient to be resting awake in bed. Pain Assessment                                             ASSESSMENT:  Patient alert and oriented to self and time, confused about place but able to state he was in [place]. Lung sounds course but no evident distress. Patient states his breathing \"feels good\". O2 sat 93% on 4L NC.       PLAN:  ABG per MD.

## 2022-10-30 NOTE — PROGRESS NOTES
Hospitalist Progress Note   Admit Date:  10/26/2022  6:21 PM   Name:  Katie Nick   Age:  79 y.o. Sex:  male  :  1940   MRN:  [id number]   Room:  [facility]    Presenting Complaint: Shortness of Breath and Fatigue     Reason(s) for Admission: Hypoxia [R09.02]  COVID-19 virus infection [U07.1]     Hospital Course:   Katie Nick is a 79 y.o. male with medical history of vascular dementia, hypertension, CKD, and malignant neoplasm of bladder, who presented with  5 days of cough associated with a fever. He has been having nasal congestion and shortness of breath. He normally is able to ambulate on his own but the past few days he has been too weak. Patient went to an urgent care earlier today and his oxygen saturation was 85% on room air. Patient does not wear oxygen at home and has no history of lung problems. Patient did receive a liter of IV fluid at urgent care which family states perked the patient up. Wife states that she tested positive for COVID about a week ago and that patient tested positive for COVID shortly thereafter. Since that time he has had poor p.o. intake. Work-up in the emergency room shows white blood cell count 3.1 with hemoglobin 9.8. Take acid is good at 0.9. Patient's chest x-ray showed no pneumonia or signs of CHF. Patient's D-dimer was elevated. Patient's CT angiogram of chest showed no PE. There is mild infiltrate in the left upper lobe. Patient was COVID-positive. Patient was given a dose of Decadron. Hospitalist was consulted for admission    Admitted for covid pneumonia     Subjective & 24hr Events (10/30/22): Patient examined at bedside. More confused and worsening hypoxemia overnight and placed on 5L NC. More sleepy at bedside this morning but will answer simple questions. Denies chest pain or fevers/chills. 10 point ROS negative except for HPI above.    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 Discussed with wife yesterday that due to his dementia it would be preferable if he could come home at discharge. 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  *      Objective:   Patient Vitals for the past 24 hrs:   Temp Pulse Resp BP SpO2   10/30/22 1225 97.9 °F (36.6 °C) 84 18 (!) 144/63 97 %   10/30/22 0830 97.8 °F (36.6 °C) 77 18 (!) 158/53 95 %   10/30/22 0750 -- 98 -- -- --   10/30/22 0747 -- (!) 39 17 -- 96 %   10/30/22 0404 98.8 °F (37.1 °C) 83 21 (!) 159/62 92 %   10/30/22 0000 -- 74 -- -- --   10/29/22 2330 99.4 °F (37.4 °C) 79 20 (!) 153/67 94 %   10/29/22 1958 -- -- -- -- 91 %   10/29/22 1945 98.6 °F (37 °C) 77 18 (!) 157/87 90 %   10/29/22 1621 98.4 °F (36.9 °C) 79 16 (!) 148/67 (!) 88 %         Oxygen Therapy  SpO2: 97 %  Pulse via Oximetry: 85 beats per minute  Pulse Oximeter Device Mode: Other (Comment)  O2 Device: High flow nasal cannula  O2 Flow Rate (L/min): 5 L/min    Estimated body mass index is 23.47 kg/m² as calculated from the following:    Height as of this encounter: 5' 10\" (1.778 m). Weight as of this encounter: 163 lb 9.6 oz (74.2 kg). Intake/Output Summary (Last 24 hours) at 10/30/2022 1534  Last data filed at 10/29/2022 2118  Gross per 24 hour   Intake 10 ml   Output 175 ml   Net -165 ml           Physical Exam:     Blood pressure (!) 144/63, pulse 84, temperature 97.9 °F (36.6 °C), temperature source Oral, resp. rate 18, height 5' 10\" (1.778 m), weight 163 lb 9.6 oz (74.2 kg), SpO2 97 %. General:    Well nourished. More sleepy  Head:  Normocephalic, atraumatic  Eyes:  Sclerae appear normal.  Pupils equally round. ENT:  Nares appear normal, no drainage. Moist oral mucosa  Neck:  No restricted ROM. Trachea midline   CV:   RRR. No jugular venous distension. Lungs:   Coarse breath sounds without wheezing, no conversational dyspnea   Abdomen: Bowel sounds present. Soft, nontender, nondistended. Extremities: No cyanosis or clubbing. No edema  Skin:     No rashes and normal coloration. Warm and dry. Neuro:  CN II-XII grossly intact. Sensation intact. A&Ox3  Psych:  Normal mood and affect.   Baseline dementia    I have personally reviewed labs and tests showing:  Recent Labs:  Recent Results (from the past 48 hour(s))   POCT Glucose    Collection Time: 10/28/22  4:44 PM   Result Value Ref Range    POC Glucose 185 (H) 65 - 100 mg/dL    Performed by: Gissel    POCT Glucose    Collection Time: 10/28/22  8:30 PM   Result Value Ref Range    POC Glucose 222 (H) 65 - 100 mg/dL    Performed by: Kelsey    POCT Glucose    Collection Time: 10/29/22  8:15 AM   Result Value Ref Range    POC Glucose 249 (H) 65 - 100 mg/dL    Performed by: Maris    POCT Glucose    Collection Time: 10/29/22 11:53 AM   Result Value Ref Range    POC Glucose 180 (H) 65 - 100 mg/dL    Performed by: [employer]    POCT Glucose    Collection Time: 10/29/22  4:55 PM   Result Value Ref Range    POC Glucose 183 (H) 65 - 100 mg/dL    Performed by: [employer]    POCT Glucose    Collection Time: 10/29/22  9:10 PM   Result Value Ref Range    POC Glucose 135 (H) 65 - 100 mg/dL    Performed by: [email or web address]    POC Blood, Cord, Arterial    Collection Time: 10/30/22 12:22 AM   Result Value Ref Range    DEVICE NASAL CANNULA      FIO2 36 %    pH, Arterial, POC 7.47 (H) 7.35 - 7.45      pCO2, Arterial, POC 32.3 (L) 35 - 45 MMHG    pO2, Arterial, POC 46 (L) 75 - 100 MMHG    HCO3, Mixed 23.7 22 - 26 MMOL/L    SO2c, Arterial, POC 85.0 (L) 95 - 98 %    Base Excess 0.4 mmol/L    POC Reymundo's Test Positive      Site RIGHT RADIAL      Specimen type: ARTERIAL      Performed by: Eugene    POCT Glucose    Collection Time: 10/30/22  8:20 AM   Result Value Ref Range    POC Glucose 218 (H) 65 - 100 mg/dL    Performed by: [employer]    D-Dimer, Quantitative    Collection Time: 10/30/22 10:59 AM   Result Value Ref Range    D-Dimer, Quant 0.90 (H) <0.56 ug/ml(FEU)   Procalcitonin    Collection Time: 10/30/22 10:59 AM   Result Value Ref Range    Procalcitonin <0.05 0.00 - 0.49 ng/mL   POCT Glucose    Collection Time: 10/30/22 12:26 PM   Result Value Ref Range    POC Glucose 185 (H) 65 - 100 mg/dL    Performed by: [employer]        I have personally reviewed imaging studies showing: Other Studies:  CT HEAD WO CONTRAST   Final Result   1. Resolution of prior blood. No acute hemorrhage. 2.  Chronic infarcts again noted. XR CHEST PORTABLE   Final Result   1. Mild bilateral infiltrates compatible with viral infection. 2.  No consolidation. CT CHEST PULMONARY EMBOLISM W CONTRAST   Final Result   1. No evidence of pulmonary embolism. 2. Mild infiltrate or atelectasis in the left upper lobe. 3. Findings in the upper abdomen as described above. XR CHEST PORTABLE   Final Result   No acute airspace disease.                 Current Meds:  Current Facility-Administered Medications   Medication Dose Route Frequency    dexamethasone (DECADRON) tablet 6 mg  6 mg Oral Daily    baricitinib (OLUMIANT) tablet 2 mg  2 mg Oral Daily    aspirin chewable tablet 81 mg  81 mg Oral Daily    glucose chewable tablet 16 g  4 tablet Oral PRN    dextrose bolus 10% 125 mL  125 mL IntraVENous PRN    Or    dextrose bolus 10% 250 mL  250 mL IntraVENous PRN    glucagon (rDNA) injection 1 mg  1 mg SubCUTAneous PRN    dextrose 10 % infusion   IntraVENous Continuous PRN    tamsulosin (FLOMAX) capsule 0.4 mg  0.4 mg Oral Daily    sodium chloride flush 0.9 % injection 5-40 mL  5-40 mL IntraVENous 2 times per day    sodium chloride flush 0.9 % injection 5-40 mL  5-40 mL IntraVENous PRN    0.9 % sodium chloride infusion   IntraVENous PRN    [Held by provider] enoxaparin Sodium (LOVENOX) injection 30 mg  30 mg SubCUTAneous BID    ondansetron (ZOFRAN-ODT) disintegrating tablet 4 mg  4 mg Oral Q8H PRN    Or    ondansetron (ZOFRAN) injection 4 mg  4 mg IntraVENous Q6H PRN    polyethylene glycol (GLYCOLAX) packet 17 g  17 g Oral Daily PRN    acetaminophen (TYLENOL) tablet 650 mg  650 mg Oral Q6H PRN    Or    acetaminophen (TYLENOL) suppository 650 mg  650 mg Rectal Q6H PRN    guaiFENesin-dextromethorphan (ROBITUSSIN DM) 100-10 MG/5ML syrup 5 mL  5 mL Oral Q4H PRN    amLODIPine (NORVASC) tablet 5 mg  5 mg Oral Daily    citalopram (CELEXA) tablet 20 mg  20 mg Oral Daily    losartan (COZAAR) tablet 100 mg  100 mg Oral Daily    memantine (NAMENDA) tablet 5 mg  5 mg Oral BID    pantoprazole (PROTONIX) tablet 40 mg  40 mg Oral Daily    atorvastatin (LIPITOR) tablet 20 mg  20 mg Oral Daily    cefTRIAXone (ROCEPHIN) 1,000 mg in sodium chloride 0.9 % 50 mL IVPB mini-bag  1,000 mg IntraVENous Q24H    azithromycin (ZITHROMAX) tablet 250 mg  250 mg Oral Daily    insulin lispro (HUMALOG) injection vial 0-4 Units  0-4 Units SubCUTAneous TID WC    insulin lispro (HUMALOG) injection vial 0-4 Units  0-4 Units SubCUTAneous Nightly    nicotine (NICODERM CQ) 21 MG/24HR 1 patch  1 patch TransDERmal Daily    sodium chloride flush 0.9 % injection 5 mL  5 mL IntraVENous Q8H    sodium chloride flush 0.9 % injection 5 mL  5 mL IntraVENous PRN       Signed:  SADE ALMONTE DO

## 2022-10-30 NOTE — PROGRESS NOTES
Report received from off going nurse. DTR at bedside. Patient alert but disoriented other than person. Will continue to monitor.

## 2022-10-30 NOTE — PROGRESS NOTES
Patient resting in bed with eyes closed. Oxygen in use at 6LPM via HFNC. No s/s of acute distress noted. Respirations regular and unlabored. Will continue to monitor. SCD's remain in place. Will report off to oncoming nurse.

## 2022-10-30 NOTE — PROGRESS NOTES
Beside shift report received from [facility]  Patient lying in bed  Responds to voice  Respirations present even and unlabored on 5L HFNC No signs of distress  No needs expressed at this time  Safety measures in place

## 2022-10-31 LAB
BACTERIA SPEC CULT: NORMAL
BACTERIA SPEC CULT: NORMAL
GLUCOSE BLD STRIP.AUTO-MCNC: 143 MG/DL (ref 65–100)
GLUCOSE BLD STRIP.AUTO-MCNC: 146 MG/DL (ref 65–100)
GLUCOSE BLD STRIP.AUTO-MCNC: 245 MG/DL (ref 65–100)
GLUCOSE BLD STRIP.AUTO-MCNC: 252 MG/DL (ref 65–100)
GLUCOSE BLD STRIP.AUTO-MCNC: 338 MG/DL (ref 65–100)
SERVICE CMNT-IMP: ABNORMAL
SERVICE CMNT-IMP: NORMAL
SERVICE CMNT-IMP: NORMAL

## 2022-10-31 PROCEDURE — 2580000003 HC RX 258: Performed by: INTERNAL MEDICINE

## 2022-10-31 PROCEDURE — 97112 NEUROMUSCULAR REEDUCATION: CPT

## 2022-10-31 PROCEDURE — 2580000003 HC RX 258: Performed by: EMERGENCY MEDICINE

## 2022-10-31 PROCEDURE — 6370000000 HC RX 637 (ALT 250 FOR IP): Performed by: FAMILY MEDICINE

## 2022-10-31 PROCEDURE — 97116 GAIT TRAINING THERAPY: CPT

## 2022-10-31 PROCEDURE — 6370000000 HC RX 637 (ALT 250 FOR IP): Performed by: INTERNAL MEDICINE

## 2022-10-31 PROCEDURE — 1100000000 HC RM PRIVATE

## 2022-10-31 PROCEDURE — 6370000000 HC RX 637 (ALT 250 FOR IP): Performed by: HOSPITALIST

## 2022-10-31 PROCEDURE — 2580000003 HC RX 258: Performed by: HOSPITALIST

## 2022-10-31 PROCEDURE — 6360000002 HC RX W HCPCS: Performed by: HOSPITALIST

## 2022-10-31 PROCEDURE — 2580000003 HC RX 258: Performed by: FAMILY MEDICINE

## 2022-10-31 PROCEDURE — 6360000002 HC RX W HCPCS: Performed by: FAMILY MEDICINE

## 2022-10-31 PROCEDURE — 6360000002 HC RX W HCPCS: Performed by: INTERNAL MEDICINE

## 2022-10-31 PROCEDURE — 82962 GLUCOSE BLOOD TEST: CPT

## 2022-10-31 RX ADMIN — SODIUM CHLORIDE: 9 INJECTION, SOLUTION INTRAVENOUS at 00:07

## 2022-10-31 RX ADMIN — CEFTRIAXONE 1000 MG: 1 INJECTION, POWDER, FOR SOLUTION INTRAMUSCULAR; INTRAVENOUS at 09:14

## 2022-10-31 RX ADMIN — TAMSULOSIN HYDROCHLORIDE 0.4 MG: 0.4 CAPSULE ORAL at 09:22

## 2022-10-31 RX ADMIN — DEXAMETHASONE 6 MG: 4 TABLET ORAL at 09:21

## 2022-10-31 RX ADMIN — CITALOPRAM HYDROBROMIDE 20 MG: 20 TABLET ORAL at 09:24

## 2022-10-31 RX ADMIN — SODIUM CHLORIDE, PRESERVATIVE FREE 10 ML: 5 INJECTION INTRAVENOUS at 22:56

## 2022-10-31 RX ADMIN — SODIUM CHLORIDE: 9 INJECTION, SOLUTION INTRAVENOUS at 18:43

## 2022-10-31 RX ADMIN — INSULIN LISPRO 1 UNITS: 100 INJECTION, SOLUTION INTRAVENOUS; SUBCUTANEOUS at 16:25

## 2022-10-31 RX ADMIN — Medication 3 MG: at 22:36

## 2022-10-31 RX ADMIN — AMLODIPINE BESYLATE 5 MG: 5 TABLET ORAL at 09:22

## 2022-10-31 RX ADMIN — ATORVASTATIN CALCIUM 20 MG: 20 TABLET, FILM COATED ORAL at 09:21

## 2022-10-31 RX ADMIN — MEMANTINE 5 MG: 5 TABLET ORAL at 09:21

## 2022-10-31 RX ADMIN — SODIUM CHLORIDE, PRESERVATIVE FREE 5 ML: 5 INJECTION INTRAVENOUS at 16:21

## 2022-10-31 RX ADMIN — LOSARTAN POTASSIUM 100 MG: 50 TABLET, FILM COATED ORAL at 09:21

## 2022-10-31 RX ADMIN — MEMANTINE 5 MG: 5 TABLET ORAL at 22:36

## 2022-10-31 RX ADMIN — SODIUM CHLORIDE, PRESERVATIVE FREE 10 ML: 5 INJECTION INTRAVENOUS at 09:31

## 2022-10-31 RX ADMIN — ENOXAPARIN SODIUM 30 MG: 100 INJECTION SUBCUTANEOUS at 22:36

## 2022-10-31 RX ADMIN — SODIUM CHLORIDE, PRESERVATIVE FREE 5 ML: 5 INJECTION INTRAVENOUS at 03:12

## 2022-10-31 RX ADMIN — PANTOPRAZOLE SODIUM 40 MG: 40 TABLET, DELAYED RELEASE ORAL at 09:22

## 2022-10-31 RX ADMIN — SODIUM CHLORIDE: 9 INJECTION, SOLUTION INTRAVENOUS at 09:11

## 2022-10-31 RX ADMIN — AZITHROMYCIN MONOHYDRATE 250 MG: 250 TABLET ORAL at 09:22

## 2022-10-31 RX ADMIN — BARICITINIB 2 MG: 2 TABLET, FILM COATED ORAL at 09:21

## 2022-10-31 RX ADMIN — ASPIRIN 81 MG: 81 TABLET, CHEWABLE ORAL at 09:21

## 2022-10-31 RX ADMIN — SODIUM CHLORIDE, PRESERVATIVE FREE 5 ML: 5 INJECTION INTRAVENOUS at 09:31

## 2022-10-31 NOTE — PROGRESS NOTES
Received report from [address] [place] [address]. Patient resting in bed with HOB elevated. Respirations present, on 5L NC. No obvious signs of distress.

## 2022-10-31 NOTE — PROGRESS NOTES
Patient resting in bed, with HOB elevated. Respirations present, 5L NC. NS at 125 mL/hr. No obvious signs of distress. Preparing to give report to oncoming RN.

## 2022-10-31 NOTE — PROGRESS NOTES
ACUTE PHYSICAL THERAPY GOALS:   (Developed with and agreed upon by patient and/or caregiver. )  LTG:  (1.)Mr. Julien Lopez will move from supine to sit and sit to supine , scoot up and down, and roll side to side in bed with CONTACT GUARD ASSIST within 7 treatment day(s). (2.)Mr. Julien Lopez will transfer from bed to chair and chair to bed with STAND BY ASSIST using the least restrictive device within 7 treatment day(s). (3.)Mr. Julien Lopez will ambulate with CONTACT GUARD ASSIST for 150 feet with the least restrictive device within 7 treatment day(s). PHYSICAL THERAPY: Daily Note AM   (Link to Caseload Tracking: PT Visit Days : 2  Time In/Out PT Charge Capture  Rehab Caseload Tracker  Orders    Zohra Eng is a 79 y.o. male   PRIMARY DIAGNOSIS: Acute respiratory failure with hypoxia ([facility] [address]  Hypoxia [R09.02]  COVID-19 virus infection [U07.1]       Inpatient: Payor: Barbara Jauregui / Plan: MEDICARE PART A AND B / Product Type: *No Product type* /     ASSESSMENT:     REHAB RECOMMENDATIONS:   Recommendation to date pending progress:  Setting:  Home Health Therapy    Equipment:    To Be Determined     ASSESSMENT:  Mr. Julien Lopez is making slow progress toward goals. Min a x 2 to get to EOB. Worked on sitting balance. Amb 10' around bed to chair to RW with CGA. SUBJECTIVE:   Mr. Julien Lopez states \"I'm ok\".      Social/Functional Lives With: Spouse  Type of Home: House  Home Equipment: [employer], [address] Help From: Family  ADL Assistance: Independent  Active : No  Patient's  Info: not at present  Occupation: Retired  OBJECTIVE:     PAIN: VITALS / O2: PRECAUTION / Jolena Jewels / Laymon Singh:   Pre Treatment:          Post Treatment: 0 Vitals        Oxygen    IV    RESTRICTIONS/PRECAUTIONS:        MOBILITY: I Mod I S SBA CGA Min Mod Max Total  NT x2 Comments:   Bed Mobility    Rolling [] [] [] [] [] [] [] [] [] [x] []    Supine to Sit [] [] [] [] [] [x] [] [] [] [] [x]    Scooting [] [] [] [] [] [x] [] [] [] [] [x]    Sit to Supine [] [] [] [] [] [] [] [] [] [x] []    Transfers    Sit to Stand [] [] [] [] [] [x] [] [] [] [] [x]    Bed to Chair [] [] [] [] [x] [] [] [] [] [] []    Stand to Sit [] [] [] [] [x] [] [] [] [] [] []     [] [] [] [] [] [] [] [] [] [] []    I=Independent, Mod I=Modified Independent, S=Supervision, SBA=Standby Assistance, CGA=Contact Guard Assistance,   Min=Minimal Assistance, Mod=Moderate Assistance, Max=Maximal Assistance, Total=Total Assistance, NT=Not Tested    BALANCE: Good Fair+ Fair Fair- Poor NT Comments   Sitting Static [] [x] [] [] [] []    Sitting Dynamic [] [x] [] [] [] []              Standing Static [] [x] [] [] [] []    Standing Dynamic [] [] [x] [] [] []      GAIT: I Mod I S SBA CGA Min Mod Max Total  NT x2 Comments:   Level of Assistance [] [] [] [] [] [] [] [] [] [] []    Distance 10  feet    DME Rolling Walker    Gait Quality Decreased rae  and Decreased step length    Weightbearing Status      Stairs      I=Independent, Mod I=Modified Independent, S=Supervision, SBA=Standby Assistance, CGA=Contact Guard Assistance,   Min=Minimal Assistance, Mod=Moderate Assistance, Max=Maximal Assistance, Total=Total Assistance, NT=Not Tested    PLAN:   [address]: 3 times/week for duration of hospital stay or until stated goals are met, whichever comes first.    TREATMENT:   TREATMENT:   Gait Training (23 Minutes): Gait training for 10 feet utilizing [employer]. Patient required Manual and Verbal cueing to improve Activity Pacing and Gait Mechanics.      TREATMENT GRID:  N/A    AFTER TREATMENT PRECAUTIONS: Chair, Needs within reach, and Visitors at bedside    INTERDISCIPLINARY COLLABORATION:  RN/ PCT, PT/ PTA, and OT/ CRUZ    EDUCATION:      TIME IN/OUT:  Time In: 0928  Time Out: 3492  Minutes: [address], RIA

## 2022-10-31 NOTE — PROGRESS NOTES
Report received from off going RN. Patient resting with spouse at bedside, no Acute distress noted. 5LPM via NC with respirations regular and unlabored. Will continue to monitor. Safety measures in place.

## 2022-10-31 NOTE — PROGRESS NOTES
Hospitalist Progress Note   Admit Date:  10/26/2022  6:21 PM   Name:  Andria Mak   Age:  79 y.o. Sex:  male  :  1940   MRN:  [id number]   Room:  [facility] [place]/    Presenting Complaint: Shortness of Breath and Fatigue     Reason(s) for Admission: Hypoxia [R09.02]  COVID-19 virus infection [U07.1]     Hospital Course:   Andria Mak is a 79 y.o. male with medical history of vascular dementia, hypertension, CKD, and malignant neoplasm of bladder, who presented with  5 days of cough associated with a fever. He has been having nasal congestion and shortness of breath. He normally is able to ambulate on his own but the past few days he has been too weak. Patient went to an urgent care earlier today and his oxygen saturation was 85% on room air. Patient does not wear oxygen at home and has no history of lung problems. Patient did receive a liter of IV fluid at urgent care which family states perked the patient up. Wife states that she tested positive for COVID about a week ago and that patient tested positive for COVID shortly thereafter. Since that time he has had poor p.o. intake. Work-up in the emergency room shows white blood cell count 3.1 with hemoglobin 9.8. Take acid is good at 0.9. Patient's chest x-ray showed no pneumonia or signs of CHF. Patient's D-dimer was elevated. Patient's CT angiogram of chest showed no PE. There is mild infiltrate in the left upper lobe. Patient was COVID-positive. Patient was given a dose of Decadron. Hospitalist was consulted for admission    Admitted for covid pneumonia     Subjective & 24hr Events (10/31/22): Patient examined at bedside. No acute overnight events. Marked improvement in mental status today. Up in chair at bedside. Wife at bedside. In good spirits. Still requiring 5L NC to keep SpO2>90%. Denies fevers/chills, chest pain or abdominal pain. 10 point ROS negative except for HPI above.    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 Discussed with wife yesterday that due to his dementia it would be preferable if he could come home at discharge. 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  *      Objective:   Patient Vitals for the past 24 hrs:   Temp Pulse Resp BP SpO2   10/31/22 1130 98.4 °F (36.9 °C) 77 18 128/74 96 %   10/31/22 0808 98.8 °F (37.1 °C) 71 16 (!) 163/63 95 %   10/31/22 0348 98.6 °F (37 °C) 73 18 127/75 94 %   10/30/22 2331 98.1 °F (36.7 °C) 70 18 (!) 143/71 (!) 53 %   10/30/22 2020 97.9 °F (36.6 °C) 71 18 132/63 94 %   10/30/22 1644 97.9 °F (36.6 °C) 74 18 135/61 96 %         Oxygen Therapy  SpO2: 96 %  Pulse via Oximetry: 85 beats per minute  Pulse Oximeter Device Mode: Other (Comment)  O2 Device: High flow nasal cannula  O2 Flow Rate (L/min): 5 L/min    Estimated body mass index is 23.47 kg/m² as calculated from the following:    Height as of this encounter: 5' 10\" (1.778 m). Weight as of this encounter: 163 lb 9.6 oz (74.2 kg). Intake/Output Summary (Last 24 hours) at 10/31/2022 1428  Last data filed at 10/31/2022 1420  Gross per 24 hour   Intake 560 ml   Output 1 ml   Net 559 ml           Physical Exam:     Blood pressure 128/74, pulse 77, temperature 98.4 °F (36.9 °C), temperature source Oral, resp. rate 18, height 5' 10\" (1.778 m), weight 163 lb 9.6 oz (74.2 kg), SpO2 96 %. General:    Well nourished. More awake and alert today. Pleasant  Head:  Normocephalic, atraumatic  Eyes:  Sclerae appear normal.  Pupils equally round. ENT:  Nares appear normal, no drainage. Moist oral mucosa  Neck:  No restricted ROM. Trachea midline   CV:   RRR. No jugular venous distension. Lungs:   Coarse breath sounds without wheezing, no conversational dyspnea   Abdomen: Bowel sounds present. Soft, nontender, nondistended. Extremities: No cyanosis or clubbing. No edema  Skin:     No rashes and normal coloration. Warm and dry. Neuro:  CN II-XII grossly intact. Sensation intact. A&Ox3  Psych:  Normal mood and affect.   Baseline dementia    I have personally reviewed labs and tests showing:  Recent Labs:  Recent Results (from the past 48 hour(s))   POCT Glucose    Collection Time: 10/29/22  4:55 PM   Result Value Ref Range    POC Glucose 183 (H) 65 - 100 mg/dL    Performed by: [employer]    POCT Glucose    Collection Time: 10/29/22  9:10 PM   Result Value Ref Range    POC Glucose 135 (H) 65 - 100 mg/dL    Performed by: [employer]    POC Blood, Cord, Arterial    Collection Time: 10/30/22 12:22 AM   Result Value Ref Range    DEVICE NASAL CANNULA      FIO2 36 %    pH, Arterial, POC 7.47 (H) 7.35 - 7.45      pCO2, Arterial, POC 32.3 (L) 35 - 45 MMHG    pO2, Arterial, POC 46 (L) 75 - 100 MMHG    HCO3, Mixed 23.7 22 - 26 MMOL/L    SO2c, Arterial, POC 85.0 (L) 95 - 98 %    Base Excess 0.4 mmol/L    POC Reymundo's Test Positive      Site RIGHT RADIAL      Specimen type: ARTERIAL      Performed by: Eugene    POCT Glucose    Collection Time: 10/30/22  8:20 AM   Result Value Ref Range    POC Glucose 218 (H) 65 - 100 mg/dL    Performed by: Maris    D-Dimer, Quantitative    Collection Time: 10/30/22 10:59 AM   Result Value Ref Range    D-Dimer, Quant 0.90 (H) <0.56 ug/ml(FEU)   Procalcitonin    Collection Time: 10/30/22 10:59 AM   Result Value Ref Range    Procalcitonin <0.05 0.00 - 0.49 ng/mL   POCT Glucose    Collection Time: 10/30/22 12:26 PM   Result Value Ref Range    POC Glucose 185 (H) 65 - 100 mg/dL    Performed by: Maris    CBC with Auto Differential    Collection Time: 10/30/22  3:41 PM   Result Value Ref Range    WBC 5.4 4.3 - 11.1 K/uL    RBC 3.88 (L) 4.23 - 5.6 M/uL    Hemoglobin 10.1 (L) 13.6 - 17.2 g/dL    Hematocrit 31.8 (L) 41.1 - 50.3 %    MCV 82.0 82 - 102 FL    MCH 26.0 (L) 26.1 - 32.9 PG    MCHC 31.8 31.4 - 35.0 g/dL    RDW 15.5 (H) 11.9 - 14.6 %    Platelets 023 248 - 182 K/uL    MPV 10.3 9.4 - 12.3 FL    nRBC 0.00 0.0 - 0.2 K/uL    Differential Type AUTOMATED      Seg Neutrophils 84 (H) 43 - 78 %    Lymphocytes 10 (L) 13 - 44 %    Monocytes 5 4.0 - 12.0 %    Eosinophils % 0 (L) 0.5 - 7.8 %    Basophils 0 0.0 - 2.0 %    Immature Granulocytes 1 0.0 - 5.0 %    Segs Absolute 4.5 1.7 - 8.2 K/UL    Absolute Lymph # 0.6 0.5 - 4.6 K/UL    Absolute Mono # 0.3 0.1 - 1.3 K/UL    Absolute Eos # 0.0 0.0 - 0.8 K/UL    Basophils Absolute 0.0 0.0 - 0.2 K/UL    Absolute Immature Granulocyte 0.0 0.0 - 0.5 K/UL   Comprehensive Metabolic Panel    Collection Time: 10/30/22  3:41 PM   Result Value Ref Range    Sodium 141 133 - 143 mmol/L    Potassium 4.6 3.5 - 5.1 mmol/L    Chloride 113 (H) 101 - 110 mmol/L    CO2 25 21 - 32 mmol/L    Anion Gap 3 2 - 11 mmol/L    Glucose 180 (H) 65 - 100 mg/dL    BUN 38 (H) 8 - 23 MG/DL    Creatinine 1.40 0.8 - 1.5 MG/DL    Est, Glom Filt Rate 50 (L) >60 ml/min/1.73m2    Calcium 8.7 8.3 - 10.4 MG/DL    Total Bilirubin 0.3 0.2 - 1.1 MG/DL    ALT 13 12 - 65 U/L    AST 14 (L) 15 - 37 U/L    Alk Phosphatase 42 (L) 50 - 136 U/L    Total Protein 6.3 6.3 - 8.2 g/dL    Albumin 2.7 (L) 3.2 - 4.6 g/dL    Globulin 3.6 2.8 - 4.5 g/dL    Albumin/Globulin Ratio 0.8 0.4 - 1.6     D-Dimer, Quantitative    Collection Time: 10/30/22  3:41 PM   Result Value Ref Range    D-Dimer, Quant 0.83 (H) <0.56 ug/ml(FEU)   Procalcitonin    Collection Time: 10/30/22  3:41 PM   Result Value Ref Range    Procalcitonin <0.05 0.00 - 0.49 ng/mL   POCT Glucose    Collection Time: 10/30/22  4:44 PM   Result Value Ref Range    POC Glucose 204 (H) 65 - 100 mg/dL    Performed by: [employer]    POCT Glucose    Collection Time: 10/30/22  9:34 PM   Result Value Ref Range    POC Glucose 201 (H) 65 - 100 mg/dL    Performed by: [facility]    POCT Glucose    Collection Time: 10/31/22  8:08 AM   Result Value Ref Range    POC Glucose 143 (H) 65 - 100 mg/dL    Performed by: [employer]    POCT Glucose    Collection Time: 10/31/22 11:50 AM   Result Value Ref Range    POC Glucose 146 (H) 65 - 100 mg/dL    Performed by: [employer]        I have personally reviewed imaging studies showing: Other Studies:  CT HEAD WO CONTRAST   Final Result   1. Resolution of prior blood. No acute hemorrhage. 2.  Chronic infarcts again noted. XR CHEST PORTABLE   Final Result   1. Mild bilateral infiltrates compatible with viral infection. 2.  No consolidation. CT CHEST PULMONARY EMBOLISM W CONTRAST   Final Result   1. No evidence of pulmonary embolism. 2. Mild infiltrate or atelectasis in the left upper lobe. 3. Findings in the upper abdomen as described above. XR CHEST PORTABLE   Final Result   No acute airspace disease.                 Current Meds:  Current Facility-Administered Medications   Medication Dose Route Frequency    dexamethasone (DECADRON) tablet 6 mg  6 mg Oral Daily    baricitinib (OLUMIANT) tablet 2 mg  2 mg Oral Daily    0.9 % sodium chloride infusion   IntraVENous Continuous    melatonin tablet 3 mg  3 mg Oral Nightly PRN    aspirin chewable tablet 81 mg  81 mg Oral Daily    glucose chewable tablet 16 g  4 tablet Oral PRN    dextrose bolus 10% 125 mL  125 mL IntraVENous PRN    Or    dextrose bolus 10% 250 mL  250 mL IntraVENous PRN    glucagon (rDNA) injection 1 mg  1 mg SubCUTAneous PRN    dextrose 10 % infusion   IntraVENous Continuous PRN    tamsulosin (FLOMAX) capsule 0.4 mg  0.4 mg Oral Daily    sodium chloride flush 0.9 % injection 5-40 mL  5-40 mL IntraVENous 2 times per day    sodium chloride flush 0.9 % injection 5-40 mL  5-40 mL IntraVENous PRN    0.9 % sodium chloride infusion   IntraVENous PRN    [Held by provider] enoxaparin Sodium (LOVENOX) injection 30 mg  30 mg SubCUTAneous BID    ondansetron (ZOFRAN-ODT) disintegrating tablet 4 mg  4 mg Oral Q8H PRN    Or    ondansetron (ZOFRAN) injection 4 mg  4 mg IntraVENous Q6H PRN    polyethylene glycol (GLYCOLAX) packet 17 g  17 g Oral Daily PRN    acetaminophen (TYLENOL) tablet 650 mg  650 mg Oral Q6H PRN    Or    acetaminophen (TYLENOL) suppository 650 mg  650 mg Rectal Q6H PRN    guaiFENesin-dextromethorphan (ROBITUSSIN DM) 100-10 MG/5ML syrup 5 mL  5 mL Oral Q4H PRN    amLODIPine (NORVASC) tablet 5 mg  5 mg Oral Daily    citalopram (CELEXA) tablet 20 mg  20 mg Oral Daily    losartan (COZAAR) tablet 100 mg  100 mg Oral Daily    memantine (NAMENDA) tablet 5 mg  5 mg Oral BID    pantoprazole (PROTONIX) tablet 40 mg  40 mg Oral Daily    atorvastatin (LIPITOR) tablet 20 mg  20 mg Oral Daily    insulin lispro (HUMALOG) injection vial 0-4 Units  0-4 Units SubCUTAneous TID WC    insulin lispro (HUMALOG) injection vial 0-4 Units  0-4 Units SubCUTAneous Nightly    nicotine (NICODERM CQ) 21 MG/24HR 1 patch  1 patch TransDERmal Daily    sodium chloride flush 0.9 % injection 5 mL  5 mL IntraVENous Q8H    sodium chloride flush 0.9 % injection 5 mL  5 mL IntraVENous PRN       Signed:  Crystal Menjivar DO

## 2022-10-31 NOTE — PROGRESS NOTES
Pt is resting in bed with no complaints of pain or discomfort at this time. Pt is on 5l NC with even and unlabored respirations. Pt has family at the bedside. Call light is in place. No further needs expressed at this time.

## 2022-10-31 NOTE — PROGRESS NOTES
Pt resting in bed quietly. Alert and oriented x 4 at this time. Respirations even and unlabored on 5 L HFNC. NS infusing at 125 mL/hr. Bed locked and low with alarm active. Pt encouraged to use call light for assistance. Family at the bedside. No signs of distress at this time. Report received from [facility]. This RN to assume care.

## 2022-10-31 NOTE — PROGRESS NOTES
ACUTE OCCUPATIONAL THERAPY GOALS:   (Developed with and agreed upon by patient and/or caregiver.)  1. Patient will complete lower body bathing and dressing with SUPERVISION and adaptive equipment as needed. 2. Patient will complete toileting with SUPERVISION. 3. Patient will complete grooming ADL with SUPERVISION. 4. Patient will tolerate 25 minutes of OT treatment with 1-2 rest breaks to increase activity tolerance for ADLs. 5. Patient will complete functional transfers with SUPERVISION and adaptive equipment as needed. 6. Patient will tolerate 10 minutes BUE exercises to increase strength for safe, functional transfers. OCCUPATIONAL THERAPY: Daily Note AM   OT Visit Days: 2   Time  OT Charge Capture  Rehab Caseload Tracker  OT Orders    Geraldo Forman is a 79 y.o. male   PRIMARY DIAGNOSIS: Acute respiratory failure with hypoxia ([facility] [address]  Hypoxia [R09.02]  COVID-19 virus infection [U07.1]       Inpatient: Payor: MEDICARE / Plan: MEDICARE PART A AND B / Product Type: *No Product type* /     ASSESSMENT:     REHAB RECOMMENDATIONS: CURRENT LEVEL OF FUNCTION:  (Most Recently Demonstrated)   Recommendation to date pending progress:  Setting:  Home Health Therapy    Equipment:    To Be Determined Bathing:  Not Tested  Dressing: Total Assist  Feeding/Grooming: Total Assist  Toileting:  Not Tested  Functional Mobility:  Contact Guard Assist     ASSESSMENT:  Mr. Mo Roa was supine in bed upon arrival. Pt completed bed mobility with CGA-min A. Pt completed functional mobility with CGA using rolling walker. Pt is progressing towards goals. Continue POC.          SUBJECTIVE:     Mr. Mo Roa states, \"Hello\"     Social/Functional Lives With: Spouse  Type of Home: House  Home Equipment: Arely Beal, [address] Help From: Family  ADL Assistance: Independent  Active : No  Patient's  Info: not at present  Occupation: Retired    OBJECTIVE:     Rashi Gomez / Leo Apple / Kristy Larios: IV    RESTRICTIONS/PRECAUTIONS:  Restrictions/Precautions  Restrictions/Precautions: Fall Risk        PAIN: VITALS / O2:   Pre Treatment: 0           Post Treatment: 0 Vitals          Oxygen        MOBILITY: I Mod I S SBA CGA Min Mod Max Total  NT x2 Comments:   Bed Mobility    Rolling [] [] [] [] [] [] [] [] [] [] []    Supine to Sit [] [] [x] [] [] [] [] [] [] [] []    Scooting [] [] [] [] [] [x] [] [] [] [] []    Sit to Supine [] [] [] [] [] [] [] [] [] [] []    Transfers    Sit to Stand [] [] [] [] [] [x] [] [] [] [] []    Bed to Chair [] [] [] [] [x] [] [] [] [] [] []    Stand to Sit [] [] [] [] [] [x] [] [] [] [] []    Tub/Shower [] [] [] [] [] [] [] [] [] [] []     Toilet [] [] [] [] [] [] [] [] [] [] []      [] [] [] [] [] [] [] [] [] [] []    I=Independent, Mod I=Modified Independent, S=Supervision/Setup, SBA=Standby Assistance, CGA=Contact Guard Assistance, Min=Minimal Assistance, Mod=Moderate Assistance, Max=Maximal Assistance, Total=Total Assistance, NT=Not Tested    ACTIVITIES OF DAILY LIVING: I Mod I S SBA CGA Min Mod Max Total NT Comments   BASIC ADLs:              Upper Body   Bathing [] [] [] [] [] [] [] [] [] []    Lower Body Bathing [] [] [] [] [] [] [] [] [] []    Toileting [] [] [] [] [] [] [] [] [] []    Upper Body Dressing [] [] [] [] [] [] [] [] [] []    Lower Body Dressing [] [] [] [] [] [] [] [] [] []    Feeding [] [] [] [] [] [] [] [] [] []    Grooming [] [] [] [] [] [] [] [] [] []    Personal Device Care [] [] [] [] [] [] [] [] [] []    Functional Mobility [] [] [] [] [x] [] [] [] [] [] RW   I=Independent, Mod I=Modified Independent, S=Supervision/Setup, SBA=Standby Assistance, CGA=Contact Guard Assistance, Min=Minimal Assistance, Mod=Moderate Assistance, Max=Maximal Assistance, Total=Total Assistance, NT=Not Tested    BALANCE: Good Fair+ Fair Fair- Poor NT Comments   Sitting Static [x] [] [] [] [] []    Sitting Dynamic [] [] [] [] [] []              Standing Static [x] [] [] [] [] [] Standing Dynamic [] [] [] [] [] []        PLAN:     FREQUENCY/DURATION   OT Plan of Care: 3 times/week for duration of hospital stay or until stated goals are met, whichever comes first.    TREATMENT:     TREATMENT:   Neuromuscular Re-education (30 Minutes): Neuromuscular Re-education included Balance Training, Coordination training, Sitting balance training, and Standing balance training to improve Balance, Coordination, and Functional Mobility.     TREATMENT GRID:  N/A    AFTER TREATMENT PRECAUTIONS: Alarm Activated, Bed/Chair Locked, Call light within reach, Chair, Needs within reach, and RN notified    INTERDISCIPLINARY COLLABORATION:  RN/ PCT, PT/ PTA, and OT/ CRUZ    EDUCATION:       TOTAL TREATMENT DURATION AND TIME:  Time In: 0920  Time Out: [address]  Minutes: [place], J LUIS

## 2022-10-31 NOTE — CARE COORDINATION
Patient's family would like patient to discharge home with [facility]. CM following for all discharge needs.

## 2022-10-31 NOTE — PROGRESS NOTES
Pt is resting in bed with no complaints of pain or discomfort at this time. Pt is on 5L NC with even and unlabored respirations.  Will continue to monitor

## 2022-11-01 LAB
GLUCOSE BLD STRIP.AUTO-MCNC: 139 MG/DL (ref 65–100)
GLUCOSE BLD STRIP.AUTO-MCNC: 154 MG/DL (ref 65–100)
GLUCOSE BLD STRIP.AUTO-MCNC: 293 MG/DL (ref 65–100)
GLUCOSE BLD STRIP.AUTO-MCNC: 333 MG/DL (ref 65–100)
SERVICE CMNT-IMP: ABNORMAL

## 2022-11-01 PROCEDURE — 6360000002 HC RX W HCPCS: Performed by: HOSPITALIST

## 2022-11-01 PROCEDURE — 6370000000 HC RX 637 (ALT 250 FOR IP): Performed by: FAMILY MEDICINE

## 2022-11-01 PROCEDURE — 1100000000 HC RM PRIVATE

## 2022-11-01 PROCEDURE — 6360000002 HC RX W HCPCS: Performed by: INTERNAL MEDICINE

## 2022-11-01 PROCEDURE — 2580000003 HC RX 258: Performed by: HOSPITALIST

## 2022-11-01 PROCEDURE — 97530 THERAPEUTIC ACTIVITIES: CPT

## 2022-11-01 PROCEDURE — 6370000000 HC RX 637 (ALT 250 FOR IP): Performed by: HOSPITALIST

## 2022-11-01 PROCEDURE — 2580000003 HC RX 258: Performed by: EMERGENCY MEDICINE

## 2022-11-01 PROCEDURE — 2580000003 HC RX 258: Performed by: INTERNAL MEDICINE

## 2022-11-01 PROCEDURE — 82962 GLUCOSE BLOOD TEST: CPT

## 2022-11-01 PROCEDURE — 6370000000 HC RX 637 (ALT 250 FOR IP): Performed by: INTERNAL MEDICINE

## 2022-11-01 RX ORDER — ENOXAPARIN SODIUM 100 MG/ML
40 INJECTION SUBCUTANEOUS DAILY
Status: DISCONTINUED | OUTPATIENT
Start: 2022-11-02 | End: 2022-11-09 | Stop reason: HOSPADM

## 2022-11-01 RX ADMIN — SODIUM CHLORIDE, PRESERVATIVE FREE 5 ML: 5 INJECTION INTRAVENOUS at 09:10

## 2022-11-01 RX ADMIN — INSULIN LISPRO 2 UNITS: 100 INJECTION, SOLUTION INTRAVENOUS; SUBCUTANEOUS at 18:32

## 2022-11-01 RX ADMIN — CITALOPRAM HYDROBROMIDE 20 MG: 20 TABLET ORAL at 09:06

## 2022-11-01 RX ADMIN — TAMSULOSIN HYDROCHLORIDE 0.4 MG: 0.4 CAPSULE ORAL at 09:07

## 2022-11-01 RX ADMIN — LOSARTAN POTASSIUM 100 MG: 50 TABLET, FILM COATED ORAL at 09:06

## 2022-11-01 RX ADMIN — AMLODIPINE BESYLATE 5 MG: 5 TABLET ORAL at 09:06

## 2022-11-01 RX ADMIN — ENOXAPARIN SODIUM 30 MG: 100 INJECTION SUBCUTANEOUS at 09:06

## 2022-11-01 RX ADMIN — INSULIN LISPRO 4 UNITS: 100 INJECTION, SOLUTION INTRAVENOUS; SUBCUTANEOUS at 21:00

## 2022-11-01 RX ADMIN — Medication 3 MG: at 22:24

## 2022-11-01 RX ADMIN — DEXAMETHASONE 6 MG: 4 TABLET ORAL at 09:06

## 2022-11-01 RX ADMIN — PANTOPRAZOLE SODIUM 40 MG: 40 TABLET, DELAYED RELEASE ORAL at 09:07

## 2022-11-01 RX ADMIN — SODIUM CHLORIDE, PRESERVATIVE FREE 5 ML: 5 INJECTION INTRAVENOUS at 04:02

## 2022-11-01 RX ADMIN — MEMANTINE 5 MG: 5 TABLET ORAL at 09:06

## 2022-11-01 RX ADMIN — ATORVASTATIN CALCIUM 20 MG: 20 TABLET, FILM COATED ORAL at 09:06

## 2022-11-01 RX ADMIN — SODIUM CHLORIDE, PRESERVATIVE FREE 5 ML: 5 INJECTION INTRAVENOUS at 09:09

## 2022-11-01 RX ADMIN — SODIUM CHLORIDE, PRESERVATIVE FREE 5 ML: 5 INJECTION INTRAVENOUS at 18:35

## 2022-11-01 RX ADMIN — BARICITINIB 2 MG: 2 TABLET, FILM COATED ORAL at 09:07

## 2022-11-01 RX ADMIN — ASPIRIN 81 MG: 81 TABLET, CHEWABLE ORAL at 09:08

## 2022-11-01 RX ADMIN — SODIUM CHLORIDE, PRESERVATIVE FREE 10 ML: 5 INJECTION INTRAVENOUS at 20:59

## 2022-11-01 RX ADMIN — MEMANTINE 5 MG: 5 TABLET ORAL at 20:59

## 2022-11-01 RX ADMIN — SODIUM CHLORIDE: 9 INJECTION, SOLUTION INTRAVENOUS at 04:02

## 2022-11-01 NOTE — PROGRESS NOTES
Hospitalist Progress Note   Admit Date:  10/26/2022  6:21 PM   Name:  Williams Fuller   Age:  79 y.o. Sex:  male  :  1940   MRN:  [id number]   Room:  0/    Reason(s) for Admission: Hypoxia [R09.02]  COVID-19 virus infection [U07.1]     Hospital Course & Interval History:   Mr. Norah Cheng is an 80 y/o WM with a h/o vascular dementia, HTN, CKD, and bladder cacner who was admitted to our service on 10/29 with acute hypoxemic respiratory failure 2/2 COVID-19. CXR unremarkable. CT chest neg for PE but does have mild infiltrate in DANI. He was started on dexamethasone, baricitinib and supplemental O2. He was weaned to RA but then began to have increased O2 requirements again up to 5L, CXR repeated 10/30 and showed mild bilateral atypical appearing infiltrates. Subjective/24hr Events (22): In bed, feels well, pleasantly confused. NC O2 is currently off, bedside pulse ox showed 89-90% at rest. Unable to get accurate ROS otherwise. Assessment & Plan:   # Acute hypoxemic respiratory failure 2/2 COVID-19   - Initially weaned to RA but had recurrent increase in O2 needs. Was on 5L yesterday. O2 off this morning and sat was 89-90% at rest so put back on 2L. Con't dex, baricitinib. Walk test to assess for O2. # HTN   - Norvasc, Cozaar    # MDD   - Celexa    # BPH   - Flomax    # GERD   - PPI    # HLD   - Statin    # Vascular dementia   - Namenda    Discharge Planning: Wife prefers to take home given his dementia. Hopefully discharge tomorrow, . 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 ([facility] 10/27/2022 Yes    COVID-19 virus infection 10/29/2022 Yes    Essential hypertension, benign 10/27/2022 Yes        Objective:   Patient Vitals for the past 24 hrs:   Temp Pulse Resp BP SpO2   11/01/22 1049 98.1 °F (36.7 °C) (!) 46 21 (!) 160/64 92 %   11/01/22 0726 98.2 °F (36.8 °C) 65 20 (!) 159/64 90 %   11/01/22 0350 -- -- -- -- 92 %   11/01/22 0343 97.7 °F (36.5 °C) 64 18 (!) 158/64 (!) 89 %   10/31/22 2335 97.7 °F (36.5 °C) 70 18 (!) 140/64 94 %   10/31/22 1922 97.3 °F (36.3 °C) 78 18 (!) 127/52 90 %   10/31/22 1446 97.7 °F (36.5 °C) 76 17 138/69 94 %       Estimated body mass index is 23.69 kg/m² as calculated from the following:    Height as of this encounter: 5' 10\" (1.778 m). Weight as of this encounter: 165 lb 1.6 oz (74.9 kg). Intake/Output Summary (Last 24 hours) at 11/1/2022 1407  Last data filed at 11/1/2022 0552  Gross per 24 hour   Intake 520 ml   Output 752 ml   Net -232 ml         Physical Exam:   Blood pressure (!) 160/64, pulse (!) 46, temperature 98.1 °F (36.7 °C), temperature source Oral, resp. rate 21, height 5' 10\" (1.778 m), weight 165 lb 1.6 oz (74.9 kg), SpO2 92 %. General:    Well nourished. No overt distress. Head:  Normocephalic, atraumatic  Eyes:  Sclerae appear normal. Pupils equally round. ENT:  Nares appear normal, no drainage. Moist oral mucosa  Neck:  No restricted ROM. Trachea midline. CV:   Bigeminy? No m/r/g. No jugular venous distension. Lungs:   Mild rhonchi, no wheezes or rales. Initially on RA with sat 89-90%. Placed back on 2L. Abdomen: Bowel sounds present. Soft, nontender, nondistended. Extremities: No cyanosis or clubbing. No edema. Skin:     No rashes and normal coloration. Warm and dry. Neuro:  CN II-XII grossly intact. Sensation intact. A&Ox3. Pleasantly confused. Psych:  Normal mood and affect. As above.     I have reviewed ordered lab tests and independently visualized imaging below:    Recent Labs:  Recent Results (from the past 48 hour(s)) CBC with Auto Differential    Collection Time: 10/30/22  3:41 PM   Result Value Ref Range    WBC 5.4 4.3 - 11.1 K/uL    RBC 3.88 (L) 4.23 - 5.6 M/uL    Hemoglobin 10.1 (L) 13.6 - 17.2 g/dL    Hematocrit 31.8 (L) 41.1 - 50.3 %    MCV 82.0 82 - 102 FL    MCH 26.0 (L) 26.1 - 32.9 PG    MCHC 31.8 31.4 - 35.0 g/dL    RDW 15.5 (H) 11.9 - 14.6 %    Platelets 389 791 - 858 K/uL    MPV 10.3 9.4 - 12.3 FL    nRBC 0.00 0.0 - 0.2 K/uL    Differential Type AUTOMATED      Seg Neutrophils 84 (H) 43 - 78 %    Lymphocytes 10 (L) 13 - 44 %    Monocytes 5 4.0 - 12.0 %    Eosinophils % 0 (L) 0.5 - 7.8 %    Basophils 0 0.0 - 2.0 %    Immature Granulocytes 1 0.0 - 5.0 %    Segs Absolute 4.5 1.7 - 8.2 K/UL    Absolute Lymph # 0.6 0.5 - 4.6 K/UL    Absolute Mono # 0.3 0.1 - 1.3 K/UL    Absolute Eos # 0.0 0.0 - 0.8 K/UL    Basophils Absolute 0.0 0.0 - 0.2 K/UL    Absolute Immature Granulocyte 0.0 0.0 - 0.5 K/UL   Comprehensive Metabolic Panel    Collection Time: 10/30/22  3:41 PM   Result Value Ref Range    Sodium 141 133 - 143 mmol/L    Potassium 4.6 3.5 - 5.1 mmol/L    Chloride 113 (H) 101 - 110 mmol/L    CO2 25 21 - 32 mmol/L    Anion Gap 3 2 - 11 mmol/L    Glucose 180 (H) 65 - 100 mg/dL    BUN 38 (H) 8 - 23 MG/DL    Creatinine 1.40 0.8 - 1.5 MG/DL    Est, Glom Filt Rate 50 (L) >60 ml/min/1.73m2    Calcium 8.7 8.3 - 10.4 MG/DL    Total Bilirubin 0.3 0.2 - 1.1 MG/DL    ALT 13 12 - 65 U/L    AST 14 (L) 15 - 37 U/L    Alk Phosphatase 42 (L) 50 - 136 U/L    Total Protein 6.3 6.3 - 8.2 g/dL    Albumin 2.7 (L) 3.2 - 4.6 g/dL    Globulin 3.6 2.8 - 4.5 g/dL    Albumin/Globulin Ratio 0.8 0.4 - 1.6     D-Dimer, Quantitative    Collection Time: 10/30/22  3:41 PM   Result Value Ref Range    D-Dimer, Quant 0.83 (H) <0.56 ug/ml(FEU)   Procalcitonin    Collection Time: 10/30/22  3:41 PM   Result Value Ref Range    Procalcitonin <0.05 0.00 - 0.49 ng/mL   POCT Glucose    Collection Time: 10/30/22  4:44 PM   Result Value Ref Range    POC Glucose 204 (H) 65 - 100 mg/dL    Performed by: PhillipsCaitlin    POCT Glucose    Collection Time: 10/30/22  9:34 PM   Result Value Ref Range    POC Glucose 201 (H) 65 - 100 mg/dL    Performed by: [facility]    POCT Glucose    Collection Time: 10/31/22  8:08 AM   Result Value Ref Range    POC Glucose 143 (H) 65 - 100 mg/dL    Performed by: PrietoingtonShannanPCT    POCT Glucose    Collection Time: 10/31/22 11:50 AM   Result Value Ref Range    POC Glucose 146 (H) 65 - 100 mg/dL    Performed by: PrietoingtonShannanPCT    POCT Glucose    Collection Time: 10/31/22  4:06 PM   Result Value Ref Range    POC Glucose 245 (H) 65 - 100 mg/dL    Performed by: PrietoingtonShannanPCT    POCT Glucose    Collection Time: 10/31/22  8:21 PM   Result Value Ref Range    POC Glucose 338 (H) 65 - 100 mg/dL    Performed by: Sor[facility]ana[facility]Companion    POCT Glucose    Collection Time: 10/31/22 10:54 PM   Result Value Ref Range    POC Glucose 252 (H) 65 - 100 mg/dL    Performed by: Yanick    POCT Glucose    Collection Time: 11/01/22  7:51 AM   Result Value Ref Range    POC Glucose 154 (H) 65 - 100 mg/dL    Performed by: Gracie    POCT Glucose    Collection Time: 11/01/22 10:46 AM   Result Value Ref Range    POC Glucose 139 (H) 65 - 100 mg/dL    Performed by: KENNY Leggett 38          Other Studies:  CT HEAD WO CONTRAST    Result Date: 10/30/2022  Noncontrast head CT Indication: Follow-up subarachnoid hemorrhage and intraventricular blood, subacute progressively worsening severe confusion and altered mental status. Technique: Noncontrast axial images were obtained through the brain. All CT scans at this location are performed using dose modulation techniques as appropriate including the following: Automated exposure control, adjustment of the MA and/or kV according to patient's size, or use of iterative reconstruction technique.  Reformatted sagittal, coronal images obtained to further evaluate bones, soft tissues, extra-axial spaces. Comparison: 6/28/2022 CT and 6/25/2022 MRI Findings: Unchanged chronic bilateral ischemic insults. There is no acute intracranial hemorrhage, hydrocephalus, intra-axial mass, or mass-effect. Previous intraventricular blood has resolved. There is no CT evidence of acute large artery territorial infarction or abnormal extra-axial fluid collection. The mastoid air cells and paranasal sinuses are clear where imaged. 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 No displaced skull fractures are present. 1.  Resolution of prior blood. No acute hemorrhage. 2.  Chronic infarcts again noted.        Current Meds:  Current Facility-Administered Medications   Medication Dose Route Frequency    [START ON 11/2/2022] enoxaparin (LOVENOX) injection 40 mg  40 mg SubCUTAneous Daily    dexamethasone (DECADRON) tablet 6 mg  6 mg Oral Daily    baricitinib (OLUMIANT) tablet 2 mg  2 mg Oral Daily    melatonin tablet 3 mg  3 mg Oral Nightly PRN    aspirin chewable tablet 81 mg  81 mg Oral Daily    glucose chewable tablet 16 g  4 tablet Oral PRN    dextrose bolus 10% 125 mL  125 mL IntraVENous PRN    Or    dextrose bolus 10% 250 mL  250 mL IntraVENous PRN    glucagon (rDNA) injection 1 mg  1 mg SubCUTAneous PRN    dextrose 10 % infusion   IntraVENous Continuous PRN    tamsulosin (FLOMAX) capsule 0.4 mg  0.4 mg Oral Daily    sodium chloride flush 0.9 % injection 5-40 mL  5-40 mL IntraVENous 2 times per day    sodium chloride flush 0.9 % injection 5-40 mL  5-40 mL IntraVENous PRN    0.9 % sodium chloride infusion   IntraVENous PRN    ondansetron (ZOFRAN-ODT) disintegrating tablet 4 mg  4 mg Oral Q8H PRN    Or    ondansetron (ZOFRAN) injection 4 mg  4 mg IntraVENous Q6H PRN    polyethylene glycol (GLYCOLAX) packet 17 g  17 g Oral Daily PRN    acetaminophen (TYLENOL) tablet 650 mg  650 mg Oral Q6H PRN    Or    acetaminophen (TYLENOL) suppository 650 mg  650 mg Rectal Q6H PRN guaiFENesin-dextromethorphan (ROBITUSSIN DM) 100-10 MG/5ML syrup 5 mL  5 mL Oral Q4H PRN    amLODIPine (NORVASC) tablet 5 mg  5 mg Oral Daily    citalopram (CELEXA) tablet 20 mg  20 mg Oral Daily    losartan (COZAAR) tablet 100 mg  100 mg Oral Daily    memantine (NAMENDA) tablet 5 mg  5 mg Oral BID    pantoprazole (PROTONIX) tablet 40 mg  40 mg Oral Daily    atorvastatin (LIPITOR) tablet 20 mg  20 mg Oral Daily    insulin lispro (HUMALOG) injection vial 0-4 Units  0-4 Units SubCUTAneous TID WC    insulin lispro (HUMALOG) injection vial 0-4 Units  0-4 Units SubCUTAneous Nightly    nicotine (NICODERM CQ) 21 MG/24HR 1 patch  1 patch TransDERmal Daily    sodium chloride flush 0.9 % injection 5 mL  5 mL IntraVENous Q8H    sodium chloride flush 0.9 % injection 5 mL  5 mL IntraVENous PRN       Signed:  Jaydon Nunes MD    Part of this note may have been written by using a voice dictation software. The note has been proof read but may still contain some grammatical/other typographical errors.

## 2022-11-01 NOTE — PROGRESS NOTES
6 Minute Walk Test   Pre-Test Vitals:  ;   99% 5L at rest                                   85% room air at                                    rest    Post-Test Vitals:      90% room air walking 20' sitting and standing 4 times Sp02 93% on 3L    Distance:  ;  20'

## 2022-11-01 NOTE — PROGRESS NOTES
ACUTE PHYSICAL THERAPY GOALS:   (Developed with and agreed upon by patient and/or caregiver. )  LTG:  (1.)Mr. Julien Lopez will move from supine to sit and sit to supine , scoot up and down, and roll side to side in bed with CONTACT GUARD ASSIST within 7 treatment day(s). (2.)Mr. Julien Lopez will transfer from bed to chair and chair to bed with STAND BY ASSIST using the least restrictive device within 7 treatment day(s). (3.)Mr. Julien Lopez will ambulate with CONTACT GUARD ASSIST for 150 feet with the least restrictive device within 7 treatment day(s). PHYSICAL THERAPY: Daily Note PM   (Link to Caseload Tracking: PT Visit Days : 3  Time In/Out PT Charge Capture  Rehab Caseload Tracker  Orders    Zohra Eng is a 79 y.o. male   PRIMARY DIAGNOSIS: Acute respiratory failure with hypoxia ([facility] [address] [address]  Hypoxia [R09.02]  COVID-19 virus infection [U07.1]       Inpatient: Payor: Barbara Jauregui / Plan: MEDICARE PART A AND B / Product Type: *No Product type* /     ASSESSMENT:     REHAB RECOMMENDATIONS:   Recommendation to date pending progress:  [address] [place] [address] [place] but needs some help, is quite weak    Equipment:    To Be Determined has a walker and needs it right now     ASSESSMENT:  Mr. Julien Lopez is supine and wanting to get up. He needed Min A to get to the EOB and Min A to stand. When asked if he wanted to walk with or without the walker he wanted to try without but then was requiring mod A to stand. I gave him the walker and he moved much better. He was able to walk all around the room with CGA with the walker and cues for technique. He is quite weak and needing physical assistance to safely get around. SUBJECTIVE:   Mr. Julien Lopez states \"lets try without\".      Social/Functional Lives With: Spouse  Type of Home: House  Home Equipment: [place], [address] Help From: Family  ADL Assistance: Independent  Active : No  Patient's  Info: not at present  Occupation: Retired  OBJECTIVE:     PAIN: VITALS / O2: PRECAUTION / LINES / DRAINS:   Pre Treatment:          Post Treatment: 0 Vitals        Oxygen    None    RESTRICTIONS/PRECAUTIONS:        MOBILITY: I Mod I S SBA CGA Min Mod Max Total  NT x2 Comments:   Bed Mobility    Rolling [] [] [] [] [] [] [] [] [] [] []    Supine to Sit [] [] [] [] [] [x] [] [] [] [] []    Scooting [] [] [] [] [] [x] [] [] [] [] []    Sit to Supine [] [] [] [] [] [] [] [] [] [x] []    Transfers    Sit to Stand [] [] [] [] [] [x] [] [] [] [] []    Bed to Chair [] [] [] [] [x] [x] [] [] [] [] []    Stand to Sit [] [] [] [] [x] [x] [] [] [] [] []     [] [] [] [] [] [] [] [] [] [] []    I=Independent, Mod I=Modified Independent, S=Supervision, SBA=Standby Assistance, CGA=Contact Guard Assistance,   Min=Minimal Assistance, Mod=Moderate Assistance, Max=Maximal Assistance, Total=Total Assistance, NT=Not Tested    BALANCE: Good Fair+ Fair Fair- Poor NT Comments   Sitting Static [] [x] [] [] [] []    Sitting Dynamic [] [x] [] [] [] []              Standing Static [] [] [x] [] [] []    Standing Dynamic [] [] [x] [] [] []      GAIT: I Mod I S SBA CGA Min Mod Max Total  NT x2 Comments:   Level of Assistance [] [] [] [] [] [x] [] [] [] [] []    Distance 20 feet    DME Rolling Walker    Gait Quality Decreased rae  and Decreased step length    Weightbearing Status      Stairs      I=Independent, Mod I=Modified Independent, S=Supervision, SBA=Standby Assistance, CGA=Contact Guard Assistance,   Min=Minimal Assistance, Mod=Moderate Assistance, Max=Maximal Assistance, Total=Total Assistance, NT=Not Tested    PLAN:   FREQUENCY AND DURATION: 3 times/week for duration of hospital stay or until stated goals are met, whichever comes first.    TREATMENT:   TREATMENT:   Therapeutic Activity (25 Minutes): Therapeutic activity included Supine to Sit, Scooting, Transfer Training, Ambulation on level ground, and Standing balance to improve functional Activity tolerance, Balance, Mobility, and Strength.     TREATMENT GRID:  N/A    AFTER TREATMENT PRECAUTIONS: Alarm Activated, Bed/Chair Locked, Call light within reach, Chair, Needs within reach, and RN notified    INTERDISCIPLINARY COLLABORATION:  RN/ PCT and PT/ PTA    EDUCATION:      TIME IN/OUT:  Time In: 1150  Time Out: 1215  Minutes: 25    Devontene Sessions, PTA

## 2022-11-01 NOTE — PROGRESS NOTES
Patient with uneventful night. Seemed to rest well. Respirations regular and unlabored. 5LPM via HFNC. Will continue to monitor. NAD noted. Safety measures in place. Preparing to report off to oncoming nurse.

## 2022-11-02 LAB
GLUCOSE BLD STRIP.AUTO-MCNC: 105 MG/DL (ref 65–100)
GLUCOSE BLD STRIP.AUTO-MCNC: 135 MG/DL (ref 65–100)
GLUCOSE BLD STRIP.AUTO-MCNC: 302 MG/DL (ref 65–100)
GLUCOSE BLD STRIP.AUTO-MCNC: 311 MG/DL (ref 65–100)
SERVICE CMNT-IMP: ABNORMAL

## 2022-11-02 PROCEDURE — 6360000002 HC RX W HCPCS: Performed by: INTERNAL MEDICINE

## 2022-11-02 PROCEDURE — 6370000000 HC RX 637 (ALT 250 FOR IP): Performed by: HOSPITALIST

## 2022-11-02 PROCEDURE — 2580000003 HC RX 258: Performed by: EMERGENCY MEDICINE

## 2022-11-02 PROCEDURE — 2580000003 HC RX 258: Performed by: HOSPITALIST

## 2022-11-02 PROCEDURE — 97112 NEUROMUSCULAR REEDUCATION: CPT

## 2022-11-02 PROCEDURE — 82962 GLUCOSE BLOOD TEST: CPT

## 2022-11-02 PROCEDURE — 6370000000 HC RX 637 (ALT 250 FOR IP): Performed by: INTERNAL MEDICINE

## 2022-11-02 PROCEDURE — 6370000000 HC RX 637 (ALT 250 FOR IP): Performed by: FAMILY MEDICINE

## 2022-11-02 PROCEDURE — 1100000000 HC RM PRIVATE

## 2022-11-02 RX ADMIN — ATORVASTATIN CALCIUM 20 MG: 20 TABLET, FILM COATED ORAL at 08:42

## 2022-11-02 RX ADMIN — AMLODIPINE BESYLATE 5 MG: 5 TABLET ORAL at 08:42

## 2022-11-02 RX ADMIN — ASPIRIN 81 MG: 81 TABLET, CHEWABLE ORAL at 08:42

## 2022-11-02 RX ADMIN — MEMANTINE 5 MG: 5 TABLET ORAL at 20:53

## 2022-11-02 RX ADMIN — INSULIN LISPRO 4 UNITS: 100 INJECTION, SOLUTION INTRAVENOUS; SUBCUTANEOUS at 20:52

## 2022-11-02 RX ADMIN — ENOXAPARIN SODIUM 40 MG: 100 INJECTION SUBCUTANEOUS at 08:44

## 2022-11-02 RX ADMIN — DEXAMETHASONE 6 MG: 4 TABLET ORAL at 08:42

## 2022-11-02 RX ADMIN — SODIUM CHLORIDE, PRESERVATIVE FREE 5 ML: 5 INJECTION INTRAVENOUS at 08:45

## 2022-11-02 RX ADMIN — PANTOPRAZOLE SODIUM 40 MG: 40 TABLET, DELAYED RELEASE ORAL at 08:42

## 2022-11-02 RX ADMIN — SODIUM CHLORIDE, PRESERVATIVE FREE 5 ML: 5 INJECTION INTRAVENOUS at 17:30

## 2022-11-02 RX ADMIN — TAMSULOSIN HYDROCHLORIDE 0.4 MG: 0.4 CAPSULE ORAL at 08:42

## 2022-11-02 RX ADMIN — CITALOPRAM HYDROBROMIDE 20 MG: 20 TABLET ORAL at 08:42

## 2022-11-02 RX ADMIN — Medication 3 MG: at 20:53

## 2022-11-02 RX ADMIN — SODIUM CHLORIDE, PRESERVATIVE FREE 10 ML: 5 INJECTION INTRAVENOUS at 20:53

## 2022-11-02 RX ADMIN — SODIUM CHLORIDE, PRESERVATIVE FREE 5 ML: 5 INJECTION INTRAVENOUS at 04:17

## 2022-11-02 RX ADMIN — BARICITINIB 2 MG: 2 TABLET, FILM COATED ORAL at 08:42

## 2022-11-02 RX ADMIN — INSULIN LISPRO 3 UNITS: 100 INJECTION, SOLUTION INTRAVENOUS; SUBCUTANEOUS at 17:12

## 2022-11-02 RX ADMIN — LOSARTAN POTASSIUM 100 MG: 50 TABLET, FILM COATED ORAL at 08:42

## 2022-11-02 RX ADMIN — MEMANTINE 5 MG: 5 TABLET ORAL at 08:42

## 2022-11-02 NOTE — PROGRESS NOTES
11/02/22 1141   Resting (Room Air)   SpO2 92   HR 77   During Walk (Room Air)   SpO2 83   HR 88   Walk/Assistance Device Walker   During Walk (On O2)   SpO2 83   HR 90   O2 Device Nasal cannula   O2 Flow Rate (l/min) 3 l/min   Need Additional O2 Flow Rate Rows Yes   O2 Flow Rate (l/min) 4 l/min   O2 Saturation 85   O2 Flow Rate (l/min) 5 l/min   O2 Saturation 87   O2 Flow Rate (l/min) 7 l/min   O2 Saturation 90   Walk/Assistance Device Walker   After Walk   SpO2 90   HR 92   Symptoms Shortness of breath   Does the Patient Qualify for Home O2 Yes   Liter Flow at Rest 4   Liter Flow on Exertion 7   Does the Patient Need Portable Oxygen Tanks Yes

## 2022-11-02 NOTE — PROGRESS NOTES
Patient was in chair when this nurse went into room and was assisted to bed at this time. Patient having no pain or distress noted. 02 at 2 liters via NC with RR even/unlabored.   Safety in place with bed alarm on and call light in reach

## 2022-11-02 NOTE — PROGRESS NOTES
ACUTE OCCUPATIONAL THERAPY GOALS:   (Developed with and agreed upon by patient and/or caregiver.)  1. Patient will complete lower body bathing and dressing with SUPERVISION and adaptive equipment as needed. 2. Patient will complete toileting with SUPERVISION. 3. Patient will complete grooming ADL with SUPERVISION. 4. Patient will tolerate 25 minutes of OT treatment with 1-2 rest breaks to increase activity tolerance for ADLs. 5. Patient will complete functional transfers with SUPERVISION and adaptive equipment as needed. 6. Patient will tolerate 10 minutes BUE exercises to increase strength for safe, functional transfers. OCCUPATIONAL THERAPY: Daily Note AM   OT Visit Days: 3   Time  OT Charge Capture  Rehab Caseload Tracker  OT Orders    Katie Nick is a 79 y.o. male   PRIMARY DIAGNOSIS: Acute respiratory failure with hypoxia ([facility] [address]  Hypoxia [R09.02]  COVID-19 virus infection [U07.1]       Inpatient: Payor: MEDICARE / Plan: MEDICARE PART A AND B / Product Type: *No Product type* /     ASSESSMENT:     REHAB RECOMMENDATIONS: CURRENT LEVEL OF FUNCTION:  (Most Recently Demonstrated)   Recommendation to date pending progress:  Setting:  Home Health Therapy    Equipment:    To Be Determined Bathing:  Not Tested  Dressing:  Not Tested  Feeding/Grooming: Total Assist  Toileting:  Not Tested  Functional Mobility:  Minimal Assist     ASSESSMENT:  Mr. Netta Ortiz was supine in bed upon arrival. Pt completed bed mobility with CGA-min A. Pt completed functional mobility with min A using rolling walker. Pt 's O2 dropped to the low 80's. O2 had to be bumped up to 7L. Pt is progressing towards goals. Continue POC.          SUBJECTIVE:     Mr. Netta Ortiz states, \"Hello\"     Social/Functional Lives With: Spouse  Type of Home: House  Home Equipment: Eleonore [address], [address] Help From: Family  ADL Assistance: Independent  Active : No  Patient's  Info: not at present  Occupation: Retired    OBJECTIVE: Rashi Gomez / Leo Apple / AIRWAY: IV    RESTRICTIONS/PRECAUTIONS:  Restrictions/Precautions  Restrictions/Precautions: Fall Risk        PAIN: VITALS / O2:   Pre Treatment: 0           Post Treatment: 0 Vitals          Oxygen        MOBILITY: I Mod I S SBA CGA Min Mod Max Total  NT x2 Comments:   Bed Mobility    Rolling [] [] [] [] [] [] [] [] [] [] []    Supine to Sit [] [] [] [] [] [x] [] [] [] [] []    Scooting [] [] [] [] [] [] [] [] [] [] []    Sit to Supine [] [] [] [] [] [] [] [] [] [] []    Transfers    Sit to Stand [] [] [] [] [] [x] [] [] [] [] []    Bed to Chair [] [] [] [] [] [x] [] [] [] [] []    Stand to Sit [] [] [] [] [] [x] [] [] [] [] []    Tub/Shower [] [] [] [] [] [] [] [] [] [] []     Toilet [] [] [] [] [] [] [] [] [] [] []      [] [] [] [] [] [] [] [] [] [] []    I=Independent, Mod I=Modified Independent, S=Supervision/Setup, SBA=Standby Assistance, CGA=Contact Guard Assistance, Min=Minimal Assistance, Mod=Moderate Assistance, Max=Maximal Assistance, Total=Total Assistance, NT=Not Tested    ACTIVITIES OF DAILY LIVING: I Mod I S SBA CGA Min Mod Max Total NT Comments   BASIC ADLs:              Upper Body   Bathing [] [] [] [] [] [] [] [] [] []    Lower Body Bathing [] [] [] [] [] [] [] [] [] []    Toileting [] [] [] [] [] [] [] [] [] []    Upper Body Dressing [] [] [] [] [] [] [] [] [] []    Lower Body Dressing [] [] [] [] [] [] [] [] [] []    Feeding [] [] [] [] [] [] [] [] [] []    Grooming [] [] [] [] [] [] [] [] [] []    Personal Device Care [] [] [] [] [] [] [] [] [] []    Functional Mobility [] [] [] [] [] [x] [] [] [] [] RW   I=Independent, Mod I=Modified Independent, S=Supervision/Setup, SBA=Standby Assistance, CGA=Contact Guard Assistance, Min=Minimal Assistance, Mod=Moderate Assistance, Max=Maximal Assistance, Total=Total Assistance, NT=Not Tested    BALANCE: Good Fair+ Fair Fair- Poor NT Comments   Sitting Static [x] [] [] [] [] []    Sitting Dynamic [] [] [] [] [] []              Standing Static [x] [] [] [] [] []    Standing Dynamic [] [] [] [] [] []        PLAN:     FREQUENCY/DURATION   OT Plan of Care: 3 times/week for duration of hospital stay or until stated goals are met, whichever comes first.    TREATMENT:     TREATMENT:   Neuromuscular Re-education (15 Minutes): Neuromuscular Re-education included Balance Training, Coordination training, Sitting balance training, and Standing balance training to improve Balance, Coordination, and Functional Mobility.     TREATMENT GRID:  N/A    AFTER TREATMENT PRECAUTIONS: Alarm Activated, Bed/Chair Locked, Call light within reach, Chair, Needs within reach, and RN notified    INTERDISCIPLINARY COLLABORATION:  RN/ PCT, PT/ PTA, and OT/ CRUZ    EDUCATION:       TOTAL TREATMENT DURATION AND TIME:  Time In: 1130  Time Out: 121 [facility]  Minutes: Diana 37, J LUIS

## 2022-11-02 NOTE — PROGRESS NOTES
Patient sleeping with respirations at 18 and no distress noted.   Bed alarm on and call light in reach

## 2022-11-02 NOTE — PROGRESS NOTES
Patient resting in bed with no pain or distress noted. 02 on at 2 liters via NC with RR even/unlabored.   Safety in place with bed alarm on with call light in reach and will prepare bedside shift report for oncoming nurse

## 2022-11-02 NOTE — PROGRESS NOTES
Hospitalist Progress Note   Admit Date:  10/26/2022  6:21 PM   Name:  Solange Dykes   Age:  79 y.o. Sex:  male  :  1940   MRN:  [id number]   Room:  [facility]    Reason(s) for Admission: Hypoxia [R09.02]  COVID-19 virus infection [U07.1]     Hospital Course & Interval History:   Mr. Danielle Mills is an 80 y/o WM with a h/o vascular dementia, HTN, CKD, and bladder cacner who was admitted to our service on 10/29 with acute hypoxemic respiratory failure 2/2 COVID-19. CXR unremarkable. CT chest neg for PE but does have mild infiltrate in DANI. He was started on dexamethasone, baricitinib and supplemental O2. He was weaned to RA but then began to have increased O2 requirements again up to 5L, CXR repeated 10/30 and showed mild bilateral atypical appearing infiltrates. Subjective/24hr Events (22):  Up to chair, was down to 2L NC yesterday but now on 4L again. Walked with RT and needed 7L with exertion. He feels well, wants to go home. No chest pain, N/V/D. Assessment & Plan:   # Acute hypoxemic respiratory failure 2/2 COVID-19              - Initially weaned to RA but had recurrent increase in O2 needs. Had down to 2L on , now back to 4-5L at rest. Attempted ambulatory oximetry again today and required 7L, 11/3. Con't dex and baricitinib, wean O2 as able. # HTN              - Norvasc, Cozaar   - Currently well controlled. # MDD              - Celexa     # BPH              - Flomax     # GERD              - PPI     # HLD              - Statin     # Vascular dementia              - Namenda    Discharge Planning: Home pending improvement in O2 needs. 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 ([facility] 10/27/2022 Yes    COVID-19 virus infection 10/29/2022 Yes    Essential hypertension, benign 10/27/2022 Yes        Objective:   Patient Vitals for the past 24 hrs:   Temp Pulse Resp BP SpO2   11/02/22 1032 97.5 °F (36.4 °C) 59 20 (!) 135/59 97 %   11/02/22 0747 98.1 °F (36.7 °C) 73 18 129/82 91 %   11/02/22 0358 97.9 °F (36.6 °C) 71 18 (!) 152/64 91 %   11/01/22 2247 98.1 °F (36.7 °C) 65 18 135/60 96 %   11/01/22 1921 97.3 °F (36.3 °C) 82 18 127/81 94 %   11/01/22 1534 98.1 °F (36.7 °C) 77 21 124/62 95 %       Estimated body mass index is 23.69 kg/m² as calculated from the following:    Height as of this encounter: 5' 10\" (1.778 m). Weight as of this encounter: 165 lb 1.6 oz (74.9 kg). Intake/Output Summary (Last 24 hours) at 11/2/2022 1358  Last data filed at 11/2/2022 0900  Gross per 24 hour   Intake 220 ml   Output --   Net 220 ml         Physical Exam:   Blood pressure (!) 135/59, pulse 59, temperature 97.5 °F (36.4 °C), resp. rate 20, height 5' 10\" (1.778 m), weight 165 lb 1.6 oz (74.9 kg), SpO2 97 %. General:    Well nourished. No overt distress. Pleasantly confused, appears stated age. Head:  Normocephalic, atraumatic  Eyes:  Sclerae appear normal. Pupils equally round. ENT:  Nares appear normal, no drainage. Moist oral mucosa  Neck:  No restricted ROM. Trachea midline. CV:   RRR. No m/r/g. No jugular venous distension. Lungs:   Decreased, mild rhonchi. No wheezing or rales. Respirations even, unlabored. 4L NC O2. Abdomen: Bowel sounds present. Soft, nontender, nondistended. Extremities: No cyanosis or clubbing. No edema. Skin:     No rashes and normal coloration. Warm and dry. Neuro:  CN II-XII grossly intact. Sensation intact. Pleasantly confused. Psych:  Normal mood and affect.       I have reviewed ordered lab tests and independently visualized imaging below:    Recent Labs:  Recent Results (from the past 48 hour(s))   POCT Glucose    Collection Time: 10/31/22  4:06 PM   Result Value Ref Range    POC Glucose 245 (H) 65 - 100 mg/dL    Performed by: Kaitlynn    POCT Glucose    Collection Time: 10/31/22  8:21 PM   Result Value Ref Range    POC Glucose 338 (H) 65 - 100 mg/dL    Performed by: [facility]    POCT Glucose    Collection Time: 10/31/22 10:54 PM   Result Value Ref Range    POC Glucose 252 (H) 65 - 100 mg/dL    Performed by: Yanick    POCT Glucose    Collection Time: 11/01/22  7:51 AM   Result Value Ref Range    POC Glucose 154 (H) 65 - 100 mg/dL    Performed by: Gracie    POCT Glucose    Collection Time: 11/01/22 10:46 AM   Result Value Ref Range    POC Glucose 139 (H) 65 - 100 mg/dL    Performed by: Steven    POCT Glucose    Collection Time: 11/01/22  5:05 PM   Result Value Ref Range    POC Glucose 293 (H) 65 - 100 mg/dL    Performed by: Nahum Sin    POCT Glucose    Collection Time: 11/01/22  8:35 PM   Result Value Ref Range    POC Glucose 333 (H) 65 - 100 mg/dL    Performed by: Esteban    POCT Glucose    Collection Time: 11/02/22  7:44 AM   Result Value Ref Range    POC Glucose 105 (H) 65 - 100 mg/dL    Performed by: Faustovel Sin    POCT Glucose    Collection Time: 11/02/22 10:41 AM   Result Value Ref Range    POC Glucose 135 (H) 65 - 100 mg/dL    Performed by: González          Other Studies:  No results found.     Current Meds:  Current Facility-Administered Medications   Medication Dose Route Frequency    enoxaparin (LOVENOX) injection 40 mg  40 mg SubCUTAneous Daily    dexamethasone (DECADRON) tablet 6 mg  6 mg Oral Daily    baricitinib (OLUMIANT) tablet 2 mg  2 mg Oral Daily    melatonin tablet 3 mg  3 mg Oral Nightly PRN    aspirin chewable tablet 81 mg  81 mg Oral Daily    glucose chewable tablet 16 g  4 tablet Oral PRN    dextrose bolus 10% 125 mL  125 mL IntraVENous PRN    Or dextrose bolus 10% 250 mL  250 mL IntraVENous PRN    glucagon (rDNA) injection 1 mg  1 mg SubCUTAneous PRN    dextrose 10 % infusion   IntraVENous Continuous PRN    tamsulosin (FLOMAX) capsule 0.4 mg  0.4 mg Oral Daily    sodium chloride flush 0.9 % injection 5-40 mL  5-40 mL IntraVENous 2 times per day    sodium chloride flush 0.9 % injection 5-40 mL  5-40 mL IntraVENous PRN    0.9 % sodium chloride infusion   IntraVENous PRN    ondansetron (ZOFRAN-ODT) disintegrating tablet 4 mg  4 mg Oral Q8H PRN    Or    ondansetron (ZOFRAN) injection 4 mg  4 mg IntraVENous Q6H PRN    polyethylene glycol (GLYCOLAX) packet 17 g  17 g Oral Daily PRN    acetaminophen (TYLENOL) tablet 650 mg  650 mg Oral Q6H PRN    Or    acetaminophen (TYLENOL) suppository 650 mg  650 mg Rectal Q6H PRN    guaiFENesin-dextromethorphan (ROBITUSSIN DM) 100-10 MG/5ML syrup 5 mL  5 mL Oral Q4H PRN    amLODIPine (NORVASC) tablet 5 mg  5 mg Oral Daily    citalopram (CELEXA) tablet 20 mg  20 mg Oral Daily    losartan (COZAAR) tablet 100 mg  100 mg Oral Daily    memantine (NAMENDA) tablet 5 mg  5 mg Oral BID    pantoprazole (PROTONIX) tablet 40 mg  40 mg Oral Daily    atorvastatin (LIPITOR) tablet 20 mg  20 mg Oral Daily    insulin lispro (HUMALOG) injection vial 0-4 Units  0-4 Units SubCUTAneous TID WC    insulin lispro (HUMALOG) injection vial 0-4 Units  0-4 Units SubCUTAneous Nightly    nicotine (NICODERM CQ) 21 MG/24HR 1 patch  1 patch TransDERmal Daily    sodium chloride flush 0.9 % injection 5 mL  5 mL IntraVENous Q8H    sodium chloride flush 0.9 % injection 5 mL  5 mL IntraVENous PRN       Signed:  Debbi Campbell MD    Part of this note may have been written by using a voice dictation software. The note has been proof read but may still contain some grammatical/other typographical errors.

## 2022-11-03 LAB
ANION GAP SERPL CALC-SCNC: 4 MMOL/L (ref 2–11)
BUN SERPL-MCNC: 27 MG/DL (ref 8–23)
CALCIUM SERPL-MCNC: 8.9 MG/DL (ref 8.3–10.4)
CHLORIDE SERPL-SCNC: 114 MMOL/L (ref 101–110)
CO2 SERPL-SCNC: 24 MMOL/L (ref 21–32)
CREAT SERPL-MCNC: 1.3 MG/DL (ref 0.8–1.5)
ERYTHROCYTE [DISTWIDTH] IN BLOOD BY AUTOMATED COUNT: 15.2 % (ref 11.9–14.6)
GLUCOSE BLD STRIP.AUTO-MCNC: 100 MG/DL (ref 65–100)
GLUCOSE BLD STRIP.AUTO-MCNC: 109 MG/DL (ref 65–100)
GLUCOSE BLD STRIP.AUTO-MCNC: 278 MG/DL (ref 65–100)
GLUCOSE BLD STRIP.AUTO-MCNC: 371 MG/DL (ref 65–100)
GLUCOSE SERPL-MCNC: 114 MG/DL (ref 65–100)
HCT VFR BLD AUTO: 27.9 % (ref 41.1–50.3)
HGB BLD-MCNC: 8.8 G/DL (ref 13.6–17.2)
MCH RBC QN AUTO: 25.5 PG (ref 26.1–32.9)
MCHC RBC AUTO-ENTMCNC: 31.5 G/DL (ref 31.4–35)
MCV RBC AUTO: 80.9 FL (ref 82–102)
NRBC # BLD: 0 K/UL (ref 0–0.2)
PLATELET # BLD AUTO: 335 K/UL (ref 150–450)
PMV BLD AUTO: 12.2 FL (ref 9.4–12.3)
POTASSIUM SERPL-SCNC: 4 MMOL/L (ref 3.5–5.1)
RBC # BLD AUTO: 3.45 M/UL (ref 4.23–5.6)
SERVICE CMNT-IMP: ABNORMAL
SERVICE CMNT-IMP: NORMAL
SODIUM SERPL-SCNC: 142 MMOL/L (ref 133–143)
WBC # BLD AUTO: 7.8 K/UL (ref 4.3–11.1)

## 2022-11-03 PROCEDURE — 6370000000 HC RX 637 (ALT 250 FOR IP): Performed by: INTERNAL MEDICINE

## 2022-11-03 PROCEDURE — 97110 THERAPEUTIC EXERCISES: CPT

## 2022-11-03 PROCEDURE — 6370000000 HC RX 637 (ALT 250 FOR IP): Performed by: HOSPITALIST

## 2022-11-03 PROCEDURE — 97530 THERAPEUTIC ACTIVITIES: CPT

## 2022-11-03 PROCEDURE — 2580000003 HC RX 258: Performed by: EMERGENCY MEDICINE

## 2022-11-03 PROCEDURE — 85027 COMPLETE CBC AUTOMATED: CPT

## 2022-11-03 PROCEDURE — 6360000002 HC RX W HCPCS: Performed by: INTERNAL MEDICINE

## 2022-11-03 PROCEDURE — 80048 BASIC METABOLIC PNL TOTAL CA: CPT

## 2022-11-03 PROCEDURE — 6370000000 HC RX 637 (ALT 250 FOR IP): Performed by: FAMILY MEDICINE

## 2022-11-03 PROCEDURE — 82962 GLUCOSE BLOOD TEST: CPT

## 2022-11-03 PROCEDURE — 1100000000 HC RM PRIVATE

## 2022-11-03 PROCEDURE — 2580000003 HC RX 258: Performed by: HOSPITALIST

## 2022-11-03 PROCEDURE — 36415 COLL VENOUS BLD VENIPUNCTURE: CPT

## 2022-11-03 RX ORDER — AMLODIPINE BESYLATE 10 MG/1
10 TABLET ORAL DAILY
Status: DISCONTINUED | OUTPATIENT
Start: 2022-11-04 | End: 2022-11-09 | Stop reason: HOSPADM

## 2022-11-03 RX ADMIN — LOSARTAN POTASSIUM 100 MG: 50 TABLET, FILM COATED ORAL at 09:26

## 2022-11-03 RX ADMIN — SODIUM CHLORIDE, PRESERVATIVE FREE 5 ML: 5 INJECTION INTRAVENOUS at 09:28

## 2022-11-03 RX ADMIN — MEMANTINE 5 MG: 5 TABLET ORAL at 21:24

## 2022-11-03 RX ADMIN — SODIUM CHLORIDE, PRESERVATIVE FREE 10 ML: 5 INJECTION INTRAVENOUS at 09:28

## 2022-11-03 RX ADMIN — ATORVASTATIN CALCIUM 20 MG: 20 TABLET, FILM COATED ORAL at 09:26

## 2022-11-03 RX ADMIN — ASPIRIN 81 MG: 81 TABLET, CHEWABLE ORAL at 09:25

## 2022-11-03 RX ADMIN — CITALOPRAM HYDROBROMIDE 20 MG: 20 TABLET ORAL at 09:25

## 2022-11-03 RX ADMIN — MEMANTINE 5 MG: 5 TABLET ORAL at 09:25

## 2022-11-03 RX ADMIN — SODIUM CHLORIDE, PRESERVATIVE FREE 5 ML: 5 INJECTION INTRAVENOUS at 21:25

## 2022-11-03 RX ADMIN — SODIUM CHLORIDE, PRESERVATIVE FREE 5 ML: 5 INJECTION INTRAVENOUS at 17:19

## 2022-11-03 RX ADMIN — BARICITINIB 2 MG: 2 TABLET, FILM COATED ORAL at 09:26

## 2022-11-03 RX ADMIN — DEXAMETHASONE 6 MG: 4 TABLET ORAL at 09:40

## 2022-11-03 RX ADMIN — INSULIN LISPRO 2 UNITS: 100 INJECTION, SOLUTION INTRAVENOUS; SUBCUTANEOUS at 17:15

## 2022-11-03 RX ADMIN — SODIUM CHLORIDE, PRESERVATIVE FREE 5 ML: 5 INJECTION INTRAVENOUS at 03:00

## 2022-11-03 RX ADMIN — ENOXAPARIN SODIUM 40 MG: 100 INJECTION SUBCUTANEOUS at 09:26

## 2022-11-03 RX ADMIN — INSULIN LISPRO 4 UNITS: 100 INJECTION, SOLUTION INTRAVENOUS; SUBCUTANEOUS at 21:23

## 2022-11-03 RX ADMIN — TAMSULOSIN HYDROCHLORIDE 0.4 MG: 0.4 CAPSULE ORAL at 09:25

## 2022-11-03 RX ADMIN — AMLODIPINE BESYLATE 5 MG: 5 TABLET ORAL at 09:26

## 2022-11-03 RX ADMIN — PANTOPRAZOLE SODIUM 40 MG: 40 TABLET, DELAYED RELEASE ORAL at 09:25

## 2022-11-03 NOTE — PROGRESS NOTES
Received report from [facility]. Patient resting in bed with eyes closed. Respirations present, O2 placed back in nares. No obvious signs of distress. Safety measures in place.

## 2022-11-03 NOTE — PROGRESS NOTES
ACUTE PHYSICAL THERAPY GOALS:   (Developed with and agreed upon by patient and/or caregiver. )  LTG:  (1.)Mr. Claudell Palau will move from supine to sit and sit to supine , scoot up and down, and roll side to side in bed with CONTACT GUARD ASSIST within 7 treatment day(s). (2.)Mr. Claudell Palau will transfer from bed to chair and chair to bed with STAND BY ASSIST using the least restrictive device within 7 treatment day(s). (3.)Mr. Claudell Palau will ambulate with CONTACT GUARD ASSIST for 150 feet with the least restrictive device within 7 treatment day(s). PHYSICAL THERAPY: Daily Note AM   (Link to Caseload Tracking: PT Visit Days : 4  Time In/Out PT Charge Capture  Rehab Caseload Tracker  Orders    Aliya Yi is a 79 y.o. male   PRIMARY DIAGNOSIS: Acute respiratory failure with hypoxia ([facility]  Hypoxia [R09.02]  COVID-19 virus infection [U07.1]       Inpatient: Payor: Chin Come / Plan: MEDICARE PART A AND B / Product Type: *No Product type* /     ASSESSMENT:     REHAB RECOMMENDATIONS:   Recommendation to date pending progress:  Setting:  Inpatient Rehab Facility     Equipment:    To Be Determined has a walker and needs it right now     ASSESSMENT:  Mr. Claudell Palau is supine and wanting to get up. He got himself to the EOB and scooted with difficulty. He struggles to get up but responded well to cues for technique. He was able to walk around the room 2 times with the rollator with CGA with sats around 92% on 5L. He performed seated exercises below with a lot of cueing for technique. He is quite weak and could certainly use a rehab stay prior to returning home with his elderly wife. SUBJECTIVE:   Mr. Claudell Palau states Veronica Belts was harder than I thought\".      Social/Functional Lives With: Spouse  Type of Home: House  Home Equipment: [employer], [employer] [address] Help From: Family  ADL Assistance: Independent  Active : No  Patient's  Info: not at present  Occupation: Retired  OBJECTIVE:     PAIN: VITALS / O2: Leyla Drown / Glenda Tristan / DRAINS:   Pre Treatment:          Post Treatment: 0 Vitals        Oxygen    None    RESTRICTIONS/PRECAUTIONS:        MOBILITY: I Mod I S SBA CGA Min Mod Max Total  NT x2 Comments:   Bed Mobility    Rolling [] [] [] [] [] [] [] [] [] [] []    Supine to Sit [] [x] [] [] [] [] [] [] [] [] []    Scooting [] [x] [] [] [] [] [] [] [] [] []    Sit to Supine [] [] [] [] [] [] [] [] [] [] []    Transfers    Sit to Stand [] [] [] [] [] [x] [] [] [] [] []    Bed to Chair [] [] [] [] [x] [x] [] [] [] [] []    Stand to Sit [] [] [] [] [x] [x] [] [] [] [] []     [] [] [] [] [] [] [] [] [] [] []    I=Independent, Mod I=Modified Independent, S=Supervision, SBA=Standby Assistance, CGA=Contact Guard Assistance,   Min=Minimal Assistance, Mod=Moderate Assistance, Max=Maximal Assistance, Total=Total Assistance, NT=Not Tested    BALANCE: Good Fair+ Fair Fair- Poor NT Comments   Sitting Static [] [x] [] [] [] []    Sitting Dynamic [] [x] [] [] [] []              Standing Static [] [] [x] [] [] []    Standing Dynamic [] [] [x] [] [] []      GAIT: I Mod I S SBA CGA Min Mod Max Total  NT x2 Comments:   Level of Assistance [] [] [] [] [] [x] [] [] [] [] []    Distance 40 feet    DME Rollator    Gait Quality Decreased rae , Decreased step length, Shuffling , and Trunk flexion    Weightbearing Status      Stairs      I=Independent, Mod I=Modified Independent, S=Supervision, SBA=Standby Assistance, CGA=Contact Guard Assistance,   Min=Minimal Assistance, Mod=Moderate Assistance, Max=Maximal Assistance, Total=Total Assistance, NT=Not Tested    PLAN:   FREQUENCY AND DURATION: 3 times/week for duration of hospital stay or until stated goals are met, whichever comes first.    TREATMENT:   TREATMENT:   Therapeutic Activity (15 Minutes):  Therapeutic activity included Supine to Sit, Scooting, Transfer Training, Ambulation on level ground, and Standing balance to improve functional Activity tolerance, Balance, Mobility, and Strength. Therapeutic Exercise (15 Minutes): Therapeutic exercises noted below to improve functional activity tolerance, AROM, strength, and mobility.      TREATMENT GRID:   Date:  11/3/22 Date:   Date:     Activity/Exercise Parameters Parameters Parameters   Seated TKE 10x B     AP 10x B     Seated marching 20x B     Seated hip abd 10x B     Sit to stand 5x                     AFTER TREATMENT PRECAUTIONS: Alarm Activated, Bed/Chair Locked, Call light within reach, Chair, Needs within reach, and RN notified    INTERDISCIPLINARY COLLABORATION:  RN/ PCT and PT/ PTA    EDUCATION:      TIME IN/OUT:  Time In: 1045  Time Out: 1120  Minutes: 35    Brendan Prather PTA

## 2022-11-03 NOTE — PROGRESS NOTES
Hospitalist Progress Note   Admit Date:  10/26/2022  6:21 PM   Name:  Shellie Tilley   Age:  79 y.o. Sex:  male  :  1940   MRN:  [id number]   Room:  [facility]/    Reason(s) for Admission: Hypoxia [R09.02]  COVID-19 virus infection [U07.1]     Hospital Course & Interval History:   Mr. Bob Musa is an 80 y/o WM with a h/o vascular dementia, HTN, CKD, and bladder cacner who was admitted to our service on 10/29 with acute hypoxemic respiratory failure 2/2 COVID-19. CXR unremarkable. CT chest neg for PE but does have mild infiltrate in DANI. He was started on dexamethasone, baricitinib and supplemental O2. He was weaned to RA but then began to have increased O2 requirements again up to 5L, CXR repeated 10/30 and showed mild bilateral atypical appearing infiltrates. Walk test  showed 7L O2 with exertion. Subjective/24hr Events (22):  Up to chair, working with therapy, doing some sit to stand exercises and very weak but working hard. PT said he ambulated in the room with them on 5L and O2 afterwards was 92%. Patient says he feels well, no chest pain or N/V/D. Assessment & Plan:   # Acute hypoxemic respiratory failure 2/2 COVID-19              - Previously weaned to RA but had recurrent increase in O2 needs. Repeat CXR unchanged. Required 7L with exertion on , tolerated ambulating with PT 11/3 with 5L NC. Con't dex, baricitinib. # HTN              - Con't losartan. Increase Norvasc to 10mg starting . # MDD              - Celexa     # BPH              - Flomax     # GERD              - PPI     # HLD              - Statin     # Vascular dementia              - Namenda    Discharge Planning: Pending. 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 ([facility] 10/27/2022 Yes    COVID-19 virus infection 10/29/2022 Yes    Essential hypertension, benign 10/27/2022 Yes        Objective:   Patient Vitals for the past 24 hrs:   Temp Pulse Resp BP SpO2   11/03/22 1451 97.8 °F (36.6 °C) 66 16 (!) 148/78 99 %   11/03/22 1047 97.7 °F (36.5 °C) 69 18 (!) 163/74 100 %   11/03/22 0749 97.9 °F (36.6 °C) 66 16 (!) 169/70 94 %   11/03/22 0425 98.1 °F (36.7 °C) 61 18 (!) 165/59 92 %   11/03/22 0014 97.9 °F (36.6 °C) 63 18 (!) 163/63 90 %   11/02/22 2012 98.1 °F (36.7 °C) 62 18 (!) 165/59 92 %       Estimated body mass index is 23.69 kg/m² as calculated from the following:    Height as of this encounter: 5' 10\" (1.778 m). Weight as of this encounter: 165 lb 1.6 oz (74.9 kg). Intake/Output Summary (Last 24 hours) at 11/3/2022 1511  Last data filed at 11/3/2022 0903  Gross per 24 hour   Intake 280 ml   Output --   Net 280 ml         Physical Exam:   Blood pressure (!) 148/78, pulse 66, temperature 97.8 °F (36.6 °C), temperature source Oral, resp. rate 16, height 5' 10\" (1.778 m), weight 165 lb 1.6 oz (74.9 kg), SpO2 99 %. General:    Well nourished. No overt distress. Appears stated age. Pleasant. Head:  Normocephalic, atraumatic  Eyes:  Sclerae appear normal. Pupils equally round. ENT:  Nares appear normal, no drainage. Moist oral mucosa  Neck:  No restricted ROM. Trachea midline. CV:   RRR. No m/r/g. No jugular venous distension. Lungs:   Decreased b/l, a few rhonchi. No wheezes. 5L NC O2. Comfortable in chair. Abdomen: Bowel sounds present. Soft, nontender, nondistended. Extremities: No cyanosis or clubbing. No edema. Skin:     No rashes and normal coloration. Warm and dry. Neuro:  CN II-XII grossly intact. Sensation intact. A&Ox3  Psych:  Normal mood and affect.       I have reviewed ordered lab tests and independently visualized imaging below:    Recent Labs:  Recent Results (from the past 48 hour(s))   POCT Glucose    Collection Time: 11/01/22  5:05 PM   Result Value Ref Range    POC Glucose 293 (H) 65 - 100 mg/dL    Performed by: Laura Blevins    POCT Glucose    Collection Time: 11/01/22  8:35 PM   Result Value Ref Range    POC Glucose 333 (H) 65 - 100 mg/dL    Performed by: Esteban    POCT Glucose    Collection Time: 11/02/22  7:44 AM   Result Value Ref Range    POC Glucose 105 (H) 65 - 100 mg/dL    Performed by: Laura Blevins    POCT Glucose    Collection Time: 11/02/22 10:41 AM   Result Value Ref Range    POC Glucose 135 (H) 65 - 100 mg/dL    Performed by: González    POCT Glucose    Collection Time: 11/02/22  4:21 PM   Result Value Ref Range    POC Glucose 311 (H) 65 - 100 mg/dL    Performed by:  KENNY Gordon    POCT Glucose    Collection Time: 11/02/22  8:29 PM   Result Value Ref Range    POC Glucose 302 (H) 65 - 100 mg/dL    Performed by: Marcus    CBC    Collection Time: 11/03/22  4:11 AM   Result Value Ref Range    WBC 7.8 4.3 - 11.1 K/uL    RBC 3.45 (L) 4.23 - 5.6 M/uL    Hemoglobin 8.8 (L) 13.6 - 17.2 g/dL    Hematocrit 27.9 (L) 41.1 - 50.3 %    MCV 80.9 (L) 82 - 102 FL    MCH 25.5 (L) 26.1 - 32.9 PG    MCHC 31.5 31.4 - 35.0 g/dL    RDW 15.2 (H) 11.9 - 14.6 %    Platelets 326 248 - 017 K/uL    MPV 12.2 9.4 - 12.3 FL    nRBC 0.00 0.0 - 0.2 K/uL   Basic Metabolic Panel w/ Reflex to MG    Collection Time: 11/03/22  4:11 AM   Result Value Ref Range    Sodium 142 133 - 143 mmol/L    Potassium 4.0 3.5 - 5.1 mmol/L    Chloride 114 (H) 101 - 110 mmol/L    CO2 24 21 - 32 mmol/L    Anion Gap 4 2 - 11 mmol/L    Glucose 114 (H) 65 - 100 mg/dL    BUN 27 (H) 8 - 23 MG/DL    Creatinine 1.30 0.8 - 1.5 MG/DL    Est, Glom Filt Rate 55 (L) >60 ml/min/1.73m2    Calcium 8.9 8.3 - 10.4 MG/DL   POCT Glucose    Collection Time: 11/03/22  7:50 AM   Result Value Ref Range    POC Glucose 100 65 - 100 mg/dL    Performed by: Kaitlynn    POCT Glucose    Collection Time: 11/03/22 11:15 AM   Result Value Ref Range    POC Glucose 109 (H) 65 - 100 mg/dL    Performed by: Kaitlynn          Other Studies:  No results found.     Current Meds:  Current Facility-Administered Medications   Medication Dose Route Frequency    enoxaparin (LOVENOX) injection 40 mg  40 mg SubCUTAneous Daily    dexamethasone (DECADRON) tablet 6 mg  6 mg Oral Daily    baricitinib (OLUMIANT) tablet 2 mg  2 mg Oral Daily    melatonin tablet 3 mg  3 mg Oral Nightly PRN    aspirin chewable tablet 81 mg  81 mg Oral Daily    glucose chewable tablet 16 g  4 tablet Oral PRN    dextrose bolus 10% 125 mL  125 mL IntraVENous PRN    Or    dextrose bolus 10% 250 mL  250 mL IntraVENous PRN    glucagon (rDNA) injection 1 mg  1 mg SubCUTAneous PRN    dextrose 10 % infusion   IntraVENous Continuous PRN    tamsulosin (FLOMAX) capsule 0.4 mg  0.4 mg Oral Daily    sodium chloride flush 0.9 % injection 5-40 mL  5-40 mL IntraVENous 2 times per day    sodium chloride flush 0.9 % injection 5-40 mL  5-40 mL IntraVENous PRN    0.9 % sodium chloride infusion   IntraVENous PRN    ondansetron (ZOFRAN-ODT) disintegrating tablet 4 mg  4 mg Oral Q8H PRN    Or    ondansetron (ZOFRAN) injection 4 mg  4 mg IntraVENous Q6H PRN    polyethylene glycol (GLYCOLAX) packet 17 g  17 g Oral Daily PRN    acetaminophen (TYLENOL) tablet 650 mg  650 mg Oral Q6H PRN    Or    acetaminophen (TYLENOL) suppository 650 mg  650 mg Rectal Q6H PRN    guaiFENesin-dextromethorphan (ROBITUSSIN DM) 100-10 MG/5ML syrup 5 mL  5 mL Oral Q4H PRN    amLODIPine (NORVASC) tablet 5 mg  5 mg Oral Daily    citalopram (CELEXA) tablet 20 mg  20 mg Oral Daily    losartan (COZAAR) tablet 100 mg  100 mg Oral Daily    memantine (NAMENDA) tablet 5 mg  5 mg Oral BID    pantoprazole (PROTONIX) tablet 40 mg  40 mg Oral Daily    atorvastatin (LIPITOR) tablet 20 mg  20 mg Oral Daily    insulin lispro (HUMALOG) injection vial 0-4 Units  0-4 Units SubCUTAneous TID WC    insulin lispro (HUMALOG) injection vial 0-4 Units  0-4 Units SubCUTAneous Nightly    nicotine (NICODERM CQ) 21 MG/24HR 1 patch  1 patch TransDERmal Daily    sodium chloride flush 0.9 % injection 5 mL  5 mL IntraVENous Q8H    sodium chloride flush 0.9 % injection 5 mL  5 mL IntraVENous PRN       Signed:  Chary Fox MD    Part of this note may have been written by using a voice dictation software. The note has been proof read but may still contain some grammatical/other typographical errors.

## 2022-11-03 NOTE — PROGRESS NOTES
Patient resting in bed with no pain or distress noted. 02 at 2 liters via NC with RR even/unlabored.   Safety in place with bed alarm on and call light n reach

## 2022-11-03 NOTE — PROGRESS NOTES
Patient resting in bed with HOB elevated. Respirations present, with no obvious signs of distress. Family at bedside. Safety measures in place. Preparing to give report to oncoming RN.

## 2022-11-04 LAB
GLUCOSE BLD STRIP.AUTO-MCNC: 106 MG/DL (ref 65–100)
GLUCOSE BLD STRIP.AUTO-MCNC: 126 MG/DL (ref 65–100)
GLUCOSE BLD STRIP.AUTO-MCNC: 290 MG/DL (ref 65–100)
GLUCOSE BLD STRIP.AUTO-MCNC: 326 MG/DL (ref 65–100)
SERVICE CMNT-IMP: ABNORMAL

## 2022-11-04 PROCEDURE — 6370000000 HC RX 637 (ALT 250 FOR IP): Performed by: HOSPITALIST

## 2022-11-04 PROCEDURE — 6360000002 HC RX W HCPCS: Performed by: INTERNAL MEDICINE

## 2022-11-04 PROCEDURE — 2580000003 HC RX 258: Performed by: EMERGENCY MEDICINE

## 2022-11-04 PROCEDURE — 97112 NEUROMUSCULAR REEDUCATION: CPT

## 2022-11-04 PROCEDURE — 2580000003 HC RX 258: Performed by: HOSPITALIST

## 2022-11-04 PROCEDURE — 6370000000 HC RX 637 (ALT 250 FOR IP): Performed by: INTERNAL MEDICINE

## 2022-11-04 PROCEDURE — 6370000000 HC RX 637 (ALT 250 FOR IP): Performed by: FAMILY MEDICINE

## 2022-11-04 PROCEDURE — 82962 GLUCOSE BLOOD TEST: CPT

## 2022-11-04 PROCEDURE — 97530 THERAPEUTIC ACTIVITIES: CPT

## 2022-11-04 PROCEDURE — 97110 THERAPEUTIC EXERCISES: CPT

## 2022-11-04 PROCEDURE — 1100000000 HC RM PRIVATE

## 2022-11-04 RX ADMIN — SODIUM CHLORIDE, PRESERVATIVE FREE 5 ML: 5 INJECTION INTRAVENOUS at 02:15

## 2022-11-04 RX ADMIN — INSULIN LISPRO 3 UNITS: 100 INJECTION, SOLUTION INTRAVENOUS; SUBCUTANEOUS at 17:29

## 2022-11-04 RX ADMIN — TAMSULOSIN HYDROCHLORIDE 0.4 MG: 0.4 CAPSULE ORAL at 08:18

## 2022-11-04 RX ADMIN — AMLODIPINE BESYLATE 10 MG: 10 TABLET ORAL at 08:18

## 2022-11-04 RX ADMIN — ENOXAPARIN SODIUM 40 MG: 100 INJECTION SUBCUTANEOUS at 08:21

## 2022-11-04 RX ADMIN — DEXAMETHASONE 6 MG: 4 TABLET ORAL at 09:16

## 2022-11-04 RX ADMIN — ATORVASTATIN CALCIUM 20 MG: 20 TABLET, FILM COATED ORAL at 08:18

## 2022-11-04 RX ADMIN — PANTOPRAZOLE SODIUM 40 MG: 40 TABLET, DELAYED RELEASE ORAL at 08:18

## 2022-11-04 RX ADMIN — BARICITINIB 2 MG: 2 TABLET, FILM COATED ORAL at 08:19

## 2022-11-04 RX ADMIN — MEMANTINE 5 MG: 5 TABLET ORAL at 21:28

## 2022-11-04 RX ADMIN — LOSARTAN POTASSIUM 100 MG: 50 TABLET, FILM COATED ORAL at 08:18

## 2022-11-04 RX ADMIN — MEMANTINE 5 MG: 5 TABLET ORAL at 08:18

## 2022-11-04 RX ADMIN — SODIUM CHLORIDE, PRESERVATIVE FREE 5 ML: 5 INJECTION INTRAVENOUS at 21:29

## 2022-11-04 RX ADMIN — CITALOPRAM HYDROBROMIDE 20 MG: 20 TABLET ORAL at 08:18

## 2022-11-04 RX ADMIN — SODIUM CHLORIDE, PRESERVATIVE FREE 5 ML: 5 INJECTION INTRAVENOUS at 17:41

## 2022-11-04 RX ADMIN — ASPIRIN 81 MG: 81 TABLET, CHEWABLE ORAL at 08:19

## 2022-11-04 RX ADMIN — Medication 3 MG: at 21:37

## 2022-11-04 RX ADMIN — SODIUM CHLORIDE, PRESERVATIVE FREE 5 ML: 5 INJECTION INTRAVENOUS at 08:26

## 2022-11-04 RX ADMIN — SODIUM CHLORIDE, PRESERVATIVE FREE 10 ML: 5 INJECTION INTRAVENOUS at 08:26

## 2022-11-04 NOTE — PROGRESS NOTES
Hospitalist Progress Note   Admit Date:  10/26/2022  6:21 PM   Name:  Aneta Colbert   Age:  79 y.o. Sex:  male  :  1940   MRN:  [id number]   Room:  [facility]/    Reason(s) for Admission: Hypoxia [R09.02]  COVID-19 virus infection [U07.1]     Hospital Course & Interval History:   Mr. Delaney Brewer is an 78 y/o WM with a h/o vascular dementia, HTN, CKD, and bladder cacner who was admitted to our service on 10/29 with acute hypoxemic respiratory failure 2/2 COVID-19. CXR unremarkable. CT chest neg for PE but does have mild infiltrate in DANI. He was started on dexamethasone, baricitinib and supplemental O2. He was weaned to RA but then began to have increased O2 requirements again up to 5L, CXR repeated 10/30 and showed mild bilateral atypical appearing infiltrates. Walk test  showed 7L O2 with exertion. Toleratin 5L NC since 11/3. Subjective/24hr Events (22):  Up to chair, feels well, weaned O2 down to 4L from 5L. Askin when he can go home. Working with therapy. Thinks his breathing is ok. Assessment & Plan:   # Acute hypoxemic respiratory failure 2/2 COVID-19   - Weaned to 4L on . Dex EOT . Con't baricitinib. - Wean O2 further as able, could re-attempt O2 walk      # HTN              - Norvasc increased starting today, . Con't ARB. # MDD              - Celexa     # BPH              - Flomax     # GERD              - PPI     # HLD              - Statin     # Vascular dementia              - Namenda    Discharge Planning: Pending. Unclear if wife can care for him at home. 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 ([facility] [address] 10/27/2022 Yes    COVID-19 virus infection 10/29/2022 Yes    Essential hypertension, benign 10/27/2022 Yes        Objective:   Patient Vitals for the past 24 hrs:   Temp Pulse Resp BP SpO2   11/04/22 1127 98.3 °F (36.8 °C) 70 15 (!) 148/77 93 %   11/04/22 0753 98.4 °F (36.9 °C) 69 16 (!) 161/99 90 %   11/04/22 0317 98.2 °F (36.8 °C) 61 19 (!) 154/60 90 %   11/04/22 0046 97.9 °F (36.6 °C) 67 16 (!) 152/57 92 %   11/03/22 2006 98.2 °F (36.8 °C) 62 16 (!) 174/85 91 %       Estimated body mass index is 23.69 kg/m² as calculated from the following:    Height as of this encounter: 5' 10\" (1.778 m). Weight as of this encounter: 165 lb 1.6 oz (74.9 kg). Intake/Output Summary (Last 24 hours) at 11/4/2022 1459  Last data filed at 11/3/2022 1809  Gross per 24 hour   Intake 100 ml   Output 1 ml   Net 99 ml         Physical Exam:   Blood pressure (!) 148/77, pulse 70, temperature 98.3 °F (36.8 °C), temperature source Oral, resp. rate 15, height 5' 10\" (1.778 m), weight 165 lb 1.6 oz (74.9 kg), SpO2 93 %. General:    Well nourished. No overt distress. Pleasant, conversant. Appears stated age. Head:  Normocephalic, atraumatic  Eyes:  Sclerae appear normal. Pupils equally round. ENT:  Nares appear normal, no drainage. Moist oral mucosa  Neck:  No restricted ROM. Trachea midline. CV:   RRR. No m/r/g. No jugular venous distension. Lungs:   CTAB. No wheezing, rhonchi, or rales. Respirations even, unlabored. 4L NC O2. Occasional cough. Comfortable. Abdomen: Bowel sounds present. Soft, nontender, nondistended. Extremities: No cyanosis or clubbing. No edema. Skin:     No rashes and normal coloration. Warm and dry. Neuro:  CN II-XII grossly intact. Sensation intact. A&Ox3  Psych:  Normal mood and affect.       I have reviewed ordered lab tests and independently visualized Performed by: Kaitlynn    POCT Glucose    Collection Time: 11/04/22 11:27 AM   Result Value Ref Range    POC Glucose 106 (H) 65 - 100 mg/dL    Performed by: Kaitlynn          Other Studies:  No results found.     Current Meds:  Current Facility-Administered Medications   Medication Dose Route Frequency    amLODIPine (NORVASC) tablet 10 mg  10 mg Oral Daily    enoxaparin (LOVENOX) injection 40 mg  40 mg SubCUTAneous Daily    dexamethasone (DECADRON) tablet 6 mg  6 mg Oral Daily    baricitinib (OLUMIANT) tablet 2 mg  2 mg Oral Daily    melatonin tablet 3 mg  3 mg Oral Nightly PRN    aspirin chewable tablet 81 mg  81 mg Oral Daily    glucose chewable tablet 16 g  4 tablet Oral PRN    dextrose bolus 10% 125 mL  125 mL IntraVENous PRN    Or    dextrose bolus 10% 250 mL  250 mL IntraVENous PRN    glucagon (rDNA) injection 1 mg  1 mg SubCUTAneous PRN    dextrose 10 % infusion   IntraVENous Continuous PRN    tamsulosin (FLOMAX) capsule 0.4 mg  0.4 mg Oral Daily    sodium chloride flush 0.9 % injection 5-40 mL  5-40 mL IntraVENous 2 times per day    sodium chloride flush 0.9 % injection 5-40 mL  5-40 mL IntraVENous PRN    0.9 % sodium chloride infusion   IntraVENous PRN    ondansetron (ZOFRAN-ODT) disintegrating tablet 4 mg  4 mg Oral Q8H PRN    Or    ondansetron (ZOFRAN) injection 4 mg  4 mg IntraVENous Q6H PRN    polyethylene glycol (GLYCOLAX) packet 17 g  17 g Oral Daily PRN    acetaminophen (TYLENOL) tablet 650 mg  650 mg Oral Q6H PRN    Or    acetaminophen (TYLENOL) suppository 650 mg  650 mg Rectal Q6H PRN    guaiFENesin-dextromethorphan (ROBITUSSIN DM) 100-10 MG/5ML syrup 5 mL  5 mL Oral Q4H PRN    citalopram (CELEXA) tablet 20 mg  20 mg Oral Daily    losartan (COZAAR) tablet 100 mg  100 mg Oral Daily    memantine (NAMENDA) tablet 5 mg  5 mg Oral BID    pantoprazole (PROTONIX) tablet 40 mg  40 mg Oral Daily    atorvastatin (LIPITOR) tablet 20 mg  20 mg Oral Daily    insulin lispro (HUMALOG) injection vial 0-4 Units  0-4 Units SubCUTAneous TID WC    insulin lispro (HUMALOG) injection vial 0-4 Units  0-4 Units SubCUTAneous Nightly    nicotine (NICODERM CQ) 21 MG/24HR 1 patch  1 patch TransDERmal Daily    sodium chloride flush 0.9 % injection 5 mL  5 mL IntraVENous Q8H    sodium chloride flush 0.9 % injection 5 mL  5 mL IntraVENous PRN       Signed:  Deena Nguyen MD    Part of this note may have been written by using a voice dictation software. The note has been proof read but may still contain some grammatical/other typographical errors.

## 2022-11-04 NOTE — PROGRESS NOTES
ACUTE OCCUPATIONAL THERAPY GOALS:   (Developed with and agreed upon by patient and/or caregiver.)  1. Patient will complete lower body bathing and dressing with SUPERVISION and adaptive equipment as needed. 2. Patient will complete toileting with SUPERVISION. 3. Patient will complete grooming ADL with SUPERVISION. 4. Patient will tolerate 25 minutes of OT treatment with 1-2 rest breaks to increase activity tolerance for ADLs. 5. Patient will complete functional transfers with SUPERVISION and adaptive equipment as needed. 6. Patient will tolerate 10 minutes BUE exercises to increase strength for safe, functional transfers. OCCUPATIONAL THERAPY: Daily Note AM   OT Visit Days: 4   Time  OT Charge Capture  Rehab Caseload Tracker  OT Orders    Stephan Vaughan is a 79 y.o. male   PRIMARY DIAGNOSIS: Acute respiratory failure with hypoxia ([facility] [address]  Hypoxia [R09.02]  COVID-19 virus infection [U07.1]       Inpatient: Payor: MEDICARE / Plan: MEDICARE PART A AND B / Product Type: *No Product type* /     ASSESSMENT:     REHAB RECOMMENDATIONS: CURRENT LEVEL OF FUNCTION:  (Most Recently Demonstrated)   Recommendation to date pending progress:  Setting:  Home Health Therapy    Equipment:    To Be Determined Bathing:  Not Tested  Dressing:  Not Tested  Feeding/Grooming:  Not Tested  Toileting:  Not Tested  Functional Mobility:  Contact Guard Assist     ASSESSMENT:  Mr. Alfredo Liu was supine in bed upon arrival. Pt completed bed mobility with min A. Pt completed functional mobility with CGA using rolling walker. Continue POC.          SUBJECTIVE:     Mr. Alfredo Liu states, \"Hello\"     Social/Functional Lives With: Spouse  Type of Home: House  Home Equipment: [address], [address] Help From: Family  ADL Assistance: Independent  Active : No  Patient's  Info: not at present  Occupation: Retired    OBJECTIVE:     Tellis Merlin / Liliana Tilley / [employer] Law: IV    RESTRICTIONS/PRECAUTIONS:  Restrictions/Precautions  Restrictions/Precautions: Fall Risk        PAIN: VITALS / O2:   Pre Treatment: 0           Post Treatment: 0 Vitals          Oxygen        MOBILITY: I Mod I S SBA CGA Min Mod Max Total  NT x2 Comments:   Bed Mobility    Rolling [] [] [] [] [] [] [] [] [] [] []    Supine to Sit [] [] [] [] [] [x] [] [] [] [] []    Scooting [] [] [] [] [] [] [] [] [] [] []    Sit to Supine [] [] [] [] [] [] [] [] [] [] []    Transfers    Sit to Stand [] [] [] [] [x] [] [] [] [] [] []    Bed to Chair [] [] [] [] [x] [] [] [] [] [] []    Stand to Sit [] [] [] [] [x] [] [] [] [] [] []    Tub/Shower [] [] [] [] [] [] [] [] [] [] []     Toilet [] [] [] [] [] [] [] [] [] [] []      [] [] [] [] [] [] [] [] [] [] []    I=Independent, Mod I=Modified Independent, S=Supervision/Setup, SBA=Standby Assistance, CGA=Contact Guard Assistance, Min=Minimal Assistance, Mod=Moderate Assistance, Max=Maximal Assistance, Total=Total Assistance, NT=Not Tested    ACTIVITIES OF DAILY LIVING: I Mod I S SBA CGA Min Mod Max Total NT Comments   BASIC ADLs:              Upper Body   Bathing [] [] [] [] [] [] [] [] [] []    Lower Body Bathing [] [] [] [] [] [] [] [] [] []    Toileting [] [] [] [] [] [] [] [] [] []    Upper Body Dressing [] [] [] [] [] [] [] [] [] []    Lower Body Dressing [] [] [] [] [] [] [] [] [] []    Feeding [] [] [] [] [] [] [] [] [] []    Grooming [] [] [] [] [] [] [] [] [] []    Personal Device Care [] [] [] [] [] [] [] [] [] []    Functional Mobility [] [] [] [] [x] [] [] [] [] [] RW   I=Independent, Mod I=Modified Independent, S=Supervision/Setup, SBA=Standby Assistance, CGA=Contact Guard Assistance, Min=Minimal Assistance, Mod=Moderate Assistance, Max=Maximal Assistance, Total=Total Assistance, NT=Not Tested    BALANCE: Good Fair+ Fair Fair- Poor NT Comments   Sitting Static [x] [] [] [] [] []    Sitting Dynamic [] [] [] [] [] []              Standing Static [x] [] [] [] [] [] Standing Dynamic [] [] [] [] [] []        PLAN:     FREQUENCY/DURATION   OT Plan of Care: 3 times/week for duration of hospital stay or until stated goals are met, whichever comes first.    TREATMENT:     TREATMENT:   Neuromuscular Re-education (27 Minutes): Neuromuscular Re-education included Balance Training, Coordination training, Sitting balance training, and Standing balance training to improve Balance, Coordination, and Functional Mobility.     TREATMENT GRID:  N/A    AFTER TREATMENT PRECAUTIONS: Alarm Activated, Bed/Chair Locked, Call light within reach, Chair, Needs within reach, and RN notified    INTERDISCIPLINARY COLLABORATION:  RN/ PCT, PT/ PTA, and OT/ CRUZ    EDUCATION:       TOTAL TREATMENT DURATION AND TIME:  Time In: 1103  Time Out: 1130  Minutes: [address] [facility], [facility]

## 2022-11-04 NOTE — PROGRESS NOTES
ACUTE PHYSICAL THERAPY GOALS:   (Developed with and agreed upon by patient and/or caregiver. )  LTG:  (1.)Mr. Danielle Mills will move from supine to sit and sit to supine , scoot up and down, and roll side to side in bed with CONTACT GUARD ASSIST within 7 treatment day(s). (2.)Mr. Danielle Mills will transfer from bed to chair and chair to bed with STAND BY ASSIST using the least restrictive device within 7 treatment day(s). (3.)Mr. Danielle Mills will ambulate with CONTACT GUARD ASSIST for 150 feet with the least restrictive device within 7 treatment day(s). PHYSICAL THERAPY: Daily Note AM   (Link to Caseload Tracking: PT Visit Days : 5  Time In/Out PT Charge Capture  Rehab Caseload Tracker  Orders    Solange Dykes is a 79 y.o. male   PRIMARY DIAGNOSIS: Acute respiratory failure with hypoxia ([facility] [address]  Hypoxia [R09.02]  COVID-19 virus infection [U07.1]       Inpatient: Payor: Jb Bhatti / Plan: MEDICARE PART A AND B / Product Type: *No Product type* /     ASSESSMENT:     REHAB RECOMMENDATIONS:   Recommendation to date pending progress:  Setting:  Inpatient Rehab Facility     Equipment:    To Be Determined has a walker and needs it right now     ASSESSMENT:  Mr. Danielle Mills is sitting up in the chair and willing to participate. He walked about 4 laps of the room with the rollator and CGA. He performed below exercises as well. Sats did not go below 88% on 4L. Daughter present throughout most of the session. SUBJECTIVE:   Mr. Danielle Mills states \"ok\".      Social/Functional Lives With: Spouse  Type of Home: House  Home Equipment: [address] Help From: Family  ADL Assistance: Independent  Active : No  Patient's  Info: not at present  Occupation: Retired  OBJECTIVE:     PAIN: VITALS / O2: PRECAUTION / Glenda Hudson / Arturo Hartock:   Pre Treatment:          Post Treatment: 0 Vitals        Oxygen    None    RESTRICTIONS/PRECAUTIONS:        MOBILITY: I Mod I S SBA CGA Min Mod Max Total  NT x2 Comments:   Bed Mobility Rolling [] [] [] [] [] [] [] [] [] [] []    Supine to Sit [] [] [] [] [] [] [] [] [] [] []    Scooting [] [] [] [] [] [] [] [] [] [] []    Sit to Supine [] [] [] [] [] [] [] [] [] [] []    Transfers    Sit to Stand [] [] [] [x] [x] [] [] [] [] [] []    Bed to Chair [] [] [] [] [] [] [] [] [] [] []    Stand to Sit [] [] [] [] [x] [] [] [] [] [] []     [] [] [] [] [] [] [] [] [] [] []    I=Independent, Mod I=Modified Independent, S=Supervision, SBA=Standby Assistance, CGA=Contact Guard Assistance,   Min=Minimal Assistance, Mod=Moderate Assistance, Max=Maximal Assistance, Total=Total Assistance, NT=Not Tested    BALANCE: Good Fair+ Fair Fair- Poor NT Comments   Sitting Static [] [x] [] [] [] []    Sitting Dynamic [] [x] [] [] [] []              Standing Static [] [] [x] [] [] []    Standing Dynamic [] [] [x] [] [] []      GAIT: I Mod I S SBA CGA Min Mod Max Total  NT x2 Comments:   Level of Assistance [] [] [] [] [x] [x] [] [] [] [] []    Distance 80 feet    DME Rollator    Gait Quality Decreased rae , Decreased step length, Shuffling , and Trunk flexion    Weightbearing Status      Stairs      I=Independent, Mod I=Modified Independent, S=Supervision, SBA=Standby Assistance, CGA=Contact Guard Assistance,   Min=Minimal Assistance, Mod=Moderate Assistance, Max=Maximal Assistance, Total=Total Assistance, NT=Not Tested    PLAN:   FREQUENCY AND DURATION: 3 times/week for duration of hospital stay or until stated goals are met, whichever comes first.    TREATMENT:   TREATMENT:   Therapeutic Activity (15 Minutes): Therapeutic activity included Supine to Sit, Scooting, Transfer Training, Ambulation on level ground, and Standing balance to improve functional Activity tolerance, Balance, Mobility, and Strength. Therapeutic Exercise (15 Minutes): Therapeutic exercises noted below to improve functional activity tolerance, AROM, strength, and mobility.      TREATMENT GRID:   Date:  11/3/22 Date:  11/4/22 Date: Activity/Exercise Parameters Parameters Parameters   Seated TKE 10x B     AP 10x B     Seated marching 20x B     Seated hip abd 10x B     Sit to stand 5x 5x    Calf raises  10x B    High knee marching  10x B        AFTER TREATMENT PRECAUTIONS: Alarm Activated, Bed/Chair Locked, Call light within reach, Chair, Needs within reach, RN notified, and Visitors at bedside    INTERDISCIPLINARY COLLABORATION:  RN/ PCT and PT/ PTA    EDUCATION:      TIME IN/OUT:  Time In: 1140  Time Out: 1210  Minutes: 30    Adam Hernandez PTA

## 2022-11-04 NOTE — CARE COORDINATION
Patient has been approved to discharge to [facility] tomorrow if stable. Daughter was in patient's room and was given the [address] and made aware that patient could discharge tomorrow if stable.

## 2022-11-04 NOTE — PROGRESS NOTES
Pt is resting in bed with no complaints of pain or discomfort at this time. Pt is on 5l NC with even and unlabored respirations. Pt is made aware to alert staff when needing assistance.  Call light is in place

## 2022-11-04 NOTE — CARE COORDINATION
Patient were discussed in [facility] team meeting this AM.  Patient has been accepted at [facility]. Can accept patient when medically stable. CM will continue to follow.

## 2022-11-04 NOTE — PROGRESS NOTES
Comprehensive Nutrition Assessment    Type and Reason for Visit: Initial, RD Nutrition Re-Screen/LOS  Length of Stay    Nutrition Recommendations/Plan:   Meals and Snacks:  Diet: Continue current order  Nutrition Supplement Therapy:  Medical food supplement therapy:  Initiate Ensure Enlive twice per day (this provides 350 kcal and 20 grams protein per bottle)     Malnutrition Assessment:  Malnutrition Status: Insufficient data (COVID+ pt, NFPE deferred at this time)     Nutrition Assessment:  Nutrition History: Pt reports no hx of weight loss or change in appetite. Do You Have Any Cultural, Hoahaoism, or Ethnic Food Preferences?: No   Nutrition Background:       PMH significant for vascular dementia, HTN, CKD, and malignant neoplasm of bladder. Presents this admission for cough and fever. Pt is COVID+. Nutrition Interval:  RD called pt to discuss current diet. He reports good appetite and states he eats \"most of his meals\". Per nurse charting, intake is variable. EMR shows pts weight has been trending down since June - RD started EE BID to help meet estimated nutritional needs. Current Nutrition Therapies:  ADULT DIET; Regular    Current Intake:   Average Meal Intake: 51-75% (Pt reports eating almost all his meals)        Anthropometric Measures:  Height: 5' 10\" (177.8 cm)  Current Body Wt: 165 lb 2 oz (74.9 kg) (11/01/2022), Weight source: Bed Scale  BMI: 23.7, Normal Weight (BMI 18.5-24. 9)  Admission Body Weight: 164 lb 14.5 oz (74.8 kg)  Ideal Body Weight (Kg) (Calculated): 75 kg (166 lbs), 99.5 %  Usual Body Wt:  , Percent weight change:         Edema:Peripheral Vascular  Peripheral Vascular (WDL): Within Defined Limits   BMI Category Normal Weight (BMI 18.5-24. 9)  Estimated Daily Nutrient Needs:  Energy (kcal/day): 3598-0078 (Kcal/kg Weight used: 74.9 kg (20-25 kcal/kg) Current  Protein (g/day): 75-90 Weight Used: (Current) 74.9 kg (1.0-1.2 g/kg)  Fluid (ml/day):   (1 ml/kcal)    Nutrition Diagnosis: No nutrition diagnosis at this time     Nutrition Interventions:   Food and/or Nutrient Delivery: Continue Current Diet, Start Oral Nutrition Supplement     Coordination of Nutrition Care: Continue to monitor while inpatient  Plan of Care discussed with: Patient    Goals:       Active Goal: Meet at least 75% of estimated needs, by next RD assessment     Nutrition Monitoring and Evaluation:      Food/Nutrient Intake Outcomes: Food and Nutrient Intake, Supplement Intake  Physical Signs/Symptoms Outcomes: Weight, Meal Time Behavior    Discharge Planning:    Continue current diet    Shawn Mckeon, RD

## 2022-11-05 ENCOUNTER — APPOINTMENT (OUTPATIENT)
Dept: GENERAL RADIOLOGY | Age: 82
DRG: 871 | End: 2022-11-05
Payer: MEDICARE

## 2022-11-05 LAB
ANION GAP SERPL CALC-SCNC: ABNORMAL MMOL/L (ref 2–11)
BUN SERPL-MCNC: 32 MG/DL (ref 8–23)
CALCIUM SERPL-MCNC: 8.9 MG/DL (ref 8.3–10.4)
CHLORIDE SERPL-SCNC: 114 MMOL/L (ref 101–110)
CO2 SERPL-SCNC: 27 MMOL/L (ref 21–32)
CREAT SERPL-MCNC: 1.4 MG/DL (ref 0.8–1.5)
ERYTHROCYTE [DISTWIDTH] IN BLOOD BY AUTOMATED COUNT: 15.2 % (ref 11.9–14.6)
GLUCOSE BLD STRIP.AUTO-MCNC: 112 MG/DL (ref 65–100)
GLUCOSE BLD STRIP.AUTO-MCNC: 173 MG/DL (ref 65–100)
GLUCOSE BLD STRIP.AUTO-MCNC: 279 MG/DL (ref 65–100)
GLUCOSE BLD STRIP.AUTO-MCNC: 365 MG/DL (ref 65–100)
GLUCOSE SERPL-MCNC: 151 MG/DL (ref 65–100)
HCT VFR BLD AUTO: 28.3 % (ref 41.1–50.3)
HGB BLD-MCNC: 9.2 G/DL (ref 13.6–17.2)
MCH RBC QN AUTO: 26.4 PG (ref 26.1–32.9)
MCHC RBC AUTO-ENTMCNC: 32.5 G/DL (ref 31.4–35)
MCV RBC AUTO: 81.3 FL (ref 82–102)
NRBC # BLD: 0 K/UL (ref 0–0.2)
PLATELET # BLD AUTO: 370 K/UL (ref 150–450)
PMV BLD AUTO: 12.5 FL (ref 9.4–12.3)
POTASSIUM SERPL-SCNC: 4 MMOL/L (ref 3.5–5.1)
RBC # BLD AUTO: 3.48 M/UL (ref 4.23–5.6)
SERVICE CMNT-IMP: ABNORMAL
SODIUM SERPL-SCNC: 140 MMOL/L (ref 133–143)
WBC # BLD AUTO: 8 K/UL (ref 4.3–11.1)

## 2022-11-05 PROCEDURE — 71045 X-RAY EXAM CHEST 1 VIEW: CPT

## 2022-11-05 PROCEDURE — 6370000000 HC RX 637 (ALT 250 FOR IP): Performed by: FAMILY MEDICINE

## 2022-11-05 PROCEDURE — 6370000000 HC RX 637 (ALT 250 FOR IP): Performed by: INTERNAL MEDICINE

## 2022-11-05 PROCEDURE — 6360000002 HC RX W HCPCS: Performed by: INTERNAL MEDICINE

## 2022-11-05 PROCEDURE — 2580000003 HC RX 258: Performed by: EMERGENCY MEDICINE

## 2022-11-05 PROCEDURE — 2580000003 HC RX 258: Performed by: HOSPITALIST

## 2022-11-05 PROCEDURE — 80048 BASIC METABOLIC PNL TOTAL CA: CPT

## 2022-11-05 PROCEDURE — 1100000000 HC RM PRIVATE

## 2022-11-05 PROCEDURE — 6370000000 HC RX 637 (ALT 250 FOR IP): Performed by: HOSPITALIST

## 2022-11-05 PROCEDURE — 36415 COLL VENOUS BLD VENIPUNCTURE: CPT

## 2022-11-05 PROCEDURE — 82962 GLUCOSE BLOOD TEST: CPT

## 2022-11-05 PROCEDURE — 85027 COMPLETE CBC AUTOMATED: CPT

## 2022-11-05 RX ORDER — INSULIN LISPRO 100 [IU]/ML
0-4 INJECTION, SOLUTION INTRAVENOUS; SUBCUTANEOUS
Status: CANCELLED | OUTPATIENT
Start: 2022-11-05

## 2022-11-05 RX ORDER — ENOXAPARIN SODIUM 100 MG/ML
40 INJECTION SUBCUTANEOUS DAILY
Status: CANCELLED | OUTPATIENT
Start: 2022-11-06

## 2022-11-05 RX ORDER — FUROSEMIDE 10 MG/ML
40 INJECTION INTRAMUSCULAR; INTRAVENOUS ONCE
Status: COMPLETED | OUTPATIENT
Start: 2022-11-05 | End: 2022-11-05

## 2022-11-05 RX ORDER — TAMSULOSIN HYDROCHLORIDE 0.4 MG/1
0.4 CAPSULE ORAL DAILY
Status: CANCELLED | OUTPATIENT
Start: 2022-11-06

## 2022-11-05 RX ORDER — ONDANSETRON 4 MG/1
4 TABLET, ORALLY DISINTEGRATING ORAL EVERY 8 HOURS PRN
Status: CANCELLED | OUTPATIENT
Start: 2022-11-05

## 2022-11-05 RX ORDER — SODIUM CHLORIDE 9 MG/ML
INJECTION, SOLUTION INTRAVENOUS PRN
Status: CANCELLED | OUTPATIENT
Start: 2022-11-05

## 2022-11-05 RX ORDER — CITALOPRAM 20 MG/1
20 TABLET ORAL DAILY
Status: CANCELLED | OUTPATIENT
Start: 2022-11-06

## 2022-11-05 RX ORDER — DEXAMETHASONE 4 MG/1
6 TABLET ORAL DAILY
Status: CANCELLED | OUTPATIENT
Start: 2022-11-05 | End: 2022-11-12

## 2022-11-05 RX ORDER — LOSARTAN POTASSIUM 50 MG/1
100 TABLET ORAL DAILY
Status: CANCELLED | OUTPATIENT
Start: 2022-11-06

## 2022-11-05 RX ORDER — INSULIN LISPRO 100 [IU]/ML
0-4 INJECTION, SOLUTION INTRAVENOUS; SUBCUTANEOUS NIGHTLY
Status: CANCELLED | OUTPATIENT
Start: 2022-11-05

## 2022-11-05 RX ORDER — POLYETHYLENE GLYCOL 3350 17 G/17G
17 POWDER, FOR SOLUTION ORAL DAILY PRN
Status: CANCELLED | OUTPATIENT
Start: 2022-11-05

## 2022-11-05 RX ORDER — ASPIRIN 81 MG/1
81 TABLET, CHEWABLE ORAL DAILY
Status: CANCELLED | OUTPATIENT
Start: 2022-11-06

## 2022-11-05 RX ORDER — AMLODIPINE BESYLATE 10 MG/1
10 TABLET ORAL DAILY
Status: CANCELLED | OUTPATIENT
Start: 2022-11-06

## 2022-11-05 RX ORDER — MEMANTINE HYDROCHLORIDE 5 MG/1
5 TABLET ORAL 2 TIMES DAILY
Status: CANCELLED | OUTPATIENT
Start: 2022-11-05

## 2022-11-05 RX ORDER — ACETAMINOPHEN 325 MG/1
650 TABLET ORAL EVERY 6 HOURS PRN
Status: CANCELLED | OUTPATIENT
Start: 2022-11-05

## 2022-11-05 RX ORDER — GUAIFENESIN/DEXTROMETHORPHAN 100-10MG/5
5 SYRUP ORAL EVERY 4 HOURS PRN
Status: CANCELLED | OUTPATIENT
Start: 2022-11-05

## 2022-11-05 RX ORDER — ATORVASTATIN CALCIUM 10 MG/1
20 TABLET, FILM COATED ORAL DAILY
Status: CANCELLED | OUTPATIENT
Start: 2022-11-06

## 2022-11-05 RX ORDER — ACETAMINOPHEN 650 MG/1
650 SUPPOSITORY RECTAL EVERY 6 HOURS PRN
Status: CANCELLED | OUTPATIENT
Start: 2022-11-05

## 2022-11-05 RX ORDER — LANOLIN ALCOHOL/MO/W.PET/CERES
3 CREAM (GRAM) TOPICAL NIGHTLY PRN
Status: CANCELLED | OUTPATIENT
Start: 2022-11-05

## 2022-11-05 RX ORDER — PANTOPRAZOLE SODIUM 40 MG/1
40 TABLET, DELAYED RELEASE ORAL DAILY
Status: CANCELLED | OUTPATIENT
Start: 2022-11-06

## 2022-11-05 RX ORDER — SODIUM CHLORIDE 0.9 % (FLUSH) 0.9 %
5-40 SYRINGE (ML) INJECTION PRN
Status: CANCELLED | OUTPATIENT
Start: 2022-11-05

## 2022-11-05 RX ORDER — NICOTINE 21 MG/24HR
1 PATCH, TRANSDERMAL 24 HOURS TRANSDERMAL DAILY
Status: CANCELLED | OUTPATIENT
Start: 2022-11-06

## 2022-11-05 RX ORDER — ONDANSETRON 2 MG/ML
4 INJECTION INTRAMUSCULAR; INTRAVENOUS EVERY 6 HOURS PRN
Status: CANCELLED | OUTPATIENT
Start: 2022-11-05

## 2022-11-05 RX ADMIN — SODIUM CHLORIDE, PRESERVATIVE FREE 10 ML: 5 INJECTION INTRAVENOUS at 20:35

## 2022-11-05 RX ADMIN — ENOXAPARIN SODIUM 40 MG: 100 INJECTION SUBCUTANEOUS at 08:00

## 2022-11-05 RX ADMIN — MEMANTINE 5 MG: 5 TABLET ORAL at 20:34

## 2022-11-05 RX ADMIN — SODIUM CHLORIDE, PRESERVATIVE FREE 5 ML: 5 INJECTION INTRAVENOUS at 02:43

## 2022-11-05 RX ADMIN — BARICITINIB 2 MG: 2 TABLET, FILM COATED ORAL at 07:58

## 2022-11-05 RX ADMIN — SODIUM CHLORIDE, PRESERVATIVE FREE 5 ML: 5 INJECTION INTRAVENOUS at 08:05

## 2022-11-05 RX ADMIN — SODIUM CHLORIDE, PRESERVATIVE FREE 5 ML: 5 INJECTION INTRAVENOUS at 18:04

## 2022-11-05 RX ADMIN — FUROSEMIDE 40 MG: 10 INJECTION, SOLUTION INTRAMUSCULAR; INTRAVENOUS at 12:41

## 2022-11-05 RX ADMIN — CITALOPRAM HYDROBROMIDE 20 MG: 20 TABLET ORAL at 08:04

## 2022-11-05 RX ADMIN — AMLODIPINE BESYLATE 10 MG: 10 TABLET ORAL at 07:59

## 2022-11-05 RX ADMIN — Medication 3 MG: at 21:32

## 2022-11-05 RX ADMIN — ATORVASTATIN CALCIUM 20 MG: 20 TABLET, FILM COATED ORAL at 07:59

## 2022-11-05 RX ADMIN — LOSARTAN POTASSIUM 100 MG: 50 TABLET, FILM COATED ORAL at 07:58

## 2022-11-05 RX ADMIN — ASPIRIN 81 MG: 81 TABLET, CHEWABLE ORAL at 07:58

## 2022-11-05 RX ADMIN — TAMSULOSIN HYDROCHLORIDE 0.4 MG: 0.4 CAPSULE ORAL at 07:58

## 2022-11-05 RX ADMIN — INSULIN LISPRO 4 UNITS: 100 INJECTION, SOLUTION INTRAVENOUS; SUBCUTANEOUS at 17:36

## 2022-11-05 RX ADMIN — PANTOPRAZOLE SODIUM 40 MG: 40 TABLET, DELAYED RELEASE ORAL at 07:58

## 2022-11-05 RX ADMIN — DEXAMETHASONE 6 MG: 4 TABLET ORAL at 07:59

## 2022-11-05 RX ADMIN — MEMANTINE 5 MG: 5 TABLET ORAL at 07:59

## 2022-11-05 NOTE — PROGRESS NOTES
Patient unable to perform ambulation per wife at bedside due to difficulty ambulating. Patient required 4L of O2 at rest to achieve SpO2 of 91%. No acute distress noted.     11/05/22 1351   Resting (Room Air)   SpO2 86   HR 78   Resting (On O2)   SpO2 85   HR 81   O2 Device Nasal cannula   O2 Flow Rate (l/min) 1 l/min   During Walk (On O2)   Need Additional O2 Flow Rate Rows Yes   O2 Flow Rate (l/min) 2 l/min   O2 Saturation 85   O2 Flow Rate (l/min) 3 l/min   O2 Saturation 86   O2 Flow Rate (l/min) 4 l/min   O2 Saturation 92   After Walk   Does the Patient Qualify for Home O2 Yes   Liter Flow at Rest 4   Does the Patient Need Portable Oxygen Tanks Yes

## 2022-11-05 NOTE — PROGRESS NOTES
Hospitalist Progress Note   Admit Date:  10/26/2022  6:21 PM   Name:  Ryan West   Age:  79 y.o. Sex:  male  :  1940   MRN:  [id number]   Room:  [facility]    Reason(s) for Admission: Hypoxia [R09.02]  COVID-19 virus infection [U07.1]     Hospital Course & Interval History:   Mr. Sulaiman Francis is an 78 y/o WM with a h/o vascular dementia, HTN, CKD, and bladder cacner who was admitted to our service on 10/29 with acute hypoxemic respiratory failure 2/2 COVID-19. CXR unremarkable. CT chest neg for PE but does have mild infiltrate in DANI. He was started on dexamethasone, baricitinib and supplemental O2. He was weaned to RA but then began to have increased O2 requirements again up to 5L, CXR repeated 10/30 and showed mild bilateral atypical appearing infiltrates. Walk test  showed 7L O2 with exertion. Tolerating 5L NC since 11/3. Recheck ambulatory oximetry . Subjective/24hr Events (22):  Awake in bed, on 5L, weaned to 4L. Comfortable, no cough, SOB or N/V/D. Wife at bedside. Patient pleasant and cooperative. Assessment & Plan:   # Acute hypoxemic respiratory failure 2/2 COVID-19              - Weaned to 4L on . Completes dex today, . Remains on baricitinib. Recheck ambulatory oximetry today, . # HTN              - Norvasc increased . Con't ARB. - Better controlled     # MDD              - Celexa     # BPH              - Flomax     # GERD              - PPI     # HLD              - Statin     # Vascular dementia              - Namenda    Discharge Planning: Accepted to [facility] pending O2 improvement. Diet:  ADULT DIET;  Regular  ADULT ORAL NUTRITION SUPPLEMENT; AM Snack, PM Snack; Standard High Calorie/High Protein Oral Supplement  DVT PPx: Lovenox  Code status: Full Code    Hospital Problems             Last Modified POA    * (Principal) Acute respiratory failure with hypoxia ([address] 10/29/2022 Yes    Dementia ([facility] [address] 10/27/2022 Yes    Type 2 diabetes mellitus ([address] A&Ox3  Psych:  Normal mood and affect.       I have reviewed ordered lab tests and independently visualized imaging below:    Recent Labs:  Recent Results (from the past 48 hour(s))   POCT Glucose    Collection Time: 11/03/22 11:15 AM   Result Value Ref Range    POC Glucose 109 (H) 65 - 100 mg/dL    Performed by: [email or web address]    POCT Glucose    Collection Time: 11/03/22  4:39 PM   Result Value Ref Range    POC Glucose 278 (H) 65 - 100 mg/dL    Performed by: [email or web address]    POCT Glucose    Collection Time: 11/03/22  9:05 PM   Result Value Ref Range    POC Glucose 371 (H) 65 - 100 mg/dL    Performed by: Maris    POCT Glucose    Collection Time: 11/04/22  7:55 AM   Result Value Ref Range    POC Glucose 126 (H) 65 - 100 mg/dL    Performed by: [email or web address]    POCT Glucose    Collection Time: 11/04/22 11:27 AM   Result Value Ref Range    POC Glucose 106 (H) 65 - 100 mg/dL    Performed by: [employer]    POCT Glucose    Collection Time: 11/04/22  4:55 PM   Result Value Ref Range    POC Glucose 326 (H) 65 - 100 mg/dL    Performed by: [employer]    POCT Glucose    Collection Time: 11/04/22  8:43 PM   Result Value Ref Range    POC Glucose 290 (H) 65 - 100 mg/dL    Performed by: Randolph    CBC    Collection Time: 11/05/22  3:50 AM   Result Value Ref Range    WBC 8.0 4.3 - 11.1 K/uL    RBC 3.48 (L) 4.23 - 5.6 M/uL    Hemoglobin 9.2 (L) 13.6 - 17.2 g/dL    Hematocrit 28.3 (L) 41.1 - 50.3 %    MCV 81.3 (L) 82 - 102 FL    MCH 26.4 26.1 - 32.9 PG    MCHC 32.5 31.4 - 35.0 g/dL    RDW 15.2 (H) 11.9 - 14.6 %    Platelets 341 174 - 577 K/uL    MPV 12.5 (H) 9.4 - 12.3 FL    nRBC 0.00 0.0 - 0.2 K/uL   Basic Metabolic Panel w/ Reflex to MG    Collection Time: 11/05/22  3:50 AM   Result Value Ref Range    Sodium 140 133 - 143 mmol/L    Potassium 4.0 3.5 - 5.1 mmol/L    Chloride 114 (H) 101 - 110 mmol/L    CO2 27 21 - 32 mmol/L    Anion Gap Cannot be calculated 2 - 11 mmol/L    Glucose 151 (H) 65 - 100 mg/dL    BUN 32 (H) 8 - 23 MG/DL    Creatinine 1.40 0.8 - 1.5 MG/DL    Est, Glom Filt Rate 50 (L) >60 ml/min/1.73m2    Calcium 8.9 8.3 - 10.4 MG/DL   POCT Glucose    Collection Time: 11/05/22  7:50 AM   Result Value Ref Range    POC Glucose 112 (H) 65 - 100 mg/dL    Performed by: Saumya          Other Studies:  No results found.     Current Meds:  Current Facility-Administered Medications   Medication Dose Route Frequency    amLODIPine (NORVASC) tablet 10 mg  10 mg Oral Daily    enoxaparin (LOVENOX) injection 40 mg  40 mg SubCUTAneous Daily    baricitinib (OLUMIANT) tablet 2 mg  2 mg Oral Daily    melatonin tablet 3 mg  3 mg Oral Nightly PRN    aspirin chewable tablet 81 mg  81 mg Oral Daily    glucose chewable tablet 16 g  4 tablet Oral PRN    dextrose bolus 10% 125 mL  125 mL IntraVENous PRN    Or    dextrose bolus 10% 250 mL  250 mL IntraVENous PRN    glucagon (rDNA) injection 1 mg  1 mg SubCUTAneous PRN    dextrose 10 % infusion   IntraVENous Continuous PRN    tamsulosin (FLOMAX) capsule 0.4 mg  0.4 mg Oral Daily    sodium chloride flush 0.9 % injection 5-40 mL  5-40 mL IntraVENous 2 times per day    sodium chloride flush 0.9 % injection 5-40 mL  5-40 mL IntraVENous PRN    0.9 % sodium chloride infusion   IntraVENous PRN    ondansetron (ZOFRAN-ODT) disintegrating tablet 4 mg  4 mg Oral Q8H PRN    Or    ondansetron (ZOFRAN) injection 4 mg  4 mg IntraVENous Q6H PRN    polyethylene glycol (GLYCOLAX) packet 17 g  17 g Oral Daily PRN    acetaminophen (TYLENOL) tablet 650 mg  650 mg Oral Q6H PRN    Or    acetaminophen (TYLENOL) suppository 650 mg  650 mg Rectal Q6H PRN    guaiFENesin-dextromethorphan (ROBITUSSIN DM) 100-10 MG/5ML syrup 5 mL  5 mL Oral Q4H PRN    citalopram (CELEXA) tablet 20 mg  20 mg Oral Daily    losartan (COZAAR) tablet 100 mg  100 mg Oral Daily    memantine (NAMENDA) tablet 5 mg  5 mg Oral BID    pantoprazole (PROTONIX) tablet 40 mg  40 mg Oral Daily    atorvastatin (LIPITOR) tablet 20 mg  20 mg Oral Daily    insulin lispro (HUMALOG) injection vial 0-4 Units  0-4 Units SubCUTAneous TID WC    insulin lispro (HUMALOG) injection vial 0-4 Units  0-4 Units SubCUTAneous Nightly    nicotine (NICODERM CQ) 21 MG/24HR 1 patch  1 patch TransDERmal Daily    sodium chloride flush 0.9 % injection 5 mL  5 mL IntraVENous Q8H    sodium chloride flush 0.9 % injection 5 mL  5 mL IntraVENous PRN       Signed:  Leonid Crawford MD    Part of this note may have been written by using a voice dictation software. The note has been proof read but may still contain some grammatical/other typographical errors.

## 2022-11-05 NOTE — PROGRESS NOTES
Pt is resting in bed with no complaints of pain or discomfort at this time. Pt is on 5L NC with even and unlabored respirations. Pt is alert and oriented times 3. Call light is in place.

## 2022-11-06 ENCOUNTER — APPOINTMENT (OUTPATIENT)
Dept: GENERAL RADIOLOGY | Age: 82
DRG: 871 | End: 2022-11-06
Payer: MEDICARE

## 2022-11-06 ENCOUNTER — APPOINTMENT (OUTPATIENT)
Dept: CT IMAGING | Age: 82
DRG: 871 | End: 2022-11-06
Payer: MEDICARE

## 2022-11-06 PROBLEM — R00.1 BRADYCARDIA: Status: ACTIVE | Noted: 2022-11-06

## 2022-11-06 PROBLEM — R09.02 HYPOXIA: Status: ACTIVE | Noted: 2022-11-06

## 2022-11-06 LAB
ANION GAP SERPL CALC-SCNC: 3 MMOL/L (ref 2–11)
BUN SERPL-MCNC: 35 MG/DL (ref 8–23)
CALCIUM SERPL-MCNC: 8.8 MG/DL (ref 8.3–10.4)
CHLORIDE SERPL-SCNC: 109 MMOL/L (ref 101–110)
CO2 SERPL-SCNC: 28 MMOL/L (ref 21–32)
CREAT SERPL-MCNC: 1.7 MG/DL (ref 0.8–1.5)
GLUCOSE BLD STRIP.AUTO-MCNC: 118 MG/DL (ref 65–100)
GLUCOSE BLD STRIP.AUTO-MCNC: 223 MG/DL (ref 65–100)
GLUCOSE BLD STRIP.AUTO-MCNC: 264 MG/DL (ref 65–100)
GLUCOSE BLD STRIP.AUTO-MCNC: 279 MG/DL (ref 65–100)
GLUCOSE SERPL-MCNC: 235 MG/DL (ref 65–100)
MAGNESIUM SERPL-MCNC: 1.9 MG/DL (ref 1.8–2.4)
POTASSIUM SERPL-SCNC: 3.6 MMOL/L (ref 3.5–5.1)
SERVICE CMNT-IMP: ABNORMAL
SODIUM SERPL-SCNC: 140 MMOL/L (ref 133–143)

## 2022-11-06 PROCEDURE — 80048 BASIC METABOLIC PNL TOTAL CA: CPT

## 2022-11-06 PROCEDURE — 6370000000 HC RX 637 (ALT 250 FOR IP): Performed by: HOSPITALIST

## 2022-11-06 PROCEDURE — 6370000000 HC RX 637 (ALT 250 FOR IP): Performed by: FAMILY MEDICINE

## 2022-11-06 PROCEDURE — 99223 1ST HOSP IP/OBS HIGH 75: CPT | Performed by: INTERNAL MEDICINE

## 2022-11-06 PROCEDURE — 71045 X-RAY EXAM CHEST 1 VIEW: CPT

## 2022-11-06 PROCEDURE — 2580000003 HC RX 258: Performed by: HOSPITALIST

## 2022-11-06 PROCEDURE — 6370000000 HC RX 637 (ALT 250 FOR IP): Performed by: INTERNAL MEDICINE

## 2022-11-06 PROCEDURE — 6360000002 HC RX W HCPCS: Performed by: INTERNAL MEDICINE

## 2022-11-06 PROCEDURE — 82962 GLUCOSE BLOOD TEST: CPT

## 2022-11-06 PROCEDURE — 70450 CT HEAD/BRAIN W/O DYE: CPT

## 2022-11-06 PROCEDURE — 83735 ASSAY OF MAGNESIUM: CPT

## 2022-11-06 PROCEDURE — 36415 COLL VENOUS BLD VENIPUNCTURE: CPT

## 2022-11-06 PROCEDURE — 1100000000 HC RM PRIVATE

## 2022-11-06 PROCEDURE — 93005 ELECTROCARDIOGRAM TRACING: CPT | Performed by: INTERNAL MEDICINE

## 2022-11-06 PROCEDURE — 2580000003 HC RX 258: Performed by: EMERGENCY MEDICINE

## 2022-11-06 RX ADMIN — LOSARTAN POTASSIUM 100 MG: 50 TABLET, FILM COATED ORAL at 10:04

## 2022-11-06 RX ADMIN — SODIUM CHLORIDE, PRESERVATIVE FREE 10 ML: 5 INJECTION INTRAVENOUS at 09:00

## 2022-11-06 RX ADMIN — PANTOPRAZOLE SODIUM 40 MG: 40 TABLET, DELAYED RELEASE ORAL at 10:03

## 2022-11-06 RX ADMIN — ASPIRIN 81 MG: 81 TABLET, CHEWABLE ORAL at 10:02

## 2022-11-06 RX ADMIN — TAMSULOSIN HYDROCHLORIDE 0.4 MG: 0.4 CAPSULE ORAL at 10:02

## 2022-11-06 RX ADMIN — SODIUM CHLORIDE, PRESERVATIVE FREE 10 ML: 5 INJECTION INTRAVENOUS at 22:36

## 2022-11-06 RX ADMIN — ENOXAPARIN SODIUM 40 MG: 100 INJECTION SUBCUTANEOUS at 10:03

## 2022-11-06 RX ADMIN — BARICITINIB 2 MG: 2 TABLET, FILM COATED ORAL at 10:02

## 2022-11-06 RX ADMIN — SODIUM CHLORIDE, PRESERVATIVE FREE 5 ML: 5 INJECTION INTRAVENOUS at 18:07

## 2022-11-06 RX ADMIN — INSULIN LISPRO 1 UNITS: 100 INJECTION, SOLUTION INTRAVENOUS; SUBCUTANEOUS at 18:06

## 2022-11-06 RX ADMIN — MEMANTINE 5 MG: 5 TABLET ORAL at 10:03

## 2022-11-06 RX ADMIN — ATORVASTATIN CALCIUM 20 MG: 20 TABLET, FILM COATED ORAL at 10:02

## 2022-11-06 RX ADMIN — CITALOPRAM HYDROBROMIDE 20 MG: 20 TABLET ORAL at 10:03

## 2022-11-06 RX ADMIN — SODIUM CHLORIDE, PRESERVATIVE FREE 5 ML: 5 INJECTION INTRAVENOUS at 01:43

## 2022-11-06 RX ADMIN — AMLODIPINE BESYLATE 10 MG: 10 TABLET ORAL at 10:03

## 2022-11-06 RX ADMIN — MEMANTINE 5 MG: 5 TABLET ORAL at 22:36

## 2022-11-06 RX ADMIN — Medication 3 MG: at 22:36

## 2022-11-06 NOTE — PROGRESS NOTES
Patient is alert and oriented to person with confusion. Patient having no pain or distress noted. 02 on at 4 liters via NC with RR even/unlabored.   Bed alarm on and call light in reach

## 2022-11-06 NOTE — DISCHARGE SUMMARY
Hospitalist Discharge Summary   Admit Date:  10/26/2022  6:21 PM   DC Note date: 2022  Name:  Dale Rivera   Age:  79 y.o. 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 [facility] Course:  Mr. Jose Ni is an 78 y/o WM with a h/o vascular dementia, HTN, CKD, and bladder cacner who was admitted to our service on 10/29 with acute hypoxemic respiratory failure 2/2 COVID-19. CXR was unremarkable. CT chest was negative for PE but did show a mild infiltrate in the DANI. He was started on dexamethasone, baricitinib and supplemental O2. He was weaned to RA but then began to have increased O2 requirements again up to 5L. CXR was repeated on 10/30 and showed mild bilateral atypical appearing infiltrates. Walk test  showed he needs 7L O2 with exertion. His O2 was slowly weaned over the next several days. He tolerates 4L NC with exertion as of . He completed a course of dexamethasone and will not need to continue baricitinib at discharge. His home medications were continued. Repeat CXR  showed a mild progression of his infiltrates so he was given 1 dose of IV Lasix with ~1L urine output and currently feels much better. Discharge plans discussed with his wife on . He is medically stable for discharge to the 9th floor today.     Disposition: 9th floor/[facility]  Diet: ADULT DIET; Regular  ADULT ORAL NUTRITION SUPPLEMENT; AM Snack, PM Snack; Standard High Calorie/High Protein Oral Supplement  Code Status: Full Code    Follow Ups:    Time spent in patient discharge and coordination 35 minutes. Follow up labs/diagnostics (ultimately defer to outpatient provider):  N/A    Plan was discussed with patient, RN, CM. All questions answered. Patient was stable at time of discharge. Instructions given to call a physician or return if any concerns. Current Discharge Medication List        CONTINUE these medications which have NOT CHANGED    Details   hydrALAZINE (APRESOLINE) 50 MG tablet Take 1 tablet by mouth 3 times daily  Qty: 270 tablet, Refills: 3    Associated Diagnoses: Essential hypertension, benign      memantine (NAMENDA) 5 MG tablet Take 1 tablet by mouth 2 times daily  Qty: 180 tablet, Refills: 1    Associated Diagnoses: Vascular dementia without behavioral disturbance (HCC)      olopatadine (PATANOL) 0.1 % ophthalmic solution INSTILL 1 DROP IN BOTH EYES TWICE A DAY      Selenium 200 MCG TABS Take by mouth      pantoprazole (PROTONIX) 40 MG tablet Take 1 tablet by mouth in the morning. Qty: 30 tablet, Refills: 1    Associated Diagnoses: Gastroesophageal reflux disease without esophagitis      amLODIPine (NORVASC) 5 MG tablet Take 1 tablet by mouth in the morning.   Qty: 90 tablet, Refills: 1    Associated Diagnoses: Essential hypertension, benign      ascorbic acid (VITAMIN C) 500 MG tablet Take by mouth      vitamin D3 (CHOLECALCIFEROL) 125 MCG (5000 UT) TABS tablet Take by mouth daily      citalopram (CELEXA) 20 MG tablet Take 20 mg by mouth      losartan (COZAAR) 100 MG tablet Take 100 mg by mouth      simvastatin (ZOCOR) 40 MG tablet Take 40 mg by mouth           STOP taking these medications       clopidogrel (PLAVIX) 75 MG tablet Comments:   Reason for Stopping:               Procedures done this admission:  * No surgery found *    Consults this admission:  IP CONSULT TO PHYSICAL MEDICINE REHAB    Echocardiogram results:  No results found for this or any previous visit. Diagnostic Imaging/Tests:   CT HEAD WO CONTRAST    Result Date: 10/30/2022  1. Resolution of prior blood. No acute hemorrhage. 2.  Chronic infarcts again noted. XR CHEST PORTABLE    Result Date: 10/30/2022  1. Mild bilateral infiltrates compatible with viral infection. 2.  No consolidation. XR CHEST PORTABLE    Result Date: 10/26/2022  No acute airspace disease. XR CHEST 1 VIEW    Result Date: 11/5/2022  Slight increased bilateral perihilar interstitial infiltrate, left greater than right     CT CHEST PULMONARY EMBOLISM W CONTRAST    Result Date: 10/26/2022  1. No evidence of pulmonary embolism. 2. Mild infiltrate or atelectasis in the left upper lobe. 3. Findings in the upper abdomen as described above. Labs: Results:       BMP, Mg, Phos Recent Labs     11/05/22  0350      K 4.0   *   CO2 27   ANIONGAP Cannot be calculated   BUN 32*   CREATININE 1.40   LABGLOM 50*   CALCIUM 8.9   GLUCOSE 151*      CBC Recent Labs     11/05/22  0350   WBC 8.0   RBC 3.48*   HGB 9.2*   HCT 28.3*   MCV 81.3*   MCH 26.4   MCHC 32.5   RDW 15.2*      MPV 12.5*   NRBC 0.00      LFT No results for input(s): BILITOT, BILIDIR, ALKPHOS, AST, ALT, PROT, LABALBU, GLOB in the last 72 hours.    Cardiac  Lab Results   Component Value Date/Time    NTPROBNP 964 10/26/2022 07:47 PM      Coags Lab Results   Component Value Date/Time    PROTIME 12.9 06/20/2022 10:22 PM    INR 1.1 06/20/2022 10:22 PM      A1c Lab Results   Component Value Date/Time    LABA1C 6.0 06/21/2022 04:48 AM    LABA1C 6.4 03/22/2022 01:48 PM    LABA1C 6.7 09/15/2021 09:12 AM     06/21/2022 04:48 AM     03/22/2022 01:48 PM     09/15/2021 09:12 AM      Lipids Lab Results   Component Value Date/Time    CHOL 98 03/22/2022 01:48 PM    LDLCALC 45 03/22/2022 01:48 PM    LABVLDL 19 03/02/2020 08:39 AM    HDL 38 03/22/2022 01:48 PM    TRIG 70 03/22/2022 01:48 PM      Thyroid  Lab Results   Component Value Date/Time    UQB1GKV 2.410 06/25/2022 05:05 AM        Most Recent UA Lab Results   Component Value Date/Time    COLORU YELLOW/STRAW 10/26/2022 07:51 PM    APPEARANCE CLEAR 10/26/2022 07:51 PM    SPECGRAV 1.016 10/26/2022 07:51 PM    LABPH 5.0 10/26/2022 07:51 PM    PROTEINU 300 10/26/2022 07:51 PM    GLUCOSEU Negative 10/26/2022 07:51 PM    KETUA Negative 10/26/2022 07:51 PM    BILIRUBINUR Negative 10/26/2022 07:51 PM    BLOODU Negative 10/26/2022 07:51 PM    UROBILINOGEN 0.2 10/26/2022 07:51 PM    NITRU Negative 10/26/2022 07:51 PM    LEUKOCYTESUR Negative 10/26/2022 07:51 PM    WBCUA 0-4 10/26/2022 07:51 PM    RBCUA 0-5 10/26/2022 07:51 PM    EPITHUA 0-5 10/26/2022 07:51 PM    BACTERIA Negative 10/26/2022 07:51 PM    LABCAST 2-5 10/26/2022 07:51 PM    MUCUS 0 10/26/2022 07:51 PM        Recent Labs     10/26/22  1951 10/26/22  1947   CULTURE NO GROWTH 5 DAYS NO GROWTH 5 DAYS       All Labs from Last 24 Hrs:  Recent Results (from the past 24 hour(s))   POCT Glucose    Collection Time: 11/05/22 12:09 PM   Result Value Ref Range    POC Glucose 173 (H) 65 - 100 mg/dL    Performed by: Saumya    POCT Glucose    Collection Time: 11/05/22  5:00 PM   Result Value Ref Range    POC Glucose 365 (H) 65 - 100 mg/dL    Performed by: Kirby Aggarwal    POCT Glucose    Collection Time: 11/05/22  8:42 PM   Result Value Ref Range    POC Glucose 279 (H) 65 - 100 mg/dL    Performed by: Maddie    POCT Glucose    Collection Time: 11/06/22  7:49 AM   Result Value Ref Range    POC Glucose 118 (H) 65 - 100 mg/dL    Performed by: RamseyKimberlyARN        Allergies   Allergen Reactions    Aspirin-Dipyridamole Er Headaches    Beta Adrenergic Blockers     Ciprofloxacin Other (See Comments)     weakness    Donepezil Diarrhea    Lisinopril Cough     Immunization History   Administered Date(s) Administered    Influenza Virus Vaccine 11/01/2003, 11/01/2005, 12/04/2007, 11/01/2008, 12/11/2012, 10/27/2021    Influenza, FLUAD, (age 72 y+), Adjuvanted, 0.5mL 09/28/2022    PPD Test 06/21/2022    Pneumococcal Conjugate 13-valent ([id number]) 02/05/2016    Pneumococcal Polysaccharide ([id number]) 11/01/2003, 12/01/2006, 08/01/2017, 03/09/2020    Td vaccine (adult) 01/13/2004    Tdap (Boostrix, Adacel) 02/05/2016    Zoster Live (Zostavax) 08/01/2017       Recent Vital Data:  Patient Vitals for the past 24 hrs:   Temp Pulse Resp BP SpO2   11/06/22 0730 98.6 °F (37 °C) 63 16 (!) 136/50 (!) 89 %   11/06/22 0345 97.7 °F (36.5 °C) 56 19 (!) 154/64 91 %   11/05/22 2330 97.7 °F (36.5 °C) 62 20 (!) 129/54 90 %   11/05/22 1911 97.7 °F (36.5 °C) 70 20 (!) 142/66 91 %   11/05/22 1602 97.7 °F (36.5 °C) 65 16 (!) 121/52 95 %   11/05/22 1205 98.2 °F (36.8 °C) 63 14 (!) 125/49 92 %       Oxygen Therapy  SpO2: (!) 89 %  Pulse via Oximetry: 85 beats per minute  Pulse Oximeter Device Mode: Other (Comment)  O2 Device: Nasal cannula  Skin Assessment: Clean, dry, & intact  Skin Protection for O2 Device: No  O2 Flow Rate (L/min): 4 L/min    Estimated body mass index is 23.69 kg/m² as calculated from the following:    Height as of this encounter: 5' 10\" (1.778 m). Weight as of this encounter: 165 lb 1.6 oz (74.9 kg). Intake/Output Summary (Last 24 hours) at 11/6/2022 1050  Last data filed at 11/5/2022 2330  Gross per 24 hour   Intake 100 ml   Output 1100 ml   Net -1000 ml         Physical Exam:  General:    Well nourished. No overt distress. Appears stated age, pleasant. Head:  Normocephalic, atraumatic  Eyes:  Sclerae appear normal.  Pupils equally round. HENT:  Nares appear normal, no drainage. Moist mucous membranes  Neck:  No restricted ROM. Trachea midline  CV:   RRR. No m/r/g. No JVD  Lungs:   CTAB. No wheezing, rhonchi, or rales. Respirations even, unlabored. 4L NC O2. Abdomen:   Soft, nontender, nondistended. Extremities: Warm and dry.   No cyanosis or clubbing. No edema. Skin:     No rashes. Normal coloration. Neuro:  CN II-XII grossly intact. Pleasant and conversant. Psych:  Normal mood and affect. Signed:  Ebenezer Goff MD    Part of this note may have been written by using a voice dictation software. The note has been proof read but may still contain some grammatical/other typographical errors.

## 2022-11-06 NOTE — PROGRESS NOTES
Hospitalist Progress Note   Admit Date:  10/26/2022  6:21 PM   Name:  Sugar Multani   Age:  79 y.o. Sex:  male  :  1940   MRN:  [id number]   Room:  [facility]/    Reason(s) for Admission: Hypoxia [R09.02]  COVID-19 virus infection [U07.1]     Hospital Course & Interval History:   Mr. Oneyda Rome is an 80 y/o WM with a h/o vascular dementia, HTN, CKD, and bladder cacner who was admitted to our service on 10/29 with acute hypoxemic respiratory failure 2/2 COVID-19. CXR unremarkable. CT chest neg for PE but does have mild infiltrate in DANI. He was started on dexamethasone, baricitinib and supplemental O2. He was weaned to RA but then began to have increased O2 requirements again up to 5L, CXR repeated 10/30 and showed mild bilateral atypical appearing infiltrates. Walk test  showed 7L O2 with exertion. Tolerating 5L NC since 11/3. Recheck ambulatory oximetry . Subjective/24hr Events (22): Stable on 4L NC O2 today. Was planned for discharge to 9th floor. Was sitting in chair this afternoon and suddenly became unconscious while wife was there, RR called, glucose 276, HR initially in the 30s, BP was normal. He regained consciousness and was put in bed. EKG bigeminy. Required increase in O2 to 10L. No chest pain, SOB. Assessment & Plan:   # Symptomatic bradycardia   - Resume remote tele, EKG bigeminy without AV block. Not on any AVN blockers. Stat BMP, Mg, CXR. Cardiology to see. # Acute hypoxemic respiratory failure 2/2 COVID-19              - Completed dexamethasone . Con't baricitinib. - Stable on 4L at rest and with exertion. # HTN              - Norvasc increased . Con't ARB. # MDD              - Celexa     # BPH              - Flomax     # GERD              - PPI     # HLD              - Statin     # Vascular dementia              - Namenda    Patient is critically ill.   Without intervention, there is a high probability of acute organ impairment or life-threatening deterioration in the patient's condition from symptomatic bradycardia, hypoxemic respiratory failure  Critical care interventions: Rapid response, stat EKG, labs and CXR. Cardiology consultation. Total critical care time spent: 40 minutes. Advanced Care Planning note done: Remains full code, d/w wife at bedside. Time is indicative of direct patient attendance at bedside and on the patient's floor nearby. Includes time spent at bedside performing history and exam, performing chart review, discussing findings and treatment plan with patient and/or family, discussing patient with nursing staff, consultants and colleagues, and ordering/reviewing pertinent laboratory and radiographic evaluations. Time excludes procedures. CPT:  64263: First 30-74 minutes  70120: Each block of 30 min. beyond 76      Discharge Planning: Pending. Diet:  ADULT DIET;  Regular  ADULT ORAL NUTRITION SUPPLEMENT; AM Snack, PM Snack; Standard High Calorie/High Protein Oral Supplement  DVT PPx: Lovenox  Code status: Full Code    Hospital Problems             Last Modified POA    * (Principal) Acute respiratory failure with hypoxia ([facility] 11/6/2022 Yes    Dementia ([facility] [address] 11/6/2022 Yes    Type 2 diabetes mellitus ([address] 11/6/2022 Yes    Chronic ischemic heart disease 11/6/2022 Yes    Stage 3a chronic kidney disease ([address] 11/6/2022 Yes    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 ([address] 11/6/2022 Yes    COVID-19 virus infection 11/6/2022 Yes    Bradycardia 11/6/2022 Yes    Essential hypertension, benign 11/6/2022 Yes        Objective:   Patient Vitals for the past 24 hrs:   Temp Pulse Resp BP SpO2   11/06/22 1315 -- -- -- -- 91 %   11/06/22 1121 97.7 °F (36.5 °C) 67 19 (!) 151/56 (!) 84 %   11/06/22 0730 98.6 °F (37 °C) 63 16 (!) 136/50 (!) 89 %   11/06/22 0345 97.7 °F (36.5 °C) 56 19 (!) 154/64 91 %   11/05/22 2330 97.7 °F (36.5 °C) 62 20 (!) 129/54 90 %   11/05/22 1911 97.7 °F (36.5 °C) 70 20 (!) 142/66 91 % 11/05/22 1602 97.7 °F (36.5 °C) 65 16 (!) 121/52 95 %       Estimated body mass index is 23.69 kg/m² as calculated from the following:    Height as of this encounter: 5' 10\" (1.778 m). Weight as of this encounter: 165 lb 1.6 oz (74.9 kg). Intake/Output Summary (Last 24 hours) at 11/6/2022 1545  Last data filed at 11/5/2022 2330  Gross per 24 hour   Intake 100 ml   Output 700 ml   Net -600 ml         Physical Exam:   Blood pressure (!) 151/56, pulse 67, temperature 97.7 °F (36.5 °C), temperature source Axillary, resp. rate 19, height 5' 10\" (1.778 m), weight 165 lb 1.6 oz (74.9 kg), SpO2 91 %. General:    Well nourished. No overt distress. Head:  Normocephalic, atraumatic  Eyes:  Sclerae appear normal. Pupils equally round. ENT:  Nares appear normal, no drainage. Moist oral mucosa  Neck:  No restricted ROM. Trachea midline. CV:   Bradycardia, bigeminy? .  No m/r/g. No jugular venous distension. Lungs:   CTAB. No wheezing, rhonchi, or rales. Respirations even, unlabored. Abdomen: Bowel sounds present. Soft, nontender, nondistended. Extremities: No cyanosis or clubbing. No edema. Skin:     No rashes and normal coloration. Warm and dry. Neuro:  CN II-XII grossly intact. Sensation intact. A&Ox3  Psych:  Normal mood and affect.       I have reviewed ordered lab tests and independently visualized imaging below:    Recent Labs:  Recent Results (from the past 48 hour(s))   POCT Glucose    Collection Time: 11/04/22  4:55 PM   Result Value Ref Range    POC Glucose 326 (H) 65 - 100 mg/dL    Performed by: Kaitlynn    POCT Glucose    Collection Time: 11/04/22  8:43 PM   Result Value Ref Range    POC Glucose 290 (H) 65 - 100 mg/dL    Performed by: Randolph    CBC    Collection Time: 11/05/22  3:50 AM   Result Value Ref Range    WBC 8.0 4.3 - 11.1 K/uL    RBC 3.48 (L) 4.23 - 5.6 M/uL    Hemoglobin 9.2 (L) 13.6 - 17.2 g/dL    Hematocrit 28.3 (L) 41.1 - 50.3 %    MCV 81.3 (L) 82 - 102 FL    MCH 26.4 26.1 - 32.9 PG    MCHC 32.5 31.4 - 35.0 g/dL    RDW 15.2 (H) 11.9 - 14.6 %    Platelets 187 223 - 036 K/uL    MPV 12.5 (H) 9.4 - 12.3 FL    nRBC 0.00 0.0 - 0.2 K/uL   Basic Metabolic Panel w/ Reflex to MG    Collection Time: 11/05/22  3:50 AM   Result Value Ref Range    Sodium 140 133 - 143 mmol/L    Potassium 4.0 3.5 - 5.1 mmol/L    Chloride 114 (H) 101 - 110 mmol/L    CO2 27 21 - 32 mmol/L    Anion Gap Cannot be calculated 2 - 11 mmol/L    Glucose 151 (H) 65 - 100 mg/dL    BUN 32 (H) 8 - 23 MG/DL    Creatinine 1.40 0.8 - 1.5 MG/DL    Est, Glom Filt Rate 50 (L) >60 ml/min/1.73m2    Calcium 8.9 8.3 - 10.4 MG/DL   POCT Glucose    Collection Time: 11/05/22  7:50 AM   Result Value Ref Range    POC Glucose 112 (H) 65 - 100 mg/dL    Performed by: Saumya    POCT Glucose    Collection Time: 11/05/22 12:09 PM   Result Value Ref Range    POC Glucose 173 (H) 65 - 100 mg/dL    Performed by: Saumya    POCT Glucose    Collection Time: 11/05/22  5:00 PM   Result Value Ref Range    POC Glucose 365 (H) 65 - 100 mg/dL    Performed by: Saumya    POCT Glucose    Collection Time: 11/05/22  8:42 PM   Result Value Ref Range    POC Glucose 279 (H) 65 - 100 mg/dL    Performed by: Maddie    POCT Glucose    Collection Time: 11/06/22  7:49 AM   Result Value Ref Range    POC Glucose 118 (H) 65 - 100 mg/dL    Performed by: Zafar    POCT Glucose    Collection Time: 11/06/22  3:07 PM   Result Value Ref Range    POC Glucose 279 (H) 65 - 100 mg/dL    Performed by:  González    EKG 12 Lead    Collection Time: 11/06/22  3:20 PM   Result Value Ref Range    Ventricular Rate 68 BPM    Atrial Rate 68 BPM    P-R Interval 188 ms    QRS Duration 132 ms    Q-T Interval 428 ms    QTc Calculation (Bazett) 455 ms    P Axis 86 degrees    R Axis 63 degrees    T Axis 41 degrees    Diagnosis       Sinus rhythm with Premature atrial complexes in a pattern of bigeminy         Other Studies:  XR CHEST 1 VIEW    Result Date: 11/6/2022  History: Shortness of breath Exam: portable chest Comparison: 11/5/2022 Findings: There is coarsening of the interstitial lung markings without focal alveolar infiltrate or pleural effusion. No change in the appearance of the mediastinal contour or osseous structures. IMPRESSIONs: Stable portable chest     XR CHEST 1 VIEW    Result Date: 11/5/2022  Chest X-ray INDICATION: Shortness of breath AP/PA view of the chest was obtained. FINDINGS: There is bilateral perihilar interstitial prominence, slightly increased compared to prior studies. Peripheral lungs remain clear. No effusions. The heart size is normal.  There are sternotomy changes.       Slight increased bilateral perihilar interstitial infiltrate, left greater than right       Current Meds:  Current Facility-Administered Medications   Medication Dose Route Frequency    amLODIPine (NORVASC) tablet 10 mg  10 mg Oral Daily    enoxaparin (LOVENOX) injection 40 mg  40 mg SubCUTAneous Daily    baricitinib (OLUMIANT) tablet 2 mg  2 mg Oral Daily    melatonin tablet 3 mg  3 mg Oral Nightly PRN    aspirin chewable tablet 81 mg  81 mg Oral Daily    glucose chewable tablet 16 g  4 tablet Oral PRN    dextrose bolus 10% 125 mL  125 mL IntraVENous PRN    Or    dextrose bolus 10% 250 mL  250 mL IntraVENous PRN    glucagon (rDNA) injection 1 mg  1 mg SubCUTAneous PRN    dextrose 10 % infusion   IntraVENous Continuous PRN    tamsulosin (FLOMAX) capsule 0.4 mg  0.4 mg Oral Daily    sodium chloride flush 0.9 % injection 5-40 mL  5-40 mL IntraVENous 2 times per day    sodium chloride flush 0.9 % injection 5-40 mL  5-40 mL IntraVENous PRN    0.9 % sodium chloride infusion   IntraVENous PRN    ondansetron (ZOFRAN-ODT) disintegrating tablet 4 mg  4 mg Oral Q8H PRN    Or    ondansetron (ZOFRAN) injection 4 mg  4 mg IntraVENous Q6H PRN    polyethylene glycol (GLYCOLAX) packet 17 g  17 g Oral Daily PRN    acetaminophen (TYLENOL) tablet 650 mg  650 mg Oral Q6H PRN    Or    acetaminophen (TYLENOL) suppository 650 mg  650 mg Rectal Q6H PRN    guaiFENesin-dextromethorphan (ROBITUSSIN DM) 100-10 MG/5ML syrup 5 mL  5 mL Oral Q4H PRN    citalopram (CELEXA) tablet 20 mg  20 mg Oral Daily    losartan (COZAAR) tablet 100 mg  100 mg Oral Daily    memantine (NAMENDA) tablet 5 mg  5 mg Oral BID    pantoprazole (PROTONIX) tablet 40 mg  40 mg Oral Daily    atorvastatin (LIPITOR) tablet 20 mg  20 mg Oral Daily    insulin lispro (HUMALOG) injection vial 0-4 Units  0-4 Units SubCUTAneous TID WC    insulin lispro (HUMALOG) injection vial 0-4 Units  0-4 Units SubCUTAneous Nightly    nicotine (NICODERM CQ) 21 MG/24HR 1 patch  1 patch TransDERmal Daily    sodium chloride flush 0.9 % injection 5 mL  5 mL IntraVENous Q8H    sodium chloride flush 0.9 % injection 5 mL  5 mL IntraVENous PRN       Signed:  May العلي MD    Part of this note may have been written by using a voice dictation software. The note has been proof read but may still contain some grammatical/other typographical errors.

## 2022-11-06 NOTE — PROGRESS NOTES
Patient resting in bed with no distress noted. 02 on at 4 liters via NC with RR even/unlabored.   Bed alarm on with call light in reach and will prepare bedside shift report for oncoming nurse

## 2022-11-06 NOTE — CARE COORDINATION
Patient has discharge orders today. Patient has been accepted and approved for [facility]. Patient will be transferred to [facility] today. Patient / family made aware. Patient has met all treatment goals / milestones. CM will continue to monitor and remain available for any needs or concerns that may arise. 10/27/22 1952   Service Assessment   Patient Orientation Alert and Oriented   Cognition Alert   History Provided By Patient;Spouse   Primary Caregiver Spouse   Support Systems Spouse/Significant Other   Patient's Healthcare Decision Maker is:   (spouse)   PCP Verified by CM Yes   Last Visit to PCP Within last 3 months   Prior Functional Level Independent in ADLs/IADLs   Current Functional Level Assistance with the following:;Mobility   Can patient return to prior living arrangement Unknown at present   Ability to make needs known: Good   Family able to assist with home care needs: Yes   Would you like for me to discuss the discharge plan with any other family members/significant others, and if so, who? Yes  (spouse)   Social/Functional History   Lives With Spouse   Type of [address] Help From Family   Active  No   Patient's  Info not at present   Occupation Retired   Discharge Planning   Type of [address] Prior To Admission Durable Medical Equipment   Current DME Prior to [address] [address] Care;Skilled Nursing Facility;Durable [address]   Potential DME Needed Other (Comment)  (assess pending clinical progress)   Potential Assistance Purchasing Medications No   Patient expects to be discharged to: House  (home with [facility] or other rehab pending clinical progress)   [address] Discharge   Transition of Care Consult (CM Consult) Discharge [address] Provided?  No   Mode of Transport at Discharge Other (see comment)  (family) Confirm Follow Up Transport Family   Condition of Participation: Discharge Planning   The Plan for Transition of Care is related to the following treatment goals: Pt will return home with supportive care vs rehab services. The Patient and/or Patient Representative was provided with a Choice of Provider? Patient;Patient Representative   Name of the Patient Representative who was provided with the Choice of Provider and agrees with the Discharge Plan? spouse   The Patient and/Or Patient Representative agree with the Discharge Plan? Yes   Freedom of Choice list was provided with basic dialogue that supports the patient's individualized plan of care/goals, treatment preferences, and shares the quality data associated with the providers?   Yes

## 2022-11-06 NOTE — PROGRESS NOTES
Rapid response called secondary to unresponsiveness. Patient was sitting in recliner when he suddenly became unresponsive. Staff lifted him back to bed and he became responsive then. Blood glucose 270s. Heart rate noted to be in the 40s. RR team @ bedside. EKG pending.

## 2022-11-06 NOTE — H&P
[facility] Cardiology Initial Cardiac Evaluation      Date of  Admission: 10/26/2022  6:21 PM     Primary Care Physician: Mechelle Oro DO  Primary Cardiologist: Dr Miquel Quiros  Referring Physician: Dr Js López  Supervising Physician: Dr Candelaria Keller    CC/Reason for evaluation: bradycardia     HPI:  Bailey Sullivan is a 79 y.o. male with PMH of CAD s/p CABG x 4 s/p PCI, sinus bradycardia, HTN, dyslipidemia, subclavian artery stenosis, DM2, UGI bleed/PUD/GERD, bladder cancer, CKD, CVA, subarachnoid hemorrhage 6/20/22, vascular dementia, COPD and smoker who was admitted on 10/29 with   acute hypoxemic respiratory failure 2/2 COVID-19. CXR unremarkable. CT chest neg for PE but does have mild infiltrate in DANI. He was started on dexamethasone, baricitinib and supplemental O2. He was weaned to RA but then began to have increased O2 requirements again up to 5L, CXR repeated 10/30 and showed mild bilateral atypical appearing infiltrates. Improved and was planned to be discharged today to 9th floor. Patient was sitting in chair when he sudden became unconscious while wife was there, RR called, glucose 276, HR initially in the 30s, BP was normal. He regained consciousness and was put in bed. EKG showed bigeminy. Patient was placed back on telemetry and cardiology consulted for further evaluation. Patient not on any AVN blocking agents. Telemetry showed sinus with bigeminy PACs.       Past Medical History:   Diagnosis Date    Allergic rhinitis, cause unspecified 2/27/2014    Anemia, unspecified 2/27/2014    Atherosclerosis of renal artery ([facility] [address]     2001    CAD (coronary artery disease) 2001    4 vessel cabg, cardiac stents prior to this    Cancer [facility]) 2013    bladder/ L kidney    Chronic airway obstruction, not elsewhere classified 2/27/2014    Chronic kidney disease     hx of CRF,  creatinine 1.8    Coronary atherosclerosis of native coronary vessel 2/27/2014    Depressive disorder, not elsewhere classified 2/27/2014 Diabetes ([facility] [address] dx 2000    type 2, oral med, does not check his glucose, A1C 6.4   9/9/2019, states very rare episode hypo    Diverticulosis of colon (without mention of hemorrhage) 2/27/2014    Dyslipidemia     controlled with med    Esophagitis, unspecified 2/27/2014    Essential and other specified forms of tremor 2/27/2014    Essential hypertension, benign 2/27/2014    GERD (gastroesophageal reflux disease)     controlled with omeprazole    Gross hematuria 2/27/2014    Hematuria, unspecified 2/27/2014    Hypertension     controlled with meds    Malignant neoplasm of bladder, 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     Malignant neoplasm of trigone of urinary bladder (HCC) 2/27/2014    Microscopic hematuria 2/27/2014    Osteoarthrosis, unspecified whether generalized or localized, unspecified site 2/27/2014    Other peripheral vascular disease(443.89) 2/27/2014    Polyneuropathy in diabetes(357.2) 2/27/2014    Proteinuria 2/27/2014    Psychiatric disorder     gets \"aggravated\", treated with Celexa    PUD (peptic ulcer disease) 1970's    Pure hypercholesterolemia 2/27/2014    Renal artery stenosis ([address] 2/27/2014    Renal sclerosis, unspecified 2/27/2014    left    Respiratory insufficiency 1/13/2016    Stroke ([address]     CVA x 2, (last was fall of 2011),  no residual weakness; L eye impaired    Tobacco use disorder 2/27/2014    Unspecified gastritis and gastroduodenitis without mention of hemorrhage 2/27/2014    Unspecified hereditary and idiopathic peripheral neuropathy 2/27/2014    Unspecified sleep apnea     does not wear cpap    Unspecified transient cerebral ischemia 2/27/2014      Past Surgical History:   Procedure Laterality Date    CARDIAC CATHETERIZATION      stents prior to CABG    COLONOSCOPY      KIDNEY REMOVAL Left     LIPOMA RESECTION  10/07/2019    [facility] CARDIAC SURG PROCEDURE UNLIST  2001    4 vessel CABG, cardiac stents    UROLOGICAL SURGERY      TURBT    VASCULAR SURGERY  2001 renal stent in L kidney       Allergies   Allergen Reactions    Aspirin-Dipyridamole Er Headaches    Beta Adrenergic Blockers     Ciprofloxacin Other (See Comments)     weakness    Donepezil Diarrhea    Lisinopril Cough      Social History     Socioeconomic History    Marital status:      Spouse name: Not on file    Number of children: Not on file    Years of education: Not on file    Highest education level: Not on file   Occupational History    Not on file   Tobacco Use    Smoking status: Every Day     Packs/day: 1.00     Types: Cigarettes    Smokeless tobacco: Never   Vaping Use    Vaping Use: Never used   Substance and Sexual Activity    Alcohol use: No    Drug use: No     Types: Prescription, OTC    Sexual activity: Not Currently   Other Topics Concern    Not on file   Social History Narrative    Not on file     Social Determinants of Health     Financial Resource Strain: Not on file   Food Insecurity: Not on file   Transportation Needs: Not on file   Physical Activity: Not on file   Stress: Not on file   Social Connections: Not on file   Intimate Partner Violence: Not on file   Housing Stability: Not on file     Social History       Tobacco History       Smoking Status  Every Day Smoking Frequency  1.00 packs/day Smoking Tobacco Type  Cigarettes      Smokeless Tobacco Use  Never              Alcohol History       Alcohol Use Status  No              Drug Use       Drug Use Status  No              Sexual Activity       Sexually Active  Not Currently                    Family History   Problem Relation Age of Onset    Diabetes Mother     Heart Disease Mother     Heart Attack Mother         Myocardial Infarction    Stroke Father     Alcohol Abuse Father     Diabetes Son         Current Facility-Administered Medications   Medication Dose Route Frequency    amLODIPine (NORVASC) tablet 10 mg  10 mg Oral Daily    enoxaparin (LOVENOX) injection 40 mg  40 mg SubCUTAneous Daily    baricitinib (OLUMIANT) tablet 2 mg  2 mg Oral Daily    melatonin tablet 3 mg  3 mg Oral Nightly PRN    aspirin chewable tablet 81 mg  81 mg Oral Daily    glucose chewable tablet 16 g  4 tablet Oral PRN    dextrose bolus 10% 125 mL  125 mL IntraVENous PRN    Or    dextrose bolus 10% 250 mL  250 mL IntraVENous PRN    glucagon (rDNA) injection 1 mg  1 mg SubCUTAneous PRN    dextrose 10 % infusion   IntraVENous Continuous PRN    tamsulosin (FLOMAX) capsule 0.4 mg  0.4 mg Oral Daily    sodium chloride flush 0.9 % injection 5-40 mL  5-40 mL IntraVENous 2 times per day    sodium chloride flush 0.9 % injection 5-40 mL  5-40 mL IntraVENous PRN    0.9 % sodium chloride infusion   IntraVENous PRN    ondansetron (ZOFRAN-ODT) disintegrating tablet 4 mg  4 mg Oral Q8H PRN    Or    ondansetron (ZOFRAN) injection 4 mg  4 mg IntraVENous Q6H PRN    polyethylene glycol (GLYCOLAX) packet 17 g  17 g Oral Daily PRN    acetaminophen (TYLENOL) tablet 650 mg  650 mg Oral Q6H PRN    Or    acetaminophen (TYLENOL) suppository 650 mg  650 mg Rectal Q6H PRN    guaiFENesin-dextromethorphan (ROBITUSSIN DM) 100-10 MG/5ML syrup 5 mL  5 mL Oral Q4H PRN    citalopram (CELEXA) tablet 20 mg  20 mg Oral Daily    losartan (COZAAR) tablet 100 mg  100 mg Oral Daily    memantine (NAMENDA) tablet 5 mg  5 mg Oral BID    pantoprazole (PROTONIX) tablet 40 mg  40 mg Oral Daily    atorvastatin (LIPITOR) tablet 20 mg  20 mg Oral Daily    insulin lispro (HUMALOG) injection vial 0-4 Units  0-4 Units SubCUTAneous TID     insulin lispro (HUMALOG) injection vial 0-4 Units  0-4 Units SubCUTAneous Nightly    nicotine (NICODERM CQ) 21 MG/24HR 1 patch  1 patch TransDERmal Daily    sodium chloride flush 0.9 % injection 5 mL  5 mL IntraVENous Q8H    sodium chloride flush 0.9 % injection 5 mL  5 mL IntraVENous PRN       Review of Symptoms:  Unable to review with patient secondary to dementia        Subjective:     Physical Exam:    Vitals:    11/06/22 0345 11/06/22 0730 11/06/22 1121 11/06/22 1315   BP: diabetes mellitus ([facility]  - per primary team       Stage 3a chronic kidney disease ([facility] [address]  - per primary team       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 We will continue to follow along with you. Livier Beverly PA-C  Supervising Physician: Dr Joi Grajeda    Attending Addendum    Patient independently seen and examined by me. Agree with above note by physician extender with the following additions and exceptions:  79 y.o. male with PMH of CAD s/p CABG x 4 s/p PCI, sinus bradycardia, HTN, dyslipidemia, subclavian artery stenosis, DM2, UGI bleed/PUD/GERD, bladder cancer, CKD, CVA, subarachnoid hemorrhage 6/20/22, vascular dementia, COPD and cardiology was consulted for syncope and possible bradycardia    Key findings are:  No CP or DIAZ  CV- RRR without murmur  Lungs- Clear bilaterally  Ext- no edema    Plan: unclear etiology, Pt scheduled for head CT       --cont telemetry, of note frequent PACs on EKG       --frequent PACs could result in inaccurate pulse but would not explain syncope       --further work up pending CT and clinical course     [facility]

## 2022-11-07 LAB
EKG ATRIAL RATE: 68 BPM
EKG DIAGNOSIS: NORMAL
EKG P AXIS: 86 DEGREES
EKG P-R INTERVAL: 188 MS
EKG Q-T INTERVAL: 428 MS
EKG QRS DURATION: 132 MS
EKG QTC CALCULATION (BAZETT): 455 MS
EKG R AXIS: 63 DEGREES
EKG T AXIS: 41 DEGREES
EKG VENTRICULAR RATE: 68 BPM
GLUCOSE BLD STRIP.AUTO-MCNC: 143 MG/DL (ref 65–100)
GLUCOSE BLD STRIP.AUTO-MCNC: 209 MG/DL (ref 65–100)
GLUCOSE BLD STRIP.AUTO-MCNC: 257 MG/DL (ref 65–100)
GLUCOSE BLD STRIP.AUTO-MCNC: 84 MG/DL (ref 65–100)
SERVICE CMNT-IMP: ABNORMAL
SERVICE CMNT-IMP: NORMAL

## 2022-11-07 PROCEDURE — 2580000003 HC RX 258: Performed by: EMERGENCY MEDICINE

## 2022-11-07 PROCEDURE — 6370000000 HC RX 637 (ALT 250 FOR IP): Performed by: INTERNAL MEDICINE

## 2022-11-07 PROCEDURE — 99232 SBSQ HOSP IP/OBS MODERATE 35: CPT | Performed by: INTERNAL MEDICINE

## 2022-11-07 PROCEDURE — 6370000000 HC RX 637 (ALT 250 FOR IP): Performed by: FAMILY MEDICINE

## 2022-11-07 PROCEDURE — 97530 THERAPEUTIC ACTIVITIES: CPT

## 2022-11-07 PROCEDURE — 82962 GLUCOSE BLOOD TEST: CPT

## 2022-11-07 PROCEDURE — 2580000003 HC RX 258: Performed by: HOSPITALIST

## 2022-11-07 PROCEDURE — 6370000000 HC RX 637 (ALT 250 FOR IP): Performed by: HOSPITALIST

## 2022-11-07 PROCEDURE — 1100000003 HC PRIVATE W/ TELEMETRY

## 2022-11-07 PROCEDURE — 6360000002 HC RX W HCPCS: Performed by: INTERNAL MEDICINE

## 2022-11-07 RX ADMIN — SODIUM CHLORIDE, PRESERVATIVE FREE 10 ML: 5 INJECTION INTRAVENOUS at 21:08

## 2022-11-07 RX ADMIN — SODIUM CHLORIDE, PRESERVATIVE FREE 5 ML: 5 INJECTION INTRAVENOUS at 09:47

## 2022-11-07 RX ADMIN — MEMANTINE 5 MG: 5 TABLET ORAL at 08:26

## 2022-11-07 RX ADMIN — ASPIRIN 81 MG: 81 TABLET, CHEWABLE ORAL at 08:26

## 2022-11-07 RX ADMIN — BARICITINIB 2 MG: 2 TABLET, FILM COATED ORAL at 08:26

## 2022-11-07 RX ADMIN — ENOXAPARIN SODIUM 40 MG: 100 INJECTION SUBCUTANEOUS at 08:30

## 2022-11-07 RX ADMIN — SODIUM CHLORIDE, PRESERVATIVE FREE 10 ML: 5 INJECTION INTRAVENOUS at 08:33

## 2022-11-07 RX ADMIN — TAMSULOSIN HYDROCHLORIDE 0.4 MG: 0.4 CAPSULE ORAL at 08:26

## 2022-11-07 RX ADMIN — PANTOPRAZOLE SODIUM 40 MG: 40 TABLET, DELAYED RELEASE ORAL at 08:26

## 2022-11-07 RX ADMIN — CITALOPRAM HYDROBROMIDE 20 MG: 20 TABLET ORAL at 08:26

## 2022-11-07 RX ADMIN — AMLODIPINE BESYLATE 10 MG: 10 TABLET ORAL at 08:26

## 2022-11-07 RX ADMIN — LOSARTAN POTASSIUM 100 MG: 50 TABLET, FILM COATED ORAL at 08:27

## 2022-11-07 RX ADMIN — SODIUM CHLORIDE, PRESERVATIVE FREE 5 ML: 5 INJECTION INTRAVENOUS at 18:22

## 2022-11-07 RX ADMIN — INSULIN LISPRO 1 UNITS: 100 INJECTION, SOLUTION INTRAVENOUS; SUBCUTANEOUS at 17:23

## 2022-11-07 RX ADMIN — ATORVASTATIN CALCIUM 20 MG: 20 TABLET, FILM COATED ORAL at 08:26

## 2022-11-07 RX ADMIN — MEMANTINE 5 MG: 5 TABLET ORAL at 21:08

## 2022-11-07 NOTE — PROGRESS NOTES
ACUTE PHYSICAL THERAPY GOALS:   (Developed with and agreed upon by patient and/or caregiver. )  LTG:  (1.)Mr. Paul Valdez will move from supine to sit and sit to supine , scoot up and down, and roll side to side in bed with CONTACT GUARD ASSIST within 7 treatment day(s). (2.)Mr. Paul Valdez will transfer from bed to chair and chair to bed with STAND BY ASSIST using the least restrictive device within 7 treatment day(s). (3.)Mr. Paul Valdez will ambulate with CONTACT GUARD ASSIST for 150 feet with the least restrictive device within 7 treatment day(s). PHYSICAL THERAPY: Daily Note AM   (Link to Caseload Tracking: PT Visit Days : 6  Time In/Out PT Charge Capture  Rehab Caseload Tracker  Orders    Tom Lopez is a 79 y.o. male   PRIMARY DIAGNOSIS: Acute respiratory failure with hypoxia ([facility] [address]  Hypoxia [R09.02]  COVID-19 virus infection [U07.1]       Inpatient: Payor: [employer] / Plan: MEDICARE PART A AND B / Product Type: *No Product type* /     ASSESSMENT:     REHAB RECOMMENDATIONS:   Recommendation to date pending progress:  Setting:  Inpatient Rehab Facility     Equipment:    To Be Determined has a walker and needs it right now     ASSESSMENT:  Mr. Paul Valdez is sitting up in the chair and willing to participate. He walked 50ft/RW  and Tona for holding clothes together. Pace was slow with cues to stay close to RW, But pt not interested. Sats did stayed below 90% on 4L, but stayed above 90% on 5L. Daughter present throughout of the session. SUBJECTIVE:   Mr. Paul Valdez states \"ok\".      Social/Functional Lives With: Spouse  Type of Home: House  Home Equipment: Rebls, [address] Help From: Family  ADL Assistance: Independent  Active : No  Patient's  Info: not at present  Occupation: Retired  OBJECTIVE:     PAIN: VITALS / O2: PRECAUTION / Rocha Bobbi / Angelica Potash:   Pre Treatment:          Post Treatment: 0 Vitals        Oxygen    None    RESTRICTIONS/PRECAUTIONS:        MOBILITY: I Mod I S SBA CGA Min Mod Max Total  NT x2 Comments:   Bed Mobility    Rolling [] [] [] [] [] [] [] [] [] [] []    Supine to Sit [] [] [] [] [] [] [] [] [] [] []    Scooting [] [] [] [] [] [] [] [] [] [] []    Sit to Supine [] [] [] [] [] [] [] [] [] [] []    Transfers    Sit to Stand [] [] [] [] [] [] [x] [] [] [] []    Bed to Chair [] [] [] [] [] [] [] [] [] [] []    Stand to Sit [] [] [] [] [x] [] [] [] [] [] []     [] [] [] [] [] [] [] [] [] [] []    I=Independent, Mod I=Modified Independent, S=Supervision, SBA=Standby Assistance, CGA=Contact Guard Assistance,   Min=Minimal Assistance, Mod=Moderate Assistance, Max=Maximal Assistance, Total=Total Assistance, NT=Not Tested    BALANCE: Good Fair+ Fair Fair- Poor NT Comments   Sitting Static [] [x] [] [] [] []    Sitting Dynamic [] [x] [] [] [] []              Standing Static [] [] [x] [] [] []    Standing Dynamic [] [] [x] [] [] []      GAIT: I Mod I S SBA CGA Min Mod Max Total  NT x2 Comments:   Level of Assistance [] [] [] [] [x] [] [] [] [] [] []    Distance 50  feet    DME Rollator    Gait Quality Decreased rae , Decreased step length, Shuffling , and Trunk flexion    Weightbearing Status      Stairs      I=Independent, Mod I=Modified Independent, S=Supervision, SBA=Standby Assistance, CGA=Contact Guard Assistance,   Min=Minimal Assistance, Mod=Moderate Assistance, Max=Maximal Assistance, Total=Total Assistance, NT=Not Tested    PLAN:   FREQUENCY AND DURATION: 3 times/week for duration of hospital stay or until stated goals are met, whichever comes first.    TREATMENT:   TREATMENT:   Therapeutic Activity (24 Minutes): Therapeutic activity included Scooting, Transfer Training, Ambulation on level ground, and Standing balance to improve functional Activity tolerance, Balance, Mobility, and Strength.     TREATMENT GRID:   Date:  11/3/22 Date:  11/4/22 Date:     Activity/Exercise Parameters Parameters Parameters   Seated TKE 10x B     AP 10x B     Seated marching 20x B     Seated hip abd 10x B     Sit to stand 5x 5x    Calf raises  10x B    High knee marching  10x B        AFTER TREATMENT PRECAUTIONS: Alarm Activated, Bed/Chair Locked, Call light within reach, Chair, Needs within reach, RN notified, and Visitors at bedside    INTERDISCIPLINARY COLLABORATION:  RN/ PCT and PT/ PTA    EDUCATION:      TIME IN/OUT:  Time In: 1525  Time Out: David  Minutes: 350 Doug Alexandre, PT

## 2022-11-07 NOTE — PROGRESS NOTES
Hospitalist Progress Note   Admit Date:  10/26/2022  6:21 PM   Name:  Jeremy Kelly   Age:  79 y.o. Sex:  male  :  1940   MRN:  [id number]   Room:  [facility]/      Reason(s) for Admission: Hypoxia [R09.02]  COVID-19 virus infection [U07.1]     Hospital Course & Interval History:   Mr. Hailee Fajardo is an 78 y/o WM with a h/o vascular dementia, HTN, CKD, and bladder cacner who was admitted to our service on 10/29 with acute hypoxemic respiratory failure 2/2 COVID-19. CXR unremarkable. CT chest neg for PE but does have mild infiltrate in DANI. He was started on dexamethasone, baricitinib and supplemental O2. He was weaned to RA but then began to have increased O2 requirements again up to 5L, CXR repeated 10/30 and showed mild bilateral atypical appearing infiltrates. Walk test  showed 7L O2 with exertion. Repeat ambulatory oximetry  showed 4L at rest and with exertion. RR called  for syncope and bradycardia, EKG bigeminy, regained consciousness quickly, Cardiology consulted. Subjective/24hr Events (22): Discharge held yesterday with bradycardia and syncopal event. Up in bed, feels well, wife at bedside, O2 back down to 4L NC. Ate some breakfast. HR currently low 60s and appears regular. No chest pain, N/V/D otherwise. Assessment & Plan:   # Symptomatic bradycardia // bigeminy   - RR called  for bradycardia and syncope. - Con't remote tele, EKG  bigeminy without AV block. Not on any AVN blockers. Electrolytes normal. Cardiology following. # Acute hypoxemic respiratory failure 2/2 COVID-19              - Completed dexamethasone on . Con't baricitinib. - Continues to require 4L NC     # HTN              - Norvasc increased . Con't ARB. No changes .      # MDD              - Celexa     # BPH              - Flomax     # GERD              - PPI     # HLD              - Statin     # Vascular dementia              - Namenda    Discharge Planning: To [facility] when able. Diet:  ADULT DIET; Regular  ADULT ORAL NUTRITION SUPPLEMENT; AM Snack, PM Snack; Standard High Calorie/High Protein Oral Supplement  DVT PPx: Lovenox  Code status: Full Code    Hospital Problems             Last Modified POA    * (Principal) Acute respiratory failure with hypoxia ([facility] [address] 11/6/2022 Yes    Dementia ([facility] [address] 11/6/2022 Yes    Type 2 diabetes mellitus ([facility] [address] 11/6/2022 Yes    Chronic ischemic heart disease 11/6/2022 Yes    Stage 3a chronic kidney disease ([facility] [address] 11/6/2022 Yes    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 ([facility] [address] 11/6/2022 Yes    COVID-19 virus infection 11/6/2022 Yes    Bradycardia 11/6/2022 Yes    Hypoxia 11/6/2022 Yes    Essential hypertension, benign 11/6/2022 Yes        Objective:   Patient Vitals for the past 24 hrs:   Temp Pulse Resp BP SpO2   11/07/22 0752 98.1 °F (36.7 °C) 63 19 (!) 137/49 91 %   11/07/22 0340 97.9 °F (36.6 °C) 59 20 128/63 90 %   11/07/22 0015 -- 80 -- (!) 120/48 --   11/07/22 0012 98.1 °F (36.7 °C) 80 20 (!) 115/37 96 %   11/06/22 2024 -- 66 -- (!) 130/50 --   11/06/22 1951 97.7 °F (36.5 °C) 66 20 (!) 129/36 90 %   11/06/22 1315 -- -- -- -- 91 %   11/06/22 1121 97.7 °F (36.5 °C) 67 19 (!) 151/56 (!) 84 %       Estimated body mass index is 23.69 kg/m² as calculated from the following:    Height as of this encounter: 5' 10\" (1.778 m). Weight as of this encounter: 165 lb 1.6 oz (74.9 kg). Intake/Output Summary (Last 24 hours) at 11/7/2022 0952  Last data filed at 11/6/2022 2200  Gross per 24 hour   Intake 100 ml   Output --   Net 100 ml         Physical Exam:   Blood pressure (!) 137/49, pulse 63, temperature 98.1 °F (36.7 °C), temperature source Axillary, resp. rate 19, height 5' 10\" (1.778 m), weight 165 lb 1.6 oz (74.9 kg), SpO2 91 %. General:    Well nourished. No overt distress. Awake, pleasant, NAD. Appears stated age. Head:  Normocephalic, atraumatic  Eyes:  Sclerae appear normal. Pupils equally round.   ENT:  Nares appear normal, no drainage. Moist oral mucosa  Neck:  No restricted ROM. Trachea midline. CV:   RRR, HR around low 60s on manual check. No m/r/g. No jugular venous distension. Lungs:   CTAB. No wheezing, rhonchi, or rales. Respirations even, unlabored. 4L NC O2. Abdomen: Bowel sounds present. Soft, nontender, nondistended. Extremities: No cyanosis or clubbing. No edema. Skin:     No rashes and normal coloration. Warm and dry. Neuro:  CN II-XII grossly intact. Sensation intact. A&Ox3  Psych:  Normal mood and affect. I have reviewed ordered lab tests and independently visualized imaging below:    Recent Labs:  Recent Results (from the past 48 hour(s))   POCT Glucose    Collection Time: 11/05/22 12:09 PM   Result Value Ref Range    POC Glucose 173 (H) 65 - 100 mg/dL    Performed by: Saumya    POCT Glucose    Collection Time: 11/05/22  5:00 PM   Result Value Ref Range    POC Glucose 365 (H) 65 - 100 mg/dL    Performed by: Saumya    POCT Glucose    Collection Time: 11/05/22  8:42 PM   Result Value Ref Range    POC Glucose 279 (H) 65 - 100 mg/dL    Performed by: Maddie    POCT Glucose    Collection Time: 11/06/22  7:49 AM   Result Value Ref Range    POC Glucose 118 (H) 65 - 100 mg/dL    Performed by: Zafar    POCT Glucose    Collection Time: 11/06/22  3:07 PM   Result Value Ref Range    POC Glucose 279 (H) 65 - 100 mg/dL    Performed by:  González    EKG 12 Lead    Collection Time: 11/06/22  3:20 PM   Result Value Ref Range    Ventricular Rate 68 BPM    Atrial Rate 68 BPM    P-R Interval 188 ms    QRS Duration 132 ms    Q-T Interval 428 ms    QTc Calculation (Bazett) 455 ms    P Axis 86 degrees    R Axis 63 degrees    T Axis 41 degrees    Diagnosis       Sinus rhythm with Premature atrial complexes in a pattern of bigeminy   Basic Metabolic Panel    Collection Time: 11/06/22  4:05 PM   Result Value Ref Range    Sodium 140 133 - 143 mmol/L    Potassium 3.6 3.5 - 5.1 mmol/L    Chloride 109 101 - 110 mmol/L    CO2 28 21 - 32 mmol/L    Anion Gap 3 2 - 11 mmol/L    Glucose 235 (H) 65 - 100 mg/dL    BUN 35 (H) 8 - 23 MG/DL    Creatinine 1.70 (H) 0.8 - 1.5 MG/DL    Est, Glom Filt Rate 40 (L) >60 ml/min/1.73m2    Calcium 8.8 8.3 - 10.4 MG/DL   Magnesium    Collection Time: 11/06/22  4:05 PM   Result Value Ref Range    Magnesium 1.9 1.8 - 2.4 mg/dL   POCT Glucose    Collection Time: 11/06/22  6:02 PM   Result Value Ref Range    POC Glucose 223 (H) 65 - 100 mg/dL    Performed by: Zafar    POCT Glucose    Collection Time: 11/06/22  8:57 PM   Result Value Ref Range    POC Glucose 264 (H) 65 - 100 mg/dL    Performed by: Maddie    POCT Glucose    Collection Time: 11/07/22  7:50 AM   Result Value Ref Range    POC Glucose 84 65 - 100 mg/dL    Performed by: Evelio Rendon          Other Studies:  CT HEAD WO CONTRAST    Result Date: 11/6/2022  History: Headache. H/o SAH. Exam: CT head without contrast Technique: Thin section axial CT images were obtained from the skullbase through the vertex. Radiation dose reduction techniques were used for this study. Our CT scanners use one or all of the following: Automated exposure control, adjustment of the mA and/or kV according to patient size, use of iterative reconstruction. COMPARISON: 6/28/2022 Findings: There is a stable area of encephalomalacia within the bilateral cerebellar hemispheres. There is also a stable area of encephalomalacia within the right occipital lobe. No acute intracranial hemorrhage. The ventricles are normal in size, shape, and position. No new abnormal parenchymal density is present. No extra-axial fluid collection is present. There is no mass or mass-effect. The basilar cisterns are patent. The paranasal sinuses and mastoid air cells are clear. Stable areas of encephalomalacia as described above. No acute intracranial hemorrhage.      XR CHEST 1 VIEW    Result Date: 11/6/2022  History: Shortness of breath Exam: portable chest Comparison: 11/5/2022 Findings: There is coarsening of the interstitial lung markings without focal alveolar infiltrate or pleural effusion. No change in the appearance of the mediastinal contour or osseous structures. IMPRESSIONs: Stable portable chest     XR CHEST 1 VIEW    Result Date: 11/5/2022  Chest X-ray INDICATION: Shortness of breath AP/PA view of the chest was obtained. FINDINGS: There is bilateral perihilar interstitial prominence, slightly increased compared to prior studies. Peripheral lungs remain clear. No effusions. The heart size is normal.  There are sternotomy changes.       Slight increased bilateral perihilar interstitial infiltrate, left greater than right       Current Meds:  Current Facility-Administered Medications   Medication Dose Route Frequency    amLODIPine (NORVASC) tablet 10 mg  10 mg Oral Daily    enoxaparin (LOVENOX) injection 40 mg  40 mg SubCUTAneous Daily    baricitinib (OLUMIANT) tablet 2 mg  2 mg Oral Daily    melatonin tablet 3 mg  3 mg Oral Nightly PRN    aspirin chewable tablet 81 mg  81 mg Oral Daily    glucose chewable tablet 16 g  4 tablet Oral PRN    dextrose bolus 10% 125 mL  125 mL IntraVENous PRN    Or    dextrose bolus 10% 250 mL  250 mL IntraVENous PRN    glucagon (rDNA) injection 1 mg  1 mg SubCUTAneous PRN    dextrose 10 % infusion   IntraVENous Continuous PRN    tamsulosin (FLOMAX) capsule 0.4 mg  0.4 mg Oral Daily    sodium chloride flush 0.9 % injection 5-40 mL  5-40 mL IntraVENous 2 times per day    sodium chloride flush 0.9 % injection 5-40 mL  5-40 mL IntraVENous PRN    0.9 % sodium chloride infusion   IntraVENous PRN    ondansetron (ZOFRAN-ODT) disintegrating tablet 4 mg  4 mg Oral Q8H PRN    Or    ondansetron (ZOFRAN) injection 4 mg  4 mg IntraVENous Q6H PRN    polyethylene glycol (GLYCOLAX) packet 17 g  17 g Oral Daily PRN    acetaminophen (TYLENOL) tablet 650 mg  650 mg Oral Q6H PRN    Or    acetaminophen (TYLENOL) suppository 650 mg  650 mg Rectal Q6H PRN    guaiFENesin-dextromethorphan (ROBITUSSIN DM) 100-10 MG/5ML syrup 5 mL  5 mL Oral Q4H PRN    citalopram (CELEXA) tablet 20 mg  20 mg Oral Daily    losartan (COZAAR) tablet 100 mg  100 mg Oral Daily    memantine (NAMENDA) tablet 5 mg  5 mg Oral BID    pantoprazole (PROTONIX) tablet 40 mg  40 mg Oral Daily    atorvastatin (LIPITOR) tablet 20 mg  20 mg Oral Daily    insulin lispro (HUMALOG) injection vial 0-4 Units  0-4 Units SubCUTAneous TID WC    insulin lispro (HUMALOG) injection vial 0-4 Units  0-4 Units SubCUTAneous Nightly    nicotine (NICODERM CQ) 21 MG/24HR 1 patch  1 patch TransDERmal Daily    sodium chloride flush 0.9 % injection 5 mL  5 mL IntraVENous Q8H    sodium chloride flush 0.9 % injection 5 mL  5 mL IntraVENous PRN       Signed:  Leonid Crawford MD    Part of this note may have been written by using a voice dictation software. The note has been proof read but may still contain some grammatical/other typographical errors.

## 2022-11-07 NOTE — PROGRESS NOTES
RRT Clinical Rounding Nurse Progress Report      SUBJECTIVE: Called to assess patient secondary to recent rapid response. Vitals:    11/06/22 2024 11/07/22 0012 11/07/22 0015 11/07/22 0340   BP: (!) 130/50 (!) 115/37 (!) 120/48 128/63   Pulse: 66 80 80 59   Resp:  20  20   Temp:  98.1 °F (36.7 °C)  97.9 °F (36.6 °C)   TempSrc:  Axillary  Oral   SpO2:  96%  90%   Weight:       Height:          ASSESSMENT:  Upon entering room, pt in bed sleeping with wife at bedside. O2 sat 97% on 4L HFNC. Recent labs/notes/vitals reviewed and pt discussed with primary RN. Pt with no additional syncopal episodes after RR. Plans to d/c to 9th floor soon. PLAN:  Will discharge from RRT Clinical Rounding Program per protocol. Please call if needed.     Akua Briones RN  [place]: [facility]: [phone]

## 2022-11-07 NOTE — PROGRESS NOTES
Telemetry notified this nurse that patient is SB at 47-48 pulse and this nurse checked on patient. Patient laying on left side resting in bed with no distress noted. Patient pulse going from 50's-80's on machine.

## 2022-11-07 NOTE — CARE COORDINATION
LOS 11d  IDR patient to discharge tomorrow to [facility]  9th floor   Will continue to follow for discharge planning needs  Please consult  if any new issues arise

## 2022-11-07 NOTE — PROGRESS NOTES
Patient in bed resting with no distress at this time. 02 on at 4 liters via HFNC with RR even/unlabored. Telemetry on and working with patient NSR.   Safety in place with bed alarm on and wife at bedside with call light n reach

## 2022-11-07 NOTE — PROGRESS NOTES
injection 1 mg  1 mg SubCUTAneous PRN    dextrose 10 % infusion   IntraVENous Continuous PRN    tamsulosin (FLOMAX) capsule 0.4 mg  0.4 mg Oral Daily    sodium chloride flush 0.9 % injection 5-40 mL  5-40 mL IntraVENous 2 times per day    sodium chloride flush 0.9 % injection 5-40 mL  5-40 mL IntraVENous PRN    0.9 % sodium chloride infusion   IntraVENous PRN    ondansetron (ZOFRAN-ODT) disintegrating tablet 4 mg  4 mg Oral Q8H PRN    Or    ondansetron (ZOFRAN) injection 4 mg  4 mg IntraVENous Q6H PRN    polyethylene glycol (GLYCOLAX) packet 17 g  17 g Oral Daily PRN    acetaminophen (TYLENOL) tablet 650 mg  650 mg Oral Q6H PRN    Or    acetaminophen (TYLENOL) suppository 650 mg  650 mg Rectal Q6H PRN    guaiFENesin-dextromethorphan (ROBITUSSIN DM) 100-10 MG/5ML syrup 5 mL  5 mL Oral Q4H PRN    citalopram (CELEXA) tablet 20 mg  20 mg Oral Daily    losartan (COZAAR) tablet 100 mg  100 mg Oral Daily    memantine (NAMENDA) tablet 5 mg  5 mg Oral BID    pantoprazole (PROTONIX) tablet 40 mg  40 mg Oral Daily    atorvastatin (LIPITOR) tablet 20 mg  20 mg Oral Daily    insulin lispro (HUMALOG) injection vial 0-4 Units  0-4 Units SubCUTAneous TID WC    insulin lispro (HUMALOG) injection vial 0-4 Units  0-4 Units SubCUTAneous Nightly    nicotine (NICODERM CQ) 21 MG/24HR 1 patch  1 patch TransDERmal Daily    sodium chloride flush 0.9 % injection 5 mL  5 mL IntraVENous Q8H    sodium chloride flush 0.9 % injection 5 mL  5 mL IntraVENous PRN       Data Review: data included in this note has been independently reviewed by the author     TELEMETRY:     Assessment/Plan:     Patient Active Problem List   Diagnosis    Proteinuria    Hematuria, unspecified    Pure hypercholesterolemia    Acute, but ill-defined, cerebrovascular disease    Recurrent major depressive disorder, in full remission ([facility] [address]    Gross hematuria    Chronic kidney disease, unspecified    Urinary tract infection, site not specified    Coronary atherosclerosis of native coronary vessel    Microscopic hematuria    Tobacco use disorder    Type II or unspecified type diabetes mellitus with neurological manifestations, not stated as uncontrolled(250.60)    Essential tremor    Anemia, unspecified    Essential hypertension, benign    History of kidney cancer    Renal sclerosis, unspecified    CKD (chronic kidney disease) stage 4, GFR 15-29 ml/min (HCC)    Respiratory insufficiency    SAH (subarachnoid hemorrhage) (HCC)    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HCC)    Dementia (HCC)    Type 2 diabetes mellitus ([address]    Acute blood loss anemia (ABLA)    Acute upper gastrointestinal bleeding    Bradycardia, sinus    Acute respiratory failure with hypoxia (HCC)    Hypernatremia    Allergic rhinitis    Anxiety    Esophagitis    Benign neoplasm of colon    Chronic airway obstruction, not elsewhere classified    GERD (gastroesophageal reflux disease)    Chronic ischemic heart disease    Congenital tracheoesophageal fistula, esophageal atresia and stenosis    Debility    Depression    Diabetes mellitus type 2, diet-controlled ([address]    Diabetic polyneuropathy associated with type 2 diabetes mellitus ([address]    Diverticulosis of colon    Tremor    GI bleed    Hearing loss    Hereditary and idiopathic peripheral neuropathy    Impacted cerumen    Lens replaced by other means    Major depressive disorder, recurrent episode, moderate (HCC)    Mild nonproliferative diabetic retinopathy (HCC)    Mononeuritis    Nicotine dependence, unspecified, uncomplicated    Osteoarthritis    Other and unspecified hyperlipidemia    Other ill-defined and unknown causes of morbidity and mortality    Postsurgical aortocoronary bypass status    Sciatica    Stage 3a chronic kidney disease ([address]    Unintentional weight loss    Vascular dementia without behavioral disturbance ([address]    Malignant neoplasm of urinary bladder (HCC)    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 - Continue to monitor on telemetry    Essential hypertension:  - BP well controlled, continue amlodipine and losartan  - Continue to monitor/titrate as needed. Coronary artery disease s/p CABG:  - Stable, no angina  - Continue ASA 81 mg daily, Atorvastatin 20 mg daily. Peter Cole  I have personally seen and evaluated the patient and reviewed the students note and agree with the following assessment and plan and findings. I was present for and observed the key components of this note. Any appropriate additions or editing of the information have been done by me.     Rylee Villarreal MD, [address] [facility]  Cardiology

## 2022-11-07 NOTE — PROGRESS NOTES
Patient resting in bed with no distress at this time. 02 at 4 liters via HFNC with RR even/unlabored. Telemetry on and working with patient running NSR. Safety in place with bed alarm on and call light in reach with spouse at bedside.   Will prepare bedside shift report for oncoming nurse

## 2022-11-07 NOTE — PROGRESS NOTES
Melatonin 3 mg po given for sleep per patient request and will assess med with bed alarm on and wife at bedside with call light in reach

## 2022-11-08 PROBLEM — R00.1 BRADYCARDIA: Status: RESOLVED | Noted: 2022-11-06 | Resolved: 2022-11-08

## 2022-11-08 LAB
ANION GAP SERPL CALC-SCNC: 3 MMOL/L (ref 2–11)
BUN SERPL-MCNC: 31 MG/DL (ref 8–23)
CALCIUM SERPL-MCNC: 8.7 MG/DL (ref 8.3–10.4)
CHLORIDE SERPL-SCNC: 109 MMOL/L (ref 101–110)
CO2 SERPL-SCNC: 29 MMOL/L (ref 21–32)
CREAT SERPL-MCNC: 1.5 MG/DL (ref 0.8–1.5)
GLUCOSE BLD STRIP.AUTO-MCNC: 104 MG/DL (ref 65–100)
GLUCOSE BLD STRIP.AUTO-MCNC: 168 MG/DL (ref 65–100)
GLUCOSE BLD STRIP.AUTO-MCNC: 278 MG/DL (ref 65–100)
GLUCOSE BLD STRIP.AUTO-MCNC: 89 MG/DL (ref 65–100)
GLUCOSE SERPL-MCNC: 85 MG/DL (ref 65–100)
MAGNESIUM SERPL-MCNC: 2 MG/DL (ref 1.8–2.4)
POTASSIUM SERPL-SCNC: 3.6 MMOL/L (ref 3.5–5.1)
SERVICE CMNT-IMP: ABNORMAL
SERVICE CMNT-IMP: NORMAL
SODIUM SERPL-SCNC: 141 MMOL/L (ref 133–143)

## 2022-11-08 PROCEDURE — 6370000000 HC RX 637 (ALT 250 FOR IP): Performed by: FAMILY MEDICINE

## 2022-11-08 PROCEDURE — 2700000000 HC OXYGEN THERAPY PER DAY

## 2022-11-08 PROCEDURE — 82962 GLUCOSE BLOOD TEST: CPT

## 2022-11-08 PROCEDURE — 99232 SBSQ HOSP IP/OBS MODERATE 35: CPT | Performed by: INTERNAL MEDICINE

## 2022-11-08 PROCEDURE — 6360000002 HC RX W HCPCS: Performed by: INTERNAL MEDICINE

## 2022-11-08 PROCEDURE — 83735 ASSAY OF MAGNESIUM: CPT

## 2022-11-08 PROCEDURE — 2580000003 HC RX 258: Performed by: EMERGENCY MEDICINE

## 2022-11-08 PROCEDURE — 6370000000 HC RX 637 (ALT 250 FOR IP): Performed by: INTERNAL MEDICINE

## 2022-11-08 PROCEDURE — 36415 COLL VENOUS BLD VENIPUNCTURE: CPT

## 2022-11-08 PROCEDURE — 1100000003 HC PRIVATE W/ TELEMETRY

## 2022-11-08 PROCEDURE — 94760 N-INVAS EAR/PLS OXIMETRY 1: CPT

## 2022-11-08 PROCEDURE — 2580000003 HC RX 258: Performed by: HOSPITALIST

## 2022-11-08 PROCEDURE — 80048 BASIC METABOLIC PNL TOTAL CA: CPT

## 2022-11-08 PROCEDURE — 6370000000 HC RX 637 (ALT 250 FOR IP): Performed by: HOSPITALIST

## 2022-11-08 RX ADMIN — PANTOPRAZOLE SODIUM 40 MG: 40 TABLET, DELAYED RELEASE ORAL at 09:54

## 2022-11-08 RX ADMIN — SODIUM CHLORIDE, PRESERVATIVE FREE 10 ML: 5 INJECTION INTRAVENOUS at 20:24

## 2022-11-08 RX ADMIN — SODIUM CHLORIDE, PRESERVATIVE FREE 5 ML: 5 INJECTION INTRAVENOUS at 09:53

## 2022-11-08 RX ADMIN — AMLODIPINE BESYLATE 10 MG: 10 TABLET ORAL at 09:53

## 2022-11-08 RX ADMIN — SODIUM CHLORIDE, PRESERVATIVE FREE 5 ML: 5 INJECTION INTRAVENOUS at 04:30

## 2022-11-08 RX ADMIN — ENOXAPARIN SODIUM 40 MG: 100 INJECTION SUBCUTANEOUS at 09:54

## 2022-11-08 RX ADMIN — BARICITINIB 2 MG: 2 TABLET, FILM COATED ORAL at 09:54

## 2022-11-08 RX ADMIN — TAMSULOSIN HYDROCHLORIDE 0.4 MG: 0.4 CAPSULE ORAL at 09:54

## 2022-11-08 RX ADMIN — MEMANTINE 5 MG: 5 TABLET ORAL at 09:53

## 2022-11-08 RX ADMIN — INSULIN LISPRO 2 UNITS: 100 INJECTION, SOLUTION INTRAVENOUS; SUBCUTANEOUS at 17:58

## 2022-11-08 RX ADMIN — SODIUM CHLORIDE, PRESERVATIVE FREE 5 ML: 5 INJECTION INTRAVENOUS at 17:58

## 2022-11-08 RX ADMIN — CITALOPRAM HYDROBROMIDE 20 MG: 20 TABLET ORAL at 09:53

## 2022-11-08 RX ADMIN — LOSARTAN POTASSIUM 100 MG: 50 TABLET, FILM COATED ORAL at 09:54

## 2022-11-08 RX ADMIN — MEMANTINE 5 MG: 5 TABLET ORAL at 20:23

## 2022-11-08 RX ADMIN — ATORVASTATIN CALCIUM 20 MG: 20 TABLET, FILM COATED ORAL at 09:54

## 2022-11-08 RX ADMIN — ASPIRIN 81 MG: 81 TABLET, CHEWABLE ORAL at 09:54

## 2022-11-08 NOTE — PROGRESS NOTES
am  11/8/2022 7:04 AM    Admit Date: 10/26/2022    Admit Diagnosis: Hypoxia [R09.02]  COVID-19 virus infection [U07.1]      Subjective:    Patient : Pt reports doing well overnight, denies any chest pain or shortness of breath. No complaints at this time. Resting comfortably. Objective:    BP (!) 141/56   Pulse 61   Temp 97.9 °F (36.6 °C)   Resp 16   Ht 5' 10\" (1.778 m)   Wt 165 lb 1.6 oz (74.9 kg)   SpO2 92%   BMI 23.69 kg/m²     ROS:  General ROS: negative for - chills  Hematological and Lymphatic ROS: negative for - blood clots or jaundice  Respiratory ROS: no cough, shortness of breath, or wheezing  Cardiovascular ROS: no chest pain or dyspnea on exertion  Gastrointestinal ROS: no abdominal pain, change in bowel habits, or black or bloody stools  Neurological ROS: no TIA or stroke symptoms    Physical Exam:      Physical Examination: General appearance - Appearance: alert, well appearing, and in no distress.    Neck/lymph - supple, no significant adenopathy  Chest/CV - clear to auscultation, no wheezes, rales or rhonchi, symmetric air entry  Heart - normal rate, regular rhythm, normal S1, S2, no murmurs, rubs, clicks or gallops  Abdomen/GI - soft, nontender, nondistended, no masses or organomegaly   Musculoskeletal - no joint tenderness, deformity or swelling  Extremities - peripheral pulses normal, no pedal edema, no clubbing or cyanosis  Skin - normal coloration and turgor, no rashes, no suspicious skin lesions noted    Current Facility-Administered Medications   Medication Dose Route Frequency    amLODIPine (NORVASC) tablet 10 mg  10 mg Oral Daily    enoxaparin (LOVENOX) injection 40 mg  40 mg SubCUTAneous Daily    baricitinib (OLUMIANT) tablet 2 mg  2 mg Oral Daily    melatonin tablet 3 mg  3 mg Oral Nightly PRN    aspirin chewable tablet 81 mg  81 mg Oral Daily    glucose chewable tablet 16 g  4 tablet Oral PRN    dextrose bolus 10% 125 mL  125 mL IntraVENous PRN    Or    dextrose bolus 10% 250 mL 250 mL IntraVENous PRN    glucagon (rDNA) injection 1 mg  1 mg SubCUTAneous PRN    dextrose 10 % infusion   IntraVENous Continuous PRN    tamsulosin (FLOMAX) capsule 0.4 mg  0.4 mg Oral Daily    sodium chloride flush 0.9 % injection 5-40 mL  5-40 mL IntraVENous 2 times per day    sodium chloride flush 0.9 % injection 5-40 mL  5-40 mL IntraVENous PRN    0.9 % sodium chloride infusion   IntraVENous PRN    ondansetron (ZOFRAN-ODT) disintegrating tablet 4 mg  4 mg Oral Q8H PRN    Or    ondansetron (ZOFRAN) injection 4 mg  4 mg IntraVENous Q6H PRN    polyethylene glycol (GLYCOLAX) packet 17 g  17 g Oral Daily PRN    acetaminophen (TYLENOL) tablet 650 mg  650 mg Oral Q6H PRN    Or    acetaminophen (TYLENOL) suppository 650 mg  650 mg Rectal Q6H PRN    guaiFENesin-dextromethorphan (ROBITUSSIN DM) 100-10 MG/5ML syrup 5 mL  5 mL Oral Q4H PRN    citalopram (CELEXA) tablet 20 mg  20 mg Oral Daily    losartan (COZAAR) tablet 100 mg  100 mg Oral Daily    memantine (NAMENDA) tablet 5 mg  5 mg Oral BID    pantoprazole (PROTONIX) tablet 40 mg  40 mg Oral Daily    atorvastatin (LIPITOR) tablet 20 mg  20 mg Oral Daily    insulin lispro (HUMALOG) injection vial 0-4 Units  0-4 Units SubCUTAneous TID WC    insulin lispro (HUMALOG) injection vial 0-4 Units  0-4 Units SubCUTAneous Nightly    nicotine (NICODERM CQ) 21 MG/24HR 1 patch  1 patch TransDERmal Daily    sodium chloride flush 0.9 % injection 5 mL  5 mL IntraVENous Q8H    sodium chloride flush 0.9 % injection 5 mL  5 mL IntraVENous PRN       Data Review: data included in this note has been independently reviewed by the author     TELEMETRY:     Assessment/Plan:     Patient Active Problem List   Diagnosis    Proteinuria    Hematuria, unspecified    Pure hypercholesterolemia    Acute, but ill-defined, cerebrovascular disease    Recurrent major depressive disorder, in full remission ([facility]    Gross hematuria    Chronic kidney disease, unspecified    Urinary tract infection, site not specified    Coronary atherosclerosis of native coronary vessel    Microscopic hematuria    Tobacco use disorder    Type II or unspecified type diabetes mellitus with neurological manifestations, not stated as uncontrolled(250.60)    Essential tremor    Anemia, unspecified    Essential hypertension, benign    History of kidney cancer    Renal sclerosis, unspecified    CKD (chronic kidney disease) stage 4, GFR 15-29 ml/min (HCC)    Respiratory insufficiency    SAH (subarachnoid hemorrhage) (HCC)    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HCC)    Dementia (HCC)    Type 2 diabetes mellitus (HCC)    Acute blood loss anemia ([facility]LA)    Acute upper gastrointestinal bleeding    Bradycardia, sinus    Acute respiratory failure with hypoxia (HCC)    Hypernatremia    Allergic rhinitis    Anxiety    Esophagitis    Benign neoplasm of colon    Chronic airway obstruction, not elsewhere classified    GERD (gastroesophageal reflux disease)    Chronic ischemic heart disease    Congenital tracheoesophageal fistula, esophageal atresia and stenosis    Debility    Depression    Diabetes mellitus type 2, diet-controlled ([address]    Diabetic polyneuropathy associated with type 2 diabetes mellitus ([address]    Diverticulosis of colon    Tremor    GI bleed    Hearing loss    Hereditary and idiopathic peripheral neuropathy    Impacted cerumen    Lens replaced by other means    Major depressive disorder, recurrent episode, moderate (HCC)    Mild nonproliferative diabetic retinopathy (HCC)    Mononeuritis    Nicotine dependence, unspecified, uncomplicated    Osteoarthritis    Other and unspecified hyperlipidemia    Other ill-defined and unknown causes of morbidity and mortality    Postsurgical aortocoronary bypass status    Sciatica    Stage 3a chronic kidney disease ([address]    Unintentional weight loss    Vascular dementia without behavioral disturbance ([address]    Malignant neoplasm of urinary bladder (HCC)    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 - Review of vital signs does not show any further episodes of significant bradycardia, heart rate stable in the 60s. Essential hypertension:  - BP well controlled, continue amlodipine and losartan  - Continue to monitor/titrate as needed. Coronary artery disease s/p CABG:  - Stable, no angina  - Continue ASA 81 mg daily, Atorvastatin 20 mg daily. Melly Jean  I have personally seen and evaluated the patient and reviewed the students note and agree with the following assessment and plan and findings. I was present for and observed the key components of this note. Any appropriate additions or editing of the information have been done by me.     Mason Bradford MD, [facility]  Cardiology

## 2022-11-08 NOTE — CARE COORDINATION
Patient were discussed in morning meeting. Dr. Azar Estrada will continue to follow patient. CM will continue to follow.

## 2022-11-08 NOTE — PROGRESS NOTES
Patient resting in bed on 4L NC. A&Ox2, not oriented to situation or time. Wife at bedside. Respirations even and unlabored. Patient denies pain and is in no acute distress at this time. No acute events overnight. Call bell within reach, patient instructed to call if assistance is needed. Preparing to give report to oncoming RN.

## 2022-11-08 NOTE — PROGRESS NOTES
Hospitalist Progress Note   Admit Date:  10/26/2022  6:21 PM   Name:  Geraldo Forman   Age:  79 y.o. Sex:  male  :  1940   MRN:  [id number]   Room:  [facility]/    Reason(s) for Admission: Hypoxia [R09.02]  COVID-19 virus infection [U07.1]     Hospital Course & Interval History:   Mr. Mo Roa is an 80 y/o WM with a h/o vascular dementia, HTN, CKD, and bladder cacner who was admitted to our service on 10/29 with acute hypoxemic respiratory failure 2/2 COVID-19. CXR unremarkable. CT chest neg for PE but does have mild infiltrate in DANI. He was started on dexamethasone, baricitinib and supplemental O2. He was weaned to RA but then began to have increased O2 requirements again up to 5L, CXR repeated 10/30 and showed mild bilateral atypical appearing infiltrates. Walk test  showed 7L O2 with exertion. Repeat ambulatory oximetry  showed 4L at rest and with exertion. RR called  for syncope and bradycardia, EKG bigeminy, regained consciousness quickly, Cardiology consulted. No further concerning events on telemetry. Tentative plans for discharge to [facility] on . Subjective/24hr Events (22): In chair resting, feels well, no events on tele. Wife at bedside. Patient still requiring 4L NC O2. Sleeping well, eating ok. HR 60s. No chest pain or SOB. Assessment & Plan:   # Symptomatic bradycardia // bigeminy              - RR called  for bradycardia and syncope. No further events on tele. [address] Cardiology. Electrolytes normal.     # Acute hypoxemic respiratory failure 2/2 COVID-19              - Completed dexamethasone on . Con't baricitinib. - Still requiring 4L NC O2 at rest and with exertion     # HTN              - Norvasc increased on . Con't ARB. # MDD              - Celexa     # BPH              - Flomax     # GERD              - PPI     # HLD              - Statin     # Vascular dementia              - Namenda    Discharge Planning:  To [facility] tomorrow, 11/9.  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 ([facility] [address] 11/6/2022 Yes    COVID-19 virus infection 11/6/2022 Yes    Hypoxia 11/8/2022 Yes    Essential hypertension, benign 11/6/2022 Yes        Objective:   Patient Vitals for the past 24 hrs:   Temp Pulse Resp BP SpO2   11/08/22 0758 98.6 °F (37 °C) 65 19 (!) 144/44 91 %   11/08/22 0313 97.9 °F (36.6 °C) 61 16 (!) 141/56 92 %   11/07/22 2256 98.1 °F (36.7 °C) 63 16 (!) 122/45 93 %   11/07/22 1940 99 °F (37.2 °C) 65 17 (!) 117/47 93 %   11/07/22 1504 98.2 °F (36.8 °C) 68 19 (!) 123/47 92 %   11/07/22 1151 98.2 °F (36.8 °C) 65 19 (!) 127/53 97 %       Estimated body mass index is 23.69 kg/m² as calculated from the following:    Height as of this encounter: 5' 10\" (1.778 m). Weight as of this encounter: 165 lb 1.6 oz (74.9 kg). Intake/Output Summary (Last 24 hours) at 11/8/2022 1109  Last data filed at 11/7/2022 2108  Gross per 24 hour   Intake 130 ml   Output --   Net 130 ml         Physical Exam:   Blood pressure (!) 144/44, pulse 65, temperature 98.6 °F (37 °C), temperature source Oral, resp. rate 19, height 5' 10\" (1.778 m), weight 165 lb 1.6 oz (74.9 kg), SpO2 91 %. General:    Well nourished. No overt distress. Appears stated age. Head:  Normocephalic, atraumatic  Eyes:  Sclerae appear normal. Pupils equally round. ENT:  Nares appear normal, no drainage. Moist oral mucosa  Neck:  No restricted ROM. Trachea midline. CV:   HR 60s. No m/r/g.  No jugular venous distension. Lungs:   CTAB. No wheezing, rhonchi, or rales. Respirations even, unlabored. 4L NC O2. No distress otherwise. Abdomen: Bowel sounds present. Soft, nontender, nondistended. Extremities: No cyanosis or clubbing. No edema. Skin:     No rashes and normal coloration. Warm and dry. Neuro:  CN II-XII grossly intact. Sensation intact. A&Ox3  Psych:  Normal mood and affect. I have reviewed ordered lab tests and independently visualized imaging below:    Recent Labs:  Recent Results (from the past 48 hour(s))   POCT Glucose    Collection Time: 11/06/22  3:07 PM   Result Value Ref Range    POC Glucose 279 (H) 65 - 100 mg/dL    Performed by:  González    EKG 12 Lead    Collection Time: 11/06/22  3:20 PM   Result Value Ref Range    Ventricular Rate 68 BPM    Atrial Rate 68 BPM    P-R Interval 188 ms    QRS Duration 132 ms    Q-T Interval 428 ms    QTc Calculation (Bazett) 455 ms    P Axis 86 degrees    R Axis 63 degrees    T Axis 41 degrees    Diagnosis       Sinus rhythm with Premature atrial complexes in a pattern of bigeminy   Basic Metabolic Panel    Collection Time: 11/06/22  4:05 PM   Result Value Ref Range    Sodium 140 133 - 143 mmol/L    Potassium 3.6 3.5 - 5.1 mmol/L    Chloride 109 101 - 110 mmol/L    CO2 28 21 - 32 mmol/L    Anion Gap 3 2 - 11 mmol/L    Glucose 235 (H) 65 - 100 mg/dL    BUN 35 (H) 8 - 23 MG/DL    Creatinine 1.70 (H) 0.8 - 1.5 MG/DL    Est, Glom Filt Rate 40 (L) >60 ml/min/1.73m2    Calcium 8.8 8.3 - 10.4 MG/DL   Magnesium    Collection Time: 11/06/22  4:05 PM   Result Value Ref Range    Magnesium 1.9 1.8 - 2.4 mg/dL   POCT Glucose    Collection Time: 11/06/22  6:02 PM   Result Value Ref Range    POC Glucose 223 (H) 65 - 100 mg/dL    Performed by: Zafar    POCT Glucose    Collection Time: 11/06/22  8:57 PM   Result Value Ref Range    POC Glucose 264 (H) 65 - 100 mg/dL    Performed by: Maddie    POCT Glucose    Collection Time: 11/07/22  7:50 AM Result Value Ref Range    POC Glucose 84 65 - 100 mg/dL    Performed by: Gladys    POCT Glucose    Collection Time: 11/07/22 11:49 AM   Result Value Ref Range    POC Glucose 143 (H) 65 - 100 mg/dL    Performed by: LoulouaPCT    POCT Glucose    Collection Time: 11/07/22  4:34 PM   Result Value Ref Range    POC Glucose 209 (H) 65 - 100 mg/dL    Performed by: Rita Walton    POCT Glucose    Collection Time: 11/07/22  8:03 PM   Result Value Ref Range    POC Glucose 257 (H) 65 - 100 mg/dL    Performed by: Jeffrey Pearce    Basic Metabolic Panel    Collection Time: 11/08/22  4:11 AM   Result Value Ref Range    Sodium 141 133 - 143 mmol/L    Potassium 3.6 3.5 - 5.1 mmol/L    Chloride 109 101 - 110 mmol/L    CO2 29 21 - 32 mmol/L    Anion Gap 3 2 - 11 mmol/L    Glucose 85 65 - 100 mg/dL    BUN 31 (H) 8 - 23 MG/DL    Creatinine 1.50 0.8 - 1.5 MG/DL    Est, Glom Filt Rate 46 (L) >60 ml/min/1.73m2    Calcium 8.7 8.3 - 10.4 MG/DL   Magnesium    Collection Time: 11/08/22  4:11 AM   Result Value Ref Range    Magnesium 2.0 1.8 - 2.4 mg/dL   POCT Glucose    Collection Time: 11/08/22  7:54 AM   Result Value Ref Range    POC Glucose 89 65 - 100 mg/dL    Performed by: Chao Mathis          Other Studies:  CT HEAD WO CONTRAST    Result Date: 11/6/2022  History: Headache. H/o SAH. Exam: CT head without contrast Technique: Thin section axial CT images were obtained from the skullbase through the vertex. Radiation dose reduction techniques were used for this study. Our CT scanners use one or all of the following: Automated exposure control, adjustment of the mA and/or kV according to patient size, use of iterative reconstruction. COMPARISON: 6/28/2022 Findings: There is a stable area of encephalomalacia within the bilateral cerebellar hemispheres. There is also a stable area of encephalomalacia within the right occipital lobe. No acute intracranial hemorrhage.  The ventricles are normal in size, shape, and position. No new abnormal parenchymal density is present. No extra-axial fluid collection is present. There is no mass or mass-effect. The basilar cisterns are patent. The paranasal sinuses and mastoid air cells are clear. Stable areas of encephalomalacia as described above. No acute intracranial hemorrhage. XR CHEST 1 VIEW    Result Date: 11/6/2022  History: Shortness of breath Exam: portable chest Comparison: 11/5/2022 Findings: There is coarsening of the interstitial lung markings without focal alveolar infiltrate or pleural effusion. No change in the appearance of the mediastinal contour or osseous structures.  IMPRESSIONs: Stable portable chest       Current Meds:  Current Facility-Administered Medications   Medication Dose Route Frequency    amLODIPine (NORVASC) tablet 10 mg  10 mg Oral Daily    enoxaparin (LOVENOX) injection 40 mg  40 mg SubCUTAneous Daily    baricitinib (OLUMIANT) tablet 2 mg  2 mg Oral Daily    melatonin tablet 3 mg  3 mg Oral Nightly PRN    aspirin chewable tablet 81 mg  81 mg Oral Daily    glucose chewable tablet 16 g  4 tablet Oral PRN    dextrose bolus 10% 125 mL  125 mL IntraVENous PRN    Or    dextrose bolus 10% 250 mL  250 mL IntraVENous PRN    glucagon (rDNA) injection 1 mg  1 mg SubCUTAneous PRN    dextrose 10 % infusion   IntraVENous Continuous PRN    tamsulosin (FLOMAX) capsule 0.4 mg  0.4 mg Oral Daily    sodium chloride flush 0.9 % injection 5-40 mL  5-40 mL IntraVENous 2 times per day    sodium chloride flush 0.9 % injection 5-40 mL  5-40 mL IntraVENous PRN    0.9 % sodium chloride infusion   IntraVENous PRN    ondansetron (ZOFRAN-ODT) disintegrating tablet 4 mg  4 mg Oral Q8H PRN    Or    ondansetron (ZOFRAN) injection 4 mg  4 mg IntraVENous Q6H PRN    polyethylene glycol (GLYCOLAX) packet 17 g  17 g Oral Daily PRN    acetaminophen (TYLENOL) tablet 650 mg  650 mg Oral Q6H PRN    Or    acetaminophen (TYLENOL) suppository 650 mg  650 mg Rectal Q6H PRN guaiFENesin-dextromethorphan (ROBITUSSIN DM) 100-10 MG/5ML syrup 5 mL  5 mL Oral Q4H PRN    citalopram (CELEXA) tablet 20 mg  20 mg Oral Daily    losartan (COZAAR) tablet 100 mg  100 mg Oral Daily    memantine (NAMENDA) tablet 5 mg  5 mg Oral BID    pantoprazole (PROTONIX) tablet 40 mg  40 mg Oral Daily    atorvastatin (LIPITOR) tablet 20 mg  20 mg Oral Daily    insulin lispro (HUMALOG) injection vial 0-4 Units  0-4 Units SubCUTAneous TID WC    insulin lispro (HUMALOG) injection vial 0-4 Units  0-4 Units SubCUTAneous Nightly    nicotine (NICODERM CQ) 21 MG/24HR 1 patch  1 patch TransDERmal Daily    sodium chloride flush 0.9 % injection 5 mL  5 mL IntraVENous Q8H    sodium chloride flush 0.9 % injection 5 mL  5 mL IntraVENous PRN       Signed:  Leonid Crawford MD    Part of this note may have been written by using a voice dictation software. The note has been proof read but may still contain some grammatical/other typographical errors.

## 2022-11-09 ENCOUNTER — HOSPITAL ENCOUNTER (INPATIENT)
Age: 82
LOS: 14 days | Discharge: HOME | DRG: 948 | End: 2022-11-23
Attending: PHYSICAL MEDICINE & REHABILITATION | Admitting: PHYSICAL MEDICINE & REHABILITATION
Payer: MEDICARE

## 2022-11-09 VITALS
BODY MASS INDEX: 23.64 KG/M2 | OXYGEN SATURATION: 98 % | RESPIRATION RATE: 20 BRPM | HEIGHT: 70 IN | SYSTOLIC BLOOD PRESSURE: 142 MMHG | TEMPERATURE: 98.2 F | WEIGHT: 165.1 LBS | DIASTOLIC BLOOD PRESSURE: 57 MMHG | HEART RATE: 59 BPM

## 2022-11-09 DIAGNOSIS — F01.50 VASCULAR DEMENTIA WITHOUT BEHAVIORAL DISTURBANCE (HCC): ICD-10-CM

## 2022-11-09 PROBLEM — R53.81 PHYSICAL DEBILITY: Status: ACTIVE | Noted: 2022-11-09

## 2022-11-09 LAB
GLUCOSE BLD STRIP.AUTO-MCNC: 113 MG/DL (ref 65–100)
GLUCOSE BLD STRIP.AUTO-MCNC: 222 MG/DL (ref 65–100)
GLUCOSE BLD STRIP.AUTO-MCNC: 442 MG/DL (ref 65–100)
SERVICE CMNT-IMP: ABNORMAL

## 2022-11-09 PROCEDURE — 6370000000 HC RX 637 (ALT 250 FOR IP): Performed by: HOSPITALIST

## 2022-11-09 PROCEDURE — 6370000000 HC RX 637 (ALT 250 FOR IP): Performed by: INTERNAL MEDICINE

## 2022-11-09 PROCEDURE — 99232 SBSQ HOSP IP/OBS MODERATE 35: CPT | Performed by: INTERNAL MEDICINE

## 2022-11-09 PROCEDURE — 97530 THERAPEUTIC ACTIVITIES: CPT

## 2022-11-09 PROCEDURE — 6370000000 HC RX 637 (ALT 250 FOR IP): Performed by: PHYSICAL MEDICINE & REHABILITATION

## 2022-11-09 PROCEDURE — 1180000000 HC REHAB R&B

## 2022-11-09 PROCEDURE — 2580000003 HC RX 258: Performed by: EMERGENCY MEDICINE

## 2022-11-09 PROCEDURE — 82962 GLUCOSE BLOOD TEST: CPT

## 2022-11-09 PROCEDURE — 6370000000 HC RX 637 (ALT 250 FOR IP): Performed by: FAMILY MEDICINE

## 2022-11-09 PROCEDURE — 6360000002 HC RX W HCPCS: Performed by: INTERNAL MEDICINE

## 2022-11-09 PROCEDURE — 2580000003 HC RX 258: Performed by: HOSPITALIST

## 2022-11-09 PROCEDURE — 97162 PT EVAL MOD COMPLEX 30 MIN: CPT

## 2022-11-09 PROCEDURE — 99223 1ST HOSP IP/OBS HIGH 75: CPT | Performed by: PHYSICAL MEDICINE & REHABILITATION

## 2022-11-09 RX ORDER — AMLODIPINE BESYLATE 10 MG/1
10 TABLET ORAL DAILY
Qty: 30 TABLET | Refills: 0 | Status: ON HOLD
Start: 2022-11-10 | End: 2022-11-22 | Stop reason: HOSPADM

## 2022-11-09 RX ORDER — LOSARTAN POTASSIUM 100 MG/1
100 TABLET ORAL DAILY
Qty: 30 TABLET | Refills: 0 | Status: ON HOLD
Start: 2022-11-09 | End: 2022-11-22 | Stop reason: HOSPADM

## 2022-11-09 RX ORDER — TAMSULOSIN HYDROCHLORIDE 0.4 MG/1
0.4 CAPSULE ORAL DAILY
Status: DISCONTINUED | OUTPATIENT
Start: 2022-11-10 | End: 2022-11-23 | Stop reason: HOSPADM

## 2022-11-09 RX ORDER — INSULIN LISPRO 100 [IU]/ML
0-4 INJECTION, SOLUTION INTRAVENOUS; SUBCUTANEOUS
Status: DISCONTINUED | OUTPATIENT
Start: 2022-11-09 | End: 2022-11-09

## 2022-11-09 RX ORDER — INSULIN LISPRO 100 [IU]/ML
0-4 INJECTION, SOLUTION INTRAVENOUS; SUBCUTANEOUS
Status: DISCONTINUED | OUTPATIENT
Start: 2022-11-09 | End: 2022-11-23 | Stop reason: HOSPADM

## 2022-11-09 RX ORDER — POLYETHYLENE GLYCOL 3350 17 G/17G
17 POWDER, FOR SOLUTION ORAL DAILY PRN
Status: DISCONTINUED | OUTPATIENT
Start: 2022-11-09 | End: 2022-11-23 | Stop reason: HOSPADM

## 2022-11-09 RX ORDER — GUAIFENESIN/DEXTROMETHORPHAN 100-10MG/5
5 SYRUP ORAL EVERY 4 HOURS PRN
Status: DISCONTINUED | OUTPATIENT
Start: 2022-11-09 | End: 2022-11-23 | Stop reason: HOSPADM

## 2022-11-09 RX ORDER — SODIUM CHLORIDE 9 MG/ML
INJECTION, SOLUTION INTRAVENOUS PRN
Status: DISCONTINUED | OUTPATIENT
Start: 2022-11-09 | End: 2022-11-23 | Stop reason: HOSPADM

## 2022-11-09 RX ORDER — LANOLIN ALCOHOL/MO/W.PET/CERES
3 CREAM (GRAM) TOPICAL NIGHTLY PRN
Status: DISCONTINUED | OUTPATIENT
Start: 2022-11-09 | End: 2022-11-23 | Stop reason: HOSPADM

## 2022-11-09 RX ORDER — CITALOPRAM 20 MG/1
20 TABLET ORAL DAILY
Qty: 30 TABLET | Refills: 0 | Status: ON HOLD
Start: 2022-11-09 | End: 2022-11-22 | Stop reason: HOSPADM

## 2022-11-09 RX ORDER — INSULIN GLARGINE 100 [IU]/ML
10 INJECTION, SOLUTION SUBCUTANEOUS NIGHTLY
Status: DISCONTINUED | OUTPATIENT
Start: 2022-11-09 | End: 2022-11-12

## 2022-11-09 RX ORDER — MEMANTINE HYDROCHLORIDE 5 MG/1
5 TABLET ORAL 2 TIMES DAILY
Status: DISCONTINUED | OUTPATIENT
Start: 2022-11-09 | End: 2022-11-23 | Stop reason: HOSPADM

## 2022-11-09 RX ORDER — AMLODIPINE BESYLATE 10 MG/1
10 TABLET ORAL DAILY
Status: DISCONTINUED | OUTPATIENT
Start: 2022-11-10 | End: 2022-11-23 | Stop reason: HOSPADM

## 2022-11-09 RX ORDER — ONDANSETRON 4 MG/1
4 TABLET, ORALLY DISINTEGRATING ORAL EVERY 8 HOURS PRN
Status: DISCONTINUED | OUTPATIENT
Start: 2022-11-09 | End: 2022-11-23 | Stop reason: HOSPADM

## 2022-11-09 RX ORDER — INSULIN LISPRO 100 [IU]/ML
0-4 INJECTION, SOLUTION INTRAVENOUS; SUBCUTANEOUS NIGHTLY
Status: DISCONTINUED | OUTPATIENT
Start: 2022-11-09 | End: 2022-11-09 | Stop reason: DRUGHIGH

## 2022-11-09 RX ORDER — ACETAMINOPHEN 325 MG/1
650 TABLET ORAL EVERY 6 HOURS PRN
Status: DISCONTINUED | OUTPATIENT
Start: 2022-11-09 | End: 2022-11-23 | Stop reason: HOSPADM

## 2022-11-09 RX ORDER — ATORVASTATIN CALCIUM 10 MG/1
20 TABLET, FILM COATED ORAL DAILY
Status: DISCONTINUED | OUTPATIENT
Start: 2022-11-10 | End: 2022-11-23 | Stop reason: HOSPADM

## 2022-11-09 RX ORDER — TAMSULOSIN HYDROCHLORIDE 0.4 MG/1
0.4 CAPSULE ORAL DAILY
Qty: 30 CAPSULE | Refills: 0 | Status: ON HOLD
Start: 2022-11-10 | End: 2022-11-22 | Stop reason: HOSPADM

## 2022-11-09 RX ORDER — PANTOPRAZOLE SODIUM 40 MG/1
40 TABLET, DELAYED RELEASE ORAL DAILY
Status: DISCONTINUED | OUTPATIENT
Start: 2022-11-10 | End: 2022-11-23 | Stop reason: HOSPADM

## 2022-11-09 RX ORDER — LOSARTAN POTASSIUM 50 MG/1
100 TABLET ORAL DAILY
Status: DISCONTINUED | OUTPATIENT
Start: 2022-11-10 | End: 2022-11-23 | Stop reason: HOSPADM

## 2022-11-09 RX ORDER — ACETAMINOPHEN 650 MG/1
650 SUPPOSITORY RECTAL EVERY 6 HOURS PRN
Status: DISCONTINUED | OUTPATIENT
Start: 2022-11-09 | End: 2022-11-23 | Stop reason: HOSPADM

## 2022-11-09 RX ORDER — ONDANSETRON 2 MG/ML
4 INJECTION INTRAMUSCULAR; INTRAVENOUS EVERY 6 HOURS PRN
Status: DISCONTINUED | OUTPATIENT
Start: 2022-11-09 | End: 2022-11-23 | Stop reason: HOSPADM

## 2022-11-09 RX ORDER — INSULIN LISPRO 100 [IU]/ML
0-4 INJECTION, SOLUTION INTRAVENOUS; SUBCUTANEOUS NIGHTLY
Status: DISCONTINUED | OUTPATIENT
Start: 2022-11-09 | End: 2022-11-09

## 2022-11-09 RX ORDER — DEXAMETHASONE 4 MG/1
6 TABLET ORAL DAILY
Status: DISCONTINUED | OUTPATIENT
Start: 2022-11-09 | End: 2022-11-09

## 2022-11-09 RX ORDER — NICOTINE 21 MG/24HR
1 PATCH, TRANSDERMAL 24 HOURS TRANSDERMAL DAILY
Status: DISCONTINUED | OUTPATIENT
Start: 2022-11-10 | End: 2022-11-23 | Stop reason: HOSPADM

## 2022-11-09 RX ORDER — ENOXAPARIN SODIUM 100 MG/ML
40 INJECTION SUBCUTANEOUS DAILY
Status: DISCONTINUED | OUTPATIENT
Start: 2022-11-10 | End: 2022-11-23 | Stop reason: HOSPADM

## 2022-11-09 RX ORDER — CITALOPRAM 20 MG/1
20 TABLET ORAL DAILY
Status: DISCONTINUED | OUTPATIENT
Start: 2022-11-10 | End: 2022-11-23 | Stop reason: HOSPADM

## 2022-11-09 RX ORDER — ALOGLIPTIN 6.25 MG/1
6.25 TABLET, FILM COATED ORAL DAILY
COMMUNITY
End: 2022-12-02 | Stop reason: SDUPTHER

## 2022-11-09 RX ORDER — SODIUM CHLORIDE 0.9 % (FLUSH) 0.9 %
5-40 SYRINGE (ML) INJECTION PRN
Status: DISCONTINUED | OUTPATIENT
Start: 2022-11-09 | End: 2022-11-23 | Stop reason: HOSPADM

## 2022-11-09 RX ORDER — SIMVASTATIN 40 MG
40 TABLET ORAL NIGHTLY
Qty: 30 TABLET | Refills: 0 | Status: ON HOLD
Start: 2022-11-09 | End: 2022-11-22 | Stop reason: SDUPTHER

## 2022-11-09 RX ORDER — ASPIRIN 81 MG/1
81 TABLET, CHEWABLE ORAL DAILY
Status: DISCONTINUED | OUTPATIENT
Start: 2022-11-10 | End: 2022-11-09

## 2022-11-09 RX ADMIN — ENOXAPARIN SODIUM 40 MG: 100 INJECTION SUBCUTANEOUS at 09:07

## 2022-11-09 RX ADMIN — ATORVASTATIN CALCIUM 20 MG: 20 TABLET, FILM COATED ORAL at 09:06

## 2022-11-09 RX ADMIN — LOSARTAN POTASSIUM 100 MG: 50 TABLET, FILM COATED ORAL at 09:06

## 2022-11-09 RX ADMIN — SODIUM CHLORIDE, PRESERVATIVE FREE 5 ML: 5 INJECTION INTRAVENOUS at 02:53

## 2022-11-09 RX ADMIN — ASPIRIN 81 MG: 81 TABLET, CHEWABLE ORAL at 09:06

## 2022-11-09 RX ADMIN — SODIUM CHLORIDE, PRESERVATIVE FREE 5 ML: 5 INJECTION INTRAVENOUS at 09:16

## 2022-11-09 RX ADMIN — BARICITINIB 2 MG: 2 TABLET, FILM COATED ORAL at 09:07

## 2022-11-09 RX ADMIN — CITALOPRAM HYDROBROMIDE 20 MG: 20 TABLET ORAL at 09:06

## 2022-11-09 RX ADMIN — INSULIN GLARGINE 10 UNITS: 100 INJECTION, SOLUTION SUBCUTANEOUS at 20:51

## 2022-11-09 RX ADMIN — INSULIN LISPRO 4 UNITS: 100 INJECTION, SOLUTION INTRAVENOUS; SUBCUTANEOUS at 20:48

## 2022-11-09 RX ADMIN — PANTOPRAZOLE SODIUM 40 MG: 40 TABLET, DELAYED RELEASE ORAL at 09:06

## 2022-11-09 RX ADMIN — MEMANTINE 5 MG: 5 TABLET ORAL at 21:51

## 2022-11-09 RX ADMIN — MEMANTINE 5 MG: 5 TABLET ORAL at 09:06

## 2022-11-09 RX ADMIN — INSULIN LISPRO 1 UNITS: 100 INJECTION, SOLUTION INTRAVENOUS; SUBCUTANEOUS at 12:24

## 2022-11-09 RX ADMIN — TAMSULOSIN HYDROCHLORIDE 0.4 MG: 0.4 CAPSULE ORAL at 09:07

## 2022-11-09 RX ADMIN — AMLODIPINE BESYLATE 10 MG: 10 TABLET ORAL at 09:06

## 2022-11-09 NOTE — PROGRESS NOTES
Patient resting in bed on 4L NC. Respirations present and unlabored. A&Ox2, not oriented to situation or time. Wife at bedside. No needs at this time. Call bell within reach and encouraged to call with needs.

## 2022-11-09 NOTE — DISCHARGE SUMMARY
Hospitalist Discharge Summary   Admit Date:  10/26/2022  6:21 PM   DC Note date: 2022  Name:  Williams Fuller   Age:  79 y.o. Sex:  male  :  1940   MRN:  [id number]   Room:  [facility]  PCP:  Sylvie Hernandez DO    Presenting Complaint: Shortness of Breath and Fatigue     Initial Admission Diagnosis: Hypoxia [R09.02]  COVID-19 virus infection [U07.1]     Problem List for this Hospitalization (present on admission):    Principal Problem:    Acute respiratory failure with hypoxia ([facility]  Active Problems:    Dementia ([facility]    Type 2 diabetes mellitus ([facility]    Chronic ischemic heart disease    Stage 3a chronic kidney disease ([facility]    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 Norah Cheng is an 78 y/o WM with a h/o vascular dementia, HTN, CKD, and bladder cancer who was admitted 10/29/22 with acute hypoxemic respiratory failure 2/2 COVID-19. CXR unremarkable. CT chest neg for PE but does have mild infiltrate in DANI. He was started on dexamethasone, baricitinib and supplemental O2. He was weaned to RA but then began to have increased O2 requirements again up to 5L, CXR repeated 10/30 and showed mild bilateral atypical appearing infiltrates. Walk test  showed 7L O2 with exertion. Repeat ambulatory oximetry  showed 4L at rest and with exertion. RR called  for syncope and bradycardia, EKG bigeminy, regained consciousness quickly, Cardiology consulted. No further concerning events on telemetry. plans for discharge to [facility] on . Disposition: 9th floor rehab         Diet: ADULT DIET; Regular  ADULT ORAL NUTRITION SUPPLEMENT; AM Snack, PM Snack; Standard High Calorie/High Protein Oral Supplement  Code Status: Full Code    Follow Ups: cardiology, PCP      Time spent in patient discharge and coordination 35  minutes.         Follow up labs/diagnostics (ultimately defer to outpatient provider):  O2 wean    Plan was discussed with wife. All questions answered. Patient was stable at time of discharge. Instructions given to call a physician or return if any concerns. Current Discharge Medication List        START taking these medications    Details   tamsulosin (FLOMAX) 0.4 MG capsule Take 1 capsule by mouth daily  Qty: 30 capsule, Refills: 0           CONTINUE these medications which have CHANGED    Details   citalopram (CELEXA) 20 MG tablet Take 1 tablet by mouth daily  Qty: 30 tablet, Refills: 0      simvastatin (ZOCOR) 40 MG tablet Take 1 tablet by mouth nightly  Qty: 30 tablet, Refills: 0      losartan (COZAAR) 100 MG tablet Take 1 tablet by mouth daily  Qty: 30 tablet, Refills: 0      amLODIPine (NORVASC) 10 MG tablet Take 1 tablet by mouth daily  Qty: 30 tablet, Refills: 0           CONTINUE these medications which have NOT CHANGED    Details   memantine (NAMENDA) 5 MG tablet Take 1 tablet by mouth 2 times daily  Qty: 180 tablet, Refills: 1    Associated Diagnoses: Vascular dementia without behavioral disturbance (HCC)      olopatadine (PATANOL) 0.1 % ophthalmic solution INSTILL 1 DROP IN BOTH EYES TWICE A DAY      pantoprazole (PROTONIX) 40 MG tablet Take 1 tablet by mouth in the morning.   Qty: 30 tablet, Refills: 1    Associated Diagnoses: Gastroesophageal reflux disease without esophagitis           STOP taking these medications       hydrALAZINE (APRESOLINE) 50 MG tablet Comments:   Reason for Stopping:         clopidogrel (PLAVIX) 75 MG tablet Comments:   Reason for Stopping:         Selenium 200 MCG TABS Comments:   Reason for Stopping:         ascorbic acid (VITAMIN C) 500 MG tablet Comments:   Reason for Stopping:         vitamin D3 (CHOLECALCIFEROL) 125 MCG (5000 UT) TABS tablet Comments:   Reason for Stopping:               Procedures done this admission:  * No surgery found *    Consults this admission:  IP CONSULT TO PHYSICAL MEDICINE REHAB  IP CONSULT TO CARDIOLOGY    Echocardiogram results:  No results found for this or any previous visit. Diagnostic Imaging/Tests:   CT HEAD WO CONTRAST    Result Date: 11/6/2022  Stable areas of encephalomalacia as described above. No acute intracranial hemorrhage. CT HEAD WO CONTRAST    Result Date: 10/30/2022  1. Resolution of prior blood. No acute hemorrhage. 2.  Chronic infarcts again noted. XR CHEST PORTABLE    Result Date: 10/30/2022  1. Mild bilateral infiltrates compatible with viral infection. 2.  No consolidation. XR CHEST PORTABLE    Result Date: 10/26/2022  No acute airspace disease. XR CHEST 1 VIEW    Result Date: 11/5/2022  Slight increased bilateral perihilar interstitial infiltrate, left greater than right     CT CHEST PULMONARY EMBOLISM W CONTRAST    Result Date: 10/26/2022  1. No evidence of pulmonary embolism. 2. Mild infiltrate or atelectasis in the left upper lobe. 3. Findings in the upper abdomen as described above. Labs: Results:       BMP, Mg, Phos Recent Labs     11/06/22  1605 11/08/22  0411    141   K 3.6 3.6    109   CO2 28 29   ANIONGAP 3 3   BUN 35* 31*   CREATININE 1.70* 1.50   LABGLOM 40* 46*   CALCIUM 8.8 8.7   GLUCOSE 235* 85   MG 1.9 2.0      CBC No results for input(s): WBC, RBC, HGB, HCT, MCV, MCH, MCHC, RDW, PLT, MPV, NRBC, SEGS, LYMPHOPCT, EOSRELPCT, MONOPCT, BASOPCT, IMMGRAN, SEGSABS, LYMPHSABS, EOSABS, MONOSABS, BASOSABS, ABSIMMGRAN in the last 72 hours. LFT No results for input(s): BILITOT, BILIDIR, ALKPHOS, AST, ALT, PROT, LABALBU, GLOB in the last 72 hours.    Cardiac  Lab Results   Component Value Date/Time    NTPROBNP 964 10/26/2022 07:47 PM      Coags Lab Results   Component Value Date/Time    PROTIME 12.9 06/20/2022 10:22 PM    INR 1.1 06/20/2022 10:22 PM      A1c Lab Results   Component Value Date/Time    LABA1C 6.0 06/21/2022 04:48 AM    LABA1C 6.4 03/22/2022 01:48 PM    LABA1C 6.7 09/15/2021 09:12 AM    EAG 126 06/21/2022 04:48 AM     03/22/2022 01:48 PM     09/15/2021 09:12 AM      Lipids Lab Results   Component Value Date/Time    CHOL 98 03/22/2022 01:48 PM    LDLCALC 45 03/22/2022 01:48 PM    LABVLDL 19 03/02/2020 08:39 AM    HDL 38 03/22/2022 01:48 PM    TRIG 70 03/22/2022 01:48 PM      Thyroid  Lab Results   Component Value Date/Time    PXQ0OFE 2.410 06/25/2022 05:05 AM        Most Recent UA Lab Results   Component Value Date/Time    COLORU YELLOW/STRAW 10/26/2022 07:51 PM    APPEARANCE CLEAR 10/26/2022 07:51 PM    SPECGRAV 1.016 10/26/2022 07:51 PM    LABPH 5.0 10/26/2022 07:51 PM    PROTEINU 300 10/26/2022 07:51 PM    GLUCOSEU Negative 10/26/2022 07:51 PM    KETUA Negative 10/26/2022 07:51 PM    BILIRUBINUR Negative 10/26/2022 07:51 PM    BLOODU Negative 10/26/2022 07:51 PM    UROBILINOGEN 0.2 10/26/2022 07:51 PM    NITRU Negative 10/26/2022 07:51 PM    LEUKOCYTESUR Negative 10/26/2022 07:51 PM    WBCUA 0-4 10/26/2022 07:51 PM    RBCUA 0-5 10/26/2022 07:51 PM    EPITHUA 0-5 10/26/2022 07:51 PM    BACTERIA Negative 10/26/2022 07:51 PM    LABCAST 2-5 10/26/2022 07:51 PM    MUCUS 0 10/26/2022 07:51 PM        Recent Labs     10/26/22  1951 10/26/22  1947   CULTURE NO GROWTH 5 DAYS NO GROWTH 5 DAYS       All Labs from Last 24 Hrs:  Recent Results (from the past 24 hour(s))   POCT Glucose    Collection Time: 11/08/22 11:25 AM   Result Value Ref Range    POC Glucose 104 (H) 65 - 100 mg/dL    Performed by: Gladys    POCT Glucose    Collection Time: 11/08/22  4:22 PM   Result Value Ref Range    POC Glucose 278 (H) 65 - 100 mg/dL    Performed by: Reuben Carrasquillo    POCT Glucose    Collection Time: 11/08/22  8:02 PM   Result Value Ref Range    POC Glucose 168 (H) 65 - 100 mg/dL    Performed by: Hanna    POCT Glucose    Collection Time: 11/09/22  7:19 AM   Result Value Ref Range    POC Glucose 113 (H) 65 - 100 mg/dL    Performed by:  WhiteJonneaCNA        Allergies   Allergen Reactions    Aspirin-Dipyridamole Er Headaches    Beta Adrenergic Blockers     Ciprofloxacin Other (See Comments)     weakness    Donepezil Diarrhea    Lisinopril Cough     Immunization History   Administered Date(s) Administered    Influenza Virus Vaccine 11/01/2003, 11/01/2005, 12/04/2007, 11/01/2008, 12/11/2012, 10/27/2021    Influenza, FLUAD, (age 72 y+), Adjuvanted, 0.5mL 09/28/2022    PPD Test 06/21/2022    Pneumococcal Conjugate 13-valent (Bigndyl13) 02/05/2016    Pneumococcal Polysaccharide (Usaefmksz52) 11/01/2003, 12/01/2006, 08/01/2017, 03/09/2020    Td vaccine (adult) 01/13/2004    Tdap (Boostrix, Adacel) 02/05/2016    Zoster Live (Zostavax) 08/01/2017       Recent Vital Data:  Patient Vitals for the past 24 hrs:   Temp Pulse Resp BP SpO2   11/09/22 0720 98.6 °F (37 °C) 67 20 (!) 155/50 95 %   11/09/22 0312 99.1 °F (37.3 °C) 62 18 (!) 137/48 94 %   11/08/22 2316 99.1 °F (37.3 °C) 69 18 (!) 144/58 98 %   11/08/22 1924 98.6 °F (37 °C) 77 18 (!) 123/45 92 %   11/08/22 1512 98.6 °F (37 °C) 74 19 (!) 137/47 96 %   11/08/22 1126 98.2 °F (36.8 °C) 50 19 (!) 138/57 95 %       Oxygen Therapy  SpO2: 95 %  Pulse via Oximetry: 85 beats per minute  Pulse Oximeter Device Mode: Other (Comment)  O2 Device: Nasal cannula  Skin Assessment: Clean, dry, & intact  Skin Protection for O2 Device: No  O2 Flow Rate (L/min): 4 L/min    Estimated body mass index is 23.69 kg/m² as calculated from the following:    Height as of this encounter: 5' 10\" (1.778 m). Weight as of this encounter: 165 lb 1.6 oz (74.9 kg). No intake or output data in the 24 hours ending 11/09/22 1034      Physical Exam:    General:    Well nourished. No overt distress, eldrly   CV:   RRR. No m/r/g. No JVD, no edema   Lungs:   CTAB. No wheezing, rhonchi, or rales. Respirations even, unlabored anterior   Abdomen:   Soft, nontender, nondistended. Extremities: Warm and dry. No cyanosis or clubbing. No edema. Skin:     No rashes.   Normal coloration  Neuro:  Sleepy but appropriate   Psych:  Normal mood and affect. Signed:  Mahesh Packer MD    Part of this note may have been written by using a voice dictation software. The note has been proof read but may still contain some grammatical/other typographical errors.

## 2022-11-09 NOTE — H&P
Physical Medicine and Rehabilitation History and Physical       Admit Date: 11/9/2022  Primary Care Physician: Sandie So DO  Chief Complaint: Short of breath  Admission Diagnosis: COVID-19 virus infection   Acute Respiratory Failure  Pneumonia  Hypoxia  HTN  Bradycardia  CKD   Bladder cancer  DM II  Pain  DVT prophylaxis    Rehabilitation Diagnosis:  Mobility impairments  Self Care impairments  Dyspnea    History of Present Illness/ Hospital course: This is an 80 YO with a PMH of vascular dementia, HTN, CKD and bladder cancer who was admitted 10/29/22 with acute respiratory failure secondary to COVID-19. He was started on dexamethasone, baricitinib and supplemental O2. CXR unremarkable. CT chest was negative for PE but does have mild infiltrate in DANI. He was weaned to RA but then began to have increased O2 requirements up to 5L O2. Repeat CXR on 10/30 showed bilateral atypical appearing infiltrates. He began to desat during ambulation requiring 7L O2 with exertion. On 11/6 rapid response was called for syncope and bradycardia, EKG showed bigeminy with patient regaining consciousness quickly, Cardiology was consulted without further concerning events on telemetry. Physical therapy was initiated and patient was starting to mobilize. He continues with significant mobility and self care impairments. It was felt he would benefit from comprehensive acute inpatient rehabilitation, continued close medical supervision and management prior to returning home. The medical stability and these recent medical events are not expected to interfere with the patient's ability to tolerate the intensity of services in acute rehab.     Current Level of Function: (evaluated by acute therapy staff, with bed mobility, transfers, balance, ambulation personally observed post-admission in the IRF setting minutes prior to submission of document) eating - mod A, toileting,bathing, UE and LE ADLs - dependent, mobility - mod A, ambulation short distances with RW and mod A    Previous Functional Level/Home Environment: lives with family, mobility and ADLs - mod I, household ambulation with RW    Review of Systems: +dyspnea, + lightheadedness,   + bradycardia. Denies headache,chest pain, shortness of breath, cough, visual problems, abdominal pain, dysurea, calf pain. Pertinent positives are as noted in the medical records and unremarkable otherwise. Past Medical History:   Diagnosis Date    Allergic rhinitis, cause unspecified 2/27/2014    Anemia, unspecified 2/27/2014    Atherosclerosis of renal artery ([facility]     2001    CAD (coronary artery disease) 2001    4 vessel cabg, cardiac stents prior to this    Cancer [facility]) 2013    bladder/ L kidney    Chronic airway obstruction, not elsewhere classified 2/27/2014    Chronic kidney disease     hx of CRF,  creatinine 1.8    Coronary atherosclerosis of native coronary vessel 2/27/2014    Depressive disorder, not elsewhere classified 2/27/2014    Diabetes ([facility] [address] dx 2000    type 2, oral med, does not check his glucose, A1C 6.4   9/9/2019, states very rare episode hypo    Diverticulosis of colon (without mention of hemorrhage) 2/27/2014    Dyslipidemia     controlled with med    Esophagitis, unspecified 2/27/2014    Essential and other specified forms of tremor 2/27/2014    Essential hypertension, benign 2/27/2014    GERD (gastroesophageal reflux disease)     controlled with omeprazole    Gross hematuria 2/27/2014    Hematuria, unspecified 2/27/2014    Hypertension     controlled with meds    Malignant neoplasm of bladder, 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     Malignant neoplasm of trigone of urinary bladder ([facility] [address] 2/27/2014    Microscopic hematuria 2/27/2014    Osteoarthrosis, unspecified whether generalized or localized, unspecified site 2/27/2014    Other peripheral vascular disease(443.89) 2/27/2014    Polyneuropathy in diabetes(357.2) 2/27/2014    Proteinuria 2/27/2014 Psychiatric disorder     gets \"aggravated\", treated with Celexa    PUD (peptic ulcer disease) 1970's    Pure hypercholesterolemia 2/27/2014    Renal artery stenosis ([facility] [address] 2/27/2014    Renal sclerosis, unspecified 2/27/2014    left    Respiratory insufficiency 1/13/2016    Stroke ([facility] [address]     CVA x 2, (last was fall of 2011),  no residual weakness; L eye impaired    Tobacco use disorder 2/27/2014    Unspecified gastritis and gastroduodenitis without mention of hemorrhage 2/27/2014    Unspecified hereditary and idiopathic peripheral neuropathy 2/27/2014    Unspecified sleep apnea     does not wear cpap    Unspecified transient cerebral ischemia 2/27/2014      Past Surgical History:   Procedure Laterality Date    CARDIAC CATHETERIZATION      stents prior to CABG    COLONOSCOPY      KIDNEY REMOVAL Left     LIPOMA RESECTION  10/07/2019    ND CARDIAC SURG PROCEDURE UNLIST  2001    4 vessel CABG, cardiac stents    UROLOGICAL SURGERY      TURBT    VASCULAR SURGERY  2001    renal stent in L kidney     Allergies   Allergen Reactions    Aspirin-Dipyridamole Er Headaches    Beta Adrenergic Blockers     Ciprofloxacin Other (See Comments)     weakness    Donepezil Diarrhea    Lisinopril Cough      Family History   Problem Relation Age of Onset    Diabetes Mother     Heart Disease Mother     Heart Attack Mother         Myocardial Infarction    Stroke Father     Alcohol Abuse Father     Diabetes Son       Social History     Tobacco Use    Smoking status: Every Day     Packs/day: 1.00     Types: Cigarettes    Smokeless tobacco: Never   Substance Use Topics    Alcohol use: No      Current Facility-Administered Medications   Medication Dose Route Frequency    0.9 % sodium chloride infusion   IntraVENous PRN    acetaminophen (TYLENOL) tablet 650 mg  650 mg Oral Q6H PRN    Or    acetaminophen (TYLENOL) suppository 650 mg  650 mg Rectal Q6H PRN    [START ON 11/10/2022] amLODIPine (NORVASC) tablet 10 mg  10 mg Oral Daily    [START ON 11/10/2022] aspirin chewable tablet 81 mg  81 mg Oral Daily    dexamethasone (DECADRON) tablet 6 mg  6 mg Oral Daily    [START ON 11/10/2022] citalopram (CELEXA) tablet 20 mg  20 mg Oral Daily    baricitinib (OLUMIANT) tablet 2 mg  2 mg Oral Daily    [START ON 11/10/2022] atorvastatin (LIPITOR) tablet 20 mg  20 mg Oral Daily    [START ON 11/10/2022] enoxaparin (LOVENOX) injection 40 mg  40 mg SubCUTAneous Daily    glucagon (rDNA) injection 1 mg  1 mg SubCUTAneous PRN    glucose chewable tablet 16 g  4 tablet Oral PRN    guaiFENesin-dextromethorphan (ROBITUSSIN DM) 100-10 MG/5ML syrup 5 mL  5 mL Oral Q4H PRN    insulin lispro (HUMALOG) injection vial 0-4 Units  0-4 Units SubCUTAneous TID WC    insulin lispro (HUMALOG) injection vial 0-4 Units  0-4 Units SubCUTAneous Nightly    [START ON 11/10/2022] losartan (COZAAR) tablet 100 mg  100 mg Oral Daily    melatonin tablet 3 mg  3 mg Oral Nightly PRN    memantine (NAMENDA) tablet 5 mg  5 mg Oral BID    [START ON 11/10/2022] nicotine (NICODERM CQ) 21 MG/24HR 1 patch  1 patch TransDERmal Daily    ondansetron (ZOFRAN-ODT) disintegrating tablet 4 mg  4 mg Oral Q8H PRN    Or    ondansetron (ZOFRAN) injection 4 mg  4 mg IntraVENous Q6H PRN    [START ON 11/10/2022] pantoprazole (PROTONIX) tablet 40 mg  40 mg Oral Daily    polyethylene glycol (GLYCOLAX) packet 17 g  17 g Oral Daily PRN    [START ON 11/10/2022] tamsulosin (FLOMAX) capsule 0.4 mg  0.4 mg Oral Daily    sodium chloride flush 0.9 % injection 5-40 mL  5-40 mL IntraVENous PRN       Objective:     Visit Vitals  BP (!) 112/38   Pulse 54   Temp 98.4 °F (36.9 °C) (Oral)   Resp 18   Ht 5' 10\" (1.778 m)   Wt 165 lb 2 oz (74.9 kg)   SpO2 100%   BMI 23.69 kg/m²      Intake and Output:  No intake/output data recorded. Physical Exam:   Psych: Patient was oriented to person and place, not time or situation. General:   Alert, appears stated age, No acute distress. HEENT:  Normocephalic, EOMI, facial symmetry  Intact.  Oral mucosa pink and moist. No nasal discharge. Lungs:  CTA Bilaterally,Respiration even and unlabored   No apparent use of accessory muscles for respiration. No nasal alar flaring. On O2 NC   Heart:  Regular irregular rate and rhythm, S1, S2, No murmur    Genitourinary:  deferred   Abdomen:  Soft, non-tender to palpation in all four quadrants. Bowel sounds present.      Neuromuscular:  Slowed speech pattern, fluent, responds appropriately, no resting tremors, moves all extremities with anti-gravity strength   Skin:  Intact, dry, good skin turgor, age related changes present, no LE edema              Lab Review:    Recent Results (from the past 72 hour(s))   POCT Glucose    Collection Time: 11/06/22  6:02 PM   Result Value Ref Range    POC Glucose 223 (H) 65 - 100 mg/dL    Performed by: Zafar    POCT Glucose    Collection Time: 11/06/22  8:57 PM   Result Value Ref Range    POC Glucose 264 (H) 65 - 100 mg/dL    Performed by: Maddie    POCT Glucose    Collection Time: 11/07/22  7:50 AM   Result Value Ref Range    POC Glucose 84 65 - 100 mg/dL    Performed by: LoulouaPCT    POCT Glucose    Collection Time: 11/07/22 11:49 AM   Result Value Ref Range    POC Glucose 143 (H) 65 - 100 mg/dL    Performed by: LoulouaPCT    POCT Glucose    Collection Time: 11/07/22  4:34 PM   Result Value Ref Range    POC Glucose 209 (H) 65 - 100 mg/dL    Performed by: Faye Gutierrez    POCT Glucose    Collection Time: 11/07/22  8:03 PM   Result Value Ref Range    POC Glucose 257 (H) 65 - 100 mg/dL    Performed by: Catherine Cervantes    Basic Metabolic Panel    Collection Time: 11/08/22  4:11 AM   Result Value Ref Range    Sodium 141 133 - 143 mmol/L    Potassium 3.6 3.5 - 5.1 mmol/L    Chloride 109 101 - 110 mmol/L    CO2 29 21 - 32 mmol/L    Anion Gap 3 2 - 11 mmol/L    Glucose 85 65 - 100 mg/dL    BUN 31 (H) 8 - 23 MG/DL    Creatinine 1.50 0.8 - 1.5 MG/DL    Est, Glom Filt Rate 46 (L) >60 ml/min/1.73m2 Calcium 8.7 8.3 - 10.4 MG/DL   Magnesium    Collection Time: 11/08/22  4:11 AM   Result Value Ref Range    Magnesium 2.0 1.8 - 2.4 mg/dL   POCT Glucose    Collection Time: 11/08/22  7:54 AM   Result Value Ref Range    POC Glucose 89 65 - 100 mg/dL    Performed by: Gladys    POCT Glucose    Collection Time: 11/08/22 11:25 AM   Result Value Ref Range    POC Glucose 104 (H) 65 - 100 mg/dL    Performed by: LoulouaPCT    POCT Glucose    Collection Time: 11/08/22  4:22 PM   Result Value Ref Range    POC Glucose 278 (H) 65 - 100 mg/dL    Performed by: Gracie    POCT Glucose    Collection Time: 11/08/22  8:02 PM   Result Value Ref Range    POC Glucose 168 (H) 65 - 100 mg/dL    Performed by: Hanna    POCT Glucose    Collection Time: 11/09/22  7:19 AM   Result Value Ref Range    POC Glucose 113 (H) 65 - 100 mg/dL    Performed by: Steven    POCT Glucose    Collection Time: 11/09/22 10:55 AM   Result Value Ref Range    POC Glucose 222 (H) 65 - 100 mg/dL    Performed by: Steven        Assessment:    The Post Assessment Physician Evaluation (ALVERTO) found the current functional status to be comparable with the Pre-admission Screening. The Patient is a good candidate for acute inpatient rehabilitation. Nothing since the Pre-admission screen has changed that determination. Rehabilitation Plan  The patient has shown the ability to tolerate and benefit from 3 hours of therapy daily and is being admitted to a comprehensive acute inpatient rehabilitation program consisting of at least 3 hours of combined physical and occupational therapies. Begin intensive Physical Therapy for a minimum of 1.5 hours a day, at least 5 out of 7 days per week to address bed mobility, transfers, ambulation, strengthening, balance, and endurance.    Begin intensive Occupational Therapy for a minimum of 1.5 hours a day, at least 5 out of 7 days per week to address ADL (feeding, grooming, bathing, UE and LE dressing, toileting); instrumental ADLs; cognitive function; safety; energy conservations; community reintegration; and adaptive equipment as needed. The patient will also require 24-hour skilled rehabilitation nursing for bowel and bladder management, skin care for decubitus ulcer prevention , pain management and ongoing medication administration     The patient will also require close supervision of a rehabilitation physician for  medical management of complications/comorbidities:  COVID-19 virus infection/Acute Respiratory Failure/Pneumonia - on O2, wean as tolerated    HTN/Bradycardia - on telemetry, on norvasc, losartan with parameters    CKD/h/o bladder CA - monitor labs    DM II - on lantus 10 units hs, ISS    Pain mgmt- prn tylenol    DVT prophylaxis - on lovenox    The patient's prognosis for significant practical improvement within a reasonable period of time appears good and the estimated length of stay is 12 days and he is expected to return home with family and continued rehabilitation with home health therapy. Given the patient's complex medical condition and the risk of further medical complications including: DVT, PE, skin breakdown, pneumonia due to decreased mobility,  infection due to pneumonia , CVA, MI, cardiac arrhythmias due to HTN, hypoxia exacerbation secondary to covid infection, hypo/hyperglycemia due to diabetes and electrolyte imbalance, fluid overload due to chronic renal disease  Increased energy expenditure in a patient with known HTN, DM II, bladder CA and now admitted in acute respiratory failure secondary to COVID-19 virus infection with acute hospital complications of cardiac arrhythmia with loss of consciounous could potentially impact the IRF program with decreased cardiovascular efficiency, postural hypotension, cardiac arrhythmias and respiratory insufficiency.     For these ongoing medical issues,rehabilitation services could not be safely provided at a

## 2022-11-09 NOTE — CARE COORDINATION
11/09/22 [address] Discharge None   [employer] Information Provided? No   Mode of Transport at Discharge Other (see comment)   Confirm Follow Up Transport Family   Condition of Participation: Discharge Planning   The Patient and/or Patient Representative was provided with a Choice of Provider? Patient   The Patient and/Or Patient Representative agree with the Discharge Plan? Yes   Freedom of Choice list was provided with basic dialogue that supports the patient's individualized plan of care/goals, treatment preferences, and shares the quality data associated with the providers? Yes   Patient is discharging to [facility]. Patient's wife (at bedside) and patient are agreeable to this plan.

## 2022-11-09 NOTE — PROGRESS NOTES
PHYSICAL THERAPY EXAMINATION    Time In: 1525  Time Out: 1606  Total Treatment Time:  39 Minutes         HPI:  Mr. Christopher Hernández is an 80 y/o WM with a h/o vascular dementia, HTN, CKD, and bladder cancer who was admitted 10/29/22 with acute hypoxemic respiratory failure 2/2 COVID-19. CXR unremarkable. CT chest neg for PE but does have mild infiltrate in DANI. He was started on dexamethasone, baricitinib and supplemental O2. He was weaned to RA but then began to have increased O2 requirements again up to 5L, CXR repeated 10/30 and showed mild bilateral atypical appearing infiltrates. Walk test 11/2 showed 7L O2 with exertion. Repeat ambulatory oximetry 11/6 showed 4L at rest and with exertion. RR called 11/6 for syncope and bradycardia, EKG bigeminy, regained consciousness quickly, Cardiology consulted. No further concerning events on telemetry. plans for discharge to [facility] on 11/9.     Past Medical History:   Past Medical History:   Diagnosis Date    Allergic rhinitis, cause unspecified 2/27/2014    Anemia, unspecified 2/27/2014    Atherosclerosis of renal artery ([facility]     2001    CAD (coronary artery disease) 2001    4 vessel cabg, cardiac stents prior to this    Cancer [facility]) 2013    bladder/ L kidney    Chronic airway obstruction, not elsewhere classified 2/27/2014    Chronic kidney disease     hx of CRF,  creatinine 1.8    Coronary atherosclerosis of native coronary vessel 2/27/2014    Depressive disorder, not elsewhere classified 2/27/2014    Diabetes ([facility] [address] dx 2000    type 2, oral med, does not check his glucose, A1C 6.4   9/9/2019, states very rare episode hypo    Diverticulosis of colon (without mention of hemorrhage) 2/27/2014    Dyslipidemia     controlled with med    Esophagitis, unspecified 2/27/2014    Essential and other specified forms of tremor 2/27/2014    Essential hypertension, benign 2/27/2014    GERD (gastroesophageal reflux disease)     controlled with omeprazole    Gross hematuria 2/27/2014    Hematuria, unspecified 2/27/2014    Hypertension     controlled with meds    Malignant neoplasm of bladder, 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 Malignant neoplasm of trigone of urinary bladder (HCC) 2/27/2014    Microscopic hematuria 2/27/2014    Osteoarthrosis, unspecified whether generalized or localized, unspecified site 2/27/2014    Other peripheral vascular disease(443.89) 2/27/2014    Polyneuropathy in diabetes(357.2) 2/27/2014    Proteinuria 2/27/2014    Psychiatric disorder     gets \"aggravated\", treated with Celexa    PUD (peptic ulcer disease) 1970's    Pure hypercholesterolemia 2/27/2014    Renal artery stenosis ([facility] [address] 2/27/2014    Renal sclerosis, unspecified 2/27/2014    left    Respiratory insufficiency 1/13/2016    Stroke ([facility]     CVA x 2, (last was fall of 2011),  no residual weakness; L eye impaired    Tobacco use disorder 2/27/2014    Unspecified gastritis and gastroduodenitis without mention of hemorrhage 2/27/2014    Unspecified hereditary and idiopathic peripheral neuropathy 2/27/2014    Unspecified sleep apnea     does not wear cpap    Unspecified transient cerebral ischemia 2/27/2014       Pt's Goal:  Pt. Is a poor historian. Pt's wife states her goal is for patient to be able to walk around their home w/ RW again.       Precautions:  Falls, Poor Safety Awareness, and Confused    Impairments:    Decreased strength B LE  [x]     Decreased strength trunk/core  [x]     Decreased AROM   []     Decreased PROM  []    Decreased endurance  [x]     Decreased balance sitting  [x]     Decreased balance standing  [x]     Pain   []     Slow ambulation velocity  [x]    Decreased coordination  [x]    Decreased safety awareness  [x]      Functional Limitations:   Decreased independence with bed mobility  [x]     Decreased independence with functional transfers  [x]     Decreased independence with ambulation  [x]     Decreased independence with stair negotiation  [x]       Previous Functional Level: Pt's wife reports that since d/c from SNF after [facility] in spring 2022 pt. Amb. W/ RW w/ supervision, household distances only. Home Situation:      Environment   Living Situation Private Residence   Home Entrance Ramp   Lives Alone No   Support Systems Spouse/Significant Other and Child(anoop)   DME Used Rolling Walker, Grab Bars, and Bedside Commode   DME Available Rolling Walker and Bedside Commode            Outcome Measures:     Pain level: no c/o pain    Vital Signs:  HR 96 O2 95%-88% post activity 5 LPM NC O2    Education:  role of PT, orientation to [facility]    Interdisciplinary Communication: RN made aware pt. Positioned back in bed     Cognition: Pt. Alert & follows one step commands. Exhibits decreased short term memory, decreased insight into deficits, & decreased safety awareness. Lower Extremity Function:     LLE RLE   ROM WFL WFL   Fine Motor Coordination Intact Intact   Tone Normal Normal   Sensation NT NT        MMT Initial Assessment:  Pt. Has difficulty following testing commands. Strength Grossly >3+/5 B Les.         PRIMARY MODE OF LOCOMOTION: Household Ambulation Only    Functional Assessment:    Balance  Comments   Static Sitting Fair:  Pt. requires UE support;  may need occasional min A    Dynamic Sitting Poor - unable to accept challenge or move without LOB     Static Standing Poor:  Pt. requires UE support & mod-max A to maintain position + retropulsion    Dynamic Standing Poor - unable to accept challenge or move without LOB     Picking Up Object From Floor 88     Not attempted due to medical condition or safety concerns         BED MOBILITY & TRANSFERS Quality Indicator Score Comments   Rolling 88     Not attempted due to medical condition or safety concerns deferred secondary to pt's confusion   Supine to Sit 88     Not attempted due to medical condition or safety concerns deferred secondary to pt's confusion   Sit to Supine 1  Dependent    mod A x2   Sit to Stand 1  Dependent    mod A x2   Bed to/from Chair 1  Dependent    mod A x2 w/ RW   Car Transfer 88     Not attempted due to medical condition or safety concerns         WHEELCHAIR MOBILITY/MANAGEMENT Initial Assessment/Quality Indicator Score Comments   Able to Propel 48' w/ 2 Turns 88     Not attempted due to medical condition or safety concerns Pt. Fatigued after sitting in recliner, assisted back to bed after tx   Able to Propel 150' 88     Not attempted due to medical condition or safety concerns     Wheelchair set up assistance required NT    Wheelchair management NT        AMBULATION Initial Assessment/Quality Indicator Score Comments   Assistive device RW    Walk 10' 88     Not attempted due to medical condition or safety concerns Pt. amb. w/ RW x5' w/ mod A x2, exhibiting posterior lean, narrow BOD, & festinating gait pattern   Walk 48' with 2 Turns 88     Not attempted due to medical condition or safety concerns     Walk 150' 88     Not attempted due to medical condition or safety concerns     Walking 10' on Unlevel Surfaces 88     Not attempted due to medical condition or safety concerns         STEPS/STAIRS Initial Assessment/Quality Indicator Sore Comments   1 Curb Step 88     Not attempted due to medical condition or safety concerns     4 Steps 88     Not attempted due to medical condition or safety concerns     12 Steps 9     Not applicable     Curbs/Ramps NT        Pt. Was left supine in NAD, call bell in reach, wife @ bedside. PHYSICAL THERAPY PLAN OF CARE    Therapy Diagnosis:   Please see table above    Order received from MD for physical therapy services and chart reviewed. Pt to be seen at least 5 times per week for at least 1.5 hours of physical therapy per day for 2 weeks. Thank you for the referral.    Goals:    Short Term Goals:  Pt will demonstrate roll left & right & transition supine<>sit with Mod A in 1 week   2.    Pt. will transfer from bed to/from chair with Mod A using the least restrictive device in 1 week    3. Pt. will ambulate with 25 feet with RW or LRAD and Mod A w/ Ax2 to follow w/ w/c in 1  week  4. Pt. Will will stand w/ B UE support x30 seconds w/ min A in 1 week  5. Pt. Will propel w/c 100 feet with Min A in 1 week    Long Term Goals:  Pt will demonstrate roll left & right & transition supine<>sit with Independent in 2 weeks  2. Pt. will transfer from bed to/from chair with CGA using the least restrictive device in 2 weeks   3. Pt. will ambulate with 100 feet with RW or LRAD and CGA in 2  weeks  4. Pt. Will ambulate up/down 20' ramp with RW and Min A in 2 weeks  5. Pt. Will propel w/c 150 feet with Supervision/Standby Assist in 2 weeks      Pt would benefit from skilled physical therapy in order to improve independent functional mobility within the home. Interventions may include range of motion (AROM, PROM B LE/trunk), motor function (B LE/trunk strengthening/coordination), activity tolerance (vitals, oxygen saturation levels), bed mobility training, balance activities, gait training (progressive ambulation program), and functional transfer training. Please see IRC; Interdisciplinary Eval, Care Plan, and Patient Education for further information regarding physical therapy examination and plan of care.        Magalys Menendez, PT  11/9/2022

## 2022-11-09 NOTE — PROGRESS NOTES
TRANSFER - IN REPORT:    Verbal report received from [address] on Jeremy Kelly  being received from 36 for routine progression of patient care      Report consisted of patient's Situation, Background, Assessment and   Recommendations(SBAR). Information from the following report(s) Nurse Handoff Report, Adult Overview, Intake/Output, MAR, and Recent Results was reviewed with the receiving nurse. Opportunity for questions and clarification was provided. Assessment completed upon patient's arrival to unit and care assumed. Dual skin assessment performed with Fernando Fraga.  No breakdown noted

## 2022-11-09 NOTE — PROGRESS NOTES
am  11/9/2022 7:15 AM    Admit Date: 10/26/2022    Admit Diagnosis: Hypoxia [R09.02]  COVID-19 virus infection [U07.1]      Subjective:    Patient : Pt reports doing well overnight, denies any chest pain, shortness of breath, or palpitations. Objective:    BP (!) 137/48   Pulse 62   Temp 99.1 °F (37.3 °C)   Resp 18   Ht 5' 10\" (1.778 m)   Wt 165 lb 1.6 oz (74.9 kg)   SpO2 94%   BMI 23.69 kg/m²     ROS:  General ROS: negative for - chills  Hematological and Lymphatic ROS: negative for - blood clots or jaundice  Respiratory ROS: no cough, shortness of breath, or wheezing  Cardiovascular ROS: no chest pain or dyspnea on exertion  Gastrointestinal ROS: no abdominal pain, change in bowel habits, or black or bloody stools  Neurological ROS: no TIA or stroke symptoms    Physical Exam:      Physical Examination: General appearance - Appearance: alert, well appearing, and in no distress.    Neck/lymph - supple, no significant adenopathy  Chest/CV - clear to auscultation, no wheezes, rales or rhonchi, symmetric air entry  Heart - normal rate, regular rhythm, normal S1, S2, no murmurs, rubs, clicks or gallops  Abdomen/GI - soft, nontender, nondistended, no masses or organomegaly   Musculoskeletal - no joint tenderness, deformity or swelling  Extremities - no pedal edema, no clubbing or cyanosis  Skin - normal coloration and turgor, no rashes, no suspicious skin lesions noted    Current Facility-Administered Medications   Medication Dose Route Frequency    amLODIPine (NORVASC) tablet 10 mg  10 mg Oral Daily    enoxaparin (LOVENOX) injection 40 mg  40 mg SubCUTAneous Daily    baricitinib (OLUMIANT) tablet 2 mg  2 mg Oral Daily    melatonin tablet 3 mg  3 mg Oral Nightly PRN    aspirin chewable tablet 81 mg  81 mg Oral Daily    glucose chewable tablet 16 g  4 tablet Oral PRN    dextrose bolus 10% 125 mL  125 mL IntraVENous PRN    Or    dextrose bolus 10% 250 mL  250 mL IntraVENous PRN    glucagon (rDNA) injection 1 mg 1 mg SubCUTAneous PRN    dextrose 10 % infusion   IntraVENous Continuous PRN    tamsulosin (FLOMAX) capsule 0.4 mg  0.4 mg Oral Daily    sodium chloride flush 0.9 % injection 5-40 mL  5-40 mL IntraVENous 2 times per day    sodium chloride flush 0.9 % injection 5-40 mL  5-40 mL IntraVENous PRN    0.9 % sodium chloride infusion   IntraVENous PRN    ondansetron (ZOFRAN-ODT) disintegrating tablet 4 mg  4 mg Oral Q8H PRN    Or    ondansetron (ZOFRAN) injection 4 mg  4 mg IntraVENous Q6H PRN    polyethylene glycol (GLYCOLAX) packet 17 g  17 g Oral Daily PRN    acetaminophen (TYLENOL) tablet 650 mg  650 mg Oral Q6H PRN    Or    acetaminophen (TYLENOL) suppository 650 mg  650 mg Rectal Q6H PRN    guaiFENesin-dextromethorphan (ROBITUSSIN DM) 100-10 MG/5ML syrup 5 mL  5 mL Oral Q4H PRN    citalopram (CELEXA) tablet 20 mg  20 mg Oral Daily    losartan (COZAAR) tablet 100 mg  100 mg Oral Daily    memantine (NAMENDA) tablet 5 mg  5 mg Oral BID    pantoprazole (PROTONIX) tablet 40 mg  40 mg Oral Daily    atorvastatin (LIPITOR) tablet 20 mg  20 mg Oral Daily    insulin lispro (HUMALOG) injection vial 0-4 Units  0-4 Units SubCUTAneous TID WC    insulin lispro (HUMALOG) injection vial 0-4 Units  0-4 Units SubCUTAneous Nightly    nicotine (NICODERM CQ) 21 MG/24HR 1 patch  1 patch TransDERmal Daily    sodium chloride flush 0.9 % injection 5 mL  5 mL IntraVENous Q8H    sodium chloride flush 0.9 % injection 5 mL  5 mL IntraVENous PRN       Data Review: data included in this note has been independently reviewed by the author     TELEMETRY:     Assessment/Plan:     Patient Active Problem List   Diagnosis    Proteinuria    Hematuria, unspecified    Pure hypercholesterolemia    Acute, but ill-defined, cerebrovascular disease    Recurrent major depressive disorder, in full remission ([facility]    Gross hematuria    Chronic kidney disease, unspecified    Urinary tract infection, site not specified    Coronary atherosclerosis of native coronary vessel    Microscopic hematuria    Tobacco use disorder    Type II or unspecified type diabetes mellitus with neurological manifestations, not stated as uncontrolled(250.60)    Essential tremor    Anemia, unspecified    Essential hypertension, benign    History of kidney cancer    Renal sclerosis, unspecified    CKD (chronic kidney disease) stage 4, GFR 15-29 ml/min (HCC)    Respiratory insufficiency    SAH (subarachnoid hemorrhage) (HCC)    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HCC)    Dementia (HCC)    Type 2 diabetes mellitus (HCC)    Acute blood loss anemia (ABLA)    Acute upper gastrointestinal bleeding    Bradycardia, sinus    Acute respiratory failure with hypoxia (HCC)    Hypernatremia    Allergic rhinitis    Anxiety    Esophagitis    Benign neoplasm of colon    Chronic airway obstruction, not elsewhere classified    GERD (gastroesophageal reflux disease)    Chronic ischemic heart disease    Congenital tracheoesophageal fistula, esophageal atresia and stenosis    Debility    Depression    Diabetes mellitus type 2, diet-controlled ([address]    Diabetic polyneuropathy associated with type 2 diabetes mellitus ([address]    Diverticulosis of colon    Tremor    GI bleed    Hearing loss    Hereditary and idiopathic peripheral neuropathy    Impacted cerumen    Lens replaced by other means    Major depressive disorder, recurrent episode, moderate (HCC)    Mild nonproliferative diabetic retinopathy (HCC)    Mononeuritis    Nicotine dependence, unspecified, uncomplicated    Osteoarthritis    Other and unspecified hyperlipidemia    Other ill-defined and unknown causes of morbidity and mortality    Postsurgical aortocoronary bypass status    Sciatica    Stage 3a chronic kidney disease ([address]    Unintentional weight loss    Vascular dementia without behavioral disturbance (HCC)    Malignant neoplasm of urinary bladder (HCC)    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 - Review of vital signs does not show any further episodes of significant bradycardia, heart rate stable in the 60s and 70s. Essential hypertension:  - BP well controlled, continue amlodipine and losartan  - Continue to monitor/titrate as needed. Coronary artery disease s/p CABG:  - Stable, no angina  - Continue ASA 81 mg daily, Atorvastatin 20 mg daily. Gabriella Spain  I have personally seen and evaluated the patient and reviewed the students note and agree with the following assessment and plan and findings. I was present for and observed the key components of this note. Any appropriate additions or editing of the information have been done by me.     Laurie Hollingsworth MD, [facility]  Cardiology

## 2022-11-10 ENCOUNTER — CARE COORDINATION (OUTPATIENT)
Dept: CARE COORDINATION | Facility: CLINIC | Age: 82
End: 2022-11-10

## 2022-11-10 LAB
GLUCOSE BLD STRIP.AUTO-MCNC: 103 MG/DL (ref 65–100)
GLUCOSE BLD STRIP.AUTO-MCNC: 133 MG/DL (ref 65–100)
GLUCOSE BLD STRIP.AUTO-MCNC: 195 MG/DL (ref 65–100)
GLUCOSE BLD STRIP.AUTO-MCNC: 203 MG/DL (ref 65–100)
GLUCOSE BLD STRIP.AUTO-MCNC: 226 MG/DL (ref 65–100)
SERVICE CMNT-IMP: ABNORMAL

## 2022-11-10 PROCEDURE — 1180000000 HC REHAB R&B

## 2022-11-10 PROCEDURE — 97535 SELF CARE MNGMENT TRAINING: CPT

## 2022-11-10 PROCEDURE — 97166 OT EVAL MOD COMPLEX 45 MIN: CPT

## 2022-11-10 PROCEDURE — 97110 THERAPEUTIC EXERCISES: CPT

## 2022-11-10 PROCEDURE — 6370000000 HC RX 637 (ALT 250 FOR IP): Performed by: PHYSICAL MEDICINE & REHABILITATION

## 2022-11-10 PROCEDURE — 6360000002 HC RX W HCPCS: Performed by: PHYSICAL MEDICINE & REHABILITATION

## 2022-11-10 PROCEDURE — 97530 THERAPEUTIC ACTIVITIES: CPT

## 2022-11-10 PROCEDURE — 82962 GLUCOSE BLOOD TEST: CPT

## 2022-11-10 PROCEDURE — 97116 GAIT TRAINING THERAPY: CPT

## 2022-11-10 RX ORDER — ALOGLIPTIN 6.25 MG/1
6.25 TABLET, FILM COATED ORAL DAILY
Status: DISCONTINUED | OUTPATIENT
Start: 2022-11-11 | End: 2022-11-10

## 2022-11-10 RX ORDER — ALOGLIPTIN 6.25 MG/1
6.25 TABLET, FILM COATED ORAL DAILY
Status: DISCONTINUED | OUTPATIENT
Start: 2022-11-11 | End: 2022-11-16

## 2022-11-10 RX ORDER — ALOGLIPTIN 6.25 MG/1
6.25 TABLET, FILM COATED ORAL DAILY
Status: DISCONTINUED | OUTPATIENT
Start: 2022-11-10 | End: 2022-11-10

## 2022-11-10 RX ADMIN — CITALOPRAM HYDROBROMIDE 20 MG: 20 TABLET ORAL at 09:07

## 2022-11-10 RX ADMIN — ATORVASTATIN CALCIUM 20 MG: 10 TABLET, FILM COATED ORAL at 09:07

## 2022-11-10 RX ADMIN — MEMANTINE 5 MG: 5 TABLET ORAL at 09:07

## 2022-11-10 RX ADMIN — PANTOPRAZOLE SODIUM 40 MG: 40 TABLET, DELAYED RELEASE ORAL at 09:07

## 2022-11-10 RX ADMIN — ENOXAPARIN SODIUM 40 MG: 100 INJECTION SUBCUTANEOUS at 09:06

## 2022-11-10 RX ADMIN — MEMANTINE 5 MG: 5 TABLET ORAL at 21:11

## 2022-11-10 RX ADMIN — Medication 3 MG: at 21:11

## 2022-11-10 RX ADMIN — TAMSULOSIN HYDROCHLORIDE 0.4 MG: 0.4 CAPSULE ORAL at 09:07

## 2022-11-10 RX ADMIN — INSULIN LISPRO 1 UNITS: 100 INJECTION, SOLUTION INTRAVENOUS; SUBCUTANEOUS at 21:09

## 2022-11-10 RX ADMIN — INSULIN GLARGINE 10 UNITS: 100 INJECTION, SOLUTION SUBCUTANEOUS at 21:06

## 2022-11-10 NOTE — PROGRESS NOTES
OT Daily Note  Time In 1115   Time Out 1203     Subjective: \"I haven't walked in a while. \"  Pain: not expressed  Education: building activity tolerance  Interdisciplinary Communication: with PT during cotx    Mobility   Score Comments   Sit to Supine  MaxA     Sit to Stand  MaxA     Transfer Assist  TotalA       Functional mobility   Pt ambulated x10 ft and x5 ft in parallel bars with total assist (wc follow, max x1, min-mod x1). Activity Tolerance   Pt engaged in game of Walk-in Appointment Scheduler while in seated position, pt threw x4 bean bags x3 sets towards cornhole placed ~2 ft anteriorly. Required 2 to 3 minute rest break between sets. Assessment: Progressing well, fluctuations in BP, see below. SpO2 level remained 95-97% on 4.5 L of O2.     BP (!) 123/43   Pulse (!) 47   Temp 98.8 °F (37.1 °C) (Oral)   Resp 19   Ht 5' 10\" (1.778 m)   Wt 165 lb 2 oz (74.9 kg)   SpO2 92%   BMI 23.69 kg/m²     Plan: Continue OT POC with focus on ADL/IADL skills, functional transfers, functional mobility, coordination, strength, static and dynamic balance, and activity tolerance to maximize safety and independence with ADLs and functional transfers. Patient ended session in bed with call remote and phone within reach.        Miguel Cruz OTR/L   11/10/2022

## 2022-11-10 NOTE — PROGRESS NOTES
[facility] OCCUPATIONAL THERAPY INITIAL EVALUATION    Time In 7920   Time Out 0919     HPI (per MD report): \"This is an 78 YO with a PMH of vascular dementia, HTN, CKD and bladder cancer who was admitted 10/29/22 with acute respiratory failure secondary to COVID-19. He was started on dexamethasone, baricitinib and supplemental O2. CXR unremarkable. CT chest was negative for PE but does have mild infiltrate in DANI. He was weaned to RA but then began to have increased O2 requirements up to 5L O2. Repeat CXR on 10/30 showed bilateral atypical appearing infiltrates. He began to desat during ambulation requiring 7L O2 with exertion. On 11/6 rapid response was called for syncope and bradycardia, EKG showed bigeminy with patient regaining consciousness quickly, Cardiology was consulted without further concerning events on telemetry. Physical therapy was initiated and patient was starting to mobilize. He continues with significant mobility and self care impairments. It was felt he would benefit from comprehensive acute inpatient rehabilitation, continued close medical supervision and management prior to returning home. The medical stability and these recent medical events are not expected to interfere with the patient's ability to tolerate the intensity of services in acute rehab. \"  Past Medical History:   Diagnosis Date    Allergic rhinitis, cause unspecified 2/27/2014    Anemia, unspecified 2/27/2014    Atherosclerosis of renal artery ([address]     2001    CAD (coronary artery disease) 2001    4 vessel cabg, cardiac stents prior to this    Cancer [facility]) 2013    bladder/ L kidney    Chronic airway obstruction, not elsewhere classified 2/27/2014    Chronic kidney disease     hx of CRF,  creatinine 1.8    Coronary atherosclerosis of native coronary vessel 2/27/2014    Depressive disorder, not elsewhere classified 2/27/2014    Diabetes ([address] dx 2000    type 2, oral med, does not check his glucose, A1C 6.4   9/9/2019, states very rare episode hypo    Diverticulosis of colon (without mention of hemorrhage) 2/27/2014    Dyslipidemia     controlled with med    Esophagitis, unspecified 2/27/2014    Essential and other specified forms of tremor 2/27/2014    Essential hypertension, benign 2/27/2014    GERD (gastroesophageal reflux disease)     controlled with omeprazole    Gross hematuria 2/27/2014    Hematuria, unspecified 2/27/2014    Hypertension     controlled with meds    Malignant neoplasm of bladder, 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 Malignant neoplasm of trigone of urinary bladder (HCC) 2/27/2014    Microscopic hematuria 2/27/2014    Osteoarthrosis, unspecified whether generalized or localized, unspecified site 2/27/2014    Other peripheral vascular disease(443.89) 2/27/2014    Polyneuropathy in diabetes(357.2) 2/27/2014    Proteinuria 2/27/2014    Psychiatric disorder     gets \"aggravated\", treated with Celexa    PUD (peptic ulcer disease) 1970's    Pure hypercholesterolemia 2/27/2014    Renal artery stenosis ([facility] 2/27/2014    Renal sclerosis, unspecified 2/27/2014    left    Respiratory insufficiency 1/13/2016    Stroke ([facility]     CVA x 2, (last was fall of 2011),  no residual weakness; L eye impaired    Tobacco use disorder 2/27/2014    Unspecified gastritis and gastroduodenitis without mention of hemorrhage 2/27/2014    Unspecified hereditary and idiopathic peripheral neuropathy 2/27/2014    Unspecified sleep apnea     does not wear cpap    Unspecified transient cerebral ischemia 2/27/2014       Patient's Goal: Pt unable to respond  Pain: No pain expressed. Precautions: Falls and Poor Safety Awareness    Prior Level of Function: Pt was navigating home w/ RW. Wife was assisting w/ some ADLs as needed. Was on room air per pt report.    LIVING SITUATION:    Environment   Living Alone No   Support System Spouse/Significant Other    Home Set Up 1 Level Home   Home Entrance Ramp   Bathroom Setup  Grab Bars, shower chair, tub shower    Current DME Rolling Walker, Grab Bars, and Bedside Commode     UPPER EXTREMITY ASSESSMENT:   FRANKO BOWENS   General Evaluation Impaired Impaired   FMC Impaired, but functional Impaired, but functional   GMC Impaired, but functional Impaired, but functional   Light Touch intact intact   Proprioception Unable to assess Unable to assess    Subluxation No No   Inattention/Neglect No No   Muscle Tone NT NT     STRENGTH: FRANKO BOWENS   Shoulder Flexion 4-/5 Completes full range of motion against gravity with minimal-moderate resistance 4-/5 Completes full range of motion against gravity with minimal-moderate resistance   Shoulder Abduction 4-/5 Completes full range of motion against gravity with minimal-moderate resistance 4-/5 Completes full range of motion against gravity with minimal-moderate resistance   Elbow Flexion 4-/5 Completes full range of motion against gravity with minimal-moderate resistance 4-/5 Completes full range of motion against gravity with minimal-moderate resistance   Elbow Extension  4-/5 Completes full range of motion against gravity with minimal-moderate resistance 4-/5 Completes full range of motion against gravity with minimal-moderate resistance    4/5 Completes full range of motion against gravity with moderate resistance 4/5 Completes full range of motion against gravity with moderate resistance     BALANCE:   Static Dynamic   Sitting Poor: Unable to maintain balance- requires mod/max supports from individual or chair Poor: Able to sit unsupported with mod A and reach ipsilateral/front- can't cross midline   Standing Unable to test Unable to test       FUNCTIONAL MOBILITY:    Score Comments   Rolling Substantial/maximal assistance deferred secondary to pt's confusion   Supine to Sit Substantial/maximal assistance deferred secondary to pt's confusion   Sit to Supine Substantial/maximal assistance mod A x2   Sit to Stand Dependent     Transfer Assist Dependent       ACTIVITIES OF DAILY LIVING:   Score Comments   Eating Setup or clean-up assistance     Oral Hyigene Partial/moderate assistance MIN A w/ VCs for orientation   Bathing Partial/moderate assistance MOD A   Upper Body  Dressing Partial/moderate assistance MIN A   Lower Body Dressing Partial/moderate assistance MOD A to don pants while supine in bed, Pt assisted w/ threading over feet. Required max cuing to grab bed rail to roll and don pants to waist    Donning/[place] Footwear Dependent       Toilet Transfer Substantial/maximal assistance     Toileting Hygiene Dependent       BP (!) 123/43   Pulse (!) 47   Temp 98.8 °F (37.1 °C) (Oral)   Resp 19   Ht 5' 10\" (1.778 m)   Wt 165 lb 2 oz (74.9 kg)   SpO2 92%   BMI 23.69 kg/m²      Cognition: Pt scored a 4/15 on the IRF-ISRRAEL. Attention, STM, problem solving, deficits noted. Vision/Perception: Formal testing needs to be completed. No deficits noted at this time. Problem List: [facility], Activity Tolerance, Safety Awareness, Strength, Sitting Balance, Standing Balance, and Cognition   Functional Limitations: ADL, Functional Transfers, and Functional Mobility   Session: Pt agreeable to OT evaluation. Pt completed the following with details recorded above: MMT/ROM, ADLs, IRF-ISRRAEL, and informal OT interview. Interdisciplinary Communication: Collaborated with PT regarding patient's current level of function, plan of care, and safety measures. Patient/Family Education: Patient educated On the role of OT and On POC. GOALS:  Short Term Goals:  Time Frame for Short Term Goals : 7 days   STG 1: Patient will dress UB with Setup Assistance using AE/DME PRN. STG 2: Patient will dress LB with Supervision or Touching Assistance using AE/DME PRN. STG 3: Patient will don footwear with Substantial/Maximum Assistance using AE/DME PRN. STG 4: Patient will bathe with Partial/Moderate Assistance using AE/DME PRN.   STG 5: Patient will toilet with Substantial/Maximum Assistance using AE/DME PRN.    Long Term Goals:  Time Frame for Long Term Goals : 14 days   LTG 1: Patient will dress UB with [place] using AE/DME PRN. LTG 2: Patient will dress LB with Setup Assistance using AE/DME PRN. LTG 3: Patient will don footwear with Partial/Moderate Assistance using AE/DME PRN. LTG 4: Patient will bathe with Supervision or Touching Assistance using AE/DME PRN. LTG 5: Patient will toilet with Partial/Moderate Assistance using AE/DME PRN. LTG 6: Patient/caregiver will verbalize understanding of OT recommendations regarding ADLs, functional transfers, home safety, AE/DME, energy conservation, safety awareness, activity tolerance, and follow-up therapy to increase safety with functional tasks upon discharge. OT order received, chart reviewed, and OT orders acknowledged. Patient will benefit from skilled OT services to address deficits to maximize functional performance with self-care tasks and functional mobility. Treatment is likely to include [address], [address], safety awareness, AE, coordination, strength, activity tolerance, cognition training. Patient will be seen for 1.5-2 hours of skilled OT services 5-6 days a week as appropriate. Initate POC.      Jace Montero OT   11/10/2022

## 2022-11-10 NOTE — CARE COORDINATION
CM met with patient / spouse at bedside to discuss discharge plan and needs. Spouse verified demographic, no changes needed. Verified PCP (Dr. Dayne Beavers) and [place] [employer] [place] Spouse voiced no concerns about purchasing or affording medications. Spouse states that they live in a one level home with ramp entrance. Spouse states that patient required assistance with his ADL's and do has DME's (RW, BSC,shower seat, grab bars, wheelchair) in the home. Spouse states that patient has history of HH ([facility]) and rehab ([facility] [place]). Spouse states that they have two children that lives nearby and come everyday and help with patient. Spouse states that patient has improved since he's been here and very grateful that patient was able to come. CM informed the spouse of the [address] Spouse prefer CM to make contact with the and provided an update. CM will continue to follow for any needs, concerns or questions that may occur. 11/10/22 1123   Service Assessment   Patient Orientation Person   History Provided By Medical Record;Spouse   Primary Caregiver Spouse   Support Systems Spouse/Significant Other   Last Visit to PCP Within last 3 months   Prior Functional Level Independent in ADLs/IADLs   Current Functional Level Mobility;Assistance with the following:;Bathing;Dressing   Can patient return to prior living arrangement Yes   Ability to make needs known: Good   Family able to assist with home care needs: Yes   Would you like for me to discuss the discharge plan with any other family members/significant others, and if so, who?  Yes   Financial Resources Medicare   Social/Functional History   Lives With Spouse   Type of Home House   Bathroom Shower/Tub Tub/Shower unit   [address]   Receives Help From Family   ADL Assistance Independent   Active  No   Discharge Planning   Current Services Prior To Admission Durable Medical Equipment   Current DME Prior to [address] Medications No   One/Two Story Residence One story   History of falls? 1   Services At/After Discharge   Transition of Care Consult (CM Consult) Discharge [address] Provided? No   Condition of Participation: Discharge Planning   The Plan for Transition of Care is related to the following treatment goals: Pt will return home with supportive care vs rehab services. Name of the Patient Representative who was provided with the Choice of Provider and agrees with the Discharge Plan? spouse   The Patient and/Or Patient Representative agree with the Discharge Plan? Yes   Freedom of Choice list was provided with basic dialogue that supports the patient's individualized plan of care/goals, treatment preferences, and shares the quality data associated with the providers?   Yes

## 2022-11-10 NOTE — PLAN OF CARE
Problem: Skin/Tissue Integrity  Goal: Absence of new skin breakdown  Description: 1. Monitor for areas of redness and/or skin breakdown  2. Assess vascular access sites hourly  3. Every 4-6 hours minimum:  Change oxygen saturation probe site  4. Every 4-6 hours:  If on nasal continuous positive airway pressure, respiratory therapy assess nares and determine need for appliance change or resting period.   11/10/2022 0500 by Trinh Haddad RN  Outcome: Progressing  11/9/2022 1546 by Cathy Marinelli RN  Outcome: Progressing     Problem: Safety - Adult  Goal: Free from fall injury  11/10/2022 0500 by Trinh Haddad RN  Outcome: Progressing  11/9/2022 1546 by Cathy Marinelli RN  Outcome: Progressing  Flowsheets (Taken 11/9/2022 1427)  Free From Fall Injury: Instruct family/caregiver on patient safety     Problem: ABCDS Injury Assessment  Goal: Absence of physical injury  Outcome: Progressing

## 2022-11-10 NOTE — PROGRESS NOTES
Physical Therapy  PHYSICAL THERAPY DAILY NOTE  Time In:  6534  Time Out:  1203  Total Treatment Time:  (P) 78 Minutes  Pt. Seen for: AM, Gait Training, Therapeutic Exercise, and Transfer Training     Subjective: :Patient had no complaints. Objective: O 2 at 4 liters. O2 sats remained >90% thru out session. BP did drop 121/76 then after ambulating dropped to 115/56. Later after a sitting ex /51. Precautions: Poor Safety Awareness    GROSS ASSESSMENT Daily Assessment           COGNITION Daily Assessment    impaired       BED/MAT MOBILITY Daily Assessment    Rolling Right: Min A  Rolling Left: Min A  Supine to Sit: Min A  Sit to Supine: Min A       TRANSFERS Daily Assessment    Sit to Stand: Mod A  Stand to Sit: Mod A  Transfer Type: Stand Pivot  Transfer Assistance: Max A  Car Transfers: NT         GAIT Daily Assessment    Amount of Assistance: Max A and Total A  Distance (ft): 10 X 3  Assistive Device: Parallel Bars and Gait Belt  Surface: Level Surface       STEPS/STAIRS Daily Assessment    Steps Ambulated:    Level of Assistance:    Railing:  Assistive Device:        BALANCE Daily Assessment    Static Sitting:   Dynamic Sitting:   Static Standing:   Dynamic Standing:        WHEELCHAIR MOBILITY Daily Assessment    Able to Propel (ft):   Assistance:   Surface:   Wheelchair Set-up:        LOWER EXTREMITY EXERCISES Daily Assessment    Co treated with OT with a seated exercise using U E s. Pain level: no pain noted. Pain Location:    Pain Interventions:     Vital Signs:  BP (!) 123/43   Pulse (!) 47   Temp 98.8 °F (37.1 °C) (Oral)   Resp 19   Ht 5' 10\" (1.778 m)   Wt 165 lb 2 oz (74.9 kg)   SpO2 92%   BMI 23.69 kg/m²       Education:      Interdisciplinary Communication:     Pt. Left in bed with alarm on and call bell at reach. Assessment: Patient seemed motivated with therapy . Plan of Care: Continue with plan of care.     Priscila Dwyer, RAI  11/10/2022

## 2022-11-10 NOTE — PROGRESS NOTES
Physical Therapy  PHYSICAL THERAPY DAILY NOTE  Time In:  0114 PM  Time Out:  4019 PM  Total Treatment Time:  (P) 55 Minutes  Pt. Seen for: PM, Gait Training, Therapeutic Exercise, and Transfer Training     Subjective: \" I am ready to get up but not to work. \"         Objective:  Precautions: Poor Safety Awareness, Confused, and Impulsive     GROSS ASSESSMENT Daily Assessment           COGNITION Daily Assessment    impaired       BED/MAT MOBILITY Daily Assessment    Rolling Right: Mod A  Rolling Left: Mod A  Supine to Sit: Max A  Sit to Supine: Max A       TRANSFERS Daily Assessment    Sit to Stand: Max A and Total A  Stand to Sit: Max A and Total A  Transfer Type: Stand Pivot  Transfer Assistance: Max A and Total A  Car Transfers: NT         GAIT Daily Assessment    Amount of Assistance:   Distance (ft):   Assistive Device:   Surface:        STEPS/STAIRS Daily Assessment    Steps Ambulated:    Level of Assistance:    Railing:  Assistive Device:        BALANCE Daily Assessment    Static Sitting:   Dynamic Sitting:   Static Standing:   Dynamic Standing:        WHEELCHAIR MOBILITY Daily Assessment    Able to Propel (ft):   Assistance:   Surface:   Wheelchair Set-up:        LOWER EXTREMITY EXERCISES Daily Assessment    SEATED EXERCISES Sets Reps Comments   Ankle Pumps 2 10    Hip Flexion 2 10    Long Arc Quads 2 10    Hip Adduction/Ball Squeeze 2 10                      Rode motomed x 10 minutes       Pain level: no pain noted  Pain Location:    Pain Interventions:     Vital Signs:  BP (!) 123/43   Pulse (!) 47   Temp 98.8 °F (37.1 °C) (Oral)   Resp 19   Ht 5' 10\" (1.778 m)   Wt 165 lb 2 oz (74.9 kg)   SpO2 92%   BMI 23.69 kg/m²       Education:      Interdisciplinary Communication:     Pt. Left in bed with alarm on and call bell at reach. Assessment: Patient seems motivated with therapy but doesn't realize all hs deficits. Plan of Care: Continue with plan of care.     Deisy Berg, RIA  11/10/2022

## 2022-11-11 LAB
GLUCOSE BLD STRIP.AUTO-MCNC: 159 MG/DL (ref 65–100)
GLUCOSE BLD STRIP.AUTO-MCNC: 195 MG/DL (ref 65–100)
GLUCOSE BLD STRIP.AUTO-MCNC: 68 MG/DL (ref 65–100)
GLUCOSE BLD STRIP.AUTO-MCNC: 83 MG/DL (ref 65–100)
GLUCOSE BLD STRIP.AUTO-MCNC: 98 MG/DL (ref 65–100)
SERVICE CMNT-IMP: ABNORMAL
SERVICE CMNT-IMP: ABNORMAL
SERVICE CMNT-IMP: NORMAL

## 2022-11-11 PROCEDURE — 97530 THERAPEUTIC ACTIVITIES: CPT

## 2022-11-11 PROCEDURE — 2700000000 HC OXYGEN THERAPY PER DAY

## 2022-11-11 PROCEDURE — 97110 THERAPEUTIC EXERCISES: CPT

## 2022-11-11 PROCEDURE — 97535 SELF CARE MNGMENT TRAINING: CPT

## 2022-11-11 PROCEDURE — 6370000000 HC RX 637 (ALT 250 FOR IP): Performed by: PHYSICIAN ASSISTANT

## 2022-11-11 PROCEDURE — 6370000000 HC RX 637 (ALT 250 FOR IP): Performed by: PHYSICAL MEDICINE & REHABILITATION

## 2022-11-11 PROCEDURE — 82962 GLUCOSE BLOOD TEST: CPT

## 2022-11-11 PROCEDURE — 1180000000 HC REHAB R&B

## 2022-11-11 PROCEDURE — 99232 SBSQ HOSP IP/OBS MODERATE 35: CPT | Performed by: PHYSICAL MEDICINE & REHABILITATION

## 2022-11-11 PROCEDURE — 6360000002 HC RX W HCPCS: Performed by: PHYSICAL MEDICINE & REHABILITATION

## 2022-11-11 RX ADMIN — LOSARTAN POTASSIUM 100 MG: 50 TABLET, FILM COATED ORAL at 08:24

## 2022-11-11 RX ADMIN — PANTOPRAZOLE SODIUM 40 MG: 40 TABLET, DELAYED RELEASE ORAL at 08:24

## 2022-11-11 RX ADMIN — ALOGLIPTIN 6.25 MG: 6.25 TABLET, FILM COATED ORAL at 08:24

## 2022-11-11 RX ADMIN — Medication 3 MG: at 20:38

## 2022-11-11 RX ADMIN — MEMANTINE 5 MG: 5 TABLET ORAL at 20:38

## 2022-11-11 RX ADMIN — MEMANTINE 5 MG: 5 TABLET ORAL at 08:24

## 2022-11-11 RX ADMIN — INSULIN GLARGINE 10 UNITS: 100 INJECTION, SOLUTION SUBCUTANEOUS at 21:00

## 2022-11-11 RX ADMIN — CITALOPRAM HYDROBROMIDE 20 MG: 20 TABLET ORAL at 08:25

## 2022-11-11 RX ADMIN — AMLODIPINE BESYLATE 10 MG: 10 TABLET ORAL at 08:24

## 2022-11-11 RX ADMIN — TAMSULOSIN HYDROCHLORIDE 0.4 MG: 0.4 CAPSULE ORAL at 08:24

## 2022-11-11 RX ADMIN — ENOXAPARIN SODIUM 40 MG: 100 INJECTION SUBCUTANEOUS at 08:25

## 2022-11-11 RX ADMIN — ATORVASTATIN CALCIUM 20 MG: 10 TABLET, FILM COATED ORAL at 08:24

## 2022-11-11 NOTE — PROGRESS NOTES
Physical Therapy  PHYSICAL THERAPY DAILY NOTE  Time In:  1006 AM  Time Out:  1120 AM  Total Treatment Time:  (P) 74 Minutes  Pt. Seen for: AM, Gait Training, Therapeutic Exercise, and Transfer Training     Subjective: \" I want coffee. \"         Objective:  Precautions: Poor Safety Awareness    GROSS ASSESSMENT Daily Assessment           COGNITION Daily Assessment           BED/MAT MOBILITY Daily Assessment    Rolling Right: Min A  Rolling Left: Min A  Supine to Sit: Mod A  Sit to Supine: Mod A       TRANSFERS Daily Assessment    Sit to Stand: Max A  Stand to Sit: Max A  Transfer Type: Stand Pivot  Transfer Assistance: Max A  Car Transfers: NT         GAIT Daily Assessment    Amount of Assistance: NT  Distance (ft):   Assistive Device:   Surface:        STEPS/STAIRS Daily Assessment    Steps Ambulated:    Level of Assistance:    Railing:  Assistive Device:        BALANCE Daily Assessment    Static Sitting:   Dynamic Sitting:   Static Standing:   Dynamic Standing:        WHEELCHAIR MOBILITY Daily Assessment    Able to Propel (ft):   Assistance:   Surface:   Wheelchair Set-up:        LOWER EXTREMITY EXERCISES Daily Assessment    Yudelka quiroz x 10 minutes. Worked with OT with activity in sitting. Pain level: no pain noted. Pain Location:    Pain Interventions:     Vital Signs:  BP (!) 128/47   Pulse 60   Temp 99.3 °F (37.4 °C) (Axillary)   Resp 16   Ht 5' 10\" (1.778 m)   Wt 165 lb 2 oz (74.9 kg)   SpO2 95%   BMI 23.69 kg/m²       Education:      Interdisciplinary Communication:     Pt. Left in wheelchair with nurse Henny present with chair alarm on. Assessment: Patients confusion is hindering progress. Plan of Care: Continue with plan of care.     Priscila Dwyer, RIA  11/11/2022

## 2022-11-11 NOTE — PROGRESS NOTES
Physical Therapy  PHYSICAL THERAPY DAILY NOTE  Time In:  8489 PM  Time Out:  6621 PM  Total Treatment Time:  (P) 37 Minutes  Pt. Seen for: PM, Therapeutic Exercise, and Transfer Training     Subjective: \" I just want to get in my bed. \"         Objective:  Precautions: Poor Safety Awareness    GROSS ASSESSMENT Daily Assessment           COGNITION Daily Assessment    impaired       BED/MAT MOBILITY Daily Assessment    Rolling Right:   Rolling Left:   Supine to Sit: Max A  Sit to Supine: Max A       TRANSFERS Daily Assessment    Sit to Stand: Max A  Stand to Sit: Max A  Transfer Type: Stand Pivot  Transfer Assistance: Max A and Total A  Car Transfers: NT         GAIT Daily Assessment    Amount of Assistance:   Distance (ft):   Assistive Device:   Surface:        STEPS/STAIRS Daily Assessment    Steps Ambulated:    Level of Assistance:    Railing:  Assistive Device:        BALANCE Daily Assessment    Static Sitting:   Dynamic Sitting:   Static Standing:   Dynamic Standing:        WHEELCHAIR MOBILITY Daily Assessment    Able to Propel (ft):   Assistance:   Surface:   Wheelchair Set-up:        LOWER EXTREMITY EXERCISES Daily Assessment    SUPINE EXERCISES Sets Reps Comments   Ankle Pumps 1 10 All exs required assistance   Quad Sets 1 10    Glut Sets 1 10    Heel Slides 1 10    Hip Abduction 1 10    Short Arc Quad 1 10                 Pain level: no pain noted. Pain Location:    Pain Interventions:     Vital Signs:  BP (!) 140/47   Pulse (!) 44   Temp 98.1 °F (36.7 °C) (Oral)   Resp 18   Ht 5' 10\" (1.778 m)   Wt 165 lb 2 oz (74.9 kg)   SpO2 97%   BMI 23.69 kg/m²       Education:      Interdisciplinary Communication:     Pt. Left in bed with call mono t reach and alarm on. Assessment: Patient drowsy today and required more assistance with mobility. Plan of Care: Continue with plan of care.     Jose Roberto Zimmerman, PTA  11/11/2022

## 2022-11-11 NOTE — PROGRESS NOTES
Physical Medicine and Rehabilitation Progress Note    Zohra Failing  Admit Date: 11/9/2022  Admit Diagnosis: Debility [R53.81]  Physical debility [R53.81]    Subjective:Patient seen face-to-face. Denies lightheadedness, palpations, SOB at rest or nausea. No pain complaints.      Objective:     Current Facility-Administered Medications   Medication Dose Route Frequency    alogliptin (NESINA) tablet 6.25 mg  6.25 mg Oral Daily    0.9 % sodium chloride infusion   IntraVENous PRN    acetaminophen (TYLENOL) tablet 650 mg  650 mg Oral Q6H PRN    Or    acetaminophen (TYLENOL) suppository 650 mg  650 mg Rectal Q6H PRN    amLODIPine (NORVASC) tablet 10 mg  10 mg Oral Daily    citalopram (CELEXA) tablet 20 mg  20 mg Oral Daily    atorvastatin (LIPITOR) tablet 20 mg  20 mg Oral Daily    enoxaparin (LOVENOX) injection 40 mg  40 mg SubCUTAneous Daily    guaiFENesin-dextromethorphan (ROBITUSSIN DM) 100-10 MG/5ML syrup 5 mL  5 mL Oral Q4H PRN    losartan (COZAAR) tablet 100 mg  100 mg Oral Daily    melatonin tablet 3 mg  3 mg Oral Nightly PRN    memantine (NAMENDA) tablet 5 mg  5 mg Oral BID    nicotine (NICODERM CQ) 21 MG/24HR 1 patch  1 patch TransDERmal Daily    ondansetron (ZOFRAN-ODT) disintegrating tablet 4 mg  4 mg Oral Q8H PRN    Or    ondansetron (ZOFRAN) injection 4 mg  4 mg IntraVENous Q6H PRN    pantoprazole (PROTONIX) tablet 40 mg  40 mg Oral Daily    polyethylene glycol (GLYCOLAX) packet 17 g  17 g Oral Daily PRN    tamsulosin (FLOMAX) capsule 0.4 mg  0.4 mg Oral Daily    sodium chloride flush 0.9 % injection 5-40 mL  5-40 mL IntraVENous PRN    glucagon (rDNA) injection 1 mg  1 mg SubCUTAneous PRN    insulin glargine (LANTUS) injection vial 10 Units  10 Units SubCUTAneous Nightly    insulin lispro (HUMALOG) injection vial 0-4 Units  0-4 Units SubCUTAneous 4x Daily AC & HS     Allergies   Allergen Reactions    Aspirin-Dipyridamole Er Headaches    Beta Adrenergic Blockers     Ciprofloxacin Other (See Comments) weakness    Donepezil Diarrhea    Lisinopril Cough       Visit Vitals  BP (!) 128/47   Pulse 60   Temp 99.3 °F (37.4 °C) (Axillary)   Resp 16   Ht 5' 10\" (1.778 m)   Wt 165 lb 2 oz (74.9 kg)   SpO2 95%   BMI 23.69 kg/m²      Intake and Output:  11/09 1901 - 11/11 0700  In: -   Out: 400 [Urine:400]    Physical Exam:   General:   Alert, NAD, up in wc in gym. HEENT:  Normocephalic, EOMI, facial symmetry  Intact. Oral mucosa pink and moist. No nasal discharge. Lungs:  CTA Bilaterally,Respiration even and unlabored. On O2 NC   Heart:  Regular irregular rate and rhythm, S1, S2, No murmur        Abdomen:  Soft, bowel sounds present. Neuromuscular:  Slowed speech pattern, fluent, responds appropriately, no resting tremors, moves all extremities with anti-gravity strength   Skin:  Intact, dry, good skin turgor, age related changes present, no LE edema           Functional Assessment:                                Cipriano Fall Risk Assessment:        Ambulation:        Labs/Studies:  Recent Results (from the past 72 hour(s))   POCT Glucose    Collection Time: 11/08/22 11:25 AM   Result Value Ref Range    POC Glucose 104 (H) 65 - 100 mg/dL    Performed by: Gladys    POCT Glucose    Collection Time: 11/08/22  4:22 PM   Result Value Ref Range    POC Glucose 278 (H) 65 - 100 mg/dL    Performed by: Gracie    POCT Glucose    Collection Time: 11/08/22  8:02 PM   Result Value Ref Range    POC Glucose 168 (H) 65 - 100 mg/dL    Performed by: Hanna    POCT Glucose    Collection Time: 11/09/22  7:19 AM   Result Value Ref Range    POC Glucose 113 (H) 65 - 100 mg/dL    Performed by: Steven    POCT Glucose    Collection Time: 11/09/22 10:55 AM   Result Value Ref Range    POC Glucose 222 (H) 65 - 100 mg/dL    Performed by:  Steven    POCT Glucose    Collection Time: 11/09/22  5:16 PM   Result Value Ref Range    POC Glucose 203 (H) 65 - 100 mg/dL    Performed by: [facility]    POCT Glucose    Collection Time: 11/09/22  8:25 PM   Result Value Ref Range    POC Glucose 442 (H) 65 - 100 mg/dL    Performed by: [facility]    POCT Glucose    Collection Time: 11/10/22  6:40 AM   Result Value Ref Range    POC Glucose 103 (H) 65 - 100 mg/dL    Performed by: [facility]    POCT Glucose    Collection Time: 11/10/22 12:08 PM   Result Value Ref Range    POC Glucose 133 (H) 65 - 100 mg/dL    Performed by: Julia    POCT Glucose    Collection Time: 11/10/22  4:36 PM   Result Value Ref Range    POC Glucose 195 (H) 65 - 100 mg/dL    Performed by: [facility]    POCT Glucose    Collection Time: 11/10/22  8:52 PM   Result Value Ref Range    POC Glucose 226 (H) 65 - 100 mg/dL    Performed by: [employer]    POCT Glucose    Collection Time: 11/11/22  6:19 AM   Result Value Ref Range    POC Glucose 68 65 - 100 mg/dL    Performed by: Mariana    POCT Glucose    Collection Time: 11/11/22  7:33 AM   Result Value Ref Range    POC Glucose 98 65 - 100 mg/dL    Performed by: Terryann Boast      Assessment: This is an 78 YO with a PMH of vascular dementia, HTN, CKD and bladder CA who was admitted 10/29 in  respiratory failure secondary to COVID. Treated with dexamethasone, baricitinib and supplemental O2. Persistent hypoxia with O2 desat during activity. Acute transfer d/t  syncope and bradycardia, He continues with significant mobility and self care impairments. Rehabilitation Plan  Continue PT for a minimum of 1.5 hours a day, at least 5 out of 7 days per week to address bed mobility, transfers, ambulation, strengthening, balance, and endurance.    Continue OT for a minimum of 1.5 hours a day, at least 5 out of 7 days per week to address ADL (feeding, grooming, bathing, UE and LE dressing, toileting); instrumental ADLs; cognitive function; safety; energy conservations; community reintegration; and adaptive equipment as needed. Continue 24-hour skilled rehabilitation nursing for bowel and bladder management, skin care for decubitus ulcer prevention , pain management and ongoing medication administration      Continue close physician supervision and   medical management of: :  COVID-19 virus infection/ARF/Pneumonia - on O2, wean as tolerated     HTN/Bradycardia - HRs fluctuating, 40 - 76 in last 24 hrs, aymptomatic  continues on telemetry, on norvasc, losartan with parameters, rapid response called during acute hospitalization for syncope and bradycardia with transfer, cardiology consulted without further events, if re occurs, will re consult     CKD/h/o bladder CA - monitor labs, Cr - 1.5 on 11/8     DM II - BG - 68, 98 this am, yesterday 103, 133, 195, 226, on lantus 10 units hs, ISS     Pain mgmt- prn tylenol     DVT prophylaxis - on lovenox    Time spent was 25 minutes with over 1/2 in direct patient care/examination, consultation and coordination of care.     Signed By: Eric Rodriguez MD     November 11, 2022

## 2022-11-11 NOTE — PROGRESS NOTES
OT Daily Note  Time In 1035   Time Out 1120     Subjective: \"Sleeping\" [when asked about his hobbies]. Pain: not expressed  Education: building activity tolerance  Interdisciplinary Communication: with PTA during cotx    Mobility   Score Comments   Sit to Stand  Mod assist       Activity Tolerance   Pt engaged in ball tossing activity while in seated position in wc, pt threw x30 x 2 sets towards cornhole placed 1 ft anteriorly. Pt required 5+ minute rest breaks throughout activity. Assessment: Pt limited by fatigue and decreased alertness but slowly improved throughout session with movement and drinking coffee. Pt continues to benefit from skilled OT services and cotx to build back endurance and functional mobility. Plan: Continue OT POC with focus on ADL/IADL skills, functional transfers, functional mobility, coordination, strength, static and dynamic balance, and activity tolerance to maximize safety and independence with ADLs and functional transfers. Patient ended session in  with RN assuming care.         ELENA Han/ROSARIO   11/11/2022

## 2022-11-11 NOTE — PROGRESS NOTES
OT DAILY NOTE    Time In 0828   Time Out 0913     Subjective: Pt resistant to treatment   Pain: No pain expressed. Interdisciplinary Communication: Collaborated with RN about pt's level of alertness. Precautions: Falls and Poor Safety Awareness    BP (!) 128/47   Pulse 60   Temp 99.3 °F (37.4 °C) (Axillary)   Resp 16   Ht 5' 10\" (1.778 m)   Wt 165 lb 2 oz (74.9 kg)   SpO2 95%   BMI 23.69 kg/m²      MOBILITY:   Score Comments   Rolling Dependent deferred secondary to pt's confusion   Supine to Sit Substantial/maximal assistance deferred secondary to pt's confusion   Sit to Supine Substantial/maximal assistance mod A x2   Sit to Stand Dependent     Transfer Assist Dependent       ACTIVITIES OF DAILY LIVING:   Score Comments   Bathing Dependent dependent to wash face   Lower Body Dressing Dependent dependent to don pants while supine in bed   Toileting Hygiene Dependent     Education Benefits of OT     Assessment: Patient not engaged and transitioned in and out of alertness. Pt rarely responsive to verbal commands and required max A - dependent to complete ADLs. Pt continues to benefit from skilled OT services to address remaining deficits and progress toward baseline level of independence and safety. Patient ended session supine in bed with call bell and needs within reach. Plan: Continue OT POC.      Enrique Guan OT   11/11/2022

## 2022-11-12 LAB
GLUCOSE BLD STRIP.AUTO-MCNC: 111 MG/DL (ref 65–100)
GLUCOSE BLD STRIP.AUTO-MCNC: 140 MG/DL (ref 65–100)
GLUCOSE BLD STRIP.AUTO-MCNC: 227 MG/DL (ref 65–100)
GLUCOSE BLD STRIP.AUTO-MCNC: 59 MG/DL (ref 65–100)
GLUCOSE BLD STRIP.AUTO-MCNC: 64 MG/DL (ref 65–100)
GLUCOSE BLD STRIP.AUTO-MCNC: 67 MG/DL (ref 65–100)
SERVICE CMNT-IMP: ABNORMAL
SERVICE CMNT-IMP: NORMAL

## 2022-11-12 PROCEDURE — 6370000000 HC RX 637 (ALT 250 FOR IP): Performed by: PHYSICAL MEDICINE & REHABILITATION

## 2022-11-12 PROCEDURE — 6370000000 HC RX 637 (ALT 250 FOR IP): Performed by: PHYSICIAN ASSISTANT

## 2022-11-12 PROCEDURE — 1180000000 HC REHAB R&B

## 2022-11-12 PROCEDURE — 97110 THERAPEUTIC EXERCISES: CPT

## 2022-11-12 PROCEDURE — 2700000000 HC OXYGEN THERAPY PER DAY

## 2022-11-12 PROCEDURE — 97116 GAIT TRAINING THERAPY: CPT

## 2022-11-12 PROCEDURE — 94760 N-INVAS EAR/PLS OXIMETRY 1: CPT

## 2022-11-12 PROCEDURE — 6360000002 HC RX W HCPCS: Performed by: PHYSICAL MEDICINE & REHABILITATION

## 2022-11-12 PROCEDURE — 97530 THERAPEUTIC ACTIVITIES: CPT

## 2022-11-12 PROCEDURE — 99232 SBSQ HOSP IP/OBS MODERATE 35: CPT | Performed by: PHYSICAL MEDICINE & REHABILITATION

## 2022-11-12 PROCEDURE — 82962 GLUCOSE BLOOD TEST: CPT

## 2022-11-12 RX ORDER — INSULIN GLARGINE 100 [IU]/ML
8 INJECTION, SOLUTION SUBCUTANEOUS NIGHTLY
Status: DISCONTINUED | OUTPATIENT
Start: 2022-11-12 | End: 2022-11-14

## 2022-11-12 RX ADMIN — PANTOPRAZOLE SODIUM 40 MG: 40 TABLET, DELAYED RELEASE ORAL at 08:14

## 2022-11-12 RX ADMIN — ALOGLIPTIN 6.25 MG: 6.25 TABLET, FILM COATED ORAL at 08:14

## 2022-11-12 RX ADMIN — TAMSULOSIN HYDROCHLORIDE 0.4 MG: 0.4 CAPSULE ORAL at 08:14

## 2022-11-12 RX ADMIN — CITALOPRAM HYDROBROMIDE 20 MG: 20 TABLET ORAL at 08:15

## 2022-11-12 RX ADMIN — MEMANTINE 5 MG: 5 TABLET ORAL at 08:14

## 2022-11-12 RX ADMIN — AMLODIPINE BESYLATE 10 MG: 10 TABLET ORAL at 08:14

## 2022-11-12 RX ADMIN — MEMANTINE 5 MG: 5 TABLET ORAL at 21:00

## 2022-11-12 RX ADMIN — INSULIN GLARGINE 8 UNITS: 100 INJECTION, SOLUTION SUBCUTANEOUS at 22:13

## 2022-11-12 RX ADMIN — ENOXAPARIN SODIUM 40 MG: 100 INJECTION SUBCUTANEOUS at 08:14

## 2022-11-12 RX ADMIN — ATORVASTATIN CALCIUM 20 MG: 10 TABLET, FILM COATED ORAL at 08:14

## 2022-11-12 RX ADMIN — LOSARTAN POTASSIUM 100 MG: 50 TABLET, FILM COATED ORAL at 08:14

## 2022-11-12 NOTE — CARE COORDINATION
Patient needs are continue to be followed by Dr. Elian Vásquez. Patient has no discharge date / plan at this time. CM will continue to follow / monitor for any needs, concerns or questions that may arise.

## 2022-11-12 NOTE — PLAN OF CARE
Problem: Safety - Adult  Goal: Free from fall injury  11/12/2022 0924 by Mansi Elizalde RN  Outcome: Progressing  11/12/2022 0037 by Philomena Cole.  JOSE A Patrick  Outcome: Progressing

## 2022-11-12 NOTE — PROGRESS NOTES
Physical Medicine and Rehabilitation Progress Note    Blair Zhang  Admit Date: 11/9/2022  Admit Diagnosis: Debility [R53.81]  Physical debility [R53.81]    Subjective:Patient seen face-to-face. Therapy report mobility at max A. Denies pain,lightheadedness, palpations, SOB at rest or nausea. Participating in therapy.     Objective:     Current Facility-Administered Medications   Medication Dose Route Frequency    alogliptin (NESINA) tablet 6.25 mg  6.25 mg Oral Daily    0.9 % sodium chloride infusion   IntraVENous PRN    acetaminophen (TYLENOL) tablet 650 mg  650 mg Oral Q6H PRN    Or    acetaminophen (TYLENOL) suppository 650 mg  650 mg Rectal Q6H PRN    amLODIPine (NORVASC) tablet 10 mg  10 mg Oral Daily    citalopram (CELEXA) tablet 20 mg  20 mg Oral Daily    atorvastatin (LIPITOR) tablet 20 mg  20 mg Oral Daily    enoxaparin (LOVENOX) injection 40 mg  40 mg SubCUTAneous Daily    guaiFENesin-dextromethorphan (ROBITUSSIN DM) 100-10 MG/5ML syrup 5 mL  5 mL Oral Q4H PRN    losartan (COZAAR) tablet 100 mg  100 mg Oral Daily    melatonin tablet 3 mg  3 mg Oral Nightly PRN    memantine (NAMENDA) tablet 5 mg  5 mg Oral BID    nicotine (NICODERM CQ) 21 MG/24HR 1 patch  1 patch TransDERmal Daily    ondansetron (ZOFRAN-ODT) disintegrating tablet 4 mg  4 mg Oral Q8H PRN    Or    ondansetron (ZOFRAN) injection 4 mg  4 mg IntraVENous Q6H PRN    pantoprazole (PROTONIX) tablet 40 mg  40 mg Oral Daily    polyethylene glycol (GLYCOLAX) packet 17 g  17 g Oral Daily PRN    tamsulosin (FLOMAX) capsule 0.4 mg  0.4 mg Oral Daily    sodium chloride flush 0.9 % injection 5-40 mL  5-40 mL IntraVENous PRN    glucagon (rDNA) injection 1 mg  1 mg SubCUTAneous PRN    insulin glargine (LANTUS) injection vial 10 Units  10 Units SubCUTAneous Nightly    insulin lispro (HUMALOG) injection vial 0-4 Units  0-4 Units SubCUTAneous 4x Daily AC & HS     Allergies   Allergen Reactions    Aspirin-Dipyridamole Er Headaches    Beta Adrenergic Blockers     Ciprofloxacin Other (See Comments)     weakness    Donepezil Diarrhea    Lisinopril Cough       Visit Vitals  BP (!) 120/40   Pulse 61   Temp 99.2 °F (37.3 °C) (Oral)   Resp 16   Ht 5' 10\" (1.778 m)   Wt 165 lb 2 oz (74.9 kg)   SpO2 94%   BMI 23.69 kg/m²      Intake and Output:  11/10 1901 - 11/12 0700  In: 120 [P.O.:120]  Out: 200 [Urine:200]    Physical Exam:   General:   drowsy, NAD, resting comfortably in bed    HEENT:  Normocephalic, EOMI, facial symmetry  Intact. Oral mucosa pink and moist. No nasal discharge. Lungs:  CTA Bilaterally,Respiration even and unlabored. On O2 2 L NC   Heart:  Regular irregular rate and rhythm, S1, S2, No murmur        Abdomen:  Soft, bowel sounds present. Neuromuscular:  Slowed speech pattern, fluent, responds appropriately, no resting tremors, moves all extremities with anti-gravity strength   Skin:  Intact, dry, good skin turgor, age related changes present, no LE edema           Functional Assessment:                                Cipriano Fall Risk Assessment:        Ambulation:        Labs/Studies:  Recent Results (from the past 72 hour(s))   POCT Glucose    Collection Time: 11/09/22 10:55 AM   Result Value Ref Range    POC Glucose 222 (H) 65 - 100 mg/dL    Performed by:  Steven    POCT Glucose    Collection Time: 11/09/22  5:16 PM   Result Value Ref Range    POC Glucose 203 (H) 65 - 100 mg/dL    Performed by: [facility]    POCT Glucose    Collection Time: 11/09/22  8:25 PM   Result Value Ref Range    POC Glucose 442 (H) 65 - 100 mg/dL    Performed by: [facility]    POCT Glucose    Collection Time: 11/10/22  6:40 AM   Result Value Ref Range    POC Glucose 103 (H) 65 - 100 mg/dL    Performed by: Esteban    POCT Glucose    Collection Time: 11/10/22 12:08 PM   Result Value Ref Range    POC Glucose 133 (H) 65 - 100 mg/dL    Performed by: Julia    POCT Glucose    Collection Time: 11/10/22  4:36 PM   Result Value Ref Range    POC Glucose 195 (H) 65 - 100 mg/dL    Performed by: Ruth    POCT Glucose    Collection Time: 11/10/22  8:52 PM   Result Value Ref Range    POC Glucose 226 (H) 65 - 100 mg/dL    Performed by: Esteban    POCT Glucose    Collection Time: 11/11/22  6:19 AM   Result Value Ref Range    POC Glucose 68 65 - 100 mg/dL    Performed by: Mariana    POCT Glucose    Collection Time: 11/11/22  7:33 AM   Result Value Ref Range    POC Glucose 98 65 - 100 mg/dL    Performed by: Eliza Ordoñez    POCT Glucose    Collection Time: 11/11/22 11:56 AM   Result Value Ref Range    POC Glucose 83 65 - 100 mg/dL    Performed by: Carmelita    POCT Glucose    Collection Time: 11/11/22  4:57 PM   Result Value Ref Range    POC Glucose 159 (H) 65 - 100 mg/dL    Performed by: [facility] DR LOKESH MCCABE    POCT Glucose    Collection Time: 11/11/22  8:33 PM   Result Value Ref Range    POC Glucose 195 (H) 65 - 100 mg/dL    [facility]formed by: [facility]    POCT Glucose    Collection Time: 11/12/22  6:19 AM   Result Value Ref Range    POC Glucose 64 (L) 65 - 100 mg/dL    Performed by: [facility] DR LOKESH MCCABE    POCT Glucose    Collection Time: 11/12/22  6:42 AM   Result Value Ref Range    POC Glucose 59 (L) 65 - 100 mg/dL    Performed by: Mariana    POCT Glucose    Collection Time: 11/12/22  7:15 AM   Result Value Ref Range    POC Glucose 111 (H) 65 - 100 mg/dL    Performed by: [facility]      Assessment: This is an 78 YO with a PMH of vascular dementia, HTN, CKD and bladder CA who was admitted 10/29 in  respiratory failure secondary to COVID. Treated with dexamethasone, baricitinib and supplemental O2. Persistent hypoxia with O2 desat during activity. Acute transfer d/t  syncope and bradycardia, He continues with significant mobility and self care impairments.     Rehabilitation Plan  Continue PT for a minimum of 1.5 hours a day, at least 5 out of 7 days per week to address bed mobility, transfers, ambulation, strengthening, balance, and endurance. Continue OT for a minimum of 1.5 hours a day, at least 5 out of 7 days per week to address ADL (feeding, grooming, bathing, UE and LE dressing, toileting); instrumental ADLs; cognitive function; safety; energy conservations; community reintegration; and adaptive equipment as needed. Continue 24-hour skilled rehabilitation nursing for bowel and bladder management, skin care for decubitus ulcer prevention , pain management and ongoing medication administration      Continue close physician supervision and   medical management of: :  COVID-19 virus infection/ARF/Pneumonia - on 2L O2, wean as tolerated     HTN/Bradycardia - HRs fluctuating, 44 - 81 in last 24 hrs, BP - 120/40, aymptomatic  continues on telemetry, on norvasc, losartan with parameters, rapid response called during acute hospitalization for syncope and bradycardia with transfer, cardiology consulted without further events, if re occurs, will re consult     CKD/h/o bladder CA - monitor labs, Cr - 1.5 on 11/8     DM II - BG - 64, 59, 11 this am, yesterday 68, 98, 83, 159, 195, decrease lantus to 8 units hs, ISS     Pain mgmt- prn tylenol     DVT prophylaxis - on lovenox    Time spent was 25 minutes with over 1/2 in direct patient care/examination, consultation and coordination of care.     Signed By: Cat Beard MD     November 12, 2022

## 2022-11-13 LAB
GLUCOSE BLD STRIP.AUTO-MCNC: 117 MG/DL (ref 65–100)
GLUCOSE BLD STRIP.AUTO-MCNC: 127 MG/DL (ref 65–100)
GLUCOSE BLD STRIP.AUTO-MCNC: 88 MG/DL (ref 65–100)
GLUCOSE BLD STRIP.AUTO-MCNC: 93 MG/DL (ref 65–100)
SERVICE CMNT-IMP: ABNORMAL
SERVICE CMNT-IMP: ABNORMAL
SERVICE CMNT-IMP: NORMAL
SERVICE CMNT-IMP: NORMAL

## 2022-11-13 PROCEDURE — 6370000000 HC RX 637 (ALT 250 FOR IP): Performed by: PHYSICIAN ASSISTANT

## 2022-11-13 PROCEDURE — 1180000000 HC REHAB R&B

## 2022-11-13 PROCEDURE — 99232 SBSQ HOSP IP/OBS MODERATE 35: CPT | Performed by: PHYSICAL MEDICINE & REHABILITATION

## 2022-11-13 PROCEDURE — 6360000002 HC RX W HCPCS: Performed by: PHYSICAL MEDICINE & REHABILITATION

## 2022-11-13 PROCEDURE — 6370000000 HC RX 637 (ALT 250 FOR IP): Performed by: PHYSICAL MEDICINE & REHABILITATION

## 2022-11-13 PROCEDURE — 82962 GLUCOSE BLOOD TEST: CPT

## 2022-11-13 RX ADMIN — AMLODIPINE BESYLATE 10 MG: 10 TABLET ORAL at 08:26

## 2022-11-13 RX ADMIN — PANTOPRAZOLE SODIUM 40 MG: 40 TABLET, DELAYED RELEASE ORAL at 08:26

## 2022-11-13 RX ADMIN — TAMSULOSIN HYDROCHLORIDE 0.4 MG: 0.4 CAPSULE ORAL at 08:26

## 2022-11-13 RX ADMIN — ALOGLIPTIN 6.25 MG: 6.25 TABLET, FILM COATED ORAL at 08:26

## 2022-11-13 RX ADMIN — ATORVASTATIN CALCIUM 20 MG: 10 TABLET, FILM COATED ORAL at 08:26

## 2022-11-13 RX ADMIN — INSULIN GLARGINE 8 UNITS: 100 INJECTION, SOLUTION SUBCUTANEOUS at 21:19

## 2022-11-13 RX ADMIN — LOSARTAN POTASSIUM 100 MG: 50 TABLET, FILM COATED ORAL at 08:25

## 2022-11-13 RX ADMIN — MEMANTINE 5 MG: 5 TABLET ORAL at 08:26

## 2022-11-13 RX ADMIN — CITALOPRAM HYDROBROMIDE 20 MG: 20 TABLET ORAL at 08:26

## 2022-11-13 RX ADMIN — MEMANTINE 5 MG: 5 TABLET ORAL at 20:58

## 2022-11-13 RX ADMIN — ENOXAPARIN SODIUM 40 MG: 100 INJECTION SUBCUTANEOUS at 08:26

## 2022-11-13 NOTE — PROGRESS NOTES
Physical Medicine and Rehabilitation Progress Note    Wendi Soares  Admit Date: 11/9/2022  Admit Diagnosis: Debility [R53.81]  Physical debility [R53.81]    Subjective:Patient seen face-to-face. Therapy report mobility at max A. Denies pain, lightheadedness, palpations, SOB or nausea. Participating in therapy.     Objective:     Current Facility-Administered Medications   Medication Dose Route Frequency    insulin glargine (LANTUS) injection vial 8 Units  8 Units SubCUTAneous Nightly    alogliptin (NESINA) tablet 6.25 mg  6.25 mg Oral Daily    0.9 % sodium chloride infusion   IntraVENous PRN    acetaminophen (TYLENOL) tablet 650 mg  650 mg Oral Q6H PRN    Or    acetaminophen (TYLENOL) suppository 650 mg  650 mg Rectal Q6H PRN    amLODIPine (NORVASC) tablet 10 mg  10 mg Oral Daily    citalopram (CELEXA) tablet 20 mg  20 mg Oral Daily    atorvastatin (LIPITOR) tablet 20 mg  20 mg Oral Daily    enoxaparin (LOVENOX) injection 40 mg  40 mg SubCUTAneous Daily    guaiFENesin-dextromethorphan (ROBITUSSIN DM) 100-10 MG/5ML syrup 5 mL  5 mL Oral Q4H PRN    losartan (COZAAR) tablet 100 mg  100 mg Oral Daily    melatonin tablet 3 mg  3 mg Oral Nightly PRN    memantine (NAMENDA) tablet 5 mg  5 mg Oral BID    nicotine (NICODERM CQ) 21 MG/24HR 1 patch  1 patch TransDERmal Daily    ondansetron (ZOFRAN-ODT) disintegrating tablet 4 mg  4 mg Oral Q8H PRN    Or    ondansetron (ZOFRAN) injection 4 mg  4 mg IntraVENous Q6H PRN    pantoprazole (PROTONIX) tablet 40 mg  40 mg Oral Daily    polyethylene glycol (GLYCOLAX) packet 17 g  17 g Oral Daily PRN    tamsulosin (FLOMAX) capsule 0.4 mg  0.4 mg Oral Daily    sodium chloride flush 0.9 % injection 5-40 mL  5-40 mL IntraVENous PRN    glucagon (rDNA) injection 1 mg  1 mg SubCUTAneous PRN    insulin lispro (HUMALOG) injection vial 0-4 Units  0-4 Units SubCUTAneous 4x Daily AC & HS     Allergies   Allergen Reactions    Aspirin-Dipyridamole Er Headaches    Beta Adrenergic Blockers Ciprofloxacin Other (See Comments)     weakness    Donepezil Diarrhea    Lisinopril Cough       Visit Vitals  BP (!) 140/58   Pulse 62   Temp 98.6 °F (37 °C) (Oral)   Resp 18   Ht 5' 10\" (1.778 m)   Wt 165 lb 2 oz (74.9 kg)   SpO2 92%   BMI 23.69 kg/m²      Intake and Output:  11/11 1901 - 11/13 0700  In: 120 [P.O.:120]  Out: -     Physical Exam:   General:   awake, NAD, up in chair in room    HEENT:  Normocephalic, EOMI, facial symmetry  Intact. Oral mucosa pink and moist. No nasal discharge. Lungs:  CTA Bilaterally,Respiration even and unlabored. On O2 2 L NC   Heart:  Regular irregular rate and rhythm, S1, S2, No murmur        Abdomen:  Soft, bowel sounds present. Neuromuscular:  Slowed speech pattern, fluent, responds appropriately, no resting tremors, moves all extremities with anti-gravity strength   Skin:  Intact, dry, good skin turgor, age related changes present, no LE edema           Functional Assessment:                                Cipriano Fall Risk Assessment:        Ambulation:        Labs/Studies:  Recent Results (from the past 72 hour(s))   POCT Glucose    Collection Time: 11/10/22 12:08 PM   Result Value Ref Range    POC Glucose 133 (H) 65 - 100 mg/dL    Performed by: Julia    POCT Glucose    Collection Time: 11/10/22  4:36 PM   Result Value Ref Range    POC Glucose 195 (H) 65 - 100 mg/dL    Performed by: Ruth    POCT Glucose    Collection Time: 11/10/22  8:52 PM   Result Value Ref Range    POC Glucose 226 (H) 65 - 100 mg/dL    Performed by: Esteban    POCT Glucose    Collection Time: 11/11/22  6:19 AM   Result Value Ref Range    POC Glucose 68 65 - 100 mg/dL    Performed by:  Mariana    POCT Glucose    Collection Time: 11/11/22  7:33 AM   Result Value Ref Range    POC Glucose 98 65 - 100 mg/dL    Performed by: Christofer Oates    POCT Glucose    Collection Time: 11/11/22 11:56 AM   Result Value Ref Range    POC Glucose 83 65 - 100 mg/dL Performed by: Scott Davila    POCT Glucose    Collection Time: 11/11/22  4:57 PM   Result Value Ref Range    POC Glucose 159 (H) 65 - 100 mg/dL    Performed by: [facility] DR LOKESH MCCABE    POCT Glucose    Collection Time: 11/11/22  8:33 PM   Result Value Ref Range    POC Glucose 195 (H) 65 - 100 mg/dL    Performed by: [facility] DR LOKESH MCCABE    POCT Glucose    Collection Time: 11/12/22  6:19 AM   Result Value Ref Range    POC Glucose 64 (L) 65 - 100 mg/dL    Performed by: [facility] DR LOKESH MCCABE    POCT Glucose    Collection Time: 11/12/22  6:42 AM   Result Value Ref Range    POC Glucose 59 (L) 65 - 100 mg/dL    Performed by: Mariana    POCT Glucose    Collection Time: 11/12/22  7:15 AM   Result Value Ref Range    POC Glucose 111 (H) 65 - 100 mg/dL    Performed by: [facility] DR LOKESH MCCABE    POCT Glucose    Collection Time: 11/12/22 11:44 AM   Result Value Ref Range    POC Glucose 67 65 - 100 mg/dL    Performed by: Marvin    POCT Glucose    Collection Time: 11/12/22  4:45 PM   Result Value Ref Range    POC Glucose 140 (H) 65 - 100 mg/dL    Performed by: [facility]    POCT Glucose    Collection Time: 11/12/22  8:19 PM   Result Value Ref Range    POC Glucose 227 (H) 65 - 100 mg/dL    Performed by: [facility]    POCT Glucose    Collection Time: 11/13/22  6:42 AM   Result Value Ref Range    POC Glucose 93 65 - 100 mg/dL    Performed by: Ezra    POCT Glucose    Collection Time: 11/13/22 11:39 AM   Result Value Ref Range    POC Glucose 88 65 - 100 mg/dL    Performed by: Zachariah      Assessment: This is an 80 YO with a PMH of vascular dementia, HTN, CKD and bladder CA who was admitted 10/29 in  respiratory failure secondary to COVID. Treated with dexamethasone, baricitinib and supplemental O2. Persistent hypoxia with O2 desat during activity. Acute transfer d/t  syncope and bradycardia, He continues with significant mobility and self care impairments.     Rehabilitation Plan  Continue PT for a minimum of 1.5 hours a day, at least 5 out of 7 days per week to address bed mobility, transfers, ambulation, strengthening, balance, and endurance. Continue OT for a minimum of 1.5 hours a day, at least 5 out of 7 days per week to address ADL (feeding, grooming, bathing, UE and LE dressing, toileting); instrumental ADLs; cognitive function; safety; energy conservations; community reintegration; and adaptive equipment as needed. Continue 24-hour skilled rehabilitation nursing for bowel and bladder management, skin care for decubitus ulcer prevention , pain management and ongoing medication administration      Continue close physician supervision and   medical management of: :  COVID-19 virus infection/ARF/Pneumonia - on 2L O2, wean as tolerated, 88-91% on 2L O2 sats at rest     HTN/Bradycardia - HRs wnml last 24 hours, 61 - 79 , BP - 140/58, aymptomatic  continues on telemetry, on norvasc, losartan with parameters, rapid response called during acute hospitalization for syncope and bradycardia with transfer, cardiology consulted without further events, if re occurs, will re consult     CKD/h/o bladder CA - monitor labs, Cr - 1.5 on 11/8     DM II - BG - 88, 93, 11 this am, yesterday 64, 59, 111, 67, 140, 227, improving on decreased lantus 8 units hs, ISS     Pain mgmt- prn tylenol     DVT prophylaxis - on lovenox    Time spent was 25 minutes with over 1/2 in direct patient care/examination, consultation and coordination of care.     Signed By: aFrzad Oro MD     November 13, 2022

## 2022-11-14 LAB
GLUCOSE BLD STRIP.AUTO-MCNC: 121 MG/DL (ref 65–100)
GLUCOSE BLD STRIP.AUTO-MCNC: 127 MG/DL (ref 65–100)
GLUCOSE BLD STRIP.AUTO-MCNC: 230 MG/DL (ref 65–100)
GLUCOSE BLD STRIP.AUTO-MCNC: 73 MG/DL (ref 65–100)
SERVICE CMNT-IMP: ABNORMAL
SERVICE CMNT-IMP: NORMAL

## 2022-11-14 PROCEDURE — 6360000002 HC RX W HCPCS: Performed by: PHYSICAL MEDICINE & REHABILITATION

## 2022-11-14 PROCEDURE — 94760 N-INVAS EAR/PLS OXIMETRY 1: CPT

## 2022-11-14 PROCEDURE — 97530 THERAPEUTIC ACTIVITIES: CPT

## 2022-11-14 PROCEDURE — 97535 SELF CARE MNGMENT TRAINING: CPT

## 2022-11-14 PROCEDURE — 99232 SBSQ HOSP IP/OBS MODERATE 35: CPT | Performed by: PHYSICAL MEDICINE & REHABILITATION

## 2022-11-14 PROCEDURE — 2700000000 HC OXYGEN THERAPY PER DAY

## 2022-11-14 PROCEDURE — 97110 THERAPEUTIC EXERCISES: CPT

## 2022-11-14 PROCEDURE — 6370000000 HC RX 637 (ALT 250 FOR IP): Performed by: PHYSICAL MEDICINE & REHABILITATION

## 2022-11-14 PROCEDURE — 97116 GAIT TRAINING THERAPY: CPT

## 2022-11-14 PROCEDURE — 82962 GLUCOSE BLOOD TEST: CPT

## 2022-11-14 PROCEDURE — 6370000000 HC RX 637 (ALT 250 FOR IP): Performed by: PHYSICIAN ASSISTANT

## 2022-11-14 PROCEDURE — 1180000000 HC REHAB R&B

## 2022-11-14 RX ORDER — INSULIN GLARGINE 100 [IU]/ML
6 INJECTION, SOLUTION SUBCUTANEOUS NIGHTLY
Status: DISCONTINUED | OUTPATIENT
Start: 2022-11-14 | End: 2022-11-22

## 2022-11-14 RX ADMIN — MEMANTINE 5 MG: 5 TABLET ORAL at 09:26

## 2022-11-14 RX ADMIN — ENOXAPARIN SODIUM 40 MG: 100 INJECTION SUBCUTANEOUS at 09:27

## 2022-11-14 RX ADMIN — INSULIN GLARGINE 6 UNITS: 100 INJECTION, SOLUTION SUBCUTANEOUS at 21:10

## 2022-11-14 RX ADMIN — ATORVASTATIN CALCIUM 20 MG: 10 TABLET, FILM COATED ORAL at 09:27

## 2022-11-14 RX ADMIN — CITALOPRAM HYDROBROMIDE 20 MG: 20 TABLET ORAL at 10:23

## 2022-11-14 RX ADMIN — LOSARTAN POTASSIUM 100 MG: 50 TABLET, FILM COATED ORAL at 09:26

## 2022-11-14 RX ADMIN — ALOGLIPTIN 6.25 MG: 6.25 TABLET, FILM COATED ORAL at 09:27

## 2022-11-14 RX ADMIN — TAMSULOSIN HYDROCHLORIDE 0.4 MG: 0.4 CAPSULE ORAL at 09:26

## 2022-11-14 RX ADMIN — AMLODIPINE BESYLATE 10 MG: 10 TABLET ORAL at 09:27

## 2022-11-14 RX ADMIN — MEMANTINE 5 MG: 5 TABLET ORAL at 21:21

## 2022-11-14 RX ADMIN — PANTOPRAZOLE SODIUM 40 MG: 40 TABLET, DELAYED RELEASE ORAL at 09:26

## 2022-11-14 RX ADMIN — INSULIN LISPRO 1 UNITS: 100 INJECTION, SOLUTION INTRAVENOUS; SUBCUTANEOUS at 21:21

## 2022-11-14 NOTE — PROGRESS NOTES
INPATIENT REHAB CENTER PROGRESS NOTE    Gordon Prior  Admit Date: 11/9/2022  Admit Diagnosis:   Debility [R53.81]  Physical debility [R53.81]    Subjective     Patient seen and examined. Sleepy. Doesn't want to eat breakfast yet. No headache, cp or sob. Slept \"all night. \" Affect blunted. Participates in therapies; co-treating due to severity of debility.      Objective:     Current Facility-Administered Medications   Medication Dose Route Frequency    insulin glargine (LANTUS) injection vial 8 Units  8 Units SubCUTAneous Nightly    alogliptin (NESINA) tablet 6.25 mg  6.25 mg Oral Daily    0.9 % sodium chloride infusion   IntraVENous PRN    acetaminophen (TYLENOL) tablet 650 mg  650 mg Oral Q6H PRN    Or    acetaminophen (TYLENOL) suppository 650 mg  650 mg Rectal Q6H PRN    amLODIPine (NORVASC) tablet 10 mg  10 mg Oral Daily    citalopram (CELEXA) tablet 20 mg  20 mg Oral Daily    atorvastatin (LIPITOR) tablet 20 mg  20 mg Oral Daily    enoxaparin (LOVENOX) injection 40 mg  40 mg SubCUTAneous Daily    guaiFENesin-dextromethorphan (ROBITUSSIN DM) 100-10 MG/5ML syrup 5 mL  5 mL Oral Q4H PRN    losartan (COZAAR) tablet 100 mg  100 mg Oral Daily    melatonin tablet 3 mg  3 mg Oral Nightly PRN    memantine (NAMENDA) tablet 5 mg  5 mg Oral BID    nicotine (NICODERM CQ) 21 MG/24HR 1 patch  1 patch TransDERmal Daily    ondansetron (ZOFRAN-ODT) disintegrating tablet 4 mg  4 mg Oral Q8H PRN    Or    ondansetron (ZOFRAN) injection 4 mg  4 mg IntraVENous Q6H PRN    pantoprazole (PROTONIX) tablet 40 mg  40 mg Oral Daily    polyethylene glycol (GLYCOLAX) packet 17 g  17 g Oral Daily PRN    tamsulosin (FLOMAX) capsule 0.4 mg  0.4 mg Oral Daily    sodium chloride flush 0.9 % injection 5-40 mL  5-40 mL IntraVENous PRN    glucagon (rDNA) injection 1 mg  1 mg SubCUTAneous PRN    insulin lispro (HUMALOG) injection vial 0-4 Units  0-4 Units SubCUTAneous 4x Daily AC & HS        Review of Systems:   Denies chest pain, shortness of breath, cough, headache, visual problems, abdominal pain, dysuria, calf pain. Pertinent positives are as noted in the HPI, ROS unremarkable otherwise. Visit Vitals  BP (!) 152/53   Pulse 81   Temp 98.4 °F (36.9 °C) (Oral)   Resp 17   Ht 5' 10\" (1.778 m)   Wt 165 lb 2 oz (74.9 kg)   SpO2 (!) 89%   BMI 23.69 kg/m²        Physical Exam:   General: Alert and oriented x2. No acute cardiorespiratory distress. HEENT: Normocephalic, EOM intact. Oral mucosa moist without cyanosis. Lungs: Clear to auscultation bilaterally. Respiration even and unlabored. Heart: Regular irreg rate and rhythm, S1, S2. No murmurs, clicks, rub or gallops. Abdomen: Soft, non-tender, not distended. Bowel sounds normoactive. No organomegaly. Genitourinary: Deferred. Neuromuscular:      Candice with anti gravity strength at least  Follows one step commands  No tremor, slow speech but responds appropriately  Sensation intact   Skin/extremity: No rashes, no erythema. No calf tenderness B LE. No edema.                                                                               Krishnan Fall Risk Assessment:  History of Falling: Yes     Swallow Assessment:  Swallow Screening  Is the patient unable to remain alert for testing?: No  Is the patient on a modified diet (thickened liquids) due to pre-existing dysphagia?: No  Is there presence of existing enteral tube feeding via the stomach or nose?: No  Is there presence of head-of-bed restrictions (less than 30 degrees)?: No  Is there presence of tracheotomy tube?: No  Is the patient ordered nothing-by-mouth status?: No  3 oz Water Swallow Screen: Pass      Ambulation:            Labs/Studies:  Recent Results (from the past 72 hour(s))   POCT Glucose    Collection Time: 11/11/22 11:56 AM   Result Value Ref Range    POC Glucose 83 65 - 100 mg/dL    Performed by: Carmelita    POCT Glucose    Collection Time: 11/11/22  4:57 PM   Result Value Ref Range    POC Glucose 159 (H) 65 - 100 mg/dL    Performed by: [facility] DR LOKESH MCCABE    POCT Glucose    Collection Time: 11/11/22  8:33 PM   Result Value Ref Range    POC Glucose 195 (H) 65 - 100 mg/dL    Performed by: [facility] DR LOKESH MCCABE    POCT Glucose    Collection Time: 11/12/22  6:19 AM   Result Value Ref Range    POC Glucose 64 (L) 65 - 100 mg/dL    Performed by: [facility] DR LOKESH MCCABE    POCT Glucose    Collection Time: 11/12/22  6:42 AM   Result Value Ref Range    POC Glucose 59 (L) 65 - 100 mg/dL    Performed by: Mariana    POCT Glucose    Collection Time: 11/12/22  7:15 AM   Result Value Ref Range    POC Glucose 111 (H) 65 - 100 mg/dL    Performed by: [facility] DR LOKESH MCCABE    POCT Glucose    Collection Time: 11/12/22 11:44 AM   Result Value Ref Range    POC Glucose 67 65 - 100 mg/dL    Performed by: Marvin    POCT Glucose    Collection Time: 11/12/22  4:45 PM   Result Value Ref Range    POC Glucose 140 (H) 65 - 100 mg/dL    Performed by: [facility]    POCT Glucose    Collection Time: 11/12/22  8:19 PM   Result Value Ref Range    POC Glucose 227 (H) 65 - 100 mg/dL    Performed by: [facility]    POCT Glucose    Collection Time: 11/13/22  6:42 AM   Result Value Ref Range    POC Glucose 93 65 - 100 mg/dL    Performed by: Ezra    POCT Glucose    Collection Time: 11/13/22 11:39 AM   Result Value Ref Range    POC Glucose 88 65 - 100 mg/dL    Performed by: Zachariah    POCT Glucose    Collection Time: 11/13/22  4:19 PM   Result Value Ref Range    POC Glucose 117 (H) 65 - 100 mg/dL    Performed by: Zachariah    POCT Glucose    Collection Time: 11/13/22  9:06 PM   Result Value Ref Range    POC Glucose 127 (H) 65 - 100 mg/dL    Performed by: Ezra    POCT Glucose    Collection Time: 11/14/22  6:39 AM   Result Value Ref Range    POC Glucose 73 65 - 100 mg/dL    Performed by: Ezra        Assessment:     Assessment:   This is an 80 YO with a PMH of vascular dementia, HTN, CKD and bladder CA who was admitted 10/29 in  respiratory failure secondary to COVID. Treated with dexamethasone, baricitinib and supplemental O2. Persistent hypoxia with O2 desat during activity. Acute transfer d/t  syncope and bradycardia, He continues with significant mobility and self care impairments. Plan / Recommendations / Medical Decision Making:     Continue daily physician / PA medical management:    COVID-19 virus infection/ARF/Pneumonia - on 2L O2, wean as tolerated, 88-91% on 2L O2 sats at rest  -11/14 sats 89% this am on 2L O2, enc IS. Lungs clear     HTN/Bradycardia - HRs wnml last 24 hours, 61 - 79 , BP - 140/58, aymptomatic  continues on telemetry, on norvasc, losartan with parameters, rapid response called during acute hospitalization for syncope and bradycardia with transfer, cardiology consulted without further events, if re occurs, will re consult  -11/14 HR 62-81; no bradycardic events. BP varies 95//53; monitor for now and cont current meds with parameters     CKD/h/o bladder CA - monitor labs, Cr - 1.5 on 11/8  -11/14 f/u labs in a.m.      DM II - BG - 88, 93, 11 this am, yesterday 64, 59, 111, 67, 140, 227, improving on decreased lantus 8 units hs, SSI  -11/14 BG 73 this a.m, 127, 117, 88, 93; dec lantus to 6u     Pain mgmt- prn tylenol     DVT prophylaxis - on lovenox/ SCDs    Depression/dementia; cont celexa, cont Namenda    Tobacco use; cont nicoderm patch and stress abstinence    Anemia; hgn 9.2 on 11/5 > 8.8<10.1          Time spent was 25 minutes with over 1/2 in direct patient care / examination, consultation and coordination of care.      Signed By: Melvin Carbajal MD     November 14, 2022

## 2022-11-14 NOTE — PROGRESS NOTES
Physical Therapy  PHYSICAL THERAPY DAILY NOTE  Time In:  830 AM  Time Out:  1001 AM  Total Treatment Time:  (P) 91 Minutes  Pt. Seen for: AM, Gait Training, Therapeutic Exercise, and Transfer Training     Subjective: \" I feel ok today. \"         Objective:  Precautions: Falls, Poor Safety Awareness, and Confused    GROSS ASSESSMENT Daily Assessment           COGNITION Daily Assessment    impaired       BED/MAT MOBILITY Daily Assessment    Rolling Right: Min A  Rolling Left: Min A  Supine to Sit: Mod A  Sit to Supine: Mod A       TRANSFERS Daily Assessment    Sit to Stand: Mod A  Stand to Sit: Mod A  Transfer Type: Stand Pivot and with rolling walker  Transfer Assistance: Mod A  Car Transfers: NT         GAIT Daily Assessment   Patient leaning forward. Amount of Assistance: Mod A and Max A  Distance (ft): 25  Assistive Device: RW and Gait Belt  Surface: Level Surface       STEPS/STAIRS Daily Assessment    Steps Ambulated:    Level of Assistance:    Railing:  Assistive Device:        BALANCE Daily Assessment    Static Sitting:   Dynamic Sitting:   Static Standing:   Dynamic Standing:        WHEELCHAIR MOBILITY Daily Assessment    Able to Propel (ft):   Assistance:   Surface:   Wheelchair Set-up:        LOWER EXTREMITY EXERCISES Daily Assessment    Yudelka arredondoomed x 10 minutes. Cotreated with Ot for ADLS and played horse shoes in standing with mod to max assist of 2. Pain level: no pain noted. Pain Location:    Pain Interventions:     Vital Signs: BP (!) 152/53   Pulse 81   Temp 98.4 °F (36.9 °C) (Oral)   Resp 15   Ht 5' 10\" (1.778 m)   Wt 165 lb 2 oz (74.9 kg)   SpO2 93%   BMI 23.69 kg/m²        Education:      Interdisciplinary Communication:     Pt. Left In recliner with alarm on and call bell at reach. Assessment: Patient making good progress. Plan of Care: Continue with plan of care.     Andrew Barrett, RIA  11/14/2022

## 2022-11-14 NOTE — CARE COORDINATION
Patient needs are continue to be followed by Dr. Luna Browne. Patient has no discharge date / plan at this time. CM will continue to follow / monitor for any needs, concerns or questions that may arise.

## 2022-11-14 NOTE — PROGRESS NOTES
OT Daily Note  Time In 0915   Time Out 1001     Subjective: \"It just kind of happened. \" [Pt sitting down quickly]  Pain: not expressed  Education: standing tolerance  Interdisciplinary Communication: with PT during cotx    Mobility   Score Comments   Sit to Stand  Mod to max assist     Transfer Assist  Max assist  LPT; ambulated mod assist x2 with wc follow for safety     Activity Tolerance   Pt stood 3x with RW and PTA providing min assist for balance while engaged in game in horse shoe in order to build activity tolerance. Assessment: Slowly improving activity tolerance and sit > stand functional mobility. Plan: Continue OT POC with focus on ADL/IADL skills, functional transfers, functional mobility, coordination, strength, static and dynamic balance, and activity tolerance to maximize safety and independence with ADLs and functional transfers. Patient ended session in recliner with call remote and phone within reach. Chair alarm activated.        Miguel Dupree, OTR/L   11/14/2022

## 2022-11-14 NOTE — PROGRESS NOTES
PHYSICAL THERAPY DAILY NOTE  Time In:  5414  Time Out:  1444  Total Treatment Time:  55 Minutes  Pt. Seen for: PM, Balance Training, Gait Training, Patient Education, Therapeutic Exercise, Transfer Training, and Other     Subjective: Patient reporting he would like to get out of bed. Wife reporting he could walk with a RW before admit but was limited due to parkinsons         Objective:  Precautions: Falls, Poor Safety Awareness, and Confused    GROSS ASSESSMENT Daily Assessment           COGNITION Daily Assessment    Alert, able to follow commands,cooperating,improved participation, flat affect with limited motivation         BED/MAT MOBILITY Daily Assessment    Rolling Right: Mod A  Rolling Left: Mod A  Supine to Sit: Max A  Sit to Supine: Max A       TRANSFERS Daily Assessment   Increased time and effort to complete with cues for body mechanics  Slow shuffling steps with flexed posture. Constant cueing to complete SPT with RW Sit to Stand: Max A  Stand to Sit: Mod A  Transfer Type: Stand Pivot  Transfer Assistance: Mod A with RW,   Car Transfers: NT         GAIT Daily Assessment   Gait training with constant cueing for upright midline posture with improved step length and step clearance. Cues for RW placement Amount of Assistance:  Mod A  Distance (ft): 20 ft x 1   10 ft x 1  Assistive Device: RW and Gait Belt  Surface: Level Surface       STEPS/STAIRS Daily Assessment    NA       BALANCE Daily Assessment   Static sitting balance activities sitting bed side facilitating upright midline posture and balance correction due to tendency to loose balance posteriorly    Static standing balance activities with RW facilitating upright midline posture with anterior wt shift due to tendency to loose balance posteriorly Static Sitting: Fair:  Pt. requires UE support;  may need occasional min A  Dynamic Sitting: Poor - unable to accept challenge or move without LOB   Static Standing: Poor:  Pt. requires UE support & mod-max A to maintain position  Dynamic Standing: Poor - unable to accept challenge or move without LOB        WHEELCHAIR MOBILITY Daily Assessment    NA       LOWER EXTREMITY EXERCISES Daily Assessment    LE motomed x 12 mins at level 1 with cues to increased RPM     Pain level: No c/o pain during treatment  Pain Location:  NA  Pain Interventions: NA    Vital Signs:  NT    Education:  Bed mobility training,transfer training, gait training, balance training,fall precautions, body mechanics,activity pacing, Patient verbalizing understanding and demonstrating partial understanding of patient education. Recommend follow up education. Interdisciplinary Communication: spoke with RN regarding functional status. Patient returned to room at end of treatment. Patient supine in bed with head of bed elevated and bed rails up x 2. Needs placed in reach of patient. [place] alarm on           Assessment: Patient with improved tolerance to treatment. Improved participation with improved ability to follow commands. Improved level of alertness. Slow to move with impaired body mechanics due to effects of parkinsons          Plan of Care: Continue with POC and progress as tolerated.      Oj Pryor, PT  [phone]

## 2022-11-14 NOTE — PROGRESS NOTES
OT DAILY NOTE    Time In 0830   Time Out 0915     Subjective: \"I don't have enough teeth to brush. \" Pt agreeable to treatment. Pain: No pain expressed. Interdisciplinary Communication: Collaborated with PT regarding movement ability and t/f training. Precautions: Falls, Impulsive, and Poor Safety Awareness    BP (!) 152/53   Pulse 81   Temp 98.4 °F (36.9 °C) (Oral)   Resp 17   Ht 5' 10\" (1.778 m)   Wt 165 lb 2 oz (74.9 kg)   SpO2 (!) 89%   BMI 23.69 kg/m²      MOBILITY:   Score Comments   Supine to Sit Partial/moderate assistance MOD A LB management    Sit to Stand Partial/moderate assistance MOD A X2     Transfer Assist Partial/moderate assistance       ACTIVITIES OF DAILY LIVING:   Score Comments   Eating Setup or clean-up assistance S/U and verbal encouragement to initiate eating   Oral Hygiene Setup or clean-up assistance S/U and VCs throughout to initiate steps of task   Bathing Partial/moderate assistance MOD A to maintian sit at EOB, Cues to initate   Upper Body  Dressing Partial/moderate assistance MIN A for orientation, O2 line management, and threading second sleeve of shirt   Lower Body Dressing Partial/moderate assistance MOD A don pants to waist in standing   Donning/[place] Footwear Setup or clean-up assistance S/U w/ cues for initiating task and efficient technique   Toileting Hygiene Dependent     Education Adaptive ADL Techniques, Functional Transfer Training, and Rolling Walker Management     Assessment: Patient more alert this day w/ much more participation in ADLs. Pt demonstrates progressive functional mobility. Requires frequent cuing for initiation. Pt demonstrated good participation in OT treatment. Pt continues to benefit from skilled OT services to address remaining deficits and progress toward baseline level of independence and safety. Patient ended session seated in w/c with PT. Plan: Continue OT POC.      Jace Montero OT   11/14/2022

## 2022-11-15 LAB
ANION GAP SERPL CALC-SCNC: 4 MMOL/L (ref 2–11)
BUN SERPL-MCNC: 33 MG/DL (ref 8–23)
CALCIUM SERPL-MCNC: 8.6 MG/DL (ref 8.3–10.4)
CHLORIDE SERPL-SCNC: 109 MMOL/L (ref 101–110)
CO2 SERPL-SCNC: 29 MMOL/L (ref 21–32)
CREAT SERPL-MCNC: 1.6 MG/DL (ref 0.8–1.5)
ERYTHROCYTE [DISTWIDTH] IN BLOOD BY AUTOMATED COUNT: 15.1 % (ref 11.9–14.6)
GLUCOSE BLD STRIP.AUTO-MCNC: 111 MG/DL (ref 65–100)
GLUCOSE BLD STRIP.AUTO-MCNC: 173 MG/DL (ref 65–100)
GLUCOSE BLD STRIP.AUTO-MCNC: 202 MG/DL (ref 65–100)
GLUCOSE BLD STRIP.AUTO-MCNC: 95 MG/DL (ref 65–100)
GLUCOSE SERPL-MCNC: 116 MG/DL (ref 65–100)
HCT VFR BLD AUTO: 24.6 % (ref 41.1–50.3)
HGB BLD-MCNC: 7.8 G/DL (ref 13.6–17.2)
MCH RBC QN AUTO: 25.6 PG (ref 26.1–32.9)
MCHC RBC AUTO-ENTMCNC: 31.7 G/DL (ref 31.4–35)
MCV RBC AUTO: 80.7 FL (ref 82–102)
NRBC # BLD: 0 K/UL (ref 0–0.2)
PLATELET # BLD AUTO: 303 K/UL (ref 150–450)
PMV BLD AUTO: 11.4 FL (ref 9.4–12.3)
POTASSIUM SERPL-SCNC: 3.7 MMOL/L (ref 3.5–5.1)
RBC # BLD AUTO: 3.05 M/UL (ref 4.23–5.6)
SERVICE CMNT-IMP: ABNORMAL
SERVICE CMNT-IMP: NORMAL
SODIUM SERPL-SCNC: 142 MMOL/L (ref 133–143)
WBC # BLD AUTO: 7 K/UL (ref 4.3–11.1)

## 2022-11-15 PROCEDURE — 6370000000 HC RX 637 (ALT 250 FOR IP): Performed by: PHYSICAL MEDICINE & REHABILITATION

## 2022-11-15 PROCEDURE — 97530 THERAPEUTIC ACTIVITIES: CPT

## 2022-11-15 PROCEDURE — 82962 GLUCOSE BLOOD TEST: CPT

## 2022-11-15 PROCEDURE — 97110 THERAPEUTIC EXERCISES: CPT

## 2022-11-15 PROCEDURE — 97535 SELF CARE MNGMENT TRAINING: CPT

## 2022-11-15 PROCEDURE — 36415 COLL VENOUS BLD VENIPUNCTURE: CPT

## 2022-11-15 PROCEDURE — 85027 COMPLETE CBC AUTOMATED: CPT

## 2022-11-15 PROCEDURE — 6360000002 HC RX W HCPCS: Performed by: PHYSICAL MEDICINE & REHABILITATION

## 2022-11-15 PROCEDURE — 80048 BASIC METABOLIC PNL TOTAL CA: CPT

## 2022-11-15 PROCEDURE — 97116 GAIT TRAINING THERAPY: CPT

## 2022-11-15 PROCEDURE — 6370000000 HC RX 637 (ALT 250 FOR IP): Performed by: PHYSICIAN ASSISTANT

## 2022-11-15 PROCEDURE — 99232 SBSQ HOSP IP/OBS MODERATE 35: CPT | Performed by: PHYSICAL MEDICINE & REHABILITATION

## 2022-11-15 PROCEDURE — 2700000000 HC OXYGEN THERAPY PER DAY

## 2022-11-15 PROCEDURE — 1180000000 HC REHAB R&B

## 2022-11-15 RX ADMIN — CITALOPRAM HYDROBROMIDE 20 MG: 20 TABLET ORAL at 09:44

## 2022-11-15 RX ADMIN — ATORVASTATIN CALCIUM 20 MG: 10 TABLET, FILM COATED ORAL at 09:41

## 2022-11-15 RX ADMIN — ENOXAPARIN SODIUM 40 MG: 100 INJECTION SUBCUTANEOUS at 09:41

## 2022-11-15 RX ADMIN — MEMANTINE 5 MG: 5 TABLET ORAL at 09:42

## 2022-11-15 RX ADMIN — MEMANTINE 5 MG: 5 TABLET ORAL at 21:10

## 2022-11-15 RX ADMIN — INSULIN GLARGINE 6 UNITS: 100 INJECTION, SOLUTION SUBCUTANEOUS at 21:12

## 2022-11-15 RX ADMIN — AMLODIPINE BESYLATE 10 MG: 10 TABLET ORAL at 09:42

## 2022-11-15 RX ADMIN — PANTOPRAZOLE SODIUM 40 MG: 40 TABLET, DELAYED RELEASE ORAL at 09:41

## 2022-11-15 RX ADMIN — LOSARTAN POTASSIUM 100 MG: 50 TABLET, FILM COATED ORAL at 09:42

## 2022-11-15 RX ADMIN — ALOGLIPTIN 6.25 MG: 6.25 TABLET, FILM COATED ORAL at 09:41

## 2022-11-15 RX ADMIN — TAMSULOSIN HYDROCHLORIDE 0.4 MG: 0.4 CAPSULE ORAL at 09:41

## 2022-11-15 NOTE — PROGRESS NOTES
OT DAILY NOTE    Time In 0700, 1100   Time Out 0723, 1112     Subjective: \"Nobody bosses me around. \" Pt agreeable to treatment. Pain: No pain expressed. Interdisciplinary Communication: Collaborated with PT regarding pt fatigue, MD reports low hemoglobin levels. Precautions: Falls and Poor Safety Awareness    BP (!) 144/53   Pulse 80   Temp 97.4 °F (36.3 °C) (Axillary)   Resp 12   Ht 5' 10\" (1.778 m)   Wt 165 lb 2 oz (74.9 kg)   SpO2 96%   BMI 23.69 kg/m²      MOBILITY:   Score Comments   Rolling Substantial/maximal assistance deferred secondary to pt's confusion     ACTIVITIES OF DAILY LIVING:   Score Comments   Bathing Dependent dependent to wash face   Lower Body Dressing Substantial/maximal assistance MAX A to don while supine in bed   Education Benefits of OT     - Self-Care   Pt completed toileting after verbalizing needs. Pt ambulated from room to toilet w/ CGA and RW, assist provided for O2 line management. MAX A provided for clothing management. Pt maintained a stance w/ CGA throughout task. Assessment: Patient presents extremely fatigued and is difficult to arouse. Pt continues to benefit from skilled OT services to address remaining deficits and progress toward baseline level of independence and safety. Patient ended session supine in bed with call bell and needs within reach. Plan: Continue OT POC.      Luis Doan OT   11/15/2022

## 2022-11-15 NOTE — PROGRESS NOTES
INPATIENT REHAB CENTER PROGRESS NOTE    Abilio Ochoa  Admit Date: 11/9/2022  Admit Diagnosis:   Debility [R53.81]  Physical debility [R53.81]    Subjective     Patient seen and examined. Very sleepy when I saw him. OT trying to perk him up. Eyes crusted closed. OT states that his eyes typically this way in the a.m.     Objective:     Current Facility-Administered Medications   Medication Dose Route Frequency    insulin glargine (LANTUS) injection vial 6 Units  6 Units SubCUTAneous Nightly    alogliptin (NESINA) tablet 6.25 mg  6.25 mg Oral Daily    0.9 % sodium chloride infusion   IntraVENous PRN    acetaminophen (TYLENOL) tablet 650 mg  650 mg Oral Q6H PRN    Or    acetaminophen (TYLENOL) suppository 650 mg  650 mg Rectal Q6H PRN    amLODIPine (NORVASC) tablet 10 mg  10 mg Oral Daily    citalopram (CELEXA) tablet 20 mg  20 mg Oral Daily    atorvastatin (LIPITOR) tablet 20 mg  20 mg Oral Daily    enoxaparin (LOVENOX) injection 40 mg  40 mg SubCUTAneous Daily    guaiFENesin-dextromethorphan (ROBITUSSIN DM) 100-10 MG/5ML syrup 5 mL  5 mL Oral Q4H PRN    losartan (COZAAR) tablet 100 mg  100 mg Oral Daily    melatonin tablet 3 mg  3 mg Oral Nightly PRN    memantine (NAMENDA) tablet 5 mg  5 mg Oral BID    nicotine (NICODERM CQ) 21 MG/24HR 1 patch  1 patch TransDERmal Daily    ondansetron (ZOFRAN-ODT) disintegrating tablet 4 mg  4 mg Oral Q8H PRN    Or    ondansetron (ZOFRAN) injection 4 mg  4 mg IntraVENous Q6H PRN    pantoprazole (PROTONIX) tablet 40 mg  40 mg Oral Daily    polyethylene glycol (GLYCOLAX) packet 17 g  17 g Oral Daily PRN    tamsulosin (FLOMAX) capsule 0.4 mg  0.4 mg Oral Daily    sodium chloride flush 0.9 % injection 5-40 mL  5-40 mL IntraVENous PRN    glucagon (rDNA) injection 1 mg  1 mg SubCUTAneous PRN    insulin lispro (HUMALOG) injection vial 0-4 Units  0-4 Units SubCUTAneous 4x Daily AC & HS        Review of Systems:   Denies chest pain, shortness of breath, cough, headache, visual problems, abdominal pain, dysuria, calf pain. Pertinent positives are as noted in the HPI, ROS unremarkable otherwise.   + fatigue  Visit Vitals  BP (!) 144/53   Pulse 80   Temp 97.4 °F (36.3 °C) (Axillary)   Resp 12   Ht 5' 10\" (1.778 m)   Wt 165 lb 2 oz (74.9 kg)   SpO2 96%   BMI 23.69 kg/m²        Physical Exam:   General: sleepy  No acute cardiorespiratory distress. HEENT: Normocephalic, EOM intact. Eyelids crusted, removed with warm water, lids red,sclera clear  Oral mucosa moist without cyanosis. Lungs: Clear to auscultation bilaterally. Respiration even and unlabored. Heart: Regular irreg rate and rhythm, S1, S2. No murmurs, clicks, rub or gallops. Abdomen: Soft, non-tender, not distended. Bowel sounds normoactive. No organomegaly. Genitourinary: Deferred. Neuromuscular:      No gross focal motor deficits noted. Moves all muscle groups with at least antigravity   Follows simple commands with some vcs this a.m. Voice soft. Affect blunted   Skin/extremity: No rashes, no erythema. No calf tenderness B LE.   No edema                                                                              Krishnan Fall Risk Assessment:  History of Falling: Yes     Swallow Assessment:  Swallow Screening  Is the patient unable to remain alert for testing?: No  Is the patient on a modified diet (thickened liquids) due to pre-existing dysphagia?: No  Is there presence of existing enteral tube feeding via the stomach or nose?: No  Is there presence of head-of-bed restrictions (less than 30 degrees)?: No  Is there presence of tracheotomy tube?: No  Is the patient ordered nothing-by-mouth status?: No  3 oz Water Swallow Screen: Pass        Labs/Studies:  Recent Results (from the past 72 hour(s))   POCT Glucose    Collection Time: 11/12/22 11:44 AM   Result Value Ref Range    POC Glucose 67 65 - 100 mg/dL    Performed by: Marvin    POCT Glucose    Collection Time: 11/12/22  4:45 PM   Result Value Ref Range    POC Glucose 140 (H) 65 - 100 mg/dL    Performed by: [employer]    POCT Glucose    Collection Time: 11/12/22  8:19 PM   Result Value Ref Range    POC Glucose 227 (H) 65 - 100 mg/dL    Performed by: [employer]    POCT Glucose    Collection Time: 11/13/22  6:42 AM   Result Value Ref Range    POC Glucose 93 65 - 100 mg/dL    Performed by: Ezra    POCT Glucose    Collection Time: 11/13/22 11:39 AM   Result Value Ref Range    POC Glucose 88 65 - 100 mg/dL    Performed by: Zachariah    POCT Glucose    Collection Time: 11/13/22  4:19 PM   Result Value Ref Range    POC Glucose 117 (H) 65 - 100 mg/dL    Performed by: Zachariah    POCT Glucose    Collection Time: 11/13/22  9:06 PM   Result Value Ref Range    POC Glucose 127 (H) 65 - 100 mg/dL    Performed by: Ezra    POCT Glucose    Collection Time: 11/14/22  6:39 AM   Result Value Ref Range    POC Glucose 73 65 - 100 mg/dL    Performed by: Ezra    POCT Glucose    Collection Time: 11/14/22 11:48 AM   Result Value Ref Range    POC Glucose 121 (H) 65 - 100 mg/dL    Performed by: Shyam    POCT Glucose    Collection Time: 11/14/22  5:29 PM   Result Value Ref Range    POC Glucose 127 (H) 65 - 100 mg/dL    Performed by: Shyam    POCT Glucose    Collection Time: 11/14/22  9:08 PM   Result Value Ref Range    POC Glucose 230 (H) 65 - 100 mg/dL    Performed by: [facility] DR LOKESH MCCABE    Basic Metabolic Panel w/ Reflex to MG    Collection Time: 11/15/22  4:44 AM   Result Value Ref Range    Sodium 142 133 - 143 mmol/L    Potassium 3.7 3.5 - 5.1 mmol/L    Chloride 109 101 - 110 mmol/L    CO2 29 21 - 32 mmol/L    Anion Gap 4 2 - 11 mmol/L    Glucose 116 (H) 65 - 100 mg/dL    BUN 33 (H) 8 - 23 MG/DL    Creatinine 1.60 (H) 0.8 - 1.5 MG/DL    Est, Glom Filt Rate 42 (L) >60 ml/min/1.73m2    Calcium 8.6 8.3 - 10.4 MG/DL   CBC    Collection Time: 11/15/22  4:44 AM   Result Value Ref Range    WBC 7.0 4.3 - 11.1 K/uL RBC 3.05 (L) 4.23 - 5.6 M/uL    Hemoglobin 7.8 (L) 13.6 - 17.2 g/dL    Hematocrit 24.6 (L) 41.1 - 50.3 %    MCV 80.7 (L) 82 - 102 FL    MCH 25.6 (L) 26.1 - 32.9 PG    MCHC 31.7 31.4 - 35.0 g/dL    RDW 15.1 (H) 11.9 - 14.6 %    Platelets 646 881 - 702 K/uL    MPV 11.4 9.4 - 12.3 FL    nRBC 0.00 0.0 - 0.2 K/uL   POCT Glucose    Collection Time: 11/15/22  6:10 AM   Result Value Ref Range    POC Glucose 111 (H) 65 - 100 mg/dL    Performed by: [id number] [employer]        Assessment:     Assessment: This is an 78 YO with a PMH of vascular dementia, HTN, CKD and bladder CA who was admitted 10/29 in  respiratory failure secondary to COVID. Treated with dexamethasone, baricitinib and supplemental O2. Persistent hypoxia with O2 desat during activity. Acute transfer d/t  syncope and bradycardia, He continues with significant mobility and self care impairments. Plan / Recommendations / Medical Decision Making:      Continue daily physician / PA medical management:     COVID-19 virus infection/ARF/Pneumonia - on 2L O2, wean as tolerated, 88-91% on 2L O2 sats at rest  -11/14 sats 89% this am on 2L O2, enc IS. Lungs clear  -11/15 sats 96 % but on hi flow 3 L ; lungs clear     HTN/Bradycardia - HRs wnml last 24 hours, 61 - 79 , BP - 140/58, aymptomatic  continues on telemetry, on norvasc, losartan with parameters, rapid response called during acute hospitalization for syncope and bradycardia with transfer, cardiology consulted without further events, if re occurs, will re consult  -11/14 HR 62-81; no bradycardic events.  BP varies 95//53; monitor for now and cont current meds with parameters  -11/15 HR 36 this a.m, /53; recheck HR manually 80     CKD/h/o bladder CA - monitor labs, Cr - 1.5 on 11/8  -11/14 f/u labs in a.m. ; Creat 1.6, stable     DM II - BG - 88, 93, 11 this am, yesterday 64, 59, 111, 67, 140, 227, improving on decreased lantus 8 units hs, SSI  -11/14 BG 73 this a.m, 127, 117, 88, 93; dec lantus to 6u  -11/15  this a.m, 230 at bedtime, 127 at supper and 121 at lunch. Cont current Lantus for now at 6u; consider inc in [place]     Pain mgmt- prn tylenol     DVT prophylaxis - on lovenox/ SCDs     Depression/dementia; cont celexa, cont Namenda     Tobacco use; cont nicoderm patch and stress abstinence     Anemia; hgb 9.2 on 11/5 > 8.8<10.1  -11/15 hgb 7.8; check stool. Repeat; could be contributing to his sleepiness and fatigue this a.m. Time spent was 25 minutes with over 1/2 in direct patient care / examination, consultation and coordination of care.      Signed By: Carmen Givens MD     November 15, 2022

## 2022-11-15 NOTE — PROGRESS NOTES
Physical Therapy  PHYSICAL THERAPY DAILY NOTE  Time In:  1300 PM  Time Out:  1346 PM  Total Treatment Time:  (P) 46 Minutes  Pt. Seen for: PM, Gait Training, Therapeutic Exercise, and Transfer Training     Subjective: \" I can not do anything I need a nap. \"         Objective:  Precautions: Falls and Poor Safety Awareness    GROSS ASSESSMENT Daily Assessment           COGNITION Daily Assessment    Impaired         BED/MAT MOBILITY Daily Assessment    Rolling Right:   Rolling Left:   Supine to Sit: Max A  Sit to Supine: Max A       TRANSFERS Daily Assessment    Sit to Stand: Max A  Stand to Sit: Max A  Transfer Type: Stand Pivot  Transfer Assistance: Max A  Car Transfers: NT         GAIT Daily Assessment   Patient leaning forward and pushing walker too far forward. Amount of Assistance: Max A  Distance (ft): 5  Assistive Device: RW and Gait Belt  Surface: Level Surface       STEPS/STAIRS Daily Assessment    Steps Ambulated:    Level of Assistance:    Railing:  Assistive Device:        BALANCE Daily Assessment    Static Sitting:   Dynamic Sitting:   Static Standing:   Dynamic Standing:        WHEELCHAIR MOBILITY Daily Assessment    Able to Propel (ft):   Assistance:   Surface:   Wheelchair Set-up:        LOWER EXTREMITY EXERCISES Daily Assessment    SEATED EXERCISES Sets Reps Comments   Ankle Pumps 2 10    Hip Flexion 2 10    Long Arc Quads 2 10    Hip Adduction/Ball Squeeze 2 10                             Pain level: no pain noted  Pain Location:    Pain Interventions:     Vital Signs:  BP (!) 137/48   Pulse (!) 41   Temp 97.8 °F (36.6 °C) (Axillary)   Resp 16   Ht 5' 10\" (1.778 m)   Wt 165 lb 2 oz (74.9 kg)   SpO2 92%   BMI 23.69 kg/m²       Education:      Interdisciplinary Communication:     Pt. Left in bed with call bell at reach. Assessment: Patient seemed very sleepy this afternoon. Patient will need 24 hour supervision at home. Plan of Care: Continue with plan of care.     Andrew Barrett PTA  11/15/2022

## 2022-11-15 NOTE — PROGRESS NOTES
OT Daily Note  Time In 0920   Time Out 1006     Subjective: \"It's time for me to take a nap. \"  Pain: not expressed  Education: alertness and activity tolerance  Interdisciplinary Communication: with PTA regarding morning sessions    Mobility   Score Comments   Sit to Stand  Mod assist     Transfer Assist  Mod assist   SPT with RW     Activity Tolerance   Pt poured coffee creamer into cup with cuing to initiate and drank cup in order to open eyes and participate in therapy. Pt completed hand strengthening, FMC/bilateral coordination, and object manipulation task utilizing bolts, nuts, and washers. Pt assembled all three parts and then worked on in-hand rotational skills using bilateral hands to twist nuts onto care home point on bolts. Pt with slowed coordination noted throughout task. Pt completed x3 sets of combination with prompting and extra time. SpO2 level @92-95% on 2 L of O2         Assessment: Pt very fatigued today and difficulty maintaining alertness and eyes open during session. Pt provided coffee but continued to request to take a nap. Plan: Continue OT POC with focus on ADL/IADL skills, functional transfers, functional mobility, coordination, strength, static and dynamic balance, and activity tolerance to maximize safety and independence with ADLs and functional transfers. Patient ended session in recliner with call remote and phone within reach. Chair alarm activated.        Miguel Fagan, OT   11/15/2022

## 2022-11-15 NOTE — PROGRESS NOTES
OT Daily Note  Time In 1116   Time Out 1202     Subjective: \"My arms don't need that stick. \" [Referring to dowel araceli]  Pain: not expressed  Education: purpose of rehab, benefits of strengthening prior to discharge   Interdisciplinary Communication: with PTA and RN regarding pt's progress      Strengthening   Pt completed bicep curls using 3 lb dowel araceli and anterior punches x10 reps x2 sets. Pt required max encouragement to complete exercises secondary to fatigue and decreased alertness. Activity Tolerance   Attempted to engage pt in conversation regarding hobbies, life at home, family, hospital course and reason for being here. Pt with minimal responses and preference to sit still in recliner and take a nap. Assessment: Difficulty tolerating OT session with max cuing required. Pt would benefit from stimulating techniques to improve alertness. Plan: Continue OT POC with focus on ADL/IADL skills, functional transfers, functional mobility, coordination, strength, static and dynamic balance, and activity tolerance to maximize safety and independence with ADLs and functional transfers. Patient ended session in recliner with call remote and phone within reach. Chair alarm activity.        Miguel Kaplan Fees, OT   11/15/2022

## 2022-11-16 ENCOUNTER — CARE COORDINATION (OUTPATIENT)
Dept: CARE COORDINATION | Facility: CLINIC | Age: 82
End: 2022-11-16

## 2022-11-16 LAB
GLUCOSE BLD STRIP.AUTO-MCNC: 192 MG/DL (ref 65–100)
GLUCOSE BLD STRIP.AUTO-MCNC: 248 MG/DL (ref 65–100)
GLUCOSE BLD STRIP.AUTO-MCNC: 98 MG/DL (ref 65–100)
GLUCOSE BLD STRIP.AUTO-MCNC: 99 MG/DL (ref 65–100)
HCT VFR BLD AUTO: 25.3 % (ref 41.1–50.3)
HGB BLD-MCNC: 8 G/DL (ref 13.6–17.2)
SERVICE CMNT-IMP: ABNORMAL
SERVICE CMNT-IMP: ABNORMAL
SERVICE CMNT-IMP: NORMAL
SERVICE CMNT-IMP: NORMAL

## 2022-11-16 PROCEDURE — 97110 THERAPEUTIC EXERCISES: CPT

## 2022-11-16 PROCEDURE — 2700000000 HC OXYGEN THERAPY PER DAY

## 2022-11-16 PROCEDURE — 6370000000 HC RX 637 (ALT 250 FOR IP): Performed by: PHYSICAL MEDICINE & REHABILITATION

## 2022-11-16 PROCEDURE — 97116 GAIT TRAINING THERAPY: CPT

## 2022-11-16 PROCEDURE — 82962 GLUCOSE BLOOD TEST: CPT

## 2022-11-16 PROCEDURE — 97530 THERAPEUTIC ACTIVITIES: CPT

## 2022-11-16 PROCEDURE — 36415 COLL VENOUS BLD VENIPUNCTURE: CPT

## 2022-11-16 PROCEDURE — 6360000002 HC RX W HCPCS: Performed by: PHYSICAL MEDICINE & REHABILITATION

## 2022-11-16 PROCEDURE — 1180000000 HC REHAB R&B

## 2022-11-16 PROCEDURE — 85014 HEMATOCRIT: CPT

## 2022-11-16 PROCEDURE — 6370000000 HC RX 637 (ALT 250 FOR IP): Performed by: PHYSICIAN ASSISTANT

## 2022-11-16 PROCEDURE — 99232 SBSQ HOSP IP/OBS MODERATE 35: CPT | Performed by: PHYSICAL MEDICINE & REHABILITATION

## 2022-11-16 PROCEDURE — 97535 SELF CARE MNGMENT TRAINING: CPT

## 2022-11-16 RX ORDER — ALOGLIPTIN 6.25 MG/1
12.5 TABLET, FILM COATED ORAL DAILY
Status: DISCONTINUED | OUTPATIENT
Start: 2022-11-17 | End: 2022-11-23 | Stop reason: HOSPADM

## 2022-11-16 RX ADMIN — ATORVASTATIN CALCIUM 20 MG: 10 TABLET, FILM COATED ORAL at 08:10

## 2022-11-16 RX ADMIN — MEMANTINE 5 MG: 5 TABLET ORAL at 21:06

## 2022-11-16 RX ADMIN — PANTOPRAZOLE SODIUM 40 MG: 40 TABLET, DELAYED RELEASE ORAL at 08:10

## 2022-11-16 RX ADMIN — INSULIN GLARGINE 6 UNITS: 100 INJECTION, SOLUTION SUBCUTANEOUS at 20:56

## 2022-11-16 RX ADMIN — LOSARTAN POTASSIUM 100 MG: 50 TABLET, FILM COATED ORAL at 08:10

## 2022-11-16 RX ADMIN — ALOGLIPTIN 6.25 MG: 6.25 TABLET, FILM COATED ORAL at 08:10

## 2022-11-16 RX ADMIN — MEMANTINE 5 MG: 5 TABLET ORAL at 08:10

## 2022-11-16 RX ADMIN — CITALOPRAM HYDROBROMIDE 20 MG: 20 TABLET ORAL at 08:10

## 2022-11-16 RX ADMIN — AMLODIPINE BESYLATE 10 MG: 10 TABLET ORAL at 08:12

## 2022-11-16 RX ADMIN — ENOXAPARIN SODIUM 40 MG: 100 INJECTION SUBCUTANEOUS at 08:11

## 2022-11-16 RX ADMIN — TAMSULOSIN HYDROCHLORIDE 0.4 MG: 0.4 CAPSULE ORAL at 08:10

## 2022-11-16 RX ADMIN — INSULIN LISPRO 1 UNITS: 100 INJECTION, SOLUTION INTRAVENOUS; SUBCUTANEOUS at 20:59

## 2022-11-16 NOTE — PROGRESS NOTES
INPATIENT REHAB CENTER PROGRESS NOTE    Richard Bethea  Admit Date: 11/9/2022  Admit Diagnosis:   Debility [R53.81]  Physical debility [R53.81]    Subjective     Patient seen and examined. More alert this morning than yesterday. States he slept fairly well. Denies pain. No cp, sob, n. HR and BP erratic past 24hrs. Asymptomatic  On 3L O2 yesterday.  Today, on RA with sats of 92-94%`    Objective:     Current Facility-Administered Medications   Medication Dose Route Frequency    insulin glargine (LANTUS) injection vial 6 Units  6 Units SubCUTAneous Nightly    alogliptin (NESINA) tablet 6.25 mg  6.25 mg Oral Daily    0.9 % sodium chloride infusion   IntraVENous PRN    acetaminophen (TYLENOL) tablet 650 mg  650 mg Oral Q6H PRN    Or    acetaminophen (TYLENOL) suppository 650 mg  650 mg Rectal Q6H PRN    amLODIPine (NORVASC) tablet 10 mg  10 mg Oral Daily    citalopram (CELEXA) tablet 20 mg  20 mg Oral Daily    atorvastatin (LIPITOR) tablet 20 mg  20 mg Oral Daily    enoxaparin (LOVENOX) injection 40 mg  40 mg SubCUTAneous Daily    guaiFENesin-dextromethorphan (ROBITUSSIN DM) 100-10 MG/5ML syrup 5 mL  5 mL Oral Q4H PRN    losartan (COZAAR) tablet 100 mg  100 mg Oral Daily    melatonin tablet 3 mg  3 mg Oral Nightly PRN    memantine (NAMENDA) tablet 5 mg  5 mg Oral BID    nicotine (NICODERM CQ) 21 MG/24HR 1 patch  1 patch TransDERmal Daily    ondansetron (ZOFRAN-ODT) disintegrating tablet 4 mg  4 mg Oral Q8H PRN    Or    ondansetron (ZOFRAN) injection 4 mg  4 mg IntraVENous Q6H PRN    pantoprazole (PROTONIX) tablet 40 mg  40 mg Oral Daily    polyethylene glycol (GLYCOLAX) packet 17 g  17 g Oral Daily PRN    tamsulosin (FLOMAX) capsule 0.4 mg  0.4 mg Oral Daily    sodium chloride flush 0.9 % injection 5-40 mL  5-40 mL IntraVENous PRN    glucagon (rDNA) injection 1 mg  1 mg SubCUTAneous PRN    insulin lispro (HUMALOG) injection vial 0-4 Units  0-4 Units SubCUTAneous 4x Daily AC & HS        Review of Systems: Denies chest pain, shortness of breath, cough, headache, visual problems, abdominal pain, dysuria, calf pain. Pertinent positives are as noted in the HPI, ROS unremarkable otherwise. Visit Vitals  BP (!) 103/50   Pulse 66   Temp 98 °F (36.7 °C) (Oral)   Resp 18   Ht 5' 10\" (1.778 m)   Wt 165 lb 2 oz (74.9 kg)   SpO2 92%   BMI 23.69 kg/m²        Physical Exam:   General: Alert and age appropriately oriented. No acute cardiorespiratory distress. HEENT: Normocephalic, EOM intact. Oral mucosa moist without cyanosis. Lungs: Clear to auscultation bilaterally. Respiration even and unlabored. Heart: Irreg irreg, S1, S2. No murmurs, clicks, rub or gallops. Abdomen: Soft, non-tender, not distended. Bowel sounds normoactive. No organomegaly. Genitourinary: Deferred. Neuromuscular:      No gross focal motor deficits noted. Generalized prox>distal weakness  Conversation appropriate this morning   Skin/extremity: No rashes, no erythema. No calf tenderness B LE.   No edema                                                                              Krishnan Fall Risk Assessment:  History of Falling: Yes     Swallow Assessment:  Swallow Screening  Is the patient unable to remain alert for testing?: No  Is the patient on a modified diet (thickened liquids) due to pre-existing dysphagia?: No  Is there presence of existing enteral tube feeding via the stomach or nose?: No  Is there presence of head-of-bed restrictions (less than 30 degrees)?: No  Is there presence of tracheotomy tube?: No  Is the patient ordered nothing-by-mouth status?: No  3 oz Water Swallow Screen: Pass      tudies:  Recent Results (from the past 72 hour(s))   POCT Glucose    Collection Time: 11/13/22 11:39 AM   Result Value Ref Range    POC Glucose 88 65 - 100 mg/dL    Performed by: Zachariah    POCT Glucose    Collection Time: 11/13/22  4:19 PM   Result Value Ref Range    POC Glucose 117 (H) 65 - 100 mg/dL    Performed by: Helen Steel POCT Glucose    Collection Time: 11/13/22  9:06 PM   Result Value Ref Range    POC Glucose 127 (H) 65 - 100 mg/dL    Performed by: Ezra    POCT Glucose    Collection Time: 11/14/22  6:39 AM   Result Value Ref Range    POC Glucose 73 65 - 100 mg/dL    Performed by: Ezra    POCT Glucose    Collection Time: 11/14/22 11:48 AM   Result Value Ref Range    POC Glucose 121 (H) 65 - 100 mg/dL    Performed by: Hina Flowers    POCT Glucose    Collection Time: 11/14/22  5:29 PM   Result Value Ref Range    POC Glucose 127 (H) 65 - 100 mg/dL    Performed by: Hina Flowers    POCT Glucose    Collection Time: 11/14/22  9:08 PM   Result Value Ref Range    POC Glucose 230 (H) 65 - 100 mg/dL    Performed by: [address] [place] [address]    Basic Metabolic Panel w/ Reflex to MG    Collection Time: 11/15/22  4:44 AM   Result Value Ref Range    Sodium 142 133 - 143 mmol/L    Potassium 3.7 3.5 - 5.1 mmol/L    Chloride 109 101 - 110 mmol/L    CO2 29 21 - 32 mmol/L    Anion Gap 4 2 - 11 mmol/L    Glucose 116 (H) 65 - 100 mg/dL    BUN 33 (H) 8 - 23 MG/DL    Creatinine 1.60 (H) 0.8 - 1.5 MG/DL    Est, Glom Filt Rate 42 (L) >60 ml/min/1.73m2    Calcium 8.6 8.3 - 10.4 MG/DL   CBC    Collection Time: 11/15/22  4:44 AM   Result Value Ref Range    WBC 7.0 4.3 - 11.1 K/uL    RBC 3.05 (L) 4.23 - 5.6 M/uL    Hemoglobin 7.8 (L) 13.6 - 17.2 g/dL    Hematocrit 24.6 (L) 41.1 - 50.3 %    MCV 80.7 (L) 82 - 102 FL    MCH 25.6 (L) 26.1 - 32.9 PG    MCHC 31.7 31.4 - 35.0 g/dL    RDW 15.1 (H) 11.9 - 14.6 %    Platelets 378 484 - 999 K/uL    MPV 11.4 9.4 - 12.3 FL    nRBC 0.00 0.0 - 0.2 K/uL   POCT Glucose    Collection Time: 11/15/22  6:10 AM   Result Value Ref Range    POC Glucose 111 (H) 65 - 100 mg/dL    Performed by: [id number] [facility]    POCT Glucose    Collection Time: 11/15/22 11:51 AM   Result Value Ref Range    POC Glucose 95 65 - 100 mg/dL    Performed by:  Jesi    POCT Glucose    Collection Time: 11/15/22  4:41 PM   Result Value Ref Range    POC Glucose 202 (H) 65 - 100 mg/dL    Performed by: [facility] DR LOKESH MCCABE    POCT Glucose    Collection Time: 11/15/22  8:54 PM   Result Value Ref Range    POC Glucose 173 (H) 65 - 100 mg/dL    Performed by: [facility] DR LOKESH MCCABE    Hemoglobin and Hematocrit    Collection Time: 11/16/22  4:39 AM   Result Value Ref Range    Hemoglobin 8.0 (L) 13.6 - 17.2 g/dL    Hematocrit 25.3 (L) 41.1 - 50.3 %   POCT Glucose    Collection Time: 11/16/22  6:01 AM   Result Value Ref Range    POC Glucose 98 65 - 100 mg/dL    Performed by: [facility] DR LOKESH MCCABE         Assessment:      Assessment: This is an 80 YO with a PMH of vascular dementia, HTN, CKD and bladder CA who was admitted 10/29 in  respiratory failure secondary to COVID. Treated with dexamethasone, baricitinib and supplemental O2. Persistent hypoxia with O2 desat during activity. Acute transfer d/t  syncope and bradycardia, He continues with significant mobility and self care impairments. Plan / Recommendations / Medical Decision Making:      Continue daily physician / PA medical management:     COVID-19 virus infection/ARF/Pneumonia - on 2L O2, wean as tolerated, 88-91% on 2L O2 sats at rest  -11/14 sats 89% this am on 2L O2, enc IS. Lungs clear  -11/15 sats 96 % but on hi flow 3 L ; lungs clear  -11/16 on RA this a.m. sats 92-94% at rest     HTN/Bradycardia - HRs wnml last 24 hours, 61 - 79 , BP - 140/58, aymptomatic  continues on telemetry, on norvasc, losartan with parameters, rapid response called during acute hospitalization for syncope and bradycardia with transfer, cardiology consulted without further events, if re occurs, will re consult  -11/14 HR 62-81; no bradycardic events. BP varies 95//53; monitor for now and cont current meds with parameters  -11/15 HR 36 this a.m, /53; recheck HR manually 80  -11/16 HR 66 this am. , /50, 138/112??  Doubt; 137/48, 144/53 stable; pt was very sleepy all day yesterday, likely due to bradycardia and anemia     CKD/h/o bladder CA - monitor labs, Cr - 1.5 on 11/8  -11/14 f/u labs in a.m. ; Creat 1.6, stable     DM II - BG - 88, 93, 11 this am, yesterday 64, 59, 111, 67, 140, 227, improving on decreased lantus 8 units hs, SSI  -11/14 BG 73 this a.m, 127, 117, 88, 93; dec lantus to 6u  -11/15  this a.m, 230 at bedtime, 127 at supper and 121 at lunch. Cont current Lantus for now at 6u; consider inc in [address]  -11/16 BG 98 this am, 173 last noc, 202 at supper, 95 at lunch; inc [place] to 12.5 mg daily; cont lantus 6u; can possibly get off insulin which would be easier to manage at home     Pain mgmt- prn tylenol     DVT prophylaxis - on lovenox/ SCDs     Depression/dementia; cont celexa, cont Namenda     Tobacco use; cont nicoderm patch and stress abstinence     Anemia; hgb 9.2 on 11/5 > 8.8<10.1  -11/15 hgb 7.8; check stool. Repeat; could be contributing to his sleepiness and fatigue this a.m.  -11/16 stool pending; hgb 8.0 this am; monitor           Time spent was 25 minutes with over 1/2 in direct patient care / examination, consultation and coordination of care.      Signed By: Zee Aceves MD     November 16, 2022

## 2022-11-16 NOTE — PROGRESS NOTES
PHYSICAL WEEKLY PROGRESS NOTE    TIME IN: 8527  TIME OUT: 1114  Total Treatment Time: 46 Minutes    Subjective: Patient reporting he feels OK. Reports he does not eat breakfast at home. Reports he likes to sleep late. Reports he does not use 02 at home and walks with a walker at home. Reports he would like to go home soon. Objective:     Precautions:   Falls, Poor Safety Awareness, and Confused     Vital Signs:Visit Vitals  BP (!) 103/50   Pulse 66   Temp 98 °F (36.7 °C) (Oral)   Resp 18   Ht 5' 10\" (1.778 m)   Wt 165 lb 2 oz (74.9 kg)   SpO2 92%   BMI 23.69 kg/m²     02 at 1 lpm at beginning of treatment and 02 sat 94 to 96%. Placed on room air during assessment. 02 sat 88 to 93% and HR 48 to 60    Pain level:No c/o pain during treatment  Pain location: NA  Pain interventions: NA     Patient education: Bed mobility training,transfer training, gait training, balance training,fall precautions, body mechanics,activity pacing, w/c mobility and parts management       Interdisciplinary Communication: spoke with PTA regarding progress towards goals and D/C plans     Cognition: Alert, able to follow commands,cooperating,participating, motivate to go home but limited motivation to participate with therapy, flat affect and slow to initiate functional movement,       Lower Extremity Function:Bilateral LE AROM hip decreased 50%, knee and ankle decreased 25%. Rigidity noted with bradykinesia during AROM.  Hip strength +2/5 to +3/5, knee -4/5 to 4/5, ankle -4/5 to 4/5    Functional Assessment:    Balance  Comments   Static Sitting Fair:  Pt. requires UE support;  may need occasional min A    Dynamic Sitting Fair - accepts minimal challenge;  can maintain balance while turning head/trunk    Static Standing Fair:  Pt. requires UE support;  may need occasional min A With RW   Dynamic Standing Poor - unable to accept challenge or move without LOB  Impaired righting reactions with inability to self correct BED MOBILITY &TRANSFERS Initial Assessment   Weekly Assessment Comments   Rolling  88       Min assist with rolling. Increased time and effort to complete with whole body rigidity and bradykinesia noted      Increased time and effort to complete with cues for body mechanics for all bed mobility and transfers       Supine to Sit 88          Min assist with Increased time and effort to complete with whole body rigidity and bradykinesia noted      Sit to to Supine 1  Dependent       Min assist with Increased time and effort to complete with whole body rigidity and bradykinesia noted      Sit to Stand 1  Dependent    Min assist with RW Increased time and effort to complete with whole body rigidity and bradykinesia noted         Chair To/From Bed 1  Dependent    SPT with RW with min assist.Slow shuffling steps with flexed posture with tendency to lean posteriorly. Difficulty managing RW      Increased time and effort to complete with cues for body mechanics     Car Transfer 88       NT                WHEELCHAIR MOBILITY/MANAGEMENT Initial Assessment Weekly Assessment Comments   Able to Propel 50' w/ 2 Turns 88       30 ft with min assist. Distance limited due to UE fatigue. Bradykinesia with limited shoulder flexion and extension limiting ability to propel wheelchair. Cognitive status limiting ability to turn w/c          Able to Propel 150' 88     NA          Wheelchair set up assistance required  Mod A    Wheelchair management  Manages R Brake and Manages L Brake          AMBULATION Initial Assessment Weekly Assessment Comments   Assistive device  RW and Gait Belt    Walk 10' 88       Min assist with multiple and frequent cues to ambulate 40 ft. Slow shuffling festinating gait with flexed posture with tendency to keep RW too far out in front of him.     Walk 48' with 2 Turns 88       NT due to fatigue after 40ft          Walk 150' 88       NA          Walking 10' on Unlevel Surface 88          NT due to impaired balance and gait pattern             STEPS/STAIRS Initial Assessment Weekly Assessment Comments   1 Curb Step 88       NT       Unable to ambulate up/down steps or ramp due to LE weakness and rigidity, impaired balance and cognitive status   4 Steps 88          NT       12 Steps 9        NT          Curbs/Ramps  NT        Treatment Interventions:   SUPINE EXERCISES Sets Reps Comments, Increased time and effort to complete with multiple and frequent rest breaks. Cues for correct form. Mod assist to complete     LTR 2 10    Heel Slides 2 10    Hip Abduction 2 10 In hooklying   Short Arc Quad 2 10    Bridging 2 10          Patient returned to room at end of treatment and remained up in recliner with LEs elevated and with needs in reach. Posey alarm on           Assessment: Weekly assessment completed. Goals updated and revised. Patient progressing slowly towards goals. Patient has met STGs 1 to 4  and progressing slowly towards remaining STG. Strength and endurance improving.02 sat during activity slowly improving. Able to tolerate treatment on room air with 02 sat 88 to 93%. Patient progressing towards a min to SBA functional level. Patient has potential to meet LTGs by discharge date. Recommend family training prior to D/C and follow up PT after discharge. Plan of Care:  Continue with POC and progress as tolerated. Goals:   Short Term Goals:  Pt will demonstrate roll left & right & transition supine<>sit with Mod A in 1 week (MET)  2. Pt. will transfer from bed to/from chair with Mod A using the least restrictive device in 1 week  (MET)  3. Pt. will ambulate with 25 feet with RW or LRAD and Mod A w/ Ax2 to follow w/ w/c in 1  week (MET)  4. Pt. Will will stand w/ B UE support x30 seconds w/ min A in 1 week (MET)  5. Pt.  Will propel w/c 100 feet with Min A in 1 week(slow progress towards goal)     Long Term Goals:  Pt will demonstrate roll left & right & transition supine<>sit with Independent in 2 weeks  2. Pt. will transfer from bed to/from chair with CGA using the least restrictive device in 2 weeks   3. Pt. will ambulate with 100 feet with RW or LRAD and CGA in 2  weeks  4. Pt. Will ambulate up/down 20' ramp with RW and Min A in 2 weeks  5. Pt.  Will propel w/c 150 feet with Supervision/Standby Assist in 2 weeks    Soledad Welch, PT  11/16/2022

## 2022-11-16 NOTE — PROGRESS NOTES
Physical Therapy  PHYSICAL THERAPY DAILY NOTE  Time In:  6357 PM  Time Out:  1436 PM  Total Treatment Time:  (P) 48 Minutes  Pt. Seen for: PM, Gait Training, Therapeutic Exercise, and Transfer Training     Subjective: Patient had no complaints. Objective:  Precautions: Falls, Poor Safety Awareness, Confused, and Impulsive     GROSS ASSESSMENT Daily Assessment           COGNITION Daily Assessment    impaired       BED/MAT MOBILITY Daily Assessment    Rolling Right:   Rolling Left:   Supine to Sit: Mod A  Sit to Supine: Mod A       TRANSFERS Daily Assessment    Sit to Stand: Min A  Stand to Sit: Mod A  Transfer Type: Stand Pivot  Transfer Assistance: Mod A  Car Transfers: NT         GAIT Daily Assessment   Leans forward and wife observed and stated that he walked like that prior to this admission. Amount of Assistance: Mod A  Distance (ft): 60  Assistive Device: RW and Gait Belt  Surface: Level Surface       STEPS/STAIRS Daily Assessment    Steps Ambulated:    Level of Assistance:    Railing:  Assistive Device:        BALANCE Daily Assessment    Static Sitting:   Dynamic Sitting:   Static Standing:   Dynamic Standing:        WHEELCHAIR MOBILITY Daily Assessment    Able to Propel (ft):   Assistance:   Surface:   Wheelchair Set-up:        LOWER EXTREMITY EXERCISES Daily Assessment    Yudelka quiroz x 10 minutes     Pain level: no pain noted. Pain Location:    Pain Interventions:     Vital Signs:  BP (!) 103/50   Pulse 66   Temp 98 °F (36.7 °C) (Oral)   Resp 18   Ht 5' 10\" (1.778 m)   Wt 165 lb 2 oz (74.9 kg)   SpO2 92%   BMI 23.69 kg/m²       Education:      Interdisciplinary Communication:     Pt. Left in bed with wife present and call bell at reach. Assessment: Patient varies with how much assistance he requires from session to session. He would benefit from w/c for home to assist wife with mobility and performing MRADLs. Plan of Care: Continue with plan of care.      Michael Lopez PTA  11/16/2022

## 2022-11-16 NOTE — CARE COORDINATION
Interdisciplinary Wednesday, Team Conference Meeting Notes    Interdisciplinary team conference meeting completed to discuss plan of care. Estimated D/C Date: DEFERRED      Recommended Follow-Up Therapy: PT/OT : Family training / DME's Wheelchair needed. Communication with family/caregivers: Staff having a difficulty time with being awake. MD will have [facility] follow patient.

## 2022-11-16 NOTE — PROGRESS NOTES
OT PROGRESS NOTE    Time In 0830   Time Out 0912     Subjective: \"I was okay with yesterday. \" Pt agreeable to treatment. Pain: No pain expressed. Precautions: Falls and Poor Safety Awareness    BP (!) 103/50   Pulse 66   Temp 98 °F (36.7 °C) (Oral)   Resp 18   Ht 5' 10\" (1.778 m)   Wt 165 lb 2 oz (74.9 kg)   SpO2 92%   BMI 23.69 kg/m²      Goals:   Short Term Goals:  Time Frame for Short Term Goals : 7 days   STG 1: Patient will dress UB with Setup Assistance using AE/DME PRN. Goal not met 11/16/22  STG 2: Patient will dress LB with Supervision or Touching Assistance using AE/DME PRN. Goal not met 11/16/22  STG 3: Patient will don footwear with Substantial/Maximum Assistance using AE/DME PRN. Goal met 11/16/22  STG 4: Patient will bathe with Partial/Moderate Assistance using AE/DME PRN. Goal not met 11/16/22   STG 5: Patient will toilet with Substantial/Maximum Assistance using AE/DME PRN. Goal met 11/16/22     Long Term Goals:  Time Frame for Long Term Goals : 14 days   LTG 1: Patient will dress UB with [place] using AE/DME PRN. LTG 2: Patient will dress LB with Setup Assistance using AE/DME PRN. LTG 3: Patient will don footwear with Partial/Moderate Assistance using AE/DME PRN. LTG 4: Patient will bathe with Supervision or Touching Assistance using AE/DME PRN. LTG 5: Patient will toilet with Partial/Moderate Assistance using AE/DME PRN. LTG 6: Patient/caregiver will verbalize understanding of OT recommendations regarding ADLs, functional transfers, home safety, AE/DME, energy conservation, safety awareness, activity tolerance, and follow-up therapy to increase safety with functional tasks upon discharge.         MOBILITY:   Score Comments   Rolling Substantial/maximal assistance    Supine to Sit Partial/moderate assistance MOD A for UB management   Sit to Supine Partial/moderate assistance MOD A for positioning    Sit to Stand Partial/moderate assistance MIN-MOD A w/ RW   Transfer Assist Partial/moderate assistance  MOD A and lots of VC's for encouragement to initiate      ACTIVITIES OF DAILY LIVING:   Score Comments   Bathing Dependent Pt did not participate w/ sponge bath at EOB and was dependent for LB washing. Lower Body Dressing Substantial/maximal assistance MAX A to thread pants, MOD A to don pants to waist   Donning/[place] Footwear Setup or clean-up assistance S/U w/ cues for orientation   [address]     Education Benefits of OT     Assessment: Patient more alert and participated more w/ therapy, however, continues to be limited by fatigue during AM sessions. Pt participates w/ ADLs this day w/ lots of encouragement to participate. Pt continues to benefit from skilled OT services to address remaining deficits and progress toward baseline level of independence and safety. Patient ended session seated in recliner with call bell and needs within reach. Plan: Continue OT POC.      Eddie Rivera OT   11/16/2022

## 2022-11-16 NOTE — PROGRESS NOTES
OT DAILY NOTE  Time In 1304   Time Out 1346     Subjective: \"I want to get back in that bad. \" Pt agreeable to treatment. Pain:  Pt reports some back pain . Precautions: Falls and Poor Safety Awareness    BP (!) 103/50   Pulse 66   Temp 98 °F (36.7 °C) (Oral)   Resp 18   Ht 5' 10\" (1.778 m)   Wt 165 lb 2 oz (74.9 kg)   SpO2 92%   BMI 23.69 kg/m²      FUNCTIONAL MOBILITY:    Score Comments   Sit to Stand Partial/moderate assistance MIN-MOD A w/ RW   Transfer Assist Partial/moderate assistance MIN-MOD A w/ RW          - Strengthening   Pt completed 4 minutes on the ergometer, forwards, with MIN resistance to increase UB strength and activity tolerance for integration into functional tasks. Pt required frequent rest breaks and VC's for motivation throughout task         - Activity Tolerance - Coordination   Pt completed tossing bean bags at [facility] board w/ R hand while seated in w/c. Pt then completed tapping balloon w/ wooden araceli while seated in w/c. Both activities addressed activity tolerance and BUE coordination. Pt demonstrated good participation and good auditory reception of instructions. Education   Benefits of OT and Safety Awareness     Assessment: Patient slowly progressing w/ activity tolerance and level of participation. Pt continues to report high levels of fatigue; however, participates well this day and engages BUE in tasks w/o difficulty. Pt demonstrated good participation in OT treatment. Pt continues to benefit from skilled OT services to address remaining deficits and progress toward baseline level of independence and safety. Patient ended session seated in w/c with PT. Plan: Continue OT POC.      Mu Pineda OT   11/16/2022

## 2022-11-17 LAB
GLUCOSE BLD STRIP.AUTO-MCNC: 227 MG/DL (ref 65–100)
GLUCOSE BLD STRIP.AUTO-MCNC: 91 MG/DL (ref 65–100)
GLUCOSE BLD STRIP.AUTO-MCNC: 97 MG/DL (ref 65–100)
GLUCOSE BLD STRIP.AUTO-MCNC: 99 MG/DL (ref 65–100)
SERVICE CMNT-IMP: ABNORMAL
SERVICE CMNT-IMP: NORMAL

## 2022-11-17 PROCEDURE — 1180000000 HC REHAB R&B

## 2022-11-17 PROCEDURE — 97110 THERAPEUTIC EXERCISES: CPT

## 2022-11-17 PROCEDURE — 99232 SBSQ HOSP IP/OBS MODERATE 35: CPT | Performed by: PHYSICAL MEDICINE & REHABILITATION

## 2022-11-17 PROCEDURE — 2580000003 HC RX 258: Performed by: PHYSICAL MEDICINE & REHABILITATION

## 2022-11-17 PROCEDURE — 82962 GLUCOSE BLOOD TEST: CPT

## 2022-11-17 PROCEDURE — 97535 SELF CARE MNGMENT TRAINING: CPT

## 2022-11-17 PROCEDURE — 97116 GAIT TRAINING THERAPY: CPT

## 2022-11-17 PROCEDURE — 6370000000 HC RX 637 (ALT 250 FOR IP): Performed by: PHYSICAL MEDICINE & REHABILITATION

## 2022-11-17 PROCEDURE — 6360000002 HC RX W HCPCS: Performed by: PHYSICAL MEDICINE & REHABILITATION

## 2022-11-17 PROCEDURE — 97530 THERAPEUTIC ACTIVITIES: CPT

## 2022-11-17 RX ADMIN — ATORVASTATIN CALCIUM 20 MG: 10 TABLET, FILM COATED ORAL at 08:44

## 2022-11-17 RX ADMIN — CITALOPRAM HYDROBROMIDE 20 MG: 20 TABLET ORAL at 08:48

## 2022-11-17 RX ADMIN — ALOGLIPTIN 12.5 MG: 6.25 TABLET, FILM COATED ORAL at 08:44

## 2022-11-17 RX ADMIN — TAMSULOSIN HYDROCHLORIDE 0.4 MG: 0.4 CAPSULE ORAL at 08:47

## 2022-11-17 RX ADMIN — AMLODIPINE BESYLATE 10 MG: 10 TABLET ORAL at 08:48

## 2022-11-17 RX ADMIN — LOSARTAN POTASSIUM 100 MG: 50 TABLET, FILM COATED ORAL at 08:46

## 2022-11-17 RX ADMIN — INSULIN LISPRO 1 UNITS: 100 INJECTION, SOLUTION INTRAVENOUS; SUBCUTANEOUS at 18:22

## 2022-11-17 RX ADMIN — MEMANTINE 5 MG: 5 TABLET ORAL at 08:48

## 2022-11-17 RX ADMIN — MEMANTINE 5 MG: 5 TABLET ORAL at 20:53

## 2022-11-17 RX ADMIN — SODIUM CHLORIDE, PRESERVATIVE FREE 10 ML: 5 INJECTION INTRAVENOUS at 20:56

## 2022-11-17 RX ADMIN — PANTOPRAZOLE SODIUM 40 MG: 40 TABLET, DELAYED RELEASE ORAL at 08:48

## 2022-11-17 RX ADMIN — ENOXAPARIN SODIUM 40 MG: 100 INJECTION SUBCUTANEOUS at 08:49

## 2022-11-17 NOTE — PROGRESS NOTES
Physical Therapy  PHYSICAL THERAPY DAILY NOTE  Time In:  1005 AM  Time Out:  1494 AM  Total Treatment Time:  (P) 44 Minutes  Pt. Seen for: AM, Gait Training, Therapeutic Exercise, and Transfer Training     Subjective: \" I feel alright. \"         Objective:  Precautions: Falls, Poor Safety Awareness, and Confused    GROSS ASSESSMENT Daily Assessment           COGNITION Daily Assessment    impaired       BED/MAT MOBILITY Daily Assessment    Rolling Right:   Rolling Left:   Supine to Sit:   Sit to Supine:        TRANSFERS Daily Assessment    Sit to Stand: Min A  Stand to Sit: Min A  Transfer Type: Stand Pivot and with rolling walker  Transfer Assistance: Min A  Car Transfers: NT         GAIT Daily Assessment   Leans forward and oushes walker too far forward. Amount of Assistance: Mod A  Distance (ft): 60  Assistive Device: RW and Gait Belt  Surface: Level Surface       STEPS/STAIRS Daily Assessment    Steps Ambulated:    Level of Assistance:    Railing:  Assistive Device:        BALANCE Daily Assessment    Static Sitting:   Dynamic Sitting:   Static Standing:   Dynamic Standing:        WHEELCHAIR MOBILITY Daily Assessment    Able to Propel (ft):   Assistance:   Surface:   Wheelchair Set-up:        LOWER EXTREMITY EXERCISES Daily Assessment    Yudelka quiroz x 10 minutes     Pain level: no pain noted  Pain Location:    Pain Interventions:     Vital Signs:  /74   Pulse 72   Temp 98 °F (36.7 °C) (Oral)   Resp 18   Ht 5' 10\" (1.778 m)   Wt 165 lb 2 oz (74.9 kg)   SpO2 93%   BMI 23.69 kg/m²       Education:      Interdisciplinary Communication:     Pt. Left in recliner with alarm on and call bell at reach. Assessment: Patient making progress but will need 24 hour assistance at home for safety. Plan of Care: Continue with plan of care.     Jose Roberto Zimmerman, PTA  11/17/2022

## 2022-11-17 NOTE — PROGRESS NOTES
Physician Progress Note      PATIENT:               Dari Ansari  CSN #:                  [id number]  :                       1940  ADMIT DATE:       10/26/2022 6:21 PM  [address] [place] DATE:        2022 1:28 PM  RESPONDING  PROVIDER #:        Hannah Arzate MD          QUERY TEXT:    Patient was admitted with COVID-19. They were noted to have a WBC of 3.1 and   RR 21. Progress noted mentioned COVID-PNA but also stated, \"chest x-ray showed   no pneumonia or signs of CHF. \" CT noted, \"Mild infiltrate or atelectasis in   the left upper lobe. \" Patient was treated with Decadron. After study, did   this patient have COVID-19 PNA? The medical record reflects the following:  Risk Factors: COVID  Clinical Indicators: On admit WBC 3.1. Afebrile. As per CT imaging, Mild   infiltrate or atelectasis in the left upper lobe. Treatment: Zithromax, Rocephin. Decadron. Options provided:  -- Pneumonia due to COVID-19  -- No pneumonia due to COVID- 19  -- Other - I will add my own diagnosis  -- Disagree - Not applicable / Not valid  -- Disagree - Clinically unable to determine / Unknown  -- Refer to Clinical Documentation Reviewer    PROVIDER RESPONSE TEXT:    This patient has pneumonia due to COVID-19.     Query created by: Kane Sánchez on 2022 7:20 AM      Electronically signed by:  Hannah Arzate MD 2022 1:46 PM

## 2022-11-17 NOTE — PROGRESS NOTES
OT DAILY NOTE    Time In 0915   Time Out 1000     Subjective: Pt agreeable to treatment. Pain: No pain expressed. Precautions: Falls and Poor Safety Awareness    /74   Pulse 72   Temp 98 °F (36.7 °C) (Oral)   Resp 18   Ht 5' 10\" (1.778 m)   Wt 165 lb 2 oz (74.9 kg)   SpO2 93%   BMI 23.69 kg/m²      MOBILITY:   Score Comments   Supine to Sit Partial/moderate assistance MOD A for UB management   Sit to Supine Supervision or touching assistance    Sit to Stand Partial/moderate assistance MIN-MOD A w/ RW and extra time   Transfer Assist Supervision or touching assistance CGA for Safety w/ RW     ACTIVITIES OF DAILY LIVING:   Score Comments   Oral Hygiene Setup or clean-up assistance     Bathing Partial/moderate assistance MIN-MOD A to complete sponge bath at EOB w/ frequent VC's for initiation   Upper Body  Dressing Setup or clean-up assistance     Lower Body Dressing Partial/moderate assistance MIn A, pulled pants to hips by ind. bridging while supine. Donning/[place] Footwear Setup or clean-up assistance Cues for initiation and orientation   Melchor Carnes assistance MOD - MAX A   Education Functional Transfer Training and Safety Awareness     Assessment: Patient alert and engaged in therapy today, progressing w/ ADL training. While sitting EOB pt requires frequent rest breaks to impulsively lay supine in bed d/t reported back fatigue. Pt demonstrates ability to complete ADLs w/ extra time and cuing for initiation. Pt demonstrated good participation in OT treatment. Pt continues to benefit from skilled OT services to address remaining deficits and progress toward baseline level of independence and safety. Patient ended session seated in w/c with PT. Plan: Continue OT POC.      Lakshmi Vigil, OT   11/17/2022

## 2022-11-17 NOTE — PROGRESS NOTES
Physical Therapy  PHYSICAL THERAPY DAILY NOTE  Time In:  7435 PM  Time Out:  1439 PM  Total Treatment Time:  (P) 51 Minutes  Pt. Seen for: PM, Gait Training, Therapeutic Exercise, and Transfer Training     Subjective: \"I am awake. \"         Objective:  Precautions: Falls and Poor Safety Awareness    GROSS ASSESSMENT Daily Assessment           COGNITION Daily Assessment           BED/MAT MOBILITY Daily Assessment    Rolling Right:   Rolling Left:   Supine to Sit: Min A  Sit to Supine: Min A       TRANSFERS Daily Assessment    Sit to Stand: Min A  Stand to Sit: Min A  Transfer Type: Stand Pivot and with rolling walker  Transfer Assistance: Min A  Car Transfers: NT         GAIT Daily Assessment    Amount of Assistance: Min A  Distance (ft): 60  Assistive Device: RW  Surface: Level Surface       STEPS/STAIRS Daily Assessment    Steps Ambulated:    Level of Assistance:    Railing:  Assistive Device:        BALANCE Daily Assessment    Static Sitting:   Dynamic Sitting:   Static Standing:   Dynamic Standing:        WHEELCHAIR MOBILITY Daily Assessment    Able to Propel (ft):   Assistance:   Surface:   Wheelchair Set-up:        LOWER EXTREMITY EXERCISES Daily Assessment    Yudelka wick x 10 minutes. Pain level:   Pain Location:    Pain Interventions:     Vital Signs:  /74   Pulse 72   Temp 98 °F (36.7 °C) (Oral)   Resp 18   Ht 5' 10\" (1.778 m)   Wt 165 lb 2 oz (74.9 kg)   SpO2 93%   BMI 23.69 kg/m²       Education:      Interdisciplinary Communication:     Pt. Left in recliner with alarm on and call bell at reach. Assessment: Patient making good progress with mobility. Plan of Care: Continue with plan of care.     Klaudia Spangler, PTA  11/17/2022

## 2022-11-17 NOTE — PROGRESS NOTES
OT DAILY NOTE  Time In 1302   Time Out 1348     Subjective: \"Where's Chrissy? \" Pt agreeable to treatment. Pain: No pain expressed. Precautions: Falls and Poor Safety Awareness    /74   Pulse 72   Temp 98 °F (36.7 °C) (Oral)   Resp 18   Ht 5' 10\" (1.778 m)   Wt 165 lb 2 oz (74.9 kg)   SpO2 93%   BMI 23.69 kg/m²         - Coordination - Strengthening   Pt participated in reaching in various directions and heights to grab balls and toss them into a basket. Pt completed w/ BUE and demonstrated good tolerance and BUE ROM. Pt required 1-2 VC's for encouragement. - Coordination - Self-Care   Pt completed lifting beach ball from ground into lap and placing back on ground to replicate motions required during ADLs. Pt completed task 5 times w/ frequent VC's for encouragement. Education   Benefits of OT and Energy Conservation, Pacing     Assessment: Patient w/ good participation today and presents w/ more cognitive clarity. Pt progressing w/ functional mobility and core stability while seated in w/c. Pt demonstrated good participation in OT treatment. Pt continues to benefit from skilled OT services to address remaining deficits and progress toward baseline level of independence and safety. Patient ended session seated in w/c with PT. Plan: Continue OT POC.      Azucena Pryor OT   11/17/2022

## 2022-11-17 NOTE — PROGRESS NOTES
Chief Complaint Patient presents with  Cough  Cold Symptoms 1. Have you been to the ER, urgent care clinic since your last visit? Hospitalized since your last visit? No 
 
2. Have you seen or consulted any other health care providers outside of the [address] [place] [address] since your last visit? Include any pap smears or colon screening. No 
Visit Vitals /80 (BP 1 Location: Left arm, BP Patient Position: Sitting) Pulse 91 Temp 98.7 °F (37.1 °C) Resp 16 Ht 5' 7\" (1.702 m) Wt 187 lb 12.8 oz (85.2 kg) SpO2 94% BMI 29.41 kg/m² [facility] PROGRESS NOTE    Bailey Sullivan  Admit Date: 11/9/2022  Admit Diagnosis:   Debility [R53.81]  Physical debility [R53.81]    Subjective     Patient seen and examined. Pt seen in gym this morning. Very awake today. Smiling . No complaints. No cp, SOB. Slept well.  LBP    Objective:     Current Facility-Administered Medications   Medication Dose Route Frequency    alogliptin (NESINA) tablet 12.5 mg  12.5 mg Oral Daily    insulin glargine (LANTUS) injection vial 6 Units  6 Units SubCUTAneous Nightly    0.9 % sodium chloride infusion   IntraVENous PRN    acetaminophen (TYLENOL) tablet 650 mg  650 mg Oral Q6H PRN    Or    acetaminophen (TYLENOL) suppository 650 mg  650 mg Rectal Q6H PRN    amLODIPine (NORVASC) tablet 10 mg  10 mg Oral Daily    citalopram (CELEXA) tablet 20 mg  20 mg Oral Daily    atorvastatin (LIPITOR) tablet 20 mg  20 mg Oral Daily    enoxaparin (LOVENOX) injection 40 mg  40 mg SubCUTAneous Daily    guaiFENesin-dextromethorphan (ROBITUSSIN DM) 100-10 MG/5ML syrup 5 mL  5 mL Oral Q4H PRN    losartan (COZAAR) tablet 100 mg  100 mg Oral Daily    melatonin tablet 3 mg  3 mg Oral Nightly PRN    memantine (NAMENDA) tablet 5 mg  5 mg Oral BID    nicotine (NICODERM CQ) 21 MG/24HR 1 patch  1 patch TransDERmal Daily    ondansetron (ZOFRAN-ODT) disintegrating tablet 4 mg  4 mg Oral Q8H PRN    Or    ondansetron (ZOFRAN) injection 4 mg  4 mg IntraVENous Q6H PRN    pantoprazole (PROTONIX) tablet 40 mg  40 mg Oral Daily    polyethylene glycol (GLYCOLAX) packet 17 g  17 g Oral Daily PRN    tamsulosin (FLOMAX) capsule 0.4 mg  0.4 mg Oral Daily    sodium chloride flush 0.9 % injection 5-40 mL  5-40 mL IntraVENous PRN    glucagon (rDNA) injection 1 mg  1 mg SubCUTAneous PRN    insulin lispro (HUMALOG) injection vial 0-4 Units  0-4 Units SubCUTAneous 4x Daily AC & HS        Review of Systems:   Denies chest pain, shortness of breath, cough, headache, visual problems, abdominal pain, dysuria, calf pain. Pertinent positives are as noted in the HPI, ROS unremarkable otherwise. Visit Vitals  /74   Pulse 72   Temp 98 °F (36.7 °C) (Oral)   Resp 18   Ht 5' 10\" (1.778 m)   Wt 165 lb 2 oz (74.9 kg)   SpO2 93%   BMI 23.69 kg/m²        Physical Exam:   General: Alert and age appropriately oriented. No acute cardiorespiratory distress. HEENT: Normocephalic, EOM intact. Oral mucosa moist without cyanosis. Lungs: Clear to auscultation bilaterally. Respiration even and unlabored. Heart: irreg S1, S2. No murmurs, clicks, rub or gallops. Abdomen: Soft, non-tender, not distended. Bowel sounds normoactive. No organomegaly. Genitourinary: Deferred. Neuromuscular:      No gross motor deficits  Generalized prox>distal weakness  No sensory deficits     Skin/extremity: No rashes, no erythema. No calf tenderness B LE.   No edema                                                                              Krishnan Fall Risk Assessment:  History of Falling: Yes     Swallow Assessment:  Swallow Screening  Is the patient unable to remain alert for testing?: No  Is the patient on a modified diet (thickened liquids) due to pre-existing dysphagia?: No  Is there presence of existing enteral tube feeding via the stomach or nose?: No  Is there presence of head-of-bed restrictions (less than 30 degrees)?: No  Is there presence of tracheotomy tube?: No  Is the patient ordered nothing-by-mouth status?: No  3 oz Water Swallow Screen: Pass        Labs/Studies:  Recent Results (from the past 72 hour(s))   POCT Glucose    Collection Time: 11/14/22  5:29 PM   Result Value Ref Range    POC Glucose 127 (H) 65 - 100 mg/dL    Performed by: Morales Arriaza    POCT Glucose    Collection Time: 11/14/22  9:08 PM   Result Value Ref Range    POC Glucose 230 (H) 65 - 100 mg/dL    Performed by: [facility] DR LOKESH MCCABE    Basic Metabolic Panel w/ Reflex to MG    Collection Time: 11/15/22  4:44 AM   Result Value Ref Range    Sodium 142 133 - 143 mmol/L    Potassium 3.7 3.5 - 5.1 mmol/L    Chloride 109 101 - 110 mmol/L    CO2 29 21 - 32 mmol/L    Anion Gap 4 2 - 11 mmol/L    Glucose 116 (H) 65 - 100 mg/dL    BUN 33 (H) 8 - 23 MG/DL    Creatinine 1.60 (H) 0.8 - 1.5 MG/DL    Est, Glom Filt Rate 42 (L) >60 ml/min/1.73m2    Calcium 8.6 8.3 - 10.4 MG/DL   CBC    Collection Time: 11/15/22  4:44 AM   Result Value Ref Range    WBC 7.0 4.3 - 11.1 K/uL    RBC 3.05 (L) 4.23 - 5.6 M/uL    Hemoglobin 7.8 (L) 13.6 - 17.2 g/dL    Hematocrit 24.6 (L) 41.1 - 50.3 %    MCV 80.7 (L) 82 - 102 FL    MCH 25.6 (L) 26.1 - 32.9 PG    MCHC 31.7 31.4 - 35.0 g/dL    RDW 15.1 (H) 11.9 - 14.6 %    Platelets 806 814 - 249 K/uL    MPV 11.4 9.4 - 12.3 FL    nRBC 0.00 0.0 - 0.2 K/uL   POCT Glucose    Collection Time: 11/15/22  6:10 AM   Result Value Ref Range    POC Glucose 111 (H) 65 - 100 mg/dL    Performed by: [facility] DR LOKESH MCCABE    POCT Glucose    Collection Time: 11/15/22 11:51 AM   Result Value Ref Range    POC Glucose 95 65 - 100 mg/dL    Performed by:  Jesi    POCT Glucose    Collection Time: 11/15/22  4:41 PM   Result Value Ref Range    POC Glucose 202 (H) 65 - 100 mg/dL    Performed by: [facility] DR LOKESH MCCABE    POCT Glucose    Collection Time: 11/15/22  8:54 PM   Result Value Ref Range    POC Glucose 173 (H) 65 - 100 mg/dL    Performed by: [facility] DR LOKESH MCCABE    Hemoglobin and Hematocrit    Collection Time: 11/16/22  4:39 AM   Result Value Ref Range    Hemoglobin 8.0 (L) 13.6 - 17.2 g/dL    Hematocrit 25.3 (L) 41.1 - 50.3 %   POCT Glucose    Collection Time: 11/16/22  6:01 AM   Result Value Ref Range    POC Glucose 98 65 - 100 mg/dL    Performed by: [facility] DR LOKESH MCCABE    POCT Glucose    Collection Time: 11/16/22 12:04 PM   Result Value Ref Range    POC Glucose 99 65 - 100 mg/dL    Performed by: Veto    POCT Glucose    Collection Time: 11/16/22  4:30 PM   Result Value Ref Range    POC Glucose 192 (H) 65 - 100 mg/dL    Performed by: Katelynn Mcdermott POCT Glucose    Collection Time: 11/16/22  8:23 PM   Result Value Ref Range    POC Glucose 248 (H) 65 - 100 mg/dL    Performed by: Riyay[facility]    POCT Glucose    Collection Time: 11/17/22  7:02 AM   Result Value Ref Range    POC Glucose 99 65 - 100 mg/dL    Performed by: LeviinyPCT    POCT Glucose    Collection Time: 11/17/22 12:28 PM   Result Value Ref Range    POC Glucose 97 65 - 100 mg/dL    Performed by: Zachariah      Assessment:      Assessment: This is an 78 YO with a PMH of vascular dementia, HTN, CKD and bladder CA who was admitted 10/29 in  respiratory failure secondary to COVID. Treated with dexamethasone, baricitinib and supplemental O2. Persistent hypoxia with O2 desat during activity. Acute transfer d/t  syncope and bradycardia, He continues with significant mobility and self care impairments. Plan / Recommendations / Medical Decision Making:      Continue daily physician / PA medical management:     COVID-19 virus infection/ARF/Pneumonia - on 2L O2, wean as tolerated, 88-91% on 2L O2 sats at rest  -11/14 sats 89% this am on 2L O2, enc IS. Lungs clear  -11/15 sats 96 % but on hi flow 3 L ; lungs clear  -11/16 on RA this a.m. sats 92-94% at rest  -11/17 desats with activity. Back on 2L NC, sats 93%     HTN/Bradycardia - HRs wnml last 24 hours, 61 - 79 , BP - 140/58, aymptomatic  continues on telemetry, on norvasc, losartan with parameters, rapid response called during acute hospitalization for syncope and bradycardia with transfer, cardiology consulted without further events, if re occurs, will re consult  -11/14 HR 62-81; no bradycardic events. BP varies 95//53; monitor for now and cont current meds with parameters  -11/15 HR 36 this a.m, /53; recheck HR manually 80  -11/16 HR 66 this am. , /50, 138/112??  Doubt; 137/48, 144/53 stable; pt was very sleepy all day yesterday, likely due to bradycardia and anemia  -11/17 137/74 HR 70s improved     CKD/h/o bladder CA - monitor labs, Cr - 1.5 on 11/8  -11/14 f/u labs in a.m. ; Creat 1.6, stable     DM II - BG - 88, 93, 11 this am, yesterday 64, 59, 111, 67, 140, 227, improving on decreased lantus 8 units hs, SSI  -11/14 BG 73 this a.m, 127, 117, 88, 93; dec lantus to 6u  -11/15  this a.m, 230 at bedtime, 127 at supper and 121 at lunch. Cont current Lantus for now at 6u; consider inc in [address]  -11/16 BG 98 this am, 173 last noc, 202 at supper, 95 at lunch; inc Nesina to 12.5 mg daily; cont lantus 6u; can possibly get off insulin which would be easier to manage at home  -11/17 BG 99 this am/97 at lunch/ 248 at bedtime/192 at supper;cont current treatment. Remains hi at bedtime and supper     Pain mgmt- prn tylenol     DVT prophylaxis - on lovenox/ SCDs     Depression/dementia; cont celexa, cont Namenda     Tobacco use; cont nicoderm patch and stress abstinence     Anemia; hgb 9.2 on 11/5 > 8.8<10.1  -11/15 hgb 7.8; check stool. Repeat; could be contributing to his sleepiness and fatigue this a.m.  -11/16 stool pending; hgb 8.0 this am; monitor  -11/17 recheck in a.m ;stool not yet sent           Time spent was 25 minutes with over 1/2 in direct patient care / examination, consultation and coordination of care.      Signed By: Star Wooten MD     November 17, 2022

## 2022-11-18 LAB
ANION GAP SERPL CALC-SCNC: 2 MMOL/L (ref 2–11)
BUN SERPL-MCNC: 25 MG/DL (ref 8–23)
CALCIUM SERPL-MCNC: 8.8 MG/DL (ref 8.3–10.4)
CHLORIDE SERPL-SCNC: 114 MMOL/L (ref 101–110)
CO2 SERPL-SCNC: 30 MMOL/L (ref 21–32)
CREAT SERPL-MCNC: 1.5 MG/DL (ref 0.8–1.5)
ERYTHROCYTE [DISTWIDTH] IN BLOOD BY AUTOMATED COUNT: 15.4 % (ref 11.9–14.6)
GLUCOSE BLD STRIP.AUTO-MCNC: 102 MG/DL (ref 65–100)
GLUCOSE BLD STRIP.AUTO-MCNC: 139 MG/DL (ref 65–100)
GLUCOSE BLD STRIP.AUTO-MCNC: 217 MG/DL (ref 65–100)
GLUCOSE BLD STRIP.AUTO-MCNC: 316 MG/DL (ref 65–100)
GLUCOSE SERPL-MCNC: 95 MG/DL (ref 65–100)
HCT VFR BLD AUTO: 24.7 % (ref 41.1–50.3)
HGB BLD-MCNC: 7.6 G/DL (ref 13.6–17.2)
MAGNESIUM SERPL-MCNC: 2.2 MG/DL (ref 1.8–2.4)
MCH RBC QN AUTO: 25.2 PG (ref 26.1–32.9)
MCHC RBC AUTO-ENTMCNC: 30.8 G/DL (ref 31.4–35)
MCV RBC AUTO: 81.8 FL (ref 82–102)
NRBC # BLD: 0 K/UL (ref 0–0.2)
PLATELET # BLD AUTO: 253 K/UL (ref 150–450)
PMV BLD AUTO: 11.1 FL (ref 9.4–12.3)
POTASSIUM SERPL-SCNC: 3.5 MMOL/L (ref 3.5–5.1)
RBC # BLD AUTO: 3.02 M/UL (ref 4.23–5.6)
SERVICE CMNT-IMP: ABNORMAL
SODIUM SERPL-SCNC: 146 MMOL/L (ref 133–143)
WBC # BLD AUTO: 4.7 K/UL (ref 4.3–11.1)

## 2022-11-18 PROCEDURE — 97110 THERAPEUTIC EXERCISES: CPT

## 2022-11-18 PROCEDURE — 80048 BASIC METABOLIC PNL TOTAL CA: CPT

## 2022-11-18 PROCEDURE — 6360000002 HC RX W HCPCS: Performed by: PHYSICAL MEDICINE & REHABILITATION

## 2022-11-18 PROCEDURE — 97530 THERAPEUTIC ACTIVITIES: CPT

## 2022-11-18 PROCEDURE — 99232 SBSQ HOSP IP/OBS MODERATE 35: CPT | Performed by: PHYSICIAN ASSISTANT

## 2022-11-18 PROCEDURE — 97116 GAIT TRAINING THERAPY: CPT

## 2022-11-18 PROCEDURE — 1180000000 HC REHAB R&B

## 2022-11-18 PROCEDURE — 6370000000 HC RX 637 (ALT 250 FOR IP): Performed by: PHYSICAL MEDICINE & REHABILITATION

## 2022-11-18 PROCEDURE — 36415 COLL VENOUS BLD VENIPUNCTURE: CPT

## 2022-11-18 PROCEDURE — 82962 GLUCOSE BLOOD TEST: CPT

## 2022-11-18 PROCEDURE — 97535 SELF CARE MNGMENT TRAINING: CPT

## 2022-11-18 PROCEDURE — 85027 COMPLETE CBC AUTOMATED: CPT

## 2022-11-18 PROCEDURE — 83735 ASSAY OF MAGNESIUM: CPT

## 2022-11-18 RX ADMIN — MEMANTINE 5 MG: 5 TABLET ORAL at 08:42

## 2022-11-18 RX ADMIN — INSULIN GLARGINE 6 UNITS: 100 INJECTION, SOLUTION SUBCUTANEOUS at 21:05

## 2022-11-18 RX ADMIN — CITALOPRAM HYDROBROMIDE 20 MG: 20 TABLET ORAL at 08:42

## 2022-11-18 RX ADMIN — MEMANTINE 5 MG: 5 TABLET ORAL at 21:06

## 2022-11-18 RX ADMIN — LOSARTAN POTASSIUM 100 MG: 50 TABLET, FILM COATED ORAL at 08:44

## 2022-11-18 RX ADMIN — Medication 3 MG: at 21:06

## 2022-11-18 RX ADMIN — ATORVASTATIN CALCIUM 20 MG: 10 TABLET, FILM COATED ORAL at 08:42

## 2022-11-18 RX ADMIN — PANTOPRAZOLE SODIUM 40 MG: 40 TABLET, DELAYED RELEASE ORAL at 08:42

## 2022-11-18 RX ADMIN — INSULIN LISPRO 1 UNITS: 100 INJECTION, SOLUTION INTRAVENOUS; SUBCUTANEOUS at 21:16

## 2022-11-18 RX ADMIN — AMLODIPINE BESYLATE 10 MG: 10 TABLET ORAL at 08:43

## 2022-11-18 RX ADMIN — ALOGLIPTIN 12.5 MG: 6.25 TABLET, FILM COATED ORAL at 08:41

## 2022-11-18 RX ADMIN — ENOXAPARIN SODIUM 40 MG: 100 INJECTION SUBCUTANEOUS at 08:43

## 2022-11-18 RX ADMIN — ACETAMINOPHEN 650 MG: 325 TABLET ORAL at 21:06

## 2022-11-18 RX ADMIN — TAMSULOSIN HYDROCHLORIDE 0.4 MG: 0.4 CAPSULE ORAL at 08:41

## 2022-11-18 ASSESSMENT — PAIN SCALES - GENERAL: PAINLEVEL_OUTOF10: 3

## 2022-11-18 ASSESSMENT — PAIN DESCRIPTION - LOCATION: LOCATION: GENERALIZED

## 2022-11-18 NOTE — PROGRESS NOTES
PHYSICAL THERAPY DAILY NOTE  Time In:  1348  Time Out:  1430  Total Treatment Time:  42 Minutes  Pt. Seen for: PM, Balance Training, Gait Training, Patient Education, Therapeutic Exercise, and Transfer Training     Subjective: Pt a little more drowsy this afternoon and needs increased cueing; difficulty performing LE exercises without constant assistance. Objective:  Precautions: Falls, Poor Safety Awareness, and Confused    GROSS ASSESSMENT Daily Assessment           COGNITION Daily Assessment    Alert, confused, drowsy at times.  Needs max extra time and cues       BED/MAT MOBILITY Daily Assessment    Rolling Right: Min A  Rolling Left: Min A  Supine to Sit: Min A  Sit to Supine: Min A       TRANSFERS Daily Assessment    Sit to Stand: CGA  Stand to Sit: CGA  Transfer Type: Stand Pivot  Transfer Assistance: Min A  Car Transfers: NT         GAIT Daily Assessment    Amount of Assistance: CGA-brief min assist  Distance (ft): 40 ft x 2  Assistive Device: RW  Surface: Level Surface       STEPS/STAIRS Daily Assessment    NT       BALANCE Daily Assessment    Static Sitting: Good:  Pt. able to maintain balance w/o UE support;  exhibits some postural sway  Dynamic Sitting: Good - accepts moderate challenge;  can maintain balance while picking object off the floor  Static Standing: Fair:  Pt. requires UE support;  may need occasional min A  Dynamic Standing: Fair - accepts minimal challenge;  can maintain balance while turning head/trunk       WHEELCHAIR MOBILITY Daily Assessment           LOWER EXTREMITY EXERCISES Daily Assessment    Rode edmundo med x 11 minutes, level 3    Attempted seated exercises-  LAQ x 10, marching x 10 (active)  Hip abduction x 10 (active assisted)     Pain level: 0/10  Pain Location:  NA  Pain Interventions: NA    Vital Signs:  BP (!) 119/59   Pulse 95   Temp (!) 96 °F (35.6 °C) (Oral)   Resp 16   Ht 5' 10\" (1.778 m)   Wt 165 lb 2 oz (74.9 kg)   SpO2 96%   BMI 23.69 kg/m² Education:  bed mobility training, transfer training, gait training, body mechanics, HEP training, postural training/falls prevention    Interdisciplinary Communication:     Pt. Left in supine with needs in reach, wife in room, bed alarm on         Assessment: Pt did well with edmundo med, needs max additional time and cues for exercises this PM. Gait training x 2 trials with close guarding and wc follow for safety. Limited by baseline dementia but pleasant and cooperative during session. Plan of Care: Continue with POC and progress as tolerated.        Irene Mcburney, PT  11/18/2022

## 2022-11-18 NOTE — PROGRESS NOTES
PHYSICAL THERAPY DAILY NOTE  Time In:  1118   Time Out:  1200  Total Treatment Time:  42 Minutes  Pt. Seen for: AM, Balance Training, Gait Training, Patient Education, Therapeutic Exercise, and Transfer Training     Subjective: Pt has no specific complaints, states he is tired and does need extra encouragement throughout session         Objective:  Precautions: Falls, Poor Safety Awareness, and Confused    GROSS ASSESSMENT Daily Assessment           COGNITION Daily Assessment    Alert and pleasantly confused, needs extra time and encouragement throughout session       BED/MAT MOBILITY Daily Assessment    NT       TRANSFERS Daily Assessment    Sit to Stand: CGA  Stand to Sit: CGA  Transfer Type: Stand Pivot  Transfer Assistance: Min A  Car Transfers: NT         GAIT Daily Assessment   Gait training x 4 trials with CGA-light min assist and heavy multimodal cues for posture, walker placement/positioning, and large step length.      Wheelchair follow for safety as pt tends to sit without much warning when tired Amount of Assistance: CGA-light min assist  Distance (ft): 20 ft, 75 ft, 40 ft x 2  Assistive Device: RW  Surface: Level Surface       STEPS/STAIRS Daily Assessment           BALANCE Daily Assessment   Practiced seated and standing balance during functional/cognitive activities Static Sitting: Good:  Pt. able to maintain balance w/o UE support;  exhibits some postural sway  Dynamic Sitting: Good - accepts moderate challenge;  can maintain balance while picking object off the floor  Static Standing: Fair:  Pt. requires UE support;  may need occasional min A  Dynamic Standing: Fair - accepts minimal challenge;  can maintain balance while turning head/trunk       WHEELCHAIR MOBILITY Daily Assessment           LOWER EXTREMITY EXERCISES Daily Assessment         Pain level: 0/10  Pain Location:  NA  Pain Interventions: NA    Vital Signs:  BP (!) 125/52   Pulse 61   Temp 97.9 °F (36.6 °C) (Oral)   Resp 16   Ht 5' 10\" (1.778 m)   Wt 165 lb 2 oz (74.9 kg)   SpO2 96%   BMI 23.69 kg/m²       Education:  transfer training, gait training, balance training,fall precautions, body mechanics,activity pacing. pt will need continued education and reinforcement and likely to have limited carryover due to history of vascular dementia. Interdisciplinary Communication: co-treat with OT    Pt. Left in recliner with all needs in reach, wife in room, chair alarm on         Assessment: pt pleasant during session however needs extra time and encouragement to perform transfers, gait training, and mobility. Cognition may be baseline due to history of vascular dementia. Worked on upright posture and body mechanics during gait with RW. Plan of Care: Continue with POC and progress as tolerated.        Milo Batista, PT  11/18/2022

## 2022-11-18 NOTE — PROGRESS NOTES
OT Daily Note  Time In 1118   Time Out 1200     Subjective: \"Where's my wife? \"  Pain: not expressed  Education: benefits of walking  Interdisciplinary Communication: with PT during cotx    Mobility   Score Comments   Sit to Stand  Min assist     Transfer Assist  CGA       Activity Tolerance   Pt engaged in game of bingo with therapists working on attention to task, initiation, and simple problem solving. Pt required occasional min cuing throughout for attention to board. Pt ambulated 50 ft x2 and 30 ft using RW with CGA. Assessment: Pt with improved alertness and wife present in room. 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 SpO2 level remained above 92% on 2 L throughout ambulation. Plan: Continue OT POC with focus on ADL/IADL skills, functional transfers, functional mobility, coordination, strength, static and dynamic balance, and activity tolerance to maximize safety and independence with ADLs and functional transfers. Patient ended session in recliner with call remote and phone within reach. Wife present at bedside.        Miguel Westbrook Necessary, OT   11/18/2022

## 2022-11-18 NOTE — PROGRESS NOTES
[facility]PAT[facility] PROGRESS NOTE    Jos Taylor  Admit Date: 11/9/2022  Admit Diagnosis:   Debility [R53.81]  Physical debility [R53.81]    Subjective     Patient seen and examined before ADLs, awake and alert but unaware that breakfast tray was delivered despite conversation about it with dietary tech ~5min before. Reviewed events leading to acute admission, persistent hypoxia and bradycardia in rehab. Known dementia, patient able to answer general questions about family but becomes confused with more specific queries. Reports dyspnea with exertion but not at rest, denies palpitations with therapy. Denies HA, vision changes, abdominal pain or dysuria. Patient unsure of last BM (EMR notes BM x 2 11/16). Brightens up when familiar OT enters room.  /52, Hgb 7.6,  this AM.    Objective:     Current Facility-Administered Medications   Medication Dose Route Frequency    alogliptin (NESINA) tablet 12.5 mg  12.5 mg Oral Daily    insulin glargine (LANTUS) injection vial 6 Units  6 Units SubCUTAneous Nightly    0.9 % sodium chloride infusion   IntraVENous PRN    acetaminophen (TYLENOL) tablet 650 mg  650 mg Oral Q6H PRN    Or    acetaminophen (TYLENOL) suppository 650 mg  650 mg Rectal Q6H PRN    amLODIPine (NORVASC) tablet 10 mg  10 mg Oral Daily    citalopram (CELEXA) tablet 20 mg  20 mg Oral Daily    atorvastatin (LIPITOR) tablet 20 mg  20 mg Oral Daily    enoxaparin (LOVENOX) injection 40 mg  40 mg SubCUTAneous Daily    guaiFENesin-dextromethorphan (ROBITUSSIN DM) 100-10 MG/5ML syrup 5 mL  5 mL Oral Q4H PRN    losartan (COZAAR) tablet 100 mg  100 mg Oral Daily    melatonin tablet 3 mg  3 mg Oral Nightly PRN    memantine (NAMENDA) tablet 5 mg  5 mg Oral BID    nicotine (NICODERM CQ) 21 MG/24HR 1 patch  1 patch TransDERmal Daily    ondansetron (ZOFRAN-ODT) disintegrating tablet 4 mg  4 mg Oral Q8H PRN    Or    ondansetron (ZOFRAN) injection 4 mg  4 mg IntraVENous Q6H PRN    pantoprazole (PROTONIX) tablet 40 mg  40 mg Oral Daily    polyethylene glycol (GLYCOLAX) packet 17 g  17 g Oral Daily PRN    tamsulosin (FLOMAX) capsule 0.4 mg  0.4 mg Oral Daily    sodium chloride flush 0.9 % injection 5-40 mL  5-40 mL IntraVENous PRN    glucagon (rDNA) injection 1 mg  1 mg SubCUTAneous PRN    insulin lispro (HUMALOG) injection vial 0-4 Units  0-4 Units SubCUTAneous 4x Daily AC & HS        Review of Systems:   Denies chest pain, cough, calf pain. Pertinent positives are as noted in the HPI, ROS unremarkable otherwise. Visit Vitals  BP (!) 125/52   Pulse 61   Temp 97.9 °F (36.6 °C) (Oral)   Resp 16   Ht 5' 10\" (1.778 m)   Wt 165 lb 2 oz (74.9 kg)   SpO2 96%   BMI 23.69 kg/m²        Physical Exam:   General: Alert, oriented to self. Lying in bed awaiting 1st therapy. HEENT: Normocephalic, EOM intact. Oral mucosa moist.   Lungs: Coarse breath sounds bilaterally but no adventitious sounds, good air exchange when prompted. Respirations even and unlabored. Heart: Slow-regular rate, regular underlying rhythm. Soft systolic murmur. Abdomen: Soft, non-tender, not distended. Bowel sounds normoactive. Genitourinary: Deferred. Neuromuscular:      Delayed processing, impaired STM. Follows >80% 1-step commands with minimal verbal cues. UE strength at biceps 4+/5 (B), triceps 4/5 (R), 3+/5 (L) and finger flexion 4+/5 (B). LE strength at HF 4/5, KE/KF 4+/5, DF 4/5, PF 4-/5 and mostly symmetric bilaterally. Sensation appears intact distally. Skin/extremity: No rashes, no erythema. Calves soft, non-tender. Functional Assessment:  Per PT (11/17): min A for bed mobility, sit<>stand, stand-pivot transfer with RW  Per OT (11/17): set-up A for oral hygiene, UB dressing, donning / doffing footwear; min A for LB dressing; min-mod A for bathing (to complete sponge bath at EOB w/ frequent VCs for initiation); mod-max A for toileting hygiene    Krishnan Fall Risk Assessment:  History of Falling:  Yes Swallow Assessment:  Swallow Screening  Is the patient unable to remain alert for testing?: No  Is the patient on a modified diet (thickened liquids) due to pre-existing dysphagia?: No  Is there presence of existing enteral tube feeding via the stomach or nose?: No  Is there presence of head-of-bed restrictions (less than 30 degrees)?: No  Is there presence of tracheotomy tube?: No  Is the patient ordered nothing-by-mouth status?: No  3 oz Water Swallow Screen: Pass      Ambulation (11/17): Ambulated 61' with min A and RW.      Labs/Studies:  Recent Results (from the past 72 hour(s))   POCT Glucose    Collection Time: 11/15/22 11:51 AM   Result Value Ref Range    POC Glucose 95 65 - 100 mg/dL    Performed by:  Jesi    POCT Glucose    Collection Time: 11/15/22  4:41 PM   Result Value Ref Range    POC Glucose 202 (H) 65 - 100 mg/dL    [facility]    POCT Glucose    Collection Time: 11/15/22  8:54 PM   Result Value Ref Range    POC Glucose 173 (H) 65 - 100 mg/dL    Performed by: [facility] DR LOKESH MCCABE    Hemoglobin and Hematocrit    Collection Time: 11/16/22  4:39 AM   Result Value Ref Range    Hemoglobin 8.0 (L) 13.6 - 17.2 g/dL    Hematocrit 25.3 (L) 41.1 - 50.3 %   POCT Glucose    Collection Time: 11/16/22  6:01 AM   Result Value Ref Range    POC Glucose 98 65 - 100 mg/dL    [facility]: [facility]    POCT Glucose    Collection Time: 11/16/22 12:04 PM   Result Value Ref Range    POC Glucose 99 65 - 100 mg/dL    Performed by: Veto    POCT Glucose    Collection Time: 11/16/22  4:30 PM   Result Value Ref Range    POC Glucose 192 (H) 65 - 100 mg/dL    Performed by: Veto    POCT Glucose    Collection Time: 11/16/22  8:23 PM   Result Value Ref Range    POC Glucose 248 (H) 65 - 100 mg/dL    Performed by: Esteban    POCT Glucose    Collection Time: 11/17/22  7:02 AM   Result Value Ref Range    POC Glucose 99 65 - 100 mg/dL    Performed by: Esteban    POCT Glucose    Collection Time: 11/17/22 12:28 PM   Result Value Ref Range    POC Glucose 97 65 - 100 mg/dL    Performed by: Zachariah    POCT Glucose    Collection Time: 11/17/22  5:47 PM   Result Value Ref Range    POC Glucose 227 (H) 65 - 100 mg/dL    Performed by: Zachariah    POCT Glucose    Collection Time: 11/17/22  9:23 PM   Result Value Ref Range    POC Glucose 91 65 - 100 mg/dL    Performed by: Ezra    CBC    Collection Time: 11/18/22  5:07 AM   Result Value Ref Range    WBC 4.7 4.3 - 11.1 K/uL    RBC 3.02 (L) 4.23 - 5.6 M/uL    Hemoglobin 7.6 (L) 13.6 - 17.2 g/dL    Hematocrit 24.7 (L) 41.1 - 50.3 %    MCV 81.8 (L) 82 - 102 FL    MCH 25.2 (L) 26.1 - 32.9 PG    MCHC 30.8 (L) 31.4 - 35.0 g/dL    RDW 15.4 (H) 11.9 - 14.6 %    Platelets 900 858 - 384 K/uL    MPV 11.1 9.4 - 12.3 FL    nRBC 0.00 0.0 - 0.2 K/uL   Basic Metabolic Panel w/ Reflex to MG    Collection Time: 11/18/22  5:07 AM   Result Value Ref Range    Sodium 146 (H) 133 - 143 mmol/L    Potassium 3.5 3.5 - 5.1 mmol/L    Chloride 114 (H) 101 - 110 mmol/L    CO2 30 21 - 32 mmol/L    Anion Gap 2 2 - 11 mmol/L    Glucose 95 65 - 100 mg/dL    BUN 25 (H) 8 - 23 MG/DL    Creatinine 1.50 0.8 - 1.5 MG/DL    Est, Glom Filt Rate 46 (L) >60 ml/min/1.73m2    Calcium 8.8 8.3 - 10.4 MG/DL   Magnesium    Collection Time: 11/18/22  5:07 AM   Result Value Ref Range    Magnesium 2.2 1.8 - 2.4 mg/dL   POCT Glucose    Collection Time: 11/18/22  6:10 AM   Result Value Ref Range    POC Glucose 102 (H) 65 - 100 mg/dL    Performed by: Ezra        Assessment:     Principal Problem:    Physical debility  Active Problems:    Acute blood loss anemia (ABLA)    Bradycardia, sinus    Hypernatremia    GERD (gastroesophageal reflux disease)    Nicotine dependence, unspecified, uncomplicated    Vascular dementia without behavioral disturbance (HCC)    Sleep apnea    H/O unilateral nephrectomy    Pure hypercholesterolemia Controlled type 2 diabetes mellitus with diabetic neuropathy (HCC)    Essential hypertension, benign    CKD (chronic kidney disease) stage 4, GFR 15-29 ml/min ([facility])  Resolved Problems:    COVID-19 virus infection     Debility and respiratory failure from COVID infection (treated with dexamethasone, baricitinib and supplemental O2), with persistent hypoxia    Plan / Recommendations / Medical Decision Making:     Continue daily physician / PA medical management:    COVID-19 virus infection / ARF / pneumonia - on 2L O2, wean as tolerated, 88-91% on 2L O2 sats at rest.  -11/14 sats 89% this AM on 2L O2, encourage IS; lungs clear  -11/15 sats 96% but on high-flow 3L; lungs clear  -11/16 on RA this AM, sats 92-94% at rest  -11/17 desats with activity, back on 2L NC, sats 93%     Hypertension / bradycardia - HR normal x last 24h, 61-79, /58, asymptomatic. Continues on telemetry, on amlodipine, losartan with parameters; Rapid Response called during acute hospitalization for syncope and bradycardia with transfer, Cardiology consulted without further events. If recurs, will re-consult.   -11/14 HR 62-81, no bradycardic events; BP varies 95//53, monitor for now and continue current meds with parameters  -11/15 HR 36 this AM, /53; recheck HR manually = 80  -11/16 HR 66 this AM, /50; last night  /112 (??) doubt that value; previous were 137/48, 144/53, stable; pt was very sleepy all day yesterday, likely due to bradycardia and anemia  -11/17 /74, HR 70s; improved  -11/18 /35 this AM, recheck 1h later = 125/52, HR 58-61     CKD / h/o bladder cancer - monitor labs, Cr 1.5 (11/8).  -11/15 creatinine 1.6, stable  -11/18 Cr 1.5     Diabetes mellitus - BS 88, 93, this AM (11/13); yesterday 64, 59, 111, 67, 140, 227, improving on decreased Lantus 8u QHS, Humalog SSI.  -11/14 BS 73 this AM, 127, 117, 88, 93; decrease Lantus to 6u  -11/15  this AM, 230 at bedtime, 127 at supper and 121 at lunch; continue current Lantus for now ( 6u); consider increasing Nesina  -11/16 BS 98 this AM, 173 last noc, 202 at supper, 95 at lunch; increase Nesina to 12.5mg daily; continue Lantus 6u; can possibly get off insulin, which would be easier to manage at home  -11/17 BS 99 this AM, 97 at lunch today; last night at bedtime 248, at supper 192; continue current treatment; remains high at bedtime and supper  -11/18  this AM; yesterday's values from AM to PM: 99, 97, 227, 91     Pain mgmt - PRN Tylenol. DVT prophylaxis - continue Lovenox / SCDs. Depression / dementia - continue Celexa, Namenda. Tobacco use - continue nicotine replacement patch and stress abstinence. Anemia - Hgb 9.2 (11/5), previously 8.8 (11/3), 10.1 (10/30).  -11/15 Hgb 7.8, check stool and repeat; could be contributing to his sleepiness and fatigue this AM  -11/16 stool pending; Hgb 8.0 this AM, monitor  -11/17 recheck in AM; stool not yet sent  -11/18 Hgb 7.6, drifting down, stool not sent        Time spent was 25 minutes with over 1/2 in direct patient care / examination, consultation and coordination of care. Signed By: Elli Orellana PA-C    November 18, 2022      Physician Assistant with [facility]  Qian Varela MD, Medical Director

## 2022-11-18 NOTE — PROGRESS NOTES
OT DAILY NOTE    Time In 0917   Time Out 1000     Subjective: \"We call him Lum after the radio show. \" Pt agreeable to treatment. Pain: No pain expressed. Precautions: Falls and Poor Safety Awareness    BP (!) 125/52   Pulse 61   Temp 97.9 °F (36.6 °C) (Oral)   Resp 16   Ht 5' 10\" (1.778 m)   Wt 165 lb 2 oz (74.9 kg)   SpO2 96%   BMI 23.69 kg/m²      MOBILITY:   Score Comments   Supine to Sit Supervision or touching assistance SBA for safety   Sit to Stand Supervision or touching assistance CGA w/ RW   Transfer Assist Supervision or touching assistance CGA for Safety w/ RW     ACTIVITIES OF DAILY LIVING:   Score Comments   Oral Hygiene Setup or clean-up assistance     Bathing Setup or clean-up assistance Set-up to wash face seated EOB   Lower Body Dressing Partial/moderate assistance Min A w/ threading and donning to waist   Donning/Hagerman Footwear Setup or clean-up assistance     Toileting Hygiene Substantial/maximal assistance Mod - Max A   Education Adaptive ADL Techniques, Rolling Walker Management, and Safety Awareness     - Strengthening   Pt completed BUE exercises targeting bicep flexion and extension. RUE weakness more substantial than LUE. Pt required rest breaks and VC's to complete 2x10 of elbow flexion and 2x5 of elbow extension. Assessment: Patient progressing w/ ADL participation and functional mobility. Pt continues to not have an appetite in the AM and refuses to eat breakfast. Pt progressing w/ overall coordination, strength, FMC, and activity tolerance. Pt demonstrated good participation in OT treatment. Pt continues to benefit from skilled OT services to address remaining deficits and progress toward baseline level of independence and safety. Patient ended session seated in recliner with call bell and needs within reach. Plan: Continue OT POC.      Andrea Hoyt OT   11/18/2022

## 2022-11-18 NOTE — CARE COORDINATION
Patient needs are continue to be followed by Dr. Marii Rodriguez. Patient has no discharge date / plan at this time. CM will continue to follow / monitor for any needs, concerns or questions that may arise.

## 2022-11-19 LAB
GLUCOSE BLD STRIP.AUTO-MCNC: 118 MG/DL (ref 65–100)
GLUCOSE BLD STRIP.AUTO-MCNC: 259 MG/DL (ref 65–100)
GLUCOSE BLD STRIP.AUTO-MCNC: 92 MG/DL (ref 65–100)
GLUCOSE BLD STRIP.AUTO-MCNC: 98 MG/DL (ref 65–100)
SERVICE CMNT-IMP: ABNORMAL
SERVICE CMNT-IMP: ABNORMAL
SERVICE CMNT-IMP: NORMAL
SERVICE CMNT-IMP: NORMAL

## 2022-11-19 PROCEDURE — 1180000000 HC REHAB R&B

## 2022-11-19 PROCEDURE — 97116 GAIT TRAINING THERAPY: CPT

## 2022-11-19 PROCEDURE — 6360000002 HC RX W HCPCS: Performed by: PHYSICAL MEDICINE & REHABILITATION

## 2022-11-19 PROCEDURE — 6370000000 HC RX 637 (ALT 250 FOR IP): Performed by: PHYSICAL MEDICINE & REHABILITATION

## 2022-11-19 PROCEDURE — 82962 GLUCOSE BLOOD TEST: CPT

## 2022-11-19 PROCEDURE — 97110 THERAPEUTIC EXERCISES: CPT

## 2022-11-19 PROCEDURE — 97150 GROUP THERAPEUTIC PROCEDURES: CPT

## 2022-11-19 PROCEDURE — 97530 THERAPEUTIC ACTIVITIES: CPT

## 2022-11-19 PROCEDURE — 99232 SBSQ HOSP IP/OBS MODERATE 35: CPT | Performed by: PHYSICIAN ASSISTANT

## 2022-11-19 RX ADMIN — Medication 3 MG: at 20:59

## 2022-11-19 RX ADMIN — PANTOPRAZOLE SODIUM 40 MG: 40 TABLET, DELAYED RELEASE ORAL at 08:56

## 2022-11-19 RX ADMIN — TAMSULOSIN HYDROCHLORIDE 0.4 MG: 0.4 CAPSULE ORAL at 08:55

## 2022-11-19 RX ADMIN — ALOGLIPTIN 12.5 MG: 6.25 TABLET, FILM COATED ORAL at 08:55

## 2022-11-19 RX ADMIN — ATORVASTATIN CALCIUM 20 MG: 10 TABLET, FILM COATED ORAL at 08:55

## 2022-11-19 RX ADMIN — CITALOPRAM HYDROBROMIDE 20 MG: 20 TABLET ORAL at 08:56

## 2022-11-19 RX ADMIN — LOSARTAN POTASSIUM 100 MG: 50 TABLET, FILM COATED ORAL at 08:56

## 2022-11-19 RX ADMIN — INSULIN GLARGINE 6 UNITS: 100 INJECTION, SOLUTION SUBCUTANEOUS at 21:06

## 2022-11-19 RX ADMIN — MEMANTINE 5 MG: 5 TABLET ORAL at 20:59

## 2022-11-19 RX ADMIN — INSULIN LISPRO 2 UNITS: 100 INJECTION, SOLUTION INTRAVENOUS; SUBCUTANEOUS at 18:11

## 2022-11-19 RX ADMIN — AMLODIPINE BESYLATE 10 MG: 10 TABLET ORAL at 08:56

## 2022-11-19 RX ADMIN — MEMANTINE 5 MG: 5 TABLET ORAL at 08:55

## 2022-11-19 RX ADMIN — ENOXAPARIN SODIUM 40 MG: 100 INJECTION SUBCUTANEOUS at 08:56

## 2022-11-19 NOTE — PROGRESS NOTES
PHYSICAL THERAPY DAILY NOTE  Time In:  858  Time Out:  956  Total Treatment Time:  62 Minutes  Pt. Seen for: AM, Gait Training, Patient Education, Therapeutic Exercise, Transfer Training, and Other     Subjective: Patient reporting he feels OK reports he wants to go home soon. Reports he has a RW at home but does not have 02         Objective:  Precautions: Falls and Poor Safety Awareness    GROSS ASSESSMENT Daily Assessment           COGNITION Daily Assessment    Alert, able to follow commands,cooperating,participating, motivated to go home         BED/MAT MOBILITY Daily Assessment    NT       TRANSFERS Daily Assessment   Increased time and effort to complete with cues for body mechanics  Flexed posture with shuffling steps and tendency to keep RW too far in front of self Sit to Stand: Supervision/Standby Assist  Stand to Sit: Supervision/SBA   Transfer Type:   Stand Pivot with RW  Transfer Assistance: Min A  Car Transfers: NT         GAIT Daily Assessment   Gait training with cues for upright posture and cues for improved step length and step clearance. Cues for positioning of RW Amount of Assistance: Min A  Distance (ft): 70 ft x 1  50 ft x 1 with 5 min sitting rest break between  Assistive Device: RW  Surface: Level Surface       STEPS/STAIRS Daily Assessment   Slow reciprocating pattern going up steps and single step at a time going down steps.  Increased time and effort to complete with cues for safe foot placement and improved step clearance Steps Ambulated:  4  Level of Assistance:  Min A  Railing:Bilateral Rails  Assistive Device: No A/D       BALANCE Daily Assessment    Static Sitting: Good:  Pt. able to maintain balance w/o UE support;  exhibits some postural sway  Dynamic Sitting: Fair - accepts minimal challenge;  can maintain balance while turning head/trunk  Static Standing: Fair:  Pt. requires UE support;  may need occasional min A  Dynamic Standing: Poor - unable to accept challenge or move without LOB        WHEELCHAIR MOBILITY Daily Assessment    NT       LOWER EXTREMITY EXERCISES Daily Assessment    LE motomed x 10 mins at level 2     Pain level: No c/o pain during treatment  Pain Location:  NA  Pain Interventions: NA    Vital Signs:  Visit Vitals  BP (!) 136/50   Pulse 59   Temp 97.5 °F (36.4 °C) (Oral)   Resp 18   Ht 5' 10\" (1.778 m)   Wt 165 lb 2 oz (74.9 kg)   SpO2 95%   BMI 23.69 kg/m²     Patient on 02 at 2 lpm, 02 sat 91 to 94% during treatment    Education:  transfer training, gait training, balance training,fall precautions, body mechanics,activity pacing, Patient verbalizing understanding and demonstrating partial understanding of patient education. Recommend follow up education. Interdisciplinary Communication: NA    Patient to OT at end of treatment         Assessment: Progressing towards goals. Functional strength and endurance improving. Progressing towards a SBA functional level. Decreased safety awareness during functional mobility         Plan of Care: Continue with POC and progress as tolerated.      Arjun Cheng, PT  11/19/2022

## 2022-11-19 NOTE — PROGRESS NOTES
[facility]PAT[facility] PROGRESS NOTE    Zohra Failing  Admit Date: 11/9/2022  Admit Diagnosis:   Debility [R53.81]  Physical debility [R53.81]    Subjective     Patient seen and examined during rest break in gym, pleasantly interactive but oriented to self only. Does not recall ambulating up / down stairs with PT ~30min ago; looked steady doing it, though. No complaints. BM x 3 last night.  /50, BS 98 this AM.    Objective:     Current Facility-Administered Medications   Medication Dose Route Frequency    alogliptin (NESINA) tablet 12.5 mg  12.5 mg Oral Daily    insulin glargine (LANTUS) injection vial 6 Units  6 Units SubCUTAneous Nightly    0.9 % sodium chloride infusion   IntraVENous PRN    acetaminophen (TYLENOL) tablet 650 mg  650 mg Oral Q6H PRN    Or    acetaminophen (TYLENOL) suppository 650 mg  650 mg Rectal Q6H PRN    amLODIPine (NORVASC) tablet 10 mg  10 mg Oral Daily    citalopram (CELEXA) tablet 20 mg  20 mg Oral Daily    atorvastatin (LIPITOR) tablet 20 mg  20 mg Oral Daily    enoxaparin (LOVENOX) injection 40 mg  40 mg SubCUTAneous Daily    guaiFENesin-dextromethorphan (ROBITUSSIN DM) 100-10 MG/5ML syrup 5 mL  5 mL Oral Q4H PRN    losartan (COZAAR) tablet 100 mg  100 mg Oral Daily    melatonin tablet 3 mg  3 mg Oral Nightly PRN    memantine (NAMENDA) tablet 5 mg  5 mg Oral BID    nicotine (NICODERM CQ) 21 MG/24HR 1 patch  1 patch TransDERmal Daily    ondansetron (ZOFRAN-ODT) disintegrating tablet 4 mg  4 mg Oral Q8H PRN    Or    ondansetron (ZOFRAN) injection 4 mg  4 mg IntraVENous Q6H PRN    pantoprazole (PROTONIX) tablet 40 mg  40 mg Oral Daily    polyethylene glycol (GLYCOLAX) packet 17 g  17 g Oral Daily PRN    tamsulosin (FLOMAX) capsule 0.4 mg  0.4 mg Oral Daily    sodium chloride flush 0.9 % injection 5-40 mL  5-40 mL IntraVENous PRN    glucagon (rDNA) injection 1 mg  1 mg SubCUTAneous PRN    insulin lispro (HUMALOG) injection vial 0-4 Units  0-4 Units SubCUTAneous 4x Daily AC & HS        Review of Systems:   Denies chest pain, shortness of breath, cough, headache, visual problems, abdominal pain, dysuria, calf pain. Pertinent positives are as noted in the HPI, ROS unremarkable otherwise. Visit Vitals  BP (!) 136/50   Pulse 59   Temp 97.5 °F (36.4 °C) (Oral)   Resp 18   Ht 5' 10\" (1.778 m)   Wt 165 lb 2 oz (74.9 kg)   SpO2 95%   BMI 23.69 kg/m²        Physical Exam:   General: Alert, oriented to self. In wheelchair working with OT. HEENT: Normocephalic, EOM intact. Oral mucosa moist.   Lungs: Coarse breath sounds but clear bilaterally, good air exchange. Respirations even and unlabored. Heart: Slow-regular rate, rhythm with respiratory irregularity. Soft systolic murmur. Abdomen: Soft, non-tender, not distended. Bowel sounds normoactive. Genitourinary: Deferred. Neuromuscular:      Delayed processing, impaired STM. Formal MMT not repeated today. Sensation appears intact distally. Skin/extremity: No rashes, no erythema. Calves soft, non-tender.        Krishnan Fall Risk Assessment:  History of Falling: Yes     Swallow Assessment:  Swallow Screening  Is the patient unable to remain alert for testing?: No  Is the patient on a modified diet (thickened liquids) due to pre-existing dysphagia?: No  Is there presence of existing enteral tube feeding via the stomach or nose?: No  Is there presence of head-of-bed restrictions (less than 30 degrees)?: No  Is there presence of tracheotomy tube?: No  Is the patient ordered nothing-by-mouth status?: No  3 oz Water Swallow Screen: Pass        Labs/Studies:  Recent Results (from the past 72 hour(s))   POCT Glucose    Collection Time: 11/16/22 12:04 PM   Result Value Ref Range    POC Glucose 99 65 - 100 mg/dL    Performed by: Veto    POCT Glucose    Collection Time: 11/16/22  4:30 PM   Result Value Ref Range    POC Glucose 192 (H) 65 - 100 mg/dL    Performed by: Veto    POCT Glucose    Collection Time: 11/16/22  8:23 PM   Result Value Ref Range    POC Glucose 248 (H) 65 - 100 mg/dL    Performed by: Esteban    POCT Glucose    Collection Time: 11/17/22  7:02 AM   Result Value Ref Range    POC Glucose 99 65 - 100 mg/dL    Performed by: ConstantinoCT    POCT Glucose    Collection Time: 11/17/22 12:28 PM   Result Value Ref Range    POC Glucose 97 65 - 100 mg/dL    Performed by: Zachariah    POCT Glucose    Collection Time: 11/17/22  5:47 PM   Result Value Ref Range    POC Glucose 227 (H) 65 - 100 mg/dL    Performed by: Zachariah    POCT Glucose    Collection Time: 11/17/22  9:23 PM   Result Value Ref Range    POC Glucose 91 65 - 100 mg/dL    Performed by: Ezra    CBC    Collection Time: 11/18/22  5:07 AM   Result Value Ref Range    WBC 4.7 4.3 - 11.1 K/uL    RBC 3.02 (L) 4.23 - 5.6 M/uL    Hemoglobin 7.6 (L) 13.6 - 17.2 g/dL    Hematocrit 24.7 (L) 41.1 - 50.3 %    MCV 81.8 (L) 82 - 102 FL    MCH 25.2 (L) 26.1 - 32.9 PG    MCHC 30.8 (L) 31.4 - 35.0 g/dL    RDW 15.4 (H) 11.9 - 14.6 %    Platelets 898 007 - 582 K/uL    MPV 11.1 9.4 - 12.3 FL    nRBC 0.00 0.0 - 0.2 K/uL   Basic Metabolic Panel w/ Reflex to MG    Collection Time: 11/18/22  5:07 AM   Result Value Ref Range    Sodium 146 (H) 133 - 143 mmol/L    Potassium 3.5 3.5 - 5.1 mmol/L    Chloride 114 (H) 101 - 110 mmol/L    CO2 30 21 - 32 mmol/L    Anion Gap 2 2 - 11 mmol/L    Glucose 95 65 - 100 mg/dL    BUN 25 (H) 8 - 23 MG/DL    Creatinine 1.50 0.8 - 1.5 MG/DL    Est, Glom Filt Rate 46 (L) >60 ml/min/1.73m2    Calcium 8.8 8.3 - 10.4 MG/DL   Magnesium    Collection Time: 11/18/22  5:07 AM   Result Value Ref Range    Magnesium 2.2 1.8 - 2.4 mg/dL   POCT Glucose    Collection Time: 11/18/22  6:10 AM   Result Value Ref Range    POC Glucose 102 (H) 65 - 100 mg/dL    Performed by: Ezra    POCT Glucose    Collection Time: 11/18/22 12:05 PM   Result Value Ref Range    POC Glucose 139 (H) 65 - 100 mg/dL    Performed by: SerinaCésarDYLAN    POCT Glucose    Collection Time: 11/18/22  4:25 PM   Result Value Ref Range    POC Glucose 316 (H) 65 - 100 mg/dL    Performed by: [facility] DR LOKESH MCCABE    POCT Glucose    Collection Time: 11/18/22  9:10 PM   Result Value Ref Range    POC Glucose 217 (H) 65 - 100 mg/dL    Performed by: Lv    POCT Glucose    Collection Time: 11/19/22  6:43 AM   Result Value Ref Range    POC Glucose 98 65 - 100 mg/dL    Performed by: Hanna        Assessment:     Principal Problem:    Physical debility  Active Problems:    Acute blood loss anemia (ABLA)    Bradycardia, sinus    Hypernatremia    GERD (gastroesophageal reflux disease)    Nicotine dependence, unspecified, uncomplicated    Vascular dementia without behavioral disturbance ([facility])    Sleep apnea    H/O unilateral nephrectomy    Pure hypercholesterolemia    Controlled type 2 diabetes mellitus with diabetic neuropathy ([facility])    Essential hypertension, benign    CKD (chronic kidney disease) stage 4, GFR 15-29 ml/min ([facility])  Resolved Problems:    COVID-19 virus infection     Debility and respiratory failure from COVID infection (treated with dexamethasone, baricitinib and supplemental O2), with persistent hypoxia    Plan / Recommendations / Medical Decision Making:     Continue daily physician / PA medical management:    COVID-19 virus infection / ARF / pneumonia - on 2L O2, wean as tolerated, 88-91% on 2L O2 sats at rest.  -11/14 sats 89% this AM on 2L O2, encourage IS; lungs clear  -11/15 sats 96% but on high-flow 3L; lungs clear  -11/16 on RA this AM, sats 92-94% at rest  -11/17 desats with activity, back on 2L NC, sats 93%     Hypertension / bradycardia - HR normal x last 24h, 61-79, /58, asymptomatic. Continues on telemetry, on amlodipine, losartan with parameters; Rapid Response called during acute hospitalization for syncope and bradycardia with transfer, Cardiology consulted without further events.  If recurs, will re-consult. -11/14 HR 62-81, no bradycardic events; BP varies 95//53, monitor for now and continue current meds with parameters  -11/15 HR 36 this AM, /53; recheck HR manually = 80  -11/16 HR 66 this AM, /50; last night  /112 (??) doubt that value; previous were 137/48, 144/53, stable; pt was very sleepy all day yesterday, likely due to bradycardia and anemia  -11/17 /74, HR 70s; improved  -11/18 /35 this AM, recheck 1h later = 125/52, HR 58-61  -11/19 /50, HR 59-72     CKD / h/o bladder cancer - monitor labs, Cr 1.5 (11/8).  -11/15 creatinine 1.6, stable  -11/18 Cr 1.5     Diabetes mellitus - BS 88, 93, this AM (11/13); yesterday 64, 59, 111, 67, 140, 227, improving on decreased Lantus 8u QHS, Humalog SSI.  -11/14 BS 73 this AM, 127, 117, 88, 93; decrease Lantus to 6u  -11/15  this AM, 230 at bedtime, 127 at supper and 121 at lunch; continue current Lantus for now ( 6u); consider increasing Nesina  -11/16 BS 98 this AM, 173 last night, 202 at supper, 95 at lunch; increase Nesina to 12.5mg daily; continue Lantus 6u; can possibly get off insulin, which would be easier to manage at home  -11/17 BS 99 this AM, 97 at lunch today; last night at bedtime 248, at supper 192; continue current treatment; remains high at bedtime and supper  -11/18  this AM; yesterday's values from AM to PM: 99, 97, 227, 91  -11/19 BS 98 this AM; yesterday's values from AM to PM: 102, 139, 316, 217, no change in meds     Pain mgmt - PRN Tylenol. DVT prophylaxis - continue Lovenox / SCDs. Depression / dementia - continue Celexa, Namenda. Tobacco use - continue nicotine replacement patch and stress abstinence.      Anemia - Hgb 9.2 (11/5), previously 8.8 (11/3), 10.1 (10/30).  -11/15 Hgb 7.8, check stool and repeat; could be contributing to his sleepiness and fatigue this AM  -11/16 stool pending; Hgb 8.0 this AM, monitor  -11/17 recheck in AM; stool not yet sent  -11/18 Hgb 7.6, drifting down, stool not sent        Time spent was 25 minutes with over 1/2 in direct patient care / examination, consultation and coordination of care. Signed By: Avani Reyna PA-C    November 19, 2022      Physician Assistant with [facility] includes the [facility]  Qian Fisher MD, Medical Director

## 2022-11-20 LAB
GLUCOSE BLD STRIP.AUTO-MCNC: 125 MG/DL (ref 65–100)
GLUCOSE BLD STRIP.AUTO-MCNC: 127 MG/DL (ref 65–100)
GLUCOSE BLD STRIP.AUTO-MCNC: 138 MG/DL (ref 65–100)
GLUCOSE BLD STRIP.AUTO-MCNC: 240 MG/DL (ref 65–100)
SERVICE CMNT-IMP: ABNORMAL

## 2022-11-20 PROCEDURE — 6370000000 HC RX 637 (ALT 250 FOR IP): Performed by: PHYSICAL MEDICINE & REHABILITATION

## 2022-11-20 PROCEDURE — 1180000000 HC REHAB R&B

## 2022-11-20 PROCEDURE — 2700000000 HC OXYGEN THERAPY PER DAY

## 2022-11-20 PROCEDURE — 82962 GLUCOSE BLOOD TEST: CPT

## 2022-11-20 PROCEDURE — 6360000002 HC RX W HCPCS: Performed by: PHYSICAL MEDICINE & REHABILITATION

## 2022-11-20 PROCEDURE — 94760 N-INVAS EAR/PLS OXIMETRY 1: CPT

## 2022-11-20 RX ADMIN — CITALOPRAM HYDROBROMIDE 20 MG: 20 TABLET ORAL at 09:13

## 2022-11-20 RX ADMIN — ALOGLIPTIN 12.5 MG: 6.25 TABLET, FILM COATED ORAL at 09:13

## 2022-11-20 RX ADMIN — ACETAMINOPHEN 650 MG: 325 TABLET ORAL at 21:27

## 2022-11-20 RX ADMIN — ENOXAPARIN SODIUM 40 MG: 100 INJECTION SUBCUTANEOUS at 09:14

## 2022-11-20 RX ADMIN — MEMANTINE 5 MG: 5 TABLET ORAL at 09:14

## 2022-11-20 RX ADMIN — AMLODIPINE BESYLATE 10 MG: 10 TABLET ORAL at 09:13

## 2022-11-20 RX ADMIN — INSULIN LISPRO 1 UNITS: 100 INJECTION, SOLUTION INTRAVENOUS; SUBCUTANEOUS at 17:30

## 2022-11-20 RX ADMIN — INSULIN GLARGINE 6 UNITS: 100 INJECTION, SOLUTION SUBCUTANEOUS at 20:34

## 2022-11-20 RX ADMIN — TAMSULOSIN HYDROCHLORIDE 0.4 MG: 0.4 CAPSULE ORAL at 09:13

## 2022-11-20 RX ADMIN — Medication 3 MG: at 21:27

## 2022-11-20 RX ADMIN — MEMANTINE 5 MG: 5 TABLET ORAL at 20:32

## 2022-11-20 RX ADMIN — LOSARTAN POTASSIUM 100 MG: 50 TABLET, FILM COATED ORAL at 09:13

## 2022-11-20 RX ADMIN — ATORVASTATIN CALCIUM 20 MG: 10 TABLET, FILM COATED ORAL at 09:14

## 2022-11-20 RX ADMIN — PANTOPRAZOLE SODIUM 40 MG: 40 TABLET, DELAYED RELEASE ORAL at 09:13

## 2022-11-20 ASSESSMENT — PAIN DESCRIPTION - LOCATION: LOCATION: GENERALIZED

## 2022-11-20 ASSESSMENT — PAIN DESCRIPTION - DESCRIPTORS: DESCRIPTORS: ACHING

## 2022-11-20 ASSESSMENT — PAIN SCALES - GENERAL
PAINLEVEL_OUTOF10: 0
PAINLEVEL_OUTOF10: 3

## 2022-11-20 NOTE — CARE COORDINATION
Patient were discussed in Wednesday, Team Meeting. Patient has been scheduled for discharge on 11/23/22. Patient has a wheelchair ordered from [facility]). DME's will be delivered before discharge home. Patient needs are continue to be followed by Dr. Aris Meeks. Patient has no discharge date / plan at this time. CM will continue to follow / monitor for any needs, concerns or questions that may arise.

## 2022-11-21 LAB
GLUCOSE BLD STRIP.AUTO-MCNC: 193 MG/DL (ref 65–100)
GLUCOSE BLD STRIP.AUTO-MCNC: 241 MG/DL (ref 65–100)
GLUCOSE BLD STRIP.AUTO-MCNC: 74 MG/DL (ref 65–100)
GLUCOSE BLD STRIP.AUTO-MCNC: 93 MG/DL (ref 65–100)
SERVICE CMNT-IMP: ABNORMAL
SERVICE CMNT-IMP: ABNORMAL
SERVICE CMNT-IMP: NORMAL
SERVICE CMNT-IMP: NORMAL

## 2022-11-21 PROCEDURE — 6370000000 HC RX 637 (ALT 250 FOR IP): Performed by: PHYSICAL MEDICINE & REHABILITATION

## 2022-11-21 PROCEDURE — 97530 THERAPEUTIC ACTIVITIES: CPT

## 2022-11-21 PROCEDURE — 6360000002 HC RX W HCPCS: Performed by: PHYSICAL MEDICINE & REHABILITATION

## 2022-11-21 PROCEDURE — 99232 SBSQ HOSP IP/OBS MODERATE 35: CPT | Performed by: PHYSICAL MEDICINE & REHABILITATION

## 2022-11-21 PROCEDURE — 97535 SELF CARE MNGMENT TRAINING: CPT

## 2022-11-21 PROCEDURE — 1180000000 HC REHAB R&B

## 2022-11-21 PROCEDURE — 82962 GLUCOSE BLOOD TEST: CPT

## 2022-11-21 PROCEDURE — 97116 GAIT TRAINING THERAPY: CPT

## 2022-11-21 PROCEDURE — 97110 THERAPEUTIC EXERCISES: CPT

## 2022-11-21 RX ADMIN — MEMANTINE 5 MG: 5 TABLET ORAL at 08:06

## 2022-11-21 RX ADMIN — PANTOPRAZOLE SODIUM 40 MG: 40 TABLET, DELAYED RELEASE ORAL at 08:06

## 2022-11-21 RX ADMIN — ATORVASTATIN CALCIUM 20 MG: 10 TABLET, FILM COATED ORAL at 08:06

## 2022-11-21 RX ADMIN — CITALOPRAM HYDROBROMIDE 20 MG: 20 TABLET ORAL at 08:06

## 2022-11-21 RX ADMIN — TAMSULOSIN HYDROCHLORIDE 0.4 MG: 0.4 CAPSULE ORAL at 08:06

## 2022-11-21 RX ADMIN — AMLODIPINE BESYLATE 10 MG: 10 TABLET ORAL at 08:06

## 2022-11-21 RX ADMIN — ALOGLIPTIN 12.5 MG: 6.25 TABLET, FILM COATED ORAL at 08:06

## 2022-11-21 RX ADMIN — MEMANTINE 5 MG: 5 TABLET ORAL at 21:03

## 2022-11-21 RX ADMIN — ENOXAPARIN SODIUM 40 MG: 100 INJECTION SUBCUTANEOUS at 08:06

## 2022-11-21 RX ADMIN — LOSARTAN POTASSIUM 100 MG: 50 TABLET, FILM COATED ORAL at 08:06

## 2022-11-21 RX ADMIN — INSULIN GLARGINE 6 UNITS: 100 INJECTION, SOLUTION SUBCUTANEOUS at 22:01

## 2022-11-21 NOTE — PROGRESS NOTES
INPATIENT REHAB CENTER PROGRESS NOTE    Shellie Tilley  Admit Date: 11/9/2022  Admit Diagnosis:   Debility [R53.81]  Physical debility [R53.81]    Subjective     Patient seen and examined. Feeling fine. No specific complaints. Participating in therapies.  Happy to be going home Wed, per his wife's request. She has been preparing the home, ramp    Objective:     Current Facility-Administered Medications   Medication Dose Route Frequency    alogliptin (NESINA) tablet 12.5 mg  12.5 mg Oral Daily    insulin glargine (LANTUS) injection vial 6 Units  6 Units SubCUTAneous Nightly    0.9 % sodium chloride infusion   IntraVENous PRN    acetaminophen (TYLENOL) tablet 650 mg  650 mg Oral Q6H PRN    Or    acetaminophen (TYLENOL) suppository 650 mg  650 mg Rectal Q6H PRN    amLODIPine (NORVASC) tablet 10 mg  10 mg Oral Daily    citalopram (CELEXA) tablet 20 mg  20 mg Oral Daily    atorvastatin (LIPITOR) tablet 20 mg  20 mg Oral Daily    enoxaparin (LOVENOX) injection 40 mg  40 mg SubCUTAneous Daily    guaiFENesin-dextromethorphan (ROBITUSSIN DM) 100-10 MG/5ML syrup 5 mL  5 mL Oral Q4H PRN    losartan (COZAAR) tablet 100 mg  100 mg Oral Daily    melatonin tablet 3 mg  3 mg Oral Nightly PRN    memantine (NAMENDA) tablet 5 mg  5 mg Oral BID    nicotine (NICODERM CQ) 21 MG/24HR 1 patch  1 patch TransDERmal Daily    ondansetron (ZOFRAN-ODT) disintegrating tablet 4 mg  4 mg Oral Q8H PRN    Or    ondansetron (ZOFRAN) injection 4 mg  4 mg IntraVENous Q6H PRN    pantoprazole (PROTONIX) tablet 40 mg  40 mg Oral Daily    polyethylene glycol (GLYCOLAX) packet 17 g  17 g Oral Daily PRN    tamsulosin (FLOMAX) capsule 0.4 mg  0.4 mg Oral Daily    sodium chloride flush 0.9 % injection 5-40 mL  5-40 mL IntraVENous PRN    glucagon (rDNA) injection 1 mg  1 mg SubCUTAneous PRN    insulin lispro (HUMALOG) injection vial 0-4 Units  0-4 Units SubCUTAneous 4x Daily AC & HS        Review of Systems:   Denies chest pain, shortness of breath, cough, headache, visual problems, abdominal pain, dysuria, calf pain. Pertinent positives are as noted in the HPI, ROS unremarkable otherwise.   + fatigue  Visit Vitals  BP (!) 127/44   Pulse 67   Temp 97.3 °F (36.3 °C) (Oral)   Resp 16   Ht 5' 10\" (1.778 m)   Wt 165 lb 2 oz (74.9 kg)   SpO2 95%   BMI 23.69 kg/m²        Physical Exam:   General: Alert and age appropriately oriented x 2  No acute cardiorespiratory distress. HEENT: Normocephalic, EOM intact. Oral mucosa moist without cyanosis. Lungs: Clear to auscultation bilaterally. Respiration even and unlabored. Heart: Regular rate and rhythm, S1, S2. No murmurs, clicks, rub or gallops. Abdomen: Soft, non-tender, not distended. Bowel sounds normoactive. No organomegaly. Genitourinary: Deferred. Neuromuscular:      Cogn impairments with delayed processing, safety awareness and recall. No focal motor deficits   Skin/extremity: No rashes, no erythema. No calf tenderness B LE. Wound covered. Krishnan Fall Risk Assessment:  History of Falling: Yes     Swallow Assessment:  Swallow Screening  Is the patient unable to remain alert for testing?: No  Is the patient on a modified diet (thickened liquids) due to pre-existing dysphagia?: No  Is there presence of existing enteral tube feeding via the stomach or nose?: No  Is there presence of head-of-bed restrictions (less than 30 degrees)?: No  Is there presence of tracheotomy tube?: No  Is the patient ordered nothing-by-mouth status?: No  3 oz Water Swallow Screen: Pass      Labs/Studies:  Recent Results (from the past 72 hour(s))   POCT Glucose    Collection Time: 11/18/22  9:10 PM   Result Value Ref Range    POC Glucose 217 (H) 65 - 100 mg/dL    Performed by:  Lv    POCT Glucose    Collection Time: 11/19/22  6:43 AM   Result Value Ref Range    POC Glucose 98 65 - 100 mg/dL    Performed by: Hanna POCT Glucose    Collection Time: 11/19/22 11:53 AM   Result Value Ref Range    POC Glucose 92 65 - 100 mg/dL    Performed by: ShailaSDYLAN    POCT Glucose    Collection Time: 11/19/22  5:24 PM   Result Value Ref Range    POC Glucose 259 (H) 65 - 100 mg/dL    Performed by: SerinahBSDYLAN    POCT Glucose    Collection Time: 11/19/22  8:58 PM   Result Value Ref Range    POC Glucose 118 (H) 65 - 100 mg/dL    Performed by: Lam    POCT Glucose    Collection Time: 11/20/22  6:04 AM   Result Value Ref Range    POC Glucose 125 (H) 65 - 100 mg/dL    Performed by: Pili Higginbotham    POCT Glucose    Collection Time: 11/20/22 11:34 AM   Result Value Ref Range    POC Glucose 127 (H) 65 - 100 mg/dL    Performed by: Julia    POCT Glucose    Collection Time: 11/20/22  5:05 PM   Result Value Ref Range    POC Glucose 240 (H) 65 - 100 mg/dL    Performed by: ReiniereCPIETER    POCT Glucose    Collection Time: 11/20/22  8:51 PM   Result Value Ref Range    POC Glucose 138 (H) 65 - 100 mg/dL    Performed by: ReiniereCPIETER    POCT Glucose    Collection Time: 11/21/22  5:43 AM   Result Value Ref Range    POC Glucose 74 65 - 100 mg/dL    Performed by: Laura    POCT Glucose    Collection Time: 11/21/22 11:59 AM   Result Value Ref Range    POC Glucose 93 65 - 100 mg/dL    Performed by:  Farhat    POCT Glucose    Collection Time: 11/21/22  4:06 PM   Result Value Ref Range    POC Glucose 193 (H) 65 - 100 mg/dL    Performed by: Edilma Linares        Assessment:     Principal Problem:    Physical debility  Active Problems:    Acute blood loss anemia (ABLA)    Bradycardia, sinus    Hypernatremia    GERD (gastroesophageal reflux disease)    Nicotine dependence, unspecified, uncomplicated    Vascular dementia without behavioral disturbance ([facility])    Sleep apnea    H/O unilateral nephrectomy    COVID-19 virus infection    Pure hypercholesterolemia    Controlled type 2 diabetes mellitus with diabetic neuropathy ([facility] [address]    Essential hypertension, benign    CKD (chronic kidney disease) stage 4, GFR 15-29 ml/min ([facility])  Resolved Problems:    * No resolved hospital problems. *     Debility and respiratory failure from COVID infection (treated with dexamethasone, baricitinib and supplemental O2), with persistent hypoxia     Plan / Recommendations / Medical Decision Making:      Continue daily physician / PA medical management:     COVID-19 virus infection / ARF / pneumonia - on 2L O2, wean as tolerated, 88-91% on 2L O2 sats at rest.  -11/14 sats 89% this AM on 2L O2, encourage IS; lungs clear  -11/15 sats 96% but on high-flow 3L; lungs clear  -11/16 on RA this AM, sats 92-94% at rest  -11/17 desats with activity, back on 2L NC, sats 93%  11/21 RA sates 95%     Hypertension / bradycardia - HR normal x last 24h, 61-79, /58, asymptomatic. Continues on telemetry, on amlodipine, losartan with parameters; Rapid Response called during acute hospitalization for syncope and bradycardia with transfer, Cardiology consulted without further events. If recurs, will re-consult.   -11/14 HR 62-81, no bradycardic events; BP varies 95//53, monitor for now and continue current meds with parameters  -11/15 HR 36 this AM, /53; recheck HR manually = 80  -11/16 HR 66 this AM, /50; last night  /112 (??) doubt that value; previous were 137/48, 144/53, stable; pt was very sleepy all day yesterday, likely due to bradycardia and anemia  -11/17 /74, HR 70s; improved  -11/18 /35 this AM, recheck 1h later = 125/52, HR 58-61  -11/19 /50, HR 59-72  -11/21 /48 this am. Range 119-140/39-48      CKD / h/o bladder cancer - monitor labs, Cr 1.5 (11/8).  -11/15 creatinine 1.6, stable  -11/18 Cr 1.5; 11/21 f/u in am     Diabetes mellitus - BS 88, 93, this AM (11/13); yesterday 64, 59, 111, 67, 140, 227, improving on decreased Lantus 8u QHS, Humalog SSI.  -11/14 BS 73 this AM, 127, 117, 88, 93; decrease Lantus to 6u  -11/15  this AM, 230 at bedtime, 127 at supper and 121 at lunch; continue current Lantus for now ( 6u); consider increasing Nesina  -11/16 BS 98 this AM, 173 last night, 202 at supper, 95 at lunch; increase Nesina to 12.5mg daily; continue Lantus 6u; can possibly get off insulin, which would be easier to manage at home  -11/17 BS 99 this AM, 97 at lunch today; last night at bedtime 248, at supper 192; continue current treatment; remains high at bedtime and supper  -11/18  this AM; yesterday's values from AM to PM: 99, 97, 227, 91  -11/19 BS 98 this AM; yesterday's values from AM to PM: 102, 139, 316, 217, no change in meds  -11/21 BS 74 this am/93 at lunch and 193 at supper;  138 last noc and 240 at supper. Overall improved     Pain mgmt - PRN Tylenol. DVT prophylaxis - continue Lovenox / SCDs. Depression / dementia - continue Celexa, Namenda. Tobacco use - continue nicotine replacement patch and stress abstinence. Anemia - Hgb 9.2 (11/5), previously 8.8 (11/3), 10.1 (10/30).  -11/15 Hgb 7.8, check stool and repeat; could be contributing to his sleepiness and fatigue this AM  -11/16 stool pending; Hgb 8.0 this AM, monitor  -11/17 recheck in AM; stool not yet sent  -11/18 Hgb 7.6, drifting down, stool not sent  -11/21 no stool sent!!. Repeat Hgb in am                Time spent was 25 minutes with over 1/2 in direct patient care / examination, consultation and coordination of care.      Signed By: Allan Bashir MD     November 21, 2022

## 2022-11-21 NOTE — PROGRESS NOTES
OT DAILY NOTE    Time In 0915   Time Out 0950     Subjective: \"Take a guess on when I can go home. \" Pt agreeable to treatment. Pain: No pain expressed. Precautions: Falls and Poor Safety Awareness    BP (!) 140/48   Pulse 70   Temp 97.3 °F (36.3 °C) (Oral)   Resp 18   Ht 5' 10\" (1.778 m)   Wt 165 lb 2 oz (74.9 kg)   SpO2 93%   BMI 23.69 kg/m²      MOBILITY:   Score Comments   Rolling Substantial/maximal assistance deferred secondary to pt's confusion   Supine to Sit Supervision or touching assistance SBA for safety   Sit to Supine Independent mod A x2   Sit to Stand Supervision or touching assistance CGA   Transfer Assist Supervision or touching assistance CGA for Safety w/ RW     ACTIVITIES OF DAILY LIVING:   Score Comments   Bathing Setup or clean-up assistance S/U w/ VC's for initiaiton   Upper Body  Dressing Setup or clean-up assistance     Lower Body Dressing Partial/moderate assistance MIN A w/ VC's for initiation   Donning/[place] Footwear Setup or clean-up assistance     Toilet Transfer       [address] [place] Substantial/maximal assistance     Education Functional Transfer Training     Assessment: Patient presented to therapy alert and ready to willingly participate. Pt completed dressing w/ S/U to MIN A. Pt required frequent rest breaks to lay supine in bed. Session was ended short d/t pt reporting high levels of fatigue and a need to lay down and rest. Pt vitals were taken and were WNL. Pt demonstrated good participation in OT treatment. Pt continues to benefit from skilled OT services to address remaining deficits and progress toward baseline level of independence and safety. Patient ended session supine in bed with call bell and needs within reach. Plan: Continue OT POC.      Laine Jacobs 62, OT   11/21/2022

## 2022-11-21 NOTE — PROGRESS NOTES
Physical Therapy  PHYSICAL THERAPY DAILY NOTE  Time In:  1691 AM  Time Out:  3606 PM  Total Treatment Time:  (P) 89 Minutes  Pt. Seen for: AM, Gait Training, Therapeutic Exercise, and Transfer Training     Subjective: \"I want ot go home before the holidays. \"         Objective: Patient on 1 liter of O2 and thru out session O2 sats remained >90%. Precautions: Falls, Poor Safety Awareness, and Confused    GROSS ASSESSMENT Daily Assessment           COGNITION Daily Assessment    impaired       BED/MAT MOBILITY Daily Assessment    Rolling Right:   Rolling Left:   Supine to Sit: Min A  Sit to Supine: Min A       TRANSFERS Daily Assessment    Sit to Stand: Mod A  Stand to Sit: Mod A  Transfer Type: Stand Pivot and with rolling walker  Transfer Assistance: Min A  Car Transfers: NT         GAIT Daily Assessment   Patient leans forward during gait but when wife saw him walking she stated that he did that prior . Amount of Assistance: Mod A  Distance (ft): 40  Assistive Device: RW and Gait Belt  Surface: Level Surface       STEPS/STAIRS Daily Assessment    Steps Ambulated:    Level of Assistance:    Railing:  Assistive Device:        BALANCE Daily Assessment    Static Sitting:   Dynamic Sitting:   Static Standing:   Dynamic Standing:        WHEELCHAIR MOBILITY Daily Assessment    Able to Propel (ft):   Assistance:   Surface:   Wheelchair Set-up:        LOWER EXTREMITY EXERCISES Daily Assessment    SEATED EXERCISES Sets Reps Comments   Ankle Pumps 2 10    Hip Flexion 2 10    Long Arc Quads 2 10    Hip Adduction/Ball Squeeze 2 10    Hip Abduction with green Theraband 2 10    Hamstring Curls with green Theraband 2 10    Hip Extension with green Theraband 2 10          Pain level: no pain noted.   Pain Location:    Pain Interventions:     Vital Signs:  BP (!) 140/48   Pulse 70   Temp 97.3 °F (36.3 °C) (Oral)   Resp 18   Ht 5' 10\" (1.778 m)   Wt 165 lb 2 oz (74.9 kg)   SpO2 93%   BMI 23.69 kg/m²       Education: Interdisciplinary Communication:     Pt. Left in recliner with alarm on and call bell at reach. Assessment: Patient will need 24 hour supervision at w/c level at home for safety. Plan of Care: Continue with plan of care.     Rosalio Solano, PTA  11/21/2022

## 2022-11-22 LAB
ALBUMIN SERPL-MCNC: 2.3 G/DL (ref 3.2–4.6)
ALBUMIN/GLOB SERPL: 0.7 {RATIO} (ref 0.4–1.6)
ALP SERPL-CCNC: 70 U/L (ref 50–136)
ALT SERPL-CCNC: 18 U/L (ref 12–65)
ANION GAP SERPL CALC-SCNC: 3 MMOL/L (ref 2–11)
AST SERPL-CCNC: 14 U/L (ref 15–37)
BILIRUB SERPL-MCNC: 0.3 MG/DL (ref 0.2–1.1)
BUN SERPL-MCNC: 22 MG/DL (ref 8–23)
CALCIUM SERPL-MCNC: 9 MG/DL (ref 8.3–10.4)
CHLORIDE SERPL-SCNC: 114 MMOL/L (ref 101–110)
CO2 SERPL-SCNC: 30 MMOL/L (ref 21–32)
CREAT SERPL-MCNC: 1.6 MG/DL (ref 0.8–1.5)
ERYTHROCYTE [DISTWIDTH] IN BLOOD BY AUTOMATED COUNT: 16.6 % (ref 11.9–14.6)
GLOBULIN SER CALC-MCNC: 3.3 G/DL (ref 2.8–4.5)
GLUCOSE BLD STRIP.AUTO-MCNC: 159 MG/DL (ref 65–100)
GLUCOSE BLD STRIP.AUTO-MCNC: 82 MG/DL (ref 65–100)
GLUCOSE BLD STRIP.AUTO-MCNC: 83 MG/DL (ref 65–100)
GLUCOSE SERPL-MCNC: 91 MG/DL (ref 65–100)
HCT VFR BLD AUTO: 25.7 % (ref 41.1–50.3)
HCT VFR BLD AUTO: 26.7 % (ref 41.1–50.3)
HGB BLD-MCNC: 7.9 G/DL (ref 13.6–17.2)
HGB BLD-MCNC: 8.4 G/DL (ref 13.6–17.2)
MAGNESIUM SERPL-MCNC: 2 MG/DL (ref 1.8–2.4)
MCH RBC QN AUTO: 25.2 PG (ref 26.1–32.9)
MCHC RBC AUTO-ENTMCNC: 30.7 G/DL (ref 31.4–35)
MCV RBC AUTO: 82.1 FL (ref 82–102)
NRBC # BLD: 0 K/UL (ref 0–0.2)
PLATELET # BLD AUTO: 311 K/UL (ref 150–450)
PMV BLD AUTO: 12 FL (ref 9.4–12.3)
POTASSIUM SERPL-SCNC: 3.5 MMOL/L (ref 3.5–5.1)
PROT SERPL-MCNC: 5.6 G/DL (ref 6.3–8.2)
RBC # BLD AUTO: 3.13 M/UL (ref 4.23–5.6)
SERVICE CMNT-IMP: ABNORMAL
SERVICE CMNT-IMP: NORMAL
SERVICE CMNT-IMP: NORMAL
SODIUM SERPL-SCNC: 147 MMOL/L (ref 133–143)
WBC # BLD AUTO: 4.5 K/UL (ref 4.3–11.1)

## 2022-11-22 PROCEDURE — 99233 SBSQ HOSP IP/OBS HIGH 50: CPT | Performed by: PHYSICAL MEDICINE & REHABILITATION

## 2022-11-22 PROCEDURE — 97116 GAIT TRAINING THERAPY: CPT

## 2022-11-22 PROCEDURE — 6370000000 HC RX 637 (ALT 250 FOR IP): Performed by: PHYSICAL MEDICINE & REHABILITATION

## 2022-11-22 PROCEDURE — 97530 THERAPEUTIC ACTIVITIES: CPT

## 2022-11-22 PROCEDURE — 85027 COMPLETE CBC AUTOMATED: CPT

## 2022-11-22 PROCEDURE — 97535 SELF CARE MNGMENT TRAINING: CPT

## 2022-11-22 PROCEDURE — 85014 HEMATOCRIT: CPT

## 2022-11-22 PROCEDURE — 97542 WHEELCHAIR MNGMENT TRAINING: CPT

## 2022-11-22 PROCEDURE — 97110 THERAPEUTIC EXERCISES: CPT

## 2022-11-22 PROCEDURE — 36415 COLL VENOUS BLD VENIPUNCTURE: CPT

## 2022-11-22 PROCEDURE — 6360000002 HC RX W HCPCS: Performed by: PHYSICAL MEDICINE & REHABILITATION

## 2022-11-22 PROCEDURE — 83735 ASSAY OF MAGNESIUM: CPT

## 2022-11-22 PROCEDURE — 80053 COMPREHEN METABOLIC PANEL: CPT

## 2022-11-22 PROCEDURE — 1180000000 HC REHAB R&B

## 2022-11-22 PROCEDURE — 82962 GLUCOSE BLOOD TEST: CPT

## 2022-11-22 RX ORDER — MEMANTINE HYDROCHLORIDE 5 MG/1
5 TABLET ORAL 2 TIMES DAILY
Qty: 180 TABLET | Refills: 1 | Status: SHIPPED | OUTPATIENT
Start: 2022-11-22

## 2022-11-22 RX ORDER — CITALOPRAM 20 MG/1
20 TABLET ORAL DAILY
Qty: 30 TABLET | Refills: 3 | Status: SHIPPED | OUTPATIENT
Start: 2022-11-23

## 2022-11-22 RX ORDER — SIMVASTATIN 40 MG
40 TABLET ORAL NIGHTLY
Qty: 30 TABLET | Refills: 2 | Status: SHIPPED | OUTPATIENT
Start: 2022-11-22

## 2022-11-22 RX ORDER — TAMSULOSIN HYDROCHLORIDE 0.4 MG/1
0.4 CAPSULE ORAL DAILY
Qty: 30 CAPSULE | Refills: 3 | Status: SHIPPED | OUTPATIENT
Start: 2022-11-23

## 2022-11-22 RX ORDER — PANTOPRAZOLE SODIUM 40 MG/1
40 TABLET, DELAYED RELEASE ORAL DAILY
Qty: 30 TABLET | Refills: 3 | Status: SHIPPED | OUTPATIENT
Start: 2022-11-23

## 2022-11-22 RX ORDER — GLIPIZIDE 5 MG/1
5 TABLET ORAL
Status: DISCONTINUED | OUTPATIENT
Start: 2022-11-22 | End: 2022-11-22

## 2022-11-22 RX ORDER — AMLODIPINE BESYLATE 10 MG/1
10 TABLET ORAL DAILY
Qty: 30 TABLET | Refills: 3 | Status: SHIPPED | OUTPATIENT
Start: 2022-11-23

## 2022-11-22 RX ORDER — GLIPIZIDE 5 MG/1
5 TABLET ORAL
Qty: 60 TABLET | Refills: 3 | Status: SHIPPED | OUTPATIENT
Start: 2022-11-23

## 2022-11-22 RX ORDER — LOSARTAN POTASSIUM 100 MG/1
100 TABLET ORAL DAILY
Qty: 30 TABLET | Refills: 3 | Status: SHIPPED | OUTPATIENT
Start: 2022-11-23

## 2022-11-22 RX ORDER — GLIPIZIDE 5 MG/1
5 TABLET ORAL
Status: DISCONTINUED | OUTPATIENT
Start: 2022-11-22 | End: 2022-11-23 | Stop reason: HOSPADM

## 2022-11-22 RX ADMIN — ALOGLIPTIN 12.5 MG: 6.25 TABLET, FILM COATED ORAL at 08:10

## 2022-11-22 RX ADMIN — PANTOPRAZOLE SODIUM 40 MG: 40 TABLET, DELAYED RELEASE ORAL at 08:10

## 2022-11-22 RX ADMIN — CITALOPRAM HYDROBROMIDE 20 MG: 20 TABLET ORAL at 08:10

## 2022-11-22 RX ADMIN — MEMANTINE 5 MG: 5 TABLET ORAL at 08:24

## 2022-11-22 RX ADMIN — MEMANTINE 5 MG: 5 TABLET ORAL at 20:18

## 2022-11-22 RX ADMIN — ENOXAPARIN SODIUM 40 MG: 100 INJECTION SUBCUTANEOUS at 08:10

## 2022-11-22 RX ADMIN — AMLODIPINE BESYLATE 10 MG: 10 TABLET ORAL at 08:10

## 2022-11-22 RX ADMIN — GLIPIZIDE 5 MG: 5 TABLET ORAL at 17:13

## 2022-11-22 RX ADMIN — ATORVASTATIN CALCIUM 20 MG: 10 TABLET, FILM COATED ORAL at 08:10

## 2022-11-22 RX ADMIN — LOSARTAN POTASSIUM 100 MG: 50 TABLET, FILM COATED ORAL at 08:10

## 2022-11-22 RX ADMIN — TAMSULOSIN HYDROCHLORIDE 0.4 MG: 0.4 CAPSULE ORAL at 08:10

## 2022-11-22 NOTE — PROGRESS NOTES
PHYSICAL THERAPY DISCHARGE SUMMARY    TIME IN: 80  TIME OUT: 1115  Total Treatment Time:  44 Minutes      Precautions at discharge:   Falls, Poor Safety Awareness, and Confused     Problem List:    Decreased strength B LE  [x]     Decreased strength trunk/core  [x]     Decreased AROM   [x]     Decreased PROM  [x]     Decreased balance sitting  [x]     Decreased balance standing  [x]     Decreased endurance  [x]     Pain  []        Functional Limitations:   Decreased independence with bed mobility  [x]     Decreased independence with functional transfers  [x]     Decreased independence with ambulation  [x]     Decreased independence with stair negotiation  [x]               Objective:     Vital Signs:Visit Vitals  BP (!) 160/63   Pulse 69   Temp 97.5 °F (36.4 °C) (Oral)   Resp 18   Ht 5' 10\" (1.778 m)   Wt 165 lb 2 oz (74.9 kg)   SpO2 95%   BMI 23.69 kg/m²     02 at 2 lpm, 02 sat 91 to 94% and HR 46 to 92 and irregular during assessment    Pain level: No c/o pain during treatment  Pain location: NA  Pain interventions: NA     Patient education: Bed mobility training,transfer training, gait training, balance training,fall precautions, body mechanics,activity pacing, w/c mobility and parts management, Patient verbalizing understanding and demonstrating partial understanding of patient education. Recommend follow up education.     Interdisciplinary Communication: spoke with PTA and  regarding D/C plans     Cognition: Alert, able to follow commands,cooperating,participating, motivated to go home, flat affect with limited initiation of functional movement      Lower Extremity Function:   RLE           LLE   AROM Hip decreased 50%, knee and ankle  decreased 25% Hip decreased 50%, knee and ankle  decreased 25%   Fine Motor Coordination Impaired Impaired   Tone Rigidity noted Rigidity noted   Sensation Intact to light touch impaired proprioception during functional movement Intact to light touch impaired proprioception during functional movement      Bilateral LE hip strength +2/5 to -4/5, knee -4/5 to +4/5, ankle +4/5 to -4/5    0/5 No palpable muscle contraction  1/5 Palpable muscle contraction, no joint movement  2-/5 Less than full range of motion in gravity eliminated position  2/5 Able to complete full range of motion in gravity eliminated position  2+/5 Able to initiate movement against gravity  3-/5 More than half but not full range of motion against gravity  3/5 Able to complete full range of motion against gravity  3+/5 Completes full range of motion against gravity with minimal resistance  4-/5 Completes full range of motion against gravity with minimal-moderate resistance  4/5 Completes full range of motion against gravity with moderate resistance  4+/5 Completes full range of motion against gravity with moderate-maximum resistance  5/5 Completes full range of motion against gravity with maximum resistance    PRIMARY MODE OF LOCOMOTION: Ambulation      Functional Assessment:    Balance  Comments   Static Sitting Good:  Pt. able to maintain balance w/o UE support;  exhibits some postural sway    Dynamic Sitting Fair - accepts minimal challenge;  can maintain balance while turning head/trunk    Static Standing Fair:  Pt. requires UE support;  may need occasional min A    Dynamic Standing Poor - unable to accept challenge or move without LOB  With RW   Picking Up Object From Floor 3  Partial/moderate assistance (Increased time and effort to complete with bradykinesia and whole body rigidty noted)                       BED MOBILITY &TRANSFERS Initial Assessment   Discharge Assessment Comments   Rolling  88  Independent (Increased time and effort to complete with altered body mechanics. Using bed rail. Whole body rigidity with bradykineisa  noted)  Not attempted due to medical condition or safety concerns 6  Independent (Increased time and effort to complete with altered body mechanics. Using bed rail.  Whole body rigidity with bradykineisa  noted)        Supine to Sit 88  Supervision or touching assistance (Increased time and effort to complete with altered body mechanics, bradykinesia and whole body rigidity. Using bed rail to complete)  Not attempted due to medical condition or safety concerns 4  Supervision or touching assistance (Increased time and effort to complete with altered body mechanics, bradykinesia and whole body rigidity. Using bed rail to complete)        Sit to to Supine 1  Dependent    4  Supervision or touching assistance (Increased time and effort to complete with altered body mechanics, bradykinesia and whole body rigidity. Using bed rail to complete)        Sit to Stand 1  Dependent    4  Supervision or touching assistance (Increased time and effort to complete with altered body mechanics, bradykinesia and whole body rigidity. Completed with RW)        Chair To/From Bed 1  Dependent    4  Supervision or touching assistance (SPT with RW. flexed posture with shuffling steps. INcreased time and effort to complete with cues for safe body mechanics)        Car Transfer 88  Partial/moderate assistance (SPT with RW, increased time and effort to complete with bradykinesia and whole body rigidity noted. Flexed posture with shuffling steps)  Not attempted due to medical condition or safety concerns 3  Partial/moderate assistance (SPT with RW, increased time and effort to complete with bradykinesia and whole body rigidity noted. Flexed posture with shuffling steps)              WHEELCHAIR MOBILITY/MANAGEMENT Initial Assessment Discharge Assessment Comments   Able to Propel 48' w/ 2 Turns 88     Not attempted due to medical condition or safety concerns 80     Not attempted due to medical condition or safety concerns (Able to propel w/c 30 ft before onset of UE fatigue.  Increased time and effort to complete with limited UE ROM with rigidity and bradykinesia limiting motion of UEs when propelling w/c)     Able to Propel 150' 88   Not attempted due to medical condition or safety concerns 80     Not attempted due to medical condition or safety concerns (as noted above)     Wheelchair set up assistance required  Min A    Wheelchair management  Manages R Brake and Manages L Brake          AMBULATION Initial Assessment Discharge Assessment Comments   Assistive device  RW    Walk 10' 88  Partial/moderate assistance (SLow shuffling gait with flexed posture with tendency to keep RW too far in front of self. Requires assistance for safe positioning of RW)  Not attempted due to medical condition or safety concerns 3  Partial/moderate assistance (SLow shuffling gait with flexed posture with tendency to keep RW too far in front of self. Requires assistance for safe positioning of RW)        Walk 50' with 2 Turns 88  Partial/moderate assistance (SLow shuffling gait with flexed posture with tendency to keep RW too far in front of self. Tendency to move feet outside frame of RW when making turns. Requires assistance to manage safe positioning of RW)  Not attempted due to medical condition or safety concerns 3  Partial/moderate assistance (SLow shuffling gait with flexed posture with tendency to keep RW too far in front of self. Tendency to move feet outside frame of RW when making turns. Requires assistance to manage safe positioning of RW)        Walk 150' 88     Not attempted due to medical condition or safety concerns 80     Not attempted due to medical condition or safety concerns (Unable to ambualte > 60 ft due to fatigue) Not attempted due to medical condition or safety concerns (Unable to ambualte > 60 ft due to fatigue)   Walking 10' on Unlevel Surface 88  Partial/moderate assistance (Slow shuffling gait with flexed posture going up/down 10 ft ramp. Tendency to keep RW too far in front of self.  Requires assist for safe positioning of RW. 4 min sitting rest break after going up ramp)  Not attempted due to medical condition or safety concerns 3  Partial/moderate assistance (Slow shuffling gait with flexed posture going up/down 10 ft ramp. Tendency to keep RW too far in front of self. Requires assist for safe positioning of RW. 4 min sitting rest break after going up ramp)              STEPS/STAIRS Initial Assessment Discharge Assessment Comments   1 Curb Step 88  Partial/moderate assistance (up/down 1 step with hand rails and min to mod assist. Flexed posture with decreased step clearance and impaired foot placement. INcreased time and effort to complete)  Not attempted due to medical condition or safety concerns 3  Partial/moderate assistance (up/down 1 step with hand rails and min to mod assist. Flexed posture with decreased step clearance and impaired foot placement. INcreased time and effort to complete)        4 Steps 88  Partial/moderate assistance (SLow reciprocating pattern going up steps and single step at a time going down steps. Increased time and effort to complete with 5 min sitting rest break after going up steps. Flexed posture with decreased step clearance and impaired step placement)  Not attempted due to medical condition or safety concerns 3  Partial/moderate assistance (SLow reciprocating pattern going up steps and single step at a time going down steps. Increased time and effort to complete with 5 min sitting rest break after going up steps. Flexed posture with decreased step clearance and impaired step placement)        12 Steps 9      Not applicable 88     Not attempted due to medical condition or safety concerns (Unable due to fatigue after going up/down 4 steps)     Ramp  Min A        Patient to OT at end of treatment           PHYSICAL THERAPY PLAN OF CARE     LTGs:Short Term Goals:  Pt will demonstrate roll left & right & transition supine<>sit with Mod A in 1 week (MET)  2. Pt. will transfer from bed to/from chair with Mod A using the least restrictive device in 1 week  (MET)  3.    Pt. will ambulate with 25 feet with RW or LRAD and Mod A w/ Ax2 to follow w/ w/c in 1  week (MET)  4. Pt. Will will stand w/ B UE support x30 seconds w/ min A in 1 week (MET)  5. Pt. Will propel w/c 100 feet with Min A in 1 week(Not met  due to slow progress)     Long Term Goals: (Goals not met due to slow progress)  Pt will demonstrate roll left & right & transition supine<>sit with Independent in 2 weeks  2. Pt. will transfer from bed to/from chair with CGA using the least restrictive device in 2 weeks   3. Pt. will ambulate with 100 feet with RW or LRAD and CGA in 2  weeks  4. Pt. Will ambulate up/down 20' ramp with RW and Min A in 2 weeks  5. Pt. Will propel w/c 150 feet with Supervision/Standby Assist in 2 weeks. Pt would benefit from continued skilled physical therapy in order to improve independent functional mobility within the home with use of least restrictive device. Interventions may include range of motion (AROM, PROM B LE/trunk), motor function (B LE/trunk strengthening/coordination), activity tolerance (vitals, oxygen saturation levels), bed mobility training, balance activities, gait training (progressive ambulation program), and functional transfer training. HEP handout: Min assist with cues to complete LE HEP  SUPINE EXERCISES Sets Reps Comments   Ankle Pumps 2 10    Heel Slides 2 10    Hip Abduction 2 10    Short Arc Quad 2 10    Bridging 2 10      . Pt to be discharged 11/23/2022 with assistance provided by family. Therapy Recommendations upon discharge:    PT, 24 hour supervision, assist with functional mobility at home   Equipment needs at discharge:  Patient has RW, recommending wheelchair     Please see IRC; Interdisciplinary Eval, Care Plan, and Patient Education for further information regarding physical therapy discharge summary and plan of care.        Ty Linder, PT  11/22/2022

## 2022-11-22 NOTE — PROGRESS NOTES
Physical Therapy  PHYSICAL THERAPY DAILY NOTE  Time In:  4409 PM  Time Out:  1349 PM  Total Treatment Time:  (P) 46 Minutes  Pt. Seen for: PM, Family Training, Gait Training, Therapeutic Exercise, and Transfer Training     Subjective: Family here and observed therapy. Objective: Adjusted new wheelchair. Discussed with patients daughter concerning his gait and he will need to be in wheelchair to be more independent in the home with wife. The wheelchair will assist with MRADLs. Precautions: Falls, Poor Safety Awareness, Confused, and Impulsive     GROSS ASSESSMENT Daily Assessment           COGNITION Daily Assessment    impaired       BED/MAT MOBILITY Daily Assessment    Rolling Right:   Rolling Left:   Supine to Sit: CGA  Sit to Supine: CGA       TRANSFERS Daily Assessment    Sit to Stand: CGA  Stand to Sit: CGA  Transfer Type: Stand Pivot and with rolling walker  Transfer Assistance: Mod A  Car Transfers: NT         GAIT Daily Assessment   Patient has forward posture during gait that wife stated that he did before. Amount of Assistance: Mod A  Distance (ft): 60  Assistive Device: RW  Surface: Level Surface       STEPS/STAIRS Daily Assessment   Patient has ramp at home going home at w/c level Steps Ambulated:    Level of Assistance:    Railing:  Assistive Device:        BALANCE Daily Assessment    Static Sitting:   Dynamic Sitting:   Static Standing:   Dynamic Standing:        WHEELCHAIR MOBILITY Daily Assessment    Able to Propel (ft): 150  Assistance: Supervision/Standby Assist  Surface: Level Surface  Wheelchair Set-up: NT       LOWER EXTREMITY EXERCISES Daily Assessment    Yudelka wick x 10 minutes     Pain level: no pain noted. Pain Location:    Pain Interventions:     Vital Signs:  BP (!) 160/63   Pulse 69   Temp 97.5 °F (36.4 °C) (Oral)   Resp 18   Ht 5' 10\" (1.778 m)   Wt 165 lb 2 oz (74.9 kg)   SpO2 95%   BMI 23.69 kg/m²       Education:      Interdisciplinary Communication:     Pt.  Left in recliner with alarm on and call bell at reach. Assessment: Patient will need 24 hour assistance at home for safety with all mobility. Plan of Care: Continue with plan of care.     Jair Alonzo, PTA  11/22/2022

## 2022-11-22 NOTE — PROGRESS NOTES
[facility] PROGRESS NOTE    Marko Jarvis  Admit Date: 11/9/2022  Admit Diagnosis:   Debility [R53.81]  Physical debility [R53.81]    Subjective     Patient seen and examined. Sleepy but awake and attentive. Forgot that he is going home tomorrow. Very happy. \" I can go today! \" Humberto Tony him his wife will be prepared for him to come home tomorrow. Doesn't want breakfast. Says he doesn't eat breakfast at home.  No complaints other that fatigue and want to sleep    Objective:     Current Facility-Administered Medications   Medication Dose Route Frequency    alogliptin (NESINA) tablet 12.5 mg  12.5 mg Oral Daily    insulin glargine (LANTUS) injection vial 6 Units  6 Units SubCUTAneous Nightly    0.9 % sodium chloride infusion   IntraVENous PRN    acetaminophen (TYLENOL) tablet 650 mg  650 mg Oral Q6H PRN    Or    acetaminophen (TYLENOL) suppository 650 mg  650 mg Rectal Q6H PRN    amLODIPine (NORVASC) tablet 10 mg  10 mg Oral Daily    citalopram (CELEXA) tablet 20 mg  20 mg Oral Daily    atorvastatin (LIPITOR) tablet 20 mg  20 mg Oral Daily    enoxaparin (LOVENOX) injection 40 mg  40 mg SubCUTAneous Daily    guaiFENesin-dextromethorphan (ROBITUSSIN DM) 100-10 MG/5ML syrup 5 mL  5 mL Oral Q4H PRN    losartan (COZAAR) tablet 100 mg  100 mg Oral Daily    melatonin tablet 3 mg  3 mg Oral Nightly PRN    memantine (NAMENDA) tablet 5 mg  5 mg Oral BID    nicotine (NICODERM CQ) 21 MG/24HR 1 patch  1 patch TransDERmal Daily    ondansetron (ZOFRAN-ODT) disintegrating tablet 4 mg  4 mg Oral Q8H PRN    Or    ondansetron (ZOFRAN) injection 4 mg  4 mg IntraVENous Q6H PRN    pantoprazole (PROTONIX) tablet 40 mg  40 mg Oral Daily    polyethylene glycol (GLYCOLAX) packet 17 g  17 g Oral Daily PRN    tamsulosin (FLOMAX) capsule 0.4 mg  0.4 mg Oral Daily    sodium chloride flush 0.9 % injection 5-40 mL  5-40 mL IntraVENous PRN    glucagon (rDNA) injection 1 mg  1 mg SubCUTAneous PRN    insulin lispro (HUMALOG) injection vial 0-4 Units  0-4 Units SubCUTAneous 4x Daily AC & HS        Review of Systems:   Denies chest pain, shortness of breath, cough, headache, visual problems, abdominal pain, dysuria, calf pain. Pertinent positives are as noted in the HPI, ROS unremarkable otherwise. On ! L O2. Sats 95% RA after I took him off. Visit Vitals  BP (!) 160/63   Pulse 69   Temp 97.5 °F (36.4 °C) (Oral)   Resp 18   Ht 5' 10\" (1.778 m)   Wt 165 lb 2 oz (74.9 kg)   SpO2 95%   BMI 23.69 kg/m²        Physical Exam:   General: Alert and age appropriately oriented x2   No acute cardiorespiratory distress. HEENT: Normocephalic, EOM intact. Oral mucosa moist without cyanosis. Lungs: Clear to auscultation bilaterally. Dec at bases but not taking big breaths for me  Respiration even and unlabored. Heart: Regular rate and rhythm, S1, S2. No murmurs, clicks, rub or gallops. Abdomen: Soft, non-tender, not distended. Bowel sounds normoactive. No organomegaly. Genitourinary: Deferred. Neuromuscular:      No gross focal motor deficits noted. Cognitive impairments with delayed processing, STM, dec concentration but fcs   [place]   Skin/extremity: No rashes, no erythema. No calf tenderness B LE.   No edema                                                                              Krishnan Fall Risk Assessment:  History of Falling: Yes     Swallow Assessment:  Swallow Screening  Is the patient unable to remain alert for testing?: No  Is the patient on a modified diet (thickened liquids) due to pre-existing dysphagia?: No  Is there presence of existing enteral tube feeding via the stomach or nose?: No  Is there presence of head-of-bed restrictions (less than 30 degrees)?: No  Is there presence of tracheotomy tube?: No  Is the patient ordered nothing-by-mouth status?: No  3 oz Water Swallow Screen: Pass        Labs/Studies:  Recent Results (from the past 72 hour(s))   POCT Glucose    Collection Time: 11/19/22 11:53 AM   Result Value Ref Range    POC Glucose 92 65 - 100 mg/dL    Performed by: Natali    POCT Glucose    Collection Time: 11/19/22  5:24 PM   Result Value Ref Range    POC Glucose 259 (H) 65 - 100 mg/dL    Performed by: Natali    POCT Glucose    Collection Time: 11/19/22  8:58 PM   Result Value Ref Range    POC Glucose 118 (H) 65 - 100 mg/dL    Performed by: Lam    POCT Glucose    Collection Time: 11/20/22  6:04 AM   Result Value Ref Range    POC Glucose 125 (H) 65 - 100 mg/dL    Performed by: Olivia Pearson    POCT Glucose    Collection Time: 11/20/22 11:34 AM   Result Value Ref Range    POC Glucose 127 (H) 65 - 100 mg/dL    Performed by: Julia    POCT Glucose    Collection Time: 11/20/22  5:05 PM   Result Value Ref Range    POC Glucose 240 (H) 65 - 100 mg/dL    Performed by: Julia    POCT Glucose    Collection Time: 11/20/22  8:51 PM   Result Value Ref Range    POC Glucose 138 (H) 65 - 100 mg/dL    Performed by: Julia    POCT Glucose    Collection Time: 11/21/22  5:43 AM   Result Value Ref Range    POC Glucose 74 65 - 100 mg/dL    Performed by: Laura    POCT Glucose    Collection Time: 11/21/22 11:59 AM   Result Value Ref Range    POC Glucose 93 65 - 100 mg/dL    Performed by:  Farhat    POCT Glucose    Collection Time: 11/21/22  4:06 PM   Result Value Ref Range    POC Glucose 193 (H) 65 - 100 mg/dL    Performed by: [facility] DR LOKESH MCCABE    POCT Glucose    Collection Time: 11/21/22  8:55 PM   Result Value Ref Range    POC Glucose 241 (H) 65 - 100 mg/dL    Performed by: [facility] DR LOKESH MCCABE    Comprehensive Metabolic Panel w/ Reflex to MG    Collection Time: 11/22/22  5:09 AM   Result Value Ref Range    Sodium 147 (H) 133 - 143 mmol/L    Potassium 3.5 3.5 - 5.1 mmol/L    Chloride 114 (H) 101 - 110 mmol/L    CO2 30 21 - 32 mmol/L    Anion Gap 3 2 - 11 mmol/L    Glucose 91 65 - 100 mg/dL    BUN 22 8 - 23 MG/DL    Creatinine 1.60 (H) 0.8 - 1.5 MG/DL Est, Glom Filt Rate 42 (L) >60 ml/min/1.73m2    Calcium 9.0 8.3 - 10.4 MG/DL    Total Bilirubin 0.3 0.2 - 1.1 MG/DL    ALT 18 12 - 65 U/L    AST 14 (L) 15 - 37 U/L    Alk Phosphatase 70 50 - 136 U/L    Total Protein 5.6 (L) 6.3 - 8.2 g/dL    Albumin 2.3 (L) 3.2 - 4.6 g/dL    Globulin 3.3 2.8 - 4.5 g/dL    Albumin/Globulin Ratio 0.7 0.4 - 1.6     CBC    Collection Time: 11/22/22  5:09 AM   Result Value Ref Range    WBC 4.5 4.3 - 11.1 K/uL    RBC 3.13 (L) 4.23 - 5.6 M/uL    Hemoglobin 7.9 (L) 13.6 - 17.2 g/dL    Hematocrit 25.7 (L) 41.1 - 50.3 %    MCV 82.1 82 - 102 FL    MCH 25.2 (L) 26.1 - 32.9 PG    MCHC 30.7 (L) 31.4 - 35.0 g/dL    RDW 16.6 (H) 11.9 - 14.6 %    Platelets 557 851 - 974 K/uL    MPV 12.0 9.4 - 12.3 FL    nRBC 0.00 0.0 - 0.2 K/uL   Magnesium    Collection Time: 11/22/22  5:09 AM   Result Value Ref Range    Magnesium 2.0 1.8 - 2.4 mg/dL   POCT Glucose    Collection Time: 11/22/22  6:05 AM   Result Value Ref Range    POC Glucose 82 65 - 100 mg/dL    Performed by: [id number] [employer]        Assessment:     Principal Problem:    Physical debility  Active Problems:    Acute blood loss anemia (ABLA)    Bradycardia, sinus    Hypernatremia    GERD (gastroesophageal reflux disease)    Nicotine dependence, unspecified, uncomplicated    Vascular dementia without behavioral disturbance (HCC)    Sleep apnea    H/O unilateral nephrectomy    COVID-19 virus infection    Pure hypercholesterolemia    Controlled type 2 diabetes mellitus with diabetic neuropathy (HCC)    Essential hypertension, benign    CKD (chronic kidney disease) stage 4, GFR 15-29 ml/min ([facility])  Resolved Problems:    * No resolved hospital problems.  *     Debility and respiratory failure from COVID infection (treated with dexamethasone, baricitinib and supplemental O2), with persistent hypoxia     Plan / Recommendations / Medical Decision Making:      Continue daily physician / PA medical management:     COVID-19 virus infection / ARF / pneumonia - on 2L O2, wean as tolerated, 88-91% on 2L O2 sats at rest.  -11/14 sats 89% this AM on 2L O2, encourage IS; lungs clear  -11/15 sats 96% but on high-flow 3L; lungs clear  -11/16 on RA this AM, sats 92-94% at rest  -11/17 desats with activity, back on 2L NC, sats 93%  11/21 RA sates 95%  11/22 I turned off O2, was on 1 liter when I saw him. Sats 95% (recorded on 2 L but it was actually off)     Hypertension / bradycardia - HR normal x last 24h, 61-79, /58, asymptomatic. Continues on telemetry, on amlodipine, losartan with parameters; Rapid Response called during acute hospitalization for syncope and bradycardia with transfer, Cardiology consulted without further events. If recurs, will re-consult. -11/14 HR 62-81, no bradycardic events; BP varies 95//53, monitor for now and continue current meds with parameters  -11/15 HR 36 this AM, /53; recheck HR manually = 80  -11/16 HR 66 this AM, /50; last night  /112 (??) doubt that value; previous were 137/48, 144/53, stable; pt was very sleepy all day yesterday, likely due to bradycardia and anemia  -11/17 /74, HR 70s; improved  -11/18 /35 this AM, recheck 1h later = 125/52, HR 58-61  -11/19 /50, HR 59-72  -11/21 /48 this am. Range 119-140/39-48   -11/22 [id number]/51-64; HR 60s-70s ; no change in meds. Diastolic low. Bp varies.       CKD / h/o bladder cancer - monitor labs, Cr 1.5 (11/8).  -11/15 creatinine 1.6, stable  -11/18 Cr 1.5; 11/21 f/u in am  -11/22 creat 1.6 stable     Diabetes mellitus - BS 88, 93, this AM (11/13); yesterday 64, 59, 111, 67, 140, 227, improving on decreased Lantus 8u QHS, Humalog SSI.  -11/14 BS 73 this AM, 127, 117, 88, 93; decrease Lantus to 6u  -11/15  this AM, 230 at bedtime, 127 at supper and 121 at lunch; continue current Lantus for now ( 6u); consider increasing Nesina  -11/16 BS 98 this AM, 173 last night, 202 at supper, 95 at lunch; increase Nesina to 12.5mg daily; continue Lantus 6u; can possibly get off insulin, which would be easier to manage at home  -11/17 BS 99 this AM, 97 at lunch today; last night at bedtime 248, at supper 192; continue current treatment; remains high at bedtime and supper  -11/18  this AM; yesterday's values from AM to PM: 99, 97, 227, 91  -11/19 BS 98 this AM; yesterday's values from AM to PM: 102, 139, 316, 217, no change in meds  -11/21 BS 74 this am/93 at lunch and 193 at supper;  138 last noc and 240 at supper. Overall improved  --11/21 BS 82 this am, 241 at  bedtime, 193 at supper, 93 at lunch and 74 yesterday; doesn't eat breakfast, no noc time snack. Will need f/u with PCP. Would consider dropping Lantus and adding glipizide dosed before supper. Will try today. Cont Nesina     Pain mgmt - PRN Tylenol. DVT prophylaxis - continue Lovenox / SCDs. Depression / dementia - continue Celexa, Namenda. Tobacco use - continue nicotine replacement patch and stress abstinence. Anemia - Hgb 9.2 (11/5), previously 8.8 (11/3), 10.1 (10/30).  -11/15 Hgb 7.8, check stool and repeat; could be contributing to his sleepiness and fatigue this AM  -11/16 stool pending; Hgb 8.0 this AM, monitor  -11/17 recheck in AM; stool not yet sent  -11/18 Hgb 7.6, drifting down, stool not sent  -11/21 no stool sent!!. Repeat Hgb in am  -11/22 hgb 7.9 stl improvement; hx of UGIB in June; currently no melena, no hematemesis ; stool never sent for hemoccult ; GI saw back then but was a poor candidate for endoscopy at that time due to [facility] then. He was placed on PPI then. Pts baseline over past month about 8.5- 9. Recommend outpt w/u                Time spent was 35 minutes with over 1/2 in direct patient care / examination, consultation and coordination of care.      Signed By: Melvin Carbajal MD     November 22, 2022

## 2022-11-22 NOTE — PROGRESS NOTES
OT DAILY NOTE  Time In 1115   Time Out 1158     Subjective: \"I believe I want to go home to eat my turkey. \" Pt agreeable to treatment. Pain: No pain expressed. Precautions: Falls and Poor Safety Awareness    BP (!) 160/63   Pulse 69   Temp 97.5 °F (36.4 °C) (Oral)   Resp 18   Ht 5' 10\" (1.778 m)   Wt 165 lb 2 oz (74.9 kg)   SpO2 95%   BMI 23.69 kg/m²      FUNCTIONAL MOBILITY:    Score Comments   Sit to Stand Supervision or touching assistance (Increased time and effort to complete with altered body mechanics, bradykinesia and whole body rigidity. Completed with RW) CGA   Transfer Assist Supervision or touching assistance (SPT with RW. flexed posture with shuffling steps. INcreased time and effort to complete with cues for safe body mechanics) CGA for Safety w/ RW   Ambulation Supervision or Touching Assistance CGA-MIN A RW          - Activity Tolerance   Pt completed 5 minutes on the ergometer, forwards, with MIN-MOD resistance to increase UB strength and activity tolerance for integration into functional tasks. 2 rest breaks required throughout d/t shoulder fatigue. - Activity Tolerance - Range of Motion   Pt participated in shoulder arc activity while seated in w/c to address B shoulder strength and AROM for integration into functional tasks. Pt moved 12x2 rings R > L with RUE, then L > R using LUE. Pt required rest break between sets. Height of arc 20.5. Education   Functional Transfer Training and Safety Awareness     Assessment: Patient w/ good activity tolerance today and continues to progress w/ functional mobility and w/c use. Pt demonstrated good participation in OT treatment. Pt continues to benefit from skilled OT services to address remaining deficits and progress toward baseline level of independence and safety. Patient ended session seated in recliner with call bell and needs within reach. Plan: Continue OT POC.      Maurilio Killian, OT   11/22/2022

## 2022-11-22 NOTE — PROGRESS NOTES
OT DISCHARGE REPORT    Time In 0915   Time Out 0959     GOALS:  Short Term Goals:  Time Frame for Short Term Goals : 7 days   STG 1: Patient will dress UB with Setup Assistance using AE/DME PRN. Goal not met 11/16/22, Goal Met 11/22/22  STG 2: Patient will dress LB with Supervision or Touching Assistance using AE/DME PRN. Goal not met 11/16/22, Goal progressing 11/22/22  STG 3: Patient will don footwear with Substantial/Maximum Assistance using AE/DME PRN. Goal met 11/16/22  STG 4: Patient will bathe with Partial/Moderate Assistance using AE/DME PRN. Goal not met 11/16/22, Goal not met 11/22/22  STG 5: Patient will toilet with Substantial/Maximum Assistance using AE/DME PRN. Goal met 11/16/22      Long Term Goals:  Time Frame for Long Term Goals : 14 days   LTG 1: Patient will dress UB with [place] using AE/DME PRN. Goal progressing 11/22/22  LTG 2: Patient will dress LB with Setup Assistance using AE/DME PRN. Goal progressing 11/22/22  LTG 3: Patient will don footwear with Partial/Moderate Assistance using AE/DME PRN. Goal met 11/22/22  LTG 4: Patient will bathe with Supervision or Touching Assistance using AE/DME PRN. Goal progressing 11/22/22  LTG 5: Patient will toilet with Partial/Moderate Assistance using AE/DME PRN. Goal met 11/22/22  LTG 6: Patient/caregiver will verbalize understanding of OT recommendations regarding ADLs, functional transfers, home safety, AE/DME, energy conservation, safety awareness, activity tolerance, and follow-up therapy to increase safety with functional tasks upon discharge. Goal met 11/22/22         Subjective: \"I'm ready to be home. \" Pt agreeable to treatment. Pain: No pain expressed. Interdisciplinary Communication: Collaborated with Anjelica d/c.    Precautions at Discharge: Falls and Poor Safety Awareness    BP (!) 160/63   Pulse 69   Temp 97.5 °F (36.4 °C) (Oral)   Resp 18   Ht 5' 10\" (1.778 m)   Wt 165 lb 2 oz (74.9 kg)   SpO2 95%   BMI 23.69 kg/m² MOBILITY:   Score Comments   Rolling Independent (Increased time and effort to complete with altered body mechanics. Using bed rail. Whole body rigidity with bradykineisa  noted)    Supine to Sit Supervision or touching assistance (Increased time and effort to complete with altered body mechanics, bradykinesia and whole body rigidity. Using bed rail to complete) SBA for safety   Sit to Supine Supervision or touching assistance (Increased time and effort to complete with altered body mechanics, bradykinesia and whole body rigidity. Using bed rail to complete)    Sit to Stand Supervision or touching assistance (Increased time and effort to complete with altered body mechanics, bradykinesia and whole body rigidity. Completed with RW) CGA w/ RW and extra time    Transfer Assist Supervision or touching assistance (SPT with RW. flexed posture with shuffling steps. INcreased time and effort to complete with cues for safe body mechanics) CGA for Safety w/ RW   Ambulation Supervision or Touching Assistance CGA w/ RW         ACTIVITIES OF DAILY LIVING:    Initial Score Initial Comments Current Score Current Comments   Eating Setup or clean-up assistance S/U and verbal encouragement to initiate eating Setup or clean-up assistance     Oral Hyigene Partial/moderate assistance MIN A w/ VCs for orientation Setup or clean-up assistance     Bathing Partial/moderate assistance MOD A Setup or clean-up assistance S/U w/ sponge bath EOB   Upper Body  Dressing Partial/moderate assistance MIN A Setup or clean-up assistance     Lower Body Dressing Partial/moderate assistance MOD A to don pants while supine in bed, Pt assisted w/ threading over feet.  Required max cuing to grab bed rail to roll and don pants to waist  Partial/moderate assistance      Donning/[place] Footwear Dependent  S/U w/ cues for initiating task and efficient technique Setup or clean-up assistance     Toilet Transfer Substantial/maximal assistance   Partial/moderate assistance     Toileting Hygiene Dependent MOD - MAX A Substantial/maximal assistance     Initial Score: Patient's lowest score within first 72 hrs of stay as gathered by OT. Current Score: Patient's average performance level over the last 48 hours. Discharge Location: Home  Recommended Continued Therapy/Supervision: HHOT, 24 Hour Supervision  Recommended Equipment: shower chair   Safety/Functional Level: Pt completes ADLs with S/U to MOD A using Rolling Walker and Wheelchair . Pt requires CGA-MIN A for ambulation with RW and mobility related ADLs. Pt will be dependent  with IADLs such as home management, cooking and cleaning. Family Training: N/A. [facility] Residual Deficits: Decreased strength, safety awareness, coordination. Progress Over LOS: Patient demonstrates good progress with S/U- MOD A for ADL and CGA for functional transfers, see above for details. Patient continues to require skilled [address] [place] [address] services to address remaining deficits stated above. Summary of Session:   Focus of session was on morning ADL routine and discharge assessment. Collaborated with PT and confirmed patient is ready for discharge. Patient ended session seated in recliner with call bell and needs within reach.      Kelly Watkins OT   11/22/2022

## 2022-11-22 NOTE — PROGRESS NOTES
Comprehensive Nutrition Assessment    Type and Reason for Visit: RD Nutrition Re-Screen/LOS, Initial  Length of Stay    Nutrition Recommendations/Plan:   Meals and Snacks:  Diet: Continue current order  Nutrition Supplement Therapy:  Medical food supplement therapy:  Continue Ensure Enlive twice per day (this provides 350 kcal and 20 grams protein per bottle)     Malnutrition Assessment:  Malnutrition Status: No malnutrition     Nutrition Assessment:  Nutrition History: unable to obtain - pt states he does not remember how he eats at home. Noting hx of dementia. Do You Have Any Cultural, Anglican, or Ethnic Food Preferences?: No   Nutrition Background:       PMH significant for vascular dementia, HTN, CKD, and bladder cx. Pt was previously admitted for COVID-19 on 10/29/2022. Now in rehab. Nutrition Interval:  Pt sitting in chair at time of RD visit. He reports good appetite and no recent weight loss. He states he drinks at least one of the ensure supplements he receives daily. Of note, pt is hesitant with some of his answers and does have hx of dementia. RD discussed pt with JOSE A Velazco who states pts wife brings food in for him and he eats very well whenever she does. No acute nutrition concerns at this time. RD will continue to follow. Current Nutrition Therapies:  ADULT ORAL NUTRITION SUPPLEMENT; AM Snack, PM Snack; Standard High Calorie/High Protein Oral Supplement  ADULT DIET; Easy to Chew; 3 carb choices (45 gm/meal);  Pt is allowed to have grilled cheese sandwiches    Current Intake:   Average Meal Intake: % (JOSE A rodríguez states pt eats very well when his wife brings food in) Average Supplements Intake: 51-75% (Pt reports drinking one ensure daily)      Anthropometric Measures:  Height: 5' 10\" (177.8 cm)  Current Body Wt: 165 lb 2 oz (74.9 kg) (11/9/2022), Weight source: Not Specified  BMI: 23.7, Normal Weight (BMI 22.0 to 24.9) age over 72  Admission Body Weight: 165 lb 2 oz (74.9 kg)  Ideal Body Weight (Kg) (Calculated): 75 kg (166 lbs), 99.5 %  Usual Body Wt:  , Percent weight change:         Edema:    BMI Category Normal Weight (BMI 22.0 to 24.9) age over 72  Estimated Daily Nutrient Needs:  Energy (kcal/day): 2242-4904 (20-25 kcal/kg) (Kcal/kg Weight used: 74.9 kg Current  Protein (g/day): 75-90 (1.0-1.2 g/kg) Weight Used: (Current) 74.9 kg  Fluid (ml/day):   (1 ml/kcal)    Nutrition Diagnosis:   No nutrition diagnosis at this time     Nutrition Interventions:   Food and/or Nutrient Delivery: Continue Current Diet     Coordination of Nutrition Care: Continue to monitor while inpatient  Plan of Care discussed with: JOSE A March, patient    Goals:       Active Goal: Meet at least 75% of estimated needs, by next RD assessment       Nutrition Monitoring and Evaluation:      Food/Nutrient Intake Outcomes: Food and Nutrient Intake, Supplement Intake  Physical Signs/Symptoms Outcomes: Weight, Meal Time Behavior    Discharge Planning:    Continue current diet    Dorothy Allison RD

## 2022-11-23 ENCOUNTER — CARE COORDINATION (OUTPATIENT)
Dept: CARE COORDINATION | Facility: CLINIC | Age: 82
End: 2022-11-23

## 2022-11-23 VITALS
HEIGHT: 70 IN | DIASTOLIC BLOOD PRESSURE: 61 MMHG | BODY MASS INDEX: 23.64 KG/M2 | OXYGEN SATURATION: 98 % | TEMPERATURE: 97.8 F | RESPIRATION RATE: 16 BRPM | WEIGHT: 165.12 LBS | SYSTOLIC BLOOD PRESSURE: 118 MMHG | HEART RATE: 70 BPM

## 2022-11-23 LAB
GLUCOSE BLD STRIP.AUTO-MCNC: 84 MG/DL (ref 65–100)
SERVICE CMNT-IMP: NORMAL

## 2022-11-23 PROCEDURE — 99239 HOSP IP/OBS DSCHRG MGMT >30: CPT | Performed by: PHYSICAL MEDICINE & REHABILITATION

## 2022-11-23 PROCEDURE — 6370000000 HC RX 637 (ALT 250 FOR IP): Performed by: PHYSICAL MEDICINE & REHABILITATION

## 2022-11-23 PROCEDURE — 6360000002 HC RX W HCPCS: Performed by: PHYSICAL MEDICINE & REHABILITATION

## 2022-11-23 PROCEDURE — 82962 GLUCOSE BLOOD TEST: CPT

## 2022-11-23 RX ADMIN — CITALOPRAM HYDROBROMIDE 20 MG: 20 TABLET ORAL at 08:00

## 2022-11-23 RX ADMIN — ATORVASTATIN CALCIUM 20 MG: 10 TABLET, FILM COATED ORAL at 07:59

## 2022-11-23 RX ADMIN — MEMANTINE 5 MG: 5 TABLET ORAL at 08:00

## 2022-11-23 RX ADMIN — PANTOPRAZOLE SODIUM 40 MG: 40 TABLET, DELAYED RELEASE ORAL at 08:00

## 2022-11-23 RX ADMIN — ENOXAPARIN SODIUM 40 MG: 100 INJECTION SUBCUTANEOUS at 08:00

## 2022-11-23 RX ADMIN — TAMSULOSIN HYDROCHLORIDE 0.4 MG: 0.4 CAPSULE ORAL at 08:00

## 2022-11-23 RX ADMIN — ALOGLIPTIN 12.5 MG: 6.25 TABLET, FILM COATED ORAL at 07:59

## 2022-11-23 NOTE — CARE COORDINATION
Patient has discharge for today. Patient has a referral made for [facility]. Referral completed and placed on discharge information. Patient DME's has been delivered. Family will transport patient home. Family has requested a 11 am for patient to be discharged home. Patient has met all treatment goals / milestones. CM will continue to monitor and remain available for any needs, concerns or questions. 11/10/22 1123   Service Assessment   Patient Orientation Person; Alert and Oriented;Place;Situation;Self   Cognition Alert   History Provided By Medical Record;Spouse   Primary Caregiver Spouse   Support Systems Spouse/Significant Other   Last Visit to PCP Within last 3 months   Prior Functional Level Independent in ADLs/IADLs   Current Functional Level Mobility;Assistance with the following:;Bathing;Dressing   Can patient return to prior living arrangement Yes   Ability to make needs known: Good   Family able to assist with home care needs: Yes   Would you like for me to discuss the discharge plan with any other family members/significant others, and if so, who? Yes   Financial Resources Medicare   Social/Functional History   Lives With Spouse   Type of Home House   Bathroom Shower/Tub Tub/Shower unit   [address] Help From Family   ADL Assistance Independent   Active  No   Discharge Planning   Current Services Prior To Admission Durable Medical Equipment   Current DME Prior to [address] Medications No   One/Two Story Residence One story   History of falls? 1   Services At/After Discharge   Transition of Care Consult (CM Consult) Discharge [address] Provided?  No   Condition of Participation: Discharge Planning   The Plan for Transition of Care is related to the following treatment goals: Pt will return home with supportive care vs rehab services. Name of the Patient Representative who was provided with the Choice of Provider and agrees with the Discharge Plan? spouse   The Patient and/Or Patient Representative agree with the Discharge Plan? Yes   Freedom of Choice list was provided with basic dialogue that supports the patient's individualized plan of care/goals, treatment preferences, and shares the quality data associated with the providers?   Yes

## 2022-11-23 NOTE — DISCHARGE SUMMARY
Pt discharged home. Transported safely by [facility] RN to vehicle. Son transporting pt and wife home. Pt and wife educated about home meds, pharmacy pickup, and follow-up appointments.

## 2022-11-23 NOTE — CARE COORDINATION
Care Transitions Outreach Attempt    Call within 2 business days of discharge: Yes   Attempted to reach patient for transitions of care follow up. Unable to reach patient. Will attempt to contact patient next business day. Patient discharged from inpatient rehab 2022 with home health services. Patient: Nesha Mayes Patient : 1940 MRN: [id number]    Last Discharge 30 [facility]       Date Complaint Diagnosis Description Type Department Provider    22  Vascular dementia without behavioral disturbance [facility]) Admission (Discharged) Pablo Guzman MD              Was this an external facility discharge?  No Discharge Facility: [address] [place] [address]    Noted following upcoming appointments from discharge chart review:   [facility] follow up appointment(s):   Future Appointments   Date Time Provider Xochilt Bonds   2022  1:00 PM Rhonda Rosas DO [facility] AMB   2022  2:00 PM MALU [facility] AMB   3/22/2023 10:30 AM [facility] AM[facility]   3/28/2023  1:45 PM Pedro Luis Fleming DO [facility] AMB     Non-[facility] follow up appointment(s): n/a

## 2022-11-23 NOTE — DISCHARGE SUMMARY
[facility]  Qian Price, [address]  [place], [address] [facility]  Tel: [phone]    PHYSICAL MEDICINE & REHABILITATION DISCHARGE SUMMARY     Date: 11/23/2022  Admission Date: 11/9/2022  Discharge Date: 11/23/2022    S  Primary Care Provider: Taye Parker DO    Admission Condition: good  Discharged Condition: good    Admission Diagnosis: COVID-19 virus infection   Acute Respiratory Failure  Pneumonia  Hypoxia  HTN  Bradycardia  CKD   Bladder cancer  DM II  Pain  DVT prophylaxis     Rehabilitation Diagnosis:  Mobility impairments  Self Care impairments  Dyspnea    Hospital Course: The patient was admitted to [address] [place] [address] by Qina Price MD on 11/9/2022 s/p Covid 19 and physical debility    History of Present Illness/ Hospital course: This is an 78 YO with a PMH of vascular dementia, HTN, CKD and bladder cancer who was admitted 10/29/22 with acute respiratory failure secondary to COVID-19. He was started on dexamethasone, baricitinib and supplemental O2. CXR unremarkable. CT chest was negative for PE but does have mild infiltrate in DANI. He was weaned to RA but then began to have increased O2 requirements up to 5L O2. Repeat CXR on 10/30 showed bilateral atypical appearing infiltrates. He began to desat during ambulation requiring 7L O2 with exertion. On 11/6 rapid response was called for syncope and bradycardia, EKG showed bigeminy with patient regaining consciousness quickly, Cardiology was consulted without further concerning events on telemetry. Physical therapy was initiated and patient was starting to mobilize. He continues with significant mobility and self care impairments. It was felt he would benefit from comprehensive acute inpatient rehabilitation, continued close medical supervision and management prior to returning home.  The medical stability and these recent medical events are not expected to interfere with the patient's ability to tolerate the intensity of services in acute rehab. REHABILITATION COURSE:   Debility and respiratory failure from COVID infection (treated with dexamethasone, baricitinib and supplemental O2), with persistent hypoxia     Plan / Recommendations / Medical Decision Making:      Continue daily physician / PA medical management:     COVID-19 virus infection / ARF / pneumonia - on 2L O2, wean as tolerated, 88-91% on 2L O2 sats at rest.  -11/14 sats 89% this AM on 2L O2, encourage IS; lungs clear  -11/15 sats 96% but on high-flow 3L; lungs clear  -11/16 on RA this AM, sats 92-94% at rest  -11/17 desats with activity, back on 2L NC, sats 93%  11/21 RA sates 95%  11/22 I turned off O2, was on 1 liter when I saw him. Sats 95% (recorded on 2 L but it was actually off)     Hypertension / bradycardia - HR normal x last 24h, 61-79, /58, asymptomatic. Continues on telemetry, on amlodipine, losartan with parameters; Rapid Response called during acute hospitalization for syncope and bradycardia with transfer, Cardiology consulted without further events. If recurs, will re-consult. -11/14 HR 62-81, no bradycardic events; BP varies 95//53, monitor for now and continue current meds with parameters  -11/15 HR 36 this AM, /53; recheck HR manually = 80  -11/16 HR 66 this AM, /50; last night  /112 (??) doubt that value; previous were 137/48, 144/53, stable; pt was very sleepy all day yesterday, likely due to bradycardia and anemia  -11/17 /74, HR 70s; improved  -11/18 /35 this AM, recheck 1h later = 125/52, HR 58-61  -11/19 /50, HR 59-72  -11/21 /48 this am. Range 119-140/39-48   -11/22 [id number]/34-99; HR 60s-70s ; no change in meds. Diastolic low. Bp varies.    -11/23 138/52     CKD / h/o bladder cancer - monitor labs, Cr 1.5 (11/8).  -11/15 creatinine 1.6, stable  -11/18 Cr 1.5; 11/21 f/u in am  -11/22 creat 1.6 stable     Diabetes mellitus - BS 88, 93, this AM (11/13); yesterday 64, 59, 111, 67, 140, 227, improving on decreased Lantus 8u QHS, Humalog SSI.  -11/14 BS 73 this AM, 127, 117, 88, 93; decrease Lantus to 6u  -11/15  this AM, 230 at bedtime, 127 at supper and 121 at lunch; continue current Lantus for now ( 6u); consider increasing Nesina  -11/16 BS 98 this AM, 173 last night, 202 at supper, 95 at lunch; increase Nesina to 12.5mg daily; continue Lantus 6u; can possibly get off insulin, which would be easier to manage at home  -11/17 BS 99 this AM, 97 at lunch today; last night at bedtime 248, at supper 192; continue current treatment; remains high at bedtime and supper  -11/18  this AM; yesterday's values from AM to PM: 99, 97, 227, 91  -11/19 BS 98 this AM; yesterday's values from AM to PM: 102, 139, 316, 217, no change in meds  -11/21 BS 74 this am/93 at lunch and 193 at supper;  138 last noc and 240 at supper. Overall improved  --11/21 BS 82 this am, 241 at  bedtime, 193 at supper, 93 at lunch and 74 yesterday; doesn't eat breakfast, no noc time snack. Will need f/u with PCP. Would consider dropping Lantus and adding glipizide dosed before supper. Will try today. Cont Nesina  -11/23 BG this am not yet recorded bedtime BG was 159 improved     Pain mgmt - PRN Tylenol. DVT prophylaxis - continue Lovenox / SCDs. Depression / dementia - continue Celexa, Namenda. Tobacco use - continue nicotine replacement patch and stress abstinence.      Anemia - Hgb 9.2 (11/5), previously 8.8 (11/3), 10.1 (10/30).  -11/15 Hgb 7.8, check stool and repeat; could be contributing to his sleepiness and fatigue this AM  -11/16 stool pending; Hgb 8.0 this AM, monitor  -11/17 recheck in AM; stool not yet sent  -11/18 Hgb 7.6, drifting down, stool not sent  -11/21 no stool sent!!. Repeat Hgb in am  -11/22 hgb 7.9 stl improvement; hx of UGIB in June; currently no melena, no hematemesis ; stool never sent for hemoccult ; GI saw back then but was a poor candidate for endoscopy at that time due to [facility] then. He was placed on PPI then. Pts baseline over past month about 8.5- 9. Recommend outpt w/u  -11/23 hgb improved to 8.4      FUNCTIONAL LEVEL ON ADMISSION:  - mod A, toileting,bathing, UE and LE ADLs - dependent, mobility - mod A, ambulation short distances with RW and mod A       FUNCTIONAL LEVEL AT DISCHARGE:  PHYSICAL THERAPY DISCHARGE SUMMARY     TIME IN: 80  TIME OUT: 1115  Total Treatment Time:  44 Minutes      Precautions at discharge:   Falls, Poor Safety Awareness, and Confused      Problem List:    Decreased strength B LE  [x]     Decreased strength trunk/core  [x]     Decreased AROM   [x]     Decreased PROM  [x]     Decreased balance sitting  [x]     Decreased balance standing  [x]     Decreased endurance  [x]     Pain  []        Functional Limitations:   Decreased independence with bed mobility  [x]     Decreased independence with functional transfers  [x]     Decreased independence with ambulation  [x]     Decreased independence with stair negotiation  [x]               Objective:      Vital Signs:Visit Vitals  BP (!) 160/63   Pulse 69   Temp 97.5 °F (36.4 °C) (Oral)   Resp 18   Ht 5' 10\" (1.778 m)   Wt 165 lb 2 oz (74.9 kg)   SpO2 95%   BMI 23.69 kg/m²      02 at 2 lpm, 02 sat 91 to 94% and HR 46 to 92 and irregular during assessment     Pain level: No c/o pain during treatment  Pain location: NA  Pain interventions: NA     Patient education: Bed mobility training,transfer training, gait training, balance training,fall precautions, body mechanics,activity pacing, w/c mobility and parts management, Patient verbalizing understanding and demonstrating partial understanding of patient education. Recommend follow up education.      Interdisciplinary Communication: spoke with PTA and  regarding D/C plans     Cognition: Alert, able to follow commands,cooperating,participating, motivated to go home, flat affect with limited initiation of functional movement        Lower Extremity Function:    RLE           LLE   AROM Hip decreased 50%, knee and ankle  decreased 25% Hip decreased 50%, knee and ankle  decreased 25%   Fine Motor Coordination Impaired Impaired   Tone Rigidity noted Rigidity noted   Sensation Intact to light touch impaired proprioception during functional movement Intact to light touch impaired proprioception during functional movement       Bilateral LE hip strength +2/5 to -4/5, knee -4/5 to +4/5, ankle +4/5 to -4/5     0/5      No palpable muscle contraction  1/5      Palpable muscle contraction, no joint movement  2-/5    Less than full range of motion in gravity eliminated position  2/5      Able to complete full range of motion in gravity eliminated position  2+/5   Able to initiate movement against gravity  3-/5    More than half but not full range of motion against gravity  3/5      Able to complete full range of motion against gravity  3+/5   Completes full range of motion against gravity with minimal resistance  4-/5    Completes full range of motion against gravity with minimal-moderate resistance  4/5      Completes full range of motion against gravity with moderate resistance  4+/5   Completes full range of motion against gravity with moderate-maximum resistance  5/5      Completes full range of motion against gravity with maximum resistance     PRIMARY MODE OF LOCOMOTION: Ambulation       Functional Assessment:     Balance   Comments   Static Sitting Good:  Pt. able to maintain balance w/o UE support;  exhibits some postural sway     Dynamic Sitting Fair - accepts minimal challenge;  can maintain balance while turning head/trunk     Static Standing Fair:  Pt. requires UE support;  may need occasional min A     Dynamic Standing Poor - unable to accept challenge or move without LOB  With RW   Picking Up Object From Floor 3  Partial/moderate assistance (Increased time and effort to complete with bradykinesia and whole body rigidty noted)                      BED MOBILITY &TRANSFERS Initial Assessment    Discharge Assessment Comments   Rolling  88  Independent (Increased time and effort to complete with altered body mechanics. Using bed rail. Whole body rigidity with bradykineisa  noted)  Not attempted due to medical condition or safety concerns 6  Independent (Increased time and effort to complete with altered body mechanics. Using bed rail. Whole body rigidity with bradykineisa  noted)      Supine to Sit 88  Supervision or touching assistance (Increased time and effort to complete with altered body mechanics, bradykinesia and whole body rigidity. Using bed rail to complete)  Not attempted due to medical condition or safety concerns 4  Supervision or touching assistance (Increased time and effort to complete with altered body mechanics, bradykinesia and whole body rigidity. Using bed rail to complete)      Sit to to Supine 1  Dependent   4  Supervision or touching assistance (Increased time and effort to complete with altered body mechanics, bradykinesia and whole body rigidity. Using bed rail to complete)      Sit to Stand 1  Dependent   4  Supervision or touching assistance (Increased time and effort to complete with altered body mechanics, bradykinesia and whole body rigidity. Completed with RW)      Chair To/From Bed 1  Dependent   4  Supervision or touching assistance (SPT with RW. flexed posture with shuffling steps. INcreased time and effort to complete with cues for safe body mechanics)      Car Transfer 88  Partial/moderate assistance (SPT with RW, increased time and effort to complete with bradykinesia and whole body rigidity noted. Flexed posture with shuffling steps)  Not attempted due to medical condition or safety concerns 3  Partial/moderate assistance (SPT with RW, increased time and effort to complete with bradykinesia and whole body rigidity noted.  Flexed posture with shuffling steps)            WHEELCHAIR MOBILITY/MANAGEMENT Initial Assessment Discharge Assessment Comments   Able to Propel 48' w/ 2 Turns 88  Not attempted due to medical condition or safety concerns 80  Not attempted due to medical condition or safety concerns (Able to propel w/c 30 ft before onset of UE fatigue. Increased time and effort to complete with limited UE ROM with rigidity and bradykinesia limiting motion of UEs when propelling w/c)    Able to Propel 150' 88   Not attempted due to medical condition or safety concerns 80  Not attempted due to medical condition or safety concerns (as noted above)    Wheelchair set up assistance required   [employer]           AMBULATION Initial Assessment Discharge Assessment Comments   Assistive device   RW     Walk 10' 88  Partial/moderate assistance (SLow shuffling gait with flexed posture with tendency to keep RW too far in front of self. Requires assistance for safe positioning of RW)  Not attempted due to medical condition or safety concerns 3  Partial/moderate assistance (SLow shuffling gait with flexed posture with tendency to keep RW too far in front of self. Requires assistance for safe positioning of RW)      Walk 50' with 2 Turns 88  Partial/moderate assistance (SLow shuffling gait with flexed posture with tendency to keep RW too far in front of self. Tendency to move feet outside frame of RW when making turns. Requires assistance to manage safe positioning of RW)  Not attempted due to medical condition or safety concerns 3  Partial/moderate assistance (SLow shuffling gait with flexed posture with tendency to keep RW too far in front of self. Tendency to move feet outside frame of RW when making turns. Requires assistance to manage safe positioning of RW)      Walk 150' 88  Not attempted due to medical condition or safety concerns 80  Not attempted due to medical condition or safety concerns (Unable to ambualte > 60 ft due to fatigue) Not attempted due to medical condition or safety concerns (Unable to ambualte > 60 ft due to fatigue)   Walking 10' on Unlevel Surface 88  Partial/moderate assistance (Slow shuffling gait with flexed posture going up/down 10 ft ramp. Tendency to keep RW too far in front of self. Requires assist for safe positioning of RW. 4 min sitting rest break after going up ramp)  Not attempted due to medical condition or safety concerns 3  Partial/moderate assistance (Slow shuffling gait with flexed posture going up/down 10 ft ramp. Tendency to keep RW too far in front of self. Requires assist for safe positioning of RW. 4 min sitting rest break after going up ramp)            STEPS/STAIRS Initial Assessment Discharge Assessment Comments   1 Curb Step 88  Partial/moderate assistance (up/down 1 step with hand rails and min to mod assist. Flexed posture with decreased step clearance and impaired foot placement. INcreased time and effort to complete)  Not attempted due to medical condition or safety concerns 3  Partial/moderate assistance (up/down 1 step with hand rails and min to mod assist. Flexed posture with decreased step clearance and impaired foot placement. INcreased time and effort to complete)      4 Steps 88  Partial/moderate assistance (SLow reciprocating pattern going up steps and single step at a time going down steps. Increased time and effort to complete with 5 min sitting rest break after going up steps. Flexed posture with decreased step clearance and impaired step placement)  Not attempted due to medical condition or safety concerns 3  Partial/moderate assistance (SLow reciprocating pattern going up steps and single step at a time going down steps. Increased time and effort to complete with 5 min sitting rest break after going up steps.  Flexed posture with decreased step clearance and impaired step placement)      12 Steps 9   Not applicable 88  Not attempted due to medical condition or safety concerns (Unable due to fatigue after going up/down 4 steps)    Ramp   Min A         Patient to OT at end of treatment            PHYSICAL THERAPY PLAN OF CARE     LTGs:Short Term Goals:  Pt will demonstrate roll left & right & transition supine<>sit with Mod A in 1 week (MET)  2. Pt. will transfer from bed to/from chair with Mod A using the least restrictive device in 1 week  (MET)  3. Pt. will ambulate with 25 feet with RW or LRAD and Mod A w/ Ax2 to follow w/ w/c in 1  week (MET)  4. Pt. Will will stand w/ B UE support x30 seconds w/ min A in 1 week (MET)  5. Pt. Will propel w/c 100 feet with Min A in 1 week(Not met  due to slow progress)     Long Term Goals: (Goals not met due to slow progress)  Pt will demonstrate roll left & right & transition supine<>sit with Independent in 2 weeks  2. Pt. will transfer from bed to/from chair with CGA using the least restrictive device in 2 weeks   3. Pt. will ambulate with 100 feet with RW or LRAD and CGA in 2  weeks  4. Pt. Will ambulate up/down 20' ramp with RW and Min A in 2 weeks  5. Pt. Will propel w/c 150 feet with Supervision/Standby Assist in 2 weeks. Pt would benefit from continued skilled physical therapy in order to improve independent functional mobility within the home with use of least restrictive device. Interventions may include range of motion (AROM, PROM B LE/trunk), motor function (B LE/trunk strengthening/coordination), activity tolerance (vitals, oxygen saturation levels), bed mobility training, balance activities, gait training (progressive ambulation program), and functional transfer training. HEP handout: Min assist with cues to complete LE HEP  SUPINE EXERCISES Sets Reps Comments   Ankle Pumps 2 10     Heel Slides 2 10     Hip Abduction 2 10     Short Arc Quad 2 10     Bridging 2 10        . Pt to be discharged 11/23/2022 with assistance provided by family.      Therapy Recommendations upon discharge:    PT, 24 hour supervision, assist with functional mobility at home   Equipment needs at discharge:  Patient has RW, recommending wheelchair     Please see IRC; Interdisciplinary Eval, Care Plan, and Patient Education for further information regarding physical therapy discharge summary and plan of care. Yanci Díaz, PT  11/22/2022     OT DISCHARGE REPORT     Time In 0915   Time Out 0959      GOALS:  Short Term Goals:  Time Frame for Short Term Goals : 7 days   STG 1: Patient will dress UB with Setup Assistance using AE/DME PRN. Goal not met 11/16/22, Goal Met 11/22/22  STG 2: Patient will dress LB with Supervision or Touching Assistance using AE/DME PRN. Goal not met 11/16/22, Goal progressing 11/22/22  STG 3: Patient will don footwear with Substantial/Maximum Assistance using AE/DME PRN. Goal met 11/16/22  STG 4: Patient will bathe with Partial/Moderate Assistance using AE/DME PRN. Goal not met 11/16/22, Goal not met 11/22/22  STG 5: Patient will toilet with Substantial/Maximum Assistance using AE/DME PRN. Goal met 11/16/22      Long Term Goals:  Time Frame for Long Term Goals : 14 days   LTG 1: Patient will dress UB with Isabela using AE/DME PRN. Goal progressing 11/22/22  LTG 2: Patient will dress LB with Setup Assistance using AE/DME PRN. Goal progressing 11/22/22  LTG 3: Patient will don footwear with Partial/Moderate Assistance using AE/DME PRN. Goal met 11/22/22  LTG 4: Patient will bathe with Supervision or Touching Assistance using AE/DME PRN. Goal progressing 11/22/22  LTG 5: Patient will toilet with Partial/Moderate Assistance using AE/DME PRN. Goal met 11/22/22  LTG 6: Patient/caregiver will verbalize understanding of OT recommendations regarding ADLs, functional transfers, home safety, AE/DME, energy conservation, safety awareness, activity tolerance, and follow-up therapy to increase safety with functional tasks upon discharge. Goal met 11/22/22           Subjective: \"I'm ready to be home. \" Pt agreeable to treatment. Pain: No pain expressed. Interdisciplinary Communication: Collaborated with Anjelica d/jamie. Precautions at Discharge: Falls and Poor Safety Awareness     BP (!) 160/63   Pulse 69   Temp 97.5 °F (36.4 °C) (Oral)   Resp 18   Ht 5' 10\" (1.778 m)   Wt 165 lb 2 oz (74.9 kg)   SpO2 95%   BMI 23.69 kg/m²       MOBILITY:    Score Comments   Rolling Independent (Increased time and effort to complete with altered body mechanics. Using bed rail. Whole body rigidity with bradykineisa  noted)     Supine to Sit Supervision or touching assistance (Increased time and effort to complete with altered body mechanics, bradykinesia and whole body rigidity. Using bed rail to complete) SBA for safety   Sit to Supine Supervision or touching assistance (Increased time and effort to complete with altered body mechanics, bradykinesia and whole body rigidity. Using bed rail to complete)     Sit to Stand Supervision or touching assistance (Increased time and effort to complete with altered body mechanics, bradykinesia and whole body rigidity. Completed with RW) CGA w/ RW and extra time    Transfer Assist Supervision or touching assistance (SPT with RW. flexed posture with shuffling steps.  INcreased time and effort to complete with cues for safe body mechanics) CGA for Safety w/ RW   Ambulation Supervision or Touching Assistance CGA w/ RW          ACTIVITIES OF DAILY LIVING:     Initial Score Initial Comments Current Score Current Comments   Eating Setup or clean-up assistance S/U and verbal encouragement to initiate eating Setup or clean-up assistance    Oral Hyigene Partial/moderate assistance MIN A w/ VCs for orientation Setup or clean-up assistance    Bathing Partial/moderate assistance MOD A Setup or clean-up assistance S/U w/ sponge bath EOB   Upper Body  Dressing Partial/moderate assistance MIN A Setup or clean-up assistance    Lower Body Dressing Partial/moderate assistance MOD A to don pants while supine in bed, Pt assisted w/ threading over feet. Required max cuing to grab bed rail to roll and don pants to waist  Partial/moderate assistance      Donning/[place] Footwear Dependent  S/U w/ cues for initiating task and efficient technique Setup or clean-up assistance    Toilet Transfer Substantial/maximal assistance  Partial/moderate assistance    Toileting Hygiene Dependent MOD - MAX A Substantial/maximal assistance    Initial Score: Patient's lowest score within first 72 hrs of stay as gathered by OT. Current Score: Patient's average performance level over the last 48 hours. Discharge Location: Home  Recommended Continued Therapy/Supervision: HHOT, 24 Hour Supervision  Recommended Equipment: shower chair   Safety/Functional Level: Pt completes ADLs with S/U to MOD A using Rolling Walker and Wheelchair . Pt requires CGA-MIN A for ambulation with RW and mobility related ADLs. Pt will be dependent  with IADLs such as home management, cooking and cleaning. Family Training: N/AMary Apple Residual Deficits: Decreased strength, safety awareness, coordination. Progress Over LOS: Patient demonstrates good progress with S/U- MOD A for ADL and CGA for functional transfers, see above for details. Patient continues to require skilled [facility] services to address remaining deficits stated above. Summary of Session:   Focus of session was on morning ADL routine and discharge assessment. Collaborated with PT and confirmed patient is ready for discharge. Patient ended session seated in recliner with call bell and needs within reach.       Mendel Forte OT   11/22/2022                      HOME ARCHITECTURE:  lives with family, mobility and ADLs - mod I, household ambulation with RW    Past Medical History:   Diagnosis Date    Allergic rhinitis, cause unspecified 2/27/2014    Anemia, unspecified 2/27/2014    Atherosclerosis of renal artery ([facility] [address]     2001    CAD (coronary artery disease) 2001    4 vessel cabg, cardiac stents prior to this [facility]) 2013    bladder/ L kidney    Chronic airway obstruction, not elsewhere classified 2/27/2014    Chronic kidney disease     hx of CRF,  creatinine 1.8    Coronary atherosclerosis of native coronary vessel 2/27/2014    Depressive disorder, not elsewhere classified 2/27/2014    Diabetes ([facility] [address] dx 2000    type 2, oral med, does not check his glucose, A1C 6.4   9/9/2019, states very rare episode hypo    Diverticulosis of colon (without mention of hemorrhage) 2/27/2014    Dyslipidemia     controlled with med    Esophagitis, unspecified 2/27/2014    Essential and other specified forms of tremor 2/27/2014    Essential hypertension, benign 2/27/2014    GERD (gastroesophageal reflux disease)     controlled with omeprazole    Gross hematuria 2/27/2014    Hematuria, unspecified 2/27/2014    Hypertension     controlled with meds    Malignant neoplasm of bladder, 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     Malignant neoplasm of trigone of urinary bladder (HCC) 2/27/2014    Microscopic hematuria 2/27/2014    Osteoarthrosis, unspecified whether generalized or localized, unspecified site 2/27/2014    Other peripheral vascular disease(443.89) 2/27/2014    Polyneuropathy in diabetes(357.2) 2/27/2014    Proteinuria 2/27/2014    Psychiatric disorder     gets \"aggravated\", treated with Celexa    PUD (peptic ulcer disease) 1970's    Pure hypercholesterolemia 2/27/2014    Renal artery stenosis ([facility] 2/27/2014    Renal sclerosis, unspecified 2/27/2014    left    Respiratory insufficiency 1/13/2016    Stroke ([facility]     CVA x 2, (last was fall of 2011),  no residual weakness; L eye impaired    Tobacco use disorder 2/27/2014    Unspecified gastritis and gastroduodenitis without mention of hemorrhage 2/27/2014    Unspecified hereditary and idiopathic peripheral neuropathy 2/27/2014    Unspecified sleep apnea     does not wear cpap    Unspecified transient cerebral ischemia 2/27/2014      Past Surgical History: Procedure Laterality Date    CARDIAC CATHETERIZATION      stents prior to CABG    COLONOSCOPY      KIDNEY REMOVAL Left     LIPOMA RESECTION  10/07/2019    LA CARDIAC SURG PROCEDURE UNLIST  2001    4 vessel CABG, cardiac stents    UROLOGICAL SURGERY      TURBT    VASCULAR SURGERY  2001    renal stent in L kidney      Family History   Problem Relation Age of Onset    Diabetes Mother     Heart Disease Mother     Heart Attack Mother         Myocardial Infarction    Stroke Father     Alcohol Abuse Father     Diabetes Son       Social History     Tobacco Use    Smoking status: Every Day     Packs/day: 1.00     Types: Cigarettes    Smokeless tobacco: Never   Substance Use Topics    Alcohol use: No     Allergies   Allergen Reactions    Aspirin-Dipyridamole Er Headaches    Beta Adrenergic Blockers     Ciprofloxacin Other (See Comments)     weakness    Donepezil Diarrhea    Lisinopril Cough      Prior to Admission medications    Medication Sig Start Date End Date Taking?  Authorizing Provider   citalopram (CELEXA) 20 MG tablet Take 1 tablet by mouth daily 11/23/22  Yes Bakari Khanna MD   glipiZIDE (GLUCOTROL) 5 MG tablet Take 1 tablet by mouth daily (before dinner) 11/23/22  Yes Bakari Khanna MD   simvastatin (ZOCOR) 40 MG tablet Take 1 tablet by mouth nightly 11/22/22  Yes Bakari Khanna MD   losartan (COZAAR) 100 MG tablet Take 1 tablet by mouth daily 11/23/22  Yes Bakari Khanna MD   amLODIPine (NORVASC) 10 MG tablet Take 1 tablet by mouth daily 11/23/22  Yes Bakari Khanna MD   tamsulosin (FLOMAX) 0.4 MG capsule Take 1 capsule by mouth daily 11/23/22  Yes Bakari Khanna MD   memantine (NAMENDA) 5 MG tablet Take 1 tablet by mouth 2 times daily 11/22/22  Yes Bakari Khanna MD   pantoprazole (PROTONIX) 40 MG tablet Take 1 tablet by mouth daily 11/23/22  Yes Bakari Khanna MD   alogliptin (NESINA) 6.25 MG TABS tablet Take 6.25 mg by mouth daily   Yes Historical Provider, MD   olopatadine (PATANOL) 0.1 % ophthalmic solution INSTILL 1 DROP IN BOTH EYES TWICE A DAY 4/20/22   Historical Provider, MD       Lab Review:   Recent Results (from the past 72 hour(s))   POCT Glucose    Collection Time: 11/20/22 11:34 AM   Result Value Ref Range    POC Glucose 127 (H) 65 - 100 mg/dL    Performed by: Julia    POCT Glucose    Collection Time: 11/20/22  5:05 PM   Result Value Ref Range    POC Glucose 240 (H) 65 - 100 mg/dL    Performed by: Philip Caba    POCT Glucose    Collection Time: 11/20/22  8:51 PM   Result Value Ref Range    POC Glucose 138 (H) 65 - 100 mg/dL    Performed by: Julia    POCT Glucose    Collection Time: 11/21/22  5:43 AM   Result Value Ref Range    POC Glucose 74 65 - 100 mg/dL    Performed by: Laura    POCT Glucose    Collection Time: 11/21/22 11:59 AM   Result Value Ref Range    POC Glucose 93 65 - 100 mg/dL    Performed by:  Farhat    POCT Glucose    Collection Time: 11/21/22  4:06 PM   Result Value Ref Range    POC Glucose 193 (H) 65 - 100 mg/dL    Performed by: [facility] DR LOKESH MCCABE    POCT Glucose    Collection Time: 11/21/22  8:55 PM   Result Value Ref Range    POC Glucose 241 (H) 65 - 100 mg/dL    Performed by: [facility] DR LOKESH MCCABE    Comprehensive Metabolic Panel w/ Reflex to MG    Collection Time: 11/22/22  5:09 AM   Result Value Ref Range    Sodium 147 (H) 133 - 143 mmol/L    Potassium 3.5 3.5 - 5.1 mmol/L    Chloride 114 (H) 101 - 110 mmol/L    CO2 30 21 - 32 mmol/L    Anion Gap 3 2 - 11 mmol/L    Glucose 91 65 - 100 mg/dL    BUN 22 8 - 23 MG/DL    Creatinine 1.60 (H) 0.8 - 1.5 MG/DL    Est, Glom Filt Rate 42 (L) >60 ml/min/1.73m2    Calcium 9.0 8.3 - 10.4 MG/DL    Total Bilirubin 0.3 0.2 - 1.1 MG/DL    ALT 18 12 - 65 U/L    AST 14 (L) 15 - 37 U/L    Alk Phosphatase 70 50 - 136 U/L    Total Protein 5.6 (L) 6.3 - 8.2 g/dL    Albumin 2.3 (L) 3.2 - 4.6 g/dL    Globulin 3.3 2.8 - 4.5 g/dL    Albumin/Globulin Ratio 0.7 0.4 - 1.6     CBC    Collection Time: 11/22/22  5:09 AM   Result Value Ref Range    WBC 4.5 4.3 - 11.1 K/uL    RBC 3.13 (L) 4.23 - 5.6 M/uL    Hemoglobin 7.9 (L) 13.6 - 17.2 g/dL    Hematocrit 25.7 (L) 41.1 - 50.3 %    MCV 82.1 82 - 102 FL    MCH 25.2 (L) 26.1 - 32.9 PG    MCHC 30.7 (L) 31.4 - 35.0 g/dL    RDW 16.6 (H) 11.9 - 14.6 %    Platelets 280 167 - 133 K/uL    MPV 12.0 9.4 - 12.3 FL    nRBC 0.00 0.0 - 0.2 K/uL   Magnesium    Collection Time: 11/22/22  5:09 AM   Result Value Ref Range    Magnesium 2.0 1.8 - 2.4 mg/dL   POCT Glucose    Collection Time: 11/22/22  6:05 AM   Result Value Ref Range    POC Glucose 82 65 - 100 mg/dL    Performed by: [facility] DR LOKESH MCCABE    POCT Glucose    Collection Time: 11/22/22 11:56 AM   Result Value Ref Range    POC Glucose 83 65 - 100 mg/dL    Performed by: Reyna    Hemoglobin and Hematocrit    Collection Time: 11/22/22  4:29 PM   Result Value Ref Range    Hemoglobin 8.4 (L) 13.6 - 17.2 g/dL    Hematocrit 26.7 (L) 41.1 - 50.3 %   POCT Glucose    Collection Time: 11/22/22  8:48 PM   Result Value Ref Range    POC Glucose 159 (H) 65 - 100 mg/dL    Performed by: [facility] DR LOKESH MCCABE          Visit Vitals  BP (!) 138/52   Pulse 72   Temp 97.7 °F (36.5 °C) (Oral)   Resp 16   Ht 5' 10\" (1.778 m)   Wt 165 lb 2 oz (74.9 kg)   SpO2 97%   BMI 23.69 kg/m²        Discharge Exam:  General: Alert and age appropriately oriented x2   No acute cardiorespiratory distress. HEENT: Normocephalic, EOM intact. Oral mucosa moist without cyanosis. Lungs: Clear to auscultation bilaterally. Dec at bases but not taking big breaths for me  Respiration even and unlabored. Heart: Regular rate and rhythm, S1, S2. No murmurs, clicks, rub or gallops. Abdomen: Soft, non-tender, not distended. Bowel sounds normoactive. No organomegaly. Genitourinary: Deferred. Neuromuscular:        No gross focal motor deficits noted.   Cognitive impairments with delayed processing, STM, dec concentration but fcs   [place]   Skin/extremity: No rashes, no erythema. No calf tenderness B LE. No edema       DISCHARGE INSTRUCTIONS:   1. Diet: diabetic diet  2. Activity: activity as tolerated with 24 supervision and assistance to ambulate  3. Incision / wound care: none needed    Current Discharge Medication List        START taking these medications    Details   glipiZIDE (GLUCOTROL) 5 MG tablet Take 1 tablet by mouth daily (before dinner)  Qty: 60 tablet, Refills: 3           CONTINUE these medications which have CHANGED    Details   citalopram (CELEXA) 20 MG tablet Take 1 tablet by mouth daily  Qty: 30 tablet, Refills: 3      simvastatin (ZOCOR) 40 MG tablet Take 1 tablet by mouth nightly  Qty: 30 tablet, Refills: 2      losartan (COZAAR) 100 MG tablet Take 1 tablet by mouth daily  Qty: 30 tablet, Refills: 3      amLODIPine (NORVASC) 10 MG tablet Take 1 tablet by mouth daily  Qty: 30 tablet, Refills: 3      tamsulosin (FLOMAX) 0.4 MG capsule Take 1 capsule by mouth daily  Qty: 30 capsule, Refills: 3      memantine (NAMENDA) 5 MG tablet Take 1 tablet by mouth 2 times daily  Qty: 180 tablet, Refills: 1    Associated Diagnoses: Vascular dementia without behavioral disturbance (HCC)      pantoprazole (PROTONIX) 40 MG tablet Take 1 tablet by mouth daily  Qty: 30 tablet, Refills: 3           CONTINUE these medications which have NOT CHANGED    Details   alogliptin (NESINA) 6.25 MG TABS tablet Take 6.25 mg by mouth daily      olopatadine (PATANOL) 0.1 % ophthalmic solution INSTILL 1 DROP IN BOTH EYES TWICE A DAY             I have reviewed the patients controlled substance prescription history as maintained in the [address] [place] [address] prescription drug monitoring program (PDMP) so that prescription(s) for controlled substance, if any provided, could be given. REHABILITATION MANAGEMENT:   1. Devices: RW, WC, shower chair  2.  Consult: PT/OT/CM    DISPOSITION: home with  therapies      OUTPATIENT FOLLOW-UP:  Devan Hernandez, DO  [address]  [place] [address]  [phone]    Follow up in 1 week(s)      Kamilah Drummond DO  [address] [id number] [phone]                   Time spent in patient discharge planning and coordination: >35 minutes.

## 2022-11-25 ENCOUNTER — CARE COORDINATION (OUTPATIENT)
Dept: CARE COORDINATION | Facility: CLINIC | Age: 82
End: 2022-11-25

## 2022-11-25 NOTE — CARE COORDINATION
[facility] Care Transitions Initial Follow Up Call    Call within 2 business days of discharge: Yes    LPN Care Coordinator contacted the family by telephone to perform post hospital discharge assessment. Verified name and  with family as identifiers. Provided introduction to self, and explanation of the LPN Care Coordinator role. Patient: Aliya Yi Patient : 1940   MRN: [id number]  Reason for Admission: Acute respiratory failure with hypoxia  Discharge Date: 22 RARS: Readmission Risk Score: 23.9    Patient discharged from inpatient rehab 2022    Last Discharge  Street       Date Complaint Diagnosis Description Type Department Provider    22  Vascular dementia without behavioral disturbance [facility]) Admission (Discharged) Felipe Sánchez MD            Was this an external facility discharge? No Discharge Facility: [address] [place] [address] to be reviewed by the provider   Additional needs identified to be addressed with provider: No  none               Method of communication with provider: none. Spoke with spouse states doing ok. Spouse denies any increased shortness of breath. Spouse states she needs home health services in the home. Encouraged the spouse to call PCP to order home health services. Spouse states patient has a follow up appointment with PCP     LPN Care Coordinator reviewed discharge instructions with family who verbalized understanding. The family was given an opportunity to ask questions and does not have any further questions or concerns at this time. Were discharge instructions available to patient? Yes Reviewed appropriate site of care based on symptoms and resources available to patient including: PCP  When to call 911. The family agrees to contact the PCP office for questions related to their healthcare. Advance Care Planning:   Does patient have an Advance Directive: decision maker updated.     Medication reconciliation was performed with family, who verbalizes understanding of administration of home medications. Medications reviewed, 1111F entered: N/A    Was patient discharged with a pulse oximeter? no    Non-face-to-face services provided:  Obtained and reviewed discharge summary and/or continuity of care documents    Offered patient enrollment in the Remote Patient Monitoring (RPM) program for in-home monitoring: NA.    Care Transitions 24 Hour Call    Do you have any ongoing symptoms?: No  Do you have all of your prescriptions and are they filled?: Yes  Have you scheduled your follow up appointment?: Yes (Comment: PCP 11/30/2022)  How are you going to get to your appointment?: Car - family or friend to transport  Were you discharged with any Home Care or Post Acute Services: No  Do you feel like you have everything you need to keep you well at home?: Yes  Care Transitions Interventions         Follow Up  Future Appointments   Date Time Provider Xochilt Bonds   11/30/2022  1:00 PM Oliver Acevedo DO [facility] AM[facility]   12/12/2022  2:00 PM MALU CastroND [facility] AMB   3/22/2023 10:30 AM [facility] RESOURCE [facility] AM[facility]   3/28/2023  1:45 PM ePdro Luis Crawford DO [facility] AMB       LPN Care Coordinator provided contact information. Plan follow in 14 days  based on severity of symptoms and risk factors.   Plan for next call: follow-up appointment-Discuss any questions with plan of care and home health service referral.    Otilio Dickerson LPN

## 2022-11-30 ENCOUNTER — OFFICE VISIT (OUTPATIENT)
Dept: INTERNAL MEDICINE CLINIC | Facility: CLINIC | Age: 82
End: 2022-11-30

## 2022-11-30 ENCOUNTER — HOSPICE ADMISSION (OUTPATIENT)
Dept: HOSPICE | Facility: HOSPICE | Age: 82
End: 2022-11-30
Payer: MEDICARE

## 2022-11-30 VITALS — OXYGEN SATURATION: 92 % | HEART RATE: 69 BPM | SYSTOLIC BLOOD PRESSURE: 122 MMHG | DIASTOLIC BLOOD PRESSURE: 64 MMHG

## 2022-11-30 DIAGNOSIS — F02.C0 SEVERE LATE ONSET ALZHEIMER'S DEMENTIA WITHOUT BEHAVIORAL DISTURBANCE, PSYCHOTIC DISTURBANCE, MOOD DISTURBANCE, OR ANXIETY (HCC): ICD-10-CM

## 2022-11-30 DIAGNOSIS — Z09 HOSPITAL DISCHARGE FOLLOW-UP: ICD-10-CM

## 2022-11-30 DIAGNOSIS — R54 AGE-RELATED PHYSICAL DEBILITY: ICD-10-CM

## 2022-11-30 DIAGNOSIS — F01.50 VASCULAR DEMENTIA WITHOUT BEHAVIORAL DISTURBANCE (HCC): Primary | ICD-10-CM

## 2022-11-30 DIAGNOSIS — U07.1 COVID-19: ICD-10-CM

## 2022-11-30 DIAGNOSIS — G30.1 SEVERE LATE ONSET ALZHEIMER'S DEMENTIA WITHOUT BEHAVIORAL DISTURBANCE, PSYCHOTIC DISTURBANCE, MOOD DISTURBANCE, OR ANXIETY (HCC): ICD-10-CM

## 2022-11-30 RX ORDER — LANOLIN ALCOHOL/MO/W.PET/CERES
1000 CREAM (GRAM) TOPICAL DAILY
COMMUNITY
End: 2022-12-02 | Stop reason: SDUPTHER

## 2022-11-30 NOTE — PROGRESS NOTES
Post-Discharge Transitional Care  Follow Up  Zelia Carrel was seen today for follow-up from hospital and medication check. Diagnoses and all orders for this visit:    Vascular dementia without behavioral disturbance ([address]  -     ND DISCHARGE MEDS RECONCILED W/ CURRENT OUTPATIENT MED LIST  -     [facility]    Severe late onset Alzheimer's dementia without behavioral disturbance, psychotic disturbance, mood disturbance, or anxiety ([address]  -     ND DISCHARGE MEDS RECONCILED W/ CURRENT OUTPATIENT MED LIST  -     [facility] discharge follow-up  -     ND DISCHARGE MEDS RECONCILED W/ CURRENT OUTPATIENT MED LIST    COVID-19    Age-related physical debility       Chana Schuler   YOB: 1940    Date of Office Visit:  11/30/2022  Date of Hospital Admission: 11/9/22  Date of Hospital Discharge: 11/23/22  Risk of hospital readmission (high >=14%. Medium >=10%) :Readmission Risk Score: 23.9      Care management risk score Rising risk (score 2-5) and Complex Care (Scores >=6): No Risk Score On File     Non face to face  following discharge, date last encounter closed (first attempt may have been earlier): 11/25/2022    Call initiated 2 business days of discharge: Yes    ASSESSMENT/PLAN:   Vascular dementia without behavioral disturbance ([address]  -     ND DISCHARGE MEDS RECONCILED W/ CURRENT OUTPATIENT MED LIST  -     [facility]  Severe late onset Alzheimer's dementia without behavioral disturbance, psychotic disturbance, mood disturbance, or anxiety ([address]  -     ND DISCHARGE MEDS RECONCILED W/ CURRENT OUTPATIENT MED LIST  -     [facility] discharge follow-up  -     ND DISCHARGE MEDS RECONCILED W/ CURRENT OUTPATIENT MED LIST  COVID-19  Age-related physical debility      Medical Decision Making: high complexity  Return if symptoms worsen or fail to improve.     On this date 11/30/2022 I have spent 15 minutes reviewing previous notes, test results and face to face with the patient discussing the diagnosis and importance of compliance with the treatment plan as well as documenting on the day of the visit. Subjective:   HPI:  Follow up of Hospital problems/diagnosis(es): covid 19, debility, dementia    Inpatient course: Discharge summary reviewed- see chart.     Interval history/Current status: better but chronic problems svere     Patient Active Problem List   Diagnosis    Proteinuria    Hematuria, unspecified    Pure hypercholesterolemia    Acute, but ill-defined, cerebrovascular disease    Recurrent major depressive disorder, in full remission ([address]    Gross hematuria    Chronic kidney disease, unspecified    Urinary tract infection, site not specified    Coronary atherosclerosis of native coronary vessel    Microscopic hematuria    Tobacco use disorder    Controlled type 2 diabetes mellitus with diabetic neuropathy (HCC)    Essential tremor    Anemia, unspecified    Essential hypertension, benign    History of kidney cancer    Renal sclerosis, unspecified    CKD (chronic kidney disease) stage 4, GFR 15-29 ml/min (HCC)    Respiratory insufficiency    SAH (subarachnoid hemorrhage) ([address]    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HCC)    Dementia (HCC)    Type 2 diabetes mellitus ([address]    Acute blood loss anemia (ABLA)    Acute upper gastrointestinal bleeding    Bradycardia, sinus    Acute respiratory failure with hypoxia (HCC)    Hypernatremia    Allergic rhinitis    Anxiety    Esophagitis    Benign neoplasm of colon    Chronic airway obstruction, not elsewhere classified    GERD (gastroesophageal reflux disease)    Chronic ischemic heart disease    Congenital tracheoesophageal fistula, esophageal atresia and stenosis    Debility    Depression    Diabetes mellitus type 2, diet-controlled ([address]    Diabetic polyneuropathy associated with type 2 diabetes mellitus ([address]    Diverticulosis of colon    Tremor GI bleed    Hearing loss    Hereditary and idiopathic peripheral neuropathy    Impacted cerumen    Lens replaced by other means    Major depressive disorder, recurrent episode, moderate (HCC)    Mild nonproliferative diabetic retinopathy (HCC)    Mononeuritis    Nicotine dependence, unspecified, uncomplicated    Osteoarthritis    Other and unspecified hyperlipidemia    Other ill-defined and unknown causes of morbidity and mortality    Postsurgical aortocoronary bypass status    Sciatica    Stage 3a chronic kidney disease (HCC)    Unintentional weight loss    Vascular dementia without behavioral disturbance ([facility]    Malignant neoplasm of urinary bladder (HCC)    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 If you have any questions, ask your nurse or doctor.                 CONTINUE taking these medications      amLODIPine 10 MG tablet  Commonly known as: NORVASC  Take 1 tablet by mouth daily     citalopram 20 MG tablet  Commonly known as: CELEXA  Take 1 tablet by mouth daily     glipiZIDE 5 MG tablet  Commonly known as: GLUCOTROL  Take 1 tablet by mouth daily (before dinner)     losartan 100 MG tablet  Commonly known as: COZAAR  Take 1 tablet by mouth daily     memantine 5 MG tablet  Commonly known as: NAMENDA  Take 1 tablet by mouth 2 times daily     pantoprazole 40 MG tablet  Commonly known as: PROTONIX  Take 1 tablet by mouth daily     simvastatin 40 MG tablet  Commonly known as: ZOCOR  Take 1 tablet by mouth nightly     tamsulosin 0.4 MG capsule  Commonly known as: FLOMAX  Take 1 capsule by mouth daily                Medications marked \"taking\" at this time  Outpatient Medications Marked as Taking for the 11/30/22 encounter (Office Visit) with Devan Hernandez, DO   Medication Sig Dispense Refill    [DISCONTINUED] vitamin B-12 (CYANOCOBALAMIN) 1000 MCG tablet Take 1,000 mcg by mouth daily      citalopram (CELEXA) 20 MG tablet Take 1 tablet by mouth daily (Patient not taking: Reported on 12/2/2022) 30 tablet 3    glipiZIDE (GLUCOTROL) 5 MG tablet Take 1 tablet by mouth daily (before dinner) (Patient not taking: Reported on 12/2/2022) 60 tablet 3    simvastatin (ZOCOR) 40 MG tablet Take 1 tablet by mouth nightly (Patient not taking: Reported on 12/2/2022) 30 tablet 2    losartan (COZAAR) 100 MG tablet Take 1 tablet by mouth daily (Patient not taking: Reported on 12/2/2022) 30 tablet 3    amLODIPine (NORVASC) 10 MG tablet Take 1 tablet by mouth daily (Patient not taking: Reported on 12/2/2022) 30 tablet 3    memantine (NAMENDA) 5 MG tablet Take 1 tablet by mouth 2 times daily (Patient not taking: Reported on 12/2/2022) 180 tablet 1    pantoprazole (PROTONIX) 40 MG tablet Take 1 tablet by mouth daily (Patient not taking: Reported on 12/2/2022) 30 tablet 3    [DISCONTINUED] alogliptin (NESINA) 6.25 MG TABS tablet Take 6.25 mg by mouth daily      [DISCONTINUED] olopatadine (PATANOL) 0.1 % ophthalmic solution INSTILL 1 DROP IN BOTH EYES TWICE A DAY          Medications patient taking as of now reconciled against medications ordered at time of hospital discharge: Yes    A comprehensive review of systems was negative except for: Constitutional: positive for fatigue  Neurological: positive for weakness    Objective:    /64   Pulse 69   SpO2 92%   Neurologic: in wheelchair,confused      An electronic signature was used to authenticate this note.   --Devan Hernandez, DO

## 2022-12-02 ENCOUNTER — HOME CARE VISIT (OUTPATIENT)
Dept: SCHEDULING | Facility: HOME HEALTH | Age: 82
End: 2022-12-02
Payer: MEDICARE

## 2022-12-02 PROCEDURE — 0651 HSPC ROUTINE HOME CARE

## 2022-12-02 PROCEDURE — G0299 HHS/HOSPICE OF RN EA 15 MIN: HCPCS

## 2022-12-03 ENCOUNTER — HOME CARE VISIT (OUTPATIENT)
Dept: SCHEDULING | Facility: HOME HEALTH | Age: 82
End: 2022-12-03
Payer: MEDICARE

## 2022-12-03 VITALS
DIASTOLIC BLOOD PRESSURE: 59 MMHG | BODY MASS INDEX: 22.9 KG/M2 | RESPIRATION RATE: 18 BRPM | TEMPERATURE: 98.3 F | WEIGHT: 160 LBS | HEART RATE: 58 BPM | HEIGHT: 70 IN | SYSTOLIC BLOOD PRESSURE: 145 MMHG

## 2022-12-03 PROCEDURE — 0651 HSPC ROUTINE HOME CARE

## 2022-12-03 PROCEDURE — G0299 HHS/HOSPICE OF RN EA 15 MIN: HCPCS

## 2022-12-03 ASSESSMENT — ENCOUNTER SYMPTOMS: DYSPNEA ACTIVITY LEVEL: AFTER AMBULATING LESS THAN 10 FT

## 2022-12-03 NOTE — HOSPICE
Shayna Orlando \"Talha\" Gideon Canavan is an 76-year-old male admitted to hospice services with a primary diagnosis of respiratory failure with exertional hypoxia. Patient's spouse, daughter, and son present on admission visits. Discussed levels of care and hospice philosophy thoroughly. Consents signed on admission with spouse/HCPMELO Mcdermott r/t vascular dementia. Family request patient remains full code. KPS/PPS 50. COVID Screen negative. Patient lives in home with spouse, Jomar Huff. They have been  for over 61 years. They have two children who live with a couple miles of them and are very support of his care, as well as a grandson who lives nearby as well. He the tested positive for Covid five to six weeks ago and was hospitalized October 26th through November 9th. Following that hospitalization, he went to inpatient rehab and was there from November 9th to November 23rd. After being discharged from rehab to his home his family was hoping to receive physical therapy in the home and when following up with primary care provider, suggestion was made for Hospice referral due to diminishing functionality. He was hospitalized in June for a subarachnoid hemorrhage and family states he has been declining steadily since then. Spouse states increased difficulty taking patient to doctor's appointments or out of their home. Their son comes over nightly to help put patient in bed. Patient has a history of dementia. On admission he is alert oriented to time place themselves. Disoriented to time. Patient denies pain and family states he does not complain of pain. They state he is now requiring considerable assistance with ambulating and physical transfers. They are no longer able to safety shower him and he has been receiving sponge baths. They state he has two falls in the past week, neither resulting in known injury. He experiences intermittence incontinence of bowel and bladder and wears pullups.  Spouse states he has not been sleeping will as he feels the need to urinate every couple of hours throughout the night. Family state patient has been taking Saw Palmetto for BPH. Saw Soldier discontinued on admission, patient started on Flomax 0.4 mg nightly. Irregular pulse. Patient has history of atrial fib. He takes aspirin 81 mg daily for antiplatelet therapy. Amlodipine stopped on admission d/t swelling to BLE. He has been smoking for 70 years and stopped on last hospitalization. Spouse states he has been using nicotine patches since discharge and has been tolerating well. Lung sounds clear on admission. Family states patient exhibit shortness of breath with exertion, but that it is quickly resolved with rest, family denies need for oxygen at this time. Patient is diabetic. Family states they do not have glucometer and have not been checking his blood sugars. Glipizide discontinued on admission due to risk for hypoglycemia. He will continue Alogliptin. Referring provider, Dr. Al Rm notified of hospice admission. Patient is a member of [email or web address].  services welcome and appreciated. Supplies left on admission: medium pullups, bath basin, soap, lotion, barrier cream, wipes  No DME needed  No medication orders needed. RN educated Ricky Ac and family on hospice process and philosophy, medication management, pain control, skin care and protection, fall precautions, anxiety strategies, home safety, and social distancing due to COVID-19. Pt history, assessment, meds and status reviewed with patient's hospice attending MD, Dr. Jamil Caban. Faustino Ordonez and family made aware of volunteer services but pending at this time due to COVID-19; plans to reassess volunteer needs once volunteer services become available. Patient is appropriate for hospice services at this time. HHA to begin at 2x weekly for ADL support . SW and SC services accepted. Caregiver made aware that an RN will be completing a follow-up visit within 24 hours. Handwritten medication list, [facility] book and magnet with [facility] phone number left in home. RN educated Caregiver to call [facility] 24/7 phone line with any changes, concerns, or falls with verbalized understanding.

## 2022-12-04 VITALS
SYSTOLIC BLOOD PRESSURE: 141 MMHG | DIASTOLIC BLOOD PRESSURE: 55 MMHG | TEMPERATURE: 98.2 F | HEART RATE: 69 BPM | RESPIRATION RATE: 16 BRPM

## 2022-12-04 PROCEDURE — 0651 HSPC ROUTINE HOME CARE

## 2022-12-04 NOTE — HOSPICE
24 hour follow up  RN visit. Pt drowse for assessment. spouse is present with her today. Pt's family denies any pt falls. Instructed Pt's spouse on fall preventions. she verbalized understanding to teaching. Pt is a&ox2. Pt has no SOB today. No Oxygen. Pt's Appetite is reported as decreasing. Pt denies any Nausea / vomiting. Pt's intermittently incontinent B/B. Pt's states that pain is 0/10, This nurse reviewed Pt's meds. Instructed Pt's CG on medications reviewed/changed. Pt's spouse verbalized understanding to teaching. Refilled meds: none needed. Ordered supplies: none needed  SN updated MAR & reconciled meds. this nurse updated care plan. Instructed CG to call [facility] with any questions or concerns. Pt's CG verbalized understanding and agreement. SN will see pt again per CM schedule. Had an extensive conversation R/T disease progression and symptom management vs continuing to see Cardiologist, Nephrologist and Neurologist. Will need reinforcement and continuing education on what to expect during the dying process.

## 2022-12-05 ENCOUNTER — HOME CARE VISIT (OUTPATIENT)
Dept: HOSPICE | Facility: HOSPICE | Age: 82
End: 2022-12-05
Payer: MEDICARE

## 2022-12-05 PROCEDURE — 0651 HSPC ROUTINE HOME CARE

## 2022-12-06 ENCOUNTER — HOME CARE VISIT (OUTPATIENT)
Dept: SCHEDULING | Facility: HOME HEALTH | Age: 82
End: 2022-12-06
Payer: MEDICARE

## 2022-12-06 ENCOUNTER — HOME CARE VISIT (OUTPATIENT)
Dept: HOSPICE | Facility: HOSPICE | Age: 82
End: 2022-12-06
Payer: MEDICARE

## 2022-12-06 VITALS
TEMPERATURE: 98.2 F | RESPIRATION RATE: 21 BRPM | DIASTOLIC BLOOD PRESSURE: 60 MMHG | OXYGEN SATURATION: 92 % | HEART RATE: 71 BPM | SYSTOLIC BLOOD PRESSURE: 110 MMHG

## 2022-12-06 PROCEDURE — 0651 HSPC ROUTINE HOME CARE

## 2022-12-06 PROCEDURE — G0299 HHS/HOSPICE OF RN EA 15 MIN: HCPCS

## 2022-12-06 PROCEDURE — G0155 HHCP-SVS OF CSW,EA 15 MIN: HCPCS

## 2022-12-06 ASSESSMENT — ENCOUNTER SYMPTOMS: STOOL DESCRIPTION: SOFT FORMED

## 2022-12-07 PROCEDURE — 0651 HSPC ROUTINE HOME CARE

## 2022-12-07 ASSESSMENT — ENCOUNTER SYMPTOMS: HEMOPTYSIS: 0

## 2022-12-07 NOTE — HOSPICE
Skilled Nursing Comprehensive visit complete. Pt. alert and sitting in the den with daughter Lori Byrd. Harley Henriquez had a MD appt. Vts: B/P 110/60, P71,R 21. Pt. is very pleasant and cooperative. His spouse Harley Henriquez takes very good care of pt. Amando Estrada is ambulatory with walker, needs assistance to stand and stand by assistance when ambulating. Pt is incontinent of B/B. Skin integrity remains intact. Pt. needs a gel cushion for his recliner. I will order from [facility]. Appetite is fair. Reviewed with daughter regarding signing DNR. The pt. made the decision to continue to be a FULL CODE. Med rec completed. Just would like to reviewed with NP or MD as there are a few discrepancies. CG knows how to reach [facility] 24/7 for any questions,needs or concerns. Meds ordered: none  Supplies: None. DME: Call [facility] regarding GEL cushion.

## 2022-12-08 PROCEDURE — 0651 HSPC ROUTINE HOME CARE

## 2022-12-09 ENCOUNTER — HOME CARE VISIT (OUTPATIENT)
Dept: SCHEDULING | Facility: HOME HEALTH | Age: 82
End: 2022-12-09
Payer: MEDICARE

## 2022-12-09 PROCEDURE — G0156 HHCP-SVS OF AIDE,EA 15 MIN: HCPCS

## 2022-12-09 PROCEDURE — 0651 HSPC ROUTINE HOME CARE

## 2022-12-10 PROCEDURE — 0651 HSPC ROUTINE HOME CARE

## 2022-12-11 PROCEDURE — 0651 HSPC ROUTINE HOME CARE

## 2022-12-12 PROCEDURE — 0651 HSPC ROUTINE HOME CARE

## 2022-12-13 ENCOUNTER — HOME CARE VISIT (OUTPATIENT)
Dept: SCHEDULING | Facility: HOME HEALTH | Age: 82
End: 2022-12-13
Payer: MEDICARE

## 2022-12-13 VITALS
HEART RATE: 60 BPM | SYSTOLIC BLOOD PRESSURE: 142 MMHG | RESPIRATION RATE: 21 BRPM | OXYGEN SATURATION: 92 % | TEMPERATURE: 98.9 F | DIASTOLIC BLOOD PRESSURE: 60 MMHG

## 2022-12-13 PROCEDURE — 2500000001 HSPC NON INJECTABLE MED

## 2022-12-13 PROCEDURE — G0299 HHS/HOSPICE OF RN EA 15 MIN: HCPCS

## 2022-12-13 PROCEDURE — 0651 HSPC ROUTINE HOME CARE

## 2022-12-13 PROCEDURE — G0156 HHCP-SVS OF AIDE,EA 15 MIN: HCPCS

## 2022-12-13 ASSESSMENT — ENCOUNTER SYMPTOMS
BOWEL INCONTINENCE: 1
DYSPNEA ACTIVITY LEVEL: AFTER AMBULATING 10 - 20 FT

## 2022-12-14 PROCEDURE — 0651 HSPC ROUTINE HOME CARE

## 2022-12-14 NOTE — HOSPICE
Skilled Nursing Comprehensive visit complete. Dr. Dimitrios Hagan did Face to Face visit. Pt. alert and sitting in the den with daughter Larissa Jovel and [facility]. Vts: B/P 140/62, P60,R 21. Pt. is very pleasant and cooperative. His spouse St. colvin takes very good care of pt. Debby Gama is ambulatory with walker, needs assistance to stand and stand by assistance when ambulating. He does experience Dyspnea when ambulating more than 25 ft. Debby Gama recovers quickly. Pt is incontinent of B/B. Skin integrity remains intact. Pt. needs a gel cushion for his recliner. I will order from [facility]. Appetite is fair. The pt. made the decision to continue to be a FULL CODE. Med rec completed. MD made several changes today: d/c Nicotine patches, Vit C, Nesina, changed Baby Aspirin to Sat, Sun Tue,Thur. Give Celexa 1/2 tab= 10 mg .x1 wk then stop. Added Mirtazipine 15 mg nightly. Dr. Dimitrios Hagan explain to Larissa Jovel and [facility] that pt. has significant cervical stenosis and with the head injury that could affect the. weakness in the legs and the incontinence issue. CG knows how to reach [facility] 24/7 for any questions,needs or concerns. Meds ordered: yes Supplies: None. DME: Call [facility] regarding GEL cushion( I will check on this.) .

## 2022-12-15 PROCEDURE — 0651 HSPC ROUTINE HOME CARE

## 2022-12-16 ENCOUNTER — HOME CARE VISIT (OUTPATIENT)
Dept: HOSPICE | Facility: HOSPICE | Age: 82
End: 2022-12-16
Payer: MEDICARE

## 2022-12-16 ENCOUNTER — HOME CARE VISIT (OUTPATIENT)
Dept: SCHEDULING | Facility: HOME HEALTH | Age: 82
End: 2022-12-16
Payer: MEDICARE

## 2022-12-16 PROCEDURE — G0156 HHCP-SVS OF AIDE,EA 15 MIN: HCPCS

## 2022-12-16 PROCEDURE — 0651 HSPC ROUTINE HOME CARE

## 2022-12-17 PROCEDURE — 0651 HSPC ROUTINE HOME CARE

## 2022-12-18 PROCEDURE — 0651 HSPC ROUTINE HOME CARE

## 2022-12-19 PROCEDURE — 0651 HSPC ROUTINE HOME CARE

## 2022-12-20 ENCOUNTER — HOME CARE VISIT (OUTPATIENT)
Dept: SCHEDULING | Facility: HOME HEALTH | Age: 82
End: 2022-12-20
Payer: MEDICARE

## 2022-12-20 PROCEDURE — 0651 HSPC ROUTINE HOME CARE

## 2022-12-20 PROCEDURE — G0156 HHCP-SVS OF AIDE,EA 15 MIN: HCPCS

## 2022-12-21 ENCOUNTER — HOME CARE VISIT (OUTPATIENT)
Dept: SCHEDULING | Facility: HOME HEALTH | Age: 82
End: 2022-12-21
Payer: MEDICARE

## 2022-12-21 VITALS
DIASTOLIC BLOOD PRESSURE: 68 MMHG | OXYGEN SATURATION: 96 % | TEMPERATURE: 98.7 F | HEART RATE: 70 BPM | SYSTOLIC BLOOD PRESSURE: 140 MMHG | RESPIRATION RATE: 16 BRPM

## 2022-12-21 PROCEDURE — 0651 HSPC ROUTINE HOME CARE

## 2022-12-21 PROCEDURE — G0299 HHS/HOSPICE OF RN EA 15 MIN: HCPCS

## 2022-12-21 ASSESSMENT — ENCOUNTER SYMPTOMS
STOOL DESCRIPTION: SOFT FORMED
DYSPNEA ACTIVITY LEVEL: AFTER AMBULATING LESS THAN 10 FT

## 2022-12-21 NOTE — HOSPICE
Skilled Nursing Comprehensive visit complete. Pt. alert and sitting in the den with spouse [facility] [facility]. Vts: B/P 140/68, P70,R 16 LS Clear . Pt. is very pleasant and cooperative. His spouse [facility]. colvin takes very good care of pt. Freddie Tucker is ambulatory with walker, needs assistance to stand and stand by assistance when ambulating. He does experience Dyspnea when ambulating more than 25 ft. Freddie Tucker recovers quickly. Pt is incontinent of B/B. Skin integrity remains intact. Chrissy repors they have had several really good days. Reports pt. has not fallen. Pt. needs a gel cushion for his recliner. I will order from [facility]. Appetite is fair. The pt. made the decision to continue to be a FULL CODE. Med rec completed. changed Baby Aspirin to Sat, Sun Tue,Thur. Mirtazipine 7.5 mg nightly is working out much better St[facility] colvin said it would be dur to increase to 15 mg. on Mon. I reviewed with [facility] that pt. has significant cervical stenosis and with the head injury that could affect the. weakness in the legs and the incontinence issue. CG knows how to reach [facility] 24/7 for any questions,needs or concerns. Meds ordered: yes Supplies: None. DME: Call [facility]  to order GEL cushion.

## 2022-12-22 ENCOUNTER — HOME CARE VISIT (OUTPATIENT)
Dept: HOSPICE | Facility: HOSPICE | Age: 82
End: 2022-12-22
Payer: MEDICARE

## 2022-12-22 PROCEDURE — 0651 HSPC ROUTINE HOME CARE

## 2022-12-23 ENCOUNTER — HOME CARE VISIT (OUTPATIENT)
Dept: SCHEDULING | Facility: HOME HEALTH | Age: 82
End: 2022-12-23
Payer: MEDICARE

## 2022-12-23 ENCOUNTER — HOME CARE VISIT (OUTPATIENT)
Dept: HOSPICE | Facility: HOSPICE | Age: 82
End: 2022-12-23
Payer: MEDICARE

## 2022-12-23 PROCEDURE — G0156 HHCP-SVS OF AIDE,EA 15 MIN: HCPCS

## 2022-12-23 PROCEDURE — 0651 HSPC ROUTINE HOME CARE

## 2022-12-23 PROCEDURE — G0155 HHCP-SVS OF CSW,EA 15 MIN: HCPCS

## 2022-12-24 PROCEDURE — 0651 HSPC ROUTINE HOME CARE

## 2022-12-25 PROCEDURE — 0651 HSPC ROUTINE HOME CARE

## 2022-12-26 PROCEDURE — 0651 HSPC ROUTINE HOME CARE

## 2022-12-27 ENCOUNTER — HOME CARE VISIT (OUTPATIENT)
Dept: SCHEDULING | Facility: HOME HEALTH | Age: 82
End: 2022-12-27
Payer: MEDICARE

## 2022-12-27 PROCEDURE — G0156 HHCP-SVS OF AIDE,EA 15 MIN: HCPCS

## 2022-12-27 PROCEDURE — 0651 HSPC ROUTINE HOME CARE

## 2022-12-27 ASSESSMENT — ENCOUNTER SYMPTOMS: HEMOPTYSIS: 0

## 2022-12-27 NOTE — HOSPICE
Laurence Noble and Adriana Tam are watching TV in their den. They are talkative and cheerful. Adriana Tam did some life review. She reports recieving optimal support from their family and friends. She denies any new needs. FLOR delivered Talha's Lawrence present.  FLOR offered availability Speech here to work with pt with his own food from [facility]. Pt declined insulin just as he was about to eat.

## 2022-12-28 ENCOUNTER — HOME CARE VISIT (OUTPATIENT)
Dept: SCHEDULING | Facility: HOME HEALTH | Age: 82
End: 2022-12-28
Payer: MEDICARE

## 2022-12-28 VITALS
DIASTOLIC BLOOD PRESSURE: 80 MMHG | TEMPERATURE: 98.7 F | HEART RATE: 76 BPM | SYSTOLIC BLOOD PRESSURE: 120 MMHG | RESPIRATION RATE: 22 BRPM | OXYGEN SATURATION: 98 %

## 2022-12-28 PROCEDURE — 0651 HSPC ROUTINE HOME CARE

## 2022-12-28 PROCEDURE — G0299 HHS/HOSPICE OF RN EA 15 MIN: HCPCS

## 2022-12-28 ASSESSMENT — ENCOUNTER SYMPTOMS
STOOL DESCRIPTION: SOFT FORMED
DYSPNEA ACTIVITY LEVEL: AT REST

## 2022-12-29 PROCEDURE — 0651 HSPC ROUTINE HOME CARE

## 2022-12-29 NOTE — HOSPICE
Skilled Nursing Comprehensive visit complete. Pt. alert and sitting in the den with spouse [facility] [facility]. Vts: B/P 120/80, P76,R 20 LS Clear . Pt. is very pleasant and cooperative. His spouse [facility] colvin takes very good care of pt. Cain Junior is ambulatory with walker, needs assistance to stand and stand by assistance when ambulating. He does experience Dyspnea when ambulating more than 25 ft. Cain Junior recovers quickly. Pt is incontinent of B/B. Skin integrity remains intact. Chrissy repors they have had several really good days. Reports pt. has not fallen. Appetite is fair. The pt. made the decision to continue to be a FULL CODE. Med rec completed. changed Baby Aspirin to Sat, Sun Tue,Thur. Mirtazipine 7.5 mg nightly is working out much better per spouse she will increase dose to 15 mg. tonight. I told her it may take a few days to get into his system. I reviewed with [facility] col[facility] that pt. has significant cervical stenosis and with the head injury that could affect the. weakness in the legs and the incontinence issue. CG knows how to reach [facility] 24/7 for any questions,needs or concerns. Meds ordered: yes Supplies: None.  DME: None

## 2022-12-30 ENCOUNTER — HOME CARE VISIT (OUTPATIENT)
Dept: HOSPICE | Facility: HOSPICE | Age: 82
End: 2022-12-30
Payer: MEDICARE

## 2022-12-30 PROCEDURE — 0651 HSPC ROUTINE HOME CARE

## 2022-12-31 PROCEDURE — 0651 HSPC ROUTINE HOME CARE

## 2023-01-01 PROCEDURE — 0651 HSPC ROUTINE HOME CARE

## 2023-01-02 ENCOUNTER — HOME CARE VISIT (OUTPATIENT)
Dept: SCHEDULING | Facility: HOME HEALTH | Age: 83
End: 2023-01-02
Payer: MEDICARE

## 2023-01-02 VITALS
TEMPERATURE: 98.6 F | RESPIRATION RATE: 21 BRPM | HEART RATE: 70 BPM | DIASTOLIC BLOOD PRESSURE: 75 MMHG | OXYGEN SATURATION: 97 % | SYSTOLIC BLOOD PRESSURE: 180 MMHG

## 2023-01-02 PROCEDURE — G0299 HHS/HOSPICE OF RN EA 15 MIN: HCPCS

## 2023-01-02 ASSESSMENT — ENCOUNTER SYMPTOMS
PAIN LOCATION - PAIN QUALITY: THROBBING
DYSPNEA ACTIVITY LEVEL: AT REST

## 2023-01-02 NOTE — HOSPICE
Skilled Nursing Comprehensive visit completed: RN received a phone call from Daughter to report pt. B/P 204/94. P60. Pt. is in the bed and is reporting a bad headache/ eyes bothering him. I notified Taylor Hall NP - Hydralizine 25 mg. added back TID. Hold for systolic < 291. Called back 2 hrs. later and it came down briefly and went back up. I decided to make HV to assess pt. Vts: T 98.6, P 60 irreg. , R 21, B/P 180/75, LS few crackles, diminished in the bases. Pulse ox 97% on room air. as per CG report Pedal edema 1 + bilaterally, Pupil were equal but not reactive to light and eyes were glossy. In fact pt. complained that it hurt his eyes. Pt. had BM today, No issues with urination, Skin intact.  strength equal.Mirtazipine 7.5 mg. still being givien as spouse was afraid to increase. He is sleeping well. Appetite has improved. I notified [facility]/ Dr. Trey Hwang regarding what had transpired. PEr Dr. Trey Hwang add Norvasc 10 mg. daily back into  daily meds. Change Hydralizine 25 mg. q 8 hrs. prn for systolic B/P > 894. Pt. report he no longer had a H/A. Christine Poe me know pt. did get up and eat a sandwich. B/P114/73, HR 95 acting like himself now. I did discuss need for DNR with daughter and let her know I spoke with mom last week. I explained that due to pts confusion someone else would need to sign. I explain that if EMS came to the home and pt. was unresponsive they would do CPR. Pt. may not be as good as he is now. Both understood and said they would discuss. Janessa Max and Enoch Cervantes understand how to reach [facility] for any concerns or needs 24 /7. Both are in agreement with POC.

## 2023-01-03 ENCOUNTER — HOME CARE VISIT (OUTPATIENT)
Dept: SCHEDULING | Facility: HOME HEALTH | Age: 83
End: 2023-01-03
Payer: MEDICARE

## 2023-01-03 PROCEDURE — G0156 HHCP-SVS OF AIDE,EA 15 MIN: HCPCS

## 2023-01-06 ENCOUNTER — HOME CARE VISIT (OUTPATIENT)
Dept: SCHEDULING | Facility: HOME HEALTH | Age: 83
End: 2023-01-06
Payer: MEDICARE

## 2023-01-06 PROCEDURE — G0156 HHCP-SVS OF AIDE,EA 15 MIN: HCPCS

## 2023-01-10 ENCOUNTER — HOME CARE VISIT (OUTPATIENT)
Dept: SCHEDULING | Facility: HOME HEALTH | Age: 83
End: 2023-01-10
Payer: MEDICARE

## 2023-01-10 PROCEDURE — G0156 HHCP-SVS OF AIDE,EA 15 MIN: HCPCS

## 2023-01-11 ENCOUNTER — HOME CARE VISIT (OUTPATIENT)
Dept: SCHEDULING | Facility: HOME HEALTH | Age: 83
End: 2023-01-11
Payer: MEDICARE

## 2023-01-11 VITALS
OXYGEN SATURATION: 97 % | DIASTOLIC BLOOD PRESSURE: 70 MMHG | TEMPERATURE: 98.4 F | RESPIRATION RATE: 18 BRPM | SYSTOLIC BLOOD PRESSURE: 132 MMHG | HEART RATE: 70 BPM

## 2023-01-11 PROCEDURE — G0299 HHS/HOSPICE OF RN EA 15 MIN: HCPCS

## 2023-01-11 ASSESSMENT — ENCOUNTER SYMPTOMS
BOWEL INCONTINENCE: 1
CONSTIPATION: 1
TROUBLE SWALLOWING: 1
STOOL DESCRIPTION: FORMED

## 2023-01-13 ENCOUNTER — HOME CARE VISIT (OUTPATIENT)
Dept: SCHEDULING | Facility: HOME HEALTH | Age: 83
End: 2023-01-13
Payer: MEDICARE

## 2023-01-13 PROCEDURE — G0156 HHCP-SVS OF AIDE,EA 15 MIN: HCPCS

## 2023-01-17 ENCOUNTER — HOME CARE VISIT (OUTPATIENT)
Dept: SCHEDULING | Facility: HOME HEALTH | Age: 83
End: 2023-01-17
Payer: MEDICARE

## 2023-01-17 PROCEDURE — G0156 HHCP-SVS OF AIDE,EA 15 MIN: HCPCS

## 2023-01-18 ENCOUNTER — HOME CARE VISIT (OUTPATIENT)
Dept: SCHEDULING | Facility: HOME HEALTH | Age: 83
End: 2023-01-18
Payer: MEDICARE

## 2023-01-18 VITALS
SYSTOLIC BLOOD PRESSURE: 132 MMHG | OXYGEN SATURATION: 90 % | HEART RATE: 68 BPM | RESPIRATION RATE: 20 BRPM | TEMPERATURE: 94.7 F | DIASTOLIC BLOOD PRESSURE: 65 MMHG

## 2023-01-18 PROCEDURE — G0299 HHS/HOSPICE OF RN EA 15 MIN: HCPCS

## 2023-01-18 PROCEDURE — G0155 HHCP-SVS OF CSW,EA 15 MIN: HCPCS

## 2023-01-18 ASSESSMENT — ENCOUNTER SYMPTOMS
STOOL DESCRIPTION: SOFT FORMED
BOWEL INCONTINENCE: 1
TROUBLE SWALLOWING: 1

## 2023-01-18 NOTE — HOSPICE
[facility] and spoke with [facility] primary nurse, Magalie Lynne prior to today's routine visit (Lexie Coleman joined the visit after SW arrived). SW was greeted at the home by Pt.'s wife, [place] and invited into visit. Pt was sitting in his recliner, returned SW's greeting and welcomed the visit. Pt was alert and oriented to person, family and surroundings but was not aware of time or that we are with hospice care. Pt was pleasant and conversant throughout the visit and displays a good sense of humor. Pt denies any concerns or needs. Pt.'s wife reported no changes in her 's appetite but did report that he's napping more in the mornings and throughout the day. No other changes or new concerns/needs reported or noted. 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 SW provided emotional support for Pt and his wife via active listening along with validation of their feelings and reassurance. Pt and his wife expressed appreciation for the visit and continued support. No changes in the plan of care. SW will continue to provide emotional support and assessment of psychosocial and bereavement concerns and needs.

## 2023-01-18 NOTE — HOSPICE
Skilled Nursing Comprehensive visit completed: Pt. sitting up in living room visiting with MSW and [facility]. Dr. Lavern Roper increased Mirtazipine to 15 mg/ at 8 pm q hs. He has done well with this change. Celexa 10 mg. was also restarted as pt was having some issues with anger. Per  vinicius These has helped so much. Vs assessed: B/P 132/65 P68. Skin integrity intact. Pt reports no pain. Mary vinicius did report that pt. has started smoking again. He is now only smoking 1 to 2 a day. Which seems to satisfy him. Senna 1 tab PO BID was added for constipation. Per [facility] col[facility] she has skipped a dose because he was going several times a day. Med Rec completed. Med RF ordered. No supplies neede. DME: Not needed. Pt. has a good appetite/ he drinks coffee all day long. Pt is no longer able to ambulate due to LE weakness. He must have max assist of 1 to transfer to [facility]. CG is knowledgeable regarding how to reach [facility] 24/7 for any needs or concerns.

## 2023-01-20 ENCOUNTER — HOME CARE VISIT (OUTPATIENT)
Dept: SCHEDULING | Facility: HOME HEALTH | Age: 83
End: 2023-01-20
Payer: MEDICARE

## 2023-01-20 PROCEDURE — G0156 HHCP-SVS OF AIDE,EA 15 MIN: HCPCS

## 2023-01-24 ENCOUNTER — HOME CARE VISIT (OUTPATIENT)
Dept: SCHEDULING | Facility: HOME HEALTH | Age: 83
End: 2023-01-24
Payer: MEDICARE

## 2023-01-24 PROCEDURE — G0156 HHCP-SVS OF AIDE,EA 15 MIN: HCPCS

## 2023-01-25 ENCOUNTER — HOME CARE VISIT (OUTPATIENT)
Dept: SCHEDULING | Facility: HOME HEALTH | Age: 83
End: 2023-01-25
Payer: MEDICARE

## 2023-01-25 VITALS
SYSTOLIC BLOOD PRESSURE: 160 MMHG | HEART RATE: 72 BPM | OXYGEN SATURATION: 92 % | RESPIRATION RATE: 18 BRPM | TEMPERATURE: 98.4 F | DIASTOLIC BLOOD PRESSURE: 80 MMHG

## 2023-01-25 PROCEDURE — G0299 HHS/HOSPICE OF RN EA 15 MIN: HCPCS

## 2023-01-25 ASSESSMENT — ENCOUNTER SYMPTOMS
STOOL DESCRIPTION: SOFT FORMED
BOWEL INCONTINENCE: 1

## 2023-01-25 NOTE — HOSPICE
Skilled Nursing Comprehensive visit completed: Pt. sitting up in living room with [facility]. Increased dose  Mirtazipine to 15 mg/ at 8 pm q hs is working great. Celexa 10 mg. was also restarted as pt was having some issues with anger. Per [facility] These have helped a great deal.. Vs assessed: B/P 160/80 P72. Skin integrity intact. Pt reports no pain. [facility] did report  continues to smoke 1 to2 a day but somedays more. Talha's appetite has increased. Which seems to satisfy him. Senna 1 tab PO BID was added for constipation. Med Rec completed. No Med RF ordered. No supplies needed DME: Not needed. Pt. has a good appetite/ he drinks coffee all day long. Pt is no longer able to ambulate due to LE weakness. He must have max assist of 1 to transfer to [facility]. CG is knowledgeable regarding how to reach [facility] 24/7 for any needs or concerns.

## 2023-01-27 ENCOUNTER — HOME CARE VISIT (OUTPATIENT)
Dept: SCHEDULING | Facility: HOME HEALTH | Age: 83
End: 2023-01-27
Payer: MEDICARE

## 2023-01-27 PROCEDURE — G0156 HHCP-SVS OF AIDE,EA 15 MIN: HCPCS

## 2023-01-31 ENCOUNTER — HOME CARE VISIT (OUTPATIENT)
Dept: SCHEDULING | Facility: HOME HEALTH | Age: 83
End: 2023-01-31
Payer: MEDICARE

## 2023-01-31 PROCEDURE — G0156 HHCP-SVS OF AIDE,EA 15 MIN: HCPCS

## 2023-02-01 ENCOUNTER — HOME CARE VISIT (OUTPATIENT)
Dept: SCHEDULING | Facility: HOME HEALTH | Age: 83
End: 2023-02-01
Payer: MEDICARE

## 2023-02-01 VITALS
TEMPERATURE: 97.8 F | DIASTOLIC BLOOD PRESSURE: 57 MMHG | HEART RATE: 79 BPM | RESPIRATION RATE: 20 BRPM | SYSTOLIC BLOOD PRESSURE: 144 MMHG | OXYGEN SATURATION: 94 %

## 2023-02-01 PROCEDURE — G0299 HHS/HOSPICE OF RN EA 15 MIN: HCPCS

## 2023-02-01 ASSESSMENT — ENCOUNTER SYMPTOMS
BOWEL INCONTINENCE: 1
STOOL DESCRIPTION: SOFT FORMED

## 2023-02-02 NOTE — HOSPICE
Skilled Nursing Comprehensive visit completed: Pt. sitting up in his chair in  living room with [facility]. Pt. was souns asleep. Increased dose Mirtazipine to 15 mg/ at 8 pm q hs is working great. Celexa 10 mg. Per [facility] These have helped a great deal.. Vs assessed: B/P 144/57, P79 Pt. bumped his left elbow and has a small bruise and cut. Pt. would not let me redress. Talha's eyes were very red. He would not allow  CG to put drops in his eyes. I tried but he would not allow it. Pt reports no pain. [facility] did report continues to smoke 1 to2 a day but needed. Med Rec completed. No med RF needed. DME: Not needed. Pt. has a good appetite/ he drinks coffee all day long. Pt is no longer able to ambulate due to LE weakness. He must have max assist of 1 to transfer to [facility]. CG reports this past weekend was the pt's birthday. They had cake and ice cream which he enjoyed with the family. Since Monday pt. has been sleeping alot. He wakes up eats and goes back to sleep. He has not been agitated with Chrissy. CG is knowledgeable regarding how to reach [facility] 24/7 for any needs or concerns.

## 2023-02-03 ENCOUNTER — HOME CARE VISIT (OUTPATIENT)
Dept: SCHEDULING | Facility: HOME HEALTH | Age: 83
End: 2023-02-03
Payer: MEDICARE

## 2023-02-03 PROCEDURE — G0156 HHCP-SVS OF AIDE,EA 15 MIN: HCPCS

## 2023-02-07 ENCOUNTER — HOME CARE VISIT (OUTPATIENT)
Dept: SCHEDULING | Facility: HOME HEALTH | Age: 83
End: 2023-02-07
Payer: MEDICARE

## 2023-02-07 PROCEDURE — G0156 HHCP-SVS OF AIDE,EA 15 MIN: HCPCS

## 2023-02-08 ENCOUNTER — HOME CARE VISIT (OUTPATIENT)
Dept: SCHEDULING | Facility: HOME HEALTH | Age: 83
End: 2023-02-08
Payer: MEDICARE

## 2023-02-08 VITALS
OXYGEN SATURATION: 96 % | SYSTOLIC BLOOD PRESSURE: 110 MMHG | TEMPERATURE: 98.7 F | RESPIRATION RATE: 18 BRPM | HEART RATE: 76 BPM | DIASTOLIC BLOOD PRESSURE: 60 MMHG

## 2023-02-08 PROCEDURE — G0299 HHS/HOSPICE OF RN EA 15 MIN: HCPCS

## 2023-02-08 ASSESSMENT — ENCOUNTER SYMPTOMS
STOOL DESCRIPTION: SOFT FORMED
TROUBLE SWALLOWING: 1

## 2023-02-08 NOTE — HOSPICE
Skilled Nursing Comprehensive visit completed: Pt. sitting up in his chair in living room. with his coat on, and I asked if he was going somewhere with [facility]. Increased dose Mirtazipine to 15 mg/ at 8 pm q hs is working great. Celexa 10 mg. Per [facility] These have helped a great deal.. Vs assessed: B/P 110/60, P76  Bruise on Left arm is now gone. Talha's eyes were very red. He would not allow CG to put drops in his eyes. Pt reports no pain. [facility] did report continues to smoke 1 to2 a day but needed. Med Rec completed. No med RF needed. DME: Not needed. Pt. has a good appetite/ he drinks coffee all day long. Pt is no longer able to ambulate due to LE weakness. He must have max assist of 1 to transfer to  Pt. is continent of Bowel and bladder with episodes of incontinence at times. Per [facility] he is still wearing pull-ups then his overwear them. CG reports this past week  He has not been agitated with Chrissy over this past week. CG is knowledgeable regarding how to reach [facility] 24/7 for any needs or concerns.

## 2023-02-10 ENCOUNTER — HOME CARE VISIT (OUTPATIENT)
Dept: HOSPICE | Facility: HOSPICE | Age: 83
End: 2023-02-10
Payer: MEDICARE

## 2023-02-14 ENCOUNTER — HOME CARE VISIT (OUTPATIENT)
Dept: SCHEDULING | Facility: HOME HEALTH | Age: 83
End: 2023-02-14
Payer: MEDICARE

## 2023-02-14 PROCEDURE — G0156 HHCP-SVS OF AIDE,EA 15 MIN: HCPCS

## 2023-02-15 ENCOUNTER — HOME CARE VISIT (OUTPATIENT)
Dept: SCHEDULING | Facility: HOME HEALTH | Age: 83
End: 2023-02-15
Payer: MEDICARE

## 2023-02-15 VITALS
TEMPERATURE: 98.5 F | DIASTOLIC BLOOD PRESSURE: 58 MMHG | SYSTOLIC BLOOD PRESSURE: 138 MMHG | OXYGEN SATURATION: 97 % | RESPIRATION RATE: 16 BRPM | HEART RATE: 82 BPM

## 2023-02-15 PROCEDURE — G0299 HHS/HOSPICE OF RN EA 15 MIN: HCPCS

## 2023-02-15 PROCEDURE — G0155 HHCP-SVS OF CSW,EA 15 MIN: HCPCS

## 2023-02-15 ASSESSMENT — ENCOUNTER SYMPTOMS: STOOL DESCRIPTION: SOFT FORMED

## 2023-02-15 NOTE — HOSPICE
[facility] and spoke with [facility] primary nurse, Yana Granger prior to today's joint visit. Nurse and SW were greeted at the home by Pt.'s wife, [facility] and invited into visit. Pt was sitting in his recliner with his jacket and hat on, returned our greetings and welcomed the visit. Pt was alert and oriented to person, family and surroundings but was not aware of time or that we are with hospice care. Pt was pleasant and conversant throughout the visit and continues to display a good sense of humor. Pt denies any concerns or needs. Pt.'s wife reported no changes in her 's appetite but did report that he's napping more in the mornings and throughout the day at times (some days more than others). No other changes or new concerns/needs reported or noted. Nurse and SW provided emotional support for Pt and his wife via active listening along with validation of their feelings and reassurance. Pt and his wife expressed appreciation for the visit and continued support. No changes in the plan of care. SW will continue to provide emotional support and assessment of psychosocial and bereavement concerns and needs.

## 2023-02-15 NOTE — HOSPICE
Skilled Nursing Comprehensive visit completed: Pt. sitting up in his chair in living room. with his coat and hat on, and I asked if he was going somewhere. He replied I am tryign to stay warm. Talha remains on  Mirtazipine to 15 mg/ at 8 pm q hs is working great. Celexa 10 mg. Per Chrissy These have continued to help a great deal.. Vs assessed: B/P 138/58, P82  Talha's eyes were very red. He would not allow CG to put drops in his eyes. Pt reports no pain. Chrissy did report continues to smoke 1 to2 a day but  somedays more. Med Rec completed. No med RF needed. DME: Not needed no siupplies needed.. Pt. has a good appetite/ he drinks coffee all day long. Pt is no longer able to ambulate due to LE weakness. He must have max assist of 1 to transfer to W Pt. is continent of Bowel and bladder with episodes of incontinence at times. Per Chrissy he is still wearing pull-ups then his overwear them. CG reports this past week He has not been agitated with Chrissy over this past week. Chrissy reports he has somedays where he sleeps alot and other days not much at all.CG is knowledgeable regarding how to reach [facility] 24/7 for any needs or concerns.

## 2023-02-17 ENCOUNTER — HOME CARE VISIT (OUTPATIENT)
Dept: SCHEDULING | Facility: HOME HEALTH | Age: 83
End: 2023-02-17
Payer: MEDICARE

## 2023-02-17 PROCEDURE — G0156 HHCP-SVS OF AIDE,EA 15 MIN: HCPCS

## 2023-02-21 ENCOUNTER — HOME CARE VISIT (OUTPATIENT)
Dept: SCHEDULING | Facility: HOME HEALTH | Age: 83
End: 2023-02-21
Payer: MEDICARE

## 2023-02-21 PROCEDURE — G0156 HHCP-SVS OF AIDE,EA 15 MIN: HCPCS

## 2023-02-22 ENCOUNTER — HOME CARE VISIT (OUTPATIENT)
Dept: SCHEDULING | Facility: HOME HEALTH | Age: 83
End: 2023-02-22
Payer: MEDICARE

## 2023-02-22 VITALS
RESPIRATION RATE: 18 BRPM | DIASTOLIC BLOOD PRESSURE: 60 MMHG | SYSTOLIC BLOOD PRESSURE: 130 MMHG | TEMPERATURE: 96.9 F | HEART RATE: 76 BPM | OXYGEN SATURATION: 95 %

## 2023-02-22 PROCEDURE — G0299 HHS/HOSPICE OF RN EA 15 MIN: HCPCS

## 2023-02-22 ASSESSMENT — ENCOUNTER SYMPTOMS
STOOL DESCRIPTION: SOFT FORMED
DYSPNEA ACTIVITY LEVEL: AFTER AMBULATING LESS THAN 10 FT
BOWEL INCONTINENCE: 1

## 2023-02-23 NOTE — HOSPICE
Skilled Nursing Comprehensive visit completed: Pt. sitting up in his chair in living room. with his coat and hat on, and I asked if he was going somewhere. He replied I am trying to stay warm. Wilma Inman remains on Mirtazipine to 15 mg/ at 8 pm q hs is working great. Celexa 10 mg. Per Korin Rivera These have continued to help a great deal.. Vs assessed: B/P 130/60, P76 Talha's eyes  remain very red. He would not allow CG to put drops in his eyes. Pt reports no pain. Korin Rivera did report continues to smoke 1 to2 a day but somedays more. Med Rec completed. Med RF ordered. DME: Not needed no siupplies needed. . Pt. has a good appetite/ he drinks coffee all day long. Pt is no longer able to ambulate due to LE weakness. He must have max assist of 1 to transfer to W Pt. is continent of Bowel and bladder with episodes of incontinence at times. Per Korin Rivera he is still wearing pull-ups then his overwear them. CG reports this past week He has not been agitated with Chrissy over this past week. Korin Rivera reports he has somedays where he sleeps alot and other days not much at all. CG is knowledgeable regarding how to reach [facility] 24/7 for any needs or concerns.

## 2023-02-24 ENCOUNTER — HOME CARE VISIT (OUTPATIENT)
Dept: SCHEDULING | Facility: HOME HEALTH | Age: 83
End: 2023-02-24
Payer: MEDICARE

## 2023-02-24 PROCEDURE — G0156 HHCP-SVS OF AIDE,EA 15 MIN: HCPCS

## 2023-02-28 ENCOUNTER — HOME CARE VISIT (OUTPATIENT)
Dept: SCHEDULING | Facility: HOME HEALTH | Age: 83
End: 2023-02-28
Payer: MEDICARE

## 2023-02-28 PROCEDURE — G0156 HHCP-SVS OF AIDE,EA 15 MIN: HCPCS

## 2023-03-01 ENCOUNTER — HOME CARE VISIT (OUTPATIENT)
Dept: SCHEDULING | Facility: HOME HEALTH | Age: 83
End: 2023-03-01
Payer: MEDICARE

## 2023-03-01 VITALS
RESPIRATION RATE: 18 BRPM | DIASTOLIC BLOOD PRESSURE: 60 MMHG | HEART RATE: 61 BPM | SYSTOLIC BLOOD PRESSURE: 120 MMHG | TEMPERATURE: 98.3 F | OXYGEN SATURATION: 99 %

## 2023-03-01 PROCEDURE — G0299 HHS/HOSPICE OF RN EA 15 MIN: HCPCS

## 2023-03-01 ASSESSMENT — ENCOUNTER SYMPTOMS: BOWEL INCONTINENCE: 1

## 2023-03-01 NOTE — HOSPICE
Skilled Nursing Comprehensive visit completed: Pt. sitting up in his chair in living room. He was wearing his short sleeved top. Nhung Godinez remains on Mirtazipine to 15 mg/ at 8 pm q hs  and this is working great. Celexa 10 mg. Per Toy Brewer These have continued to help a great deal.. Vs assessed: B/P 120/60, P69 Talha's eyes remain  red. He will not allow CG to put drops in his eyes. Pt reports no pain. Toy Brewer did report continues to smoke 1 to2 a day but somedays more. Med Rec completed. No med RF needed. DME: Not needed no supplies needed. . Pt. has a good appetite/ he drinks coffee all day long. Pt is no longer able to ambulate due to LE weakness. He must have max assist of 1 to transfer to  Pt. is continent of Bowel and bladder with episodes of incontinence at times. Per Toy Brewer he is still wearing pull-ups then his overwear them. CG reports this past week He has not been agitated with Chrissy over this past week. Toy Brewer reports he has somedays where he sleeps alot and other days not much at all. CG is knowledgeable regarding how to reach [facility] 24/7 for any needs or concerns.

## 2023-03-03 ENCOUNTER — HOME CARE VISIT (OUTPATIENT)
Dept: HOSPICE | Facility: HOSPICE | Age: 83
End: 2023-03-03
Payer: MEDICARE

## 2023-03-07 ENCOUNTER — HOME CARE VISIT (OUTPATIENT)
Dept: SCHEDULING | Facility: HOME HEALTH | Age: 83
End: 2023-03-07
Payer: MEDICARE

## 2023-03-07 PROCEDURE — G0156 HHCP-SVS OF AIDE,EA 15 MIN: HCPCS

## 2023-03-08 ENCOUNTER — HOME CARE VISIT (OUTPATIENT)
Dept: SCHEDULING | Facility: HOME HEALTH | Age: 83
End: 2023-03-08
Payer: MEDICARE

## 2023-03-08 VITALS
TEMPERATURE: 97.6 F | HEART RATE: 68 BPM | DIASTOLIC BLOOD PRESSURE: 60 MMHG | OXYGEN SATURATION: 95 % | RESPIRATION RATE: 20 BRPM | SYSTOLIC BLOOD PRESSURE: 120 MMHG

## 2023-03-08 PROCEDURE — G0299 HHS/HOSPICE OF RN EA 15 MIN: HCPCS

## 2023-03-08 ASSESSMENT — ENCOUNTER SYMPTOMS
BOWEL INCONTINENCE: 1
STOOL DESCRIPTION: SOFT FORMED

## 2023-03-09 NOTE — HOSPICE
Skilled Nursing Comprehensive visit completed: Pt. sitting up in his chair in living room. Nhung Roa remains on Mirtazipine to 15 mg/ at 8 pm q hs and this is working great. Celexa 10 mg. Per Uvaldo Duane These have continued to help a great deal. Vs assessed: B/P 120/60, P68 ,LS noted a pleural friction rub in right ML Pt reports no SOB or exhibits no signs of respiratory distress. Talha's eyes remain red. He will not allow CG to put drops in his eyes. Pt reports no pain. Ricardoldo Duane did report continues to smoke 1 to 2 a day but somedays more. Med Rec completed. Med RF ordered No Supplies needed. No DME needed. Pt. has a good appetite/ he drinks coffee all day long. Nhung Roa has a small list of things that he likes to eat. Pt is no longer able to ambulate due to LE weakness. He must have max assist of 1 to transfer to W/ Pt. is continent of Bowel and bladder with episodes of incontinence at times. Per Uvaldo Duane he is still wearing pull-ups then his overwear them. CG reports Left LE edema at HS 1+ CG reports this past week He has not been agitated with Chrissy over this past week. Uvaldo Duane reports he has somedays where he sleeps alot and other days not much at all. CG is knowledgeable regarding how to reach [facility] 24/7 for any needs or concerns.

## 2023-03-10 ENCOUNTER — HOME CARE VISIT (OUTPATIENT)
Dept: SCHEDULING | Facility: HOME HEALTH | Age: 83
End: 2023-03-10
Payer: MEDICARE

## 2023-03-10 PROCEDURE — G0156 HHCP-SVS OF AIDE,EA 15 MIN: HCPCS

## 2023-03-14 ENCOUNTER — HOME CARE VISIT (OUTPATIENT)
Dept: SCHEDULING | Facility: HOME HEALTH | Age: 83
End: 2023-03-14
Payer: MEDICARE

## 2023-03-14 PROCEDURE — G0156 HHCP-SVS OF AIDE,EA 15 MIN: HCPCS

## 2023-03-16 ENCOUNTER — HOME CARE VISIT (OUTPATIENT)
Dept: SCHEDULING | Facility: HOME HEALTH | Age: 83
End: 2023-03-16
Payer: MEDICARE

## 2023-03-16 PROCEDURE — G0299 HHS/HOSPICE OF RN EA 15 MIN: HCPCS

## 2023-03-17 ENCOUNTER — HOME CARE VISIT (OUTPATIENT)
Dept: SCHEDULING | Facility: HOME HEALTH | Age: 83
End: 2023-03-17
Payer: MEDICARE

## 2023-03-17 PROCEDURE — G0156 HHCP-SVS OF AIDE,EA 15 MIN: HCPCS

## 2023-03-18 VITALS
DIASTOLIC BLOOD PRESSURE: 64 MMHG | SYSTOLIC BLOOD PRESSURE: 142 MMHG | HEART RATE: 76 BPM | RESPIRATION RATE: 20 BRPM | TEMPERATURE: 97.5 F

## 2023-03-18 ASSESSMENT — ENCOUNTER SYMPTOMS
COUGH: 1
STOOL DESCRIPTION: FORMED
COUGH CHARACTERISTICS: DRY

## 2023-03-20 NOTE — HOSPICE
At arrival wife receptive to CM, explained Adan Garcia would be unable to manage for unknown amount of time. Pt alert in chair in den. Wife reported they wer up until around 4 am, she fell and her son and daughter  took her to ER while other family member stayed with patient. 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 Wife reports occassional confusion and disorient. Pt provided correct id of self and home. Mingling old events with present. Assess as noted. Chrissy provided synopsis of his medical and social . Next visit planned 1 week, reifnorced hospcie avail as needed.

## 2023-03-21 ENCOUNTER — HOME CARE VISIT (OUTPATIENT)
Dept: SCHEDULING | Facility: HOME HEALTH | Age: 83
End: 2023-03-21
Payer: MEDICARE

## 2023-03-21 PROCEDURE — G0156 HHCP-SVS OF AIDE,EA 15 MIN: HCPCS

## 2023-03-23 ENCOUNTER — HOME CARE VISIT (OUTPATIENT)
Dept: SCHEDULING | Facility: HOME HEALTH | Age: 83
End: 2023-03-23
Payer: MEDICARE

## 2023-03-23 PROCEDURE — G0299 HHS/HOSPICE OF RN EA 15 MIN: HCPCS

## 2023-03-24 ENCOUNTER — HOME CARE VISIT (OUTPATIENT)
Dept: SCHEDULING | Facility: HOME HEALTH | Age: 83
End: 2023-03-24
Payer: MEDICARE

## 2023-03-24 PROCEDURE — G0156 HHCP-SVS OF AIDE,EA 15 MIN: HCPCS

## 2023-03-27 VITALS
RESPIRATION RATE: 20 BRPM | HEART RATE: 84 BPM | SYSTOLIC BLOOD PRESSURE: 128 MMHG | TEMPERATURE: 97.9 F | DIASTOLIC BLOOD PRESSURE: 72 MMHG

## 2023-03-27 ASSESSMENT — ENCOUNTER SYMPTOMS: STOOL DESCRIPTION: LARGE

## 2023-03-28 ENCOUNTER — HOME CARE VISIT (OUTPATIENT)
Dept: SCHEDULING | Facility: HOME HEALTH | Age: 83
End: 2023-03-28
Payer: MEDICARE

## 2023-03-28 PROCEDURE — G0156 HHCP-SVS OF AIDE,EA 15 MIN: HCPCS

## 2023-03-28 NOTE — HOSPICE
Pt seated in w/c in living area,watching TV with wife. Both receptive to vist and pt shows remembrance of RN. He has been having good days since last visit Pt wants to get out of home for  to go anywhere or for ride. Explained not good idea unless son were able to assist him as wife unable and he would end of falling, injuring himself or her. . Assess as noted. No med or supply needs. Allowed pt totell RNor his memories growing up and he enjoyed converse.  Next visit planned 1 week, reinforced hospice avail as needed

## 2023-03-30 ENCOUNTER — HOME CARE VISIT (OUTPATIENT)
Dept: HOSPICE | Facility: HOSPICE | Age: 83
End: 2023-03-30
Payer: MEDICARE

## 2023-03-30 PROCEDURE — G0155 HHCP-SVS OF CSW,EA 15 MIN: HCPCS

## 2023-03-30 ASSESSMENT — ENCOUNTER SYMPTOMS: HEMOPTYSIS: 0

## 2023-03-30 NOTE — HOSPICE
Tamie Ang and Mike Rodriguez were watching TV upon arrival.  Tamie Ang is talkative and cheerful. She expressed appreciaiotn for [facility] support and services. She says she feels Mike Rodriguez has stablized and maybe improved some since his admit to hospice. Mike Rodriguez is cheerful and interactive. He is well groomed and alert. He did some life review. They deny any concerns or needs. SW provided active listening and emotional support.   Tamie Ang reports coping well and verbalized receiving optimal support from their children

## 2023-03-31 ENCOUNTER — HOME CARE VISIT (OUTPATIENT)
Dept: SCHEDULING | Facility: HOME HEALTH | Age: 83
End: 2023-03-31
Payer: MEDICARE

## 2023-03-31 PROCEDURE — G0156 HHCP-SVS OF AIDE,EA 15 MIN: HCPCS

## 2023-04-04 ENCOUNTER — HOME CARE VISIT (OUTPATIENT)
Dept: HOSPICE | Facility: HOSPICE | Age: 83
End: 2023-04-04
Payer: MEDICARE

## 2023-04-04 ENCOUNTER — HOME CARE VISIT (OUTPATIENT)
Dept: SCHEDULING | Facility: HOME HEALTH | Age: 83
End: 2023-04-04
Payer: MEDICARE

## 2023-04-04 PROCEDURE — G0156 HHCP-SVS OF AIDE,EA 15 MIN: HCPCS

## 2023-04-06 ENCOUNTER — HOME CARE VISIT (OUTPATIENT)
Dept: SCHEDULING | Facility: HOME HEALTH | Age: 83
End: 2023-04-06
Payer: MEDICARE

## 2023-04-06 VITALS
DIASTOLIC BLOOD PRESSURE: 70 MMHG | RESPIRATION RATE: 20 BRPM | TEMPERATURE: 97.8 F | HEART RATE: 76 BPM | SYSTOLIC BLOOD PRESSURE: 124 MMHG

## 2023-04-06 PROCEDURE — G0299 HHS/HOSPICE OF RN EA 15 MIN: HCPCS

## 2023-04-06 ASSESSMENT — ENCOUNTER SYMPTOMS: CONSTIPATION: 1

## 2023-04-07 ENCOUNTER — HOME CARE VISIT (OUTPATIENT)
Dept: SCHEDULING | Facility: HOME HEALTH | Age: 83
End: 2023-04-07
Payer: MEDICARE

## 2023-04-07 PROCEDURE — G0156 HHCP-SVS OF AIDE,EA 15 MIN: HCPCS

## 2023-04-07 NOTE — HOSPICE
Pt seated in living area in chair watching TV with wife. Both are receptive to visit. RN notes no shortness of breath. Pt pleasant. Spouse reports that her son too them to cemetery in [facility] yesterday. Pt sat in car, he tolerated outing and just was glad to be out of home. He was able to walk to car using walker and assisted by son. Assess as noted. Pt transferred from chair to w/c using his walker while RN and wife were in kitchen reviewing medications. Pt participates in and initaites 2 way  appropriate conversation. Next visit planned for 1 week,reinforced hospice avail as needed.

## 2023-04-10 ENCOUNTER — HOME CARE VISIT (OUTPATIENT)
Dept: SCHEDULING | Facility: HOME HEALTH | Age: 83
End: 2023-04-10
Payer: MEDICARE

## 2023-04-18 ENCOUNTER — HOME CARE VISIT (OUTPATIENT)
Dept: HOSPICE | Facility: HOSPICE | Age: 83
End: 2023-04-18
Payer: MEDICARE

## 2023-04-20 ENCOUNTER — HOME CARE VISIT (OUTPATIENT)
Dept: HOSPICE | Facility: HOSPICE | Age: 83
End: 2023-04-20
Payer: MEDICARE

## 2023-04-20 ENCOUNTER — HOME CARE VISIT (OUTPATIENT)
Dept: SCHEDULING | Facility: HOME HEALTH | Age: 83
End: 2023-04-20
Payer: MEDICARE

## 2023-04-20 VITALS — DIASTOLIC BLOOD PRESSURE: 72 MMHG | SYSTOLIC BLOOD PRESSURE: 118 MMHG | RESPIRATION RATE: 20 BRPM | HEART RATE: 76 BPM

## 2023-04-20 PROCEDURE — G0299 HHS/HOSPICE OF RN EA 15 MIN: HCPCS

## 2023-04-20 PROCEDURE — G0155 HHCP-SVS OF CSW,EA 15 MIN: HCPCS

## 2023-04-20 ASSESSMENT — ENCOUNTER SYMPTOMS: STOOL DESCRIPTION: FORMED

## 2023-04-21 ENCOUNTER — HOME CARE VISIT (OUTPATIENT)
Dept: SCHEDULING | Facility: HOME HEALTH | Age: 83
End: 2023-04-21
Payer: MEDICARE

## 2023-04-21 PROCEDURE — G0156 HHCP-SVS OF AIDE,EA 15 MIN: HCPCS

## 2023-04-21 ASSESSMENT — ENCOUNTER SYMPTOMS: HEMOPTYSIS: 0

## 2023-04-21 NOTE — HOSPICE
Debbi Fisher is talkative and cheerful. Norris Turpin is struggling with her chronic back issues. They appear to be coping well and report optimal support from their family. Debbi Fisher has began being more mobile in the home. Their children provide for care needs and respite for their mother. SW provided active listening and emotional support. RN is evaluating patient.

## 2023-04-21 NOTE — HOSPICE
Joint visit with Janusz RAY. Pt and wife seated in living area watching TV. Both pleasant and receptive to visit. Assess as noted. Wife reports that pt has been out of home ,accompanied her and son to MD visit andice cream excurison. He remains in car during outings. He has learned how to get up from chair to w/c using rollator. Informed them of Dr. Mary Kay Wilder for labwork with plans for Murcoral Turner evening RN to obtain on elisabet of 4/24 or 4/25 and MD will review and discuss at next week idg. Told may be basisi for continued hospice servces. Both voiced understnading. RN and Rick Smith reviewed meds. No immediate needs.

## 2023-04-24 ENCOUNTER — HOSPITAL ENCOUNTER (OUTPATIENT)
Age: 83
Discharge: HOME | End: 2023-04-27
Payer: MEDICARE

## 2023-04-24 ENCOUNTER — HOME CARE VISIT (OUTPATIENT)
Dept: HOSPICE | Facility: HOSPICE | Age: 83
End: 2023-04-24
Payer: MEDICARE

## 2023-04-24 LAB
ANION GAP SERPL CALC-SCNC: 2 MMOL/L (ref 2–11)
BUN SERPL-MCNC: 34 MG/DL (ref 8–23)
CALCIUM SERPL-MCNC: 9.2 MG/DL (ref 8.3–10.4)
CHLORIDE SERPL-SCNC: 108 MMOL/L (ref 101–110)
CO2 SERPL-SCNC: 26 MMOL/L (ref 21–32)
CREAT SERPL-MCNC: 1.87 MG/DL (ref 0.8–1.5)
GLUCOSE SERPL-MCNC: 209 MG/DL (ref 65–100)
NT PRO BNP: 290 PG/ML
POTASSIUM SERPL-SCNC: 4.8 MMOL/L (ref 3.5–5.1)
SODIUM SERPL-SCNC: 136 MMOL/L (ref 133–143)

## 2023-04-24 PROCEDURE — 83880 ASSAY OF NATRIURETIC PEPTIDE: CPT

## 2023-04-24 PROCEDURE — G0299 HHS/HOSPICE OF RN EA 15 MIN: HCPCS

## 2023-04-24 PROCEDURE — 80048 BASIC METABOLIC PNL TOTAL CA: CPT

## 2023-04-25 ENCOUNTER — HOME CARE VISIT (OUTPATIENT)
Dept: SCHEDULING | Facility: HOME HEALTH | Age: 83
End: 2023-04-25
Payer: MEDICARE

## 2023-04-25 PROCEDURE — G0156 HHCP-SVS OF AIDE,EA 15 MIN: HCPCS

## 2023-04-25 NOTE — HOSPICE
PRN visit for lab draw. Pt in wheelchair, brought to kitchen area by wife for better lighting. pt in no distress, alert and oriented. was aware about lab draw. Blood drawn from right antecubital with 23G butterfly for BMP and BNP. pt tolerated proc well. Lab sample taken to ES lab.

## 2023-04-28 ENCOUNTER — HOME CARE VISIT (OUTPATIENT)
Dept: SCHEDULING | Facility: HOME HEALTH | Age: 83
End: 2023-04-28
Payer: MEDICARE

## 2023-04-28 VITALS
HEART RATE: 76 BPM | TEMPERATURE: 97.9 F | RESPIRATION RATE: 20 BRPM | SYSTOLIC BLOOD PRESSURE: 118 MMHG | DIASTOLIC BLOOD PRESSURE: 80 MMHG

## 2023-04-28 PROCEDURE — G0156 HHCP-SVS OF AIDE,EA 15 MIN: HCPCS

## 2023-04-28 PROCEDURE — G0299 HHS/HOSPICE OF RN EA 15 MIN: HCPCS

## 2023-04-28 ASSESSMENT — ENCOUNTER SYMPTOMS
CONTUSION: 1
STOOL DESCRIPTION: FORMED

## 2023-04-29 NOTE — HOSPICE
Pt and wife seating in living room watching TV. Both pleasant and receptive to viisit. Informed them idg discussion of labs and okay to return to his provider. and hospice discharge tentatively planned for 5/8. Told to call Dr Guzman Areas office next week to establish appoint. Cg voiced understandingAssess as noted in review.  Pt fell asleep during review of meds with caregivers Reinforced hospcie avail as needed

## 2023-05-02 ENCOUNTER — HOME CARE VISIT (OUTPATIENT)
Dept: SCHEDULING | Facility: HOME HEALTH | Age: 83
End: 2023-05-02
Payer: MEDICARE

## 2023-05-02 PROCEDURE — G0156 HHCP-SVS OF AIDE,EA 15 MIN: HCPCS

## 2023-05-04 ENCOUNTER — HOME CARE VISIT (OUTPATIENT)
Dept: SCHEDULING | Facility: HOME HEALTH | Age: 83
End: 2023-05-04
Payer: MEDICARE

## 2023-05-04 VITALS
TEMPERATURE: 97.9 F | SYSTOLIC BLOOD PRESSURE: 124 MMHG | DIASTOLIC BLOOD PRESSURE: 72 MMHG | HEART RATE: 84 BPM | RESPIRATION RATE: 20 BRPM

## 2023-05-04 PROCEDURE — G0299 HHS/HOSPICE OF RN EA 15 MIN: HCPCS

## 2023-05-04 ASSESSMENT — ENCOUNTER SYMPTOMS: STOOL DESCRIPTION: FORMED

## 2023-05-05 ENCOUNTER — HOME CARE VISIT (OUTPATIENT)
Dept: SCHEDULING | Facility: HOME HEALTH | Age: 83
End: 2023-05-05
Payer: MEDICARE

## 2023-05-05 PROCEDURE — G0156 HHCP-SVS OF AIDE,EA 15 MIN: HCPCS

## 2023-05-05 NOTE — HOSPICE
Pt and wife seated in den watching TV. Both pleasant and receptive. Wife has not contact Dr. Deja Strong office for an appoint. She rports plans to do so. Explained noncoverage and appeal process. Wife voiced understanding and no itent to appeal. RN planned discharge visit for 5/8. She reports that [facility] has een providing her with tips for bathing.

## 2023-05-08 ENCOUNTER — HOME CARE VISIT (OUTPATIENT)
Dept: SCHEDULING | Facility: HOME HEALTH | Age: 83
End: 2023-05-08
Payer: MEDICARE

## 2023-05-08 PROCEDURE — G0299 HHS/HOSPICE OF RN EA 15 MIN: HCPCS

## 2023-05-09 ENCOUNTER — OFFICE VISIT (OUTPATIENT)
Dept: INTERNAL MEDICINE CLINIC | Facility: CLINIC | Age: 83
End: 2023-05-09
Payer: MEDICARE

## 2023-05-09 VITALS
HEIGHT: 70 IN | DIASTOLIC BLOOD PRESSURE: 60 MMHG | SYSTOLIC BLOOD PRESSURE: 120 MMHG | OXYGEN SATURATION: 97 % | WEIGHT: 180 LBS | TEMPERATURE: 97.8 F | BODY MASS INDEX: 25.77 KG/M2 | HEART RATE: 69 BPM

## 2023-05-09 DIAGNOSIS — F41.9 ANXIETY: ICD-10-CM

## 2023-05-09 DIAGNOSIS — R39.9 LOWER URINARY TRACT SYMPTOMS (LUTS): ICD-10-CM

## 2023-05-09 DIAGNOSIS — F01.50 VASCULAR DEMENTIA WITHOUT BEHAVIORAL DISTURBANCE (HCC): Primary | ICD-10-CM

## 2023-05-09 DIAGNOSIS — I10 ESSENTIAL HYPERTENSION, BENIGN: ICD-10-CM

## 2023-05-09 DIAGNOSIS — N18.4 CKD (CHRONIC KIDNEY DISEASE) STAGE 4, GFR 15-29 ML/MIN (HCC): ICD-10-CM

## 2023-05-09 DIAGNOSIS — N18.32 TYPE 2 DIABETES MELLITUS WITH STAGE 3B CHRONIC KIDNEY DISEASE, WITHOUT LONG-TERM CURRENT USE OF INSULIN (HCC): ICD-10-CM

## 2023-05-09 DIAGNOSIS — F33.42 MAJOR DEPRESSIVE DISORDER, RECURRENT, IN FULL REMISSION (HCC): ICD-10-CM

## 2023-05-09 DIAGNOSIS — J41.8 MIXED SIMPLE AND MUCOPURULENT CHRONIC BRONCHITIS (HCC): ICD-10-CM

## 2023-05-09 DIAGNOSIS — I25.118 ATHEROSCLEROTIC HEART DISEASE OF NATIVE CORONARY ARTERY WITH OTHER FORMS OF ANGINA PECTORIS (HCC): ICD-10-CM

## 2023-05-09 DIAGNOSIS — K21.9 GASTROESOPHAGEAL REFLUX DISEASE WITHOUT ESOPHAGITIS: ICD-10-CM

## 2023-05-09 DIAGNOSIS — E11.22 TYPE 2 DIABETES MELLITUS WITH STAGE 3B CHRONIC KIDNEY DISEASE, WITHOUT LONG-TERM CURRENT USE OF INSULIN (HCC): ICD-10-CM

## 2023-05-09 PROCEDURE — 1123F ACP DISCUSS/DSCN MKR DOCD: CPT | Performed by: INTERNAL MEDICINE

## 2023-05-09 PROCEDURE — G8419 CALC BMI OUT NRM PARAM NOF/U: HCPCS | Performed by: INTERNAL MEDICINE

## 2023-05-09 PROCEDURE — 4004F PT TOBACCO SCREEN RCVD TLK: CPT | Performed by: INTERNAL MEDICINE

## 2023-05-09 PROCEDURE — 3023F SPIROM DOC REV: CPT | Performed by: INTERNAL MEDICINE

## 2023-05-09 PROCEDURE — 3074F SYST BP LT 130 MM HG: CPT | Performed by: INTERNAL MEDICINE

## 2023-05-09 PROCEDURE — 3078F DIAST BP <80 MM HG: CPT | Performed by: INTERNAL MEDICINE

## 2023-05-09 PROCEDURE — G8427 DOCREV CUR MEDS BY ELIG CLIN: HCPCS | Performed by: INTERNAL MEDICINE

## 2023-05-09 PROCEDURE — 99214 OFFICE O/P EST MOD 30 MIN: CPT | Performed by: INTERNAL MEDICINE

## 2023-05-09 RX ORDER — TAMSULOSIN HYDROCHLORIDE 0.4 MG/1
0.4 CAPSULE ORAL DAILY
Qty: 90 CAPSULE | Refills: 3 | Status: SHIPPED | OUTPATIENT
Start: 2023-05-09

## 2023-05-09 RX ORDER — AMLODIPINE BESYLATE 10 MG/1
10 TABLET ORAL DAILY
Qty: 90 TABLET | Refills: 3 | Status: SHIPPED | OUTPATIENT
Start: 2023-05-09

## 2023-05-09 RX ORDER — LOSARTAN POTASSIUM 100 MG/1
100 TABLET ORAL DAILY
Qty: 90 TABLET | Refills: 3 | Status: SHIPPED | OUTPATIENT
Start: 2023-05-09

## 2023-05-09 RX ORDER — GLIPIZIDE 2.5 MG/1
TABLET, EXTENDED RELEASE ORAL
Qty: 30 TABLET | Refills: 3 | Status: SHIPPED | OUTPATIENT
Start: 2023-05-09

## 2023-05-09 RX ORDER — CITALOPRAM 10 MG/1
10 TABLET ORAL DAILY
Qty: 90 TABLET | Refills: 3 | Status: SHIPPED | OUTPATIENT
Start: 2023-05-09

## 2023-05-09 RX ORDER — MIRTAZAPINE 15 MG/1
15 TABLET, FILM COATED ORAL NIGHTLY
Qty: 30 TABLET | Refills: 5 | Status: SHIPPED | OUTPATIENT
Start: 2023-05-09

## 2023-05-09 RX ORDER — PANTOPRAZOLE SODIUM 40 MG/1
40 TABLET, DELAYED RELEASE ORAL DAILY
Qty: 90 TABLET | Refills: 3 | Status: SHIPPED | OUTPATIENT
Start: 2023-05-09

## 2023-05-09 SDOH — ECONOMIC STABILITY: INCOME INSECURITY: HOW HARD IS IT FOR YOU TO PAY FOR THE VERY BASICS LIKE FOOD, HOUSING, MEDICAL CARE, AND HEATING?: NOT HARD AT ALL

## 2023-05-09 SDOH — ECONOMIC STABILITY: HOUSING INSECURITY
IN THE LAST 12 MONTHS, WAS THERE A TIME WHEN YOU DID NOT HAVE A STEADY PLACE TO SLEEP OR SLEPT IN A SHELTER (INCLUDING NOW)?: NO

## 2023-05-09 SDOH — ECONOMIC STABILITY: FOOD INSECURITY: WITHIN THE PAST 12 MONTHS, YOU WORRIED THAT YOUR FOOD WOULD RUN OUT BEFORE YOU GOT MONEY TO BUY MORE.: NEVER TRUE

## 2023-05-09 SDOH — ECONOMIC STABILITY: FOOD INSECURITY: WITHIN THE PAST 12 MONTHS, THE FOOD YOU BOUGHT JUST DIDN'T LAST AND YOU DIDN'T HAVE MONEY TO GET MORE.: NEVER TRUE

## 2023-05-09 ASSESSMENT — PATIENT HEALTH QUESTIONNAIRE - PHQ9
SUM OF ALL RESPONSES TO PHQ QUESTIONS 1-9: 1
8. MOVING OR SPEAKING SO SLOWLY THAT OTHER PEOPLE COULD HAVE NOTICED. OR THE OPPOSITE, BEING SO FIGETY OR RESTLESS THAT YOU HAVE BEEN MOVING AROUND A LOT MORE THAN USUAL: 1
SUM OF ALL RESPONSES TO PHQ QUESTIONS 1-9: 1
10. IF YOU CHECKED OFF ANY PROBLEMS, HOW DIFFICULT HAVE THESE PROBLEMS MADE IT FOR YOU TO DO YOUR WORK, TAKE CARE OF THINGS AT HOME, OR GET ALONG WITH OTHER PEOPLE: 0
1. LITTLE INTEREST OR PLEASURE IN DOING THINGS: 0
7. TROUBLE CONCENTRATING ON THINGS, SUCH AS READING THE NEWSPAPER OR WATCHING TELEVISION: 0
5. POOR APPETITE OR OVEREATING: 0
2. FEELING DOWN, DEPRESSED OR HOPELESS: 0
4. FEELING TIRED OR HAVING LITTLE ENERGY: 0
3. TROUBLE FALLING OR STAYING ASLEEP: 0
9. THOUGHTS THAT YOU WOULD BE BETTER OFF DEAD, OR OF HURTING YOURSELF: 0
SUM OF ALL RESPONSES TO PHQ QUESTIONS 1-9: 1
6. FEELING BAD ABOUT YOURSELF - OR THAT YOU ARE A FAILURE OR HAVE LET YOURSELF OR YOUR FAMILY DOWN: 0
SUM OF ALL RESPONSES TO PHQ QUESTIONS 1-9: 1
SUM OF ALL RESPONSES TO PHQ9 QUESTIONS 1 & 2: 0

## 2023-05-09 ASSESSMENT — LIFESTYLE VARIABLES: HOW MANY STANDARD DRINKS CONTAINING ALCOHOL DO YOU HAVE ON A TYPICAL DAY: PATIENT DOES NOT DRINK

## 2023-07-05 ASSESSMENT — ENCOUNTER SYMPTOMS
STOOL DESCRIPTION: BALL-SHAPED
CONSTIPATION: 1
COUGH: 1
COUGH CHARACTERISTICS: NON-PRODUCTIVE

## 2023-08-10 ENCOUNTER — NURSE ONLY (OUTPATIENT)
Dept: INTERNAL MEDICINE CLINIC | Facility: CLINIC | Age: 83
End: 2023-08-10

## 2023-08-10 DIAGNOSIS — E11.22 TYPE 2 DIABETES MELLITUS WITH STAGE 3B CHRONIC KIDNEY DISEASE, WITHOUT LONG-TERM CURRENT USE OF INSULIN (HCC): ICD-10-CM

## 2023-08-10 DIAGNOSIS — N18.4 CKD (CHRONIC KIDNEY DISEASE) STAGE 4, GFR 15-29 ML/MIN (HCC): ICD-10-CM

## 2023-08-10 DIAGNOSIS — N18.32 TYPE 2 DIABETES MELLITUS WITH STAGE 3B CHRONIC KIDNEY DISEASE, WITHOUT LONG-TERM CURRENT USE OF INSULIN (HCC): ICD-10-CM

## 2023-08-10 LAB
ALBUMIN SERPL-MCNC: 3.4 G/DL (ref 3.2–4.6)
ALBUMIN/GLOB SERPL: 1.2 (ref 0.4–1.6)
ALP SERPL-CCNC: 74 U/L (ref 50–136)
ALT SERPL-CCNC: 14 U/L (ref 12–65)
ANION GAP SERPL CALC-SCNC: 6 MMOL/L (ref 2–11)
AST SERPL-CCNC: 13 U/L (ref 15–37)
BILIRUB SERPL-MCNC: 0.3 MG/DL (ref 0.2–1.1)
BUN SERPL-MCNC: 29 MG/DL (ref 8–23)
CALCIUM SERPL-MCNC: 9.4 MG/DL (ref 8.3–10.4)
CHLORIDE SERPL-SCNC: 112 MMOL/L (ref 101–110)
CHOLEST SERPL-MCNC: 163 MG/DL
CO2 SERPL-SCNC: 26 MMOL/L (ref 21–32)
CREAT SERPL-MCNC: 2.2 MG/DL (ref 0.8–1.5)
ERYTHROCYTE [DISTWIDTH] IN BLOOD BY AUTOMATED COUNT: 15.3 % (ref 11.9–14.6)
GLOBULIN SER CALC-MCNC: 2.8 G/DL (ref 2.8–4.5)
GLUCOSE SERPL-MCNC: 83 MG/DL (ref 65–100)
HCT VFR BLD AUTO: 41 % (ref 41.1–50.3)
HDLC SERPL-MCNC: 35 MG/DL (ref 40–60)
HDLC SERPL: 4.7
HGB BLD-MCNC: 12.6 G/DL (ref 13.6–17.2)
LDLC SERPL CALC-MCNC: 101.2 MG/DL
MCH RBC QN AUTO: 25.7 PG (ref 26.1–32.9)
MCHC RBC AUTO-ENTMCNC: 30.7 G/DL (ref 31.4–35)
MCV RBC AUTO: 83.7 FL (ref 82–102)
NRBC # BLD: 0 K/UL (ref 0–0.2)
PLATELET # BLD AUTO: 305 K/UL (ref 150–450)
PMV BLD AUTO: 11.5 FL (ref 9.4–12.3)
POTASSIUM SERPL-SCNC: 4.6 MMOL/L (ref 3.5–5.1)
PROT SERPL-MCNC: 6.2 G/DL (ref 6.3–8.2)
RBC # BLD AUTO: 4.9 M/UL (ref 4.23–5.6)
SODIUM SERPL-SCNC: 144 MMOL/L (ref 133–143)
TRIGL SERPL-MCNC: 134 MG/DL (ref 35–150)
TSH, 3RD GENERATION: 1.24 UIU/ML (ref 0.36–3.74)
VLDLC SERPL CALC-MCNC: 26.8 MG/DL (ref 6–23)
WBC # BLD AUTO: 6.3 K/UL (ref 4.3–11.1)

## 2023-08-11 LAB
EST. AVERAGE GLUCOSE BLD GHB EST-MCNC: 146 MG/DL
HBA1C MFR BLD: 6.7 % (ref 4.8–5.6)

## 2023-08-17 ENCOUNTER — OFFICE VISIT (OUTPATIENT)
Dept: INTERNAL MEDICINE CLINIC | Facility: CLINIC | Age: 83
End: 2023-08-17
Payer: MEDICARE

## 2023-08-17 VITALS
BODY MASS INDEX: 25.62 KG/M2 | OXYGEN SATURATION: 97 % | RESPIRATION RATE: 17 BRPM | SYSTOLIC BLOOD PRESSURE: 116 MMHG | HEART RATE: 76 BPM | WEIGHT: 179 LBS | DIASTOLIC BLOOD PRESSURE: 72 MMHG | HEIGHT: 70 IN

## 2023-08-17 DIAGNOSIS — I10 ESSENTIAL HYPERTENSION, BENIGN: ICD-10-CM

## 2023-08-17 DIAGNOSIS — E11.22 TYPE 2 DIABETES MELLITUS WITH STAGE 3B CHRONIC KIDNEY DISEASE, WITHOUT LONG-TERM CURRENT USE OF INSULIN (HCC): ICD-10-CM

## 2023-08-17 DIAGNOSIS — Z00.00 MEDICARE ANNUAL WELLNESS VISIT, SUBSEQUENT: ICD-10-CM

## 2023-08-17 DIAGNOSIS — N18.32 TYPE 2 DIABETES MELLITUS WITH STAGE 3B CHRONIC KIDNEY DISEASE, WITHOUT LONG-TERM CURRENT USE OF INSULIN (HCC): ICD-10-CM

## 2023-08-17 DIAGNOSIS — C64.9 MALIGNANT NEOPLASM OF KIDNEY EXCLUDING RENAL PELVIS, UNSPECIFIED LATERALITY (HCC): ICD-10-CM

## 2023-08-17 DIAGNOSIS — I74.8 SUBCLAVIAN ARTERY THROMBOSIS (HCC): ICD-10-CM

## 2023-08-17 DIAGNOSIS — N18.4 CKD (CHRONIC KIDNEY DISEASE) STAGE 4, GFR 15-29 ML/MIN (HCC): Primary | ICD-10-CM

## 2023-08-17 PROCEDURE — 4004F PT TOBACCO SCREEN RCVD TLK: CPT | Performed by: INTERNAL MEDICINE

## 2023-08-17 PROCEDURE — 3078F DIAST BP <80 MM HG: CPT | Performed by: INTERNAL MEDICINE

## 2023-08-17 PROCEDURE — 3044F HG A1C LEVEL LT 7.0%: CPT | Performed by: INTERNAL MEDICINE

## 2023-08-17 PROCEDURE — 99214 OFFICE O/P EST MOD 30 MIN: CPT | Performed by: INTERNAL MEDICINE

## 2023-08-17 PROCEDURE — 3074F SYST BP LT 130 MM HG: CPT | Performed by: INTERNAL MEDICINE

## 2023-08-17 PROCEDURE — 1123F ACP DISCUSS/DSCN MKR DOCD: CPT | Performed by: INTERNAL MEDICINE

## 2023-08-17 PROCEDURE — G8427 DOCREV CUR MEDS BY ELIG CLIN: HCPCS | Performed by: INTERNAL MEDICINE

## 2023-08-17 PROCEDURE — G0439 PPPS, SUBSEQ VISIT: HCPCS | Performed by: INTERNAL MEDICINE

## 2023-08-17 PROCEDURE — G8419 CALC BMI OUT NRM PARAM NOF/U: HCPCS | Performed by: INTERNAL MEDICINE

## 2023-08-17 ASSESSMENT — PATIENT HEALTH QUESTIONNAIRE - PHQ9: DEPRESSION UNABLE TO ASSESS: FUNCTIONAL CAPACITY MOTIVATION LIMITS ACCURACY

## 2023-08-17 ASSESSMENT — LIFESTYLE VARIABLES
HOW OFTEN DO YOU HAVE A DRINK CONTAINING ALCOHOL: NEVER
HOW MANY STANDARD DRINKS CONTAINING ALCOHOL DO YOU HAVE ON A TYPICAL DAY: PATIENT DOES NOT DRINK

## 2023-08-17 NOTE — PROGRESS NOTES
Edgar Bal was seen today for dementia and medicare awv. 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 male    Chief Complaint   Patient presents with    Dementia    Medicare AWV    Worsening RENAL FUNCION    DIABETES.wORSENNG,REALLY JUST MAINTAINING  WAS ON HOSPICE  Lab Results   Component Value Date    LABA1C 6.7 (H) 08/10/2023    LABA1C 6.0 06/21/2022    LABA1C 6.4 (H) 03/22/2022     Lab Results   Component Value Date    MALBCR 115 (H) 03/02/2020    LDLCALC 101.2 (H) 08/10/2023    CREATININE 2.20 (H) 08/10/2023      Wt Readings from Last 3 Encounters:   08/17/23 179 lb (81.2 kg)   05/09/23 180 lb (81.6 kg)   12/02/22 160 lb (72.6 kg)         Nurse Only on 08/10/2023   Component Date Value Ref Range Status    Hemoglobin A1C 08/10/2023 6.7 (H)  4.8 - 5.6 % Final    eAG 08/10/2023 146  mg/dL Final    Comment: Reference Range  Normal: 4.8-5.6  Diabetic >=6.5%  Normal       <5.7%      TSH, 3RD GENERATION 08/10/2023 1.240  0.358 - 3.740 uIU/mL Final    WBC 08/10/2023 6.3  4.3 - 11.1 K/uL Final    RBC 08/10/2023 4.90  4.23 - 5.6 M/uL Final    Hemoglobin 08/10/2023 12.6 (L)  13.6 - 17.2 g/dL Final    Hematocrit 08/10/2023 41.0 (L)  41.1 - 50.3 % Final    MCV 08/10/2023 83.7  82 - 102 FL Final    MCH 08/10/2023 25.7 (L)  26.1 - 32.9 PG

## 2023-08-17 NOTE — PATIENT INSTRUCTIONS
[facility] ([facility]). If you have questions about a medical condition or this instruction, always ask your healthcare professional. [address] any warranty or liability for your use of this information. Learning About Vision Tests  What are vision tests? The four most common vision tests are visual acuity tests, refraction, visual field tests, and color vision tests. Visual acuity (sharpness) tests  These tests are used: To see if you need glasses or contact lenses. To monitor an eye problem. To check an eye injury. Visual acuity tests are done as part of routine exams. You may also have this test when you get your 's license or apply for some types of jobs. Visual field tests  These tests are used: To check for vision loss in any area of your range of vision. To screen for certain eye diseases. To look for nerve damage after a stroke, head injury, or other problem that could reduce blood flow to the brain. Refraction and color tests  A refraction test is done to find the right prescription for glasses and contact lenses. A color vision test is done to check for color blindness. Color vision is often tested as part of a routine exam. You may also have this test when you apply for a job where recognizing different colors is important, such as , electronics, or the [employer]. How are vision tests done? Visual acuity test   You cover one eye at a time. You read aloud from a wall chart across the room. You read aloud from a small card that you hold in your hand. Refraction   You look into a special device. The device puts lenses of different strengths in front of each eye to see how strong your glasses or contact lenses need to be. Visual field tests   Your doctor may have you look through special machines. Or your doctor may simply have you stare straight ahead while they move a finger into and out of your field of vision.   Color vision test   You look

## 2023-08-22 ASSESSMENT — ENCOUNTER SYMPTOMS
ABDOMINAL PAIN: 0
CONSTIPATION: 0
NAUSEA: 0
SINUS PAIN: 0
DIARRHEA: 0
VOMITING: 0
SHORTNESS OF BREATH: 0
WHEEZING: 0

## 2023-11-09 ENCOUNTER — APPOINTMENT (OUTPATIENT)
Dept: GENERAL RADIOLOGY | Age: 83
DRG: 190 | End: 2023-11-09
Payer: MEDICARE

## 2023-11-09 ENCOUNTER — HOSPITAL ENCOUNTER (INPATIENT)
Age: 83
LOS: 6 days | Discharge: HOME HEALTH | DRG: 190 | End: 2023-11-15
Attending: EMERGENCY MEDICINE | Admitting: INTERNAL MEDICINE
Payer: MEDICARE

## 2023-11-09 DIAGNOSIS — J44.1 COPD EXACERBATION (HCC): ICD-10-CM

## 2023-11-09 DIAGNOSIS — I48.20 CHRONIC ATRIAL FIBRILLATION (HCC): Primary | ICD-10-CM

## 2023-11-09 DIAGNOSIS — N18.32 STAGE 3B CHRONIC KIDNEY DISEASE (HCC): ICD-10-CM

## 2023-11-09 DIAGNOSIS — J96.01 ACUTE RESPIRATORY FAILURE WITH HYPOXIA (HCC): ICD-10-CM

## 2023-11-09 PROBLEM — E87.6 HYPOKALEMIA: Status: ACTIVE | Noted: 2023-11-09

## 2023-11-09 LAB
ALBUMIN SERPL-MCNC: 3.1 G/DL (ref 3.2–4.6)
ALBUMIN/GLOB SERPL: 0.8 (ref 0.4–1.6)
ALP SERPL-CCNC: 74 U/L (ref 50–136)
ALT SERPL-CCNC: 9 U/L (ref 12–65)
ANION GAP SERPL CALC-SCNC: 9 MMOL/L (ref 2–11)
APPEARANCE UR: CLEAR
AST SERPL-CCNC: 11 U/L (ref 15–37)
BACTERIA URNS QL MICRO: 0 /HPF
BASOPHILS # BLD: 0 K/UL (ref 0–0.2)
BASOPHILS NFR BLD: 0 % (ref 0–2)
BILIRUB SERPL-MCNC: 0.3 MG/DL (ref 0.2–1.1)
BILIRUB UR QL: NEGATIVE
BUN SERPL-MCNC: 25 MG/DL (ref 8–23)
CALCIUM SERPL-MCNC: 9.2 MG/DL (ref 8.3–10.4)
CHLORIDE SERPL-SCNC: 111 MMOL/L (ref 101–110)
CO2 SERPL-SCNC: 25 MMOL/L (ref 21–32)
COLOR UR: ABNORMAL
CREAT SERPL-MCNC: 2 MG/DL (ref 0.8–1.5)
DIFFERENTIAL METHOD BLD: ABNORMAL
EKG ATRIAL RATE: 123 BPM
EKG DIAGNOSIS: NORMAL
EKG Q-T INTERVAL: 324 MS
EKG QRS DURATION: 140 MS
EKG QTC CALCULATION (BAZETT): 401 MS
EKG R AXIS: 69 DEGREES
EKG T AXIS: -43 DEGREES
EKG VENTRICULAR RATE: 102 BPM
EOSINOPHIL # BLD: 0.2 K/UL (ref 0–0.8)
EOSINOPHIL NFR BLD: 2 % (ref 0.5–7.8)
ERYTHROCYTE [DISTWIDTH] IN BLOOD BY AUTOMATED COUNT: 15.8 % (ref 11.9–14.6)
GLOBULIN SER CALC-MCNC: 4.1 G/DL (ref 2.8–4.5)
GLUCOSE BLD STRIP.AUTO-MCNC: 245 MG/DL (ref 65–100)
GLUCOSE SERPL-MCNC: 195 MG/DL (ref 65–100)
GLUCOSE UR STRIP.AUTO-MCNC: NEGATIVE MG/DL
HCT VFR BLD AUTO: 38.1 % (ref 41.1–50.3)
HGB BLD-MCNC: 11.7 G/DL (ref 13.6–17.2)
HGB UR QL STRIP: NEGATIVE
IMM GRANULOCYTES # BLD AUTO: 0 K/UL (ref 0–0.5)
IMM GRANULOCYTES NFR BLD AUTO: 0 % (ref 0–5)
KETONES UR QL STRIP.AUTO: NEGATIVE MG/DL
LEUKOCYTE ESTERASE UR QL STRIP.AUTO: NEGATIVE
LYMPHOCYTES # BLD: 2.8 K/UL (ref 0.5–4.6)
LYMPHOCYTES NFR BLD: 32 % (ref 13–44)
MAGNESIUM SERPL-MCNC: 2.3 MG/DL (ref 1.8–2.4)
MCH RBC QN AUTO: 25.9 PG (ref 26.1–32.9)
MCHC RBC AUTO-ENTMCNC: 30.7 G/DL (ref 31.4–35)
MCV RBC AUTO: 84.3 FL (ref 82–102)
MONOCYTES # BLD: 1.3 K/UL (ref 0.1–1.3)
MONOCYTES NFR BLD: 14 % (ref 4–12)
NEUTS SEG # BLD: 4.5 K/UL (ref 1.7–8.2)
NEUTS SEG NFR BLD: 52 % (ref 43–78)
NITRITE UR QL STRIP.AUTO: NEGATIVE
NRBC # BLD: 0 K/UL (ref 0–0.2)
NT PRO BNP: 1600 PG/ML
PH UR STRIP: 5.5 (ref 5–9)
PLATELET # BLD AUTO: 290 K/UL (ref 150–450)
PMV BLD AUTO: 11.9 FL (ref 9.4–12.3)
POTASSIUM SERPL-SCNC: 3.3 MMOL/L (ref 3.5–5.1)
PROT SERPL-MCNC: 7.2 G/DL (ref 6.3–8.2)
PROT UR STRIP-MCNC: 100 MG/DL
RBC # BLD AUTO: 4.52 M/UL (ref 4.23–5.6)
SARS-COV-2 RDRP RESP QL NAA+PROBE: NOT DETECTED
SERVICE CMNT-IMP: ABNORMAL
SODIUM SERPL-SCNC: 145 MMOL/L (ref 133–143)
SOURCE: NORMAL
SP GR UR REFRACTOMETRY: 1.02 (ref 1–1.02)
TROPONIN I SERPL HS-MCNC: 10.4 PG/ML (ref 0–14)
UROBILINOGEN UR QL STRIP.AUTO: 1 EU/DL (ref 0.2–1)
WBC # BLD AUTO: 8.9 K/UL (ref 4.3–11.1)

## 2023-11-09 PROCEDURE — 1100000000 HC RM PRIVATE

## 2023-11-09 PROCEDURE — 6360000002 HC RX W HCPCS: Performed by: EMERGENCY MEDICINE

## 2023-11-09 PROCEDURE — 71045 X-RAY EXAM CHEST 1 VIEW: CPT

## 2023-11-09 PROCEDURE — 6370000000 HC RX 637 (ALT 250 FOR IP): Performed by: EMERGENCY MEDICINE

## 2023-11-09 PROCEDURE — 83735 ASSAY OF MAGNESIUM: CPT

## 2023-11-09 PROCEDURE — 93010 ELECTROCARDIOGRAM REPORT: CPT | Performed by: INTERNAL MEDICINE

## 2023-11-09 PROCEDURE — 85025 COMPLETE CBC W/AUTO DIFF WBC: CPT

## 2023-11-09 PROCEDURE — 94640 AIRWAY INHALATION TREATMENT: CPT

## 2023-11-09 PROCEDURE — 83880 ASSAY OF NATRIURETIC PEPTIDE: CPT

## 2023-11-09 PROCEDURE — 2580000003 HC RX 258: Performed by: INTERNAL MEDICINE

## 2023-11-09 PROCEDURE — 93005 ELECTROCARDIOGRAM TRACING: CPT | Performed by: EMERGENCY MEDICINE

## 2023-11-09 PROCEDURE — 82962 GLUCOSE BLOOD TEST: CPT

## 2023-11-09 PROCEDURE — 6370000000 HC RX 637 (ALT 250 FOR IP): Performed by: INTERNAL MEDICINE

## 2023-11-09 PROCEDURE — 6360000002 HC RX W HCPCS: Performed by: INTERNAL MEDICINE

## 2023-11-09 PROCEDURE — 81001 URINALYSIS AUTO W/SCOPE: CPT

## 2023-11-09 PROCEDURE — 80053 COMPREHEN METABOLIC PANEL: CPT

## 2023-11-09 PROCEDURE — 87635 SARS-COV-2 COVID-19 AMP PRB: CPT

## 2023-11-09 PROCEDURE — 2700000000 HC OXYGEN THERAPY PER DAY

## 2023-11-09 PROCEDURE — 99285 EMERGENCY DEPT VISIT HI MDM: CPT

## 2023-11-09 PROCEDURE — 84484 ASSAY OF TROPONIN QUANT: CPT

## 2023-11-09 PROCEDURE — 94760 N-INVAS EAR/PLS OXIMETRY 1: CPT

## 2023-11-09 RX ORDER — ACETAMINOPHEN 325 MG/1
650 TABLET ORAL EVERY 6 HOURS PRN
Status: DISCONTINUED | OUTPATIENT
Start: 2023-11-09 | End: 2023-11-15 | Stop reason: HOSPADM

## 2023-11-09 RX ORDER — SENNOSIDES A AND B 8.6 MG/1
2 TABLET, FILM COATED ORAL 2 TIMES DAILY
Status: DISCONTINUED | OUTPATIENT
Start: 2023-11-09 | End: 2023-11-15 | Stop reason: HOSPADM

## 2023-11-09 RX ORDER — GUAIFENESIN/DEXTROMETHORPHAN 100-10MG/5
5 SYRUP ORAL EVERY 4 HOURS PRN
Status: DISCONTINUED | OUTPATIENT
Start: 2023-11-09 | End: 2023-11-15 | Stop reason: HOSPADM

## 2023-11-09 RX ORDER — CITALOPRAM 20 MG/1
10 TABLET ORAL DAILY
Status: DISCONTINUED | OUTPATIENT
Start: 2023-11-10 | End: 2023-11-15 | Stop reason: HOSPADM

## 2023-11-09 RX ORDER — LOSARTAN POTASSIUM 50 MG/1
100 TABLET ORAL DAILY
Status: DISCONTINUED | OUTPATIENT
Start: 2023-11-10 | End: 2023-11-15 | Stop reason: HOSPADM

## 2023-11-09 RX ORDER — SODIUM CHLORIDE 9 MG/ML
INJECTION, SOLUTION INTRAVENOUS CONTINUOUS
Status: ACTIVE | OUTPATIENT
Start: 2023-11-09 | End: 2023-11-11

## 2023-11-09 RX ORDER — MINERAL OIL AND WHITE PETROLATUM 150; 830 MG/G; MG/G
OINTMENT OPHTHALMIC PRN
Status: DISCONTINUED | OUTPATIENT
Start: 2023-11-09 | End: 2023-11-15 | Stop reason: HOSPADM

## 2023-11-09 RX ORDER — FUROSEMIDE 10 MG/ML
20 INJECTION INTRAMUSCULAR; INTRAVENOUS
Status: DISCONTINUED | OUTPATIENT
Start: 2023-11-09 | End: 2023-11-09

## 2023-11-09 RX ORDER — ACETAMINOPHEN 650 MG/1
650 SUPPOSITORY RECTAL EVERY 6 HOURS PRN
Status: DISCONTINUED | OUTPATIENT
Start: 2023-11-09 | End: 2023-11-15 | Stop reason: HOSPADM

## 2023-11-09 RX ORDER — ALBUTEROL SULFATE 2.5 MG/3ML
2.5 SOLUTION RESPIRATORY (INHALATION) EVERY 4 HOURS PRN
Status: DISCONTINUED | OUTPATIENT
Start: 2023-11-09 | End: 2023-11-15 | Stop reason: HOSPADM

## 2023-11-09 RX ORDER — ASPIRIN 81 MG/1
81 TABLET ORAL
Status: DISCONTINUED | OUTPATIENT
Start: 2023-11-11 | End: 2023-11-15 | Stop reason: HOSPADM

## 2023-11-09 RX ORDER — SODIUM CHLORIDE 0.9 % (FLUSH) 0.9 %
5-40 SYRINGE (ML) INJECTION EVERY 12 HOURS SCHEDULED
Status: DISCONTINUED | OUTPATIENT
Start: 2023-11-09 | End: 2023-11-15 | Stop reason: HOSPADM

## 2023-11-09 RX ORDER — ONDANSETRON 2 MG/ML
4 INJECTION INTRAMUSCULAR; INTRAVENOUS EVERY 6 HOURS PRN
Status: DISCONTINUED | OUTPATIENT
Start: 2023-11-09 | End: 2023-11-15 | Stop reason: HOSPADM

## 2023-11-09 RX ORDER — INSULIN LISPRO 100 [IU]/ML
0-4 INJECTION, SOLUTION INTRAVENOUS; SUBCUTANEOUS NIGHTLY
Status: DISCONTINUED | OUTPATIENT
Start: 2023-11-09 | End: 2023-11-15 | Stop reason: HOSPADM

## 2023-11-09 RX ORDER — IPRATROPIUM BROMIDE AND ALBUTEROL SULFATE 2.5; .5 MG/3ML; MG/3ML
1 SOLUTION RESPIRATORY (INHALATION)
Status: COMPLETED | OUTPATIENT
Start: 2023-11-09 | End: 2023-11-09

## 2023-11-09 RX ORDER — INSULIN LISPRO 100 [IU]/ML
0-8 INJECTION, SOLUTION INTRAVENOUS; SUBCUTANEOUS
Status: DISCONTINUED | OUTPATIENT
Start: 2023-11-10 | End: 2023-11-15 | Stop reason: HOSPADM

## 2023-11-09 RX ORDER — BUDESONIDE 0.5 MG/2ML
0.5 INHALANT ORAL
Status: DISCONTINUED | OUTPATIENT
Start: 2023-11-09 | End: 2023-11-15 | Stop reason: HOSPADM

## 2023-11-09 RX ORDER — IBUPROFEN 600 MG/1
1 TABLET ORAL PRN
Status: DISCONTINUED | OUTPATIENT
Start: 2023-11-09 | End: 2023-11-15 | Stop reason: HOSPADM

## 2023-11-09 RX ORDER — DEXAMETHASONE SODIUM PHOSPHATE 10 MG/ML
10 INJECTION INTRAMUSCULAR; INTRAVENOUS ONCE
Status: COMPLETED | OUTPATIENT
Start: 2023-11-09 | End: 2023-11-09

## 2023-11-09 RX ORDER — PANTOPRAZOLE SODIUM 40 MG/1
40 TABLET, DELAYED RELEASE ORAL DAILY
Status: DISCONTINUED | OUTPATIENT
Start: 2023-11-10 | End: 2023-11-15 | Stop reason: HOSPADM

## 2023-11-09 RX ORDER — AMLODIPINE BESYLATE 10 MG/1
10 TABLET ORAL DAILY
Status: DISCONTINUED | OUTPATIENT
Start: 2023-11-10 | End: 2023-11-15 | Stop reason: HOSPADM

## 2023-11-09 RX ORDER — NICOTINE 21 MG/24HR
1 PATCH, TRANSDERMAL 24 HOURS TRANSDERMAL DAILY
Status: DISCONTINUED | OUTPATIENT
Start: 2023-11-10 | End: 2023-11-15 | Stop reason: HOSPADM

## 2023-11-09 RX ORDER — SODIUM CHLORIDE 9 MG/ML
INJECTION, SOLUTION INTRAVENOUS PRN
Status: DISCONTINUED | OUTPATIENT
Start: 2023-11-09 | End: 2023-11-15 | Stop reason: HOSPADM

## 2023-11-09 RX ORDER — LANOLIN ALCOHOL/MO/W.PET/CERES
1000 CREAM (GRAM) TOPICAL DAILY
Status: DISCONTINUED | OUTPATIENT
Start: 2023-11-10 | End: 2023-11-15 | Stop reason: HOSPADM

## 2023-11-09 RX ORDER — PREDNISONE 20 MG/1
40 TABLET ORAL DAILY
Status: DISCONTINUED | OUTPATIENT
Start: 2023-11-12 | End: 2023-11-10

## 2023-11-09 RX ORDER — DEXTROSE MONOHYDRATE 100 MG/ML
INJECTION, SOLUTION INTRAVENOUS CONTINUOUS PRN
Status: DISCONTINUED | OUTPATIENT
Start: 2023-11-09 | End: 2023-11-15 | Stop reason: HOSPADM

## 2023-11-09 RX ORDER — POLYETHYLENE GLYCOL 3350 17 G/17G
17 POWDER, FOR SOLUTION ORAL DAILY PRN
Status: DISCONTINUED | OUTPATIENT
Start: 2023-11-09 | End: 2023-11-15 | Stop reason: HOSPADM

## 2023-11-09 RX ORDER — AZITHROMYCIN 250 MG/1
500 TABLET, FILM COATED ORAL DAILY
Status: DISCONTINUED | OUTPATIENT
Start: 2023-11-10 | End: 2023-11-11

## 2023-11-09 RX ORDER — ONDANSETRON 4 MG/1
4 TABLET, ORALLY DISINTEGRATING ORAL EVERY 8 HOURS PRN
Status: DISCONTINUED | OUTPATIENT
Start: 2023-11-09 | End: 2023-11-15 | Stop reason: HOSPADM

## 2023-11-09 RX ORDER — ASCORBIC ACID 500 MG
1000 TABLET ORAL DAILY
Status: DISCONTINUED | OUTPATIENT
Start: 2023-11-10 | End: 2023-11-15 | Stop reason: HOSPADM

## 2023-11-09 RX ORDER — TAMSULOSIN HYDROCHLORIDE 0.4 MG/1
0.4 CAPSULE ORAL DAILY
Status: DISCONTINUED | OUTPATIENT
Start: 2023-11-10 | End: 2023-11-15 | Stop reason: HOSPADM

## 2023-11-09 RX ORDER — SODIUM CHLORIDE 0.9 % (FLUSH) 0.9 %
5-40 SYRINGE (ML) INJECTION PRN
Status: DISCONTINUED | OUTPATIENT
Start: 2023-11-09 | End: 2023-11-15 | Stop reason: HOSPADM

## 2023-11-09 RX ORDER — INSULIN GLARGINE 100 [IU]/ML
10 INJECTION, SOLUTION SUBCUTANEOUS NIGHTLY
Status: DISCONTINUED | OUTPATIENT
Start: 2023-11-09 | End: 2023-11-11

## 2023-11-09 RX ORDER — MIRTAZAPINE 15 MG/1
15 TABLET, FILM COATED ORAL NIGHTLY
Status: DISCONTINUED | OUTPATIENT
Start: 2023-11-09 | End: 2023-11-15 | Stop reason: HOSPADM

## 2023-11-09 RX ADMIN — BUDESONIDE INHALATION 500 MCG: 0.5 SUSPENSION RESPIRATORY (INHALATION) at 23:23

## 2023-11-09 RX ADMIN — DEXAMETHASONE SODIUM PHOSPHATE 10 MG: 10 INJECTION INTRAMUSCULAR; INTRAVENOUS at 21:56

## 2023-11-09 RX ADMIN — POTASSIUM BICARBONATE 20 MEQ: 782 TABLET, EFFERVESCENT ORAL at 21:58

## 2023-11-09 RX ADMIN — SODIUM CHLORIDE, PRESERVATIVE FREE 10 ML: 5 INJECTION INTRAVENOUS at 23:43

## 2023-11-09 RX ADMIN — IPRATROPIUM BROMIDE AND ALBUTEROL SULFATE 1 DOSE: 2.5; .5 SOLUTION RESPIRATORY (INHALATION) at 18:43

## 2023-11-09 RX ADMIN — SODIUM CHLORIDE: 9 INJECTION, SOLUTION INTRAVENOUS at 23:39

## 2023-11-09 RX ADMIN — SENNOSIDES 17.2 MG: 8.6 TABLET, FILM COATED ORAL at 23:51

## 2023-11-09 RX ADMIN — IPRATROPIUM BROMIDE AND ALBUTEROL SULFATE 1 DOSE: 2.5; .5 SOLUTION RESPIRATORY (INHALATION) at 21:10

## 2023-11-09 RX ADMIN — CEFTRIAXONE 1000 MG: 1 INJECTION, POWDER, FOR SOLUTION INTRAMUSCULAR; INTRAVENOUS at 23:42

## 2023-11-09 RX ADMIN — MIRTAZAPINE 15 MG: 15 TABLET, FILM COATED ORAL at 23:51

## 2023-11-09 ASSESSMENT — ENCOUNTER SYMPTOMS
WHEEZING: 1
COUGH: 1
SHORTNESS OF BREATH: 1
RHINORRHEA: 1
DIARRHEA: 0
VOMITING: 0
ABDOMINAL PAIN: 0
NAUSEA: 0
BACK PAIN: 0

## 2023-11-09 ASSESSMENT — PAIN SCALES - GENERAL: PAINLEVEL_OUTOF10: 0

## 2023-11-09 ASSESSMENT — PAIN - FUNCTIONAL ASSESSMENT: PAIN_FUNCTIONAL_ASSESSMENT: 0-10

## 2023-11-09 NOTE — ED TRIAGE NOTES
Patient arrives to ED via EMS from [address] [place] [address]. Patient started with SOB and cough yesterday. Patient was 84% on RA. Patient placed on 3L via nc with improvement to 93%. Combivent treatment given at [address] [place] [address]. Patient has history of dementia.

## 2023-11-09 NOTE — ED PROVIDER NOTES
Emergency Department Provider Note       PCP: Tomas Perez DO   Age: 79 y.o. Sex: male     DISPOSITION Decision To Admit 11/09/2023 08:52:50 PM       ICD-10-CM    1. COPD exacerbation ([address])  J44.1       2. Stage 3b chronic kidney disease (HCC)  N18.32       3. Acute respiratory failure with hypoxia (HCC)  J96.01           Medical Decision Making     Complexity of Problems Addressed:  1 or more chronic illnesses with a severe exacerbation or progression. 1 or more acute illnesses that pose a threat to life or bodily function. Data Reviewed and Analyzed:   I independently ordered and reviewed each unique test.  I reviewed external records: previous lab results from outside ED. I reviewed external records: previous imaging study including radiologist interpretation. The patients assessment required an independent historian: Family members present at bedside provide additional historical formation regards to patient's symptoms and past medical history. Patient ports during given dementia. .  The reason they were needed is dementia. I independently ordered and interpreted the ED EKG in the absence of a Cardiologist.    Rate:  102  EKG Interpretation: Tachycardia. Atrial fibrillation with right bundle branch block present  ST Segments: Nonspecific ST segments - NO STEMI      I interpreted the X-rays chest x-ray with no infiltrate or consolidation. Agree with Radiologist interpretation. .    Discussion of management or test interpretation. 79-year-old male with history of atrial fibrillation, subarachnoid hemorrhage, COPD, tobacco use, type 2 diabetes mellitus, dementia presents with complaint of worsening shortness of breath, congestion, wheezing over the past several days. Family stated they have noticed he has had rattling in both of his lungs without a productive cough. Denies LE edema. Family was that the patient had fever on Monday and Tuesday has not had fever for the past 48 hours.  Afebrile on

## 2023-11-10 ENCOUNTER — APPOINTMENT (OUTPATIENT)
Dept: NON INVASIVE DIAGNOSTICS | Age: 83
DRG: 190 | End: 2023-11-10
Attending: INTERNAL MEDICINE
Payer: MEDICARE

## 2023-11-10 LAB
ALBUMIN SERPL-MCNC: 2.9 G/DL (ref 3.2–4.6)
ALBUMIN/GLOB SERPL: 0.7 (ref 0.4–1.6)
ALP SERPL-CCNC: 66 U/L (ref 50–136)
ALT SERPL-CCNC: 11 U/L (ref 12–65)
ANION GAP SERPL CALC-SCNC: 9 MMOL/L (ref 2–11)
AST SERPL-CCNC: 10 U/L (ref 15–37)
BASOPHILS # BLD: 0 K/UL (ref 0–0.2)
BASOPHILS NFR BLD: 0 % (ref 0–2)
BILIRUB SERPL-MCNC: 0.4 MG/DL (ref 0.2–1.1)
BUN SERPL-MCNC: 27 MG/DL (ref 8–23)
CALCIUM SERPL-MCNC: 9.1 MG/DL (ref 8.3–10.4)
CHLORIDE SERPL-SCNC: 111 MMOL/L (ref 101–110)
CO2 SERPL-SCNC: 24 MMOL/L (ref 21–32)
CREAT SERPL-MCNC: 2 MG/DL (ref 0.8–1.5)
DIFFERENTIAL METHOD BLD: ABNORMAL
ECHO AO ASC DIAM: 3.3 CM
ECHO AO ASCENDING AORTA INDEX: 1.66 CM/M2
ECHO AO ROOT DIAM: 3.2 CM
ECHO AO ROOT INDEX: 1.61 CM/M2
ECHO AV AREA PEAK VELOCITY: 2.3 CM2
ECHO AV AREA VTI: 2.6 CM2
ECHO AV AREA/BSA PEAK VELOCITY: 1.2 CM2/M2
ECHO AV AREA/BSA VTI: 1.3 CM2/M2
ECHO AV MEAN GRADIENT: 5 MMHG
ECHO AV MEAN VELOCITY: 1.1 M/S
ECHO AV PEAK GRADIENT: 11 MMHG
ECHO AV PEAK VELOCITY: 1.6 M/S
ECHO AV VELOCITY RATIO: 0.81
ECHO AV VTI: 34.2 CM
ECHO BSA: 2 M2
ECHO IVC PROX: 1.7 CM
ECHO LA AREA 2C: 15.6 CM2
ECHO LA AREA 4C: 19.7 CM2
ECHO LA DIAMETER INDEX: 1.71 CM/M2
ECHO LA DIAMETER: 3.4 CM
ECHO LA MAJOR AXIS: 6.1 CM
ECHO LA MINOR AXIS: 5.2 CM
ECHO LA TO AORTIC ROOT RATIO: 1.06
ECHO LA VOL BP: 48 ML (ref 18–58)
ECHO LA VOL MOD A2C: 38 ML (ref 18–58)
ECHO LA VOL MOD A4C: 52 ML (ref 18–58)
ECHO LA VOL/BSA BIPLANE: 24 ML/M2 (ref 16–34)
ECHO LA VOLUME INDEX MOD A2C: 19 ML/M2 (ref 16–34)
ECHO LA VOLUME INDEX MOD A4C: 26 ML/M2 (ref 16–34)
ECHO LV E' LATERAL VELOCITY: 10 CM/S
ECHO LV E' SEPTAL VELOCITY: 8 CM/S
ECHO LV EDV A2C: 88 ML
ECHO LV EDV A4C: 97 ML
ECHO LV EDV INDEX A4C: 49 ML/M2
ECHO LV EDV NDEX A2C: 44 ML/M2
ECHO LV EJECTION FRACTION A2C: 55 %
ECHO LV EJECTION FRACTION A4C: 60 %
ECHO LV EJECTION FRACTION BIPLANE: 56 % (ref 55–100)
ECHO LV ESV A2C: 40 ML
ECHO LV ESV A4C: 39 ML
ECHO LV ESV INDEX A2C: 20 ML/M2
ECHO LV ESV INDEX A4C: 20 ML/M2
ECHO LVOT AREA: 2.8 CM2
ECHO LVOT AV VTI INDEX: 0.92
ECHO LVOT DIAM: 1.9 CM
ECHO LVOT MEAN GRADIENT: 4 MMHG
ECHO LVOT PEAK GRADIENT: 7 MMHG
ECHO LVOT PEAK VELOCITY: 1.3 M/S
ECHO LVOT STROKE VOLUME INDEX: 44.9 ML/M2
ECHO LVOT SV: 89.3 ML
ECHO LVOT VTI: 31.5 CM
ECHO MV A VELOCITY: 1.37 M/S
ECHO MV E DECELERATION TIME (DT): 243 MS
ECHO MV E VELOCITY: 1.07 M/S
ECHO MV E/A RATIO: 0.78
ECHO MV E/E' LATERAL: 10.7
ECHO PV ACCELERATION TIME (AT): 124 MS
ECHO PV MAX VELOCITY: 0.7 M/S
ECHO PV PEAK GRADIENT: 2 MMHG
ECHO RV FREE WALL PEAK S': 10 CM/S
ECHO RV TAPSE: 1.5 CM (ref 1.7–?)
EOSINOPHIL # BLD: 0 K/UL (ref 0–0.8)
EOSINOPHIL NFR BLD: 0 % (ref 0.5–7.8)
ERYTHROCYTE [DISTWIDTH] IN BLOOD BY AUTOMATED COUNT: 15.5 % (ref 11.9–14.6)
GLOBULIN SER CALC-MCNC: 3.9 G/DL (ref 2.8–4.5)
GLUCOSE BLD STRIP.AUTO-MCNC: 179 MG/DL (ref 65–100)
GLUCOSE BLD STRIP.AUTO-MCNC: 225 MG/DL (ref 65–100)
GLUCOSE BLD STRIP.AUTO-MCNC: 234 MG/DL (ref 65–100)
GLUCOSE BLD STRIP.AUTO-MCNC: 316 MG/DL (ref 65–100)
GLUCOSE SERPL-MCNC: 283 MG/DL (ref 65–100)
HCT VFR BLD AUTO: 35.6 % (ref 41.1–50.3)
HGB BLD-MCNC: 10.9 G/DL (ref 13.6–17.2)
IMM GRANULOCYTES # BLD AUTO: 0 K/UL (ref 0–0.5)
IMM GRANULOCYTES NFR BLD AUTO: 0 % (ref 0–5)
LYMPHOCYTES # BLD: 0.3 K/UL (ref 0.5–4.6)
LYMPHOCYTES NFR BLD: 6 % (ref 13–44)
MCH RBC QN AUTO: 25.8 PG (ref 26.1–32.9)
MCHC RBC AUTO-ENTMCNC: 30.6 G/DL (ref 31.4–35)
MCV RBC AUTO: 84.2 FL (ref 82–102)
MONOCYTES # BLD: 0.2 K/UL (ref 0.1–1.3)
MONOCYTES NFR BLD: 5 % (ref 4–12)
NEUTS SEG # BLD: 4 K/UL (ref 1.7–8.2)
NEUTS SEG NFR BLD: 89 % (ref 43–78)
NRBC # BLD: 0 K/UL (ref 0–0.2)
PLATELET # BLD AUTO: 287 K/UL (ref 150–450)
PLATELET COMMENT: ADEQUATE
PMV BLD AUTO: 12.5 FL (ref 9.4–12.3)
POTASSIUM SERPL-SCNC: 4 MMOL/L (ref 3.5–5.1)
PROCALCITONIN SERPL-MCNC: 0.16 NG/ML (ref 0–0.49)
PROT SERPL-MCNC: 6.8 G/DL (ref 6.3–8.2)
RBC # BLD AUTO: 4.23 M/UL (ref 4.23–5.6)
RBC MORPH BLD: ABNORMAL
SERVICE CMNT-IMP: ABNORMAL
SODIUM SERPL-SCNC: 144 MMOL/L (ref 133–143)
WBC # BLD AUTO: 4.5 K/UL (ref 4.3–11.1)
WBC MORPH BLD: ABNORMAL

## 2023-11-10 PROCEDURE — 94760 N-INVAS EAR/PLS OXIMETRY 1: CPT

## 2023-11-10 PROCEDURE — 6370000000 HC RX 637 (ALT 250 FOR IP): Performed by: INTERNAL MEDICINE

## 2023-11-10 PROCEDURE — 80053 COMPREHEN METABOLIC PANEL: CPT

## 2023-11-10 PROCEDURE — 92610 EVALUATE SWALLOWING FUNCTION: CPT

## 2023-11-10 PROCEDURE — 84145 PROCALCITONIN (PCT): CPT

## 2023-11-10 PROCEDURE — 94761 N-INVAS EAR/PLS OXIMETRY MLT: CPT

## 2023-11-10 PROCEDURE — 93306 TTE W/DOPPLER COMPLETE: CPT | Performed by: INTERNAL MEDICINE

## 2023-11-10 PROCEDURE — 2700000000 HC OXYGEN THERAPY PER DAY

## 2023-11-10 PROCEDURE — 93306 TTE W/DOPPLER COMPLETE: CPT

## 2023-11-10 PROCEDURE — 6360000002 HC RX W HCPCS: Performed by: INTERNAL MEDICINE

## 2023-11-10 PROCEDURE — 97530 THERAPEUTIC ACTIVITIES: CPT

## 2023-11-10 PROCEDURE — 97161 PT EVAL LOW COMPLEX 20 MIN: CPT

## 2023-11-10 PROCEDURE — 82962 GLUCOSE BLOOD TEST: CPT

## 2023-11-10 PROCEDURE — 97165 OT EVAL LOW COMPLEX 30 MIN: CPT

## 2023-11-10 PROCEDURE — 2580000003 HC RX 258: Performed by: INTERNAL MEDICINE

## 2023-11-10 PROCEDURE — 97535 SELF CARE MNGMENT TRAINING: CPT

## 2023-11-10 PROCEDURE — 85025 COMPLETE CBC W/AUTO DIFF WBC: CPT

## 2023-11-10 PROCEDURE — 94640 AIRWAY INHALATION TREATMENT: CPT

## 2023-11-10 PROCEDURE — 1100000000 HC RM PRIVATE

## 2023-11-10 PROCEDURE — 36415 COLL VENOUS BLD VENIPUNCTURE: CPT

## 2023-11-10 RX ORDER — PREDNISONE 20 MG/1
40 TABLET ORAL DAILY
Status: DISCONTINUED | OUTPATIENT
Start: 2023-11-12 | End: 2023-11-11

## 2023-11-10 RX ADMIN — PANTOPRAZOLE SODIUM 40 MG: 40 TABLET, DELAYED RELEASE ORAL at 05:50

## 2023-11-10 RX ADMIN — METHYLPREDNISOLONE SODIUM SUCCINATE 40 MG: 40 INJECTION INTRAMUSCULAR; INTRAVENOUS at 23:41

## 2023-11-10 RX ADMIN — MIRTAZAPINE 15 MG: 15 TABLET, FILM COATED ORAL at 21:16

## 2023-11-10 RX ADMIN — LOSARTAN POTASSIUM 100 MG: 50 TABLET, FILM COATED ORAL at 08:23

## 2023-11-10 RX ADMIN — CEFTRIAXONE 1000 MG: 1 INJECTION, POWDER, FOR SOLUTION INTRAMUSCULAR; INTRAVENOUS at 21:22

## 2023-11-10 RX ADMIN — INSULIN GLARGINE 10 UNITS: 100 INJECTION, SOLUTION SUBCUTANEOUS at 21:16

## 2023-11-10 RX ADMIN — METHYLPREDNISOLONE SODIUM SUCCINATE 40 MG: 40 INJECTION INTRAMUSCULAR; INTRAVENOUS at 04:25

## 2023-11-10 RX ADMIN — INSULIN LISPRO 2 UNITS: 100 INJECTION, SOLUTION INTRAVENOUS; SUBCUTANEOUS at 12:52

## 2023-11-10 RX ADMIN — TAMSULOSIN HYDROCHLORIDE 0.4 MG: 0.4 CAPSULE ORAL at 08:24

## 2023-11-10 RX ADMIN — CITALOPRAM HYDROBROMIDE 10 MG: 20 TABLET ORAL at 08:24

## 2023-11-10 RX ADMIN — CHOLECALCIFEROL TAB 125 MCG (5000 UNIT) 5000 UNITS: 125 TAB at 08:24

## 2023-11-10 RX ADMIN — SODIUM CHLORIDE: 9 INJECTION, SOLUTION INTRAVENOUS at 16:20

## 2023-11-10 RX ADMIN — SENNOSIDES 17.2 MG: 8.6 TABLET, FILM COATED ORAL at 08:24

## 2023-11-10 RX ADMIN — SODIUM CHLORIDE, PRESERVATIVE FREE 10 ML: 5 INJECTION INTRAVENOUS at 08:25

## 2023-11-10 RX ADMIN — CYANOCOBALAMIN TAB 1000 MCG 1000 MCG: 1000 TAB at 08:24

## 2023-11-10 RX ADMIN — BUDESONIDE INHALATION 500 MCG: 0.5 SUSPENSION RESPIRATORY (INHALATION) at 07:47

## 2023-11-10 RX ADMIN — BUDESONIDE INHALATION 500 MCG: 0.5 SUSPENSION RESPIRATORY (INHALATION) at 20:24

## 2023-11-10 RX ADMIN — AZITHROMYCIN DIHYDRATE 500 MG: 250 TABLET ORAL at 08:24

## 2023-11-10 RX ADMIN — OXYCODONE HYDROCHLORIDE AND ACETAMINOPHEN 1000 MG: 500 TABLET ORAL at 08:23

## 2023-11-10 RX ADMIN — METHYLPREDNISOLONE SODIUM SUCCINATE 40 MG: 40 INJECTION INTRAMUSCULAR; INTRAVENOUS at 11:40

## 2023-11-10 RX ADMIN — INSULIN GLARGINE 10 UNITS: 100 INJECTION, SOLUTION SUBCUTANEOUS at 00:04

## 2023-11-10 RX ADMIN — SENNOSIDES 17.2 MG: 8.6 TABLET, FILM COATED ORAL at 21:16

## 2023-11-10 RX ADMIN — INSULIN LISPRO 6 UNITS: 100 INJECTION, SOLUTION INTRAVENOUS; SUBCUTANEOUS at 08:24

## 2023-11-10 RX ADMIN — AMLODIPINE BESYLATE 10 MG: 10 TABLET ORAL at 08:23

## 2023-11-10 NOTE — PROGRESS NOTES
Hospitalist Progress Note   Admit Date:  2023  5:51 PM   Name:  Julia Meneses   Age:  79 y.o. Sex:  male  :  1940   MRN:  [id number]   Room:  [facility]/    Presenting/Chief Complaint: Shortness of Breath     Reason(s) for Admission: Acute exacerbation of chronic obstructive pulmonary disease (COPD) ([address]) [J44.1]  Chronic atrial fibrillation (HCC) [I48.20]  COPD exacerbation (HCC) [J44.1]  Acute respiratory failure with hypoxia ([address]) [J96.01]  Stage 3b chronic kidney disease ([address]) [N18.32]     Hospital Course:   Please refer to the admission H&P for details of presentation. In summary, Julia Meneses is a 79 y.o. male with medical history significant for COPD with ongoing smoking 1 pack/day, chronic atrial fibrillation not on anticoagulation due to history of subarachnoid hemorrhage, type 2 diabetes, CKD stage III, dementia who presents emergency room with shortness of breath that started around 2023. Also reported having some nausea with episodes of vomiting as well as fever. He was evaluated at emergency MD and noted to be hypoxic on room air and therefore sent to the emergency room. Subjective/24 hr Events (11/10/23) : Patient is seen and examined at bedside. No acute events reported overnight by nursing staff. Patient laying in bed without any acute distress. Currently on 2 L O2 nasal cannula. Reports that he does not use O2 nasal cannula at home. Reports breathing is improved. Still with cough with intermittent sputum production. No chest pains, nausea, vomiting, abdominal pain. Eating his breakfast.      Review of Systems: 10 point review of systems is otherwise negative with the exception of the elements mentioned above.         Assessment & Plan:       Acute exacerbation of chronic obstructive pulmonary disease  Hypoxia  Suspected bacterial pneumonia  No documented hypoxia on admission but noted to have saturation 80% on room air at emergency ondansetron (ZOFRAN-ODT) disintegrating tablet 4 mg  4 mg Oral Q8H PRN    Or    ondansetron (ZOFRAN) injection 4 mg  4 mg IntraVENous Q6H PRN    polyethylene glycol (GLYCOLAX) packet 17 g  17 g Oral Daily PRN    acetaminophen (TYLENOL) tablet 650 mg  650 mg Oral Q6H PRN    Or    acetaminophen (TYLENOL) suppository 650 mg  650 mg Rectal Q6H PRN    0.9 % sodium chloride infusion   IntraVENous Continuous    cefTRIAXone (ROCEPHIN) 1,000 mg in sodium chloride 0.9 % 50 mL IVPB (mini-bag)  1,000 mg IntraVENous Q24H    azithromycin (ZITHROMAX) tablet 500 mg  500 mg Oral Daily    albuterol (PROVENTIL) (2.5 MG/3ML) 0.083% nebulizer solution 2.5 mg  2.5 mg Nebulization Q4H PRN    guaiFENesin-dextromethorphan (ROBITUSSIN DM) 100-10 MG/5ML syrup 5 mL  5 mL Oral Q4H PRN    methylPREDNISolone sodium succ (SOLU-MEDROL) 40 mg in sterile water 1 mL injection  40 mg IntraVENous Q6H    Followed by    Marques Leaver ON 11/12/2023] predniSONE (DELTASONE) tablet 40 mg  40 mg Oral Daily    insulin lispro (HUMALOG) injection vial 0-8 Units  0-8 Units SubCUTAneous TID WC    insulin lispro (HUMALOG) injection vial 0-4 Units  0-4 Units SubCUTAneous Nightly    lubrifresh P.M. (artificial tears) ophthalmic ointment   Both Eyes PRN    nicotine (NICODERM CQ) 21 MG/24HR 1 patch  1 patch TransDERmal Daily    glucose chewable tablet 16 g  4 tablet Oral PRN    dextrose bolus 10% 125 mL  125 mL IntraVENous PRN    Or    dextrose bolus 10% 250 mL  250 mL IntraVENous PRN    Glucagon Emergency KIT 1 mg  1 mg SubCUTAneous PRN    dextrose 10 % infusion   IntraVENous Continuous PRN    amLODIPine (NORVASC) tablet 10 mg  10 mg Oral Daily    ascorbic acid (VITAMIN C) tablet 1,000 mg  1,000 mg Oral Daily    [START ON 11/11/2023] aspirin EC tablet 81 mg  81 mg Oral Once per day on Sun Tue Thu Sat    vitamin D3 (CHOLECALCIFEROL) tablet 5,000 Units  5,000 Units Oral Daily    citalopram (CELEXA) tablet 10 mg  10 mg Oral Daily    losartan (COZAAR) tablet 100 mg  100 mg Oral

## 2023-11-10 NOTE — PROGRESS NOTES
ACUTE PHYSICAL THERAPY GOALS:   (Developed with and agreed upon by patient and/or caregiver.)  Pt will perform bed mobility with Rose in 7 therapy sessions. Pt will perform sit-to-stand/ stand-to-sit transfers SBA in 7 therapy sessions. Pt will ambulate 125 ft SBA with use of LRAD/no device and breaks as needed in 7 therapy sessions. Pt will tolerate 25 minutes of standing activity with LRAD/no device in 7 therapy sessions. Pt will perform standing dynamic balance activities with minimal postural sway in 7 therapy sessions. Pt will tolerate multiple sets and reps of BLE exercises in 7 therapy sessions. PHYSICAL THERAPY Initial Assessment  (Link to Caseload Tracking: PT Visit Days : 1  Acknowledge Orders  Time In/Out  PT Charge Capture  Rehab Caseload Tracker    Julia Meneses is a 79 y.o. male   PRIMARY DIAGNOSIS: Acute exacerbation of chronic obstructive pulmonary disease (COPD) ([address])  Acute exacerbation of chronic obstructive pulmonary disease (COPD) ([address]) [J44.1]  Chronic atrial fibrillation (HCC) [I48.20]  COPD exacerbation (HCC) [J44.1]  Acute respiratory failure with hypoxia ([address]) [J96.01]  Stage 3b chronic kidney disease ([address]) [N18.32]       Reason for Referral: Other abnormalities of gait and mobility (R26.89)  Inpatient: Payor: MEDICARE / Plan: MEDICARE PART A AND B / Product Type: *No Product type* /     ASSESSMENT:     REHAB RECOMMENDATIONS:   Recommendation to date pending progress:  Setting:  Inpatient Rehab Facility    Equipment:    To Be Determined     ASSESSMENT:  Mr. Demi Sunshine is a 79 y.o. male presenting to PT following a hospitalization due to COPD exacerbation. At baseline, pt ambulates with a RW and lives with spouse in a single story home with a ramped entrance. At time of initial evaluation, pt presents below baseline with deficits in bed mobility, strength, transfers, balance, gait and activity tolerance limiting overall functional mobility.  During ambulation of 2x trials of 15'

## 2023-11-10 NOTE — PROGRESS NOTES
Patient is asleep, very lethargic but responds to voice. Alert and oriented to person, time. On 3 L nasal cannula. 18 gauge present in RAC running normal saline at 75 ml/hr. Patient heart sounds audible, regular rate. No edema present. Patient has a hx of dementia and confusion. Wife Diego Claudio was present at bedside and answered admission assessment questions. Stated that she would bring medications tomorrow for med review. Wife stated that patient has not been able to get up and walk around, uses wheelchair to get around. No signs of pain present, no signs of distress present. Bed in lowest position, call light and bedside table within reach. Will continue to monitor.

## 2023-11-10 NOTE — H&P
Hospitalist History and Physical   Admit Date:  2023  5:51 PM   Name:  Anna Hackett   Age:  79 y.o. Sex:  male  :  1940   MRN:  [id number]   Room:  02/02    Presenting/Chief Complaint: Shortness of Breath     Reason(s) for Admission: Acute exacerbation of chronic obstructive pulmonary disease (COPD) ([facility]) [J44.1]     History of Present Illness:   Anna Hackett is a 79 y.o. male with medical history of COPD with ongoing smoking 1 pack/day, chronic atrial fibrillation not on anticoagulation due to history of subarachnoid hemorrhage, he does take aspirin daily. He has diabetes mellitus type 2 controlled with oral meds, chronic kidney disease stage III baseline creatinine just under 2, and dementia. He lives with his spouse. She is present and is healthcare power of  and request full code. He has everted eyelids but refuses eyedrops. He does not wear home oxygen. Problems began Monday night when patient had episode of vomitus. Wife reports that she could not roll him over and she suspected that he aspirated. He was witnessed to have coughing after that and fevers Monday night but responded to Tylenol. Unfortunately fevers recurred Wednesday evening. He had some dyspnea. His son came to assist and checked pulse ox and okay. Thursday patient was evaluated at emergency MD felt to be okay and discharged home but told to recur follow-up following day. This morning on follow-up patient found to have significant rhonchi and wheezing and hypoxemia and was sent to the emergency department. His O2 sat was recorded at 88 to 89% and on 2 L nasal cannula he is above 90%. He does have rhonchi and some wheezing. He received Decadron in the emergency department. His sugar prior to Decadron therapy was in the 190s. He has a serum potassium of 3.3. On review of systems wife reports that she has been noticing he has been having some difficulty swallowing his pills.   She denies 11 (L) 15 - 37 U/L    Alk Phosphatase 74 50 - 136 U/L    Total Protein 7.2 6.3 - 8.2 g/dL    Albumin 3.1 (L) 3.2 - 4.6 g/dL    Globulin 4.1 2.8 - 4.5 g/dL    Albumin/Globulin Ratio 0.8 0.4 - 1.6     Magnesium    Collection Time: 11/09/23  6:03 PM   Result Value Ref Range    Magnesium 2.3 1.8 - 2.4 mg/dL   Troponin    Collection Time: 11/09/23  6:03 PM   Result Value Ref Range    Troponin, High Sensitivity 10.4 0 - 14 pg/mL   Brain Natriuretic Peptide    Collection Time: 11/09/23  6:03 PM   Result Value Ref Range    NT Pro-BNP 1,600 (H) <450 PG/ML   COVID-19, Rapid    Collection Time: 11/09/23  6:42 PM    Specimen: Nasopharyngeal   Result Value Ref Range    Source NASAL      SARS-CoV-2, Rapid Not detected NOTD         I have personally reviewed imaging studies:  XR CHEST PORTABLE    Result Date: 11/9/2023  EXAM:  XR CHEST PORTABLE ,  11/9/2023 6:17 PM INDICATION:  Shortness of Breath. COMPARISON:  None. FINDINGS: The postoperative cardiomediastinal contours are within normal limits. There is atherosclerotic calcification of the aorta. The lungs and pleural spaces are clear. There is no pleural effusion or pneumothorax. The visualized bones and upper abdomen are unremarkable. 1.  No acute cardiopulmonary process identified. This exam was interpreted by a board-certified, body-imaging fellowship trained radiologist from [address] .1 [facility]. If there are any questions regarding this examination please feel free to contact a radiologist at [phone]. Milena Terrazas 11/9/2023 6:41:00 PM        Signed:  Lor Murillo DO    Part of this note may have been written by using a voice dictation software. The note has been proof read but may still contain some grammatical/other typographical errors.

## 2023-11-10 NOTE — PLAN OF CARE
Problem: Respiratory - Adult  Goal: Achieves optimal ventilation and oxygenation  Outcome: Progressing  Flowsheets (Taken 11/10/2023 0750)  Achieves optimal ventilation and oxygenation:   Assess for changes in respiratory status   Position to facilitate oxygenation and minimize respiratory effort   Respiratory therapy support as indicated   Assess for changes in mentation and behavior   Oxygen supplementation based on oxygen saturation or arterial blood gases   Encourage broncho-pulmonary hygiene including cough, deep breathe, incentive spirometry   Assess and instruct to report shortness of breath or any respiratory difficulty

## 2023-11-10 NOTE — CARE COORDINATION
11/10/23 1200   Service Assessment   Patient Orientation Unable to Assess   Cognition Dementia / Early Alzheimer's   History Provided By [facility]   Primary Caregiver Spouse   Support Systems Spouse/Significant Other;Children;Family Members   Patient's Healthcare Decision Maker is: Named in [place]bra E Pat Valadez   PCP Verified by CM Yes   Last Visit to PCP Within last 3 months   Prior Functional Level Assistance with the following:;Mobility; Toileting;Cooking;Dressing   Current Functional Level Assistance with the following:;Mobility;Dressing; Toileting   Can patient return to prior living arrangement Unknown at present   Ability to make needs known: Fair   Family able to assist with home care needs: Yes   Would you like for me to discuss the discharge plan with any other family members/significant others, and if so, who? Yes   Financial Resources Medicare   Community Resources None   Social/Functional History   Lives With Spouse   Type of [place] Help From Family   ADL Assistance Needs assistance   [place] Responsibilities Yes   Ambulation Assistance Needs assistance   Transfer Assistance Independent   Active  No   Occupation Retired   Discharge Planning   Type of [facility] Prior To Admission Durable Medical Equipment   Current DME Prior to [facility] Other (Comment)  [facility])   DME Ordered? No   Potential Assistance Purchasing Medications No   Type of Home Care Services None   Patient expects to be discharged to: Other (comment)  [facility])     Patient lives with his wife (at bedside). He had hospice until May 2023 when he graduated from it. Patient has been to [facility] and did very well and wife would like him to return if possible. Patient uses a wc primarily but also has a walker. Very strong family support. CM following.

## 2023-11-10 NOTE — PLAN OF CARE
Problem: Discharge Planning  Goal: Discharge to home or other facility with appropriate resources  Outcome: Progressing     Problem: Pain  Goal: Verbalizes/displays adequate comfort level or baseline comfort level  Outcome: Progressing     Problem: Safety - Adult  Goal: Free from fall injury  Outcome: Progressing  Flowsheets (Taken 11/10/2023 0455 by Gisela South RN)  Free From Fall Injury: [address] family/caregiver on patient safety     Problem: Respiratory - Adult  Goal: Achieves optimal ventilation and oxygenation  11/10/2023 1033 by Jamir Herrera RN  Outcome: Progressing  11/10/2023 0750 by Aaron Salamanca RCP  Outcome: Progressing  Flowsheets (Taken 11/10/2023 0750)  Achieves optimal ventilation and oxygenation:   Assess for changes in respiratory status   Position to facilitate oxygenation and minimize respiratory effort   Respiratory therapy support as indicated   Assess for changes in mentation and behavior   Oxygen supplementation based on oxygen saturation or arterial blood gases   Encourage broncho-pulmonary hygiene including cough, deep breathe, incentive spirometry   Assess and instruct to report shortness of breath or any respiratory difficulty

## 2023-11-10 NOTE — PROGRESS NOTES
Goals:  LTG: Patient will tolerate least restrictive oral diet without overt s/sx of airway compromise. STG: Patient will consume minced/moist diet and thin liquids without overt s/sx of airway compromise. STG: Patient will participate in ongoing po trials with SLP in attempt to identify least restrictive oral diet. STG: Patient will participate in modified barium swallow study to objectively assess swallow function as medically indicated. SPEECH LANGUAGE PATHOLOGY: DYSPHAGIA Initial Assessment    Acknowledge Order  I  Therapy Time  I   Charges     I  Rehab Caseload Tracker      NAME: Kyrie Rodríguez  : 1940  MRN: [id number]    ADMISSION DATE: 2023  PRIMARY DIAGNOSIS: Acute exacerbation of chronic obstructive pulmonary disease (COPD) ([address])    ICD-10: Treatment Diagnosis: R13.12 Dysphagia, Oropharyngeal Phase    RECOMMENDATIONS   Diet:    Minced and Moist  Thin Liquids    Medication: as tolerated   Compensatory Swallowing Strategies:   Slow rate of intake  Remain upright for 30-45 minutes after meals  Small bites/sips  Upright as possible for all oral intake   Therapeutic Intervention:   Patient/family education  Dysphagia treatment   Patient continues to require skilled intervention:  Yes. Recommend ongoing speech therapy services during this hospitalization. Anticipated Discharge Needs: Do not anticipate ongoing speech therapy needs upon discharge. ASSESSMENT    Patient presents with oral dysphagia due to missing dentition which limits his ability to fully masticate solid textures. Improved oral prep with softer textures. No s/sx of airway compromise with po intake. Recommend minced/moist diet and thin liquids. Medications as tolerated. Will follow for diet tolerance and additional modifications as indicated. GENERAL    Subjective: Patient pleasant and cooperative with evaluation. Seen with AM meal. Oriented to person only.  States \"I hope you are not in a rush because it resection (10/07/2019); Urological Surgery; and vascular surgery (2001). Precautions/Allergies: Aspirin-dipyridamole er, Beta adrenergic blockers, Ciprofloxacin, Donepezil, and Lisinopril     Observations:  Alertness: Alert  Voice: WFL  Speech: Intelligible  Expressive Language: Fluent and Able to communicate wants and needs  Receptive Language: Follows most commands. Some comprehension issues that appear to be related to hearing loss    Prior Dysphagia History: None  Prior Instrumental Assessment: None    Current Diet:  Soft and Bite-Sized  Thin Liquids    Respiratory Status: Nasal Cannula    Pain:  Patient does not c/o pain    OBJECTIVE     Oral Motor Assessment: Labial: no impairment  Lingual: no impairment  Dentition: Minimal natural dentition  Oral Hygiene: adequate  Oropharyngeal Assessment: Patient presented with thin liquids by straw sips, puree, and soft solids. He has difficulty chewing pieces of fruit stating they are \"too tough\". He was abl to clear oral cavity with extended oral prep time. Improved oral stage of swallowing with puree textures. Timely swallow initiation, and single swallows upon palpation. No overt signs or symptoms of airway compromise observed with liquid or solid textures. Dysphagia Outcome and Severity Scale (DIPIKA)  Score: 4 Description   [] 7 Normal in all situations   [] 6 Within Functional Limits/ Modified independent   [] 5 Mild Dysphagia: Distant Supervision. May need one diet consistency      restricted. [x] 4 Mild-Moderate Dysphagia: Intermittent supervision/cuing. One-two diet    consistencies restricted. [] 3 Moderate Dysphagia: Total assistance, supervision, or strategies. Two or more diet consistencies restricted. [] 2 Moderate-Severe Dysphagia: Maximum assistance or maximum use     of strategies with partial po intake   [] 1 Severe dysphagia- NPO.  Unable to tolerate any po safely     PLAN    Duration/Frequency: Continue to follow patient 2x/week for

## 2023-11-10 NOTE — PROGRESS NOTES
Patient asleep on 2 L nc. No signs of pain present, No signs of distress present. Bed in lowest position, call light and bedside table within reach. Will begin preparing for bedside shift report.

## 2023-11-10 NOTE — PROGRESS NOTES
ACUTE OCCUPATIONAL THERAPY GOALS:   (Developed with and agreed upon by patient and/or caregiver.)  1. Patient will complete lower body bathing and dressing with supervision and adaptive equipment as needed. 2. Patient will complete toileting with SBA. 3. Patient will tolerate 30 minutes of OT treatment with 1-2 rest breaks to increase activity tolerance for ADLs. 4. Patient will complete functional transfers with supervision and adaptive equipment as needed. 5. Patient will complete functional mobility for household distances with supervision and good safety awareness. 6. Patient will complete dynamic standing balance with SBA while participating in ADL to decrease risk for falls. Timeframe: 7 visits       OCCUPATIONAL THERAPY Initial Assessment, Daily Note, and AM       OT Visit Days: 1  Acknowledge Orders  Time  OT Charge Capture  Rehab Caseload Tracker      Emanuel Norris is a 79 y.o. male   PRIMARY DIAGNOSIS: Acute exacerbation of chronic obstructive pulmonary disease (COPD) ([address])  Acute exacerbation of chronic obstructive pulmonary disease (COPD) ([address]) [J44.1]  Chronic atrial fibrillation (HCC) [I48.20]  COPD exacerbation (HCC) [J44.1]  Acute respiratory failure with hypoxia ([address]) [J96.01]  Stage 3b chronic kidney disease ([address]) [N18.32]       Reason for Referral: Generalized Muscle Weakness (M62.81)  Other lack of cordination (R27.8)  Inpatient: Payor: MEDICARE / Plan: MEDICARE PART A AND B / Product Type: *No Product type* /     ASSESSMENT:     REHAB RECOMMENDATIONS:   Recommendation to date pending progress:  Setting:  Inpatient Rehab Facility     Equipment:    To Be Determined     ASSESSMENT:  Mr. Kendell Victor presents to the hospital with COPD exacerbation and acute respiratory failure with hypoxia. Pt was supine in the bed upon arrival with supportive wife/son at bedside. Pt appears hard of hearing and has some dementia, therefore pt's wife provided prior level of functioning.  Pt lives with

## 2023-11-10 NOTE — ED NOTES
TRANSFER - OUT REPORT:    Verbal report given to Ly Ram  being transferred to [id number] for routine progression of patient care       Report consisted of patient's Situation, Background, Assessment and   Recommendations(SBAR). Information from the following report(s) Nurse Handoff Report was reviewed with the receiving nurse. Lines:   Peripheral IV 11/09/23 Right Antecubital (Active)        Opportunity for questions and clarification was provided.       Patient transported with:  Li Rubalcava  11/09/23 8801

## 2023-11-11 LAB
ANION GAP SERPL CALC-SCNC: 9 MMOL/L (ref 2–11)
BASOPHILS # BLD: 0 K/UL (ref 0–0.2)
BASOPHILS NFR BLD: 0 % (ref 0–2)
BUN SERPL-MCNC: 32 MG/DL (ref 8–23)
CALCIUM SERPL-MCNC: 9 MG/DL (ref 8.3–10.4)
CHLORIDE SERPL-SCNC: 112 MMOL/L (ref 101–110)
CO2 SERPL-SCNC: 21 MMOL/L (ref 21–32)
CREAT SERPL-MCNC: 1.81 MG/DL (ref 0.8–1.5)
DIFFERENTIAL METHOD BLD: ABNORMAL
EOSINOPHIL # BLD: 0 K/UL (ref 0–0.8)
EOSINOPHIL NFR BLD: 0 % (ref 0.5–7.8)
ERYTHROCYTE [DISTWIDTH] IN BLOOD BY AUTOMATED COUNT: 15.6 % (ref 11.9–14.6)
GLUCOSE BLD STRIP.AUTO-MCNC: 176 MG/DL (ref 65–100)
GLUCOSE BLD STRIP.AUTO-MCNC: 179 MG/DL (ref 65–100)
GLUCOSE BLD STRIP.AUTO-MCNC: 194 MG/DL (ref 65–100)
GLUCOSE BLD STRIP.AUTO-MCNC: 206 MG/DL (ref 65–100)
GLUCOSE SERPL-MCNC: 180 MG/DL (ref 65–100)
HCT VFR BLD AUTO: 36.7 % (ref 41.1–50.3)
HGB BLD-MCNC: 11.4 G/DL (ref 13.6–17.2)
IMM GRANULOCYTES # BLD AUTO: 0.1 K/UL (ref 0–0.5)
IMM GRANULOCYTES NFR BLD AUTO: 1 % (ref 0–5)
LYMPHOCYTES # BLD: 0.9 K/UL (ref 0.5–4.6)
LYMPHOCYTES NFR BLD: 7 % (ref 13–44)
MCH RBC QN AUTO: 26 PG (ref 26.1–32.9)
MCHC RBC AUTO-ENTMCNC: 31.1 G/DL (ref 31.4–35)
MCV RBC AUTO: 83.6 FL (ref 82–102)
MONOCYTES # BLD: 0.7 K/UL (ref 0.1–1.3)
MONOCYTES NFR BLD: 6 % (ref 4–12)
NEUTS SEG # BLD: 10.6 K/UL (ref 1.7–8.2)
NEUTS SEG NFR BLD: 86 % (ref 43–78)
NRBC # BLD: 0 K/UL (ref 0–0.2)
PLATELET # BLD AUTO: 301 K/UL (ref 150–450)
PLATELET COMMENT: ADEQUATE
PMV BLD AUTO: 12.2 FL (ref 9.4–12.3)
POTASSIUM SERPL-SCNC: 4.2 MMOL/L (ref 3.5–5.1)
RBC # BLD AUTO: 4.39 M/UL (ref 4.23–5.6)
RBC MORPH BLD: ABNORMAL
SERVICE CMNT-IMP: ABNORMAL
SODIUM SERPL-SCNC: 142 MMOL/L (ref 133–143)
WBC # BLD AUTO: 12.3 K/UL (ref 4.3–11.1)
WBC MORPH BLD: ABNORMAL

## 2023-11-11 PROCEDURE — 80048 BASIC METABOLIC PNL TOTAL CA: CPT

## 2023-11-11 PROCEDURE — 6360000002 HC RX W HCPCS: Performed by: INTERNAL MEDICINE

## 2023-11-11 PROCEDURE — A4216 STERILE WATER/SALINE, 10 ML: HCPCS | Performed by: INTERNAL MEDICINE

## 2023-11-11 PROCEDURE — 36415 COLL VENOUS BLD VENIPUNCTURE: CPT

## 2023-11-11 PROCEDURE — 94640 AIRWAY INHALATION TREATMENT: CPT

## 2023-11-11 PROCEDURE — 6370000000 HC RX 637 (ALT 250 FOR IP): Performed by: INTERNAL MEDICINE

## 2023-11-11 PROCEDURE — 2580000003 HC RX 258: Performed by: INTERNAL MEDICINE

## 2023-11-11 PROCEDURE — 82962 GLUCOSE BLOOD TEST: CPT

## 2023-11-11 PROCEDURE — 85025 COMPLETE CBC W/AUTO DIFF WBC: CPT

## 2023-11-11 PROCEDURE — 94760 N-INVAS EAR/PLS OXIMETRY 1: CPT

## 2023-11-11 PROCEDURE — 1100000000 HC RM PRIVATE

## 2023-11-11 RX ORDER — PREDNISONE 20 MG/1
40 TABLET ORAL DAILY
Status: DISCONTINUED | OUTPATIENT
Start: 2023-11-12 | End: 2023-11-13

## 2023-11-11 RX ORDER — INSULIN GLARGINE 100 [IU]/ML
14 INJECTION, SOLUTION SUBCUTANEOUS NIGHTLY
Status: DISCONTINUED | OUTPATIENT
Start: 2023-11-11 | End: 2023-11-15 | Stop reason: HOSPADM

## 2023-11-11 RX ORDER — AZITHROMYCIN 250 MG/1
250 TABLET, FILM COATED ORAL DAILY
Status: DISPENSED | OUTPATIENT
Start: 2023-11-12 | End: 2023-11-15

## 2023-11-11 RX ORDER — GUAIFENESIN 600 MG/1
600 TABLET, EXTENDED RELEASE ORAL 2 TIMES DAILY
Status: DISCONTINUED | OUTPATIENT
Start: 2023-11-11 | End: 2023-11-15 | Stop reason: HOSPADM

## 2023-11-11 RX ORDER — LANOLIN ALCOHOL/MO/W.PET/CERES
6 CREAM (GRAM) TOPICAL NIGHTLY
Status: DISCONTINUED | OUTPATIENT
Start: 2023-11-11 | End: 2023-11-15 | Stop reason: HOSPADM

## 2023-11-11 RX ORDER — NICOTINE 21 MG/24HR
1 PATCH, TRANSDERMAL 24 HOURS TRANSDERMAL DAILY PRN
Status: CANCELLED | OUTPATIENT
Start: 2023-11-11

## 2023-11-11 RX ADMIN — SODIUM CHLORIDE: 9 INJECTION, SOLUTION INTRAVENOUS at 03:31

## 2023-11-11 RX ADMIN — Medication 6 MG: at 20:52

## 2023-11-11 RX ADMIN — BUDESONIDE INHALATION 500 MCG: 0.5 SUSPENSION RESPIRATORY (INHALATION) at 20:24

## 2023-11-11 RX ADMIN — BUDESONIDE INHALATION 500 MCG: 0.5 SUSPENSION RESPIRATORY (INHALATION) at 08:02

## 2023-11-11 RX ADMIN — AZITHROMYCIN DIHYDRATE 500 MG: 250 TABLET ORAL at 08:53

## 2023-11-11 RX ADMIN — CEFTRIAXONE 1000 MG: 1 INJECTION, POWDER, FOR SOLUTION INTRAMUSCULAR; INTRAVENOUS at 21:04

## 2023-11-11 RX ADMIN — PANTOPRAZOLE SODIUM 40 MG: 40 TABLET, DELAYED RELEASE ORAL at 06:19

## 2023-11-11 RX ADMIN — OXYCODONE HYDROCHLORIDE AND ACETAMINOPHEN 1000 MG: 500 TABLET ORAL at 08:48

## 2023-11-11 RX ADMIN — CYANOCOBALAMIN TAB 1000 MCG 1000 MCG: 1000 TAB at 08:55

## 2023-11-11 RX ADMIN — METHYLPREDNISOLONE SODIUM SUCCINATE 40 MG: 40 INJECTION INTRAMUSCULAR; INTRAVENOUS at 12:04

## 2023-11-11 RX ADMIN — GUAIFENESIN 600 MG: 600 TABLET ORAL at 13:07

## 2023-11-11 RX ADMIN — SENNOSIDES 17.2 MG: 8.6 TABLET, FILM COATED ORAL at 08:54

## 2023-11-11 RX ADMIN — SODIUM CHLORIDE 1 MG: 9 INJECTION INTRAMUSCULAR; INTRAVENOUS; SUBCUTANEOUS at 22:21

## 2023-11-11 RX ADMIN — CHOLECALCIFEROL TAB 125 MCG (5000 UNIT) 5000 UNITS: 125 TAB at 08:54

## 2023-11-11 RX ADMIN — INSULIN LISPRO 2 UNITS: 100 INJECTION, SOLUTION INTRAVENOUS; SUBCUTANEOUS at 08:57

## 2023-11-11 RX ADMIN — ASPIRIN 81 MG: 81 TABLET ORAL at 08:49

## 2023-11-11 RX ADMIN — SODIUM CHLORIDE: 9 INJECTION, SOLUTION INTRAVENOUS at 03:17

## 2023-11-11 RX ADMIN — AMLODIPINE BESYLATE 10 MG: 10 TABLET ORAL at 08:53

## 2023-11-11 RX ADMIN — SODIUM CHLORIDE 1 MG: 9 INJECTION INTRAMUSCULAR; INTRAVENOUS; SUBCUTANEOUS at 15:56

## 2023-11-11 RX ADMIN — MIRTAZAPINE 15 MG: 15 TABLET, FILM COATED ORAL at 20:52

## 2023-11-11 RX ADMIN — WATER 5 MG: 1 INJECTION INTRAMUSCULAR; INTRAVENOUS; SUBCUTANEOUS at 17:20

## 2023-11-11 RX ADMIN — SENNOSIDES 17.2 MG: 8.6 TABLET, FILM COATED ORAL at 20:52

## 2023-11-11 RX ADMIN — CITALOPRAM HYDROBROMIDE 10 MG: 20 TABLET ORAL at 08:56

## 2023-11-11 RX ADMIN — LOSARTAN POTASSIUM 100 MG: 50 TABLET, FILM COATED ORAL at 08:50

## 2023-11-11 RX ADMIN — GUAIFENESIN 600 MG: 600 TABLET ORAL at 20:52

## 2023-11-11 RX ADMIN — INSULIN GLARGINE 14 UNITS: 100 INJECTION, SOLUTION SUBCUTANEOUS at 20:52

## 2023-11-11 RX ADMIN — TAMSULOSIN HYDROCHLORIDE 0.4 MG: 0.4 CAPSULE ORAL at 08:49

## 2023-11-11 RX ADMIN — SODIUM CHLORIDE: 9 INJECTION, SOLUTION INTRAVENOUS at 14:40

## 2023-11-11 RX ADMIN — SODIUM CHLORIDE, PRESERVATIVE FREE 10 ML: 5 INJECTION INTRAVENOUS at 08:58

## 2023-11-11 NOTE — PROGRESS NOTES
Hospitalist Progress Note   Admit Date:  2023  5:51 PM   Name:  Devin Garcia   Age:  79 y.o. Sex:  male  :  1940   MRN:  [id number]   Room:  [facility]/    Presenting/Chief Complaint: Shortness of Breath     Reason(s) for Admission: Acute exacerbation of chronic obstructive pulmonary disease (COPD) ([address]) [J44.1]  Chronic atrial fibrillation (HCC) [I48.20]  COPD exacerbation (HCC) [J44.1]  Acute respiratory failure with hypoxia ([address]) [J96.01]  Stage 3b chronic kidney disease ([address]) [N18.32]     Hospital Course:   Please refer to the admission H&P for details of presentation. In summary, Devin Garcia is a 79 y.o. male with medical history significant for COPD with ongoing smoking 1 pack/day, chronic atrial fibrillation not on anticoagulation due to history of subarachnoid hemorrhage, type 2 diabetes, CKD stage III, dementia who presents emergency room with shortness of breath that started around 2023. Also reported having some nausea with episodes of vomiting as well as fever. He was evaluated at emergency MD and noted to be hypoxic on room air and therefore sent to the emergency room. Subjective/24 hr Events (23) : Patient is seen and examined at bedside. No acute events reported overnight by nursing staff. Patient is sitting up in his recliner without any acute distress. Confused this AM but AAOx2. Pleasant. Wife and Son at bedside. On room air without any respiratory distress. Denies any shortness of breath. Still having intermittent cough with minimal sputum production. Denies chest pain, nausea, vomiting, abdominal pain. Review of Systems: 10 point review of systems is otherwise negative with the exception of the elements mentioned above.         Assessment & Plan:       Acute exacerbation of chronic obstructive pulmonary disease  Hypoxia  Suspected bacterial pneumonia  No documented hypoxia on admission but noted to have saturation 80% on room air IntraVENous 2 times per day    sodium chloride flush 0.9 % injection 5-40 mL  5-40 mL IntraVENous PRN    0.9 % sodium chloride infusion   IntraVENous PRN    ondansetron (ZOFRAN-ODT) disintegrating tablet 4 mg  4 mg Oral Q8H PRN    Or    ondansetron (ZOFRAN) injection 4 mg  4 mg IntraVENous Q6H PRN    polyethylene glycol (GLYCOLAX) packet 17 g  17 g Oral Daily PRN    acetaminophen (TYLENOL) tablet 650 mg  650 mg Oral Q6H PRN    Or    acetaminophen (TYLENOL) suppository 650 mg  650 mg Rectal Q6H PRN    0.9 % sodium chloride infusion   IntraVENous Continuous    cefTRIAXone (ROCEPHIN) 1,000 mg in sodium chloride 0.9 % 50 mL IVPB (mini-bag)  1,000 mg IntraVENous Q24H    azithromycin (ZITHROMAX) tablet 500 mg  500 mg Oral Daily    albuterol (PROVENTIL) (2.5 MG/3ML) 0.083% nebulizer solution 2.5 mg  2.5 mg Nebulization Q4H PRN    guaiFENesin-dextromethorphan (ROBITUSSIN DM) 100-10 MG/5ML syrup 5 mL  5 mL Oral Q4H PRN    insulin lispro (HUMALOG) injection vial 0-8 Units  0-8 Units SubCUTAneous TID WC    insulin lispro (HUMALOG) injection vial 0-4 Units  0-4 Units SubCUTAneous Nightly    lubrifresh P.M. (artificial tears) ophthalmic ointment   Both Eyes PRN    nicotine (NICODERM CQ) 21 MG/24HR 1 patch  1 patch TransDERmal Daily    glucose chewable tablet 16 g  4 tablet Oral PRN    dextrose bolus 10% 125 mL  125 mL IntraVENous PRN    Or    dextrose bolus 10% 250 mL  250 mL IntraVENous PRN    Glucagon Emergency KIT 1 mg  1 mg SubCUTAneous PRN    dextrose 10 % infusion   IntraVENous Continuous PRN    amLODIPine (NORVASC) tablet 10 mg  10 mg Oral Daily    ascorbic acid (VITAMIN C) tablet 1,000 mg  1,000 mg Oral Daily    aspirin EC tablet 81 mg  81 mg Oral Once per day on Sun Tue Thu Sat    vitamin D3 (CHOLECALCIFEROL) tablet 5,000 Units  5,000 Units Oral Daily    citalopram (CELEXA) tablet 10 mg  10 mg Oral Daily    losartan (COZAAR) tablet 100 mg  100 mg Oral Daily    mirtazapine (REMERON) tablet 15 mg  15 mg Oral Nightly

## 2023-11-12 PROBLEM — F03.918 DEMENTIA WITH BEHAVIORAL DISTURBANCE (HCC): Status: ACTIVE | Noted: 2022-06-21

## 2023-11-12 LAB
ANION GAP SERPL CALC-SCNC: 5 MMOL/L (ref 2–11)
BUN SERPL-MCNC: 35 MG/DL (ref 8–23)
CALCIUM SERPL-MCNC: 8.8 MG/DL (ref 8.3–10.4)
CHLORIDE SERPL-SCNC: 112 MMOL/L (ref 101–110)
CO2 SERPL-SCNC: 24 MMOL/L (ref 21–32)
CREAT SERPL-MCNC: 1.8 MG/DL (ref 0.8–1.5)
GLUCOSE BLD STRIP.AUTO-MCNC: 111 MG/DL (ref 65–100)
GLUCOSE BLD STRIP.AUTO-MCNC: 127 MG/DL (ref 65–100)
GLUCOSE BLD STRIP.AUTO-MCNC: 133 MG/DL (ref 65–100)
GLUCOSE BLD STRIP.AUTO-MCNC: 55 MG/DL (ref 65–100)
GLUCOSE BLD STRIP.AUTO-MCNC: 82 MG/DL (ref 65–100)
GLUCOSE SERPL-MCNC: 153 MG/DL (ref 65–100)
POTASSIUM SERPL-SCNC: 4.3 MMOL/L (ref 3.5–5.1)
SERVICE CMNT-IMP: ABNORMAL
SERVICE CMNT-IMP: NORMAL
SODIUM SERPL-SCNC: 141 MMOL/L (ref 133–143)

## 2023-11-12 PROCEDURE — 2580000003 HC RX 258: Performed by: INTERNAL MEDICINE

## 2023-11-12 PROCEDURE — 1100000000 HC RM PRIVATE

## 2023-11-12 PROCEDURE — 36415 COLL VENOUS BLD VENIPUNCTURE: CPT

## 2023-11-12 PROCEDURE — 6370000000 HC RX 637 (ALT 250 FOR IP): Performed by: INTERNAL MEDICINE

## 2023-11-12 PROCEDURE — 2700000000 HC OXYGEN THERAPY PER DAY

## 2023-11-12 PROCEDURE — 82962 GLUCOSE BLOOD TEST: CPT

## 2023-11-12 PROCEDURE — 80048 BASIC METABOLIC PNL TOTAL CA: CPT

## 2023-11-12 PROCEDURE — 6360000002 HC RX W HCPCS: Performed by: INTERNAL MEDICINE

## 2023-11-12 PROCEDURE — 94640 AIRWAY INHALATION TREATMENT: CPT

## 2023-11-12 PROCEDURE — 94760 N-INVAS EAR/PLS OXIMETRY 1: CPT

## 2023-11-12 PROCEDURE — 94761 N-INVAS EAR/PLS OXIMETRY MLT: CPT

## 2023-11-12 RX ORDER — DIVALPROEX SODIUM 125 MG/1
125 CAPSULE, COATED PELLETS ORAL EVERY 8 HOURS SCHEDULED
Status: DISCONTINUED | OUTPATIENT
Start: 2023-11-12 | End: 2023-11-14

## 2023-11-12 RX ORDER — QUETIAPINE FUMARATE 25 MG/1
25 TABLET, FILM COATED ORAL NIGHTLY
Status: DISCONTINUED | OUTPATIENT
Start: 2023-11-12 | End: 2023-11-15 | Stop reason: HOSPADM

## 2023-11-12 RX ORDER — FUROSEMIDE 10 MG/ML
20 INJECTION INTRAMUSCULAR; INTRAVENOUS ONCE
Status: COMPLETED | OUTPATIENT
Start: 2023-11-12 | End: 2023-11-12

## 2023-11-12 RX ADMIN — GUAIFENESIN 600 MG: 600 TABLET ORAL at 21:09

## 2023-11-12 RX ADMIN — BUDESONIDE INHALATION 500 MCG: 0.5 SUSPENSION RESPIRATORY (INHALATION) at 20:47

## 2023-11-12 RX ADMIN — MIRTAZAPINE 15 MG: 15 TABLET, FILM COATED ORAL at 21:09

## 2023-11-12 RX ADMIN — FUROSEMIDE 20 MG: 10 INJECTION, SOLUTION INTRAMUSCULAR; INTRAVENOUS at 08:52

## 2023-11-12 RX ADMIN — SENNOSIDES 17.2 MG: 8.6 TABLET, FILM COATED ORAL at 21:09

## 2023-11-12 RX ADMIN — SODIUM CHLORIDE, PRESERVATIVE FREE 10 ML: 5 INJECTION INTRAVENOUS at 08:46

## 2023-11-12 RX ADMIN — SODIUM CHLORIDE, PRESERVATIVE FREE 10 ML: 5 INJECTION INTRAVENOUS at 21:15

## 2023-11-12 RX ADMIN — PANTOPRAZOLE SODIUM 40 MG: 40 TABLET, DELAYED RELEASE ORAL at 06:04

## 2023-11-12 RX ADMIN — CEFTRIAXONE 1000 MG: 1 INJECTION, POWDER, FOR SOLUTION INTRAMUSCULAR; INTRAVENOUS at 21:18

## 2023-11-12 RX ADMIN — DIVALPROEX SODIUM 125 MG: 125 CAPSULE, COATED PELLETS ORAL at 15:35

## 2023-11-12 RX ADMIN — DIVALPROEX SODIUM 125 MG: 125 CAPSULE, COATED PELLETS ORAL at 21:09

## 2023-11-12 RX ADMIN — BUDESONIDE INHALATION 500 MCG: 0.5 SUSPENSION RESPIRATORY (INHALATION) at 07:13

## 2023-11-12 RX ADMIN — QUETIAPINE FUMARATE 25 MG: 25 TABLET ORAL at 21:09

## 2023-11-12 RX ADMIN — INSULIN GLARGINE 14 UNITS: 100 INJECTION, SOLUTION SUBCUTANEOUS at 21:14

## 2023-11-12 RX ADMIN — Medication 6 MG: at 21:10

## 2023-11-12 RX ADMIN — DEXTROSE MONOHYDRATE 125 ML: 100 INJECTION, SOLUTION INTRAVENOUS at 16:21

## 2023-11-12 ASSESSMENT — PAIN SCALES - GENERAL: PAINLEVEL_OUTOF10: 0

## 2023-11-12 NOTE — PROGRESS NOTES
Rechecked sqbs, now 133. Patient awake in bed. Alert to person only. Daughter at bedside   Will continue to monitor.

## 2023-11-12 NOTE — PROGRESS NOTES
Sqbs 55. Patient not A/O, lethargic. D10 started, see MAR. Dr Stacy Zabala notified in regards. Will continue to monitor.

## 2023-11-12 NOTE — PLAN OF CARE
Problem: Discharge Planning  Goal: Discharge to home or other facility with appropriate resources  Outcome: Progressing     Problem: Pain  Goal: Verbalizes/displays adequate comfort level or baseline comfort level  Outcome: Progressing     Problem: Safety - Adult  Goal: Free from fall injury  Outcome: Progressing  Flowsheets (Taken 11/11/2023 1914 by Ky Kaplan RN)  Free From Fall Injury: Instruct family/caregiver on patient safety     Problem: Respiratory - Adult  Goal: Achieves optimal ventilation and oxygenation  Outcome: Progressing

## 2023-11-12 NOTE — PROGRESS NOTES
Patient restless, agitated and pulling at IV. Ativan 1mg IV given. 2300- Patient resting quietly with eyes closed. Respirations even and unlabored. No further agitation.

## 2023-11-12 NOTE — PROGRESS NOTES
It is with great nakita we pray for your family today:        \" If you trust him to save your soul,  Why would you not also,trust him to care for your body and life? He has never refused to carry your burdens; He has never fainted under their weight. So then, be finished with anxious worry, and leave all your concerns behind,  Placing them in the hand of a gracious God. \"              Faithful God,    The weight of my illness is more than I can bear. I need your help. I place the full weight of my burdens in your loving care. Thank you for being so faithful to me.     Radha ambrose  [phone]

## 2023-11-12 NOTE — PROGRESS NOTES
Assumed care of patient. Assessment completed and documented, see docflow. Patient resting quietly in bed. NAD noted at present. Respirations even and non labored. Safety alarm in place. Call light within reach. Will continue to monitor.

## 2023-11-12 NOTE — PROGRESS NOTES
Patient has rested quietly throughout the night. Respirations even and unlabored. NAD noted. No events during the night. Has been incontinent of large amount of urine x4 this shift.

## 2023-11-12 NOTE — PROGRESS NOTES
Patient remains lethargic, unable to stay awake long enough to take PO medications safely. A.M. medications held. Will continue to monitor.

## 2023-11-12 NOTE — PROGRESS NOTES
Hospitalist Progress Note   Admit Date:  2023  5:51 PM   Name:  Danny Claros   Age:  79 y.o. Sex:  male  :  1940   MRN:  [id number]   Room:  [facility]/    Presenting/Chief Complaint: Shortness of Breath     Reason(s) for Admission: Acute exacerbation of chronic obstructive pulmonary disease (COPD) ([address]) [J44.1]  Chronic atrial fibrillation (HCC) [I48.20]  COPD exacerbation (HCC) [J44.1]  Acute respiratory failure with hypoxia ([address]) [J96.01]  Stage 3b chronic kidney disease ([address]) [N18.32]     Hospital Course:   Please refer to the admission H&P for details of presentation. In summary, Danny Claros is a 79 y.o. male with medical history significant for COPD with ongoing smoking 1 pack/day, chronic atrial fibrillation not on anticoagulation due to history of subarachnoid hemorrhage, type 2 diabetes, CKD stage III, dementia who presents emergency room with shortness of breath that started around 2023. Also reported having some nausea with episodes of vomiting as well as fever. He was evaluated at emergency MD and noted to be hypoxic on room air and therefore sent to the emergency room. Subjective/24 hr Events (23) : Patient is seen and examined at bedside. No acute events reported overnight by nursing staff. Yesterday evening, patient was confused and agitated. Requiring patient to keep him calm. Ativan did not work and therefore received Zyprexa. Patient remained stable and calm throughout the night. Noted to have desaturation of 89% last night. Now on 2 L O2 nasal cannula. Alert and oriented x2. No acute complaints. Denies chest pain, nausea, vomiting, abdominal pain. Review of Systems: 10 point review of systems is otherwise negative with the exception of the elements mentioned above.         Assessment & Plan:       Acute exacerbation of chronic obstructive pulmonary disease  Hypoxia  Suspected bacterial pneumonia  23: Noted to have 5-40 mL IntraVENous PRN    0.9 % sodium chloride infusion   IntraVENous PRN    ondansetron (ZOFRAN-ODT) disintegrating tablet 4 mg  4 mg Oral Q8H PRN    Or    ondansetron (ZOFRAN) injection 4 mg  4 mg IntraVENous Q6H PRN    polyethylene glycol (GLYCOLAX) packet 17 g  17 g Oral Daily PRN    acetaminophen (TYLENOL) tablet 650 mg  650 mg Oral Q6H PRN    Or    acetaminophen (TYLENOL) suppository 650 mg  650 mg Rectal Q6H PRN    cefTRIAXone (ROCEPHIN) 1,000 mg in sodium chloride 0.9 % 50 mL IVPB (mini-bag)  1,000 mg IntraVENous Q24H    albuterol (PROVENTIL) (2.5 MG/3ML) 0.083% nebulizer solution 2.5 mg  2.5 mg Nebulization Q4H PRN    guaiFENesin-dextromethorphan (ROBITUSSIN DM) 100-10 MG/5ML syrup 5 mL  5 mL Oral Q4H PRN    insulin lispro (HUMALOG) injection vial 0-8 Units  0-8 Units SubCUTAneous TID WC    insulin lispro (HUMALOG) injection vial 0-4 Units  0-4 Units SubCUTAneous Nightly    lubrifresh P.M. (artificial tears) ophthalmic ointment   Both Eyes PRN    nicotine (NICODERM CQ) 21 MG/24HR 1 patch  1 patch TransDERmal Daily    glucose chewable tablet 16 g  4 tablet Oral PRN    dextrose bolus 10% 125 mL  125 mL IntraVENous PRN    Or    dextrose bolus 10% 250 mL  250 mL IntraVENous PRN    Glucagon Emergency KIT 1 mg  1 mg SubCUTAneous PRN    dextrose 10 % infusion   IntraVENous Continuous PRN    amLODIPine (NORVASC) tablet 10 mg  10 mg Oral Daily    ascorbic acid (VITAMIN C) tablet 1,000 mg  1,000 mg Oral Daily    aspirin EC tablet 81 mg  81 mg Oral Once per day on Sun Tue Thu Sat    vitamin D3 (CHOLECALCIFEROL) tablet 5,000 Units  5,000 Units Oral Daily    citalopram (CELEXA) tablet 10 mg  10 mg Oral Daily    losartan (COZAAR) tablet 100 mg  100 mg Oral Daily    mirtazapine (REMERON) tablet 15 mg  15 mg Oral Nightly    pantoprazole (PROTONIX) tablet 40 mg  40 mg Oral Daily    senna (SENOKOT) tablet 17.2 mg  2 tablet Oral BID    tamsulosin (FLOMAX) capsule 0.4 mg  0.4 mg Oral Daily    vitamin B-12 (CYANOCOBALAMIN)

## 2023-11-13 ENCOUNTER — APPOINTMENT (OUTPATIENT)
Dept: GENERAL RADIOLOGY | Age: 83
DRG: 190 | End: 2023-11-13
Payer: MEDICARE

## 2023-11-13 LAB
ANION GAP SERPL CALC-SCNC: 2 MMOL/L (ref 2–11)
BASOPHILS # BLD: 0 K/UL (ref 0–0.2)
BASOPHILS NFR BLD: 0 % (ref 0–2)
BUN SERPL-MCNC: 35 MG/DL (ref 8–23)
CALCIUM SERPL-MCNC: 8.7 MG/DL (ref 8.3–10.4)
CHLORIDE SERPL-SCNC: 115 MMOL/L (ref 101–110)
CO2 SERPL-SCNC: 27 MMOL/L (ref 21–32)
CREAT SERPL-MCNC: 1.7 MG/DL (ref 0.8–1.5)
DIFFERENTIAL METHOD BLD: ABNORMAL
EOSINOPHIL # BLD: 0.2 K/UL (ref 0–0.8)
EOSINOPHIL NFR BLD: 2 % (ref 0.5–7.8)
ERYTHROCYTE [DISTWIDTH] IN BLOOD BY AUTOMATED COUNT: 15.8 % (ref 11.9–14.6)
GLUCOSE BLD STRIP.AUTO-MCNC: 115 MG/DL (ref 65–100)
GLUCOSE BLD STRIP.AUTO-MCNC: 130 MG/DL (ref 65–100)
GLUCOSE BLD STRIP.AUTO-MCNC: 133 MG/DL (ref 65–100)
GLUCOSE BLD STRIP.AUTO-MCNC: 185 MG/DL (ref 65–100)
GLUCOSE BLD STRIP.AUTO-MCNC: 56 MG/DL (ref 65–100)
GLUCOSE BLD STRIP.AUTO-MCNC: 73 MG/DL (ref 65–100)
GLUCOSE SERPL-MCNC: 49 MG/DL (ref 65–100)
HCT VFR BLD AUTO: 33.6 % (ref 41.1–50.3)
HGB BLD-MCNC: 10.3 G/DL (ref 13.6–17.2)
IMM GRANULOCYTES # BLD AUTO: 0.1 K/UL (ref 0–0.5)
IMM GRANULOCYTES NFR BLD AUTO: 1 % (ref 0–5)
LYMPHOCYTES # BLD: 2.8 K/UL (ref 0.5–4.6)
LYMPHOCYTES NFR BLD: 32 % (ref 13–44)
MCH RBC QN AUTO: 25.6 PG (ref 26.1–32.9)
MCHC RBC AUTO-ENTMCNC: 30.7 G/DL (ref 31.4–35)
MCV RBC AUTO: 83.6 FL (ref 82–102)
MONOCYTES # BLD: 1 K/UL (ref 0.1–1.3)
MONOCYTES NFR BLD: 11 % (ref 4–12)
NEUTS SEG # BLD: 4.7 K/UL (ref 1.7–8.2)
NEUTS SEG NFR BLD: 54 % (ref 43–78)
NRBC # BLD: 0 K/UL (ref 0–0.2)
NT PRO BNP: 2516 PG/ML
PLATELET # BLD AUTO: 296 K/UL (ref 150–450)
PMV BLD AUTO: 11.5 FL (ref 9.4–12.3)
POTASSIUM SERPL-SCNC: 3.7 MMOL/L (ref 3.5–5.1)
PROCALCITONIN SERPL-MCNC: 0.25 NG/ML (ref 0–0.49)
RBC # BLD AUTO: 4.02 M/UL (ref 4.23–5.6)
SERVICE CMNT-IMP: ABNORMAL
SERVICE CMNT-IMP: NORMAL
SODIUM SERPL-SCNC: 144 MMOL/L (ref 133–143)
WBC # BLD AUTO: 8.8 K/UL (ref 4.3–11.1)

## 2023-11-13 PROCEDURE — 94760 N-INVAS EAR/PLS OXIMETRY 1: CPT

## 2023-11-13 PROCEDURE — 84145 PROCALCITONIN (PCT): CPT

## 2023-11-13 PROCEDURE — 2580000003 HC RX 258: Performed by: INTERNAL MEDICINE

## 2023-11-13 PROCEDURE — 6370000000 HC RX 637 (ALT 250 FOR IP): Performed by: INTERNAL MEDICINE

## 2023-11-13 PROCEDURE — 36415 COLL VENOUS BLD VENIPUNCTURE: CPT

## 2023-11-13 PROCEDURE — 94640 AIRWAY INHALATION TREATMENT: CPT

## 2023-11-13 PROCEDURE — 85025 COMPLETE CBC W/AUTO DIFF WBC: CPT

## 2023-11-13 PROCEDURE — 2700000000 HC OXYGEN THERAPY PER DAY

## 2023-11-13 PROCEDURE — 83880 ASSAY OF NATRIURETIC PEPTIDE: CPT

## 2023-11-13 PROCEDURE — 82962 GLUCOSE BLOOD TEST: CPT

## 2023-11-13 PROCEDURE — 94761 N-INVAS EAR/PLS OXIMETRY MLT: CPT

## 2023-11-13 PROCEDURE — 92526 ORAL FUNCTION THERAPY: CPT

## 2023-11-13 PROCEDURE — 97530 THERAPEUTIC ACTIVITIES: CPT

## 2023-11-13 PROCEDURE — 80048 BASIC METABOLIC PNL TOTAL CA: CPT

## 2023-11-13 PROCEDURE — 6360000002 HC RX W HCPCS: Performed by: INTERNAL MEDICINE

## 2023-11-13 PROCEDURE — 71045 X-RAY EXAM CHEST 1 VIEW: CPT

## 2023-11-13 PROCEDURE — 1100000000 HC RM PRIVATE

## 2023-11-13 RX ORDER — FUROSEMIDE 10 MG/ML
40 INJECTION INTRAMUSCULAR; INTRAVENOUS ONCE
Status: COMPLETED | OUTPATIENT
Start: 2023-11-13 | End: 2023-11-13

## 2023-11-13 RX ADMIN — CEFTRIAXONE 1000 MG: 1 INJECTION, POWDER, FOR SOLUTION INTRAMUSCULAR; INTRAVENOUS at 21:03

## 2023-11-13 RX ADMIN — SODIUM CHLORIDE, PRESERVATIVE FREE 10 ML: 5 INJECTION INTRAVENOUS at 20:54

## 2023-11-13 RX ADMIN — GUAIFENESIN 600 MG: 600 TABLET ORAL at 09:01

## 2023-11-13 RX ADMIN — PANTOPRAZOLE SODIUM 40 MG: 40 TABLET, DELAYED RELEASE ORAL at 06:13

## 2023-11-13 RX ADMIN — TAMSULOSIN HYDROCHLORIDE 0.4 MG: 0.4 CAPSULE ORAL at 09:01

## 2023-11-13 RX ADMIN — LOSARTAN POTASSIUM 100 MG: 50 TABLET, FILM COATED ORAL at 09:01

## 2023-11-13 RX ADMIN — Medication 6 MG: at 20:54

## 2023-11-13 RX ADMIN — BUDESONIDE INHALATION 500 MCG: 0.5 SUSPENSION RESPIRATORY (INHALATION) at 20:02

## 2023-11-13 RX ADMIN — FUROSEMIDE 40 MG: 10 INJECTION, SOLUTION INTRAMUSCULAR; INTRAVENOUS at 12:02

## 2023-11-13 RX ADMIN — OXYCODONE HYDROCHLORIDE AND ACETAMINOPHEN 1000 MG: 500 TABLET ORAL at 09:02

## 2023-11-13 RX ADMIN — SENNOSIDES 17.2 MG: 8.6 TABLET, FILM COATED ORAL at 09:01

## 2023-11-13 RX ADMIN — DIVALPROEX SODIUM 125 MG: 125 CAPSULE, COATED PELLETS ORAL at 06:13

## 2023-11-13 RX ADMIN — SODIUM CHLORIDE, PRESERVATIVE FREE 10 ML: 5 INJECTION INTRAVENOUS at 09:02

## 2023-11-13 RX ADMIN — AZITHROMYCIN DIHYDRATE 250 MG: 250 TABLET ORAL at 09:01

## 2023-11-13 RX ADMIN — CYANOCOBALAMIN TAB 1000 MCG 1000 MCG: 1000 TAB at 09:01

## 2023-11-13 RX ADMIN — MIRTAZAPINE 15 MG: 15 TABLET, FILM COATED ORAL at 20:53

## 2023-11-13 RX ADMIN — AMLODIPINE BESYLATE 10 MG: 10 TABLET ORAL at 09:01

## 2023-11-13 RX ADMIN — CITALOPRAM HYDROBROMIDE 10 MG: 20 TABLET ORAL at 09:02

## 2023-11-13 RX ADMIN — CHOLECALCIFEROL TAB 125 MCG (5000 UNIT) 5000 UNITS: 125 TAB at 09:02

## 2023-11-13 RX ADMIN — SENNOSIDES 17.2 MG: 8.6 TABLET, FILM COATED ORAL at 20:54

## 2023-11-13 RX ADMIN — WATER 5 MG: 1 INJECTION INTRAMUSCULAR; INTRAVENOUS; SUBCUTANEOUS at 20:40

## 2023-11-13 RX ADMIN — QUETIAPINE FUMARATE 25 MG: 25 TABLET ORAL at 20:53

## 2023-11-13 RX ADMIN — BUDESONIDE INHALATION 500 MCG: 0.5 SUSPENSION RESPIRATORY (INHALATION) at 09:03

## 2023-11-13 RX ADMIN — GUAIFENESIN 600 MG: 600 TABLET ORAL at 20:53

## 2023-11-13 RX ADMIN — DEXTROSE MONOHYDRATE 125 ML: 100 INJECTION, SOLUTION INTRAVENOUS at 03:23

## 2023-11-13 RX ADMIN — DIVALPROEX SODIUM 125 MG: 125 CAPSULE, COATED PELLETS ORAL at 14:00

## 2023-11-13 NOTE — PROGRESS NOTES
Physician Progress Note      PATIENT:               Jeyson Patel  CSN #:                  [id number]  :                       1940  ADMIT DATE:       2023 5:51 PM  DISCH DATE:  RESPONDING  PROVIDER #:        Jaimee Tran MD          QUERY TEXT:    Patient admitted with COPD exacerbation. Noted documentation of suspected   bacterial pneumonia. In order to support the diagnosis of bacterial pneumonia,   please include additional clinical indicators in your documentation. Or   please document if the diagnosis of bacterial pneumonia has been ruled out   after further study. The medical record reflects the following:  Risk Factors: COPD exacerbation, hypoxia  Clinical Indicators: CXR  Impression- No acute cardiopulmonary process   identified. CXR - Negative for acute change. Treatment: supplemental oxygen, CXR, Rocephin and azithromycin  Options provided:  -- Bacterial pneumonia present as evidenced by, Please document evidence. -- Bacterial pneumonia was ruled out  -- Other - I will add my own diagnosis  -- Disagree - Not applicable / Not valid  -- Disagree - Clinically unable to determine / Unknown  -- Refer to Clinical Documentation Reviewer    PROVIDER RESPONSE TEXT:    Bacterial pneumonia is present as evidenced by elevated procalc with   suspicision for PNA with cough    Query created by: Valerie Disla on 2023 1:43 PM      QUERY TEXT:    Patient admitted with acute exacerbation of COPD. Noted documentation of   hypoxia and acute respiratory failure with hypoxia. If possible, please document in progress notes and discharge summary if you   are evaluating and /or treating any of the following: The medical record reflects the following:  Risk Factors: COPD, dementia, T2 DM, HTN, AFib, CKD4  Clinical Indicators:  23 Reason(s) for Admission: Acute respiratory failure with hypoxia (HCC)   [J96.01]  No acute distress. On 3L O2 NC with sat of 93%.   Assessment & Plan:

## 2023-11-13 NOTE — CARE COORDINATION
[facility] is not going to accept patient for rehab. CM will make patient's wife aware and decide on HH vs snf. JULIUS spoke with the patient's wife. She plans on talking to her family and decide if they want patient to discharge to rehab or home with [facility]. She will call CM back tomorrow.

## 2023-11-13 NOTE — PROGRESS NOTES
Goals:  LTG: Patient will tolerate least restrictive oral diet without overt s/sx of airway compromise. STG: Patient will consume minced/moist diet and thin liquids without overt s/sx of airway compromise. STG: Patient will participate in ongoing po trials with SLP in attempt to identify least restrictive oral diet. STG: Patient will participate in modified barium swallow study to objectively assess swallow function as medically indicated. SPEECH LANGUAGE PATHOLOGY: DYSPHAGIA Daily Note #1    Acknowledge Order  I  Therapy Time  I   Charges     I  Rehab Caseload Tracker      NAME: Debbi Lynn  : 1940  MRN: [id number]    ADMISSION DATE: 2023  PRIMARY DIAGNOSIS: Acute exacerbation of chronic obstructive pulmonary disease (COPD) ([address])    ICD-10: Treatment Diagnosis: R13.12 Dysphagia, Oropharyngeal Phase    RECOMMENDATIONS   Diet:    Soft and Bite-Sized  Thin Liquids    Medication: as tolerated   Compensatory Swallowing Strategies:   Slow rate of intake  Remain upright for 30-45 minutes after meals  Small bites/sips  Upright as possible for all oral intake   Therapeutic Intervention:   Patient/family education  Dysphagia treatment   Patient continues to require skilled intervention:  Yes. Recommend ongoing speech therapy services during this hospitalization. Anticipated Discharge Needs: Do not anticipate ongoing speech therapy needs upon discharge. ASSESSMENT    Patient with improved ability to masticate soft solids today. He demonstrated ability to adequately masticate mixed fruit including fibrous pineapple with appropriate oral prep time. Continues to tolerate thin liquids without observed difficulty    Recommend upgrade to soft/bite sized diet and thin liquids. Medications as tolerated. Will follow for diet tolerance and additional modifications as indicated. GENERAL    Subjective: Patient cooperative and pleasant.  States \"the food is treating me alright, but I eat pretty (10/07/2019); Urological Surgery; and vascular surgery (2001). Precautions/Allergies: Aspirin-dipyridamole er, Beta adrenergic blockers, Ciprofloxacin, Donepezil, and Lisinopril     Observations:  Alertness: Alert  Voice: WFL  Speech: Intelligible  Expressive Language: Fluent and Able to communicate wants and needs  Receptive Language: Follows most commands. Some comprehension issues that appear to be related to hearing loss    Prior Dysphagia History: None  Prior Instrumental Assessment: None    Current Diet:  Minced and Moist  Thin Liquids    Respiratory Status: Nasal Cannula    Pain:  Patient does not c/o pain    OBJECTIVE    Patient seen for ongoing dysphagia treatment. Patient with no reported difficulties with current diet of minced moist textures and thin liquids. However, he does indicate that food is more modified than he typically consumes. Thin liquids by serial straw sips consumed without overt s/sx of airway compromise. Bites of mixed fruit consumed including peaches and pineapples. Functional mastication with soft solids and adequate oral clearance. Discussed diet options, specifically minced moist vs soft/bite sized textures. He indicates preference for diet upgrade at this time. Will follow up for diet tolerance. Dysphagia Outcome and Severity Scale (DIPIKA)  Score: 4 Description   [] 7 Normal in all situations   [] 6 Within Functional Limits/ Modified independent   [] 5 Mild Dysphagia: Distant Supervision. May need one diet consistency      restricted. [x] 4 Mild-Moderate Dysphagia: Intermittent supervision/cuing. One-two diet    consistencies restricted. [] 3 Moderate Dysphagia: Total assistance, supervision, or strategies. Two or more diet consistencies restricted. [] 2 Moderate-Severe Dysphagia: Maximum assistance or maximum use     of strategies with partial po intake   [] 1 Severe dysphagia- NPO.  Unable to tolerate any po safely     PLAN    Duration/Frequency: Continue to follow

## 2023-11-14 LAB
ANION GAP SERPL CALC-SCNC: 5 MMOL/L (ref 2–11)
BASOPHILS # BLD: 0 K/UL (ref 0–0.2)
BASOPHILS NFR BLD: 0 % (ref 0–2)
BUN SERPL-MCNC: 33 MG/DL (ref 8–23)
CALCIUM SERPL-MCNC: 8.9 MG/DL (ref 8.3–10.4)
CHLORIDE SERPL-SCNC: 112 MMOL/L (ref 101–110)
CO2 SERPL-SCNC: 29 MMOL/L (ref 21–32)
CREAT SERPL-MCNC: 2 MG/DL (ref 0.8–1.5)
DIFFERENTIAL METHOD BLD: ABNORMAL
EOSINOPHIL # BLD: 0.2 K/UL (ref 0–0.8)
EOSINOPHIL NFR BLD: 3 % (ref 0.5–7.8)
ERYTHROCYTE [DISTWIDTH] IN BLOOD BY AUTOMATED COUNT: 15.4 % (ref 11.9–14.6)
GLUCOSE BLD STRIP.AUTO-MCNC: 112 MG/DL (ref 65–100)
GLUCOSE BLD STRIP.AUTO-MCNC: 116 MG/DL (ref 65–100)
GLUCOSE BLD STRIP.AUTO-MCNC: 139 MG/DL (ref 65–100)
GLUCOSE BLD STRIP.AUTO-MCNC: 148 MG/DL (ref 65–100)
GLUCOSE BLD STRIP.AUTO-MCNC: 172 MG/DL (ref 65–100)
GLUCOSE SERPL-MCNC: 122 MG/DL (ref 65–100)
HCT VFR BLD AUTO: 32.7 % (ref 41.1–50.3)
HGB BLD-MCNC: 10.3 G/DL (ref 13.6–17.2)
IMM GRANULOCYTES # BLD AUTO: 0.1 K/UL (ref 0–0.5)
IMM GRANULOCYTES NFR BLD AUTO: 1 % (ref 0–5)
LYMPHOCYTES # BLD: 2.4 K/UL (ref 0.5–4.6)
LYMPHOCYTES NFR BLD: 27 % (ref 13–44)
MCH RBC QN AUTO: 25.9 PG (ref 26.1–32.9)
MCHC RBC AUTO-ENTMCNC: 31.5 G/DL (ref 31.4–35)
MCV RBC AUTO: 82.2 FL (ref 82–102)
MONOCYTES # BLD: 1 K/UL (ref 0.1–1.3)
MONOCYTES NFR BLD: 11 % (ref 4–12)
NEUTS SEG # BLD: 5.3 K/UL (ref 1.7–8.2)
NEUTS SEG NFR BLD: 58 % (ref 43–78)
NRBC # BLD: 0 K/UL (ref 0–0.2)
NT PRO BNP: 1188 PG/ML
PLATELET # BLD AUTO: 292 K/UL (ref 150–450)
PMV BLD AUTO: 11.3 FL (ref 9.4–12.3)
POTASSIUM SERPL-SCNC: 3.9 MMOL/L (ref 3.5–5.1)
RBC # BLD AUTO: 3.98 M/UL (ref 4.23–5.6)
SERVICE CMNT-IMP: ABNORMAL
SODIUM SERPL-SCNC: 146 MMOL/L (ref 133–143)
WBC # BLD AUTO: 9.1 K/UL (ref 4.3–11.1)

## 2023-11-14 PROCEDURE — 36415 COLL VENOUS BLD VENIPUNCTURE: CPT

## 2023-11-14 PROCEDURE — 6370000000 HC RX 637 (ALT 250 FOR IP): Performed by: INTERNAL MEDICINE

## 2023-11-14 PROCEDURE — 97530 THERAPEUTIC ACTIVITIES: CPT

## 2023-11-14 PROCEDURE — 94761 N-INVAS EAR/PLS OXIMETRY MLT: CPT

## 2023-11-14 PROCEDURE — 82962 GLUCOSE BLOOD TEST: CPT

## 2023-11-14 PROCEDURE — 94760 N-INVAS EAR/PLS OXIMETRY 1: CPT

## 2023-11-14 PROCEDURE — 94640 AIRWAY INHALATION TREATMENT: CPT

## 2023-11-14 PROCEDURE — 1100000000 HC RM PRIVATE

## 2023-11-14 PROCEDURE — 80048 BASIC METABOLIC PNL TOTAL CA: CPT

## 2023-11-14 PROCEDURE — 83880 ASSAY OF NATRIURETIC PEPTIDE: CPT

## 2023-11-14 PROCEDURE — 85025 COMPLETE CBC W/AUTO DIFF WBC: CPT

## 2023-11-14 PROCEDURE — 2580000003 HC RX 258: Performed by: INTERNAL MEDICINE

## 2023-11-14 PROCEDURE — 6360000002 HC RX W HCPCS: Performed by: INTERNAL MEDICINE

## 2023-11-14 PROCEDURE — 2700000000 HC OXYGEN THERAPY PER DAY

## 2023-11-14 RX ORDER — HYDRALAZINE HYDROCHLORIDE 50 MG/1
25 TABLET, FILM COATED ORAL EVERY 8 HOURS PRN
Status: DISCONTINUED | OUTPATIENT
Start: 2023-11-14 | End: 2023-11-15 | Stop reason: HOSPADM

## 2023-11-14 RX ORDER — DIVALPROEX SODIUM 125 MG/1
250 CAPSULE, COATED PELLETS ORAL EVERY 8 HOURS SCHEDULED
Status: DISCONTINUED | OUTPATIENT
Start: 2023-11-14 | End: 2023-11-15 | Stop reason: HOSPADM

## 2023-11-14 RX ADMIN — Medication 6 MG: at 21:26

## 2023-11-14 RX ADMIN — ASPIRIN 81 MG: 81 TABLET ORAL at 09:42

## 2023-11-14 RX ADMIN — BUDESONIDE INHALATION 500 MCG: 0.5 SUSPENSION RESPIRATORY (INHALATION) at 22:23

## 2023-11-14 RX ADMIN — CYANOCOBALAMIN TAB 1000 MCG 1000 MCG: 1000 TAB at 09:43

## 2023-11-14 RX ADMIN — TAMSULOSIN HYDROCHLORIDE 0.4 MG: 0.4 CAPSULE ORAL at 09:43

## 2023-11-14 RX ADMIN — OXYCODONE HYDROCHLORIDE AND ACETAMINOPHEN 1000 MG: 500 TABLET ORAL at 09:42

## 2023-11-14 RX ADMIN — MIRTAZAPINE 15 MG: 15 TABLET, FILM COATED ORAL at 21:27

## 2023-11-14 RX ADMIN — AMLODIPINE BESYLATE 10 MG: 10 TABLET ORAL at 09:43

## 2023-11-14 RX ADMIN — CITALOPRAM HYDROBROMIDE 10 MG: 20 TABLET ORAL at 09:42

## 2023-11-14 RX ADMIN — CHOLECALCIFEROL TAB 125 MCG (5000 UNIT) 5000 UNITS: 125 TAB at 09:43

## 2023-11-14 RX ADMIN — DIVALPROEX SODIUM 250 MG: 125 CAPSULE, COATED PELLETS ORAL at 14:46

## 2023-11-14 RX ADMIN — GUAIFENESIN 600 MG: 600 TABLET ORAL at 09:52

## 2023-11-14 RX ADMIN — QUETIAPINE FUMARATE 25 MG: 25 TABLET ORAL at 21:27

## 2023-11-14 RX ADMIN — AZITHROMYCIN DIHYDRATE 250 MG: 250 TABLET ORAL at 09:42

## 2023-11-14 RX ADMIN — DIVALPROEX SODIUM 250 MG: 125 CAPSULE, COATED PELLETS ORAL at 21:26

## 2023-11-14 RX ADMIN — SENNOSIDES 17.2 MG: 8.6 TABLET, FILM COATED ORAL at 21:26

## 2023-11-14 RX ADMIN — GUAIFENESIN 600 MG: 600 TABLET ORAL at 21:26

## 2023-11-14 RX ADMIN — SENNOSIDES 17.2 MG: 8.6 TABLET, FILM COATED ORAL at 09:43

## 2023-11-14 RX ADMIN — SODIUM CHLORIDE, PRESERVATIVE FREE 5 ML: 5 INJECTION INTRAVENOUS at 21:33

## 2023-11-14 RX ADMIN — BUDESONIDE INHALATION 500 MCG: 0.5 SUSPENSION RESPIRATORY (INHALATION) at 08:16

## 2023-11-14 NOTE — PROGRESS NOTES
ACUTE PHYSICAL THERAPY GOALS:   (Developed with and agreed upon by patient and/or caregiver.)  Pt will perform bed mobility with Rose in 7 therapy sessions. Pt will perform sit-to-stand/ stand-to-sit transfers SBA in 7 therapy sessions. Pt will ambulate 125 ft SBA with use of LRAD/no device and breaks as needed in 7 therapy sessions. Pt will tolerate 25 minutes of standing activity with LRAD/no device in 7 therapy sessions. Pt will perform standing dynamic balance activities with minimal postural sway in 7 therapy sessions. Pt will tolerate multiple sets and reps of BLE exercises in 7 therapy sessions. PHYSICAL THERAPY: Daily Note PM   (Link to Caseload Tracking: PT Visit Days : 3  Time In/Out PT Charge Capture  Rehab Caseload Tracker  Orders    Miroslava Lawrence is a 79 y.o. male   PRIMARY DIAGNOSIS: Acute exacerbation of chronic obstructive pulmonary disease (COPD) ([address])  Acute exacerbation of chronic obstructive pulmonary disease (COPD) ([address]) [J44.1]  Chronic atrial fibrillation (HCC) [I48.20]  COPD exacerbation (HCC) [J44.1]  Acute respiratory failure with hypoxia ([address]) [J96.01]  Stage 3b chronic kidney disease ([address]) [N18.32]       Inpatient: Payor: MEDICARE / Plan: MEDICARE PART A AND B / Product Type: *No Product type* /     ASSESSMENT:     REHAB RECOMMENDATIONS:   Recommendation to date pending progress:  Setting:  Short-term Rehab    Equipment:    To Be Determined     ASSESSMENT:  Mr. Negro Carolina on RA O2 sats 94%. Performed bed mobility transfers and gait with min assist and constant verbal cues. Fair standing balance for cleaning activity. He is excited about going home. SUBJECTIVE:   Mr. Negro Carolina states\"I get to go home tomorrow. \"     Social/Functional Lives With: Spouse  Type of Home: House  Receives Help From: Family  ADL Assistance: Needs assistance  Homemaking Assistance: Independent  Homemaking Responsibilities: Yes  Ambulation Assistance: Needs assistance  Transfer Assistance: goals are met, whichever comes first.    TREATMENT:   TREATMENT:   Therapeutic Activity (23 Minutes): Therapeutic activity included Supine to Sit, Sit to Supine, Scooting, Transfer Training, Ambulation on level ground, Sitting balance , and Standing balance to improve functional Activity tolerance, Balance, Mobility, and Strength.     TREATMENT GRID:  N/A    AFTER TREATMENT PRECAUTIONS: Bed, Bed/Chair Locked, Call light within reach, Needs within reach, RN notified, Side rails x3, and sitter present    INTERDISCIPLINARY COLLABORATION:  RN/ PCT and PT/ PTA    EDUCATION:      TIME IN/OUT:  Time In: 1330  Time Out: [address] [place] [address]  Minutes: [address] [facility], PT

## 2023-11-14 NOTE — PROGRESS NOTES
Nightly    OLANZapine (ZyPREXA) 5 mg in sterile water 1 mL injection  5 mg IntraMUSCular BID PRN    sodium chloride flush 0.9 % injection 5-40 mL  5-40 mL IntraVENous 2 times per day    sodium chloride flush 0.9 % injection 5-40 mL  5-40 mL IntraVENous PRN    0.9 % sodium chloride infusion   IntraVENous PRN    ondansetron (ZOFRAN-ODT) disintegrating tablet 4 mg  4 mg Oral Q8H PRN    Or    ondansetron (ZOFRAN) injection 4 mg  4 mg IntraVENous Q6H PRN    polyethylene glycol (GLYCOLAX) packet 17 g  17 g Oral Daily PRN    acetaminophen (TYLENOL) tablet 650 mg  650 mg Oral Q6H PRN    Or    acetaminophen (TYLENOL) suppository 650 mg  650 mg Rectal Q6H PRN    albuterol (PROVENTIL) (2.5 MG/3ML) 0.083% nebulizer solution 2.5 mg  2.5 mg Nebulization Q4H PRN    guaiFENesin-dextromethorphan (ROBITUSSIN DM) 100-10 MG/5ML syrup 5 mL  5 mL Oral Q4H PRN    insulin lispro (HUMALOG) injection vial 0-8 Units  0-8 Units SubCUTAneous TID WC    insulin lispro (HUMALOG) injection vial 0-4 Units  0-4 Units SubCUTAneous Nightly    lubrifresh P.M. (artificial tears) ophthalmic ointment   Both Eyes PRN    nicotine (NICODERM CQ) 21 MG/24HR 1 patch  1 patch TransDERmal Daily    glucose chewable tablet 16 g  4 tablet Oral PRN    dextrose bolus 10% 125 mL  125 mL IntraVENous PRN    Or    dextrose bolus 10% 250 mL  250 mL IntraVENous PRN    Glucagon Emergency KIT 1 mg  1 mg SubCUTAneous PRN    dextrose 10 % infusion   IntraVENous Continuous PRN    amLODIPine (NORVASC) tablet 10 mg  10 mg Oral Daily    ascorbic acid (VITAMIN C) tablet 1,000 mg  1,000 mg Oral Daily    aspirin EC tablet 81 mg  81 mg Oral Once per day on Sun Tue Thu Sat    vitamin D3 (CHOLECALCIFEROL) tablet 5,000 Units  5,000 Units Oral Daily    citalopram (CELEXA) tablet 10 mg  10 mg Oral Daily    [Held by provider] losartan (COZAAR) tablet 100 mg  100 mg Oral Daily    mirtazapine (REMERON) tablet 15 mg  15 mg Oral Nightly    pantoprazole (PROTONIX) tablet 40 mg  40 mg Oral Daily senna (SENOKOT) tablet 17.2 mg  2 tablet Oral BID    tamsulosin (FLOMAX) capsule 0.4 mg  0.4 mg Oral Daily    vitamin B-12 (CYANOCOBALAMIN) tablet 1,000 mcg  1,000 mcg Oral Daily    budesonide (PULMICORT) nebulizer suspension 500 mcg  0.5 mg Nebulization BID RT       Signed:  Phuong Vila MD    Part of this note may have been written by using a voice dictation software. The note has been proof read but may still contain some grammatical/other typographical errors.

## 2023-11-14 NOTE — PROGRESS NOTES
Pt attempting to force self out of bed. Pt agitated and confused. Zyprexia 5 mg IM given for agitation.

## 2023-11-14 NOTE — CARE COORDINATION
CM spoke with the patient's wife. The family has decided that the patient should discharge home rather than going to snf. Due to the patient's dementia they feel that it would be best if he received [facility] services. [facility] has been ordered with RN/PT/OT/Aide. CM will also give patient's spouse information on [employer] [facility] [employer] in case she needs more assistance at home.

## 2023-11-15 VITALS
DIASTOLIC BLOOD PRESSURE: 75 MMHG | TEMPERATURE: 97.7 F | BODY MASS INDEX: 25.96 KG/M2 | HEIGHT: 70 IN | SYSTOLIC BLOOD PRESSURE: 112 MMHG | OXYGEN SATURATION: 92 % | RESPIRATION RATE: 18 BRPM | WEIGHT: 181.3 LBS | HEART RATE: 91 BPM

## 2023-11-15 LAB
ANION GAP SERPL CALC-SCNC: 5 MMOL/L (ref 2–11)
BUN SERPL-MCNC: 32 MG/DL (ref 8–23)
CALCIUM SERPL-MCNC: 8.7 MG/DL (ref 8.3–10.4)
CHLORIDE SERPL-SCNC: 109 MMOL/L (ref 101–110)
CO2 SERPL-SCNC: 28 MMOL/L (ref 21–32)
CREAT SERPL-MCNC: 1.8 MG/DL (ref 0.8–1.5)
GLUCOSE BLD STRIP.AUTO-MCNC: 109 MG/DL (ref 65–100)
GLUCOSE BLD STRIP.AUTO-MCNC: 111 MG/DL (ref 65–100)
GLUCOSE SERPL-MCNC: 118 MG/DL (ref 65–100)
POTASSIUM SERPL-SCNC: 3.6 MMOL/L (ref 3.5–5.1)
SERVICE CMNT-IMP: ABNORMAL
SERVICE CMNT-IMP: ABNORMAL
SODIUM SERPL-SCNC: 142 MMOL/L (ref 133–143)

## 2023-11-15 PROCEDURE — 82962 GLUCOSE BLOOD TEST: CPT

## 2023-11-15 PROCEDURE — 97530 THERAPEUTIC ACTIVITIES: CPT

## 2023-11-15 PROCEDURE — 6360000002 HC RX W HCPCS: Performed by: INTERNAL MEDICINE

## 2023-11-15 PROCEDURE — 6370000000 HC RX 637 (ALT 250 FOR IP): Performed by: INTERNAL MEDICINE

## 2023-11-15 PROCEDURE — 36415 COLL VENOUS BLD VENIPUNCTURE: CPT

## 2023-11-15 PROCEDURE — 97112 NEUROMUSCULAR REEDUCATION: CPT

## 2023-11-15 PROCEDURE — 2580000003 HC RX 258: Performed by: INTERNAL MEDICINE

## 2023-11-15 PROCEDURE — 94640 AIRWAY INHALATION TREATMENT: CPT

## 2023-11-15 PROCEDURE — 94760 N-INVAS EAR/PLS OXIMETRY 1: CPT

## 2023-11-15 PROCEDURE — 97535 SELF CARE MNGMENT TRAINING: CPT

## 2023-11-15 PROCEDURE — 80048 BASIC METABOLIC PNL TOTAL CA: CPT

## 2023-11-15 RX ADMIN — GUAIFENESIN 600 MG: 600 TABLET ORAL at 09:36

## 2023-11-15 RX ADMIN — BUDESONIDE INHALATION 500 MCG: 0.5 SUSPENSION RESPIRATORY (INHALATION) at 08:18

## 2023-11-15 RX ADMIN — PANTOPRAZOLE SODIUM 40 MG: 40 TABLET, DELAYED RELEASE ORAL at 06:17

## 2023-11-15 RX ADMIN — AMLODIPINE BESYLATE 10 MG: 10 TABLET ORAL at 09:36

## 2023-11-15 RX ADMIN — TAMSULOSIN HYDROCHLORIDE 0.4 MG: 0.4 CAPSULE ORAL at 09:36

## 2023-11-15 RX ADMIN — DIVALPROEX SODIUM 250 MG: 125 CAPSULE, COATED PELLETS ORAL at 15:10

## 2023-11-15 RX ADMIN — CHOLECALCIFEROL TAB 125 MCG (5000 UNIT) 5000 UNITS: 125 TAB at 09:35

## 2023-11-15 RX ADMIN — OXYCODONE HYDROCHLORIDE AND ACETAMINOPHEN 1000 MG: 500 TABLET ORAL at 09:35

## 2023-11-15 RX ADMIN — SENNOSIDES 17.2 MG: 8.6 TABLET, FILM COATED ORAL at 09:36

## 2023-11-15 RX ADMIN — SODIUM CHLORIDE, PRESERVATIVE FREE 5 ML: 5 INJECTION INTRAVENOUS at 09:40

## 2023-11-15 RX ADMIN — CYANOCOBALAMIN TAB 1000 MCG 1000 MCG: 1000 TAB at 09:36

## 2023-11-15 RX ADMIN — CITALOPRAM HYDROBROMIDE 10 MG: 20 TABLET ORAL at 09:37

## 2023-11-15 RX ADMIN — DIVALPROEX SODIUM 250 MG: 125 CAPSULE, COATED PELLETS ORAL at 06:17

## 2023-11-15 ASSESSMENT — PAIN SCALES - GENERAL
PAINLEVEL_OUTOF10: 0
PAINLEVEL_OUTOF10: 0

## 2023-11-15 NOTE — PROGRESS NOTES
ACUTE PHYSICAL THERAPY GOALS:   (Developed with and agreed upon by patient and/or caregiver.)  Pt will perform bed mobility with Rose in 7 therapy sessions. Pt will perform sit-to-stand/ stand-to-sit transfers SBA in 7 therapy sessions. Pt will ambulate 125 ft SBA with use of LRAD/no device and breaks as needed in 7 therapy sessions. Pt will tolerate 25 minutes of standing activity with LRAD/no device in 7 therapy sessions. Pt will perform standing dynamic balance activities with minimal postural sway in 7 therapy sessions. Pt will tolerate multiple sets and reps of BLE exercises in 7 therapy sessions. PHYSICAL THERAPY: Daily Note AM   (Link to Caseload Tracking: PT Visit Days : 4  Time In/Out PT Charge Capture  Rehab Caseload Tracker  Orders    Inna Garcia is a 79 y.o. male   PRIMARY DIAGNOSIS: Acute exacerbation of chronic obstructive pulmonary disease (COPD) ([address])  Acute exacerbation of chronic obstructive pulmonary disease (COPD) ([address]) [J44.1]  Chronic atrial fibrillation (HCC) [I48.20]  COPD exacerbation (HCC) [J44.1]  Acute respiratory failure with hypoxia ([address]) [J96.01]  Stage 3b chronic kidney disease ([address]) [N18.32]       Inpatient: Payor: MEDICARE / Plan: MEDICARE PART A AND B / Product Type: *No Product type* /     ASSESSMENT:     REHAB RECOMMENDATIONS:   Recommendation to date pending progress:  Setting:  Short-term Rehab    Equipment:    To Be Determined     ASSESSMENT:  Mr. Araceli Copeland was  supine on contact and agreeable to work with therapy. The PT session today consisted of bed mobility, sit to stand and transfers as well as sitting and standing balance. Pt needed mod assistance x 2 and cues for hand placement with sit to stand. SUBJECTIVE:   Mr. Araceli Copeland states  \"I get to go home tomorrow. \" .     Social/Functional Lives With: Spouse  Type of Home: House  Receives Help From: Family  ADL Assistance: Needs assistance  Homemaking Assistance: Independent  Homemaking Responsibilities:

## 2023-11-15 NOTE — DISCHARGE SUMMARY
Hospitalist Discharge Summary   Admit Date:  2023  5:51 PM   DC Note date: 11/15/2023  Name:  Oneil Ram   Age:  79 y.o. Sex:  male  :  1940   MRN:  [id number]   Room:  [facility]  PCP:  Rockford Claude, DO    Presenting Complaint: Shortness of Breath     Initial Admission Diagnosis: Acute exacerbation of chronic obstructive pulmonary disease (COPD) ([address]) [J44.1]  Chronic atrial fibrillation (HCC) [I48.20]  COPD exacerbation (HCC) [J44.1]  Acute respiratory failure with hypoxia ([address]) [J96.01]  Stage 3b chronic kidney disease ([address]) [N18.32]     Problem List for this Hospitalization (present on admission):    Principal Problem:    Acute exacerbation of chronic obstructive pulmonary disease (COPD) ([address])  Active Problems:    SAH (subarachnoid hemorrhage) ([address])    Dementia with behavioral disturbance ([address])    Type 2 diabetes mellitus (HCC)    GERD (gastroesophageal reflux disease)    Diabetes mellitus type 2, diet-controlled ([address])    Diabetic polyneuropathy associated with type 2 diabetes mellitus (HCC)    Hypoxia    Smoker    CKD (chronic kidney disease) stage 4, GFR 15-29 ml/min (HCC)    Chronic atrial fibrillation (HCC)    Hypokalemia  Resolved Problems:    * No resolved hospital problems. [facility] Course:  Please refer to the admission H&P for details of presentation. In summary, Oneil Ram is a 79 y.o. male with past medical history significant for  COPD with ongoing smoking 1 pack/day, chronic atrial fibrillation not on anticoagulation due to history of subarachnoid hemorrhage, type 2 diabetes, CKD stage III, dementia who presents emergency room with shortness of breath that started around 2023. Also reported having some nausea with episodes of vomiting as well as fever. He was evaluated at emergency MD and noted to be hypoxic on room air and therefore sent to the emergency room. Patient was started on antibiotics and completed course for presumed bacterial pneumonia. Was the patient seen in the last year in this department? Yes    Does patient have an active prescription for medications requested? Yes    Received Request Via: Patient     jugular venous distension. Lungs:             Some crackles. No wheezing. Symmetric expansion. Abdomen:        Soft, nontender, nondistended. Extremities:     No cyanosis or clubbing. No edema  Skin:                No rashes and normal coloration. Warm and dry. Neuro:             CN II-XII grossly intact. Psych:             Normal mood and affect. Signed:  Karina Bishop MD    Part of this note may have been written by using a voice dictation software. The note has been proof read but may still contain some grammatical/other typographical errors.

## 2023-11-15 NOTE — CARE COORDINATION
11/15/23 Nuvia Herrera Discharge   Transition of Care Consult (CM Consult) [facility] Resource Information Provided? No   Mode of Transport at Discharge Other (see comment)   Hospital Transport Time of Discharge 1410   Confirm Follow Up Transport Family   Condition of Participation: Discharge Planning   The Plan for Transition of Care is related to the following treatment goals: discharging home with [facility] and family support   The Patient and/or Patient Representative was provided with a Choice of Provider? Patient   The Patient and/Or Patient Representative agree with the Discharge Plan? Yes   Freedom of Choice list was provided with basic dialogue that supports the patient's individualized plan of care/goals, treatment preferences, and shares the quality data associated with the providers? Yes     Patient is discharging home with [facility]. Family at bedside will transport home.

## 2023-11-15 NOTE — PROGRESS NOTES
ACUTE OCCUPATIONAL THERAPY GOALS:   (Developed with and agreed upon by patient and/or caregiver.)  1. Patient will complete lower body bathing and dressing with supervision and adaptive equipment as needed. 2. Patient will complete toileting with SBA. 3. Patient will tolerate 30 minutes of OT treatment with 1-2 rest breaks to increase activity tolerance for ADLs. 4. Patient will complete functional transfers with supervision and adaptive equipment as needed. 5. Patient will complete functional mobility for household distances with supervision and good safety awareness. 6. Patient will complete dynamic standing balance with SBA while participating in ADL to decrease risk for falls. Timeframe: 7 visits     OCCUPATIONAL THERAPY: Daily Note AM   OT Visit Days: 1   Time In/Out  OT Charge Capture  Rehab Caseload Tracker  OT Orders    Monik Bailey is a 79 y.o. male   PRIMARY DIAGNOSIS: Acute exacerbation of chronic obstructive pulmonary disease (COPD) ([address])  Acute exacerbation of chronic obstructive pulmonary disease (COPD) ([address]) [J44.1]  Chronic atrial fibrillation (HCC) [I48.20]  COPD exacerbation (HCC) [J44.1]  Acute respiratory failure with hypoxia ([address]) [J96.01]  Stage 3b chronic kidney disease ([address]) [N18.32]       Inpatient: Payor: MEDICARE / Plan: MEDICARE PART A AND B / Product Type: *No Product type* /     ASSESSMENT:     REHAB RECOMMENDATIONS: CURRENT LEVEL OF FUNCTION:  (Most Recently Demonstrated)   Recommendation to date pending progress:  Setting:  Short-term Rehab    Equipment:    To Be Determined Bathing:  Not Tested  Dressing:  Maximal Assist  Feeding/Grooming:  Stand by Assist  Toileting:  Not Tested  Functional Mobility:  Moderate Assist x 2     ASSESSMENT:  Mr. Alba Gonzalez is limited by impairments in strength, balance, activity tolerance.  He requires Max assist to doff/don socks and don shoes sitting EOB, requiring assist to maintain balance with multiple LOBS back/ assist to increase independence, decrease assistance required, increase activity tolerance, and increase safety awareness. Patient also participated in functional mobility, functional transfer, and energy conservation training to increase independence, decrease assistance required, increase activity tolerance, and increase safety awareness.      TREATMENT GRID:  N/A    AFTER TREATMENT PRECAUTIONS: Alarm Activated, Bed/Chair Locked, Call light within reach, Chair, Heels floated, Needs within reach, and RN notified    INTERDISCIPLINARY COLLABORATION:  RN/ PCT, PT/ PTA, OT/ CRUZ, and RN Case Manager/      EDUCATION:  Education Given To: Patient  Education Provided: Role of Therapy;Plan of Care;Transfer Training  Education Method: Verbal  Education Outcome: Verbalized understanding;Continued education needed    TOTAL TREATMENT DURATION AND TIME:  Time In: 4711  Time Out: 1116  Minutes: 4[facility]

## 2023-11-15 NOTE — PROGRESS NOTES
Oxygen Qualifier       Room air: SpO2 with O2 and liter flow   Resting SpO2  94% N/a   Ambulating SpO2  91% N/a       Completed by:    Ethel Velasco RCP

## 2023-11-15 NOTE — DISCHARGE INSTRUCTIONS
Chronic Obstructive Pulmonary Disease (COPD): Care Instructions  Overview     Chronic obstructive pulmonary disease (COPD) is a lung disease that makes it hard to breathe. With COPD, the airways that lead to the lungs are narrowed, and the tiny air sacs in the lungs are damaged and lose their stretch. People with COPD have decreased airflow in and out of the lungs, which makes it hard to breathe. The airways also can get clogged with thick mucus. Cigarette smoking is a major cause of COPD. Although there is no cure for COPD, you can slow its progress. Following your treatment plan and taking care of yourself can help you feel better and live longer. Follow-up care is a key part of your treatment and safety. Be sure to make and go to all appointments, and call your doctor if you are having problems. It's also a good idea to know your test results and keep a list of the medicines you take. How can you care for yourself at home? Staying healthy    Do not smoke. This is the most important step you can take to prevent more damage to your lungs. If you need help quitting, talk to your doctor about stop-smoking programs and medicines. These can increase your chances of quitting for good. Avoid infections such as COVID-19, colds, and the flu. Wash your hands often. Get the pneumococcal and whooping cough (pertussis) vaccines. If you have had these vaccines before, ask your doctor if you need another dose. Get the flu vaccine every fall. Stay up to date on your COVID-19 vaccines. Avoid secondhand smoke and air pollution. Try to stay inside with your windows closed when air pollution is bad. Medicines and oxygen therapy    Take your medicines exactly as prescribed. Call your doctor if you think you are having a problem with your medicine. You may be taking medicines such as:  Bronchodilators. These help open your airways and make breathing easier.  They are either short-acting (work for 4 to 9 hours) or

## 2023-11-16 ENCOUNTER — CARE COORDINATION (OUTPATIENT)
Dept: CARE COORDINATION | Facility: CLINIC | Age: 83
End: 2023-11-16

## 2023-11-16 NOTE — CARE COORDINATION
Care Transitions Outreach Attempt    Call within 2 business days of discharge: Yes   Attempted to reach patient for transitions of care follow up. Unable to reach patient. Patient: Kath Kulkarni Patient : 1940 MRN: [id number]    Last Discharge Facility       Date Complaint Diagnosis Description Type Department Provider    23 Shortness of Breath Chronic atrial fibrillation ([facility]) . .. ED to Hosp-Admission (Discharged) (ADMITTED) [id number] Jaimee Tran MD; Latha Hsieh . .. Was this an external facility discharge?  No Discharge Facility Name: [facility]    Noted following upcoming appointments from discharge chart review:   [id number] follow up appointment(s):   Future Appointments   Date Time Provider [address]   2024 10:15 AM [facility] RESOURCE [facility]IM [facility] AMB   2024  2:00 PM Pedro Luis Fleming DO TRIM [facility] AMB     Non-[facility]  follow up appointment(s): na

## 2023-11-16 NOTE — CARE COORDINATION
Care Transitions Outreach Attempt    Call within 2 business days of discharge: Yes   Attempted to reach patient for transitions of care follow up. Unable to reach patient. Patient: Kath Kulkarni Patient : 1940 MRN: [id number]    Last Discharge Facility       Date Complaint Diagnosis Description Type Department Provider    23 Shortness of Breath Chronic atrial fibrillation ([address]) . .. ED to Hosp-Admission (Discharged) (ADMITTED) [id number] Jaimee Tran MD; Latha Hsieh . .. Was this an external facility discharge?  No Discharge Facility Name: [facility]    Noted following upcoming appointments from discharge chart review:   [facility] follow up appointment(s):   Future Appointments   Date Time Provider [address]   2024 10:15 AM TRIM LAB RESOURCE [facility] AMB   2024  2:00 PM Pedro Luis Fleming DO TRIM [facility] AMB     Non-[facility]  follow up appointment(s): na

## 2023-11-17 ENCOUNTER — CARE COORDINATION (OUTPATIENT)
Dept: CARE COORDINATION | Facility: CLINIC | Age: 83
End: 2023-11-17

## 2023-11-17 NOTE — CARE COORDINATION
Care Transitions Initial Follow Up Call    Call within 2 business days of discharge: Yes    Patient Current Location:  Home: [address] [place], [place] [address] [address]    N Care Coordinator contacted the spouse/partner by telephone to perform post hospital discharge assessment. Verified name and  with spouse/partner as identifiers. Provided introduction to self, and explanation of the LPN Care Coordinator role. Patient: Chad Kruger Patient : 1940   MRN: [id number]  Reason for Admission: Acute exacerbation of chronic obstructive pulmonary disease  Discharge Date: 11/15/23 RARS: Readmission Risk Score: 19.6      Last Discharge Facility       Date Complaint Diagnosis Description Type Department Provider    23 Shortness of Breath Chronic atrial fibrillation ([address]) . .. ED to Hosp-Admission (Discharged) (ADMITTED) [id number] Brennen Campbell MD; Adolfo Jimenez . .. Was this an external facility discharge? No Discharge Facility: [facility]    Challenges to be reviewed by the provider   Additional needs identified to be addressed with provider: Yes  Hospital follow up scheduling               Method of communication with provider: staff message. LPN Care Coordinator reviewed discharge instructions, medical action plan, and red flags with spouse/partner who verbalized understanding. The spouse/partner was given an opportunity to ask questions and does not have any further questions or concerns at this time. Were discharge instructions available to patient? Yes. Reviewed appropriate site of care based on symptoms and resources available to patient including: PCP  Home health  When to call 911. The spouse/partner agrees to contact the PCP office for questions related to their healthcare. Advance Care Planning:   Does patient have an Advance Directive: reviewed and current.     Medication reconciliation was performed with spouse/partner, who verbalizes understanding of administration of home

## 2023-11-27 ENCOUNTER — OFFICE VISIT (OUTPATIENT)
Dept: INTERNAL MEDICINE CLINIC | Facility: CLINIC | Age: 83
End: 2023-11-27

## 2023-11-27 VITALS
BODY MASS INDEX: 26.11 KG/M2 | DIASTOLIC BLOOD PRESSURE: 60 MMHG | WEIGHT: 182 LBS | SYSTOLIC BLOOD PRESSURE: 130 MMHG | HEART RATE: 91 BPM | OXYGEN SATURATION: 94 % | TEMPERATURE: 98.5 F

## 2023-11-27 DIAGNOSIS — F41.9 ANXIETY: ICD-10-CM

## 2023-11-27 DIAGNOSIS — J44.1 ACUTE EXACERBATION OF CHRONIC OBSTRUCTIVE PULMONARY DISEASE (COPD) (HCC): ICD-10-CM

## 2023-11-27 DIAGNOSIS — N18.4 CKD (CHRONIC KIDNEY DISEASE) STAGE 4, GFR 15-29 ML/MIN (HCC): ICD-10-CM

## 2023-11-27 DIAGNOSIS — E11.22 TYPE 2 DIABETES MELLITUS WITH STAGE 3B CHRONIC KIDNEY DISEASE, WITHOUT LONG-TERM CURRENT USE OF INSULIN (HCC): ICD-10-CM

## 2023-11-27 DIAGNOSIS — Z09 HOSPITAL DISCHARGE FOLLOW-UP: ICD-10-CM

## 2023-11-27 DIAGNOSIS — Z23 NEEDS FLU SHOT: Primary | ICD-10-CM

## 2023-11-27 DIAGNOSIS — N18.32 TYPE 2 DIABETES MELLITUS WITH STAGE 3B CHRONIC KIDNEY DISEASE, WITHOUT LONG-TERM CURRENT USE OF INSULIN (HCC): ICD-10-CM

## 2023-11-27 DIAGNOSIS — I10 ESSENTIAL HYPERTENSION, BENIGN: ICD-10-CM

## 2023-11-27 RX ORDER — VALSARTAN 80 MG/1
80 TABLET ORAL DAILY
Qty: 30 TABLET | Refills: 5 | Status: SHIPPED | OUTPATIENT
Start: 2023-11-27

## 2023-11-27 RX ORDER — AMLODIPINE BESYLATE 5 MG/1
5 TABLET ORAL DAILY
Qty: 90 TABLET | Refills: 3 | Status: SHIPPED | OUTPATIENT
Start: 2023-11-27

## 2023-11-27 RX ORDER — MIRTAZAPINE 15 MG/1
15 TABLET, FILM COATED ORAL NIGHTLY
Qty: 90 TABLET | Refills: 1 | Status: SHIPPED | OUTPATIENT
Start: 2023-11-27

## 2023-11-27 RX ORDER — GLIPIZIDE 2.5 MG/1
TABLET, EXTENDED RELEASE ORAL
Qty: 90 TABLET | Refills: 1 | Status: SHIPPED | OUTPATIENT
Start: 2023-11-27

## 2023-11-27 NOTE — PROGRESS NOTES
Post-Discharge Transitional Care  Follow Up      Inna Garcia   YOB: 1940    Date of Office Visit:  11/27/2023  Date of Hospital Admission: 11/9/23  Date of Hospital Discharge: 11/15/23  Risk of hospital readmission (high >=14%. Medium >=10%) :Readmission Risk Score: 19.6      Care management risk score Rising risk (score 2-5) and Complex Care (Scores >=6): No Risk Score On File     Non face to face  following discharge, date last encounter closed (first attempt may have been earlier): 11/17/2023    Call initiated 2 business days of discharge: Yes    ASSESSMENT/PLAN:   Needs flu shot  -     Influenza, FLUAD, (age 72 y+), IM, PF, 0.5 mL  Anxiety  -     mirtazapine (REMERON) 15 MG tablet; Take 1 tablet by mouth nightly Indications: Appetite/Mood, Disp-90 tablet, R-1Normal  Type 2 diabetes mellitus with stage 3b chronic kidney disease, without long-term current use of insulin ([facility])  -     glipiZIDE (GLUCOTROL XL) 2.5 MG extended release tablet; 1 daily with biggest meal day/ if unable to eat dont take, Disp-90 tablet, R-1Normal  Essential hypertension, benign  -     amLODIPine (NORVASC) 5 MG tablet; Take 1 tablet by mouth daily Indications: Hypertension, Disp-90 tablet, R-3Normal  Hospital discharge follow-up  -     [place] DISCHARGE MED[place] RECONCILED W/ CURRENT OUTPATIENT MED LIST  -     [place] DISCHARGE MEDS RECONCILED W/ CURRENT OUTPATIENT MED LIST  Acute exacerbation of chronic obstructive pulmonary disease (COPD) ([facility])  CKD (chronic kidney disease) stage 4, GFR 15-29 ml/min ([facility])  -     amLODIPine (NORVASC) 5 MG tablet; Take 1 tablet by mouth daily Indications: Hypertension, Disp-90 tablet, R-3Normal  -     valsartan (DIOVAN) 80 MG tablet; Take 1 tablet by mouth daily, Disp-30 tablet, R-5Normal      Medical Decision Making: moderate complexity  Return if symptoms worsen or fail to improve, for as scheduled.            Subjective:   HPI:  Follow up of Hospital problems/diagnosis(es): copd exacerbation,

## 2023-11-28 ENCOUNTER — CARE COORDINATION (OUTPATIENT)
Dept: CARE COORDINATION | Facility: CLINIC | Age: 83
End: 2023-11-28

## 2023-11-28 NOTE — CARE COORDINATION
Care Transitions Outreach Attempt    Attempted outreach follow up without success, call was declined. Per chart review patient is following plan of care and attended PCP follow up. No new needs are noted on chart review. Will attempt next outreach in 10 to 14 days. Patient: Hortencia Diaz Patient : 1940 MRN: [id number]    Last Discharge Facility       Date Complaint Diagnosis Description Type Department Provider    23 Shortness of Breath Chronic atrial fibrillation ([address]) . .. ED to Hosp-Admission (Discharged) (ADMITTED) [id number] Susy Burleson MD; Desmond Piña . ..               Noted following upcoming appointments from discharge chart review:   [facility] follow up appointment(s):   Future Appointments   Date Time Provider [address]   2024 10:15 AM [facility] AMB   2024  2:00 PM Pedro Luis Fleming DO [facility] AMB     Non-[facility]  follow up appointment(s): na

## 2023-12-14 ENCOUNTER — CARE COORDINATION (OUTPATIENT)
Dept: CARE COORDINATION | Facility: CLINIC | Age: 83
End: 2023-12-14

## 2023-12-14 NOTE — CARE COORDINATION
Patient has graduated from the Care Transitions program on 12.14.23. Patient/family has the ability to self-manage at this time. [facility] remains active. Patient has no further care management needs, no referral to the [facility] team for further management. Patient has [facility] Care Coordinator's contact information for any further questions, concerns, or needs.   Patients upcoming visits:    Future Appointments   Date Time Provider [address] [place] [address]   2/19/2024 10:15 AM [facility] RESOURCE [facility] AMB   2/22/2024  2:00 PM Pedro Luis Fleming,  [facility]IM [facility] AMB

## 2024-02-19 ENCOUNTER — NURSE ONLY (OUTPATIENT)
Dept: INTERNAL MEDICINE CLINIC | Facility: CLINIC | Age: 84
End: 2024-02-19

## 2024-02-19 DIAGNOSIS — N18.32 TYPE 2 DIABETES MELLITUS WITH STAGE 3B CHRONIC KIDNEY DISEASE, WITHOUT LONG-TERM CURRENT USE OF INSULIN (HCC): ICD-10-CM

## 2024-02-19 DIAGNOSIS — I10 ESSENTIAL HYPERTENSION, BENIGN: ICD-10-CM

## 2024-02-19 DIAGNOSIS — N18.4 CKD (CHRONIC KIDNEY DISEASE) STAGE 4, GFR 15-29 ML/MIN (HCC): ICD-10-CM

## 2024-02-19 DIAGNOSIS — E11.22 TYPE 2 DIABETES MELLITUS WITH STAGE 3B CHRONIC KIDNEY DISEASE, WITHOUT LONG-TERM CURRENT USE OF INSULIN (HCC): ICD-10-CM

## 2024-02-19 LAB
ALBUMIN SERPL-MCNC: 3.5 G/DL (ref 3.2–4.6)
ALBUMIN/GLOB SERPL: 1.3 (ref 0.4–1.6)
ALP SERPL-CCNC: 70 U/L (ref 50–136)
ALT SERPL-CCNC: 9 U/L (ref 12–65)
ANION GAP SERPL CALC-SCNC: 4 MMOL/L (ref 2–11)
AST SERPL-CCNC: 10 U/L (ref 15–37)
BILIRUB SERPL-MCNC: 0.3 MG/DL (ref 0.2–1.1)
BUN SERPL-MCNC: 22 MG/DL (ref 8–23)
CALCIUM SERPL-MCNC: 9.2 MG/DL (ref 8.3–10.4)
CHLORIDE SERPL-SCNC: 115 MMOL/L (ref 103–113)
CO2 SERPL-SCNC: 30 MMOL/L (ref 21–32)
CREAT SERPL-MCNC: 1.6 MG/DL (ref 0.8–1.5)
ERYTHROCYTE [DISTWIDTH] IN BLOOD BY AUTOMATED COUNT: 16.7 % (ref 11.9–14.6)
GLOBULIN SER CALC-MCNC: 2.8 G/DL (ref 2.8–4.5)
GLUCOSE SERPL-MCNC: 83 MG/DL (ref 65–100)
HCT VFR BLD AUTO: 36.3 % (ref 41.1–50.3)
HGB BLD-MCNC: 11.2 G/DL (ref 13.6–17.2)
MCH RBC QN AUTO: 25.8 PG (ref 26.1–32.9)
MCHC RBC AUTO-ENTMCNC: 30.9 G/DL (ref 31.4–35)
MCV RBC AUTO: 83.6 FL (ref 82–102)
NRBC # BLD: 0 K/UL (ref 0–0.2)
PLATELET # BLD AUTO: 246 K/UL (ref 150–450)
PMV BLD AUTO: 11.9 FL (ref 9.4–12.3)
POTASSIUM SERPL-SCNC: 4.2 MMOL/L (ref 3.5–5.1)
PROT SERPL-MCNC: 6.3 G/DL (ref 6.3–8.2)
RBC # BLD AUTO: 4.34 M/UL (ref 4.23–5.6)
SODIUM SERPL-SCNC: 149 MMOL/L (ref 136–146)
WBC # BLD AUTO: 7 K/UL (ref 4.3–11.1)

## 2024-02-21 LAB
EST. AVERAGE GLUCOSE BLD GHB EST-MCNC: 120 MG/DL
HBA1C MFR BLD: 5.8 % (ref 4.8–5.6)

## 2024-02-22 ENCOUNTER — OFFICE VISIT (OUTPATIENT)
Dept: INTERNAL MEDICINE CLINIC | Facility: CLINIC | Age: 84
End: 2024-02-22

## 2024-02-22 VITALS
TEMPERATURE: 98 F | HEART RATE: 73 BPM | SYSTOLIC BLOOD PRESSURE: 134 MMHG | WEIGHT: 183 LBS | DIASTOLIC BLOOD PRESSURE: 68 MMHG | BODY MASS INDEX: 26.2 KG/M2 | OXYGEN SATURATION: 97 % | HEIGHT: 70 IN

## 2024-02-22 DIAGNOSIS — F33.1 MAJOR DEPRESSIVE DISORDER, RECURRENT EPISODE, MODERATE (HCC): ICD-10-CM

## 2024-02-22 DIAGNOSIS — I48.20 CHRONIC ATRIAL FIBRILLATION (HCC): ICD-10-CM

## 2024-02-22 DIAGNOSIS — E87.0 HYPERNATREMIA: Primary | ICD-10-CM

## 2024-02-22 DIAGNOSIS — N18.4 CKD (CHRONIC KIDNEY DISEASE) STAGE 4, GFR 15-29 ML/MIN (HCC): ICD-10-CM

## 2024-02-22 DIAGNOSIS — E11.22 TYPE 2 DIABETES MELLITUS WITH STAGE 3B CHRONIC KIDNEY DISEASE, WITHOUT LONG-TERM CURRENT USE OF INSULIN (HCC): ICD-10-CM

## 2024-02-22 DIAGNOSIS — F02.C0 SEVERE LATE ONSET ALZHEIMER'S DEMENTIA WITHOUT BEHAVIORAL DISTURBANCE, PSYCHOTIC DISTURBANCE, MOOD DISTURBANCE, OR ANXIETY (HCC): ICD-10-CM

## 2024-02-22 DIAGNOSIS — K21.9 GASTROESOPHAGEAL REFLUX DISEASE WITHOUT ESOPHAGITIS: ICD-10-CM

## 2024-02-22 DIAGNOSIS — N18.32 TYPE 2 DIABETES MELLITUS WITH STAGE 3B CHRONIC KIDNEY DISEASE, WITHOUT LONG-TERM CURRENT USE OF INSULIN (HCC): ICD-10-CM

## 2024-02-22 DIAGNOSIS — C64.9 MALIGNANT NEOPLASM OF KIDNEY EXCLUDING RENAL PELVIS, UNSPECIFIED LATERALITY (HCC): ICD-10-CM

## 2024-02-22 DIAGNOSIS — F03.918 DEMENTIA WITH BEHAVIORAL DISTURBANCE (HCC): ICD-10-CM

## 2024-02-22 DIAGNOSIS — G30.1 SEVERE LATE ONSET ALZHEIMER'S DEMENTIA WITHOUT BEHAVIORAL DISTURBANCE, PSYCHOTIC DISTURBANCE, MOOD DISTURBANCE, OR ANXIETY (HCC): ICD-10-CM

## 2024-02-22 DIAGNOSIS — F33.42 MAJOR DEPRESSIVE DISORDER, RECURRENT, IN FULL REMISSION (HCC): ICD-10-CM

## 2024-02-22 DIAGNOSIS — I74.8 SUBCLAVIAN ARTERY THROMBOSIS (HCC): ICD-10-CM

## 2024-02-22 DIAGNOSIS — J44.1 ACUTE EXACERBATION OF CHRONIC OBSTRUCTIVE PULMONARY DISEASE (COPD) (HCC): ICD-10-CM

## 2024-02-22 DIAGNOSIS — I25.118 ATHEROSCLEROTIC HEART DISEASE OF NATIVE CORONARY ARTERY WITH OTHER FORMS OF ANGINA PECTORIS (HCC): ICD-10-CM

## 2024-02-22 PROBLEM — J96.01 ACUTE RESPIRATORY FAILURE WITH HYPOXIA (HCC): Status: RESOLVED | Noted: 2022-06-22 | Resolved: 2024-02-22

## 2024-02-22 RX ORDER — PANTOPRAZOLE SODIUM 40 MG/1
40 TABLET, DELAYED RELEASE ORAL DAILY
Qty: 90 TABLET | Refills: 3 | Status: SHIPPED | OUTPATIENT
Start: 2024-02-22

## 2024-02-22 ASSESSMENT — PATIENT HEALTH QUESTIONNAIRE - PHQ9
SUM OF ALL RESPONSES TO PHQ QUESTIONS 1-9: 0
SUM OF ALL RESPONSES TO PHQ QUESTIONS 1-9: 0
1. LITTLE INTEREST OR PLEASURE IN DOING THINGS: 0
9. THOUGHTS THAT YOU WOULD BE BETTER OFF DEAD, OR OF HURTING YOURSELF: 0
4. FEELING TIRED OR HAVING LITTLE ENERGY: 0
8. MOVING OR SPEAKING SO SLOWLY THAT OTHER PEOPLE COULD HAVE NOTICED. OR THE OPPOSITE, BEING SO FIGETY OR RESTLESS THAT YOU HAVE BEEN MOVING AROUND A LOT MORE THAN USUAL: 0
3. TROUBLE FALLING OR STAYING ASLEEP: 0
SUM OF ALL RESPONSES TO PHQ QUESTIONS 1-9: 0
SUM OF ALL RESPONSES TO PHQ QUESTIONS 1-9: 0
2. FEELING DOWN, DEPRESSED OR HOPELESS: 0
6. FEELING BAD ABOUT YOURSELF - OR THAT YOU ARE A FAILURE OR HAVE LET YOURSELF OR YOUR FAMILY DOWN: 0
5. POOR APPETITE OR OVEREATING: 0
7. TROUBLE CONCENTRATING ON THINGS, SUCH AS READING THE NEWSPAPER OR WATCHING TELEVISION: 0
SUM OF ALL RESPONSES TO PHQ9 QUESTIONS 1 & 2: 0

## 2024-02-22 ASSESSMENT — ENCOUNTER SYMPTOMS
CONSTIPATION: 0
SINUS PAIN: 0
VOMITING: 0
WHEEZING: 0
ABDOMINAL PAIN: 0
DIARRHEA: 0
SHORTNESS OF BREATH: 0
NAUSEA: 0

## 2024-02-22 NOTE — PROGRESS NOTES
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

## 2024-05-29 DIAGNOSIS — F41.9 ANXIETY: ICD-10-CM

## 2024-05-29 RX ORDER — MIRTAZAPINE 15 MG/1
15 TABLET, FILM COATED ORAL NIGHTLY
Qty: 90 TABLET | Refills: 1 | Status: SHIPPED | OUTPATIENT
Start: 2024-05-29

## 2024-07-18 DIAGNOSIS — E11.22 TYPE 2 DIABETES MELLITUS WITH STAGE 3B CHRONIC KIDNEY DISEASE, WITHOUT LONG-TERM CURRENT USE OF INSULIN (HCC): ICD-10-CM

## 2024-07-18 DIAGNOSIS — N18.32 TYPE 2 DIABETES MELLITUS WITH STAGE 3B CHRONIC KIDNEY DISEASE, WITHOUT LONG-TERM CURRENT USE OF INSULIN (HCC): ICD-10-CM

## 2024-07-18 DIAGNOSIS — F41.9 ANXIETY: ICD-10-CM

## 2024-07-18 RX ORDER — CITALOPRAM HYDROBROMIDE 10 MG/1
10 TABLET ORAL DAILY
Qty: 90 TABLET | Refills: 3 | Status: SHIPPED | OUTPATIENT
Start: 2024-07-18

## 2024-07-18 RX ORDER — GLIPIZIDE 2.5 MG/1
TABLET, EXTENDED RELEASE ORAL
Qty: 90 TABLET | Refills: 1 | Status: SHIPPED | OUTPATIENT
Start: 2024-07-18

## 2024-08-22 ENCOUNTER — OFFICE VISIT (OUTPATIENT)
Dept: INTERNAL MEDICINE CLINIC | Facility: CLINIC | Age: 84
End: 2024-08-22

## 2024-08-22 VITALS
OXYGEN SATURATION: 97 % | WEIGHT: 180 LBS | HEART RATE: 71 BPM | DIASTOLIC BLOOD PRESSURE: 60 MMHG | TEMPERATURE: 98.1 F | SYSTOLIC BLOOD PRESSURE: 100 MMHG | HEIGHT: 70 IN | BODY MASS INDEX: 25.77 KG/M2

## 2024-08-22 DIAGNOSIS — R39.9 LOWER URINARY TRACT SYMPTOMS (LUTS): ICD-10-CM

## 2024-08-22 DIAGNOSIS — F41.9 ANXIETY: ICD-10-CM

## 2024-08-22 DIAGNOSIS — F02.C0 SEVERE LATE ONSET ALZHEIMER'S DEMENTIA WITHOUT BEHAVIORAL DISTURBANCE, PSYCHOTIC DISTURBANCE, MOOD DISTURBANCE, OR ANXIETY (HCC): ICD-10-CM

## 2024-08-22 DIAGNOSIS — N18.4 CKD (CHRONIC KIDNEY DISEASE) STAGE 4, GFR 15-29 ML/MIN (HCC): ICD-10-CM

## 2024-08-22 DIAGNOSIS — I74.8 SUBCLAVIAN ARTERY THROMBOSIS (HCC): ICD-10-CM

## 2024-08-22 DIAGNOSIS — R15.9 INCONTINENCE OF FECES, UNSPECIFIED FECAL INCONTINENCE TYPE: ICD-10-CM

## 2024-08-22 DIAGNOSIS — G30.1 SEVERE LATE ONSET ALZHEIMER'S DEMENTIA WITHOUT BEHAVIORAL DISTURBANCE, PSYCHOTIC DISTURBANCE, MOOD DISTURBANCE, OR ANXIETY (HCC): ICD-10-CM

## 2024-08-22 DIAGNOSIS — Z00.00 MEDICARE ANNUAL WELLNESS VISIT, SUBSEQUENT: Primary | ICD-10-CM

## 2024-08-22 DIAGNOSIS — E87.0 HYPERNATREMIA: ICD-10-CM

## 2024-08-22 LAB
ALBUMIN SERPL-MCNC: 3.2 G/DL (ref 3.2–4.6)
ALBUMIN/GLOB SERPL: 1.2 (ref 1–1.9)
ALP SERPL-CCNC: 70 U/L (ref 40–129)
ALT SERPL-CCNC: 6 U/L (ref 12–65)
ANION GAP SERPL CALC-SCNC: 7 MMOL/L (ref 9–18)
AST SERPL-CCNC: 15 U/L (ref 15–37)
BILIRUB SERPL-MCNC: 0.3 MG/DL (ref 0–1.2)
BUN SERPL-MCNC: 26 MG/DL (ref 8–23)
CALCIUM SERPL-MCNC: 8.8 MG/DL (ref 8.8–10.2)
CHLORIDE SERPL-SCNC: 105 MMOL/L (ref 98–107)
CO2 SERPL-SCNC: 29 MMOL/L (ref 20–28)
CREAT SERPL-MCNC: 1.74 MG/DL (ref 0.8–1.3)
ERYTHROCYTE [DISTWIDTH] IN BLOOD BY AUTOMATED COUNT: 16.3 % (ref 11.9–14.6)
GLOBULIN SER CALC-MCNC: 2.6 G/DL (ref 2.3–3.5)
GLUCOSE SERPL-MCNC: 162 MG/DL (ref 70–99)
HCT VFR BLD AUTO: 39.6 % (ref 41.1–50.3)
HGB BLD-MCNC: 12.1 G/DL (ref 13.6–17.2)
MCH RBC QN AUTO: 25.7 PG (ref 26.1–32.9)
MCHC RBC AUTO-ENTMCNC: 30.6 G/DL (ref 31.4–35)
MCV RBC AUTO: 84.3 FL (ref 82–102)
NRBC # BLD: 0 K/UL (ref 0–0.2)
PLATELET # BLD AUTO: 304 K/UL (ref 150–450)
PMV BLD AUTO: 12.7 FL (ref 9.4–12.3)
POTASSIUM SERPL-SCNC: 4 MMOL/L (ref 3.5–5.1)
PROT SERPL-MCNC: 5.8 G/DL (ref 6.3–8.2)
RBC # BLD AUTO: 4.7 M/UL (ref 4.23–5.6)
SODIUM SERPL-SCNC: 141 MMOL/L (ref 136–145)
T4 FREE SERPL-MCNC: 1 NG/DL (ref 0.9–1.7)
TSH, 3RD GENERATION: 1.6 UIU/ML (ref 0.27–4.2)
WBC # BLD AUTO: 6.1 K/UL (ref 4.3–11.1)

## 2024-08-22 RX ORDER — VALSARTAN 80 MG/1
80 TABLET ORAL DAILY
Qty: 90 TABLET | Refills: 3 | Status: SHIPPED | OUTPATIENT
Start: 2024-08-22

## 2024-08-22 RX ORDER — MIRTAZAPINE 15 MG/1
15 TABLET, FILM COATED ORAL NIGHTLY
Qty: 90 TABLET | Refills: 1 | Status: SHIPPED | OUTPATIENT
Start: 2024-08-22

## 2024-08-22 RX ORDER — TAMSULOSIN HYDROCHLORIDE 0.4 MG/1
0.4 CAPSULE ORAL DAILY
Qty: 90 CAPSULE | Refills: 3 | Status: SHIPPED | OUTPATIENT
Start: 2024-08-22

## 2024-08-22 SDOH — ECONOMIC STABILITY: FOOD INSECURITY: WITHIN THE PAST 12 MONTHS, THE FOOD YOU BOUGHT JUST DIDN'T LAST AND YOU DIDN'T HAVE MONEY TO GET MORE.: NEVER TRUE

## 2024-08-22 SDOH — ECONOMIC STABILITY: FOOD INSECURITY: WITHIN THE PAST 12 MONTHS, YOU WORRIED THAT YOUR FOOD WOULD RUN OUT BEFORE YOU GOT MONEY TO BUY MORE.: NEVER TRUE

## 2024-08-22 SDOH — ECONOMIC STABILITY: INCOME INSECURITY: HOW HARD IS IT FOR YOU TO PAY FOR THE VERY BASICS LIKE FOOD, HOUSING, MEDICAL CARE, AND HEATING?: NOT HARD AT ALL

## 2024-08-22 ASSESSMENT — LIFESTYLE VARIABLES: HOW MANY STANDARD DRINKS CONTAINING ALCOHOL DO YOU HAVE ON A TYPICAL DAY: PATIENT DOES NOT DRINK

## 2024-08-22 ASSESSMENT — PATIENT HEALTH QUESTIONNAIRE - PHQ9
3. TROUBLE FALLING OR STAYING ASLEEP: NOT AT ALL
7. TROUBLE CONCENTRATING ON THINGS, SUCH AS READING THE NEWSPAPER OR WATCHING TELEVISION: NOT AT ALL
8. MOVING OR SPEAKING SO SLOWLY THAT OTHER PEOPLE COULD HAVE NOTICED. OR THE OPPOSITE, BEING SO FIGETY OR RESTLESS THAT YOU HAVE BEEN MOVING AROUND A LOT MORE THAN USUAL: NOT AT ALL
6. FEELING BAD ABOUT YOURSELF - OR THAT YOU ARE A FAILURE OR HAVE LET YOURSELF OR YOUR FAMILY DOWN: NOT AT ALL
9. THOUGHTS THAT YOU WOULD BE BETTER OFF DEAD, OR OF HURTING YOURSELF: NOT AT ALL
4. FEELING TIRED OR HAVING LITTLE ENERGY: NOT AT ALL
SUM OF ALL RESPONSES TO PHQ QUESTIONS 1-9: 0
SUM OF ALL RESPONSES TO PHQ QUESTIONS 1-9: 0
5. POOR APPETITE OR OVEREATING: NOT AT ALL
SUM OF ALL RESPONSES TO PHQ QUESTIONS 1-9: 0
2. FEELING DOWN, DEPRESSED OR HOPELESS: NOT AT ALL
SUM OF ALL RESPONSES TO PHQ QUESTIONS 1-9: 0
1. LITTLE INTEREST OR PLEASURE IN DOING THINGS: NOT AT ALL
SUM OF ALL RESPONSES TO PHQ9 QUESTIONS 1 & 2: 0

## 2024-08-22 NOTE — PROGRESS NOTES
John \"Talha\" was seen today for follow-up and medicare awv.    Diagnoses and all orders for this visit:    Medicare annual wellness visit, subsequent    Anxiety  -     mirtazapine (REMERON) 15 MG tablet; Take 1 tablet by mouth nightly Indications: Appetite/Mood    Lower urinary tract symptoms (LUTS)  -     tamsulosin (FLOMAX) 0.4 MG capsule; Take 1 capsule by mouth daily Indications: BLADDER OUTLET SYMPTOMS, BPH SUSPECT TAKE AT HS    CKD (chronic kidney disease) stage 4, GFR 15-29 ml/min (HCC)  -     valsartan (DIOVAN) 80 MG tablet; Take 1 tablet by mouth daily    Incontinence of feces, unspecified fecal incontinence type    Hypernatremia  -     CBC; Future  -     Comprehensive Metabolic Panel; Future  -     TSH; Future  -     T4, Free; Future  -     T4, Free  -     TSH  -     Comprehensive Metabolic Panel  -     CBC    Severe late onset Alzheimer's dementia without behavioral disturbance, psychotic disturbance, mood disturbance, or anxiety (HCC)  -     CBC; Future  -     Comprehensive Metabolic Panel; Future  -     TSH; Future  -     T4, Free; Future  -     T4, Free  -     TSH  -     Comprehensive Metabolic Panel  -     CBC    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HCC)  -     CBC; Future  -     Comprehensive Metabolic Panel; Future  -     TSH; Future  -     T4, Free; Future  -     T4, Free  -     TSH  -     Comprehensive Metabolic Panel  -     CBC      Encouraged fiber ,regular attempts to have bm/urinate ever hour or tw0.    John Friedman is a 84 y.o. male    Chief Complaint   Patient presents with    Follow-up     6 month check up needs labs    Medicare AW     Doing well  Some incontinence urine and stool      Nurse Only on 02/19/2024   Component Date Value Ref Range Status    WBC 02/19/2024 7.0  4.3 - 11.1 K/uL Final    RBC 02/19/2024 4.34  4.23 - 5.6 M/uL Final    Hemoglobin 02/19/2024 11.2 (L)  13.6 - 17.2 g/dL Final    Hematocrit 02/19/2024 36.3 (L)  41.1 - 50.3 % Final    MCV 02/19/2024 83.6  82 - 102 FL

## 2024-08-22 NOTE — PATIENT INSTRUCTIONS
Learning About Being Active as an Older Adult  Why is being active important as you get older?     Being active is one of the best things you can do for your health. And it's never too late to start. Being active--or getting active, if you aren't already--has definite benefits. It can:  Give you more energy,  Keep your mind sharp.  Improve balance to reduce your risk of falls.  Help you manage chronic illness with fewer medicines.  No matter how old you are, how fit you are, or what health problems you have, there is a form of activity that will work for you. And the more physical activity you can do, the better your overall health will be.  What kinds of activity can help you stay healthy?  Being more active will make your daily activities easier. Physical activity includes planned exercise and things you do in daily life. There are four types of activity:  Aerobic.  Doing aerobic activity makes your heart and lungs strong.  Includes walking, dancing, and gardening.  Aim for at least 2½ hours spread throughout the week.  It improves your energy and can help you sleep better.  Muscle-strengthening.  This type of activity can help maintain muscle and strengthen bones.  Includes climbing stairs, using resistance bands, and lifting or carrying heavy loads.  Aim for at least twice a week.  It can help protect the knees and other joints.  Stretching.  Stretching gives you better range of motion in joints and muscles.  Includes upper arm stretches, calf stretches, and gentle yoga.  Aim for at least twice a week, preferably after your muscles are warmed up from other activities.  It can help you function better in daily life.  Balancing.  This helps you stay coordinated and have good posture.  Includes heel-to-toe walking, hanny chi, and certain types of yoga.  Aim for at least 3 days a week.  It can reduce your risk of falling.  Even if you have a hard time meeting the recommendations, it's better to be more active  screening and assessments performed today your provider may have ordered immunizations, labs, imaging, and/or referrals for you.  A list of these orders (if applicable) as well as your Preventive Care list are included within your After Visit Summary for your review.    Other Preventive Recommendations:    A preventive eye exam performed by an eye specialist is recommended every 1-2 years to screen for glaucoma; cataracts, macular degeneration, and other eye disorders.  A preventive dental visit is recommended every 6 months.  Try to get at least 150 minutes of exercise per week or 10,000 steps per day on a pedometer .  Order or download the FREE \"Exercise & Physical Activity: Your Everyday Guide\" from [employer] [employer]. Call 1-200.238.1265 or search [employer] [place] [employer] online.  You need 5011-0292 mg of calcium and 3176-9702 IU of vitamin D per day. It is possible to meet your calcium requirement with diet alone, but a vitamin D supplement is usually necessary to meet this goal.  When exposed to the sun, use a sunscreen that protects against both UVA and UVB radiation with an SPF of 30 or greater. Reapply every 2 to 3 hours or after sweating, drying off with a towel, or swimming.  Always wear a seat belt when traveling in a car. Always wear a helmet when riding a bicycle or motorcycle.

## 2024-12-04 ENCOUNTER — TELEPHONE (OUTPATIENT)
Dept: INTERNAL MEDICINE CLINIC | Facility: CLINIC | Age: 84
End: 2024-12-04

## 2024-12-04 DIAGNOSIS — N18.32 TYPE 2 DIABETES MELLITUS WITH STAGE 3B CHRONIC KIDNEY DISEASE, WITHOUT LONG-TERM CURRENT USE OF INSULIN (HCC): ICD-10-CM

## 2024-12-04 DIAGNOSIS — I10 ESSENTIAL HYPERTENSION, BENIGN: ICD-10-CM

## 2024-12-04 DIAGNOSIS — F41.9 ANXIETY: ICD-10-CM

## 2024-12-04 DIAGNOSIS — N18.4 CKD (CHRONIC KIDNEY DISEASE) STAGE 4, GFR 15-29 ML/MIN (HCC): ICD-10-CM

## 2024-12-04 DIAGNOSIS — E11.22 TYPE 2 DIABETES MELLITUS WITH STAGE 3B CHRONIC KIDNEY DISEASE, WITHOUT LONG-TERM CURRENT USE OF INSULIN (HCC): ICD-10-CM

## 2024-12-04 RX ORDER — MIRTAZAPINE 15 MG/1
15 TABLET, FILM COATED ORAL NIGHTLY
Qty: 90 TABLET | Refills: 0 | Status: SHIPPED | OUTPATIENT
Start: 2024-12-04

## 2024-12-04 RX ORDER — AMLODIPINE BESYLATE 5 MG/1
5 TABLET ORAL DAILY
Qty: 90 TABLET | Refills: 0 | Status: SHIPPED | OUTPATIENT
Start: 2024-12-04

## 2024-12-04 RX ORDER — GLIPIZIDE 2.5 MG/1
TABLET, EXTENDED RELEASE ORAL
Qty: 90 TABLET | Refills: 0 | Status: SHIPPED | OUTPATIENT
Start: 2024-12-04

## 2024-12-04 NOTE — TELEPHONE ENCOUNTER
Patients wife called and requesting refills mirtazapine (REMERON) 15 MG tablet, amLODIPine (NORVASC) 5 MG tablet and glipiZIDE (GLUCOTROL XL) 2.5 MG extended release tablet. Please Advise.        [facility] [address]    Patient/Caregiver provided printed discharge information.

## 2025-01-29 ENCOUNTER — OFFICE VISIT (OUTPATIENT)
Dept: INTERNAL MEDICINE CLINIC | Facility: CLINIC | Age: 85
End: 2025-01-29

## 2025-01-29 VITALS
OXYGEN SATURATION: 94 % | SYSTOLIC BLOOD PRESSURE: 120 MMHG | HEIGHT: 70 IN | HEART RATE: 99 BPM | DIASTOLIC BLOOD PRESSURE: 70 MMHG | TEMPERATURE: 67 F | BODY MASS INDEX: 25.83 KG/M2

## 2025-01-29 DIAGNOSIS — R54 AGE-RELATED PHYSICAL DEBILITY: ICD-10-CM

## 2025-01-29 DIAGNOSIS — K21.9 GASTROESOPHAGEAL REFLUX DISEASE WITHOUT ESOPHAGITIS: ICD-10-CM

## 2025-01-29 DIAGNOSIS — F02.C0 SEVERE LATE ONSET ALZHEIMER'S DEMENTIA WITHOUT BEHAVIORAL DISTURBANCE, PSYCHOTIC DISTURBANCE, MOOD DISTURBANCE, OR ANXIETY (HCC): ICD-10-CM

## 2025-01-29 DIAGNOSIS — G30.1 SEVERE LATE ONSET ALZHEIMER'S DEMENTIA WITHOUT BEHAVIORAL DISTURBANCE, PSYCHOTIC DISTURBANCE, MOOD DISTURBANCE, OR ANXIETY (HCC): ICD-10-CM

## 2025-01-29 DIAGNOSIS — J41.1 MUCOPURULENT CHRONIC BRONCHITIS (HCC): ICD-10-CM

## 2025-01-29 DIAGNOSIS — N18.4 CKD (CHRONIC KIDNEY DISEASE) STAGE 4, GFR 15-29 ML/MIN (HCC): ICD-10-CM

## 2025-01-29 DIAGNOSIS — F33.1 MAJOR DEPRESSIVE DISORDER, RECURRENT EPISODE, MODERATE (HCC): ICD-10-CM

## 2025-01-29 DIAGNOSIS — I10 ESSENTIAL HYPERTENSION, BENIGN: Primary | ICD-10-CM

## 2025-01-29 DIAGNOSIS — I69.993 ATAXIA, LATE EFFECT OF CEREBROVASCULAR DISEASE: ICD-10-CM

## 2025-01-29 DIAGNOSIS — Z23 NEEDS FLU SHOT: ICD-10-CM

## 2025-01-29 DIAGNOSIS — I48.20 CHRONIC ATRIAL FIBRILLATION (HCC): ICD-10-CM

## 2025-01-29 DIAGNOSIS — N18.32 TYPE 2 DIABETES MELLITUS WITH STAGE 3B CHRONIC KIDNEY DISEASE, WITHOUT LONG-TERM CURRENT USE OF INSULIN (HCC): ICD-10-CM

## 2025-01-29 DIAGNOSIS — F41.9 ANXIETY: ICD-10-CM

## 2025-01-29 DIAGNOSIS — R09.81 NASAL CONGESTION: ICD-10-CM

## 2025-01-29 DIAGNOSIS — E11.22 TYPE 2 DIABETES MELLITUS WITH STAGE 3B CHRONIC KIDNEY DISEASE, WITHOUT LONG-TERM CURRENT USE OF INSULIN (HCC): ICD-10-CM

## 2025-01-29 PROBLEM — C64.9 MALIGNANT NEOPLASM OF KIDNEY EXCLUDING RENAL PELVIS (HCC): Status: RESOLVED | Noted: 2022-09-09 | Resolved: 2025-01-01

## 2025-01-29 PROBLEM — C64.9 RENAL CANCER (HCC): Status: RESOLVED | Noted: 2022-09-09 | Resolved: 2025-01-29

## 2025-01-29 PROBLEM — C67.9 MALIGNANT NEOPLASM OF URINARY BLADDER (HCC): Status: RESOLVED | Noted: 2022-09-09 | Resolved: 2025-01-29

## 2025-01-29 PROBLEM — I74.8 SUBCLAVIAN ARTERY THROMBOSIS (HCC): Status: RESOLVED | Noted: 2022-06-21 | Resolved: 2025-01-01

## 2025-01-29 RX ORDER — MIRTAZAPINE 15 MG/1
15 TABLET, FILM COATED ORAL NIGHTLY
Qty: 90 TABLET | Refills: 3 | Status: SHIPPED | OUTPATIENT
Start: 2025-01-29

## 2025-01-29 RX ORDER — GLIPIZIDE 2.5 MG/1
TABLET, EXTENDED RELEASE ORAL
Qty: 90 TABLET | Refills: 3 | Status: SHIPPED | OUTPATIENT
Start: 2025-01-29

## 2025-01-29 RX ORDER — CITALOPRAM HYDROBROMIDE 20 MG/1
20 TABLET ORAL DAILY
Qty: 90 TABLET | Refills: 1 | Status: SHIPPED | OUTPATIENT
Start: 2025-01-29

## 2025-01-29 RX ORDER — AZELASTINE 1 MG/ML
1 SPRAY, METERED NASAL 2 TIMES DAILY
Qty: 60 ML | Refills: 1 | Status: SHIPPED | OUTPATIENT
Start: 2025-01-29

## 2025-01-29 RX ORDER — PANTOPRAZOLE SODIUM 40 MG/1
40 TABLET, DELAYED RELEASE ORAL DAILY
Qty: 90 TABLET | Refills: 3 | Status: SHIPPED | OUTPATIENT
Start: 2025-01-29

## 2025-01-29 RX ORDER — AMLODIPINE BESYLATE 5 MG/1
5 TABLET ORAL DAILY
Qty: 90 TABLET | Refills: 3 | Status: SHIPPED | OUTPATIENT
Start: 2025-01-29

## 2025-01-29 SDOH — ECONOMIC STABILITY: FOOD INSECURITY: WITHIN THE PAST 12 MONTHS, YOU WORRIED THAT YOUR FOOD WOULD RUN OUT BEFORE YOU GOT MONEY TO BUY MORE.: NEVER TRUE

## 2025-01-29 SDOH — ECONOMIC STABILITY: FOOD INSECURITY: WITHIN THE PAST 12 MONTHS, THE FOOD YOU BOUGHT JUST DIDN'T LAST AND YOU DIDN'T HAVE MONEY TO GET MORE.: NEVER TRUE

## 2025-01-29 ASSESSMENT — PATIENT HEALTH QUESTIONNAIRE - PHQ9
3. TROUBLE FALLING OR STAYING ASLEEP: NOT AT ALL
SUM OF ALL RESPONSES TO PHQ QUESTIONS 1-9: 0
SUM OF ALL RESPONSES TO PHQ QUESTIONS 1-9: 0
6. FEELING BAD ABOUT YOURSELF - OR THAT YOU ARE A FAILURE OR HAVE LET YOURSELF OR YOUR FAMILY DOWN: NOT AT ALL
SUM OF ALL RESPONSES TO PHQ9 QUESTIONS 1 & 2: 0
1. LITTLE INTEREST OR PLEASURE IN DOING THINGS: NOT AT ALL
8. MOVING OR SPEAKING SO SLOWLY THAT OTHER PEOPLE COULD HAVE NOTICED. OR THE OPPOSITE, BEING SO FIGETY OR RESTLESS THAT YOU HAVE BEEN MOVING AROUND A LOT MORE THAN USUAL: NOT AT ALL
5. POOR APPETITE OR OVEREATING: NOT AT ALL
7. TROUBLE CONCENTRATING ON THINGS, SUCH AS READING THE NEWSPAPER OR WATCHING TELEVISION: NOT AT ALL
2. FEELING DOWN, DEPRESSED OR HOPELESS: NOT AT ALL
SUM OF ALL RESPONSES TO PHQ QUESTIONS 1-9: 0
SUM OF ALL RESPONSES TO PHQ QUESTIONS 1-9: 0
9. THOUGHTS THAT YOU WOULD BE BETTER OFF DEAD, OR OF HURTING YOURSELF: NOT AT ALL
4. FEELING TIRED OR HAVING LITTLE ENERGY: NOT AT ALL

## 2025-01-29 NOTE — PROGRESS NOTES
John Friedman (: 1940) is a 85 y.o. male, here for evaluation of the following chief complaint(s):  Follow-up (6 month check up) and Medication Refill     Assessment & Plan  1. Hearing impairment.  He has been experiencing hearing issues for the past few weeks, with a significant decline noted recently. Examination revealed wax in the ears, but no other abnormalities were found. Wax removal was attempted during the visit.    2. Anxiety and depression.  He is currently on citalopram 10 mg. The dosage will be increased to 20 mg, to be taken in the morning. Remeron will be continued at night to help with sleep and appetite.    3. Rhinorrhea.  He has been experiencing a runny nose and cough, likely due to drainage. No specific medication was prescribed for this symptom.    4. Urinary incontinence.  He has been unable to control his bladder and bowel movements, likely due to his overall decline in health and mobility. No specific treatment was recommended at this time.    5. Health maintenance.  He received the influenza vaccine.    PROCEDURE  The patient underwent a nephrectomy due to renal malignancy.  1. Essential hypertension, benign  -     amLODIPine (NORVASC) 5 MG tablet; Take 1 tablet by mouth daily Indications: Hypertension, Disp-90 tablet, R-3Normal  -     [facility]  2. CKD (chronic kidney disease) stage 4, GFR 15-29 ml/min ([facility])  -     amLODIPine (NORVASC) 5 MG tablet; Take 1 tablet by mouth daily Indications: Hypertension, Disp-90 tablet, R-3Normal  -     [facility]  3. Type 2 diabetes mellitus with stage 3b chronic kidney disease, without long-term current use of insulin ([facility])  -     glipiZIDE (GLUCOTROL XL) 2.5 MG extended release tablet; 1 daily with biggest meal day/ if unable to eat dont take, Disp-90 tablet, R-3Normal  -     [facility]  4. Anxiety  -     mirtazapine (REMERON) 15 MG tablet; Take 1 tablet by

## (undated) DEVICE — GARMENT,MEDLINE,DVT,INT,CALF,MED, GEN2: Brand: MEDLINE

## (undated) DEVICE — CYSTO: Brand: MEDLINE INDUSTRIES, INC.

## (undated) DEVICE — CYSTO/BLADDER IRRIGATION SET, REGULATING CLAMP

## (undated) DEVICE — GOWN,REINFORCED,POLY,AURORA,XXLARGE,STR: Brand: MEDLINE

## (undated) DEVICE — TRAY PREP DRY W/ PREM GLV 2 APPL 6 SPNG 2 UNDPD 1 OVERWRAP

## (undated) DEVICE — SOLUTION IRRIG 3000ML H2O STRL BAG

## (undated) DEVICE — REM POLYHESIVE ADULT PATIENT RETURN ELECTRODE: Brand: VALLEYLAB